# Patient Record
Sex: MALE | Race: WHITE | Employment: OTHER | URBAN - METROPOLITAN AREA
[De-identification: names, ages, dates, MRNs, and addresses within clinical notes are randomized per-mention and may not be internally consistent; named-entity substitution may affect disease eponyms.]

---

## 2017-09-26 RX ORDER — ASPIRIN 81 MG/1
81 TABLET ORAL DAILY
COMMUNITY
End: 2022-05-17

## 2017-09-26 RX ORDER — GLUCOSAM/CHONDRO/HERB 149/HYAL 750-100 MG
1 TABLET ORAL DAILY
COMMUNITY

## 2017-09-26 RX ORDER — LISINOPRIL 20 MG/1
20 TABLET ORAL DAILY
COMMUNITY
End: 2019-12-18

## 2017-09-26 RX ORDER — BISMUTH SUBSALICYLATE 262 MG
1 TABLET,CHEWABLE ORAL DAILY
COMMUNITY

## 2017-09-26 RX ORDER — PRAVASTATIN SODIUM 20 MG/1
20 TABLET ORAL
COMMUNITY

## 2017-09-26 RX ORDER — ACETAMINOPHEN 500 MG
1000 TABLET ORAL
COMMUNITY

## 2017-09-26 RX ORDER — METFORMIN HYDROCHLORIDE 1000 MG/1
1000 TABLET ORAL 2 TIMES DAILY
COMMUNITY

## 2017-09-26 RX ORDER — AMLODIPINE BESYLATE 5 MG/1
5 TABLET ORAL DAILY
COMMUNITY

## 2017-09-26 RX ORDER — LOSARTAN POTASSIUM 100 MG/1
100 TABLET ORAL DAILY
COMMUNITY

## 2017-09-27 ENCOUNTER — ANESTHESIA (OUTPATIENT)
Dept: ENDOSCOPY | Age: 77
End: 2017-09-27
Payer: MEDICARE

## 2017-09-27 ENCOUNTER — ANESTHESIA EVENT (OUTPATIENT)
Dept: ENDOSCOPY | Age: 77
End: 2017-09-27
Payer: MEDICARE

## 2017-09-27 ENCOUNTER — HOSPITAL ENCOUNTER (OUTPATIENT)
Age: 77
Setting detail: OUTPATIENT SURGERY
Discharge: HOME | End: 2017-09-27
Attending: INTERNAL MEDICINE | Admitting: INTERNAL MEDICINE
Payer: MEDICARE

## 2017-09-27 VITALS
HEART RATE: 58 BPM | DIASTOLIC BLOOD PRESSURE: 62 MMHG | WEIGHT: 251.5 LBS | HEIGHT: 68 IN | RESPIRATION RATE: 12 BRPM | BODY MASS INDEX: 38.12 KG/M2 | OXYGEN SATURATION: 100 % | SYSTOLIC BLOOD PRESSURE: 121 MMHG | TEMPERATURE: 97.5 F

## 2017-09-27 PROCEDURE — 74011250636 HC RX REV CODE- 250/636: Performed by: INTERNAL MEDICINE

## 2017-09-27 PROCEDURE — 77030010936 HC CLP LIG BSC -C: Performed by: INTERNAL MEDICINE

## 2017-09-27 PROCEDURE — 77030013992 HC SNR POLYP ENDOSC BSC -B: Performed by: INTERNAL MEDICINE

## 2017-09-27 PROCEDURE — 74011000250 HC RX REV CODE- 250

## 2017-09-27 PROCEDURE — 88305 TISSUE EXAM BY PATHOLOGIST: CPT | Performed by: INTERNAL MEDICINE

## 2017-09-27 PROCEDURE — 76040000007: Performed by: INTERNAL MEDICINE

## 2017-09-27 PROCEDURE — 77030003657 HC NDL SCLER BSC -B: Performed by: INTERNAL MEDICINE

## 2017-09-27 PROCEDURE — 74011250636 HC RX REV CODE- 250/636

## 2017-09-27 PROCEDURE — 76060000032 HC ANESTHESIA 0.5 TO 1 HR: Performed by: INTERNAL MEDICINE

## 2017-09-27 PROCEDURE — 74011250637 HC RX REV CODE- 250/637: Performed by: INTERNAL MEDICINE

## 2017-09-27 RX ORDER — MIDAZOLAM HYDROCHLORIDE 1 MG/ML
.25-5 INJECTION, SOLUTION INTRAMUSCULAR; INTRAVENOUS
Status: DISCONTINUED | OUTPATIENT
Start: 2017-09-27 | End: 2017-09-27 | Stop reason: HOSPADM

## 2017-09-27 RX ORDER — EPINEPHRINE 0.1 MG/ML
1 INJECTION INTRACARDIAC; INTRAVENOUS
Status: DISCONTINUED | OUTPATIENT
Start: 2017-09-27 | End: 2017-09-27 | Stop reason: HOSPADM

## 2017-09-27 RX ORDER — SODIUM CHLORIDE 0.9 % (FLUSH) 0.9 %
5-10 SYRINGE (ML) INJECTION AS NEEDED
Status: DISCONTINUED | OUTPATIENT
Start: 2017-09-27 | End: 2017-09-27 | Stop reason: HOSPADM

## 2017-09-27 RX ORDER — NALOXONE HYDROCHLORIDE 0.4 MG/ML
0.4 INJECTION, SOLUTION INTRAMUSCULAR; INTRAVENOUS; SUBCUTANEOUS
Status: DISCONTINUED | OUTPATIENT
Start: 2017-09-27 | End: 2017-09-27 | Stop reason: HOSPADM

## 2017-09-27 RX ORDER — ATROPINE SULFATE 0.1 MG/ML
0.5 INJECTION INTRAVENOUS
Status: DISCONTINUED | OUTPATIENT
Start: 2017-09-27 | End: 2017-09-27 | Stop reason: HOSPADM

## 2017-09-27 RX ORDER — SODIUM CHLORIDE 9 MG/ML
75 INJECTION, SOLUTION INTRAVENOUS CONTINUOUS
Status: DISCONTINUED | OUTPATIENT
Start: 2017-09-27 | End: 2017-09-27 | Stop reason: HOSPADM

## 2017-09-27 RX ORDER — SODIUM CHLORIDE 0.9 % (FLUSH) 0.9 %
5-10 SYRINGE (ML) INJECTION EVERY 8 HOURS
Status: DISCONTINUED | OUTPATIENT
Start: 2017-09-27 | End: 2017-09-27 | Stop reason: HOSPADM

## 2017-09-27 RX ORDER — LIDOCAINE HYDROCHLORIDE 20 MG/ML
INJECTION, SOLUTION EPIDURAL; INFILTRATION; INTRACAUDAL; PERINEURAL AS NEEDED
Status: DISCONTINUED | OUTPATIENT
Start: 2017-09-27 | End: 2017-09-27 | Stop reason: HOSPADM

## 2017-09-27 RX ORDER — FENTANYL CITRATE 50 UG/ML
50 INJECTION, SOLUTION INTRAMUSCULAR; INTRAVENOUS
Status: DISCONTINUED | OUTPATIENT
Start: 2017-09-27 | End: 2017-09-27 | Stop reason: HOSPADM

## 2017-09-27 RX ORDER — PROPOFOL 10 MG/ML
INJECTION, EMULSION INTRAVENOUS AS NEEDED
Status: DISCONTINUED | OUTPATIENT
Start: 2017-09-27 | End: 2017-09-27 | Stop reason: HOSPADM

## 2017-09-27 RX ORDER — SODIUM CHLORIDE 9 MG/ML
INJECTION, SOLUTION INTRAVENOUS
Status: DISCONTINUED | OUTPATIENT
Start: 2017-09-27 | End: 2017-09-27 | Stop reason: HOSPADM

## 2017-09-27 RX ORDER — FLUMAZENIL 0.1 MG/ML
0.2 INJECTION INTRAVENOUS
Status: DISCONTINUED | OUTPATIENT
Start: 2017-09-27 | End: 2017-09-27 | Stop reason: HOSPADM

## 2017-09-27 RX ORDER — DEXTROMETHORPHAN/PSEUDOEPHED 2.5-7.5/.8
1.2 DROPS ORAL
Status: DISCONTINUED | OUTPATIENT
Start: 2017-09-27 | End: 2017-09-27 | Stop reason: HOSPADM

## 2017-09-27 RX ADMIN — PROPOFOL 30 MG: 10 INJECTION, EMULSION INTRAVENOUS at 11:09

## 2017-09-27 RX ADMIN — SODIUM CHLORIDE 75 ML/HR: 900 INJECTION, SOLUTION INTRAVENOUS at 10:43

## 2017-09-27 RX ADMIN — SODIUM CHLORIDE: 9 INJECTION, SOLUTION INTRAVENOUS at 10:47

## 2017-09-27 RX ADMIN — SIMETHICONE 80 MG: 20 SUSPENSION/ DROPS ORAL at 10:59

## 2017-09-27 RX ADMIN — PROPOFOL 20 MG: 10 INJECTION, EMULSION INTRAVENOUS at 11:02

## 2017-09-27 RX ADMIN — LIDOCAINE HYDROCHLORIDE 60 MG: 20 INJECTION, SOLUTION EPIDURAL; INFILTRATION; INTRACAUDAL; PERINEURAL at 10:54

## 2017-09-27 RX ADMIN — PROPOFOL 100 MG: 10 INJECTION, EMULSION INTRAVENOUS at 10:54

## 2017-09-27 RX ADMIN — PROPOFOL 20 MG: 10 INJECTION, EMULSION INTRAVENOUS at 11:12

## 2017-09-27 RX ADMIN — PROPOFOL 20 MG: 10 INJECTION, EMULSION INTRAVENOUS at 10:59

## 2017-09-27 RX ADMIN — PROPOFOL 20 MG: 10 INJECTION, EMULSION INTRAVENOUS at 11:06

## 2017-09-27 RX ADMIN — PROPOFOL 40 MG: 10 INJECTION, EMULSION INTRAVENOUS at 10:57

## 2017-09-27 NOTE — DISCHARGE INSTRUCTIONS
[place] Office: [phone]    Anh Morales  [id number]  1940    EGD/COLONOSCOPY DISCHARGE INSTRUCTIONS  Discomfort:  Sore throat- throat lozenges or warm salt water gargle  redness at IV site- apply warm compress to area; if redness or soreness persist- contact your physician  Gaseous discomfort- walking, belching will help relieve any discomfort  You may not operate a vehicle for 12 hours  You may not engage in an occupation involving machinery or appliances for rest of today. You may not drink alcoholic beverages for at least 12 hours  Avoid making any critical decisions for at least 24 hour  DIET  You may resume your regular diet - however -  remember your colon is empty and a heavy meal will produce gas. Avoid these foods:  fried / greasy foods, excessive carbonated drinks or too much caffeine  MEDICATIONS   Regarding Aspirin or Nonsteroidal medications specifically, please see below. ACTIVITY  You may resume your normal daily activities. Spend the remainder of the day resting -  avoid any strenuous activity. CALL M.D. ANY SIGN OF   Increasing pain, nausea, vomiting  Abdominal distension (swelling)  New increased bleeding (oral or rectal)  Fever (chills)  Pain in chest area  Bloody discharge from nose or mouth  Shortness of breath    You may not take any Advil, Aspirin, Ibuprofen, Motrin, Aleve, or Goodys for 7 days, ONLY  Tylenol as needed for pain. Follow-up Instructions:   Call  Jerry Jaime MD for any questions or concerns  Results of procedure / biopsy in 7 days   Telephone # [phone]      Follow-up Information     None

## 2017-09-27 NOTE — ROUTINE PROCESS
Isis Ivonne  1940  [id number]    Situation:  Verbal report received from: Viet Mcdonough RN  Procedure: Procedure(s):  COLONOSCOPY  INJECTION  RESOLUTION CLIP x2  ENDOSCOPIC POLYPECTOMY    Background:    Preoperative diagnosis: PERSONAL HISTORY COLON POLYPS  Postoperative diagnosis: polyps, hemorrhoids, diverticulosis    :  Dr. Alanna Garcia  Assistant(s): Endoscopy Technician-1: Benito Decker  Endoscopy Technician-2: Balbir Diggs  Endoscopy RN-1: Ana Cristina Lorenz RN    Specimens:   ID Type Source Tests Collected by Time Destination   1 : cecum polyp Preservative Cecum  Leon Pimentel MD 9/27/2017 1122 Pathology   2 : ascending colon polyp Preservative Colon, Ascending  Leon Pimentel MD 9/27/2017 1123 Pathology   3 : transverse colon polyps Preservative Colon, Transverse  Leon Pimentel MD 9/27/2017 1123 Pathology   4 : descending colon polyp Preservative Colon, Descending  Leon Pimentel MD 9/27/2017 1124 Pathology     H. Pylori  no    Assessment:  Intra-procedure medications       Anesthesia gave intra-procedure sedation and medications, see anesthesia flow sheet     Intravenous fluids: NS@ KVO     Vital signs stable     Abdominal assessment: round and soft     Recommendation:  Discharge patient per MD order  Family or Friend   Permission to share finding with family or friend

## 2017-09-27 NOTE — ANESTHESIA PREPROCEDURE EVALUATION
Anesthetic History   No history of anesthetic complications            Review of Systems / Medical History  Patient summary reviewed, nursing notes reviewed and pertinent labs reviewed    Pulmonary  Within defined limits                 Neuro/Psych   Within defined limits           Cardiovascular  Within defined limits  Hypertension              Exercise tolerance: >4 METS     GI/Hepatic/Renal  Within defined limits              Endo/Other  Within defined limits  Diabetes    Arthritis     Other Findings   Comments: Hx Prostate Ca           Physical Exam    Airway  Mallampati: II  TM Distance: 4 - 6 cm  Neck ROM: normal range of motion   Mouth opening: Normal     Cardiovascular  Regular rate and rhythm,  S1 and S2 normal,  no murmur, click, rub, or gallop             Dental    Dentition: Full upper dentures and Full lower dentures     Pulmonary  Breath sounds clear to auscultation               Abdominal  GI exam deferred       Other Findings            Anesthetic Plan    ASA: 2  Anesthesia type: general and total IV anesthesia          Induction: Intravenous  Anesthetic plan and risks discussed with: Patient

## 2017-09-27 NOTE — PERIOP NOTES
Anesthesia reports 250mg Propofol, 60mg Lidocaine and 400mL NS given during procedure. Received report from anesthesia staff on vital signs and status of patient.

## 2017-09-27 NOTE — IP AVS SNAPSHOT
[address] [facility] 
[phone] Patient: Manan Collier MRN: [id number] [facility]:0/7/6073 You are allergic to the following No active allergies Recent Documentation Height Weight BMI Smoking Status 1.727 m 114.1 kg 38.24 kg/m2 Former Smoker Emergency Contacts Name Discharge Info Relation Home Work Mobile Janiya Castillo CAREGIVER [3] Spouse [3] [phone] About your hospitalization You were admitted on:  September 27, 2017 You last received care in the:  [facility] ENDOSCOPY You were discharged on:  September 27, 2017 Unit phone number:  [phone] Why you were hospitalized Your primary diagnosis was:  Not on File Providers Seen During Your Hospitalizations Provider Role Specialty Primary office phone Jannet Sanchez MD Attending Provider Gastroenterology [phone] Your Primary Care Physician (PCP) Primary Care Physician Office Phone Office Fax Rena Belcher [phone] Follow-up Information None Current Discharge Medication List  
  
CONTINUE these medications which have NOT CHANGED Dose & Instructions Dispensing Information Comments Morning Noon Evening Bedtime  
 amLODIPine 5 mg tablet Commonly known as:  Serge Emerald Your last dose was: Your next dose is:    
   
   
 Dose:  5 mg Take 5 mg by mouth daily. Refills:  0  
     
   
   
   
  
 aspirin delayed-release 81 mg tablet Your last dose was: Your next dose is:    
   
   
 Dose:  81 mg Take 81 mg by mouth daily. Refills:  0  
     
   
   
   
  
 FISH OIL 1,000 mg (120 mg-180 mg) Cap Generic drug:  Omega-3-DHA-EPA-Fish Oil Your last dose was: Your next dose is:    
   
   
 Dose:  1 Tab Take 1 Tab by mouth daily. Refills:  0 lisinopril 20 mg tablet Commonly known as:  Domitilaquentin Camacho Your last dose was: Your next dose is:    
   
   
 Dose:  20 mg Take 20 mg by mouth daily. Refills:  0  
     
   
   
   
  
 losartan 100 mg tablet Commonly known as:  COZAAR Your last dose was: Your next dose is:    
   
   
 Dose:  100 mg Take 100 mg by mouth daily. Refills:  0  
     
   
   
   
  
 metFORMIN 1,000 mg tablet Commonly known as:  GLUCOPHAGE Your last dose was: Your next dose is:    
   
   
 Dose:  1000 mg Take 1,000 mg by mouth two (2) times a day. Refills:  0  
     
   
   
   
  
 multivitamin tablet Commonly known as:  ONE A DAY Your last dose was: Your next dose is:    
   
   
 Dose:  1 Tab Take 1 Tab by mouth daily. Refills:  0  
     
   
   
   
  
 pravastatin 20 mg tablet Commonly known as:  PRAVACHOL Your last dose was: Your next dose is:    
   
   
 Dose:  20 mg Take 20 mg by mouth nightly. Refills:  0  
     
   
   
   
  
 TYLENOL EXTRA STRENGTH 500 mg tablet Generic drug:  acetaminophen Your last dose was: Your next dose is:    
   
   
 Dose:  1000 mg Take 1,000 mg by mouth every six (6) hours as needed for Pain. Refills:  0 Discharge Instructions [place] Office: [phone] Angelina Zaidi [phone] 
1940 EGD/COLONOSCOPY DISCHARGE INSTRUCTIONS Discomfort: 
Sore throat- throat lozenges or warm salt water gargle 
redness at IV site- apply warm compress to area; if redness or soreness persist- contact your physician Gaseous discomfort- walking, belching will help relieve any discomfort You may not operate a vehicle for 12 hours You may not engage in an occupation involving machinery or appliances for rest of today. You may not drink alcoholic beverages for at least 12 hours Avoid making any critical decisions for at least 24 hour DIET 
You may resume your regular diet  however -  remember your colon is empty and a heavy meal will produce gas. Avoid these foods:  fried / greasy foods, excessive carbonated drinks or too much caffeine MEDICATIONS Regarding Aspirin or Nonsteroidal medications specifically, please see below. ACTIVITY You may resume your normal daily activities. Spend the remainder of the day resting -  avoid any strenuous activity. CALL M.D. ANY SIGN OF Increasing pain, nausea, vomiting Abdominal distension (swelling) New increased bleeding (oral or rectal) Fever (chills) Pain in chest area Bloody discharge from nose or mouth Shortness of breath You may not take any Advil, Aspirin, Ibuprofen, Motrin, Aleve, or Goodys for 7 days, ONLY  Tylenol as needed for pain. Follow-up Instructions: 
 Call  Jerry Lujan MD for any questions or concerns Results of procedure / biopsy in 7 days Telephone # [phone] Follow-up Information None Discharge Orders None Introducing [facility]! Ama Kirk introduces [employer]seen patient portal. Now you can access parts of your medical record, email your doctor's office, and request medication refills online. 1. In your internet browser, go to [email or web address] 2. Click on the First Time User? Click Here link in the Sign In box. You will see the New Member Sign Up page. 3. Enter your Shopseen Access Code exactly as it appears below. You will not need to use this code after youve completed the sign-up process. If you do not sign up before the expiration date, you must request a new code. · [employer]seen Access Code: [email or web address] Expires: 12/26/2017  9:13 AM 
 
4. Enter the last four digits of your Social Security Number (xxxx) and Date of Birth (mm/dd/yyyy) as indicated and click Submit. You will be taken to the next sign-up page. 5. Create a Shopseen ID.  This will be your Shopseen login ID and cannot be changed, so think of one that is secure and easy to remember. 6. Create a Virtual Command password. You can change your password at any time. 7. Enter your Password Reset Question and Answer. This can be used at a later time if you forget your password. 8. Enter your e-mail address. You will receive e-mail notification when new information is available in [address] 9. Click Sign Up. You can now view and download portions of your medical record. 10. Click the Download Summary menu link to download a portable copy of your medical information. If you have questions, please visit the Frequently Asked Questions section of the [employer] Command website. Remember, Virtual Command is NOT to be used for urgent needs. For medical emergencies, dial 911. Now available from your iPhone and Android! General Information Please provide this summary of care documentation to your next provider. Patient Signature:  ____________________________________________________________ Date:  ____________________________________________________________  
  
Gildardo Benito Provider Signature:  ____________________________________________________________ Date:  ____________________________________________________________

## 2017-09-27 NOTE — H&P
Pre-endoscopy H and P    The patient was seen and examined in the room/pre-op holding area. The airway was assessed and documented. The problem list, past medical history, and medications were reviewed. There is no problem list on file for this patient. Social History     Social History    Marital status:      Spouse name: N/A    Number of children: N/A    Years of education: N/A     Occupational History    Not on file. Social History Main Topics    Smoking status: Former Smoker     Packs/day: 2.00     Years: 15.00     Quit date: 9/26/1965    Smokeless tobacco: Never Used    Alcohol use No    Drug use: No    Sexual activity: Not on file     Other Topics Concern    Not on file     Social History Narrative    No narrative on file     Past Medical History:   Diagnosis Date    Arthritis     Cancer ([facility] [address]     prostate cancer    Diabetes ([facility] [address] [facility]     Hypertension     Ill-defined condition     elevated cholesterol         Prior to Admission Medications   Prescriptions Last Dose Informant Patient Reported? Taking? Omega-3-DHA-EPA-Fish Oil (FISH OIL) 1,000 mg (120 mg-180 mg) cap 9/26/2017 at Unknown time  Yes Yes   Sig: Take 1 Tab by mouth daily. acetaminophen (TYLENOL EXTRA STRENGTH) 500 mg tablet 9/25/2017 at Unknown time  Yes Yes   Sig: Take 1,000 mg by mouth every six (6) hours as needed for Pain. amLODIPine (NORVASC) 5 mg tablet Not Taking at Unknown time  Yes No   Sig: Take 5 mg by mouth daily. aspirin delayed-release 81 mg tablet 9/20/2017 at Unknown time  Yes Yes   Sig: Take 81 mg by mouth daily. lisinopril (PRINIVIL, ZESTRIL) 20 mg tablet Not Taking at Unknown time  Yes No   Sig: Take 20 mg by mouth daily. losartan (COZAAR) 100 mg tablet 9/26/2017 at Unknown time  Yes Yes   Sig: Take 100 mg by mouth daily. metFORMIN (GLUCOPHAGE) 1,000 mg tablet 9/26/2017 at Unknown time  Yes Yes   Sig: Take 1,000 mg by mouth two (2) times a day.    multivitamin (ONE A DAY) tablet 9/26/2017 at Unknown time  Yes Yes   Sig: Take 1 Tab by mouth daily. pravastatin (PRAVACHOL) 20 mg tablet 9/26/2017 at Unknown time  Yes Yes   Sig: Take 20 mg by mouth nightly. Facility-Administered Medications: None           The review of systems is:  Negative  for shortness of breath or chest pain      The heart, lungs, and mental status were satisfactory for the administration of deep sedation and for the procedure. I discussed with the patient the objectives, risks, consequences and alternatives to the procedure.       eTrry Barrientos MD  9/27/2017  10:51 AM

## 2017-09-27 NOTE — PROCEDURES
Colonoscopy Procedure Note    Isis Coronado  1940  [id number]        Pre-operative Diagnosis: PERSONAL HISTORY COLON POLYPS    Post-operative Diagnosis: colon poylps, diverticulosis,internal hemorrhoids      : Jerry Garcia MD    Referring Provider: Radha Roldan MD    Sedation:  MAC anesthesia Propofol        Procedure Details:    After detailed informed consent was obtained with all risks and benefits of procedure explained and preoperative exam completed, the patient was taken to the endoscopy suite and placed in the left lateral decubitus position. Upon sequential sedation as per above, a digital rectal exam was performed  And was normal.  The Olympus videocolonoscope  was inserted in the rectum and carefully advanced to the cecum, which was identified by the ileocecal valve and appendiceal orifice. The quality of preparation was fair. The colonoscope was slowly withdrawn with careful evaluation between folds. Retroflexion in the rectum was performed. Findings:   · A 1.5 cm sessile cecal polyp was seen sitting in a recesss behind the ICV. It was lifted with 6 cc of SM normal saline with an injector needle and then snared off. 2 clips(360 Resolution) were applied for approximation and decreasing bleed/perofration risk. · A 8 mm ascending colon sessile polyp was snared off cold. · 2 sessile polyps removed with transverse colon. · A 8 mm sigmoid colon polyp was snared off cold. · Moderate sigmoid diverticulosis. · Medium sized internal hemorrhoids noted. · Prep is fair. Therapies:  5 complete polypectomy were performed using cold specimen retrieved and the polyps were  retrieved. 6 cc normal saline SM for lift, 2  Resolution clips. Specimen: Specimens were collected as described above and sent to pathology. Complications: None were encountered during the procedure. EBL:  None. Recommendations:     -Await pathology.  -High fiber diet.   -Repeat colonoscopy in 2 years (prep, polyps,past history)   Naturally, for new bleeding, unexplained weight loss,change in bowel habits and anemia, an earlier colonoscopy should be considered. Jerry Figueroa MD  9/27/2017  11:23 AM

## 2017-09-27 NOTE — ANESTHESIA POSTPROCEDURE EVALUATION
Post-Anesthesia Evaluation and Assessment    Patient: Maria M Zavala MRN: [id number]  SSN: [id number]    YOB: 1940  Age: 65 y.o. Sex: male       Cardiovascular Function/Vital Signs  Visit Vitals    /54    Pulse 64    Temp 36.4 °C (97.5 °F)    Resp 21    Ht 5' 8\" (1.727 m)    Wt 114.1 kg (251 lb 8 oz)    SpO2 96%    BMI 38.24 kg/m2       Patient is status post general, total IV anesthesia anesthesia for Procedure(s):  COLONOSCOPY  INJECTION  RESOLUTION CLIP x2  ENDOSCOPIC POLYPECTOMY. Nausea/Vomiting: None    Postoperative hydration reviewed and adequate. Pain:  Pain Scale 1: Numeric (0 - 10) (09/27/17 1140)  Pain Intensity 1: 2 (abdomen) (09/27/17 1140)   Managed    Neurological Status: At baseline    Mental Status and Level of Consciousness: Arousable    Pulmonary Status:   O2 Device: Room air (09/27/17 1140)   Adequate oxygenation and airway patent    Complications related to anesthesia: None    Post-anesthesia assessment completed.  No concerns    Signed By: Orlene Boxer, MD     September 27, 2017

## 2019-12-18 NOTE — PERIOP NOTES
[facility]  Ambulatory Surgery Unit  Pre-operative Instructions for Endo Procedures    Procedure Date  12/30            Tentative Arrival Time 0615      1. On the day of your procedure, please report to the Ambulatory Surgery Unit Registration Desk and sign in at your designated time. The Ambulatory Surgery Unit is located in [facility] on the [facility] side of the \[facility] [place] [facility]\"" across from the [facility] building. Please have all of your health insurance cards and a photo ID. 2. You must have someone with you to drive you home, as you should not drive a car for 24 hours following anesthesia. Please make arrangements for a responsible adult friend or family member to stay with you for at least the first 24 hours after your procedure. 3. Do not have anything to eat or drink (including water, gum, mints, coffee, juice) after 11:59 PM 12/29. This may not apply to medications prescribed by your physician. (Please note below the special instructions with medications to take the morning of your procedure.)    4. If applicable, follow the clear liquid diet and bowel prep instructions provided by your physician's office. If you do not have this information, or have any questions, please contact your physician's office. 5. We recommend you do not drink any alcoholic beverages for 24 hours before and after your procedure. 6. Contact your surgeons office for instructions on the following medications: non-steroidal anti-inflammatory drugs (i.e. Advil, Aleve), vitamins, and supplements. (Some surgeons will want you to stop these medications prior to surgery and others may allow you to take them)   **If you are currently taking Plavix, Coumadin, Aspirin and/or other blood-thinning agents, contact your surgeon for instructions. ** Your surgeon will partner with the physician prescribing these medications to determine if it is safe to stop or if you need to continue taking.  Please do not stop taking these medications without instructions from your surgeon. 7. In an effort to help prevent surgical site infection, we ask that you shower with an anti-bacterial soap (i.e. Dial or Safeguard) on the morning of your procedure. Do not apply any lotions, powders, or deodorants after showering. 8. Wear comfortable clothes. Wear glasses instead of contacts. Do not bring any jewelry or money (other than copays or fees as instructed). Do not wear make-up, particularly mascara, the morning of your procedure. Wear your hair loose or down, no ponytails, buns, candis pins or clips. All body piercings must be removed. 9. You should understand that if you do not follow these instructions your procedure may be cancelled. If your physical condition changes (i.e. fever, cold or flu) please contact your surgeon as soon as possible. 10. It is important that you be on time. If a situation occurs where you may be late, or if you have any questions or problems, please call [phone]. 11. Your procedure time may be subject to change. You will receive a phone call the day prior to confirm your arrival time. Special Instructions: Take all medications and inhalers, as prescribed, on the morning of surgery with a sip of water EXCEPT: Diabetic meds       Insulin Dependent Diabetic patients: Take your diabetic medications as prescribed the day before surgery. Hold all diabetic medications the day of surgery. If you are scheduled to arrive for surgery after 8:00 AM, and your AM blood sugar is >200, please call Ambulatory Surgery. I understand a pre-operative phone call will be made to verify my procedure time. In the event that I am not available, I give permission for a message to be left on my answering service and/or with another person?       yes         ___________________      ___________________      ___________________  (Signature of Patient)          (Witness)                   (Date and Time)

## 2019-12-27 ENCOUNTER — ANESTHESIA EVENT (OUTPATIENT)
Dept: SURGERY | Age: 79
End: 2019-12-27
Payer: MEDICARE

## 2019-12-30 ENCOUNTER — ANESTHESIA (OUTPATIENT)
Dept: SURGERY | Age: 79
End: 2019-12-30
Payer: MEDICARE

## 2019-12-30 ENCOUNTER — HOSPITAL ENCOUNTER (OUTPATIENT)
Age: 79
Setting detail: OUTPATIENT SURGERY
Discharge: HOME | End: 2019-12-30
Attending: INTERNAL MEDICINE | Admitting: INTERNAL MEDICINE
Payer: MEDICARE

## 2019-12-30 VITALS
SYSTOLIC BLOOD PRESSURE: 131 MMHG | HEIGHT: 69 IN | BODY MASS INDEX: 37.03 KG/M2 | DIASTOLIC BLOOD PRESSURE: 72 MMHG | WEIGHT: 250 LBS | HEART RATE: 60 BPM | OXYGEN SATURATION: 97 % | TEMPERATURE: 97.8 F | RESPIRATION RATE: 16 BRPM

## 2019-12-30 LAB
GLUCOSE BLD STRIP.AUTO-MCNC: 149 MG/DL (ref 65–100)
GLUCOSE BLD STRIP.AUTO-MCNC: 157 MG/DL (ref 65–100)
SERVICE CMNT-IMP: ABNORMAL
SERVICE CMNT-IMP: ABNORMAL

## 2019-12-30 PROCEDURE — 77030021352 HC CBL LD SYS DISP COVD -B: Performed by: INTERNAL MEDICINE

## 2019-12-30 PROCEDURE — 76060000073 HC AMB SURG ANES FIRST 0.5 HR: Performed by: INTERNAL MEDICINE

## 2019-12-30 PROCEDURE — 76210000040 HC AMBSU PH I REC FIRST 0.5 HR: Performed by: INTERNAL MEDICINE

## 2019-12-30 PROCEDURE — 77030013992 HC SNR POLYP ENDOSC BSC -B: Performed by: INTERNAL MEDICINE

## 2019-12-30 PROCEDURE — 76210000046 HC AMBSU PH II REC FIRST 0.5 HR: Performed by: INTERNAL MEDICINE

## 2019-12-30 PROCEDURE — 76030000002 HC AMB SURG OR TIME FIRST 0.: Performed by: INTERNAL MEDICINE

## 2019-12-30 PROCEDURE — 88305 TISSUE EXAM BY PATHOLOGIST: CPT

## 2019-12-30 PROCEDURE — 82962 GLUCOSE BLOOD TEST: CPT

## 2019-12-30 PROCEDURE — 77030020255 HC SOL INJ LR 1000ML BG: Performed by: INTERNAL MEDICINE

## 2019-12-30 PROCEDURE — 74011250636 HC RX REV CODE- 250/636: Performed by: NURSE ANESTHETIST, CERTIFIED REGISTERED

## 2019-12-30 PROCEDURE — 74011250636 HC RX REV CODE- 250/636: Performed by: INTERNAL MEDICINE

## 2019-12-30 RX ORDER — MIDAZOLAM HYDROCHLORIDE 1 MG/ML
.25-5 INJECTION, SOLUTION INTRAMUSCULAR; INTRAVENOUS
Status: DISCONTINUED | OUTPATIENT
Start: 2019-12-30 | End: 2019-12-30 | Stop reason: HOSPADM

## 2019-12-30 RX ORDER — SODIUM CHLORIDE 0.9 % (FLUSH) 0.9 %
5-40 SYRINGE (ML) INJECTION AS NEEDED
Status: DISCONTINUED | OUTPATIENT
Start: 2019-12-30 | End: 2019-12-30 | Stop reason: HOSPADM

## 2019-12-30 RX ORDER — NALOXONE HYDROCHLORIDE 0.4 MG/ML
0.4 INJECTION, SOLUTION INTRAMUSCULAR; INTRAVENOUS; SUBCUTANEOUS
Status: DISCONTINUED | OUTPATIENT
Start: 2019-12-30 | End: 2019-12-30 | Stop reason: SDUPTHER

## 2019-12-30 RX ORDER — PROPOFOL 10 MG/ML
INJECTION, EMULSION INTRAVENOUS AS NEEDED
Status: DISCONTINUED | OUTPATIENT
Start: 2019-12-30 | End: 2019-12-30 | Stop reason: HOSPADM

## 2019-12-30 RX ORDER — NALOXONE HYDROCHLORIDE 0.4 MG/ML
0.4 INJECTION, SOLUTION INTRAMUSCULAR; INTRAVENOUS; SUBCUTANEOUS
Status: DISCONTINUED | OUTPATIENT
Start: 2019-12-30 | End: 2019-12-30 | Stop reason: HOSPADM

## 2019-12-30 RX ORDER — SODIUM CHLORIDE, SODIUM LACTATE, POTASSIUM CHLORIDE, CALCIUM CHLORIDE 600; 310; 30; 20 MG/100ML; MG/100ML; MG/100ML; MG/100ML
25 INJECTION, SOLUTION INTRAVENOUS CONTINUOUS
Status: DISCONTINUED | OUTPATIENT
Start: 2019-12-30 | End: 2019-12-30 | Stop reason: HOSPADM

## 2019-12-30 RX ORDER — FLUMAZENIL 0.1 MG/ML
0.2 INJECTION INTRAVENOUS
Status: DISCONTINUED | OUTPATIENT
Start: 2019-12-30 | End: 2019-12-30 | Stop reason: HOSPADM

## 2019-12-30 RX ORDER — SODIUM CHLORIDE 9 MG/ML
75 INJECTION, SOLUTION INTRAVENOUS CONTINUOUS
Status: DISCONTINUED | OUTPATIENT
Start: 2019-12-30 | End: 2019-12-30 | Stop reason: HOSPADM

## 2019-12-30 RX ORDER — SODIUM CHLORIDE 0.9 % (FLUSH) 0.9 %
5-40 SYRINGE (ML) INJECTION EVERY 8 HOURS
Status: DISCONTINUED | OUTPATIENT
Start: 2019-12-30 | End: 2019-12-30 | Stop reason: HOSPADM

## 2019-12-30 RX ORDER — EPINEPHRINE 0.1 MG/ML
1 INJECTION INTRACARDIAC; INTRAVENOUS
Status: DISCONTINUED | OUTPATIENT
Start: 2019-12-30 | End: 2019-12-30 | Stop reason: HOSPADM

## 2019-12-30 RX ORDER — DEXTROMETHORPHAN/PSEUDOEPHED 2.5-7.5/.8
1.2 DROPS ORAL
Status: DISCONTINUED | OUTPATIENT
Start: 2019-12-30 | End: 2019-12-30 | Stop reason: HOSPADM

## 2019-12-30 RX ORDER — FENTANYL CITRATE 50 UG/ML
50 INJECTION, SOLUTION INTRAMUSCULAR; INTRAVENOUS
Status: DISCONTINUED | OUTPATIENT
Start: 2019-12-30 | End: 2019-12-30 | Stop reason: HOSPADM

## 2019-12-30 RX ORDER — ATROPINE SULFATE 0.1 MG/ML
0.5 INJECTION INTRAVENOUS
Status: DISCONTINUED | OUTPATIENT
Start: 2019-12-30 | End: 2019-12-30 | Stop reason: HOSPADM

## 2019-12-30 RX ADMIN — SODIUM CHLORIDE, SODIUM LACTATE, POTASSIUM CHLORIDE, AND CALCIUM CHLORIDE 25 ML/HR: 600; 310; 30; 20 INJECTION, SOLUTION INTRAVENOUS at 06:51

## 2019-12-30 RX ADMIN — PROPOFOL 50 MG: 10 INJECTION, EMULSION INTRAVENOUS at 07:51

## 2019-12-30 RX ADMIN — PROPOFOL 50 MG: 10 INJECTION, EMULSION INTRAVENOUS at 07:42

## 2019-12-30 RX ADMIN — PROPOFOL 50 MG: 10 INJECTION, EMULSION INTRAVENOUS at 07:41

## 2019-12-30 RX ADMIN — PROPOFOL 50 MG: 10 INJECTION, EMULSION INTRAVENOUS at 07:46

## 2019-12-30 NOTE — ANESTHESIA POSTPROCEDURE EVALUATION
Procedure(s):  COLONOSCOPY WITH POLYPECTOMY.     general, total IV anesthesia    Anesthesia Post Evaluation      Multimodal analgesia: multimodal analgesia not used between 6 hours prior to anesthesia start to PACU discharge  Patient location during evaluation: PACU  Patient participation: complete - patient participated  Level of consciousness: awake and alert  Pain score: 0  Airway patency: patent  Anesthetic complications: no  Cardiovascular status: acceptable  Respiratory status: acceptable  Hydration status: acceptable  Post anesthesia nausea and vomiting:  none      Vitals Value Taken Time   /62 12/30/2019  8:05 AM   Temp 36.6 °C (97.8 °F) 12/30/2019  7:56 AM   Pulse 63 12/30/2019  8:05 AM   Resp 15 12/30/2019  8:05 AM   SpO2 96 % 12/30/2019  8:05 AM

## 2019-12-30 NOTE — PROCEDURES
Colonoscopy Procedure Note    Esperanza Casper  1940  [id number]    Indications:  Please see below. Pre-operative Diagnosis: PERSONAL HISTORY COLON POLYPS    Post-operative Diagnosis: DIVERTICULOSIS, HEMORRHOIDS, POLYPS      : Jerry Esquivel MD    Referring Provider: Trip Almanzar MD    Sedation:  MAC anesthesia Propofol        Procedure Details:    After detailed informed consent was obtained with all risks and benefits of procedure explained and preoperative exam completed, the patient was taken to the endoscopy suite and placed in the left lateral decubitus position. Upon sequential sedation as per above, a digital rectal exam was performed  And was normal.  The Olympus videocolonoscope  was inserted in the rectum and carefully advanced to the cecum, which was identified by the ileocecal valve. The quality of preparation was inadequate. The colonoscope was slowly withdrawn with careful evaluation between folds. Retroflexion in the rectum was performed. Findings:   · The colon is not well prepped with vegetable material and liquid stool throughout which clogs the scope repeatedly. · 2 small polyps seen in transverse colon. Both removed with a cold snare. 1 polyp was lost in a stool puddle. · Moderate left sided diverticulosis. · Small internal hemorrhoids          Therapies:  2 complete polypectomies were performed using cold snare  and 1  polyp was retrieved. The other one was lost in a stool puddle    Specimen:  Specimens were collected as described above and sent to pathology. Complications: None were encountered during the procedure. EBL:  None. Recommendations:     -Await pathology. -Repeat colonoscopy in 6 months to 1 year  With a gallon [id number]/enema prep. Naturally, for new bleeding, unexplained weight loss,change in bowel habits and anemia, an earlier colonoscopy should be considered. Jerry Esquivel MD  12/30/2019  7:58 AM

## 2019-12-30 NOTE — DISCHARGE INSTRUCTIONS
[place] Office: [phone]    Javier Wilhelm  [id number]  1940    EGD/COLONOSCOPY DISCHARGE INSTRUCTIONS  Discomfort:  Sore throat- throat lozenges or warm salt water gargle  redness at IV site- apply warm compress to area; if redness or soreness persist- contact your physician  Gaseous discomfort- walking, belching will help relieve any discomfort  You may not operate a vehicle for 24 hours  You may not engage in an occupation involving machinery or appliances for rest of today. You may not drink alcoholic beverages for at least 24 hours  Avoid making any critical decisions for at least 24 hour  DIET  You may resume your regular diet - however -  remember your colon is empty and a heavy meal will produce gas. Avoid these foods:  fried / greasy foods, excessive carbonated drinks or too much caffeine  MEDICATIONS   Regarding Aspirin or Nonsteroidal medications specifically, please see below. ACTIVITY  You may resume your normal daily activities. Spend the remainder of the day resting -  avoid any strenuous activity. CALL M.D. ANY SIGN OF   Increasing pain, nausea, vomiting  Abdominal distension (swelling)  New increased bleeding (oral or rectal)  Fever (chills)  Pain in chest area  Bloody discharge from nose or mouth  Shortness of breath    You may not take any Advil, Aspirin, Ibuprofen, Motrin, Aleve, or Goodys for 5 days, ONLY  Tylenol as needed for pain. Follow-up Instructions:   Call  Jerry Green MD for any questions or concerns  Results of procedure / biopsy in 7 days   Telephone # [phone]  Start Miralax 17 gm ( take 2 times a day) and Metamucil (1 tbsp daily with water) these meds are over the counter    Repeat test in the next 6 months, office will get back in touch( will need gallon prep with enema)    Follow-up Information    None                DO NOT TAKE SLEEPING MEDICATIONS OR ANTIANXIETY MEDICATIONS WHILE TAKING NARCOTIC PAIN MEDICATIONS,  ESPECIALLY THE NIGHT OF ANESTHESIA. CPAP PATIENTS BE SURE TO WEAR MACHINE WHENEVER NAPPING OR SLEEPING. DISCHARGE SUMMARY from Nurse    The following personal items collected during your admission are returned to you:   Dental Appliance: Dental Appliances: Lowers, Uppers  Vision: Visual Aid: Glasses  Hearing Aid:    Jewelry:    Clothing:    Other Valuables:    Valuables sent to safe:        PATIENT INSTRUCTIONS:    Anesthesia Discharge Instructions for Procedural Area requiring Sedation (MAC Anesthesia, Cath Lab, Endo and Radiology): You have been given medications during your procedure that may affect your memory and mental judgement for the next 24 hours. During this time frame for your safety, please follow the instructions listed below :    Have a responsible adult to drive you home and be with you for at least 12 hours.  Rest today and resume normal activities tomorrow.  Start with a soft bland diet and advance as tolerated to your recommended diet.  Do not drive any motor vehicle or operate mechanical or electrical equipment prior to [employer].  Avoid making critical decisions or signing legal documents prior to 6am tomorrow.  Do not drink alcohol prior to 6am tomorrow.  If you have sleep apnea and you plan to go home and take a nap, please use your CPAP machine not only at bedtime, but also while napping for 24 hours.  If you notice any redness or swelling on parts of your body where IV medications were given, place a warm wet washcloth over the area for 20 minutes at a time until the redness or swelling goes away. If you still have redness or swelling after 2-3 days, please call us. · You will receive a Post Operative Call from one of the Recovery Room Nurses on the day after your surgery to check on you. It is very important for us to know how you are recovering after your surgery.  If you have an issue or need to speak with someone, please call your surgeon, do not wait for the post operative call.    · You may receive an e-mail or letter in the mail from [facility] regarding your experience with us in the Ambulatory Surgery Unit. Your feedback is valuable to us and we appreciate your participation in the survey. · If the above instructions are not adequate, please contact Jobe Kussmaul, BSN, RN Perianesthesia Manager at [phone]. If you are having problems after your surgery, call the physician at his office number. · We wish you a speedy recovery ? What to do at Home:      *  Please give a list of your current medications to your Primary Care Provider. *  Please update this list whenever your medications are discontinued, doses are      changed, or new medications (including over-the-counter products) are added. *  Please carry medication information at all times in case of emergency situations. If you have not received your influenza and/or pneumococcal vaccine, please follow up with your primary care physician. The discharge information has been reviewed with the patient and spouse. The patient and spouse verbalized understanding.

## 2019-12-30 NOTE — H&P
Pre-endoscopy H and P    The patient was seen and examined in the room/pre-op holding area. The airway was assessed and documented. The problem list, past medical history, and medications were reviewed. There is no problem list on file for this patient.     Social History     Socioeconomic History    Marital status:      Spouse name: Not on file    Number of children: Not on file    Years of education: Not on file    Highest education level: Not on file   Occupational History    Not on file   Social Needs    Financial resource strain: Not on file    Food insecurity:     Worry: Not on file     Inability: Not on file    Transportation needs:     Medical: Not on file     Non-medical: Not on file   Tobacco Use    Smoking status: Former Smoker     Packs/day: 2.00     Years: 15.00     Pack years: 30.00     Last attempt to quit: 1965     Years since quittin.2    Smokeless tobacco: Never Used   Substance and Sexual Activity    Alcohol use: No    Drug use: No    Sexual activity: Not on file   Lifestyle    Physical activity:     Days per week: Not on file     Minutes per session: Not on file    Stress: Not on file   Relationships    Social connections:     Talks on phone: Not on file     Gets together: Not on file     Attends Quaker service: Not on file     Active member of club or organization: Not on file     Attends meetings of clubs or organizations: Not on file     Relationship status: Not on file    Intimate partner violence:     Fear of current or ex partner: Not on file     Emotionally abused: Not on file     Physically abused: Not on file     Forced sexual activity: Not on file   Other Topics Concern    Not on file   Social History Narrative    Not on file     Past Medical History:   Diagnosis Date    Arthritis     At risk for sleep apnea 2019    Stop Bang 5     Cancer [facility])     prostate cancer    Diabetes ([facility] [address]     Elevated cholesterol     Hypertension Prior to Admission Medications   Prescriptions Last Dose Informant Patient Reported? Taking? Omega-3-DHA-EPA-Fish Oil (FISH OIL) 1,000 mg (120 mg-180 mg) cap 12/29/2019 at Unknown time  Yes Yes   Sig: Take 1 Tab by mouth daily. acetaminophen (TYLENOL EXTRA STRENGTH) 500 mg tablet 12/29/2019 at Unknown time  Yes Yes   Sig: Take 1,000 mg by mouth every six (6) hours as needed for Pain. amLODIPine (NORVASC) 5 mg tablet 12/30/2019 at Unknown time  Yes Yes   Sig: Take 5 mg by mouth daily. aspirin delayed-release 81 mg tablet 12/29/2019 at Unknown time  Yes Yes   Sig: Take 81 mg by mouth daily. losartan (COZAAR) 100 mg tablet 12/30/2019 at Unknown time  Yes Yes   Sig: Take 100 mg by mouth daily. metFORMIN (GLUCOPHAGE) 1,000 mg tablet 12/29/2019 at Unknown time  Yes Yes   Sig: Take 1,000 mg by mouth two (2) times a day. multivitamin (ONE A DAY) tablet 12/29/2019 at Unknown time  Yes Yes   Sig: Take 1 Tab by mouth daily. pravastatin (PRAVACHOL) 20 mg tablet 12/29/2019 at Unknown time  Yes Yes   Sig: Take 20 mg by mouth nightly. Facility-Administered Medications: None           The review of systems is:  Negative  for shortness of breath or chest pain      The heart, lungs, and mental status were satisfactory for the administration of deep sedation and for the procedure. I discussed with the patient the objectives, risks, consequences and alternatives to the procedure.       Rosalinda Doyle MD  12/30/2019  7:36 AM

## 2019-12-30 NOTE — ANESTHESIA PREPROCEDURE EVALUATION
Anesthetic History   No history of anesthetic complications            Review of Systems / Medical History  Patient summary reviewed, nursing notes reviewed and pertinent labs reviewed    Pulmonary  Within defined limits                 Neuro/Psych   Within defined limits           Cardiovascular  Within defined limits  Hypertension: well controlled              Exercise tolerance: >4 METS     GI/Hepatic/Renal  Within defined limits              Endo/Other  Within defined limits  Diabetes: type 2    Arthritis     Other Findings   Comments: Hx Prostate Ca         Physical Exam    Airway  Mallampati: II  TM Distance: 4 - 6 cm  Neck ROM: normal range of motion   Mouth opening: Normal     Cardiovascular    Rhythm: regular  Rate: normal         Dental    Dentition: Full upper dentures and Full lower dentures     Pulmonary  Breath sounds clear to auscultation               Abdominal  GI exam deferred       Other Findings            Anesthetic Plan    ASA: 2  Anesthesia type: general and total IV anesthesia          Induction: Intravenous  Anesthetic plan and risks discussed with: Patient      preop glucose 157

## 2019-12-30 NOTE — PERIOP NOTES
Papo Radha  1940  [id number]    Situation:  Verbal report given from: [address] [place] [address]  Procedure: Procedure(s):  COLONOSCOPY WITH POLYPECTOMY    Background:    Preoperative diagnosis: PERSONAL HISTORY COLON POLYPS    Postoperative diagnosis: DIVERTICULOSIS, HEMORRHOIDS, POLYPS    :  Dr. Jesus Sandhu    Assistant(s): Circ-1: Marcello Finnegan RN  Circ-2: Vasile Montague RN  Scrub Tech-1: Renetta Kirby     Specimens:   ID Type Source Tests Collected by Time Destination   1 : TRANSVERSE COLON POLYP Preservative Colon, Transverse  Tommy Solitario MD 12/30/2019 6405 Pathology       Assessment:  Intra-procedure medications         Anesthesia gave intra-procedure sedation and medications, see anesthesia flow sheet     Intravenous fluids: LR@ KVO     Vital signs stable       Recommendation:    Permission to share finding with wife Anna Garrido

## 2020-01-02 NOTE — PERIOP NOTES
[facility]  Ambulatory Surgery Unit  Pre-operative Instructions    Surgery/Procedure Date  Wednesday, January 8, 2020            Tentative Arrival Time TBD      1. On the day of your surgery/procedure, please report to the Ambulatory Surgery Unit Registration Desk and sign in at your designated time. The Ambulatory Surgery Unit is located in [facility] on the [facility] side of the [facility] [place] across from the [address] [place] [address]. Please have all of your health insurance cards and a photo ID. 2. You must have someone with you to drive you home, as you should not drive a car for 24 hours following anesthesia. Please make arrangements for a responsible adult friend or family member to stay with you for at least the first 24 hours after your surgery. 3. Do not have anything to eat or drink (including water, gum, mints, coffee, juice) after 11:59 PM, Tuesday. This may not apply to medications prescribed by your physician. (Please note below the special instructions with medications to take the morning of surgery, if applicable.)    4. We recommend you do not drink any alcoholic beverages for 24 hours before and after your surgery. 5. Contact your surgeons office for instructions on the following medications: non-steroidal anti-inflammatory drugs (i.e. Advil, Aleve), vitamins, and supplements. (Some surgeons will want you to stop these medications prior to surgery and others may allow you to take them)   **If you are currently taking Plavix, Coumadin, Aspirin and/or other blood-thinning agents, contact your surgeon for instructions. ** Your surgeon will partner with the physician prescribing these medications to determine if it is safe to stop or if you need to continue taking. Please do not stop taking these medications without instructions from your surgeon.     6. In an effort to help prevent surgical site infection, we ask that you shower with an anti-bacterial soap (i.e. Dial/Safeguard, or the soap provided to you at your preadmission testing appointment) for 3 days prior to and on the morning of surgery, using a fresh towel after each shower. (Please begin this process with fresh bed linens.) Do not apply any lotions, powders, or deodorants after the shower on the day of your procedure. If applicable, please do not shave the operative site for 48 hours prior to surgery. 7. Wear comfortable clothes. Wear glasses instead of contacts. Do not bring any jewelry or money (other than copays or fees as instructed). Do not wear make-up, particularly mascara, the morning of your surgery. Do not wear nail polish, particularly if you are having foot /hand surgery. Wear your hair loose or down, no ponytails, buns, candis pins or clips. All body piercings must be removed. 8. You should understand that if you do not follow these instructions your surgery may be cancelled. If your physical condition changes (i.e. fever, cold or flu) please contact your surgeon as soon as possible. 9. It is important that you be on time. If a situation occurs where you may be late, or if you have any questions or problems, please call [phone].    10. Your surgery time may be subject to change. You will receive a phone call the day prior to surgery to confirm your arrival time. 11. Pediatric patients: please bring a change of clothes, diapers, bottle/sippy cup, pacifier, etc.      Special Instructions: Take all medications and inhalers, as prescribed, on the morning of surgery with a sip of water EXCEPT: no diabetic medications day of surgery. Insulin Dependent Diabetic patients: Take your diabetic medications as prescribed the day before surgery. Hold all diabetic medications the day of surgery. If you are scheduled to arrive for surgery after 8:00 AM, and your AM blood sugar is >200, please call Ambulatory Surgery.       I understand a pre-operative phone call will be made to verify my surgery time. In the event that I am not available, I give permission for a message to be left on my answering service and/or with another person?       yes    Preop instructions reviewed  Pt verbalized understanding.      ___________________      ___________________      ________________  (Signature of Patient)          (Witness)                   (Date and Time)

## 2020-01-03 ENCOUNTER — APPOINTMENT (OUTPATIENT)
Dept: ULTRASOUND IMAGING | Age: 80
End: 2020-01-03
Attending: EMERGENCY MEDICINE
Payer: MEDICARE

## 2020-01-03 ENCOUNTER — HOSPITAL ENCOUNTER (EMERGENCY)
Age: 80
Discharge: HOME | End: 2020-01-03
Attending: EMERGENCY MEDICINE
Payer: MEDICARE

## 2020-01-03 ENCOUNTER — APPOINTMENT (OUTPATIENT)
Dept: GENERAL RADIOLOGY | Age: 80
End: 2020-01-03
Attending: EMERGENCY MEDICINE
Payer: MEDICARE

## 2020-01-03 VITALS
TEMPERATURE: 98 F | HEART RATE: 71 BPM | HEIGHT: 68 IN | RESPIRATION RATE: 20 BRPM | OXYGEN SATURATION: 96 % | SYSTOLIC BLOOD PRESSURE: 127 MMHG | WEIGHT: 259.04 LBS | BODY MASS INDEX: 39.26 KG/M2 | DIASTOLIC BLOOD PRESSURE: 85 MMHG

## 2020-01-03 DIAGNOSIS — R10.13 ABDOMINAL PAIN, EPIGASTRIC: ICD-10-CM

## 2020-01-03 DIAGNOSIS — K85.00 IDIOPATHIC ACUTE PANCREATITIS WITHOUT INFECTION OR NECROSIS: Primary | ICD-10-CM

## 2020-01-03 LAB
ALBUMIN SERPL-MCNC: 3.5 G/DL (ref 3.5–5)
ALBUMIN/GLOB SERPL: 0.9 {RATIO} (ref 1.1–2.2)
ALP SERPL-CCNC: 70 U/L (ref 45–117)
ALT SERPL-CCNC: 32 U/L (ref 12–78)
ANION GAP SERPL CALC-SCNC: 5 MMOL/L (ref 5–15)
AST SERPL-CCNC: 27 U/L (ref 15–37)
ATRIAL RATE: 90 BPM
BASOPHILS # BLD: 0 K/UL (ref 0–0.1)
BASOPHILS NFR BLD: 0 % (ref 0–1)
BILIRUB SERPL-MCNC: 1.1 MG/DL (ref 0.2–1)
BUN SERPL-MCNC: 14 MG/DL (ref 6–20)
BUN/CREAT SERPL: 13 (ref 12–20)
CALCIUM SERPL-MCNC: 9 MG/DL (ref 8.5–10.1)
CALCULATED P AXIS, ECG09: 65 DEGREES
CALCULATED R AXIS, ECG10: -3 DEGREES
CALCULATED T AXIS, ECG11: -10 DEGREES
CHLORIDE SERPL-SCNC: 102 MMOL/L (ref 97–108)
CO2 SERPL-SCNC: 31 MMOL/L (ref 21–32)
CREAT SERPL-MCNC: 1.06 MG/DL (ref 0.7–1.3)
DIAGNOSIS, 93000: NORMAL
DIFFERENTIAL METHOD BLD: NORMAL
EOSINOPHIL # BLD: 0.1 K/UL (ref 0–0.4)
EOSINOPHIL NFR BLD: 2 % (ref 0–7)
ERYTHROCYTE [DISTWIDTH] IN BLOOD BY AUTOMATED COUNT: 12.9 % (ref 11.5–14.5)
GLOBULIN SER CALC-MCNC: 3.7 G/DL (ref 2–4)
GLUCOSE SERPL-MCNC: 260 MG/DL (ref 65–100)
HCT VFR BLD AUTO: 41.5 % (ref 36.6–50.3)
HGB BLD-MCNC: 13.8 G/DL (ref 12.1–17)
IMM GRANULOCYTES # BLD AUTO: 0 K/UL (ref 0–0.04)
IMM GRANULOCYTES NFR BLD AUTO: 0 % (ref 0–0.5)
LIPASE SERPL-CCNC: 1146 U/L (ref 73–393)
LYMPHOCYTES # BLD: 1.3 K/UL (ref 0.8–3.5)
LYMPHOCYTES NFR BLD: 22 % (ref 12–49)
MCH RBC QN AUTO: 29.9 PG (ref 26–34)
MCHC RBC AUTO-ENTMCNC: 33.3 G/DL (ref 30–36.5)
MCV RBC AUTO: 89.8 FL (ref 80–99)
MONOCYTES # BLD: 0.7 K/UL (ref 0–1)
MONOCYTES NFR BLD: 12 % (ref 5–13)
NEUTS SEG # BLD: 4 K/UL (ref 1.8–8)
NEUTS SEG NFR BLD: 64 % (ref 32–75)
NRBC # BLD: 0 K/UL (ref 0–0.01)
NRBC BLD-RTO: 0 PER 100 WBC
P-R INTERVAL, ECG05: 178 MS
PLATELET # BLD AUTO: 192 K/UL (ref 150–400)
PMV BLD AUTO: 10 FL (ref 8.9–12.9)
POTASSIUM SERPL-SCNC: 4 MMOL/L (ref 3.5–5.1)
PROT SERPL-MCNC: 7.2 G/DL (ref 6.4–8.2)
Q-T INTERVAL, ECG07: 366 MS
QRS DURATION, ECG06: 116 MS
QTC CALCULATION (BEZET), ECG08: 447 MS
RBC # BLD AUTO: 4.62 M/UL (ref 4.1–5.7)
SODIUM SERPL-SCNC: 138 MMOL/L (ref 136–145)
TROPONIN I SERPL-MCNC: <0.05 NG/ML
VENTRICULAR RATE, ECG03: 90 BPM
WBC # BLD AUTO: 6.2 K/UL (ref 4.1–11.1)

## 2020-01-03 PROCEDURE — 93005 ELECTROCARDIOGRAM TRACING: CPT

## 2020-01-03 PROCEDURE — 85025 COMPLETE CBC W/AUTO DIFF WBC: CPT

## 2020-01-03 PROCEDURE — 84484 ASSAY OF TROPONIN QUANT: CPT

## 2020-01-03 PROCEDURE — 99284 EMERGENCY DEPT VISIT MOD MDM: CPT

## 2020-01-03 PROCEDURE — 36415 COLL VENOUS BLD VENIPUNCTURE: CPT

## 2020-01-03 PROCEDURE — 71045 X-RAY EXAM CHEST 1 VIEW: CPT

## 2020-01-03 PROCEDURE — 76705 ECHO EXAM OF ABDOMEN: CPT

## 2020-01-03 PROCEDURE — 83690 ASSAY OF LIPASE: CPT

## 2020-01-03 PROCEDURE — 80053 COMPREHEN METABOLIC PANEL: CPT

## 2020-01-03 NOTE — ED NOTES
Assumed care. Patient denies complaints at this time but reported that yesterday he was having upper abdominal pain and chest pain and wanted to get checked out. Alert and oriented. NAD. Cardiac, bp, and pulse ox monitoring in place. Call bell in reach. Will continue to monitor. 1100 Xray at bedside for portable cxr.    1313 Discharge instructions provided to patient. Verbalized understanding. Alert and oriented. IV lock removed. Monitoring discontinued. Patient ambulated with steady gait out of ED.

## 2020-01-07 ENCOUNTER — ANESTHESIA EVENT (OUTPATIENT)
Dept: SURGERY | Age: 80
End: 2020-01-07
Payer: MEDICARE

## 2020-01-07 PROBLEM — H25.812 COMBINED FORMS OF AGE-RELATED CATARACT OF LEFT EYE: Status: ACTIVE | Noted: 2020-01-07

## 2020-01-08 ENCOUNTER — ANESTHESIA (OUTPATIENT)
Dept: SURGERY | Age: 80
End: 2020-01-08
Payer: MEDICARE

## 2020-01-08 ENCOUNTER — HOSPITAL ENCOUNTER (OUTPATIENT)
Age: 80
Setting detail: OUTPATIENT SURGERY
Discharge: HOME | End: 2020-01-08
Attending: OPHTHALMOLOGY | Admitting: OPHTHALMOLOGY
Payer: MEDICARE

## 2020-01-08 VITALS
HEART RATE: 63 BPM | RESPIRATION RATE: 12 BRPM | SYSTOLIC BLOOD PRESSURE: 136 MMHG | DIASTOLIC BLOOD PRESSURE: 65 MMHG | WEIGHT: 253 LBS | OXYGEN SATURATION: 96 % | BODY MASS INDEX: 37.47 KG/M2 | TEMPERATURE: 98.2 F | HEIGHT: 69 IN

## 2020-01-08 LAB
GLUCOSE BLD STRIP.AUTO-MCNC: 160 MG/DL (ref 65–100)
GLUCOSE BLD STRIP.AUTO-MCNC: 192 MG/DL (ref 65–100)
SERVICE CMNT-IMP: ABNORMAL
SERVICE CMNT-IMP: ABNORMAL

## 2020-01-08 PROCEDURE — 74011000250 HC RX REV CODE- 250: Performed by: OPHTHALMOLOGY

## 2020-01-08 PROCEDURE — 82962 GLUCOSE BLOOD TEST: CPT

## 2020-01-08 PROCEDURE — 77030003029 HC SUT VCRL J&J -B: Performed by: OPHTHALMOLOGY

## 2020-01-08 PROCEDURE — 74011250636 HC RX REV CODE- 250/636: Performed by: NURSE ANESTHETIST, CERTIFIED REGISTERED

## 2020-01-08 PROCEDURE — 77030018850 HC STOCK ANTIEMB THG COVD -A: Performed by: OPHTHALMOLOGY

## 2020-01-08 PROCEDURE — 77030021352 HC CBL LD SYS DISP COVD -B: Performed by: OPHTHALMOLOGY

## 2020-01-08 PROCEDURE — 74011000250 HC RX REV CODE- 250

## 2020-01-08 PROCEDURE — V2632 POST CHMBR INTRAOCULAR LENS: HCPCS | Performed by: OPHTHALMOLOGY

## 2020-01-08 PROCEDURE — 74011250636 HC RX REV CODE- 250/636: Performed by: OPHTHALMOLOGY

## 2020-01-08 PROCEDURE — 76030000002 HC AMB SURG OR TIME FIRST 0.: Performed by: OPHTHALMOLOGY

## 2020-01-08 PROCEDURE — 76060000073 HC AMB SURG ANES FIRST 0.5 HR: Performed by: OPHTHALMOLOGY

## 2020-01-08 PROCEDURE — 76210000046 HC AMBSU PH II REC FIRST 0.5 HR: Performed by: OPHTHALMOLOGY

## 2020-01-08 PROCEDURE — 74011250637 HC RX REV CODE- 250/637: Performed by: OPHTHALMOLOGY

## 2020-01-08 PROCEDURE — 77030018846 HC SOL IRR STRL H20 ICUM -A: Performed by: OPHTHALMOLOGY

## 2020-01-08 DEVICE — LENS IOL POST 1-PC 6X13 22.0 -- ACRYSOF: Type: IMPLANTABLE DEVICE | Site: EYE | Status: FUNCTIONAL

## 2020-01-08 RX ORDER — SODIUM CHLORIDE 0.9 % (FLUSH) 0.9 %
5-40 SYRINGE (ML) INJECTION AS NEEDED
Status: DISCONTINUED | OUTPATIENT
Start: 2020-01-08 | End: 2020-01-08 | Stop reason: HOSPADM

## 2020-01-08 RX ORDER — POVIDONE-IODINE 10 %
SOLUTION, NON-ORAL TOPICAL
Status: COMPLETED | OUTPATIENT
Start: 2020-01-08 | End: 2020-01-08

## 2020-01-08 RX ORDER — SODIUM CHLORIDE 9 MG/ML
25 INJECTION, SOLUTION INTRAVENOUS CONTINUOUS
Status: DISCONTINUED | OUTPATIENT
Start: 2020-01-08 | End: 2020-01-08 | Stop reason: HOSPADM

## 2020-01-08 RX ORDER — GENTAMICIN SULFATE 0.3 %
0.5 OINTMENT (GRAM) OPHTHALMIC (EYE) 3 TIMES DAILY
Status: DISCONTINUED | OUTPATIENT
Start: 2020-01-08 | End: 2020-01-08 | Stop reason: HOSPADM

## 2020-01-08 RX ORDER — ONDANSETRON 2 MG/ML
4 INJECTION INTRAMUSCULAR; INTRAVENOUS AS NEEDED
Status: DISCONTINUED | OUTPATIENT
Start: 2020-01-08 | End: 2020-01-08 | Stop reason: HOSPADM

## 2020-01-08 RX ORDER — KETOROLAC TROMETHAMINE 5 MG/ML
1 SOLUTION OPHTHALMIC 4 TIMES DAILY
Status: COMPLETED | OUTPATIENT
Start: 2020-01-08 | End: 2020-01-08

## 2020-01-08 RX ORDER — SODIUM CHLORIDE 0.9 % (FLUSH) 0.9 %
5-40 SYRINGE (ML) INJECTION EVERY 8 HOURS
Status: DISCONTINUED | OUTPATIENT
Start: 2020-01-08 | End: 2020-01-08 | Stop reason: HOSPADM

## 2020-01-08 RX ORDER — SODIUM CHLORIDE, SODIUM LACTATE, POTASSIUM CHLORIDE, CALCIUM CHLORIDE 600; 310; 30; 20 MG/100ML; MG/100ML; MG/100ML; MG/100ML
25 INJECTION, SOLUTION INTRAVENOUS CONTINUOUS
Status: DISCONTINUED | OUTPATIENT
Start: 2020-01-08 | End: 2020-01-08 | Stop reason: HOSPADM

## 2020-01-08 RX ORDER — TETRACAINE HYDROCHLORIDE 5 MG/ML
1 SOLUTION OPHTHALMIC
Status: DISCONTINUED | OUTPATIENT
Start: 2020-01-08 | End: 2020-01-08 | Stop reason: HOSPADM

## 2020-01-08 RX ORDER — LIDOCAINE HYDROCHLORIDE 20 MG/ML
INJECTION, SOLUTION INFILTRATION; PERINEURAL
Status: DISCONTINUED
Start: 2020-01-08 | End: 2020-01-08 | Stop reason: HOSPADM

## 2020-01-08 RX ORDER — ERYTHROMYCIN 5 MG/G
OINTMENT OPHTHALMIC ONCE
Status: DISCONTINUED | OUTPATIENT
Start: 2020-01-08 | End: 2020-01-08 | Stop reason: HOSPADM

## 2020-01-08 RX ORDER — TROPICAMIDE 10 MG/ML
SOLUTION/ DROPS OPHTHALMIC
Status: COMPLETED
Start: 2020-01-08 | End: 2020-01-08

## 2020-01-08 RX ORDER — MOXIFLOXACIN 5 MG/ML
1 SOLUTION/ DROPS OPHTHALMIC 3 TIMES DAILY
COMMUNITY
End: 2021-11-16

## 2020-01-08 RX ORDER — MIDAZOLAM HYDROCHLORIDE 1 MG/ML
INJECTION, SOLUTION INTRAMUSCULAR; INTRAVENOUS AS NEEDED
Status: DISCONTINUED | OUTPATIENT
Start: 2020-01-08 | End: 2020-01-08 | Stop reason: HOSPADM

## 2020-01-08 RX ORDER — DIPHENHYDRAMINE HYDROCHLORIDE 50 MG/ML
12.5 INJECTION, SOLUTION INTRAMUSCULAR; INTRAVENOUS AS NEEDED
Status: DISCONTINUED | OUTPATIENT
Start: 2020-01-08 | End: 2020-01-08 | Stop reason: HOSPADM

## 2020-01-08 RX ORDER — LIDOCAINE HYDROCHLORIDE 40 MG/ML
3 INJECTION, SOLUTION RETROBULBAR; TOPICAL ONCE
Status: COMPLETED | OUTPATIENT
Start: 2020-01-08 | End: 2020-01-08

## 2020-01-08 RX ORDER — FENTANYL CITRATE 50 UG/ML
25 INJECTION, SOLUTION INTRAMUSCULAR; INTRAVENOUS
Status: DISCONTINUED | OUTPATIENT
Start: 2020-01-08 | End: 2020-01-08 | Stop reason: HOSPADM

## 2020-01-08 RX ORDER — PREDNISOLONE ACETATE 10 MG/ML
1 SUSPENSION/ DROPS OPHTHALMIC 2 TIMES DAILY
Status: DISCONTINUED | OUTPATIENT
Start: 2020-01-08 | End: 2020-01-08 | Stop reason: HOSPADM

## 2020-01-08 RX ORDER — LIDOCAINE HYDROCHLORIDE 10 MG/ML
0.1 INJECTION, SOLUTION EPIDURAL; INFILTRATION; INTRACAUDAL; PERINEURAL AS NEEDED
Status: DISCONTINUED | OUTPATIENT
Start: 2020-01-08 | End: 2020-01-08 | Stop reason: HOSPADM

## 2020-01-08 RX ORDER — TROPICAMIDE 10 MG/ML
1 SOLUTION/ DROPS OPHTHALMIC ONCE
Status: COMPLETED | OUTPATIENT
Start: 2020-01-08 | End: 2020-01-08

## 2020-01-08 RX ORDER — PROPARACAINE HYDROCHLORIDE 5 MG/ML
1 SOLUTION/ DROPS OPHTHALMIC ONCE
Status: COMPLETED | OUTPATIENT
Start: 2020-01-08 | End: 2020-01-08

## 2020-01-08 RX ADMIN — Medication 3 AMPULE: at 06:45

## 2020-01-08 RX ADMIN — MIDAZOLAM HYDROCHLORIDE 0.5 MG: 1 INJECTION, SOLUTION INTRAMUSCULAR; INTRAVENOUS at 07:50

## 2020-01-08 RX ADMIN — SODIUM CHLORIDE 25 ML/HR: 900 INJECTION, SOLUTION INTRAVENOUS at 06:34

## 2020-01-08 RX ADMIN — MIDAZOLAM HYDROCHLORIDE 0.5 MG: 1 INJECTION, SOLUTION INTRAMUSCULAR; INTRAVENOUS at 07:42

## 2020-01-08 RX ADMIN — MIDAZOLAM HYDROCHLORIDE 0.5 MG: 1 INJECTION, SOLUTION INTRAMUSCULAR; INTRAVENOUS at 07:49

## 2020-01-08 RX ADMIN — KETOROLAC TROMETHAMINE 1 DROP: 5 SOLUTION OPHTHALMIC at 06:45

## 2020-01-08 RX ADMIN — TROPICAMIDE 1 DROP: 10 SOLUTION/ DROPS OPHTHALMIC at 06:45

## 2020-01-08 RX ADMIN — PROPARACAINE HYDROCHLORIDE 1 DROP: 5 SOLUTION/ DROPS OPHTHALMIC at 06:45

## 2020-01-08 NOTE — OP NOTES
Name: Kristal Vail MASC-4        updated 3/1/2013  Description:  Bridgette Andrew with intraocular lens implant    PREOPERATIVE DIAGNOSIS: Visually impairing combined cataract, Left eye. POSTOPERATIVE DIAGNOSIS: Visually impairing combined   cataract,Left eye. OPERATION: Procedure(s):  LEFT EYE FIRST REFRACTIVE CATARCT REMOVAL WITH LENS IMPLANTATION    ANESTHESIA: Topical with intravenous sedation    TYPE OF LENS IMPLANT USED:   Implant Name Type Inv. Item Serial No.  Lot No. LRB No. Used Action   IOL ASPHERIC 6.0 JACINTA +22.0 - [id number]  IOL ASPHERIC 6.0 JACINTA +22.0 [id number] [employer] NA Left 1 Implanted       PHACO TIME:   37 seconds at an average power of 12.6%. Estimated blood loss: None    Complications:None    Specimen removed: None    DESCRIPTION OF PROCEDURE:  The patient was brought to the holding area where an IV was begun at a  keep open rate. The patient was connected to cardiovascular monitoring. The patient received 1 drop of mydriacyl 1% and proparacaine 0.5%. The patient was then brought back to the operating suite and cardiovascular monitoring was reestablished. The patient was  prepped and draped in the usual manner for ocular surgery. The patient received 1 drop of Proparacaine followed by 2 drops of 5% Betadine solution in the inferior conjunctival cul-de-sac The operating microscope was brought into position and 4% Xylocaine drops were instilled onto the eye. The lid speculum was set into position. A paracentesis was created and Sugarcane solution was instilled into the anterior chamber for adequate anesthesia and pupillary dilation. Viscoelastic was instilled into the anterior chamber. The 2.4 mm keratome was then utilized to create a clear corneal incision, and a circular capsulorrhexis was performed. BSS was utilized to perform careful hydrodissection of the lens. Viscoelastic was then instilled into the anterior chamber to protect the corneal endothelium. Phacoemulsification was then carried out. Any remaining cortical material was removed in irrigation/aspiration mode. Viscoelastic was then instilled into the capsular bag. The lens implant was inspected for any defects. After finding none, the lens was placed in its injector and inserted into the capsular bag. The lens implant was centered on the patient's visual/astigmatic axis. The viscoelastic was then removed from the eye. Miochol was instilled posterior to the iris plane to facilitate pupillary miosis. The wound was checked for any leaks, after finding none, Iopidine and Pred Forte drops were instilled into the inferior cul-de-sac, and erythromycin ointment was placed over the cornea. A semi-pressure dressing and shield were then placed for the patient. The patient tolerated the procedure very well, and was brought to the recovery room in an alert and stable and satisfactory postoperative condition. Instructions were given to the patient and their family for their immediate postoperative care.   [address] [place] [address],   1/8/2020

## 2020-01-08 NOTE — PERIOP NOTES
Terence Yan  1940  [id number]    Situation:  Verbal report given from:  Beatriz MCQUEEN and Chilango MCDONALD  Procedure: Procedure(s):  LEFT EYE FIRST REFRACTIVE CATARCT REMOVAL WITH LENS IMPLANTATION    Background:    Preoperative diagnosis: Combined forms of age-related cataract of left eye [H25.812]    Postoperative diagnosis: Combined forms of age-related cataract of left eye [H25.812]    :  Dr. Jonnie Paul    Assistant(s): Circ-1: Sylvester Winkler RN  Scrub Tech-1: Edin Donovan    Specimens: * No specimens in log *    Assessment:  Intra-procedure medications         Anesthesia gave intra-procedure sedation and medications, see anesthesia flow sheet     Intravenous fluids: NS @ KVO     Vital signs stable       Recommendation:    Permission to share finding with wife : yes

## 2020-01-08 NOTE — ANESTHESIA POSTPROCEDURE EVALUATION
Procedure(s):  LEFT EYE FIRST REFRACTIVE CATARCT REMOVAL WITH LENS IMPLANTATION. MAC    Anesthesia Post Evaluation      Multimodal analgesia: multimodal analgesia not used between 6 hours prior to anesthesia start to PACU discharge  Patient location during evaluation: PACU  Patient participation: complete - patient participated  Level of consciousness: awake and alert  Pain score: 0  Airway patency: patent  Anesthetic complications: no  Cardiovascular status: acceptable  Respiratory status: acceptable  Hydration status: acceptable  Post anesthesia nausea and vomiting:  none      No vitals data found for the desired time range.

## 2020-01-08 NOTE — PERIOP NOTES
Received to pACU awake/alert. BS [id number]  0825 Discharged to home via/wc,accompanied to car per RN. Skin warm and dry, awake and alert. Respirations even, unlabored. Pt and family members questions and concerns addressed prior to discharge. All belongings with pt.

## 2020-01-08 NOTE — DISCHARGE INSTRUCTIONS
Josh oCllado D.O., P.CLila  [facility] 183.  [phone]    Post-Operative Instructions for Cataract Surgery     Remove your eye shield and bandage at 12 noon the same day as surgery and start your eye drops. THROW AWAY THE GAUZE UNDER THE SHIELD.  Place the blue eye shield back on for one week while asleep, even if napping in the recliner. Use the tape included in your bag.  DO NOT RUB YOUR EYE EVER! DO NOT WIPE YOUR EYE! ONLY WIPE ON YOUR CHEEK!                DO NOT WEAR EYE MAKEUP FOR 1 WEEK!  You can shower starting tomorrow.  You may resume your previous diet.  If you use glaucoma drops in the operative eye, continue them as directed.  Common symptoms after surgery include a scratchy feeling, slight headache, red eye, and/or blurred vision. You may use Advil or Tylenol for any discomfort.  Avoid strenuous activities and driving until you see Dr. Karon Starkey tomorrow. Please use care when walking, your depth perception may be altered.  Bring your bag with your drops to your follow up appointment. Below are Instructions On How To Use Your Eye Drops. ON THE DAY OF SURGERY:     Use all three eye drops at 12 noon, 4pm, and 8pm.  Wait 10 minutes between drops. THE DAY AFTER SURGERY:     You will use the drops 4 times a day at 8am, 12 noon, 4pm, and 8pm.   Use the drops every day until bottles are empty. Vigamox (tan top) Put 1 drop in at 8am, 12 noon, 4pm, and 8pm.    Pred Acetate (pink top) Put 1 drop in at 8:10am, 12:10pm, 4:10pm, and 8:10pm. Shake before using. Ketorolac/Diclofenac Put 1 drop in at 8:20am, 12:20pm, 4:20pm, 8:20pm.    CONTINUE DROPS UNTIL ALL BOTTLES ARE EMPTY! Follow-up appointment tomorrow at Dr. Vale Dumont office:_______291 am____________    Please call the office at [phone] if you experience severe pain or nausea.      The following personal items collected during your admission are returned to you:   Dental Appliance: Dental Appliances: Lowers, Uppers, With patient  Vision: Visual Aid: Glasses, With patient  Hearing Aid: @FLOW  DISCHARGE SUMMARY from Nurse  (1341:LAST)@  Jewelry: Jewelry: Ring, With patient  Clothing: Clothing: With patient, Shirt, Undergarments, Footwear, Pants, Socks, Jacket/Coat, At bedside  Other Valuables: Other Valuables: Eyeglasses(given to wife)  Valuables sent to safe:        PATIENT INSTRUCTIONS:    After General Anesthesia or Intravenous Sedation, for 24 hours or while taking prescription Narcotics:        Someone should be with you for the next 24 hours. For your own safety, a responsible adult must drive you home. · Limit your activities  · Recommended activity: Rest today, up with assistance today. Do not climb stairs or shower unattended for the next 24 hours. · Please start with a soft bland diet and advance as tolerated (no nausea) to regular diet. · If you have a sore throat you should try the following: fluids, warm salt water gargles, or throat lozenges. If it does not improve after several days please follow up with your primary physician. · Do not drive and operate hazardous machinery  · Do not make important personal or business decisions  · Do  not drink alcoholic beverages  · If you have not urinated within 8 hours after discharge, please contact your surgeon on call. Report the following to your surgeon:  · Excessive pain, swelling, redness or odor of or around the surgical area  · Temperature over 100.5  · Any nausea or vomiting. · You will receive a Post Operative Call from one of the Recovery Room Nurses on the day after your surgery to check on you. It is very important for us to know how you are recovering after your surgery. If you have an issue or need to speak with someone, please call your surgeon, do not wait for the post operative call.     · You may receive an e-mail or letter in the mail from [employer] regarding your experience with us in the Ambulatory Surgery Unit. Your feedback is valuable to us and we appreciate your participation in the survey. · If the above instructions are not adequate or you are having problems after your surgery, call the physician at their office number. · We wish you a speedy recovery ? What to do at Home:      *  Please give a list of your current medications to your Primary Care Provider. *  Please update this list whenever your medications are discontinued, doses are      changed, or new medications (including over-the-counter products) are added. *  Please carry medication information at all times in case of emergency situations. If you have not received your influenza and/or pneumococcal vaccine, please follow up with your primary care physician. The discharge information has been reviewed with the patient and family. The patient and family verbalized understanding. TO PREVENT AN INFECTION      1. [address] YOUR HANDS     To prevent infection, good handwashing is the most important thing you or your caregiver can do.  Wash your hands with soap and water or use the hand  we gave you before you touch any wounds. 2.  USE CLEAN SHEETS     Use freshly cleaned sheets on your bed after surgery.  To keep the surgery site clean, do not allow pets to sleep with you while your wound is still healing. 3. STOP SMOKING    Stop smoking, or at least cut back on smoking     Smoking slows your healing. 4.  CONTROL YOUR BLOOD SUGAR     High blood sugars slow wound healing.  If you are diabetic, control your blood sugar levels before and after your surgery.

## 2020-01-08 NOTE — ANESTHESIA PREPROCEDURE EVALUATION
Anesthetic History   No history of anesthetic complications            Review of Systems / Medical History  Patient summary reviewed, nursing notes reviewed and pertinent labs reviewed    Pulmonary  Within defined limits                 Neuro/Psych   Within defined limits           Cardiovascular  Within defined limits  Hypertension: well controlled              Exercise tolerance: >4 METS     GI/Hepatic/Renal  Within defined limits              Endo/Other  Within defined limits  Diabetes: type 2    Arthritis     Other Findings   Comments: Hx Prostate Ca    Recent ED (01/2020) visit for epigastric pain/ pancreatitis           Physical Exam    Airway  Mallampati: II  TM Distance: 4 - 6 cm  Neck ROM: normal range of motion   Mouth opening: Normal     Cardiovascular    Rhythm: regular  Rate: normal         Dental    Dentition: Full upper dentures and Full lower dentures     Pulmonary  Breath sounds clear to auscultation               Abdominal  GI exam deferred       Other Findings            Anesthetic Plan    ASA: 2  Anesthesia type: MAC          Induction: Intravenous  Anesthetic plan and risks discussed with: Patient      preop glucose 192

## 2020-01-09 PROBLEM — H25.812 COMBINED FORMS OF AGE-RELATED CATARACT OF LEFT EYE: Status: RESOLVED | Noted: 2020-01-07 | Resolved: 2020-01-09

## 2020-01-09 PROBLEM — H25.811 COMBINED FORMS OF AGE-RELATED CATARACT OF RIGHT EYE: Status: ACTIVE | Noted: 2020-01-09

## 2020-01-13 NOTE — PERIOP NOTES
[facility]  Ambulatory Surgery Unit  Pre-operative Instructions    Surgery/Procedure Date  1/15            Tentative Arrival Time TBD      1. On the day of your surgery/procedure, please report to the Ambulatory Surgery Unit Registration Desk and sign in at your designated time. The Ambulatory Surgery Unit is located in [facility] on the [facility] side of the [facility] across from the [address] [place] [address]. Please have all of your health insurance cards and a photo ID. 2. You must have someone with you to drive you home, as you should not drive a car for 24 hours following anesthesia. Please make arrangements for a responsible adult friend or family member to stay with you for at least the first 24 hours after your surgery. 3. Do not have anything to eat or drink (including water, gum, mints, coffee, juice) after 11:59 PM  1/14. This may not apply to medications prescribed by your physician. (Please note below the special instructions with medications to take the morning of surgery, if applicable.)    4. We recommend you do not drink any alcoholic beverages for 24 hours before and after your surgery. 5. Contact your surgeons office for instructions on the following medications: non-steroidal anti-inflammatory drugs (i.e. Advil, Aleve), vitamins, and supplements. (Some surgeons will want you to stop these medications prior to surgery and others may allow you to take them)   **If you are currently taking Plavix, Coumadin, Aspirin and/or other blood-thinning agents, contact your surgeon for instructions. ** Your surgeon will partner with the physician prescribing these medications to determine if it is safe to stop or if you need to continue taking. Please do not stop taking these medications without instructions from your surgeon.     6. In an effort to help prevent surgical site infection, we ask that you shower with an anti-bacterial soap (i.e. Dial/Safeguard, or the soap provided to you at your preadmission testing appointment) on the morning of surgery, using a fresh towel after each shower. (Please begin this process with fresh bed linens.) Do not apply any lotions, powders, or deodorants after the shower on the day of your procedure. If applicable, please do not shave the operative site for 48 hours prior to surgery. 7. Wear comfortable clothes. Wear glasses instead of contacts. Do not bring any jewelry or money (other than copays or fees as instructed). Do not wear make-up, particularly mascara, the morning of your surgery. Do not wear nail polish, particularly if you are having foot /hand surgery. Wear your hair loose or down, no ponytails, buns, candis pins or clips. All body piercings must be removed. 8. You should understand that if you do not follow these instructions your surgery may be cancelled. If your physical condition changes (i.e. fever, cold or flu) please contact your surgeon as soon as possible. 9. It is important that you be on time. If a situation occurs where you may be late, or if you have any questions or problems, please call [phone].    10. Your surgery time may be subject to change. You will receive a phone call the day prior to surgery to confirm your arrival time. Special Instructions: Take all medications and inhalers, as prescribed, on the morning of surgery with a sip of water EXCEPT: diabetic medications      Insulin Dependent Diabetic patients: Take your diabetic medications as prescribed the day before surgery. Hold all diabetic medications the day of surgery. If you are scheduled to arrive for surgery after 8:00 AM, and your AM blood sugar is >200, please call Ambulatory Surgery. I understand a pre-operative phone call will be made to verify my surgery time. In the event that I am not available, I give permission for a message to be left on my answering service and/or with another person?       yes    Reviewed by phone with pt, verbalized understanding     ___________________      ___________________      ________________  (Signature of Patient)          (Witness)                   (Date and Time)

## 2020-01-14 ENCOUNTER — ANESTHESIA EVENT (OUTPATIENT)
Dept: SURGERY | Age: 80
End: 2020-01-14
Payer: MEDICARE

## 2020-01-15 ENCOUNTER — ANESTHESIA (OUTPATIENT)
Dept: SURGERY | Age: 80
End: 2020-01-15
Payer: MEDICARE

## 2020-01-15 ENCOUNTER — HOSPITAL ENCOUNTER (OUTPATIENT)
Age: 80
Setting detail: OUTPATIENT SURGERY
Discharge: HOME | End: 2020-01-15
Attending: OPHTHALMOLOGY | Admitting: OPHTHALMOLOGY
Payer: MEDICARE

## 2020-01-15 VITALS
DIASTOLIC BLOOD PRESSURE: 70 MMHG | HEART RATE: 57 BPM | HEIGHT: 68 IN | BODY MASS INDEX: 38.4 KG/M2 | SYSTOLIC BLOOD PRESSURE: 125 MMHG | WEIGHT: 253.38 LBS | OXYGEN SATURATION: 97 % | TEMPERATURE: 98 F | RESPIRATION RATE: 12 BRPM

## 2020-01-15 LAB
GLUCOSE BLD STRIP.AUTO-MCNC: 162 MG/DL (ref 65–100)
GLUCOSE BLD STRIP.AUTO-MCNC: 164 MG/DL (ref 65–100)
SERVICE CMNT-IMP: ABNORMAL
SERVICE CMNT-IMP: ABNORMAL

## 2020-01-15 PROCEDURE — 74011000250 HC RX REV CODE- 250

## 2020-01-15 PROCEDURE — 74011250636 HC RX REV CODE- 250/636: Performed by: NURSE ANESTHETIST, CERTIFIED REGISTERED

## 2020-01-15 PROCEDURE — 77030021352 HC CBL LD SYS DISP COVD -B: Performed by: OPHTHALMOLOGY

## 2020-01-15 PROCEDURE — 74011000250 HC RX REV CODE- 250: Performed by: OPHTHALMOLOGY

## 2020-01-15 PROCEDURE — 76030000002 HC AMB SURG OR TIME FIRST 0.: Performed by: OPHTHALMOLOGY

## 2020-01-15 PROCEDURE — 77030018846 HC SOL IRR STRL H20 ICUM -A: Performed by: OPHTHALMOLOGY

## 2020-01-15 PROCEDURE — 76060000073 HC AMB SURG ANES FIRST 0.5 HR: Performed by: OPHTHALMOLOGY

## 2020-01-15 PROCEDURE — 74011250636 HC RX REV CODE- 250/636: Performed by: OPHTHALMOLOGY

## 2020-01-15 PROCEDURE — 76210000046 HC AMBSU PH II REC FIRST 0.5 HR: Performed by: OPHTHALMOLOGY

## 2020-01-15 PROCEDURE — 82962 GLUCOSE BLOOD TEST: CPT

## 2020-01-15 PROCEDURE — V2632 POST CHMBR INTRAOCULAR LENS: HCPCS | Performed by: OPHTHALMOLOGY

## 2020-01-15 PROCEDURE — 74011250637 HC RX REV CODE- 250/637: Performed by: OPHTHALMOLOGY

## 2020-01-15 DEVICE — LENS IOL POST 1-PC 6X13 21.5 -- ACRYSOF: Type: IMPLANTABLE DEVICE | Site: EYE | Status: FUNCTIONAL

## 2020-01-15 RX ORDER — MIDAZOLAM HYDROCHLORIDE 1 MG/ML
0.5 INJECTION, SOLUTION INTRAMUSCULAR; INTRAVENOUS
Status: DISCONTINUED | OUTPATIENT
Start: 2020-01-15 | End: 2020-01-15 | Stop reason: HOSPADM

## 2020-01-15 RX ORDER — LIDOCAINE HYDROCHLORIDE 10 MG/ML
0.1 INJECTION, SOLUTION EPIDURAL; INFILTRATION; INTRACAUDAL; PERINEURAL AS NEEDED
Status: DISCONTINUED | OUTPATIENT
Start: 2020-01-15 | End: 2020-01-15 | Stop reason: HOSPADM

## 2020-01-15 RX ORDER — SODIUM CHLORIDE 9 MG/ML
25 INJECTION, SOLUTION INTRAVENOUS CONTINUOUS
Status: DISCONTINUED | OUTPATIENT
Start: 2020-01-15 | End: 2020-01-15 | Stop reason: HOSPADM

## 2020-01-15 RX ORDER — MIDAZOLAM HYDROCHLORIDE 1 MG/ML
1 INJECTION, SOLUTION INTRAMUSCULAR; INTRAVENOUS AS NEEDED
Status: DISCONTINUED | OUTPATIENT
Start: 2020-01-15 | End: 2020-01-15 | Stop reason: HOSPADM

## 2020-01-15 RX ORDER — DIPHENHYDRAMINE HYDROCHLORIDE 50 MG/ML
12.5 INJECTION, SOLUTION INTRAMUSCULAR; INTRAVENOUS AS NEEDED
Status: DISCONTINUED | OUTPATIENT
Start: 2020-01-15 | End: 2020-01-15 | Stop reason: HOSPADM

## 2020-01-15 RX ORDER — TROPICAMIDE 10 MG/ML
SOLUTION/ DROPS OPHTHALMIC
Status: COMPLETED
Start: 2020-01-15 | End: 2020-01-15

## 2020-01-15 RX ORDER — LIDOCAINE HYDROCHLORIDE 40 MG/ML
3 INJECTION, SOLUTION RETROBULBAR; TOPICAL ONCE
Status: COMPLETED | OUTPATIENT
Start: 2020-01-15 | End: 2020-01-15

## 2020-01-15 RX ORDER — TETRACAINE HYDROCHLORIDE 5 MG/ML
1 SOLUTION OPHTHALMIC
Status: ACTIVE | OUTPATIENT
Start: 2020-01-15 | End: 2020-01-15

## 2020-01-15 RX ORDER — HYDROMORPHONE HYDROCHLORIDE 1 MG/ML
0.5 INJECTION, SOLUTION INTRAMUSCULAR; INTRAVENOUS; SUBCUTANEOUS
Status: DISCONTINUED | OUTPATIENT
Start: 2020-01-15 | End: 2020-01-15 | Stop reason: HOSPADM

## 2020-01-15 RX ORDER — GENTAMICIN SULFATE 0.3 %
OINTMENT (GRAM) OPHTHALMIC (EYE) AS NEEDED
Status: DISCONTINUED | OUTPATIENT
Start: 2020-01-15 | End: 2020-01-15 | Stop reason: HOSPADM

## 2020-01-15 RX ORDER — MIDAZOLAM HYDROCHLORIDE 1 MG/ML
INJECTION, SOLUTION INTRAMUSCULAR; INTRAVENOUS AS NEEDED
Status: DISCONTINUED | OUTPATIENT
Start: 2020-01-15 | End: 2020-01-15 | Stop reason: HOSPADM

## 2020-01-15 RX ORDER — HYDROCODONE BITARTRATE AND ACETAMINOPHEN 5; 325 MG/1; MG/1
1 TABLET ORAL AS NEEDED
Status: DISCONTINUED | OUTPATIENT
Start: 2020-01-15 | End: 2020-01-15 | Stop reason: HOSPADM

## 2020-01-15 RX ORDER — PREDNISOLONE ACETATE 10 MG/ML
1 SUSPENSION/ DROPS OPHTHALMIC 2 TIMES DAILY
Status: DISCONTINUED | OUTPATIENT
Start: 2020-01-15 | End: 2020-01-15 | Stop reason: HOSPADM

## 2020-01-15 RX ORDER — KETOROLAC TROMETHAMINE 5 MG/ML
1 SOLUTION OPHTHALMIC ONCE
Status: COMPLETED | OUTPATIENT
Start: 2020-01-15 | End: 2020-01-15

## 2020-01-15 RX ORDER — PROPARACAINE HYDROCHLORIDE 5 MG/ML
1 SOLUTION/ DROPS OPHTHALMIC ONCE
Status: COMPLETED | OUTPATIENT
Start: 2020-01-15 | End: 2020-01-15

## 2020-01-15 RX ORDER — ERYTHROMYCIN 5 MG/G
OINTMENT OPHTHALMIC ONCE
Status: DISCONTINUED | OUTPATIENT
Start: 2020-01-15 | End: 2020-01-15 | Stop reason: HOSPADM

## 2020-01-15 RX ORDER — FENTANYL CITRATE 50 UG/ML
50 INJECTION, SOLUTION INTRAMUSCULAR; INTRAVENOUS AS NEEDED
Status: DISCONTINUED | OUTPATIENT
Start: 2020-01-15 | End: 2020-01-15 | Stop reason: HOSPADM

## 2020-01-15 RX ORDER — FENTANYL CITRATE 50 UG/ML
25 INJECTION, SOLUTION INTRAMUSCULAR; INTRAVENOUS
Status: DISCONTINUED | OUTPATIENT
Start: 2020-01-15 | End: 2020-01-15 | Stop reason: HOSPADM

## 2020-01-15 RX ORDER — TETRACAINE HYDROCHLORIDE 5 MG/ML
SOLUTION OPHTHALMIC AS NEEDED
Status: DISCONTINUED | OUTPATIENT
Start: 2020-01-15 | End: 2020-01-15 | Stop reason: HOSPADM

## 2020-01-15 RX ORDER — ONDANSETRON 2 MG/ML
4 INJECTION INTRAMUSCULAR; INTRAVENOUS AS NEEDED
Status: DISCONTINUED | OUTPATIENT
Start: 2020-01-15 | End: 2020-01-15 | Stop reason: HOSPADM

## 2020-01-15 RX ADMIN — MIDAZOLAM HYDROCHLORIDE 1 MG: 1 INJECTION, SOLUTION INTRAMUSCULAR; INTRAVENOUS at 08:17

## 2020-01-15 RX ADMIN — TROPICAMIDE 1 DROP: 10 SOLUTION/ DROPS OPHTHALMIC at 07:23

## 2020-01-15 RX ADMIN — PROPARACAINE HYDROCHLORIDE 1 DROP: 5 SOLUTION/ DROPS OPHTHALMIC at 07:23

## 2020-01-15 RX ADMIN — SODIUM CHLORIDE 25 ML/HR: 900 INJECTION, SOLUTION INTRAVENOUS at 07:21

## 2020-01-15 RX ADMIN — MIDAZOLAM HYDROCHLORIDE 0.5 MG: 1 INJECTION, SOLUTION INTRAMUSCULAR; INTRAVENOUS at 08:02

## 2020-01-15 RX ADMIN — Medication 3 AMPULE: at 07:22

## 2020-01-15 RX ADMIN — KETOROLAC TROMETHAMINE 1 DROP: 5 SOLUTION OPHTHALMIC at 07:25

## 2020-01-15 RX ADMIN — MIDAZOLAM HYDROCHLORIDE 0.5 MG: 1 INJECTION, SOLUTION INTRAMUSCULAR; INTRAVENOUS at 08:06

## 2020-01-15 NOTE — PERIOP NOTES
Amy Mcknightt  1940  [id number]    Situation:  Verbal report given from: Carin Garcia CRNA RN  Procedure: Procedure(s):  RIGHT EYE SECOND REFRACTIVE CATARCT REMOVAL WITH LENS IMPLANTATION    Background:    Preoperative diagnosis: Combined forms of age-related cataract of right eye [H25.811]    Postoperative diagnosis: Combined forms of age-related cataract of right eye [H25.811]    :  Dr. Sophia Posada    Assistant(s): Circ-1: Sandra Soto RN  Scrub Tech-1: Maris Callahan    Specimens: * No specimens in log *    Assessment:  Intra-procedure medications         Anesthesia gave intra-procedure sedation and medications, see anesthesia flow sheet     Intravenous fluids: LR@ KVO     Vital signs stable       Recommendation:    Permission to share finding with Vaishali : yes

## 2020-01-15 NOTE — PERIOP NOTES
Permission received to review discharge instructions and discuss private health information with, wife Cameron Barragan. Patient states that wife, Cameron Barragan will be with her for at least 24 hours following today's procedure. Pt refused blanket.

## 2020-01-15 NOTE — OP NOTES
Name: Ana Jordan MASC-4        updated 3/1/2013  Description:  Uriel Leach with intraocular lens implant    PREOPERATIVE DIAGNOSIS: Visually impairing combined cataract, Right eye. POSTOPERATIVE DIAGNOSIS: Visually impairing combined   cataract,Right eye. OPERATION: Procedure(s):  RIGHT EYE SECOND REFRACTIVE CATARCT REMOVAL WITH LENS IMPLANTATION    ANESTHESIA: Topical with intravenous sedation    TYPE OF LENS IMPLANT USED:   Implant Name Type Inv. Item Serial No.  Lot No. LRB No. Used Action   IOL ASPHERIC 6.0 JACINTA +21.5 - [id number]  IOL ASPHERIC 6.0 JACINTA +21.5 [id number] [employer] Right 1 Implanted       PHACO TIME:   50 seconds at an average power of 18.9%. Estimated blood loss: None    Complications:None    Specimen removed: None    DESCRIPTION OF PROCEDURE:  The patient was brought to the holding area where an IV was begun at a  keep open rate. The patient was connected to cardiovascular monitoring. The patient received 1 drop of mydriacyl 1% and proparacaine 0.5%. The patient was then brought back to the operating suite and cardiovascular monitoring was reestablished. The patient was  prepped and draped in the usual manner for ocular surgery. The patient received 1 drop of Proparacaine followed by 2 drops of 5% Betadine solution in the inferior conjunctival cul-de-sac The operating microscope was brought into position and 4% Xylocaine drops were instilled onto the eye. The lid speculum was set into position. A paracentesis was created and Sugarcane solution was instilled into the anterior chamber for adequate anesthesia and pupillary dilation. Viscoelastic was instilled into the anterior chamber. The 2.4 mm keratome was then utilized to create a clear corneal incision, and a circular capsulorrhexis was performed. BSS was utilized to perform careful hydrodissection of the lens.  Viscoelastic was then instilled into the anterior chamber to protect the corneal endothelium. Phacoemulsification was then carried out. Any remaining cortical material was removed in irrigation/aspiration mode. Viscoelastic was then instilled into the capsular bag. The lens implant was inspected for any defects. After finding none, the lens was placed in its injector and inserted into the capsular bag. The lens implant was centered on the patient's visual/astigmatic axis. The viscoelastic was then removed from the eye. Miochol was instilled posterior to the iris plane to facilitate pupillary miosis. The wound was checked for any leaks, after finding none, Iopidine and Pred Forte drops were instilled into the inferior cul-de-sac, and erythromycin ointment was placed over the cornea. A semi-pressure dressing and shield were then placed for the patient. The patient tolerated the procedure very well, and was brought to the recovery room in an alert and stable and satisfactory postoperative condition. Instructions were given to the patient and their family for their immediate postoperative care.   [address] [place] [address],   1/15/2020

## 2020-01-15 NOTE — DISCHARGE INSTRUCTIONS
Wandalee Boxer, D.O., P.CLila  [facility] 183.  [phone]    Post-Operative Instructions for Cataract Surgery     Remove your eye shield and bandage at 12 noon the same day as surgery and start your eye drops. THROW AWAY THE GAUZE UNDER THE SHIELD.  Place the blue eye shield back on for one week while asleep, even if napping in the recliner. Use the tape included in your bag.  DO NOT RUB YOUR EYE EVER! DO NOT WIPE YOUR EYE! ONLY WIPE ON YOUR CHEEK!                DO NOT WEAR EYE MAKEUP FOR 1 WEEK!  You can shower starting tomorrow.  You may resume your previous diet.  If you use glaucoma drops in the operative eye, continue them as directed.  Common symptoms after surgery include a scratchy feeling, slight headache, red eye, and/or blurred vision. You may use Advil or Tylenol for any discomfort.  Avoid strenuous activities and driving until you see Dr. Conley Sandhoff tomorrow. Please use care when walking, your depth perception may be altered.  Bring your bag with your drops to your follow up appointment. Below are Instructions On How To Use Your Eye Drops. ON THE DAY OF SURGERY:     Use all three eye drops at 12 noon, 4pm, and 8pm.  Wait 10 minutes between drops. THE DAY AFTER SURGERY:     You will use the drops 4 times a day at 8am, 12 noon, 4pm, and 8pm.   Use the drops every day until bottles are empty. Vigamox (tan top) Put 1 drop in at 8am, 12 noon, 4pm, and 8pm.    Pred Acetate (pink top) Put 1 drop in at 8:10am, 12:10pm, 4:10pm, and 8:10pm. Shake before using. Ketorolac/Diclofenac Put 1 drop in at 8:20am, 12:20pm, 4:20pm, 8:20pm.    CONTINUE DROPS UNTIL ALL BOTTLES ARE EMPTY! Follow-up appointment tomorrow at Dr. Gabriele Guzman office:________8:30am____________    Please call the office at [phone] if you experience severe pain or nausea.      The following personal items collected during your admission are returned to you: Dental Appliance: Dental Appliances: Lowers, Uppers  Vision: Visual Aid: None  Hearing Aid: @FLOW  DISCHARGE SUMMARY from Nurse  (1341:LAST)@  Jewelry: Jewelry: Ring(with patient - wedding band)  Clothing: Clothing: Other (comment)(street clothes with jacket)  Other Valuables: Other Valuables: Joni Kinney, Cell Phone(wallet and cell phone given to wife)  Valuables sent to safe:        PATIENT INSTRUCTIONS:    After General Anesthesia or Intravenous Sedation, for 24 hours or while taking prescription Narcotics:        Someone should be with you for the next 24 hours. For your own safety, a responsible adult must drive you home. · Limit your activities  · Recommended activity: Rest today, up with assistance today. Do not climb stairs or shower unattended for the next 24 hours. · Please start with a soft bland diet and advance as tolerated (no nausea) to regular diet. · If you have a sore throat you should try the following: fluids, warm salt water gargles, or throat lozenges. If it does not improve after several days please follow up with your primary physician. · Do not drive and operate hazardous machinery  · Do not make important personal or business decisions  · Do  not drink alcoholic beverages  · If you have not urinated within 8 hours after discharge, please contact your surgeon on call. Report the following to your surgeon:  · Excessive pain, swelling, redness or odor of or around the surgical area  · Temperature over 100.5  · Any nausea or vomiting. · You will receive a Post Operative Call from one of the Recovery Room Nurses on the day after your surgery to check on you. It is very important for us to know how you are recovering after your surgery. If you have an issue or need to speak with someone, please call your surgeon, do not wait for the post operative call.     · You may receive an e-mail or letter in the mail from [employer] regarding your experience with us in the Ambulatory Surgery Unit. Your feedback is valuable to us and we appreciate your participation in the survey. · If the above instructions are not adequate or you are having problems after your surgery, call the physician at their office number. · We wish you a speedy recovery ? What to do at Home:      *  Please give a list of your current medications to your Primary Care Provider. *  Please update this list whenever your medications are discontinued, doses are      changed, or new medications (including over-the-counter products) are added. *  Please carry medication information at all times in case of emergency situations. If you have not received your influenza and/or pneumococcal vaccine, please follow up with your primary care physician. The discharge information has been reviewed with the patient and family. The patient and family verbalized understanding. TO PREVENT AN INFECTION      1. [address] YOUR HANDS     To prevent infection, good handwashing is the most important thing you or your caregiver can do.  Wash your hands with soap and water or use the hand  we gave you before you touch any wounds. 2.  USE CLEAN SHEETS     Use freshly cleaned sheets on your bed after surgery.  To keep the surgery site clean, do not allow pets to sleep with you while your wound is still healing. 3. STOP SMOKING    Stop smoking, or at least cut back on smoking     Smoking slows your healing. 4.  CONTROL YOUR BLOOD SUGAR     High blood sugars slow wound healing.  If you are diabetic, control your blood sugar levels before and after your surgery.

## 2020-12-11 ENCOUNTER — TRANSCRIBE ORDER (OUTPATIENT)
Dept: SCHEDULING | Age: 80
End: 2020-12-11

## 2020-12-11 DIAGNOSIS — R06.00 DYSPNEA: Primary | ICD-10-CM

## 2020-12-17 ENCOUNTER — HOSPITAL ENCOUNTER (OUTPATIENT)
Dept: NON INVASIVE DIAGNOSTICS | Age: 80
Discharge: HOME | End: 2020-12-17
Attending: FAMILY MEDICINE
Payer: MEDICARE

## 2020-12-17 VITALS
HEIGHT: 68 IN | WEIGHT: 253.31 LBS | BODY MASS INDEX: 38.39 KG/M2 | SYSTOLIC BLOOD PRESSURE: 125 MMHG | DIASTOLIC BLOOD PRESSURE: 70 MMHG

## 2020-12-17 DIAGNOSIS — R06.00 DYSPNEA: ICD-10-CM

## 2020-12-17 LAB
ECHO AO ROOT DIAM: 2.25 CM
ECHO AR MAX VEL PISA: 123.81 CM/S
ECHO AV AREA PEAK VELOCITY: 2.02 CM2
ECHO AV AREA/BSA PEAK VELOCITY: 0.9 CM2/M2
ECHO AV PEAK GRADIENT: 13.92 MMHG
ECHO AV PEAK VELOCITY: 186.52 CM/S
ECHO AV REGURGITANT PHT: 1270.13 MS
ECHO LA MAJOR AXIS: 2.34 CM
ECHO LA MINOR AXIS: 1.04 CM
ECHO LV E' LATERAL VELOCITY: 9.55 CM/S
ECHO LV E' SEPTAL VELOCITY: 7.49 CM/S
ECHO LV INTERNAL DIMENSION DIASTOLIC: 4.33 CM (ref 4.2–5.9)
ECHO LV INTERNAL DIMENSION SYSTOLIC: 4.49 CM
ECHO LV IVSD: 1.27 CM (ref 0.6–1)
ECHO LV MASS 2D: 182.2 G (ref 88–224)
ECHO LV MASS INDEX 2D: 80.6 G/M2 (ref 49–115)
ECHO LV POSTERIOR WALL DIASTOLIC: 1.09 CM (ref 0.6–1)
ECHO LVOT DIAM: 2.28 CM
ECHO LVOT PEAK GRADIENT: 3.4 MMHG
ECHO LVOT PEAK VELOCITY: 92.18 CM/S
ECHO MV A VELOCITY: 70.98 CM/S
ECHO MV E DECELERATION TIME (DT): 224.17 MS
ECHO MV E VELOCITY: 79.13 CM/S
ECHO MV E/A RATIO: 1.11
ECHO MV E/E' LATERAL: 8.29
ECHO MV E/E' RATIO (AVERAGED): 9.43
ECHO MV E/E' SEPTAL: 10.56
ECHO PV MAX VELOCITY: 100.35 CM/S
ECHO PV PEAK INSTANTANEOUS GRADIENT SYSTOLIC: 4.03 MMHG
ECHO TV REGURGITANT MAX VELOCITY: 181.36 CM/S
ECHO TV REGURGITANT MAX VELOCITY: 95.73 CM/S
ECHO TV REGURGITANT PEAK GRADIENT: 13.27 MMHG

## 2020-12-17 PROCEDURE — 93306 TTE W/DOPPLER COMPLETE: CPT

## 2021-05-17 NOTE — PERIOP NOTES
Pt discharged via wheelchair, accompanied by RN. Pt discharged awake and alert, respirations equal and unlabored, skin warm, dry, and intact. Pt and family members' questions and concerns addressed prior to discharge. Complex Repair And Tissue Cultured Epidermal Autograft Text: The defect edges were debeveled with a #15 scalpel blade.  The primary defect was closed partially with a complex linear closure.  Given the location of the defect, shape of the defect and the proximity to free margins an tissue cultured epidermal autograft was deemed most appropriate to repair the remaining defect.  The graft was trimmed to fit the size of the remaining defect.  The graft was then placed in the primary defect, oriented appropriately, and sutured into place.

## 2021-10-09 ENCOUNTER — HOSPITAL ENCOUNTER (INPATIENT)
Age: 81
LOS: 7 days | Discharge: HOME | DRG: 330 | End: 2021-10-16
Attending: EMERGENCY MEDICINE | Admitting: HOSPITALIST
Payer: MEDICARE

## 2021-10-09 ENCOUNTER — APPOINTMENT (OUTPATIENT)
Dept: CT IMAGING | Age: 81
DRG: 330 | End: 2021-10-09
Attending: EMERGENCY MEDICINE
Payer: MEDICARE

## 2021-10-09 DIAGNOSIS — K63.89 COLONIC MASS: ICD-10-CM

## 2021-10-09 DIAGNOSIS — K62.5 RECTAL BLEEDING: Primary | ICD-10-CM

## 2021-10-09 DIAGNOSIS — I48.92 ATRIAL FLUTTER, UNSPECIFIED TYPE (HCC): ICD-10-CM

## 2021-10-09 DIAGNOSIS — S22.080A COMPRESSION FRACTURE OF T12 VERTEBRA, INITIAL ENCOUNTER (HCC): ICD-10-CM

## 2021-10-09 LAB
ALBUMIN SERPL-MCNC: 3.4 G/DL (ref 3.5–5)
ALBUMIN/GLOB SERPL: 0.8 {RATIO} (ref 1.1–2.2)
ALP SERPL-CCNC: 95 U/L (ref 45–117)
ALT SERPL-CCNC: 18 U/L (ref 12–78)
ANION GAP SERPL CALC-SCNC: 4 MMOL/L (ref 5–15)
APTT PPP: 25.8 SEC (ref 22.1–31)
AST SERPL-CCNC: 15 U/L (ref 15–37)
BASOPHILS # BLD: 0 K/UL (ref 0–0.1)
BASOPHILS NFR BLD: 0 % (ref 0–1)
BILIRUB SERPL-MCNC: 0.8 MG/DL (ref 0.2–1)
BUN SERPL-MCNC: 16 MG/DL (ref 6–20)
BUN/CREAT SERPL: 14 (ref 12–20)
CALCIUM SERPL-MCNC: 9.2 MG/DL (ref 8.5–10.1)
CHLORIDE SERPL-SCNC: 104 MMOL/L (ref 97–108)
CO2 SERPL-SCNC: 29 MMOL/L (ref 21–32)
COVID-19 RAPID TEST, COVR: NOT DETECTED
CREAT SERPL-MCNC: 1.12 MG/DL (ref 0.7–1.3)
DIFFERENTIAL METHOD BLD: NORMAL
EOSINOPHIL # BLD: 0.1 K/UL (ref 0–0.4)
EOSINOPHIL NFR BLD: 2 % (ref 0–7)
ERYTHROCYTE [DISTWIDTH] IN BLOOD BY AUTOMATED COUNT: 13.2 % (ref 11.5–14.5)
GLOBULIN SER CALC-MCNC: 4.3 G/DL (ref 2–4)
GLUCOSE BLD STRIP.AUTO-MCNC: 171 MG/DL (ref 65–117)
GLUCOSE BLD STRIP.AUTO-MCNC: 181 MG/DL (ref 65–117)
GLUCOSE SERPL-MCNC: 211 MG/DL (ref 65–100)
HCT VFR BLD AUTO: 40.7 % (ref 36.6–50.3)
HEMOCCULT STL QL: NEGATIVE
HGB BLD-MCNC: 13.4 G/DL (ref 12.1–17)
IMM GRANULOCYTES # BLD AUTO: 0 K/UL (ref 0–0.04)
IMM GRANULOCYTES NFR BLD AUTO: 0 % (ref 0–0.5)
INR PPP: 1 (ref 0.9–1.1)
LIPASE SERPL-CCNC: 123 U/L (ref 73–393)
LYMPHOCYTES # BLD: 1.9 K/UL (ref 0.8–3.5)
LYMPHOCYTES NFR BLD: 28 % (ref 12–49)
MAGNESIUM SERPL-MCNC: 2.2 MG/DL (ref 1.6–2.4)
MCH RBC QN AUTO: 29.6 PG (ref 26–34)
MCHC RBC AUTO-ENTMCNC: 32.9 G/DL (ref 30–36.5)
MCV RBC AUTO: 89.8 FL (ref 80–99)
MONOCYTES # BLD: 0.6 K/UL (ref 0–1)
MONOCYTES NFR BLD: 8 % (ref 5–13)
NEUTS SEG # BLD: 4.3 K/UL (ref 1.8–8)
NEUTS SEG NFR BLD: 62 % (ref 32–75)
NRBC # BLD: 0 K/UL (ref 0–0.01)
NRBC BLD-RTO: 0 PER 100 WBC
PLATELET # BLD AUTO: 280 K/UL (ref 150–400)
PMV BLD AUTO: 9.8 FL (ref 8.9–12.9)
POTASSIUM SERPL-SCNC: 3.7 MMOL/L (ref 3.5–5.1)
PROT SERPL-MCNC: 7.7 G/DL (ref 6.4–8.2)
PROTHROMBIN TIME: 10.9 SEC (ref 9–11.1)
RBC # BLD AUTO: 4.53 M/UL (ref 4.1–5.7)
SERVICE CMNT-IMP: ABNORMAL
SERVICE CMNT-IMP: ABNORMAL
SODIUM SERPL-SCNC: 137 MMOL/L (ref 136–145)
SOURCE, COVRS: NORMAL
THERAPEUTIC RANGE,PTTT: NORMAL SECS (ref 58–77)
WBC # BLD AUTO: 6.9 K/UL (ref 4.1–11.1)

## 2021-10-09 PROCEDURE — 99222 1ST HOSP IP/OBS MODERATE 55: CPT | Performed by: INTERNAL MEDICINE

## 2021-10-09 PROCEDURE — 83690 ASSAY OF LIPASE: CPT

## 2021-10-09 PROCEDURE — 82962 GLUCOSE BLOOD TEST: CPT

## 2021-10-09 PROCEDURE — 82272 OCCULT BLD FECES 1-3 TESTS: CPT

## 2021-10-09 PROCEDURE — 99285 EMERGENCY DEPT VISIT HI MDM: CPT

## 2021-10-09 PROCEDURE — 87635 SARS-COV-2 COVID-19 AMP PRB: CPT

## 2021-10-09 PROCEDURE — 74011250637 HC RX REV CODE- 250/637: Performed by: INTERNAL MEDICINE

## 2021-10-09 PROCEDURE — 74178 CT ABD&PLV WO CNTR FLWD CNTR: CPT

## 2021-10-09 PROCEDURE — 80053 COMPREHEN METABOLIC PANEL: CPT

## 2021-10-09 PROCEDURE — 85025 COMPLETE CBC W/AUTO DIFF WBC: CPT

## 2021-10-09 PROCEDURE — 93005 ELECTROCARDIOGRAM TRACING: CPT

## 2021-10-09 PROCEDURE — 94760 N-INVAS EAR/PLS OXIMETRY 1: CPT

## 2021-10-09 PROCEDURE — 83735 ASSAY OF MAGNESIUM: CPT

## 2021-10-09 PROCEDURE — 36415 COLL VENOUS BLD VENIPUNCTURE: CPT

## 2021-10-09 PROCEDURE — 74011636637 HC RX REV CODE- 636/637: Performed by: INTERNAL MEDICINE

## 2021-10-09 PROCEDURE — 74011000636 HC RX REV CODE- 636: Performed by: EMERGENCY MEDICINE

## 2021-10-09 PROCEDURE — 65660000000 HC RM CCU STEPDOWN

## 2021-10-09 PROCEDURE — 85610 PROTHROMBIN TIME: CPT

## 2021-10-09 PROCEDURE — 74011250636 HC RX REV CODE- 250/636: Performed by: INTERNAL MEDICINE

## 2021-10-09 PROCEDURE — 86901 BLOOD TYPING SEROLOGIC RH(D): CPT

## 2021-10-09 PROCEDURE — 85730 THROMBOPLASTIN TIME PARTIAL: CPT

## 2021-10-09 RX ORDER — ONDANSETRON 2 MG/ML
4 INJECTION INTRAMUSCULAR; INTRAVENOUS
Status: DISCONTINUED | OUTPATIENT
Start: 2021-10-09 | End: 2021-10-16 | Stop reason: HOSPADM

## 2021-10-09 RX ORDER — ACETAMINOPHEN 325 MG/1
650 TABLET ORAL
Status: DISCONTINUED | OUTPATIENT
Start: 2021-10-09 | End: 2021-10-16 | Stop reason: HOSPADM

## 2021-10-09 RX ORDER — SODIUM CHLORIDE 9 MG/ML
50 INJECTION, SOLUTION INTRAVENOUS CONTINUOUS
Status: DISCONTINUED | OUTPATIENT
Start: 2021-10-09 | End: 2021-10-13 | Stop reason: SDUPTHER

## 2021-10-09 RX ORDER — LABETALOL HYDROCHLORIDE 5 MG/ML
20 INJECTION, SOLUTION INTRAVENOUS
Status: DISCONTINUED | OUTPATIENT
Start: 2021-10-09 | End: 2021-10-16 | Stop reason: HOSPADM

## 2021-10-09 RX ORDER — BISACODYL 5 MG
5 TABLET, DELAYED RELEASE (ENTERIC COATED) ORAL DAILY PRN
Status: DISCONTINUED | OUTPATIENT
Start: 2021-10-09 | End: 2021-10-16 | Stop reason: HOSPADM

## 2021-10-09 RX ORDER — DEXTROSE 50 % IN WATER (D50W) INTRAVENOUS SYRINGE
25-50 AS NEEDED
Status: DISCONTINUED | OUTPATIENT
Start: 2021-10-09 | End: 2021-10-16 | Stop reason: HOSPADM

## 2021-10-09 RX ORDER — ACETAMINOPHEN 325 MG/1
650 TABLET ORAL
Status: DISCONTINUED | OUTPATIENT
Start: 2021-10-09 | End: 2021-10-09 | Stop reason: SDUPTHER

## 2021-10-09 RX ORDER — METOPROLOL TARTRATE 25 MG/1
12.5 TABLET, FILM COATED ORAL EVERY 12 HOURS
Status: DISCONTINUED | OUTPATIENT
Start: 2021-10-09 | End: 2021-10-16 | Stop reason: HOSPADM

## 2021-10-09 RX ORDER — MAGNESIUM CITRATE
296 SOLUTION, ORAL ORAL
Status: COMPLETED | OUTPATIENT
Start: 2021-10-10 | End: 2021-10-10

## 2021-10-09 RX ORDER — PROMETHAZINE HYDROCHLORIDE 25 MG/1
12.5 TABLET ORAL
Status: DISCONTINUED | OUTPATIENT
Start: 2021-10-09 | End: 2021-10-16 | Stop reason: HOSPADM

## 2021-10-09 RX ORDER — SODIUM CHLORIDE 0.9 % (FLUSH) 0.9 %
5-40 SYRINGE (ML) INJECTION AS NEEDED
Status: DISCONTINUED | OUTPATIENT
Start: 2021-10-09 | End: 2021-10-16 | Stop reason: HOSPADM

## 2021-10-09 RX ORDER — MAGNESIUM SULFATE 100 %
4 CRYSTALS MISCELLANEOUS AS NEEDED
Status: DISCONTINUED | OUTPATIENT
Start: 2021-10-09 | End: 2021-10-16 | Stop reason: HOSPADM

## 2021-10-09 RX ORDER — ACETAMINOPHEN 650 MG/1
650 SUPPOSITORY RECTAL
Status: DISCONTINUED | OUTPATIENT
Start: 2021-10-09 | End: 2021-10-16 | Stop reason: HOSPADM

## 2021-10-09 RX ORDER — PRAVASTATIN SODIUM 10 MG/1
20 TABLET ORAL
Status: DISCONTINUED | OUTPATIENT
Start: 2021-10-09 | End: 2021-10-16 | Stop reason: HOSPADM

## 2021-10-09 RX ORDER — INSULIN LISPRO 100 [IU]/ML
INJECTION, SOLUTION INTRAVENOUS; SUBCUTANEOUS
Status: DISCONTINUED | OUTPATIENT
Start: 2021-10-09 | End: 2021-10-16 | Stop reason: HOSPADM

## 2021-10-09 RX ORDER — POLYETHYLENE GLYCOL 3350 17 G/17G
119 POWDER, FOR SOLUTION ORAL EVERY 4 HOURS
Status: DISCONTINUED | OUTPATIENT
Start: 2021-10-09 | End: 2021-10-10 | Stop reason: SDUPTHER

## 2021-10-09 RX ORDER — POLYETHYLENE GLYCOL 3350 17 G/17G
17 POWDER, FOR SOLUTION ORAL DAILY
Status: DISCONTINUED | OUTPATIENT
Start: 2021-10-10 | End: 2021-10-16 | Stop reason: HOSPADM

## 2021-10-09 RX ORDER — SODIUM CHLORIDE 0.9 % (FLUSH) 0.9 %
5-40 SYRINGE (ML) INJECTION EVERY 8 HOURS
Status: DISCONTINUED | OUTPATIENT
Start: 2021-10-09 | End: 2021-10-16 | Stop reason: HOSPADM

## 2021-10-09 RX ORDER — BISACODYL 5 MG
10 TABLET, DELAYED RELEASE (ENTERIC COATED) ORAL EVERY 12 HOURS
Status: COMPLETED | OUTPATIENT
Start: 2021-10-09 | End: 2021-10-10

## 2021-10-09 RX ORDER — AMLODIPINE BESYLATE 5 MG/1
5 TABLET ORAL DAILY
Status: DISCONTINUED | OUTPATIENT
Start: 2021-10-10 | End: 2021-10-16 | Stop reason: HOSPADM

## 2021-10-09 RX ADMIN — IOPAMIDOL 100 ML: 755 INJECTION, SOLUTION INTRAVENOUS at 12:04

## 2021-10-09 RX ADMIN — Medication 10 ML: at 22:00

## 2021-10-09 RX ADMIN — POLYETHYLENE GLYCOL 3350 119 G: 17 POWDER, FOR SOLUTION ORAL at 21:02

## 2021-10-09 RX ADMIN — METOPROLOL TARTRATE 12.5 MG: 25 TABLET, FILM COATED ORAL at 21:02

## 2021-10-09 RX ADMIN — INSULIN LISPRO 2 UNITS: 100 INJECTION, SOLUTION INTRAVENOUS; SUBCUTANEOUS at 17:41

## 2021-10-09 RX ADMIN — SODIUM CHLORIDE 50 ML/HR: 9 INJECTION, SOLUTION INTRAVENOUS at 17:19

## 2021-10-09 RX ADMIN — Medication 10 ML: at 17:19

## 2021-10-09 RX ADMIN — BISACODYL 10 MG: 5 TABLET, COATED ORAL at 19:00

## 2021-10-09 NOTE — Clinical Note
Status[de-identified] INPATIENT [101]   Type of Bed: Telemetry [19]   Cardiac Monitoring Required?: Yes   Inpatient Hospitalization Certified Necessary for the Following Reasons: 3. Patient receiving treatment that can only be provided in an inpatient setting (further clarification in H&P documentation)   Admitting Diagnosis: Rectal bleed [0043964]   Admitting Diagnosis: Colonic mass [1157496]   Admitting Diagnosis: [facility] [427.32. ICD-9-CM]   Admitting Physician: Hieu Portillo [[id number]]   Attending Physician: Hieu Portillo [[id number]]   Estimated Length of Stay: 3-4 Midnights   Discharge Plan[de-identified] [facility]

## 2021-10-09 NOTE — ED NOTES
Assumed care of pt from triage, pt placed on the monitor x 3. Pt reporting intermittent abdominal discomfort, melena and watery diarrhea x 2 weeks, pt spoke w/ GI who referred him to ED for CT abdomen. Pt notes that he fell about 1 month ago onto his back on bricks outside. Pt notes residual back and neck discomfort and that \"he hasn't been right since\" and he thinks he \"busted something up on the inside. \" Pt describing incontinence of stool since the fall. Pt was evaluated by PCP after fall and was told he needed a colonoscopy at some point after he got a CT - was referred by GI for scan. Pt denies any dizziness, SOB, CP or numbness/tinglinging feet. Additionally, pt notes feeling more weak than normal after his fall. Pt is wheezing upon ausculation and notes he feels SOB/tires out w/ exertion. Pt BLE edema worsening over the past few weeks     1230 Pt ambulated to bedside commode, verbal order received for hemocult    1315 Per MD pt, ok to drink     1530 Patient is being transferred to Room # [id number] [phone] [id number]. Report given to CHARISSE Luther on Esteban Friar for routine progression of care. Report consisted of the following information SBAR, Kardex, ED Summary, MAR and Recent Results. Patient transferred to receiving unit by: transport (RN or tech name). Outstanding consults needed: not at this time    Next labs due: none ordered     The following personal items will be sent with the patient during transfer to the floor:     All valuables:    Cardiac monitoring ordered: Yes    The following CURRENT information was reported to the receiving RN:    Code status: Full Code at time of transfer    Last set of vital signs:  Vital Signs  Level of Consciousness: Alert (0) (10/09/21 0952)  Temp: 98.4 °F (36.9 °C) (10/09/21 0952)  Temp Source: Oral (10/09/21 0952)  Pulse (Heart Rate): 77 (10/09/21 1445)  Cardiac Rhythm: Atrial Flutter (10/09/21 1321)  Resp Rate: 19 (10/09/21 1445)  BP: 118/74 (10/09/21 1445)  MAP (Monitor): 89 (10/09/21 1445)  MAP (Calculated): 89 (10/09/21 1445)  BP 1 Location: Left upper arm (10/09/21 0952)  BP 1 Method: Automatic (10/09/21 0952)  BP Patient Position: At rest (10/09/21 0952)  MEWS Score: 3 (10/09/21 0952)         Oxygen Therapy  O2 Sat (%): 94 % (10/09/21 1445)  Pulse via Oximetry: 78 beats per minute (10/09/21 1445)  O2 Device: None (Room air) (10/09/21 1031)      Last pain assessment:  Pain 1  Pain Scale 1: Numeric (0 - 10)      Wounds: No     Urinary catheter: voiding  Is there a craft order: No     LDAs:       Peripheral IV 10/09/21 Left Antecubital (Active)         Opportunity for questions and clarification was provided.     Miri Woodruff, RN     [id number] Cardiology at bedside

## 2021-10-09 NOTE — CONSULTS
GI Consultation Note Lorri Cesar for Kitajyotsna Campbell)    NAME: Brittany Irizarry : 1940 MRN: [id number]   ATTG: Dr. Gerhardt Bookbinder PCP: Rodney Valverde MD  Date/Time:  10/9/2021 5:50 PM  Subjective:   REASON FOR CONSULT:        Raven Camacho is a 79 y.o. male who I was asked to see for abnormal CT scan showing a sigmoid mass and rectal bleeding. He takes ASA but no blood thinners. He's had abd discomfort for a few weeks, some watery diarrhea too. Normally he has constipation issues he says. He's also in afib/flutter, which is new. PMH is notable for arthritis, prostate CA, DM, HTN. Past Medical History:   Diagnosis Date    Arthritis     At risk for sleep apnea 2019    [facility] [place] [facility]     Cancer [facility])     prostate cancer    Diabetes ([facility] [address]     Elevated cholesterol     Hypertension       Past Surgical History:   Procedure Laterality Date    COLONOSCOPY N/A 2017    COLONOSCOPY performed by Deepali Machado MD at [facility]\"" ENDOSCOPY    COLONOSCOPY N/A 2019    COLONOSCOPY WITH POLYPECTOMY performed by Monroe Govea MD at \[facility]\"" AMBULATORY OR    HX CATARACT REMOVAL Left 2020    HX HERNIA REPAIR      HX HIP REPLACEMENT Right     HX KNEE REPLACEMENT Right     HX PROSTATECTOMY       Social History     Tobacco Use    Smoking status: Former Smoker     Packs/day: 2.00     Years: 15.00     Pack years: 30.00     Quit date: 1965     Years since quittin.0    Smokeless tobacco: Never Used   Substance Use Topics    Alcohol use: No      Family History   Problem Relation Age of Onset    Cancer Mother         ovarian    Stroke Father       No Known Allergies   Home Medications:  Prior to Admission Medications   Prescriptions Last Dose Informant Patient Reported? Taking? Omega-3-DHA-EPA-Fish Oil (FISH OIL) 1,000 mg (120 mg-180 mg) cap   Yes No   Sig: Take 1 Tab by mouth daily.    acetaminophen (TYLENOL EXTRA STRENGTH) 500 mg tablet   Yes No   Sig: Take 1,000 mg by mouth every six (6) hours as needed for Pain.   amLODIPine (NORVASC) 5 mg tablet   Yes No   Sig: Take 5 mg by mouth daily. aspirin delayed-release 81 mg tablet   Yes No   Sig: Take 81 mg by mouth daily. losartan (COZAAR) 100 mg tablet   Yes No   Sig: Take 100 mg by mouth daily. metFORMIN (GLUCOPHAGE) 1,000 mg tablet   Yes No   Sig: Take 1,000 mg by mouth two (2) times a day. moxifloxacin (VIGAMOX) 0.5 % ophthalmic solution   Yes No   Sig: Administer 1 Drop to left eye three (3) times daily. multivitamin (ONE A DAY) tablet   Yes No   Sig: Take 1 Tab by mouth daily. pravastatin (PRAVACHOL) 20 mg tablet   Yes No   Sig: Take 20 mg by mouth nightly.       Facility-Administered Medications: None     Hospital medications:  Current Facility-Administered Medications   Medication Dose Route Frequency    sodium chloride (NS) flush 5-40 mL  5-40 mL IntraVENous Q8H    sodium chloride (NS) flush 5-40 mL  5-40 mL IntraVENous PRN    acetaminophen (TYLENOL) tablet 650 mg  650 mg Oral Q6H PRN    Or    acetaminophen (TYLENOL) suppository 650 mg  650 mg Rectal Q6H PRN    [START ON 10/10/2021] polyethylene glycol (MIRALAX) packet 17 g  17 g Oral DAILY    bisacodyL (DULCOLAX) tablet 5 mg  5 mg Oral DAILY PRN    promethazine (PHENERGAN) tablet 12.5 mg  12.5 mg Oral Q6H PRN    Or    ondansetron (ZOFRAN) injection 4 mg  4 mg IntraVENous Q6H PRN    0.9% sodium chloride infusion  50 mL/hr IntraVENous CONTINUOUS    glucose chewable tablet 16 g  4 Tablet Oral PRN    dextrose (D50W) injection syrg 12.5-25 g  25-50 mL IntraVENous PRN    glucagon (GLUCAGEN) injection 1 mg  1 mg IntraMUSCular PRN    insulin lispro (HUMALOG) injection   SubCUTAneous AC&HS    metoprolol tartrate (LOPRESSOR) tablet 12.5 mg  12.5 mg Oral Q12H    bisacodyL (DULCOLAX) tablet 10 mg  10 mg Oral Q12H    [START ON 10/10/2021] magnesium citrate solution 296 mL  296 mL Oral NOW    polyethylene glycol (MIRALAX) packet 119 g  119 g Oral Q4H     REVIEW OF SYSTEMS:     []     Unable to obtain ROS due to  []    mental status change  []    sedated   []    intubated   [x]    Total of 11 systems reviewed as follows:  Const:  negative fever, negative chills, negative weight loss  Eyes:   negative diplopia or visual changes, negative eye pain  ENT:   negative coryza, negative sore throat  Resp:   negative cough, hemoptysis, dyspnea  Cards:  negative for chest pain, palpitations, lower extremity edema  :  negative for frequency, dysuria and hematuria  Skin:   negative for rash and pruritus  Heme:  negative for easy bruising and gum/nose bleeding  MS:  negative for myalgias, arthralgias, back pain and muscle weakness  Neurolo:  negative for headaches, dizziness, vertigo, memory problems   Psych:  negative for feelings of anxiety, depression     Pertinent Positives include :    Objective:   VITALS:    Visit Vitals  BP (!) 144/66   Pulse 84   Temp 97.9 °F (36.6 °C)   Resp 16   Ht 5' 8\" (1.727 m)   Wt 119.2 kg (262 lb 12.6 oz)   SpO2 96%   BMI 39.96 kg/m²     Temp (24hrs), Av.2 °F (36.8 °C), Min:97.9 °F (36.6 °C), Max:98.4 °F (36.9 °C)    PHYSICAL EXAM:   General:    Alert, cooperative, no distress, appears stated age. Head:   Normocephalic, without obvious abnormality, atraumatic. Eyes:   Conjunctivae clear, anicteric sclerae. Pupils are equal  Nose:  Nares normal. No drainage or sinus tenderness. Throat:    Lips, mucosa, and tongue normal.  No Thrush  Neck:  Supple, symmetrical,  no adenopathy, thyroid: non tender  Back:    Symmetric,  No CVA tenderness. Lungs:   CTA bilaterally. No wheezing/rhonchi/rales. Chest wall:  No tenderness or deformity. No Accessory muscle use. Heart:   S1S2, no murmur, rub or gallop. Abdomen:   Soft, non-tender. Not distended. Bowel sounds normal. No masses  Extremities: Atraumatic, No cyanosis. No edema. No clubbing  Skin:     Texture, turgor normal. No rashes/lesions/jaundice  Lymph: Cervical, supraclavicular normal.  Psych:  Good insight. Not depressed.   Not anxious or agitated. Neurologic: EOMs intact. No facial asymmetry. No aphasia or slurred speech. Normal strength, A/O X 3. LAB DATA REVIEWED:    Recent Results (from the past 48 hour(s))   CBC WITH AUTOMATED DIFF    Collection Time: 10/09/21 10:10 AM   Result Value Ref Range    WBC 6.9 4.1 - 11.1 K/uL    RBC 4.53 4.10 - 5.70 M/uL    HGB 13.4 12.1 - 17.0 g/dL    HCT 40.7 36.6 - 50.3 %    MCV 89.8 80.0 - 99.0 FL    MCH 29.6 26.0 - 34.0 PG    MCHC 32.9 30.0 - 36.5 g/dL    RDW 13.2 11.5 - 14.5 %    PLATELET 123 750 - 178 K/uL    MPV 9.8 8.9 - 12.9 FL    NRBC 0.0 0  WBC    ABSOLUTE NRBC 0.00 0.00 - 0.01 K/uL    NEUTROPHILS 62 32 - 75 %    LYMPHOCYTES 28 12 - 49 %    MONOCYTES 8 5 - 13 %    EOSINOPHILS 2 0 - 7 %    BASOPHILS 0 0 - 1 %    IMMATURE GRANULOCYTES 0 0.0 - 0.5 %    ABS. NEUTROPHILS 4.3 1.8 - 8.0 K/UL    ABS. LYMPHOCYTES 1.9 0.8 - 3.5 K/UL    ABS. MONOCYTES 0.6 0.0 - 1.0 K/UL    ABS. EOSINOPHILS 0.1 0.0 - 0.4 K/UL    ABS. BASOPHILS 0.0 0.0 - 0.1 K/UL    ABS. IMM. GRANS. 0.0 0.00 - 0.04 K/UL    DF AUTOMATED     METABOLIC PANEL, COMPREHENSIVE    Collection Time: 10/09/21 10:10 AM   Result Value Ref Range    Sodium 137 136 - 145 mmol/L    Potassium 3.7 3.5 - 5.1 mmol/L    Chloride 104 97 - 108 mmol/L    CO2 29 21 - 32 mmol/L    Anion gap 4 (L) 5 - 15 mmol/L    Glucose 211 (H) 65 - 100 mg/dL    BUN 16 6 - 20 MG/DL    Creatinine 1.12 0.70 - 1.30 MG/DL    BUN/Creatinine ratio 14 12 - 20      GFR est AA >60 >60 ml/min/1.73m2    GFR est non-AA >60 >60 ml/min/1.73m2    Calcium 9.2 8.5 - 10.1 MG/DL    Bilirubin, total 0.8 0.2 - 1.0 MG/DL    ALT (SGPT) 18 12 - 78 U/L    AST (SGOT) 15 15 - 37 U/L    Alk.  phosphatase 95 45 - 117 U/L    Protein, total 7.7 6.4 - 8.2 g/dL    Albumin 3.4 (L) 3.5 - 5.0 g/dL    Globulin 4.3 (H) 2.0 - 4.0 g/dL    A-G Ratio 0.8 (L) 1.1 - 2.2     TYPE & SCREEN    Collection Time: 10/09/21 10:10 AM   Result Value Ref Range    Crossmatch Expiration 10/12/2021,2359     ABO/Rh(D) Mitchael Asa POSITIVE Antibody screen NEG    LIPASE    Collection Time: 10/09/21 10:10 AM   Result Value Ref Range    Lipase 123 73 - 393 U/L   PROTHROMBIN TIME + INR    Collection Time: 10/09/21 10:21 AM   Result Value Ref Range    INR 1.0 0.9 - 1.1      Prothrombin time 10.9 9.0 - 11.1 sec   PTT    Collection Time: 10/09/21 10:21 AM   Result Value Ref Range    aPTT 25.8 22.1 - 31.0 sec    aPTT, therapeutic range     58.0 - 77.0 SECS   MAGNESIUM    Collection Time: 10/09/21 10:45 AM   Result Value Ref Range    Magnesium 2.2 1.6 - 2.4 mg/dL   OCCULT BLOOD, STOOL    Collection Time: 10/09/21  1:36 PM   Result Value Ref Range    Occult blood, stool Negative NEG     EKG, 12 LEAD, INITIAL    Collection Time: 10/09/21  1:43 PM   Result Value Ref Range    Ventricular Rate 74 BPM    Atrial Rate 288 BPM    QRS Duration 76 ms    Q-T Interval 376 ms    QTC Calculation (Bezet) 417 ms    Calculated P Axis 87 degrees    Calculated R Axis -14 degrees    Calculated T Axis 4 degrees    Diagnosis       Atrial flutter with variable AV block  When compared with ECG of 03-JAN-2020 09:54,  Atrial flutter has replaced Sinus rhythm  Incomplete right bundle branch block is no longer present     GLUCOSE, POC    Collection Time: 10/09/21  4:50 PM   Result Value Ref Range    Glucose (POC) 171 (H) 65 - 117 mg/dL    Performed by Kaycee Cordial (PCT)      IMAGING RESULTS:  CT A/P:  \"IMPRESSION     1. No evidence of active GI bleeding. 2. Eccentric masslike wall thickening in the mid sigmoid colon measuring 5.5 x  2.7 x 4.1 cm, highly suspicious for malignancy. Recommend colonoscopy. 3. Acute to subacute minimally displaced fracture of the anterior superior T12  vertebral body. Additional age-indeterminate minimally displaced fracture of the  T11 spinous process. 4. Cholelithiasis. 5. 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 Multiple nonobstructing stones in the right  kidney. \"    Assessment/Plan: Active Problems:    Atrial flutter, paroxysmal (HCC) (10/9/2021)      Rectal bleed (10/9/2021)      Colonic mass (10/9/2021)    78 yo M with rectal bleeding, recent change in bowel habits, mass in sigmoid colon on CT.  Also, new-onset afib/flutter   ___________________________________________________  RECOMMENDATIONS:      -- bowel preparation tomorrow  -- colonoscopy Monday with Dr. Clive Hernandez if okay from cardiology perspective  -- serial H/H    Discussed Code Status:    [x]    Full Code      []    DNR    ___________________________________________________  Care Plan discussed with:    [x]    Patient   []    Family   []    Nursing   [x]    Attending  Total Time :  55  minutes   ___________________________________________________  GI: Delmon Romberg, MD

## 2021-10-09 NOTE — H&P
Hospitalist Admission Note    NAME:  Sharyn Cummins   :   1940   MRN:   [id number]     Date of admit: 10/9/2021    PCP: Minh Kang MD    Assessment/Plan:       Acute lower GI bleed POA  ? Sigmoid colon mass POA  Presents with recent rectal bleeding x 3 weeks, no weight loss  Last colonoscopy  with Dr Prisca Burton   Not well prepped   2 small polyps seen in transverse colon. Both removed with a cold snare. 1 polyp was lost in a stool puddle. Moderate left sided diverticulosis. Small internal hemorrhoids  HgB 13.4  No current anticoagulant use  Hold aspirin  CTA abdomen pelvis in ED   No evidence of active GI bleeding. Eccentric masslike wall thickening in the mid sigmoid colon measuring 5.5 x 2.7 x 4.1 cm    highly suspicious for malignancy   Acute to subacute minimally displaced fracture of the anterior superior T12 vertebral body   Cholelithiasis. 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 Multiple nonobstructing stones in the right kidney.    Admit to telemetry  Maintain IV access  IV fluids  Regular diet, start clear liquid diet in AM for colonoscopy monday  Serial hemoglobins  Type and screen  Transfuse to keep hemoglobin greater than 7  PO PPI  Consult gastroenterology, Dr Marj Ferreira contacted by ED covering for Dr Bryanna Lazo onset atrial flutter POA  Asymptomatic, found on admit EKG  Currently rate controlled  Check echocardiogram and TSH  Seen by Dr. Wen Garcia in the emergency room  Monitor on telemetry  Low-dose beta-blocker    Right severe hydronephrosis, likely chronic POA  Right nephrolithiasis POA  Creatinine 1.12, creatinine clearance 63.8  Chronic urinary complaints per the patient  We will ask urology to see, suspect nothing emergent to do at this point    Acute to subacute fracture of the anterior superior T12 vertebral body POA  Noted on CT abdomen done today  Not a lot of back pain  PRN pain meds    Diabetes mellitus type 2 POA  Home regimen: metformin  Diabetic diet  Hold metformin, add lantus as needed  Sliding scale insulin  POC  Check HgBa1C    Essential HTN POA  Hyperlipidemia POA  Hold  ASA with bleeding  Continue statin  Continue home BP regimen  Norvasc, denied taking Cozaar  And Norvasc, add low-dose metoprolol as above  PRN labetalol     History of prostate cancer s/p prostatectomy 25 years ago    Obesity POA Body mass index is 39.96 kg/m². Given the patient's current clinical presentation, I have a high level of concern for decompensation if discharged from the emergency department. My assessment of this patient's clinical condition and my plan of care is as noted above. DVT prophylaxis with SCDs     Code status: full code  NOK: wife    History     CHIEF COMPLAINT: \"I been having blood in my stool for the past 3 weeks\"    HISTORY OF PRESENT ILLNESS:    79-year-old male    Known history of diabetes, hypertension, prostate cancer status post prostatectomy    History of colonic polyps, status post last colonoscopy in 2019 with Dr. Virginia Cates with 3 weeks of stool with blood  Often loose to watery, he described the stools as appearing \"rosalie\"  Seeing blood in the stool almost daily  Never enough to fill the bowl  No melena  Positive abdominal pain in the lower quadrants, moderate, brought on by eating   \"Gas-like pain\"  No weight loss  No chest pain, shortness of breath, fevers chills  Chronic urinary symptoms including dysuria  Because of the persistent blood in the stool, reached out to his PCP  PCP recommended he contact Dr. Gregg Shen, was referred to the emergency room    Emergency room  Hemodynamically stable  Non bloody bowel movement in ED per patient report, stool was heme-negative  Hemoglobin 13.4  Platelets 479,788, PT/INR 1.0  CT scan abdomen pelvis   No evidence of active GI bleeding.     Eccentric masslike wall thickening in the mid sigmoid colon measuring 5.5 x 2.7 x 4.1 cm    highly suspicious for malignancy   Acute to subacute minimally displaced fracture of the anterior superior T12 vertebral body   Cholelithiasis. 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 Multiple nonobstructing stones in the right kidney. EKG noted with atrial flutter, rate controlled   Patient denies a prior history of this  Seen by Dr. Coy Jackson from GI, planning for colonoscopy Monday  Seen by Dr. Sayda Castanon, low-dose beta-blocker started  We will called to admit the patient      Past Medical History:   Diagnosis Date    Arthritis     At risk for sleep apnea 2019    Stop [address]     Cancer [facility])     prostate cancer    Diabetes [address] [address]     Elevated cholesterol     Hypertension         Past Surgical History:   Procedure Laterality Date    COLONOSCOPY N/A 2017    COL[facility] performed [facility]idd [facility] at [facility]OSCOPY    COLONOSCOPY N/A 2019    COLONOSCOPY WITH POLYPECTOMY performed by Jonathan Walter MD at [facility] AMBULATORY OR    HX CATARACT REMOVAL Left 2020    HX HERNIA REPAIR      HX HIP REPLACEMENT Right     HX KNEE REPLACEMENT Right     HX PROSTATECTOMY         Social History     Tobacco Use    Smoking status: Former Smoker     Packs/day: 2.00     Years: 15.00     Pack years: 30.00     Quit date: 1965     Years since quittin.0    Smokeless tobacco: Never Used   Substance Use Topics    Alcohol use: No        Family History   Problem Relation Age of Onset    Cancer Mother         ovarian    Stroke Father         No Known Allergies     Prior to Admission medications    Medication Sig Start Date End Date Taking? Authorizing Provider   moxifloxacin (VIGAMOX) 0.5 % ophthalmic solution Administer 1 Drop to left eye three (3) times daily. Provider, Historical   pravastatin (PRAVACHOL) 20 mg tablet Take 20 mg by mouth nightly.     Provider, Historical   amLODIPine (NORVASC) 5 mg tablet Take 5 mg by mouth daily. Provider, Historical   losartan (COZAAR) 100 mg tablet Take 100 mg by mouth daily. Provider, Historical   multivitamin (ONE A DAY) tablet Take 1 Tab by mouth daily. Provider, Historical   acetaminophen (TYLENOL EXTRA STRENGTH) 500 mg tablet Take 1,000 mg by mouth every six (6) hours as needed for Pain. Provider, Historical   metFORMIN (GLUCOPHAGE) 1,000 mg tablet Take 1,000 mg by mouth two (2) times a day. Provider, Historical   aspirin delayed-release 81 mg tablet Take 81 mg by mouth daily. Provider, Historical   Omega-3-DHA-EPA-Fish Oil (FISH OIL) 1,000 mg (120 mg-180 mg) cap Take 1 Tab by mouth daily.     Provider, Historical       Review of symptoms:     POSITIVE= Bold  Negative = not bold  General:  fever, chills, sweats  Eyes:    blurred vision, eye pain, double vision  ENT:    Coryza, sore throat, trouble swallowing  Respiratory:   cough, sputum, SOB  Cardiology:   chest pain, orthopnea, PND, edema  Gastrointestinal:  abdominal pain , N/V, diarrhea, constipation, melena or BRBPR  Genitourinary:  Urgency, dysuria, hematuria  Muskuloskeletal :  Joint redness, swelling or acute joint pain, myalgias  Hematology:  easy bruising, nose or gum bleeding  Dermatological: rash, ulceration  Endocrine:   Polyuria or polydipsia, heat or hold intolerance  Neurological:  Headache, focal motor or sensory changes     Speech difficulties, memory loss  Psychological: depression, agitation      Objective:   VITALS:    Patient Vitals for the past 24 hrs:   Temp Pulse Resp BP SpO2   10/09/21 1515  86 23 110/78 93 %   10/09/21 1445  77 19 118/74 94 %   10/09/21 1315  88 14 (!) 113/58 94 %   10/09/21 1145  87 17 123/72 95 %   10/09/21 1045  91 16 125/76 94 %   10/09/21 0955    (!) 154/74    10/09/21 0952 98.4 °F (36.9 °C) 100 18 (!) 228/161 98 %     Temp (24hrs), Av.4 °F (36.9 °C), Min:98.4 °F (36.9 °C), Max:98.4 °F (36.9 °C)      O2 Device: None (Room air)    Wt Readings from Last 12 Encounters:   10/09/21 119.2 kg (262 lb 12.6 oz)   12/17/20 114.9 kg (253 lb 4.9 oz)   01/15/20 114.9 kg (253 lb 6 oz)   01/08/20 114.8 kg (253 lb)   01/03/20 117.5 kg (259 lb 0.7 oz)   12/30/19 113.4 kg (250 lb)   09/27/17 114.1 kg (251 lb 8 oz)         PHYSICAL EXAM:     I had a face to face encounter and independently examined this patient on 10/09/21  as outlined below:    General:    Alert, cooperative in no distress     HEENT: Normocephalic, atraumatic    PERRL, Sclera no icterus    Nasal mucosa without masses or discharge  Hearing intact to voice    Oropharynx without erythema or exudate  No thrush  Pink MM  Neck:  No meningismus, trachea midline, no carotid bruits     Thyroid not enlarged, no nodules or tenderness  Lungs:   Clear to auscultation bilaterally. No wheezing or rales    No accessory muscle use or retractions. Heart:   Regular rate and rhythm,  no murmur or gallop. No LE edema  Abdomen:   Soft, non-tender. Not distended. Bowel sounds normal.     No masses, No Hepatosplenomegaly, No Rebound or guarding  Lymph nodes: No cervical or inguinal JNENYFER  Musculoskeletal:  No Joint swelling, erythema, warmth.  No Cyanosis or clubbing  Skin:      No rashes    Not Jaundiced   No nodules or thickening  Neurologic: Alert and oriented X 3, follows commands     Cranial nerves 2 to 12 intact    Symmetric motor strength bilaterally       LAB DATA REVIEWED:    Recent Results (from the past 12 hour(s))   CBC WITH AUTOMATED DIFF    Collection Time: 10/09/21 10:10 AM   Result Value Ref Range    WBC 6.9 4.1 - 11.1 K/uL    RBC 4.53 4.10 - 5.70 M/uL    HGB 13.4 12.1 - 17.0 g/dL    HCT 40.7 36.6 - 50.3 %    MCV 89.8 80.0 - 99.0 FL    MCH 29.6 26.0 - 34.0 PG    MCHC 32.9 30.0 - 36.5 g/dL    RDW 13.2 11.5 - 14.5 %    PLATELET 803 807 - 525 K/uL    MPV 9.8 8.9 - 12.9 FL    NRBC 0.0 0  WBC    ABSOLUTE NRBC 0.00 0.00 - 0.01 K/uL    NEUTROPHILS 62 32 - 75 %    LYMPHOCYTES 28 12 - 49 %    MONOCYTES 8 5 - 13 % EOSINOPHILS 2 0 - 7 %    BASOPHILS 0 0 - 1 %    IMMATURE GRANULOCYTES 0 0.0 - 0.5 %    ABS. NEUTROPHILS 4.3 1.8 - 8.0 K/UL    ABS. LYMPHOCYTES 1.9 0.8 - 3.5 K/UL    ABS. MONOCYTES 0.6 0.0 - 1.0 K/UL    ABS. EOSINOPHILS 0.1 0.0 - 0.4 K/UL    ABS. BASOPHILS 0.0 0.0 - 0.1 K/UL    ABS. IMM. GRANS. 0.0 0.00 - 0.04 K/UL    DF AUTOMATED     METABOLIC PANEL, COMPREHENSIVE    Collection Time: 10/09/21 10:10 AM   Result Value Ref Range    Sodium 137 136 - 145 mmol/L    Potassium 3.7 3.5 - 5.1 mmol/L    Chloride 104 97 - 108 mmol/L    CO2 29 21 - 32 mmol/L    Anion gap 4 (L) 5 - 15 mmol/L    Glucose 211 (H) 65 - 100 mg/dL    BUN 16 6 - 20 MG/DL    Creatinine 1.12 0.70 - 1.30 MG/DL    BUN/Creatinine ratio 14 12 - 20      GFR est AA >60 >60 ml/min/1.73m2    GFR est non-AA >60 >60 ml/min/1.73m2    Calcium 9.2 8.5 - 10.1 MG/DL    Bilirubin, total 0.8 0.2 - 1.0 MG/DL    ALT (SGPT) 18 12 - 78 U/L    AST (SGOT) 15 15 - 37 U/L    Alk.  phosphatase 95 45 - 117 U/L    Protein, total 7.7 6.4 - 8.2 g/dL    Albumin 3.4 (L) 3.5 - 5.0 g/dL    Globulin 4.3 (H) 2.0 - 4.0 g/dL    A-G Ratio 0.8 (L) 1.1 - 2.2     TYPE & SCREEN    Collection Time: 10/09/21 10:10 AM   Result Value Ref Range    Crossmatch Expiration 10/12/2021,2359     ABO/Rh(D) Alena Cogan POSITIVE     Antibody screen NEG    LIPASE    Collection Time: 10/09/21 10:10 AM   Result Value Ref Range    Lipase 123 73 - 393 U/L   PROTHROMBIN TIME + INR    Collection Time: 10/09/21 10:21 AM   Result Value Ref Range    INR 1.0 0.9 - 1.1      Prothrombin time 10.9 9.0 - 11.1 sec   PTT    Collection Time: 10/09/21 10:21 AM   Result Value Ref Range    aPTT 25.8 22.1 - 31.0 sec    aPTT, therapeutic range     58.0 - 77.0 SECS   MAGNESIUM    Collection Time: 10/09/21 10:45 AM   Result Value Ref Range    Magnesium 2.2 1.6 - 2.4 mg/dL   OCCULT BLOOD, STOOL    Collection Time: 10/09/21  1:36 PM   Result Value Ref Range    Occult blood, stool Negative NEG     EKG, 12 LEAD, INITIAL    Collection Time: 10/09/21 1:43 PM   Result Value Ref Range    Ventricular Rate 74 BPM    Atrial Rate 288 BPM    QRS Duration 76 ms    Q-T Interval 376 ms    QTC Calculation (Bezet) 417 ms    Calculated P Axis 87 degrees    Calculated R Axis -14 degrees    Calculated T Axis 4 degrees    Diagnosis       Atrial flutter with variable AV block  When compared with ECG of 03-JAN-2020 09:54,  Atrial flutter has replaced Sinus rhythm  Incomplete right bundle branch block is no longer present         EKG as read by me shows atrial flutter with variable block rate 74      CT Results  (Last 48 hours)               10/09/21 1204  CT ABD PELV W WO CONT Final result    Impression:      1. No evidence of active GI bleeding. 2. Eccentric masslike wall thickening in the mid sigmoid colon measuring 5.5 x   2.7 x 4.1 cm, highly suspicious for malignancy. Recommend colonoscopy. 3. Acute to subacute minimally displaced fracture of the anterior superior T12   vertebral body. Additional age-indeterminate minimally displaced fracture of the   T11 spinous process. 4. Cholelithiasis. 5. 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 Multiple nonobstructing stones in the right   kidney. Narrative:  EXAM:  CT ABD PELV W WO CONT       INDICATION: GI bleed        COMPARISON: Right upper quadrant ultrasound 1/3/2020. CONTRAST:  100 mL of Isovue-370. TECHNIQUE:    Prior to and following the uneventful intravenous administration of contrast,   thin axial images were obtained through the abdomen and pelvis. Coronal and   sagittal reconstructions were generated. Oral contrast was not administered. CT   dose reduction was achieved through use of a standardized protocol tailored for   this examination and automatic exposure control for dose modulation. FINDINGS:    Lower Thorax:   Lung Bases: Clear. Heart: The heart is normal in size. No pericardial effusion.  Calcifications of the descending thoracic aorta. Abdomen/Pelvis:   Liver:  No focal liver lesions. Biliary system: Gallbladder contains multiple stones. No gallbladder wall   thickening or surrounding fat stranding. No intrahepatic or extrahepatic biliary   ductal dilatation. Spleen: Normal.       Pancreas: Normal.       Kidneys/Ureters/Bladder: There is severe right hydronephrosis to the level of   the UPJ, with no right hydroureter. There multiple nonobstructing stones within   the right kidney, with a staghorn type calculus in the lower pole of the right   kidney measuring at least 17 mm in length. There is a simple cyst in the   interpolar region of the right kidney measuring 2.1 cm. The left kidney is   unremarkable. The bladder is normal.       Adrenals: Normal.       Stomach/bowel: There is eccentric masslike wall thickening in the mid sigmoid   colon measuring 5.5 x 2.7 x 4.1 cm (series 305 image 129 and series 607 image   65), which lies along the antimesenteric wall of the sigmoid colon. This   thickening is nodular with associated luminal narrowing. There is no evidence of   active GI bleeding. There is a large amount of stool noted throughout the colon,   including a rectal stool ball. There is diverticulosis of the descending and   sigmoid colon. No free intraperitoneal air noted. Normal appendix. Reproductive Organs: Prostate is surgically absent. Vasculature: Normal caliber arteries. Severe calcific atherosclerosis of the   infrarenal abdominal aorta and iliac vasculature. Portal vein, SMV, and splenic   vein are patent. Nodes: No pathologically enlarged lymph nodes. Fluid: No free fluid. Bones/Soft Tissue: Acute to subacute minimally displaced fracture of the   anterior superior T12 vertebral body. Additional age-indeterminate minimally   displaced fracture of the T11 spinous process. Status post right total hip   arthroplasty.  Multilevel degenerative disc disease throughout the thoracolumbar   spine. Flowing ventral osteophytes in the minute lower thoracic spine,   consistent with DISH. CT ABD PELV W WO CONT    Result Date: 10/9/2021  1. No evidence of active GI bleeding. 2. Eccentric masslike wall thickening in the mid sigmoid colon measuring 5.5 x 2.7 x 4.1 cm, highly suspicious for malignancy. Recommend colonoscopy. 3. Acute to subacute minimally displaced fracture of the anterior superior T12 vertebral body. Additional age-indeterminate minimally displaced fracture of the T11 spinous process. 4. Cholelithiasis. 5. 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 Multiple nonobstructing stones in the right kidney. I saw the patient personally, took a history and did a complete physical exam at the bedside. I performed complex decision making in coming up with a diagnostic and treatment plan for the patient. I reviewed the patient's past medical records, current laboratory and radiology results, and actual Xray films/EKG. I have also discussed this case with the involved ED physician.     Care Plan discussed with:    Patient, Family, ED Doc    Risk of deterioration:  High    Total Time Coordinating Admission:  65   minutes    Total Critical Care Time:         Gino Baeza MD

## 2021-10-09 NOTE — CONSULTS
Cardiology Consult Note       Date of  Admission: 10/9/2021  9:57 AM     Admission type:Emergency     Subjective:     Asked by Dr. Reed/hospitalist to see. Mr. Conner Dockery is admitted with rectal bleed  for several days. Hemoglobin stable. CT abdomen showed rectal mass/thickening suggestive of malignancy. Noted to be in atrial flutter with controlled rate. Patient denies any previous cardiac history. No chest pain, SOB, palpitations. Echo 12/20 showed normal EF. Does not smoke. He is obese. Patient Active Problem List    Diagnosis Date Noted    Atrial flutter, paroxysmal ([address] 10/09/2021    Combined forms of age-related cataract of right eye 01/09/2020      Orin Kilgore MD  Past Medical History:   Diagnosis Date    Arthritis     At risk for sleep apnea 12/18/2019    Lily Melara 5     [facility])     prostate cancer    Diabetes ([facility] [address]     Elevated cholesterol     Hypertension       Past Surgical History:   Procedure Laterality Date    COLONOSCOPY N/A 9/27/2017    COLON[facility] Katina Tyler MD [facility] ENDOSCOPY    COLONOSCOPY N/A 12/30/2019    COLONOSCOPY WITH POLYPECTOMY performed by David Victoria MD at [facility] AMBULATORY OR    HX CATARACT REMOVAL Left 01/08/2020    HX HERNIA REPAIR      HX HIP REPLACEMENT Right     HX KNEE REPLACEMENT Right     HX PROSTATECTOMY       No Known Allergies   Family History   Problem Relation Age of Onset    Cancer Mother         ovarian    Stroke Father       No current facility-administered medications for this encounter. Current Outpatient Medications   Medication Sig    moxifloxacin (VIGAMOX) 0.5 % ophthalmic solution Administer 1 Drop to left eye three (3) times daily.  pravastatin (PRAVACHOL) 20 mg tablet Take 20 mg by mouth nightly.  amLODIPine (NORVASC) 5 mg tablet Take 5 mg by mouth daily.  losartan (COZAAR) 100 mg tablet Take 100 mg by mouth daily.  multivitamin (ONE A DAY) tablet Take 1 Tab by mouth daily.     acetaminophen (TYLENOL EXTRA STRENGTH) 500 mg tablet Take 1,000 mg by mouth every six (6) hours as needed for Pain.  metFORMIN (GLUCOPHAGE) 1,000 mg tablet Take 1,000 mg by mouth two (2) times a day.  aspirin delayed-release 81 mg tablet Take 81 mg by mouth daily.  Omega-3-DHA-EPA-Fish Oil (FISH OIL) 1,000 mg (120 mg-180 mg) cap Take 1 Tab by mouth daily. Review of Symptoms:  Review of Systems   Constitutional: Negative. Negative for chills and fever. HENT: Negative. Negative for hearing loss. Respiratory: Negative. Negative for cough, hemoptysis and shortness of breath. Cardiovascular: Negative. Negative for chest pain, palpitations, orthopnea, claudication, leg swelling and PND. Gastrointestinal: Positive for blood in stool. Negative for nausea and vomiting. Musculoskeletal: Negative for myalgias. Skin: Negative. Negative for rash. Neurological: Negative. Negative for dizziness, loss of consciousness and headaches. Physical Exam    Physical Exam  Vitals and nursing note reviewed. Constitutional:       Appearance: He is obese. Cardiovascular:      Rate and Rhythm: Normal rate. Rhythm irregular. Heart sounds: Normal heart sounds. Pulmonary:      Breath sounds: Normal breath sounds. Musculoskeletal:      Right lower leg: No edema. Left lower leg: No edema. Neurological:      Mental Status: He is alert and oriented to person, place, and time.    Psychiatric:         Mood and Affect: Mood normal.          Cardiographics    Telemetry: aflutter  ECG: atrial flutter, rate controlled  Echocardiogram: Not done    Labs:   Recent Results (from the past 24 hour(s))   CBC WITH AUTOMATED DIFF    Collection Time: 10/09/21 10:10 AM   Result Value Ref Range    WBC 6.9 4.1 - 11.1 K/uL    RBC 4.53 4.10 - 5.70 M/uL    HGB 13.4 12.1 - 17.0 g/dL    HCT 40.7 36.6 - 50.3 %    MCV 89.8 80.0 - 99.0 FL    MCH 29.6 26.0 - 34.0 PG    MCHC 32.9 30.0 - 36.5 g/dL    RDW 13.2 11.5 - 14.5 %    PLATELET 260 576 - 475 K/uL    MPV 9.8 8.9 - 12.9 FL    NRBC 0.0 0  WBC    ABSOLUTE NRBC 0.00 0.00 - 0.01 K/uL    NEUTROPHILS 62 32 - 75 %    LYMPHOCYTES 28 12 - 49 %    MONOCYTES 8 5 - 13 %    EOSINOPHILS 2 0 - 7 %    BASOPHILS 0 0 - 1 %    IMMATURE GRANULOCYTES 0 0.0 - 0.5 %    ABS. NEUTROPHILS 4.3 1.8 - 8.0 K/UL    ABS. LYMPHOCYTES 1.9 0.8 - 3.5 K/UL    ABS. MONOCYTES 0.6 0.0 - 1.0 K/UL    ABS. EOSINOPHILS 0.1 0.0 - 0.4 K/UL    ABS. BASOPHILS 0.0 0.0 - 0.1 K/UL    ABS. IMM. GRANS. 0.0 0.00 - 0.04 K/UL    DF AUTOMATED     METABOLIC PANEL, COMPREHENSIVE    Collection Time: 10/09/21 10:10 AM   Result Value Ref Range    Sodium 137 136 - 145 mmol/L    Potassium 3.7 3.5 - 5.1 mmol/L    Chloride 104 97 - 108 mmol/L    CO2 29 21 - 32 mmol/L    Anion gap 4 (L) 5 - 15 mmol/L    Glucose 211 (H) 65 - 100 mg/dL    BUN 16 6 - 20 MG/DL    Creatinine 1.12 0.70 - 1.30 MG/DL    BUN/Creatinine ratio 14 12 - 20      GFR est AA >60 >60 ml/min/1.73m2    GFR est non-AA >60 >60 ml/min/1.73m2    Calcium 9.2 8.5 - 10.1 MG/DL    Bilirubin, total 0.8 0.2 - 1.0 MG/DL    ALT (SGPT) 18 12 - 78 U/L    AST (SGOT) 15 15 - 37 U/L    Alk.  phosphatase 95 45 - 117 U/L    Protein, total 7.7 6.4 - 8.2 g/dL    Albumin 3.4 (L) 3.5 - 5.0 g/dL    Globulin 4.3 (H) 2.0 - 4.0 g/dL    A-G Ratio 0.8 (L) 1.1 - 2.2     TYPE & SCREEN    Collection Time: 10/09/21 10:10 AM   Result Value Ref Range    Crossmatch Expiration 10/12/2021,2359     ABO/Rh(D) [address] POSITIVE     Antibody screen NEG    LIPASE    Collection Time: 10/09/21 10:10 AM   Result Value Ref Range    Lipase 123 73 - 393 U/L   PROTHROMBIN TIME + INR    Collection Time: 10/09/21 10:21 AM   Result Value Ref Range    INR 1.0 0.9 - 1.1      Prothrombin time 10.9 9.0 - 11.1 sec   PTT    Collection Time: 10/09/21 10:21 AM   Result Value Ref Range    aPTT 25.8 22.1 - 31.0 sec    aPTT, therapeutic range     58.0 - 77.0 SECS   OCCULT BLOOD, STOOL    Collection Time: 10/09/21  1:36 PM Result Value Ref Range    Occult blood, stool Negative NEG          Assessment:     Assessment:         Hospital Problems  Date Reviewed: 1/9/2020        Codes Class Noted POA    [facility] ICD-10-CM: P88.35  ICD-9-CM: 427.32  10/9/2021 Yes               Plan:     Atrial  Flutter, paroxysmal- rate controlled. Unknown duration. He is asymptomatic. Will add low dose betablocker. Not a candidate for anti coagulation due to rectal bleed. Check echo. Proceed with GI w/u as needed. Will follow thank you for the consult.     Danielito Archer MD

## 2021-10-09 NOTE — ED PROVIDER NOTES
EMERGENCY DEPARTMENT HISTORY AND PHYSICAL EXAM      Date: 10/9/2021  Patient Name: Alejandro Patel    History of Presenting Illness     Chief Complaint   Patient presents with    Melena     Pt arrives ambulatory to triage with CC of rectal bleeding, bright red blood x 3 weeks that has steadily increased. Spoke to GI specialist yesterday who directed him to ED for imaging. Patient denies dizziness. History Provided By: Patient    HPI: Alejandro Patel, 79 y.o. male presents to the ED with cc of blood in stool. Pt states he has of GI bleeding issues in the past. Last colonoscopy was in 2019. He states for the past week he has been passing blood in his stool. He states today that he had 4 or 5 BM's prior to coming to the ED with blood in the stool he has had some lower abdominal discomfort mild in severity. He has been having constipation for quite some time with sometimes only 2BM;s per week. Over the past several weeks this has worsened and has only been going a small amount when he does have a bowel movement. In addition he states he had fallen a week ago and landed on bricks ans has been dealing with back pain. He states that pain has been moderate in severity but is not currently having any pain. Since the fall there has been no incontinence of bowel or bladder. There has been no loss of sensation or strength in the legs. He denies any fever or chills. He denies any CP or SOA. He denies any nausea or vomiting. There has been no melena. He denies any prior cardiac history. There are no other complaints, changes, or physical findings at this time. PCP: Yas Plasencia MD    No current facility-administered medications on file prior to encounter. Current Outpatient Medications on File Prior to Encounter   Medication Sig Dispense Refill    moxifloxacin (VIGAMOX) 0.5 % ophthalmic solution Administer 1 Drop to left eye three (3) times daily.       pravastatin (PRAVACHOL) 20 mg tablet Take 20 mg by mouth nightly.  amLODIPine (NORVASC) 5 mg tablet Take 5 mg by mouth daily.  losartan (COZAAR) 100 mg tablet Take 100 mg by mouth daily.  multivitamin (ONE A DAY) tablet Take 1 Tab by mouth daily.  acetaminophen (TYLENOL EXTRA STRENGTH) 500 mg tablet Take 1,000 mg by mouth every six (6) hours as needed for Pain.  metFORMIN (GLUCOPHAGE) 1,000 mg tablet Take 1,000 mg by mouth two (2) times a day.  aspirin delayed-release 81 mg tablet Take 81 mg by mouth daily.  Omega-3-DHA-EPA-Fish Oil (FISH OIL) 1,000 mg (120 mg-180 mg) cap Take 1 Tab by mouth daily. Past History     Past Medical History:  Past Medical History:   Diagnosis Date    Arthritis     At risk for sleep apnea 2019    [address]     Cancer [facility])     prostate cancer    Diabetes ([facility] [address]     Elevated cholesterol     Hypertension        Past Surgical History:  Past Surgical History:   Procedure Laterality Date    COLONOSCOPY N/A 2017    COLONOSCOPY performed by Benton Esquivel [facility] at [facility] ENDOSCOPY    COLONOSCOPY N/A 2019    COLONOSCOPY WITH POLYPECTOMY performed by Rachel Figueredo MD at [facility] AMBULATORY OR    HX CATARACT REMOVAL Left 2020    HX HERNIA REPAIR      HX HIP REPLACEMENT Right     HX KNEE REPLACEMENT Right     HX PROSTATECTOMY         Family History:  Family History   Problem Relation Age of Onset    Cancer Mother         ovarian    Stroke Father        Social History:  Social History     Tobacco Use    Smoking status: Former Smoker     Packs/day: 2.00     Years: 15.00     Pack years: 30.00     Quit date: 1965     Years since quittin.0    Smokeless tobacco: Never Used   Substance Use Topics    Alcohol use: No    Drug use: No       Allergies:  No Known Allergies      Review of Systems   Review of Systems   Constitutional: Negative. Negative for appetite change, chills, fatigue and fever. HENT: Negative.   Negative for congestion, rhinorrhea, sinus pressure and sore throat. Eyes: Negative. Respiratory: Negative. Negative for cough, choking, chest tightness, shortness of breath and wheezing. Cardiovascular: Negative. Negative for chest pain, palpitations and leg swelling. Gastrointestinal: Positive for blood in stool and constipation. Negative for abdominal pain, diarrhea, nausea and vomiting. Endocrine: Negative. Genitourinary: Negative. Negative for difficulty urinating, dysuria, flank pain and urgency. Musculoskeletal: Positive for back pain. Skin: Negative. Neurological: Negative. Negative for dizziness, speech difficulty, weakness, light-headedness, numbness and headaches. Psychiatric/Behavioral: Negative. All other systems reviewed and are negative. Physical Exam   Physical Exam  Vitals and nursing note reviewed. Constitutional:       General: He is not in acute distress. Appearance: He is well-developed. He is not diaphoretic. Comments: Morbidly obese     HENT:      Head: Normocephalic and atraumatic. Mouth/Throat:      Mouth: Mucous membranes are moist.      Pharynx: No oropharyngeal exudate. Eyes:      Extraocular Movements: Extraocular movements intact. Conjunctiva/sclera: Conjunctivae normal.      Pupils: Pupils are equal, round, and reactive to light. Neck:      Vascular: No JVD. Trachea: No tracheal deviation. Cardiovascular:      Rate and Rhythm: Normal rate and regular rhythm. Heart sounds: Normal heart sounds. No murmur heard. Pulmonary:      Effort: Pulmonary effort is normal. No respiratory distress. Breath sounds: Normal breath sounds. No stridor. No wheezing or rales. Abdominal:      General: There is no distension. Palpations: Abdomen is soft. Tenderness: There is abdominal tenderness (Mild LLQ). There is no guarding or rebound. Musculoskeletal:         General: No tenderness. Normal range of motion. Cervical back: Normal range of motion and neck supple. Skin:     General: Skin is warm and dry. Capillary Refill: Capillary refill takes less than 2 seconds. Neurological:      Mental Status: He is alert and oriented to person, place, and time. Cranial Nerves: No cranial nerve deficit. Comments: No gross motor or sensory deficits    Psychiatric:         Mood and Affect: Mood normal.         Behavior: Behavior normal.         Diagnostic Study Results     Labs -     Recent Results (from the past 12 hour(s))   CBC WITH AUTOMATED DIFF    Collection Time: 10/09/21 10:10 AM   Result Value Ref Range    WBC 6.9 4.1 - 11.1 K/uL    RBC 4.53 4.10 - 5.70 M/uL    HGB 13.4 12.1 - 17.0 g/dL    HCT 40.7 36.6 - 50.3 %    MCV 89.8 80.0 - 99.0 FL    MCH 29.6 26.0 - 34.0 PG    MCHC 32.9 30.0 - 36.5 g/dL    RDW 13.2 11.5 - 14.5 %    PLATELET 544 263 - 525 K/uL    MPV 9.8 8.9 - 12.9 FL    NRBC 0.0 0  WBC    ABSOLUTE NRBC 0.00 0.00 - 0.01 K/uL    NEUTROPHILS 62 32 - 75 %    LYMPHOCYTES 28 12 - 49 %    MONOCYTES 8 5 - 13 %    EOSINOPHILS 2 0 - 7 %    BASOPHILS 0 0 - 1 %    IMMATURE GRANULOCYTES 0 0.0 - 0.5 %    ABS. NEUTROPHILS 4.3 1.8 - 8.0 K/UL    ABS. LYMPHOCYTES 1.9 0.8 - 3.5 K/UL    ABS. MONOCYTES 0.6 0.0 - 1.0 K/UL    ABS. EOSINOPHILS 0.1 0.0 - 0.4 K/UL    ABS. BASOPHILS 0.0 0.0 - 0.1 K/UL    ABS. IMM. GRANS. 0.0 0.00 - 0.04 K/UL    DF AUTOMATED     METABOLIC PANEL, COMPREHENSIVE    Collection Time: 10/09/21 10:10 AM   Result Value Ref Range    Sodium 137 136 - 145 mmol/L    Potassium 3.7 3.5 - 5.1 mmol/L    Chloride 104 97 - 108 mmol/L    CO2 29 21 - 32 mmol/L    Anion gap 4 (L) 5 - 15 mmol/L    Glucose 211 (H) 65 - 100 mg/dL    BUN 16 6 - 20 MG/DL    Creatinine 1.12 0.70 - 1.30 MG/DL    BUN/Creatinine ratio 14 12 - 20      GFR est AA >60 >60 ml/min/1.73m2    GFR est non-AA >60 >60 ml/min/1.73m2    Calcium 9.2 8.5 - 10.1 MG/DL    Bilirubin, total 0.8 0.2 - 1.0 MG/DL    ALT (SGPT) 18 12 - 78 U/L    AST (SGOT) 15 15 - 37 U/L    Alk.  phosphatase 95 45 - 117 U/L Protein, total 7.7 6.4 - 8.2 g/dL    Albumin 3.4 (L) 3.5 - 5.0 g/dL    Globulin 4.3 (H) 2.0 - 4.0 g/dL    A-G Ratio 0.8 (L) 1.1 - 2.2     TYPE & SCREEN    Collection Time: 10/09/21 10:10 AM   Result Value Ref Range    Crossmatch Expiration 10/12/2021,2359     ABO/Rh(D) Juancarlos Mayorga POSITIVE     Antibody screen NEG    LIPASE    Collection Time: 10/09/21 10:10 AM   Result Value Ref Range    Lipase 123 73 - 393 U/L   PROTHROMBIN TIME + INR    Collection Time: 10/09/21 10:21 AM   Result Value Ref Range    INR 1.0 0.9 - 1.1      Prothrombin time 10.9 9.0 - 11.1 sec   PTT    Collection Time: 10/09/21 10:21 AM   Result Value Ref Range    aPTT 25.8 22.1 - 31.0 sec    aPTT, therapeutic range     58.0 - 77.0 SECS   OCCULT BLOOD, STOOL    Collection Time: 10/09/21  1:36 PM   Result Value Ref Range    Occult blood, stool Negative NEG         Radiologic Studies -   CT ABD PELV W WO CONT   Final Result      1. No evidence of active GI bleeding. 2. Eccentric masslike wall thickening in the mid sigmoid colon measuring 5.5 x   2.7 x 4.1 cm, highly suspicious for malignancy. Recommend colonoscopy. 3. Acute to subacute minimally displaced fracture of the anterior superior T12   vertebral body. Additional age-indeterminate minimally displaced fracture of the   T11 spinous process. 4. Cholelithiasis. 5. 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 Multiple nonobstructing stones in the right   kidney. CT Results  (Last 48 hours)               10/09/21 1204  CT ABD PELV W WO CONT Final result    Impression:      1. No evidence of active GI bleeding. 2. Eccentric masslike wall thickening in the mid sigmoid colon measuring 5.5 x   2.7 x 4.1 cm, highly suspicious for malignancy. Recommend colonoscopy. 3. Acute to subacute minimally displaced fracture of the anterior superior T12   vertebral body.  Additional age-indeterminate minimally displaced fracture of the   T11 spinous process. 4. Cholelithiasis. 5. 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 Multiple nonobstructing stones in the right   kidney. Narrative:  EXAM:  CT ABD PELV W WO CONT       INDICATION: GI bleed        COMPARISON: Right upper quadrant ultrasound 1/3/2020. CONTRAST:  100 mL of Isovue-370. TECHNIQUE:    Prior to and following the uneventful intravenous administration of contrast,   thin axial images were obtained through the abdomen and pelvis. Coronal and   sagittal reconstructions were generated. Oral contrast was not administered. CT   dose reduction was achieved through use of a standardized protocol tailored for   this examination and automatic exposure control for dose modulation. FINDINGS:    Lower Thorax:   Lung Bases: Clear. Heart: The heart is normal in size. No pericardial effusion. Calcifications of   the descending thoracic aorta. Abdomen/Pelvis:   Liver:  No focal liver lesions. Biliary system: Gallbladder contains multiple stones. No gallbladder wall   thickening or surrounding fat stranding. No intrahepatic or extrahepatic biliary   ductal dilatation. Spleen: Normal.       Pancreas: Normal.       Kidneys/Ureters/Bladder: There is severe right hydronephrosis to the level of   the UPJ, with no right hydroureter. There multiple nonobstructing stones within   the right kidney, with a staghorn type calculus in the lower pole of the right   kidney measuring at least 17 mm in length. There is a simple cyst in the   interpolar region of the right kidney measuring 2.1 cm. The left kidney is   unremarkable. The bladder is normal.       Adrenals: Normal.       Stomach/bowel: There is eccentric masslike wall thickening in the mid sigmoid   colon measuring 5.5 x 2.7 x 4.1 cm (series 305 image 129 and series 607 image   65), which lies along the antimesenteric wall of the sigmoid colon. This   thickening is nodular with associated luminal narrowing. There is no evidence of   active GI bleeding. There is a large amount of stool noted throughout the colon,   including a rectal stool ball. There is diverticulosis of the descending and   sigmoid colon. No free intraperitoneal air noted. Normal appendix. Reproductive Organs: Prostate is surgically absent. Vasculature: Normal caliber arteries. Severe calcific atherosclerosis of the   infrarenal abdominal aorta and iliac vasculature. Portal vein, SMV, and splenic   vein are patent. Nodes: No pathologically enlarged lymph nodes. Fluid: No free fluid. Bones/Soft Tissue: Acute to subacute minimally displaced fracture of the   anterior superior T12 vertebral body. Additional age-indeterminate minimally   displaced fracture of the T11 spinous process. Status post right total hip   arthroplasty. Multilevel degenerative disc disease throughout the thoracolumbar   spine. Flowing ventral osteophytes in the minute lower thoracic spine,   consistent with DISH. CXR Results  (Last 48 hours)    None          Medical Decision Making   I am the first provider for this patient. I reviewed the vital signs, available nursing notes, past medical history, past surgical history, family history and social history. Vital Signs-Reviewed the patient's vital signs. Patient Vitals for the past 12 hrs:   Temp Pulse Resp BP SpO2   10/09/21 1315  88 14 (!) 113/58 94 %   10/09/21 1145  87 17 123/72 95 %   10/09/21 1045  91 16 125/76 94 %   10/09/21 0955    (!) 154/74    10/09/21 0952 98.4 °F (36.9 °C) 100 18 (!) 228/161 98 %       EKG interpretation: (Preliminary)  Atrial flutter, variable block, rate 74, normal axis    Records Reviewed: Nursing Notes, Old Medical Records, Previous Radiology Studies and Previous Laboratory Studies, Colonoscopy in 2017 and 2019.  Polyps with both removed no malignancy    Provider Notes (Medical Decision Making):   DDx- Colitis, diverticulitis, hemorrhoids, constipation, acute blood loss anemia, Back strain    ED Course:   Initial assessment performed. The patients presenting problems have been discussed, and they are in agreement with the care plan formulated and outlined with them. I have encouraged them to ask questions as they arise throughout their visit. Hgb wnl. CT shows constipation in addition to what appears to be colonic mass. Pt has been having ongoing constipation issues for several months Discussed results with pt and his wife. This could be very large polyp given hx also concern for malignancy. While pt was in ED, he was placed on the heart monitor. Appeared to be Atrial flutter. EKG obtained which does confirm Aflutter with variable conduction but rate controlled. Pt with no prior hx and denies any hx of palpitations, CP or SOA. Following CT pt had a very large BM, no blood seen, occult stool card sent. Given colonic mass, new onset Aflutter, acute T12 compression fx, feel best interest of pt to admit will discuss with GI. Consult:  Case discussed with Dr. Heather Charles. Agrees with admx, colonoscopy prep tomorrow with plan for colonoscopy on Monday. Consult:  Case discussed with Dr. Mary Ellen Delgado. He will see pt in consult     Consult:  Case discussed with Dr. Carlos Velarde, IM. Pt will be evaluated and admitted. Consult:     Disposition:  Admit       Diagnosis     Clinical Impression:   1. Rectal bleeding    2. Colonic mass    3. Atrial flutter, unspecified type ([address]    4. Compression fracture of T12 vertebra, initial encounter [facility])        Attestations:    Yamini Kennedy, DO    Please note that this dictation was completed with [employer], the [employer] voice recognition software. Quite often unanticipated grammatical, syntax, homophones, and other interpretive errors are inadvertently transcribed by the computer software. Please disregard these errors.   Please excuse any errors that have escaped final proofreading. Thank you.

## 2021-10-10 LAB
ALBUMIN SERPL-MCNC: 3.1 G/DL (ref 3.5–5)
ALBUMIN/GLOB SERPL: 0.7 {RATIO} (ref 1.1–2.2)
ALP SERPL-CCNC: 91 U/L (ref 45–117)
ALT SERPL-CCNC: 18 U/L (ref 12–78)
ANION GAP SERPL CALC-SCNC: 5 MMOL/L (ref 5–15)
AST SERPL-CCNC: 16 U/L (ref 15–37)
ATRIAL RATE: 288 BPM
BASOPHILS # BLD: 0 K/UL (ref 0–0.1)
BASOPHILS NFR BLD: 0 % (ref 0–1)
BILIRUB SERPL-MCNC: 0.7 MG/DL (ref 0.2–1)
BUN SERPL-MCNC: 17 MG/DL (ref 6–20)
BUN/CREAT SERPL: 18 (ref 12–20)
CALCIUM SERPL-MCNC: 8.8 MG/DL (ref 8.5–10.1)
CALCULATED P AXIS, ECG09: 87 DEGREES
CALCULATED R AXIS, ECG10: -14 DEGREES
CALCULATED T AXIS, ECG11: 4 DEGREES
CHLORIDE SERPL-SCNC: 106 MMOL/L (ref 97–108)
CO2 SERPL-SCNC: 26 MMOL/L (ref 21–32)
CREAT SERPL-MCNC: 0.93 MG/DL (ref 0.7–1.3)
DIAGNOSIS, 93000: NORMAL
DIFFERENTIAL METHOD BLD: NORMAL
EOSINOPHIL # BLD: 0.1 K/UL (ref 0–0.4)
EOSINOPHIL NFR BLD: 3 % (ref 0–7)
ERYTHROCYTE [DISTWIDTH] IN BLOOD BY AUTOMATED COUNT: 13.4 % (ref 11.5–14.5)
EST. AVERAGE GLUCOSE BLD GHB EST-MCNC: 148 MG/DL
GLOBULIN SER CALC-MCNC: 4.2 G/DL (ref 2–4)
GLUCOSE BLD STRIP.AUTO-MCNC: 109 MG/DL (ref 65–117)
GLUCOSE BLD STRIP.AUTO-MCNC: 140 MG/DL (ref 65–117)
GLUCOSE BLD STRIP.AUTO-MCNC: 158 MG/DL (ref 65–117)
GLUCOSE BLD STRIP.AUTO-MCNC: 92 MG/DL (ref 65–117)
GLUCOSE SERPL-MCNC: 126 MG/DL (ref 65–100)
HBA1C MFR BLD: 6.8 % (ref 4–5.6)
HCT VFR BLD AUTO: 38 % (ref 36.6–50.3)
HGB BLD-MCNC: 13.1 G/DL (ref 12.1–17)
IMM GRANULOCYTES # BLD AUTO: 0 K/UL (ref 0–0.04)
IMM GRANULOCYTES NFR BLD AUTO: 0 % (ref 0–0.5)
LYMPHOCYTES # BLD: 1.4 K/UL (ref 0.8–3.5)
LYMPHOCYTES NFR BLD: 24 % (ref 12–49)
MCH RBC QN AUTO: 30.3 PG (ref 26–34)
MCHC RBC AUTO-ENTMCNC: 34.5 G/DL (ref 30–36.5)
MCV RBC AUTO: 87.8 FL (ref 80–99)
MONOCYTES # BLD: 0.7 K/UL (ref 0–1)
MONOCYTES NFR BLD: 12 % (ref 5–13)
NEUTS SEG # BLD: 3.5 K/UL (ref 1.8–8)
NEUTS SEG NFR BLD: 61 % (ref 32–75)
NRBC # BLD: 0 K/UL (ref 0–0.01)
NRBC BLD-RTO: 0 PER 100 WBC
PLATELET # BLD AUTO: 242 K/UL (ref 150–400)
PMV BLD AUTO: 9.7 FL (ref 8.9–12.9)
POTASSIUM SERPL-SCNC: 3.7 MMOL/L (ref 3.5–5.1)
PROT SERPL-MCNC: 7.3 G/DL (ref 6.4–8.2)
Q-T INTERVAL, ECG07: 376 MS
QRS DURATION, ECG06: 76 MS
QTC CALCULATION (BEZET), ECG08: 417 MS
RBC # BLD AUTO: 4.33 M/UL (ref 4.1–5.7)
SERVICE CMNT-IMP: ABNORMAL
SERVICE CMNT-IMP: ABNORMAL
SERVICE CMNT-IMP: NORMAL
SERVICE CMNT-IMP: NORMAL
SODIUM SERPL-SCNC: 137 MMOL/L (ref 136–145)
TSH SERPL DL<=0.05 MIU/L-ACNC: 2.53 UIU/ML (ref 0.36–3.74)
VENTRICULAR RATE, ECG03: 74 BPM
WBC # BLD AUTO: 5.7 K/UL (ref 4.1–11.1)

## 2021-10-10 PROCEDURE — 82962 GLUCOSE BLOOD TEST: CPT

## 2021-10-10 PROCEDURE — 74011250637 HC RX REV CODE- 250/637: Performed by: INTERNAL MEDICINE

## 2021-10-10 PROCEDURE — 36415 COLL VENOUS BLD VENIPUNCTURE: CPT

## 2021-10-10 PROCEDURE — 97116 GAIT TRAINING THERAPY: CPT

## 2021-10-10 PROCEDURE — 99232 SBSQ HOSP IP/OBS MODERATE 35: CPT | Performed by: INTERNAL MEDICINE

## 2021-10-10 PROCEDURE — 97161 PT EVAL LOW COMPLEX 20 MIN: CPT

## 2021-10-10 PROCEDURE — 80053 COMPREHEN METABOLIC PANEL: CPT

## 2021-10-10 PROCEDURE — 74011636637 HC RX REV CODE- 636/637: Performed by: INTERNAL MEDICINE

## 2021-10-10 PROCEDURE — 65660000000 HC RM CCU STEPDOWN

## 2021-10-10 PROCEDURE — 94760 N-INVAS EAR/PLS OXIMETRY 1: CPT

## 2021-10-10 PROCEDURE — 83036 HEMOGLOBIN GLYCOSYLATED A1C: CPT

## 2021-10-10 PROCEDURE — 84443 ASSAY THYROID STIM HORMONE: CPT

## 2021-10-10 PROCEDURE — 85025 COMPLETE CBC W/AUTO DIFF WBC: CPT

## 2021-10-10 RX ORDER — POLYETHYLENE GLYCOL 3350 17 G/17G
119 POWDER, FOR SOLUTION ORAL EVERY 4 HOURS
Status: COMPLETED | OUTPATIENT
Start: 2021-10-10 | End: 2021-10-10

## 2021-10-10 RX ADMIN — POLYETHYLENE GLYCOL 3350 17 G: 17 POWDER, FOR SOLUTION ORAL at 09:48

## 2021-10-10 RX ADMIN — PRAVASTATIN SODIUM 20 MG: 10 TABLET ORAL at 00:39

## 2021-10-10 RX ADMIN — PRAVASTATIN SODIUM 20 MG: 10 TABLET ORAL at 21:23

## 2021-10-10 RX ADMIN — Medication 10 ML: at 21:28

## 2021-10-10 RX ADMIN — METOPROLOL TARTRATE 12.5 MG: 25 TABLET, FILM COATED ORAL at 21:24

## 2021-10-10 RX ADMIN — POLYETHYLENE GLYCOL 3350 119 G: 17 POWDER, FOR SOLUTION ORAL at 00:39

## 2021-10-10 RX ADMIN — INSULIN LISPRO 2 UNITS: 100 INJECTION, SOLUTION INTRAVENOUS; SUBCUTANEOUS at 09:48

## 2021-10-10 RX ADMIN — Medication 10 ML: at 09:49

## 2021-10-10 RX ADMIN — BISACODYL 10 MG: 5 TABLET, COATED ORAL at 21:23

## 2021-10-10 RX ADMIN — BISACODYL 10 MG: 5 TABLET, COATED ORAL at 09:47

## 2021-10-10 RX ADMIN — INSULIN LISPRO 2 UNITS: 100 INJECTION, SOLUTION INTRAVENOUS; SUBCUTANEOUS at 13:03

## 2021-10-10 RX ADMIN — MAGNESIUM CITRATE 296 ML: 1.75 LIQUID ORAL at 09:48

## 2021-10-10 RX ADMIN — AMLODIPINE BESYLATE 5 MG: 5 TABLET ORAL at 09:47

## 2021-10-10 RX ADMIN — METOPROLOL TARTRATE 12.5 MG: 25 TABLET, FILM COATED ORAL at 09:47

## 2021-10-10 RX ADMIN — Medication 10 ML: at 13:32

## 2021-10-10 NOTE — PROGRESS NOTES
Hospitalist Progress Note    NAME: Anita Oswald   :  1940   MRN:  [id number]       Assessment / Plan:  Acute lower GI bleed POA  ? Sigmoid colon mass POA  Presents with recent rectal bleeding x 3 weeks, no weight loss  GI consultation  N.p.o. post midnight  Plan for colonoscopy tomorrow  Monitor hemoglobin       New onset atrial flutter POA  Asymptomatic, found on admit EKG  Currently rate controlled  Monitor on telemetry  Low-dose beta-blocker  Not candidate for anticoagulation    Right severe hydronephrosis, likely chronic POA  Right nephrolithiasis POA  Creatinine 1.12, creatinine clearance 63.8  Chronic urinary complaints per the patient   urology follow-up as outpatient     Acute to subacute fracture of the anterior superior T12 vertebral body POA  Noted on CT abdomen done today  Not a lot of back pain  PRN pain meds     Diabetes mellitus type 2 POA  Home regimen: metformin  Diabetic diet  Hold metformin, add lantus as needed  Sliding scale insulin  POC  Check HgBa1C     Essential HTN POA  Hyperlipidemia POA  Hold  ASA with bleeding  Continue statin  Continue home BP regimen  Norvasc, denied taking Cozaar  And Norvasc, add low-dose metoprolol as above  PRN labetalol          30.0 - 39.9 Obese / Body mass index is 39.96 kg/m². Estimated discharge date:   Barriers:    Code status: Full  Prophylaxis: SCD's  Recommended Disposition: Home w/Family     Subjective:     Chief Complaint / Reason for Physician Visit  \"\". Discussed with RN events overnight.   Feels better denies any active bleeding, plan for colonoscopy tomorrow    Review of Systems:  Symptom Y/N Comments  Symptom Y/N Comments   Fever/Chills n   Chest Pain n    Poor Appetite    Edema     Cough    Abdominal Pain     Sputum    Joint Pain     SOB/ROJO n   Pruritis/Rash     Nausea/vomit    Tolerating PT/OT     Diarrhea    Tolerating Diet y    Constipation n   Other       Could NOT obtain due to:      Objective:     VITALS:   Last 24hrs VS reviewed since prior progress note. Most recent are:  Patient Vitals for the past 24 hrs:   Temp Pulse Resp BP SpO2   10/10/21 1204 98 °F (36.7 °C) 71 16 126/74 98 %   10/10/21 0805 98.3 °F (36.8 °C) 74 18 (!) 146/76 97 %   10/10/21 0800  73      10/10/21 0345 97.9 °F (36.6 °C) 73 19 136/76 94 %   10/09/21 2335 98.2 °F (36.8 °C) 69 18 132/64 97 %   10/09/21 2102  76  (!) 143/73    10/09/21 1945 98 °F (36.7 °C) 74 18 (!) 143/73 98 %   10/09/21 1712 97.9 °F (36.6 °C) 84 16 (!) 144/66 96 %   10/09/21 1618 98.4 °F (36.9 °C) 87 13 139/72 92 %   10/09/21 1515  86 23 110/78 93 %   10/09/21 1445  77 19 118/74 94 %   10/09/21 1315  88 14 (!) 113/58 94 %     No intake or output data in the 24 hours ending 10/10/21 1245     I had a face to face encounter and independently examined this patient on 10/10/2021, as outlined below:  PHYSICAL EXAM:  General: WD, WN. Alert, cooperative, no acute distress    EENT:  EOMI. Anicteric sclerae. MMM  Resp:  CTA bilaterally, no wheezing or rales. No accessory muscle use  CV:  Regular  rhythm,  No edema  GI:  Soft, Non distended, Non tender. +Bowel sounds  Neurologic:  Alert and oriented X 3, normal speech,   Psych:   Good insight. Not anxious nor agitated  Skin:  No rashes. No jaundice    Reviewed most current lab test results and cultures  YES  Reviewed most current radiology test results   YES  Review and summation of old records today    NO  Reviewed patient's current orders and MAR    YES  PMH/SH reviewed - no change compared to H&P  ________________________________________________________________________  Care Plan discussed with:    Comments   Patient y    Family      RN y    Care Manager     Consultant                        Multidiciplinary team rounds were held today with , nursing, pharmacist and clinical coordinator. Patient's plan of care was discussed; medications were reviewed and discharge planning was addressed. ________________________________________________________________________  Total NON critical care TIME: 35   Minutes    Total CRITICAL CARE TIME Spent:   Minutes non procedure based      Comments   >50% of visit spent in counseling and coordination of care y    ________________________________________________________________________  Jayden Nash MD     Procedures: see electronic medical records for all procedures/Xrays and details which were not copied into this note but were reviewed prior to creation of Plan. LABS:  I reviewed today's most current labs and imaging studies. Pertinent labs include:  Recent Labs     10/10/21  0249 10/09/21  1010   WBC 5.7 6.9   HGB 13.1 13.4   HCT 38.0 40.7    280     Recent Labs     10/10/21  0249 10/09/21  1045 10/09/21  1021 10/09/21  1010     --   --  137   K 3.7  --   --  3.7     --   --  104   CO2 26  --   --  29   *  --   --  211*   BUN 17  --   --  16   CREA 0.93  --   --  1.12   CA 8.8  --   --  9.2   MG  --  2.2  --   --    ALB 3.1*  --   --  3.4*   TBILI 0.7  --   --  0.8   ALT 18  --   --  18   INR  --   --  1.0  --        Signed:  Jayden Nash MD

## 2021-10-10 NOTE — PROGRESS NOTES
End of Shift Note    Bedside shift change report given to Kisha Dawson RN (oncoming nurse) by Jamil Garcia RN (offgoing nurse). Report included the following information SBAR and MAR    Shift worked:  DAYS   Shift summary and any significant changes:     COLONOSCOPY MONDAY       Concerns for physician to address:  NONE   Zone phone for oncoming shift:   [phone]     Patient Information  Romell Cabot  79 y.o.  10/9/2021  9:57 AM by Clifford Nails MD. Romell Cabot was admitted from Home    Problem List  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 01/09/2020     Past Medical History:   Diagnosis Date    Arthritis     At risk for sleep apnea 12/18/2019    Stop [address]     Cancer [facility])     prostate cancer    Diabetes ([facility] [address]     Elevated cholesterol     Hypertension        Core Measures:  CVA: No No  CHF:No No  PNA:No No    Activity:  Activity Level: Up ad gissel  Number times ambulated in hallways past shift: 0  Number of times OOB to chair past shift: 5    Cardiac:   Cardiac Monitoring: Yes      Cardiac Rhythm: SV Tachy    Access:   Current line(s): PIV     Genitourinary:   Urinary status: voiding    Respiratory:   O2 Device: None (Room air)  Chronic home O2 use?: N/A  Incentive spirometer at bedside: N/A       GI:  Last Bowel Movement Date: 10/10/21  Current diet:  ADULT DIET Clear Liquid  DIET NPO  Passing flatus: YES  Tolerating current diet: YES       Pain Management:   Patient states pain is manageable on current regimen: N/A    Skin:  Arvin Score: 23  Interventions: turn team and increase time out of bed    Patient Safety:  Fall Score:  Total Score: 2  Interventions: bed/chair alarm and stay with me (per policy)       DVT prophylaxis:  DVT prophylaxis Med- No  DVT prophylaxis SCD or NEVILLE- No     Active Consults:  IP CONSULT TO UROLOGY    Length of Stay:  Expected LOS: - - -  Actual LOS: 1  Discharge Plan: No HOME?       Venkat Renee RN

## 2021-10-10 NOTE — PROGRESS NOTES
PHYSICAL THERAPY EVALUATION/DISCHARGE  Patient: Chavez Landry (34 y.o. male)  Date: 10/10/2021  Primary Diagnosis: Rectal bleed [K62.5]  Colonic mass [K63.89]  Atrial flutter (HCC) [I48.92]  Procedure(s) (LRB):  COLONOSCOPY (N/A)     Precautions:          ASSESSMENT  Based on the objective data described below, the patient presents with good strength, intact balance, and overall baseline independent functional mobility. Gait steady and stable overall as pt independently ambulated 100ft. No LOB/balance deficits noted. Pt denied c/o back pain with activity (T12 compression fx), stating pain has resolved over previous 1-2 days. Pt functioning at his baseline independent level and has no further skilled therapy needs. Functional Outcome Measure: The patient scored 75/100 on the Barthel Index outcome measure which is indicative of mild impairment in ADLs and functional mobility. Other factors to consider for discharge: none     Further skilled acute physical therapy is not indicated at this time. PLAN :  Recommendation for discharge: (in order for the patient to meet his/her long term goals)  No skilled physical therapy/ follow up rehabilitation needs identified at this time. This discharge recommendation:  Has been made in collaboration with the attending provider and/or case management    IF patient discharges home will need the following DME: none       SUBJECTIVE:   Patient stated I fell off a ladder trying to wash my wife's car. I guess that's what car washes are for.     OBJECTIVE DATA SUMMARY:   HISTORY:    Past Medical History:   Diagnosis Date    Arthritis     At risk for sleep apnea 12/18/2019    Stop [address]     Cancer [facility])     prostate cancer    Diabetes ([facility] [address]     Elevated cholesterol     Hypertension      Past Surgical History:   Procedure Laterality Date    COLONOSCOPY N/A 9/27/2017    COLONOSCOPY performed by Adryan Wells MD at [facility] ENDOSCOPY    COLONOSCOPY N/A 12/30/2019 COLONOSCOPY WITH POLYPECTOMY performed by Pat Lawler MD at [facility] AMBULATORY OR    HX CATARACT REMOVAL Left 2020    HX HERNIA REPAIR      HX HIP REPLACEMENT Right     HX KNEE REPLACEMENT Right     HX PROSTATECTOMY         Prior level of function: independent w/ ambulation and ADLs. Still driving. Lives with wife. Reports history of 1 fall which occurred off ladder as he was washing his wife's car. Personal factors and/or comorbidities impacting plan of care:     Home Situation  Home Environment: Private residence  # Steps to Enter: 2  Rails to Enter: Yes  Hand Rails : Right  One/Two Story Residence: One story  Living Alone: No  Support Systems: Spouse/Significant Other  Patient Expects to be Discharged to[de-identified] House  Current DME Used/Available at Home: Cane, straight    EXAMINATION/PRESENTATION/DECISION MAKING:   Critical Behavior:  Neurologic State: Alert  Orientation Level: Oriented X4  Cognition: Appropriate decision making, Appropriate for age attention/concentration, Appropriate safety awareness     Hearing:     Skin:  intact  Edema: none noted   Range Of Motion:  AROM: Within functional limits                       Strength:    Strength:  Within functional limits                    Tone & Sensation:   Tone: Normal                              Coordination:  Coordination: Within functional limits  Vision:      Functional Mobility:  Bed Mobility:  Rolling: Other (comment) (seated in recliner chair upon arrival )           Transfers:  Sit to Stand: Modified independent  Stand to Sit: Modified independent                       Balance:   Sitting: Intact  Standing: Intact  Ambulation/Gait Training:  Distance (ft): 100 Feet (ft)  Assistive Device: Gait belt  Ambulation - Level of Assistance: Independent        Gait Abnormalities: Decreased step clearance        Base of Support: Widened                           Functional Measure:  Barthel Index:    Bathin  Bladder: 10  Bowels: 10  Grooming: 5  Dressing: 10  Feeding: 10  Mobility: 0  Stairs: 0  Toilet Use: 10  Transfer (Bed to Chair and Back): 15  Total: 75/100       The Barthel ADL Index: Guidelines  1. The index should be used as a record of what a patient does, not as a record of what a patient could do. 2. The main aim is to establish degree of independence from any help, physical or verbal, however minor and for whatever reason. 3. The need for supervision renders the patient not independent. 4. A patient's performance should be established using the best available evidence. Asking the patient, friends/relatives and nurses are the usual sources, but direct observation and common sense are also important. However direct testing is not needed. 5. Usually the patient's performance over the preceding 24-48 hours is important, but occasionally longer periods will be relevant. 6. Middle categories imply that the patient supplies over 50 per cent of the effort. 7. Use of aids to be independent is allowed. Jonah Goodman., Barthel, D.W. (9713). Functional evaluation: the Barthel Index. [address] [facility] (14)2. Ana Maria Selby hillary LENNIE Munguia, Kym Irby., [facility], [address] [place] [address] (1999). Measuring the change indisability after inpatient rehabilitation; comparison of the responsiveness of the Barthel Index and Functional Kleberg Measure. Journal of Neurology, Neurosurgery, and Psychiatry, 66(4), 931-129. Pa Marshall, N.J.A, LICO Faye, & Evelyne Barajas, M.A. (2004.) Assessment of post-stroke quality of life in cost-effectiveness studies: The usefulness of the Barthel Index and the EuroQoL-5D.  Quality of Life Research, 15, 237-33          Physical Therapy Evaluation Charge Determination   History Examination Presentation Decision-Making   MEDIUM  Complexity : 1-2 comorbidities / personal factors will impact the outcome/ POC  MEDIUM Complexity : 3 Standardized tests and measures addressing body structure, function, activity limitation and / or participation in recreation  MEDIUM Complexity : Evolving with changing characteristics  MEDIUM Complexity : FOTO score of 26-74      Based on the above components, the patient evaluation is determined to be of the following complexity level: MEDIUM    Pain Rating:  Denied c/o pain    Activity Tolerance:   Good      After treatment patient left in no apparent distress:   Sitting in chair, Call bell within reach and Caregiver / family present    COMMUNICATION/EDUCATION:   The patients plan of care was discussed with: Registered nurse. Fall prevention education was provided and the patient/caregiver indicated understanding., Patient/family have participated as able in goal setting and plan of care. and Patient/family agree to work toward stated goals and plan of care.     Thank you for this referral.  Jaspal Bess, PT, DPT   Time Calculation: 16 mins

## 2021-10-10 NOTE — PROGRESS NOTES
GI Progress Note ([place] for [facility])  NAME:Ken Alcaraz :1940 FQV:[id number]   ATTG: Dr. Kajal Giordano  PCP: Barbette Bence, MD  Date/Time:  10/10/2021 8:24 AM     Primary GI: Dr. Mendez Ledesma    Reason for following: sigmoid mass    Assessment:   · Rectal bleeding  · Sigmoid mass  · Change in bowel habits, suddenly has looser stool, when normally constipated  · Afib/flutter, new-onset, cardiology following  · Prostate CA, DM, HTN     Plan:   · CLD  · Bowel prep today  · NPO p MN   · Colonoscopy tomorrow     Subjective:   Hgb stable, 13 again today. In the bathroom this morning. No complaints    Review of Systems:  Symptom Y/N Comments  Symptom Y/N Comments   Fever/Chills n   Chest Pain n    Cough n   Headaches n    Sputum n   Joint Pain n    SOB/ROJO n   Pruritis/Rash n    Tolerating Diet y CLD  Other       Could NOT obtain due to:      Objective:   VITALS:   Last 24hrs VS reviewed since prior progress note. Most recent are:  Visit Vitals  /76   Pulse 73   Temp 97.9 °F (36.6 °C)   Resp 19   Ht 5' 8\" (1.727 m)   Wt 119.2 kg (262 lb 12.6 oz)   SpO2 94%   BMI 39.96 kg/m²     No intake or output data in the 24 hours ending 10/10/21 0824  PHYSICAL EXAM:  General: WD, WN. Alert, cooperative, no acute distress    HEENT: NC, Atraumatic. Anicteric sclerae. Lungs:  CTA Bilaterally. No Wheezing/Rhonchi/Rales. Heart:  S1S2  Abdomen: Soft, Non distended, Non tender.  +Bowel sounds, no HSM  Neurologic:  Alert and oriented X 3. No acute neurological distress   Psych:   Good insight. Not anxious nor agitated.     Lab and Radiology Data Reviewed: (see below)    Medications Reviewed: (see below)  PMH/SH reviewed - no change compared to H&P  ________________________________________________________________________  Total time spent with patient: 20 minutes ________________________________________________________________________  Care Plan discussed with:  Patient x   Family     RN               Consultant:       Monica Baca MD Procedures: see electronic medical records for all procedures/Xrays and details which were not copied into this note but were reviewed prior to creation of Plan. LABS:  Recent Labs     10/10/21  0249 10/09/21  1010   WBC 5.7 6.9   HGB 13.1 13.4   HCT 38.0 40.7    280     Recent Labs     10/10/21  0249 10/09/21  1045 10/09/21  1010     --  137   K 3.7  --  3.7     --  104   CO2 26  --  29   BUN 17  --  16   CREA 0.93  --  1.12   *  --  211*   CA 8.8  --  9.2   MG  --  2.2  --      Recent Labs     10/10/21  0249 10/09/21  1010   AP 91 95   TP 7.3 7.7   ALB 3.1* 3.4*   GLOB 4.2* 4.3*   LPSE  --  123     Recent Labs     10/09/21  1021   INR 1.0   PTP 10.9   APTT 25.8      No results for input(s): FE, TIBC, PSAT, FERR in the last 72 hours. No results found for: FOL, RBCF  No results for input(s): PH, PCO2, PO2 in the last 72 hours. No results for input(s): CPK, CKMB in the last 72 hours.     No lab exists for component: TROPONINI  No results found for: COLOR, APPRN, SPGRU, REFSG, TARI, PROTU, GLUCU, KETU, BILU, UROU, KARL, LEUKU, GLUKE, EPSU, BACTU, WBCU, RBCU, CASTS, UCRY    MEDICATIONS:  Current Facility-Administered Medications   Medication Dose Route Frequency    sodium chloride (NS) flush 5-40 mL  5-40 mL IntraVENous Q8H    sodium chloride (NS) flush 5-40 mL  5-40 mL IntraVENous PRN    acetaminophen (TYLENOL) tablet 650 mg  650 mg Oral Q6H PRN    Or    acetaminophen (TYLENOL) suppository 650 mg  650 mg Rectal Q6H PRN    polyethylene glycol (MIRALAX) packet 17 g  17 g Oral DAILY    bisacodyL (DULCOLAX) tablet 5 mg  5 mg Oral DAILY PRN    promethazine (PHENERGAN) tablet 12.5 mg  12.5 mg Oral Q6H PRN    Or    ondansetron (ZOFRAN) injection 4 mg  4 mg IntraVENous Q6H PRN    0.9% sodium chloride infusion  50 mL/hr IntraVENous CONTINUOUS    glucose chewable tablet 16 g  4 Tablet Oral PRN    dextrose (D50W) injection syrg 12.5-25 g  25-50 mL IntraVENous PRN    glucagon (GLUCAGEN) injection 1 mg  1 mg IntraMUSCular PRN    insulin lispro (HUMALOG) injection   SubCUTAneous AC&HS    metoprolol tartrate (LOPRESSOR) tablet 12.5 mg  12.5 mg Oral Q12H    bisacodyL (DULCOLAX) tablet 10 mg  10 mg Oral Q12H    magnesium citrate solution 296 mL  296 mL Oral NOW    amLODIPine (NORVASC) tablet 5 mg  5 mg Oral DAILY    pravastatin (PRAVACHOL) tablet 20 mg  20 mg Oral QHS    labetaloL (NORMODYNE;TRANDATE) injection 20 mg  20 mg IntraVENous Q3H PRN

## 2021-10-10 NOTE — PROGRESS NOTES
Cardiology Progress Note      10/10/2021 11:50 AM    Admit Date: 10/9/2021    Admit Diagnosis: Rectal bleed [K62.5]; Colonic mass [K63.89]; Atrial flutter ([address] [I48.92]      Subjective:     Kristi Rodrigez denies any chest pain, SOB.      Visit Vitals  /76   Pulse 73   Temp 97.9 °F (36.6 °C)   Resp 19   Ht 5' 8\" (1.727 m)   Wt 262 lb 12.6 oz (119.2 kg)   SpO2 94%   BMI 39.96 kg/m²       Current Facility-Administered Medications   Medication Dose Route Frequency    sodium chloride (NS) flush 5-40 mL  5-40 mL IntraVENous Q8H    sodium chloride (NS) flush 5-40 mL  5-40 mL IntraVENous PRN    acetaminophen (TYLENOL) tablet 650 mg  650 mg Oral Q6H PRN    Or    acetaminophen (TYLENOL) suppository 650 mg  650 mg Rectal Q6H PRN    polyethylene glycol (MIRALAX) packet 17 g  17 g Oral DAILY    bisacodyL (DULCOLAX) tablet 5 mg  5 mg Oral DAILY PRN    promethazine (PHENERGAN) tablet 12.5 mg  12.5 mg Oral Q6H PRN    Or    ondansetron (ZOFRAN) injection 4 mg  4 mg IntraVENous Q6H PRN    0.9% sodium chloride infusion  50 mL/hr IntraVENous CONTINUOUS    glucose chewable tablet 16 g  4 Tablet Oral PRN    dextrose (D50W) injection syrg 12.5-25 g  25-50 mL IntraVENous PRN    glucagon (GLUCAGEN) injection 1 mg  1 mg IntraMUSCular PRN    insulin lispro (HUMALOG) injection   SubCUTAneous AC&HS    metoprolol tartrate (LOPRESSOR) tablet 12.5 mg  12.5 mg Oral Q12H    bisacodyL (DULCOLAX) tablet 10 mg  10 mg Oral Q12H    amLODIPine (NORVASC) tablet 5 mg  5 mg Oral DAILY    pravastatin (PRAVACHOL) tablet 20 mg  20 mg Oral QHS    labetaloL (NORMODYNE;TRANDATE) injection 20 mg  20 mg IntraVENous Q3H PRN         Objective:      Physical Exam:  Visit Vitals  /76 (BP 1 Location: Left upper arm, BP Patient Position: Sitting)   Pulse 72   Temp 98.4 °F (36.9 °C)   Resp 18   Ht 5' 8\" (1.727 m)   Wt 262 lb 12.6 oz (119.2 kg)   SpO2 97%   BMI 39.96 kg/m²     General Appearance:  Well developed, well nourished,alert and oriented x 3, and individual in no acute distress. Ears/Nose/Mouth/Throat:   Hearing grossly normal.         Neck: Supple. Chest:   Lungs clear to auscultation bilaterally. Cardiovascular:  Regular rate and rhythm, S1, S2 normal, no murmur. Abdomen:   Soft, non-tender, bowel sounds are active. Extremities: No edema bilaterally. Skin: Warm and dry.                Data Review:   Labs:    Recent Results (from the past 24 hour(s))   OCCULT BLOOD, STOOL    Collection Time: 10/09/21  1:36 PM   Result Value Ref Range    Occult blood, stool Negative NEG     EKG, 12 LEAD, INITIAL    Collection Time: 10/09/21  1:43 PM   Result Value Ref Range    Ventricular Rate 74 BPM    Atrial Rate 288 BPM    QRS Duration 76 ms    Q-T Interval 376 ms    QTC Calculation (Bezet) 417 ms    Calculated P Axis 87 degrees    Calculated R Axis -14 degrees    Calculated T Axis 4 degrees    Diagnosis       Atrial flutter with variable AV block  When compared with ECG of 03-JAN-2020 09:54,  Atrial flutter has replaced Sinus rhythm  Incomplete right bundle branch block is no longer present     GLUCOSE, POC    Collection Time: 10/09/21  4:50 PM   Result Value Ref Range    Glucose (POC) 171 (H) 65 - 117 mg/dL    Performed by Vasyl Coreas (PCT)    COVID-19 RAPID TEST    Collection Time: 10/09/21  6:57 PM   Result Value Ref Range    Specimen source Nasopharyngeal      COVID-19 rapid test Not detected NOTD     GLUCOSE, POC    Collection Time: 10/09/21  8:56 PM   Result Value Ref Range    Glucose (POC) 181 (H) 65 - 117 mg/dL    Performed by Orlando Foy (ANTHONY RN)    METABOLIC PANEL, COMPREHENSIVE    Collection Time: 10/10/21  2:49 AM   Result Value Ref Range    Sodium 137 136 - 145 mmol/L    Potassium 3.7 3.5 - 5.1 mmol/L    Chloride 106 97 - 108 mmol/L    CO2 26 21 - 32 mmol/L    Anion gap 5 5 - 15 mmol/L    Glucose 126 (H) 65 - 100 mg/dL    BUN 17 6 - 20 MG/DL    Creatinine 0.93 0.70 - 1.30 MG/DL    BUN/Creatinine ratio 18 12 - 20      GFR est AA >60 >60 ml/min/1.73m2    GFR est non-AA >60 >60 ml/min/1.73m2    Calcium 8.8 8.5 - 10.1 MG/DL    Bilirubin, total 0.7 0.2 - 1.0 MG/DL    ALT (SGPT) 18 12 - 78 U/L    AST (SGOT) 16 15 - 37 U/L    Alk. phosphatase 91 45 - 117 U/L    Protein, total 7.3 6.4 - 8.2 g/dL    Albumin 3.1 (L) 3.5 - 5.0 g/dL    Globulin 4.2 (H) 2.0 - 4.0 g/dL    A-G Ratio 0.7 (L) 1.1 - 2.2     CBC WITH AUTOMATED DIFF    Collection Time: 10/10/21  2:49 AM   Result Value Ref Range    WBC 5.7 4.1 - 11.1 K/uL    RBC 4.33 4.10 - 5.70 M/uL    HGB 13.1 12.1 - 17.0 g/dL    HCT 38.0 36.6 - 50.3 %    MCV 87.8 80.0 - 99.0 FL    MCH 30.3 26.0 - 34.0 PG    MCHC 34.5 30.0 - 36.5 g/dL    RDW 13.4 11.5 - 14.5 %    PLATELET 201 465 - 720 K/uL    MPV 9.7 8.9 - 12.9 FL    NRBC 0.0 0  WBC    ABSOLUTE NRBC 0.00 0.00 - 0.01 K/uL    NEUTROPHILS 61 32 - 75 %    LYMPHOCYTES 24 12 - 49 %    MONOCYTES 12 5 - 13 %    EOSINOPHILS 3 0 - 7 %    BASOPHILS 0 0 - 1 %    IMMATURE GRANULOCYTES 0 0.0 - 0.5 %    ABS. NEUTROPHILS 3.5 1.8 - 8.0 K/UL    ABS. LYMPHOCYTES 1.4 0.8 - 3.5 K/UL    ABS. MONOCYTES 0.7 0.0 - 1.0 K/UL    ABS. EOSINOPHILS 0.1 0.0 - 0.4 K/UL    ABS. BASOPHILS 0.0 0.0 - 0.1 K/UL    ABS. IMM.  GRANS. 0.0 0.00 - 0.04 K/UL    DF AUTOMATED     TSH 3RD GENERATION    Collection Time: 10/10/21  2:49 AM   Result Value Ref Range    TSH 2.53 0.36 - 3.74 uIU/mL   GLUCOSE, POC    Collection Time: 10/10/21  7:47 AM   Result Value Ref Range    Glucose (POC) 140 (H) 65 - 117 mg/dL    Performed by Sydnee Taylor (PCT)    GLUCOSE, POC    Collection Time: 10/10/21 11:36 AM   Result Value Ref Range    Glucose (POC) 158 (H) 65 - 117 mg/dL    Performed by Sydnee Taylor (PCT)        Telemetry: atrial flutter-      Assessment:     Hospital Problems  Date Reviewed: 1/9/2020        Codes Class Noted POA    Atrial flutter, paroxysmal ([facility] ICD-10-CM: X81.23  ICD-9-CM: 427.32  10/9/2021 Yes        Rectal bleed ICD-10-CM: K62.5  ICD-9-CM: 569.3  10/9/2021 Unknown        Colonic mass ICD-10-CM: M76.90  ICD-9-CM: 569.89  10/9/2021 Unknown              Plan:     Atrial flutter- rate controlled. No anticoagulation. Consider watchman down the road. Will await GI work up.     Zo Cat MD

## 2021-10-11 ENCOUNTER — ANESTHESIA EVENT (OUTPATIENT)
Dept: ENDOSCOPY | Age: 81
DRG: 330 | End: 2021-10-11
Payer: MEDICARE

## 2021-10-11 ENCOUNTER — APPOINTMENT (OUTPATIENT)
Dept: NON INVASIVE DIAGNOSTICS | Age: 81
DRG: 330 | End: 2021-10-11
Attending: INTERNAL MEDICINE
Payer: MEDICARE

## 2021-10-11 ENCOUNTER — ANESTHESIA (OUTPATIENT)
Dept: ENDOSCOPY | Age: 81
DRG: 330 | End: 2021-10-11
Payer: MEDICARE

## 2021-10-11 ENCOUNTER — APPOINTMENT (OUTPATIENT)
Dept: CT IMAGING | Age: 81
DRG: 330 | End: 2021-10-11
Attending: STUDENT IN AN ORGANIZED HEALTH CARE EDUCATION/TRAINING PROGRAM
Payer: MEDICARE

## 2021-10-11 LAB
ECHO AO ROOT DIAM: 3.72 CM
ECHO AV AREA PEAK VELOCITY: 1.76 CM2
ECHO AV AREA VTI: 1.96 CM2
ECHO AV AREA/BSA PEAK VELOCITY: 0.8 CM2/M2
ECHO AV AREA/BSA VTI: 0.9 CM2/M2
ECHO AV MEAN GRADIENT: 8.69 MMHG
ECHO AV PEAK GRADIENT: 16.39 MMHG
ECHO AV PEAK VELOCITY: 202.27 CM/S
ECHO AV VTI: 43.08 CM
ECHO IVC PROX: 1.81 CM
ECHO LA MAJOR AXIS: 3.22 CM
ECHO LA MINOR AXIS: 1.41 CM
ECHO LV E' LATERAL VELOCITY: 8.77 CM/S
ECHO LV E' SEPTAL VELOCITY: 7.81 CM/S
ECHO LV INTERNAL DIMENSION DIASTOLIC: 4.87 CM (ref 4.2–5.9)
ECHO LV INTERNAL DIMENSION SYSTOLIC: 3.44 CM
ECHO LV IVSD: 0.93 CM (ref 0.6–1)
ECHO LV MASS 2D: 187.4 G (ref 88–224)
ECHO LV MASS INDEX 2D: 81.9 G/M2 (ref 49–115)
ECHO LV POSTERIOR WALL DIASTOLIC: 1.18 CM (ref 0.6–1)
ECHO LVOT DIAM: 2.28 CM
ECHO LVOT PEAK GRADIENT: 3.04 MMHG
ECHO LVOT PEAK VELOCITY: 87.16 CM/S
ECHO LVOT SV: 84.6 ML
ECHO LVOT VTI: 20.74 CM
ECHO MV A VELOCITY: 31.94 CM/S
ECHO MV E DECELERATION TIME (DT): 187.12 MS
ECHO MV E VELOCITY: 84.46 CM/S
ECHO MV E/A RATIO: 2.64
ECHO MV E/E' LATERAL: 9.63
ECHO MV E/E' RATIO (AVERAGED): 10.22
ECHO MV E/E' SEPTAL: 10.81
ECHO PV MAX VELOCITY: 86.84 CM/S
ECHO PV PEAK INSTANTANEOUS GRADIENT SYSTOLIC: 3.02 MMHG
ECHO TV REGURGITANT MAX VELOCITY: 176.31 CM/S
ECHO TV REGURGITANT MAX VELOCITY: 84.96 CM/S
ECHO TV REGURGITANT PEAK GRADIENT: 14.33 MMHG
GLUCOSE BLD STRIP.AUTO-MCNC: 102 MG/DL (ref 65–117)
GLUCOSE BLD STRIP.AUTO-MCNC: 110 MG/DL (ref 65–117)
GLUCOSE BLD STRIP.AUTO-MCNC: 119 MG/DL (ref 65–117)
GLUCOSE BLD STRIP.AUTO-MCNC: 250 MG/DL (ref 65–117)
LVOT MG: 1.7 MMHG
SERVICE CMNT-IMP: ABNORMAL
SERVICE CMNT-IMP: ABNORMAL
SERVICE CMNT-IMP: NORMAL
SERVICE CMNT-IMP: NORMAL

## 2021-10-11 PROCEDURE — 71260 CT THORAX DX C+: CPT

## 2021-10-11 PROCEDURE — 93306 TTE W/DOPPLER COMPLETE: CPT

## 2021-10-11 PROCEDURE — 77030038665 HC SOL ELEVIEW INJ AGNT ARPH -B: Performed by: INTERNAL MEDICINE

## 2021-10-11 PROCEDURE — 74011250637 HC RX REV CODE- 250/637: Performed by: INTERNAL MEDICINE

## 2021-10-11 PROCEDURE — 88305 TISSUE EXAM BY PATHOLOGIST: CPT

## 2021-10-11 PROCEDURE — 74011250636 HC RX REV CODE- 250/636: Performed by: ANESTHESIOLOGY

## 2021-10-11 PROCEDURE — 77030003657 HC NDL SCLER BSC -B: Performed by: INTERNAL MEDICINE

## 2021-10-11 PROCEDURE — 77030021593 HC FCPS BIOP ENDOSC BSC -A: Performed by: INTERNAL MEDICINE

## 2021-10-11 PROCEDURE — 74011636637 HC RX REV CODE- 636/637: Performed by: INTERNAL MEDICINE

## 2021-10-11 PROCEDURE — 76040000019: Performed by: INTERNAL MEDICINE

## 2021-10-11 PROCEDURE — 82962 GLUCOSE BLOOD TEST: CPT

## 2021-10-11 PROCEDURE — 2709999900 HC NON-CHARGEABLE SUPPLY: Performed by: INTERNAL MEDICINE

## 2021-10-11 PROCEDURE — 65660000000 HC RM CCU STEPDOWN

## 2021-10-11 PROCEDURE — 74011250636 HC RX REV CODE- 250/636: Performed by: INTERNAL MEDICINE

## 2021-10-11 PROCEDURE — 76060000031 HC ANESTHESIA FIRST 0.5 HR: Performed by: INTERNAL MEDICINE

## 2021-10-11 PROCEDURE — 74011000636 HC RX REV CODE- 636: Performed by: INTERNAL MEDICINE

## 2021-10-11 PROCEDURE — 94760 N-INVAS EAR/PLS OXIMETRY 1: CPT

## 2021-10-11 PROCEDURE — 74011000250 HC RX REV CODE- 250: Performed by: ANESTHESIOLOGY

## 2021-10-11 PROCEDURE — 0DBN8ZX EXCISION OF SIGMOID COLON, VIA NATURAL OR ARTIFICIAL OPENING ENDOSCOPIC, DIAGNOSTIC: ICD-10-PCS | Performed by: INTERNAL MEDICINE

## 2021-10-11 PROCEDURE — 93306 TTE W/DOPPLER COMPLETE: CPT | Performed by: INTERNAL MEDICINE

## 2021-10-11 PROCEDURE — 99232 SBSQ HOSP IP/OBS MODERATE 35: CPT | Performed by: INTERNAL MEDICINE

## 2021-10-11 PROCEDURE — 88360 TUMOR IMMUNOHISTOCHEM/MANUAL: CPT

## 2021-10-11 RX ORDER — PROPOFOL 10 MG/ML
INJECTION, EMULSION INTRAVENOUS AS NEEDED
Status: DISCONTINUED | OUTPATIENT
Start: 2021-10-11 | End: 2021-10-11 | Stop reason: HOSPADM

## 2021-10-11 RX ORDER — SODIUM CHLORIDE 9 MG/ML
50 INJECTION, SOLUTION INTRAVENOUS CONTINUOUS
Status: DISCONTINUED | OUTPATIENT
Start: 2021-10-11 | End: 2021-10-14

## 2021-10-11 RX ORDER — LIDOCAINE HYDROCHLORIDE 20 MG/ML
INJECTION, SOLUTION EPIDURAL; INFILTRATION; INTRACAUDAL; PERINEURAL AS NEEDED
Status: DISCONTINUED | OUTPATIENT
Start: 2021-10-11 | End: 2021-10-11 | Stop reason: HOSPADM

## 2021-10-11 RX ADMIN — PROPOFOL 120 MG: 10 INJECTION, EMULSION INTRAVENOUS at 14:30

## 2021-10-11 RX ADMIN — SODIUM CHLORIDE 50 ML/HR: 900 INJECTION, SOLUTION INTRAVENOUS at 13:00

## 2021-10-11 RX ADMIN — SODIUM CHLORIDE 50 ML/HR: 9 INJECTION, SOLUTION INTRAVENOUS at 11:06

## 2021-10-11 RX ADMIN — INSULIN LISPRO 3 UNITS: 100 INJECTION, SOLUTION INTRAVENOUS; SUBCUTANEOUS at 23:31

## 2021-10-11 RX ADMIN — Medication 10 ML: at 21:10

## 2021-10-11 RX ADMIN — METOPROLOL TARTRATE 12.5 MG: 25 TABLET, FILM COATED ORAL at 20:44

## 2021-10-11 RX ADMIN — IOPAMIDOL 100 ML: 755 INJECTION, SOLUTION INTRAVENOUS at 17:58

## 2021-10-11 RX ADMIN — PRAVASTATIN SODIUM 20 MG: 10 TABLET ORAL at 21:08

## 2021-10-11 RX ADMIN — AMLODIPINE BESYLATE 5 MG: 5 TABLET ORAL at 11:05

## 2021-10-11 RX ADMIN — METOPROLOL TARTRATE 12.5 MG: 25 TABLET, FILM COATED ORAL at 11:05

## 2021-10-11 RX ADMIN — LIDOCAINE HYDROCHLORIDE 40 MG: 20 INJECTION, SOLUTION EPIDURAL; INFILTRATION; INTRACAUDAL; PERINEURAL at 14:18

## 2021-10-11 NOTE — PROGRESS NOTES
Endoscope was pre-cleaned at the bedside immediately following procedure by Emma Juarez. For medications administered by anesthesia, see anesthesia chart.

## 2021-10-11 NOTE — PROGRESS NOTES
End of Shift Note    Bedside shift change report given to Suyapa Alvarado RN   (oncoming nurse) by Eder Watters RN (offgoing nurse). Report included the following information SBAR, Kardex and Recent Results    Shift worked: 7a-7p   Shift summary and any significant changes:    Patient had colonoscopy, Echo, and CT of chest today       Concerns for physician to address: None   Zone phone for oncoming shift:       Patient Information  Esteban Dacosta  79 y.o.  10/9/2021  9:57 AM by Octavio Locke MD. Esteban Dacosta was admitted from Home    Problem List  Patient Active Problem List    Diagnosis Date Noted    Atrial flutter, paroxysmal ([facility] [address] 10/09/2021    Rectal bleed 10/09/2021    Colonic mass 10/09/2021    Combined forms of age-related cataract of right eye 01/09/2020     Past Medical History:   Diagnosis Date    Arthritis     At risk for sleep apnea 12/18/2019    Stop [place]      Cancer [facility])     prostate cancer    Diabetes ([facility] [address]     Elevated cholesterol     Hypertension        Core Measures:  CVA: No No  CHF:No No  PNA:No No    Activity:  Activity Level: Up with Assistance  Number times ambulated in hallways past shift: 2  Number of times OOB to chair past shift: 2    Cardiac:   Cardiac Monitoring: Yes      Cardiac Rhythm: Atrial Flutter (irregualr)    Access:   Current line(s): PIV   Central Line? No  PICC LINE? No     Genitourinary:   Urinary status: voiding  Urinary Catheter? No     Respiratory:   O2 Device: None (Room air)  Chronic home O2 use?: NO  Incentive spirometer at bedside: NO       GI:  Last Bowel Movement Date: 10/11/21  Current diet:  ADULT DIET Full Liquid  Passing flatus: YES  Tolerating current diet: YES       Pain Management:   Patient states pain is manageable on current regimen: YES    Skin:  Arvin Score: 23  Interventions: increase time out of bed and nutritional support     Patient Safety:  Fall Score:  Total Score: 2  Interventions: assistive device (walker, cane, etc), gripper socks, pt to call before getting OOB and stay with me (per policy)  High Fall Risk: Yes  @Rollbelt  @dexterity to release roll belt  Yes/No ( must document dexterity  here by stating Yes or No here, otherwise this is a restraint and must follow restraint documentation policy.)    DVT prophylaxis:  DVT prophylaxis Med-No  DVT prophylaxis SCD or NEVILLE- Yes     Wounds: (If Applicable)  Wounds- No  Location     Active Consults:  IP CONSULT TO UROLOGY  IP CONSULT TO HEMATOLOGY  IP CONSULT TO COLORECTAL SURGERY    Length of Stay:  Expected LOS: 3d 14h  Actual LOS: 2  Discharge Plan: possibly 10/13      Juanita Sidhu RN

## 2021-10-11 NOTE — CONSULTS
Hematology Oncology Consultation      Reason for consult: colon mass    Admitting Diagnosis: Rectal bleed [K62.5]  Colonic mass [K63.89]  Atrial flutter ([address] [I48.92]    Discussion: Roseline Forrester is a  79y.o. year old seen in consultation at the request of Dr. Aysha Swift for colon mass. He has a h/o aflutter, rectal bleed, DM2, HTN, HLD, colon mass, former smoker quit 1965, prostate cancer s/p prostatectomy 25 years ago, obesity. Admitted on 10/9/21 for acute lower GI bleed x 3 weeks. His last colonoscopy prior to admission was in 2019 with Dr. Aysha Swift, not a good prep. CTA/P was done and showed eccentric masslike wall thickening in the mid-sigmoid colon 5.5 x 2.7 x 4.1cm, highly suspicious for malignancy. He went for colonoscopy today 10/11/21 which demonstrated a fungating, large, malignant appearing mass in the distal sigmoid colon, 20cm from anal verge, covering 50% of the circumference. Multiple biopsies were taken. He states he has intermittent lower abdominal pain and fatigue. He is able to do light chores around the house but noticed easy fatigability lately. He has a normal appetite. No weight loss. He has been constipated his whole life but for a few weeks he had diarrhea. Then he had BRBPR and presented here. Wife at bedside to help provide history. Imaging:  CT A/P: masslike thickening in sigmoid colon 5.5cm in size.      Labs:    Recent Results (from the past 24 hour(s))   GLUCOSE, POC    Collection Time: 10/10/21  4:15 PM   Result Value Ref Range    Glucose (POC) 92 65 - 117 mg/dL    Performed by Olu Beal (PCT)    GLUCOSE, POC    Collection Time: 10/10/21  9:21 PM   Result Value Ref Range    Glucose (POC) 109 65 - 117 mg/dL    Performed by Desirae Flowers (ANTHONY RN)    GLUCOSE, POC    Collection Time: 10/11/21  8:03 AM   Result Value Ref Range    Glucose (POC) 119 (H) 65 - 117 mg/dL    Performed by Jessee SIMPSON    GLUCOSE, POC    Collection Time: 10/11/21 12:07 PM   Result Value Ref Range    Glucose (POC) 102 65 - 117 mg/dL    Performed by Dona Bucio PCT    ECHO ADULT COMPLETE    Collection Time: 10/11/21  2:11 PM   Result Value Ref Range    IVSd 0.93 0.60 - 1.00 cm    LVIDd 4.87 4.20 - 5.90 cm    LVIDs 3.44 cm    LVOT d 2.28 cm    LVPWd 1.18 (A) 0.60 - 1.00 cm    LVOT Peak Gradient 3.04 mmHg    Left Ventricular Outflow Tract Mean Gradient 1.70 mmHg    LVOT SV 84.6 mL    LVOT Peak Velocity 87.16 cm/s    LVOT VTI 20.74 cm    Left Atrium Major Axis 3.22 cm    Aortic Valve Area by Continuity of Peak Velocity 1.76 cm2    Aortic Valve Area by Continuity of VTI 1.96 cm2    AoV PG 16.39 mmHg    Aortic Valve Systolic Mean Gradient 5.73 mmHg    Aortic Valve Systolic Peak Velocity 205.74 cm/s    AoV VTI 43.08 cm    MV A Lalito 31.94 cm/s    Mitral Valve E Wave Deceleration Time 187.12 ms    MV E Lalito 84.46 cm/s    E/E' ratio (averaged) 10.22     E/E' lateral 9.63     E/E' septal 10.81     LV E' Lateral Velocity 8.77 cm/s    LV E' Septal Velocity 7.81 cm/s    TR Max Velocity 84.96 cm/s    Pulmonic Valve Systolic Peak Instantaneous Gradient 3.02 mmHg    Pulmonic Valve Max Velocity 86.84 cm/s    Triscuspid Valve Regurgitation Peak Gradient 14.33 mmHg    TR Max Velocity 176.31 cm/s    Ao Root D 3.72 cm    IVC proximal 1.81 cm    MV E/A 2.64     LV Mass .4 88.0 - 224.0 g    LV Mass AL Index 81.9 49.0 - 115.0 g/m2    Left Atrium Minor Axis 1.41 cm    PARAMJIT/BSA Pk Lalito 0.8 cm2/m2    PARAMJIT/BSA VTI 0.9 cm2/m2       History:  Past Medical History:   Diagnosis Date    Arthritis     At risk for sleep apnea 12/18/2019    Stop Bang 5     Cancer [facility])     prostate cancer    Diabetes ([facility] [address]     Elevated cholesterol     Hypertension       Past Surgical History:   Procedure Laterality Date    COLONOSCOPY N/A 9/27/2017    COLON[facility]COPY performed by [facility] at [facility] ENDOSCOPY    COLONOSCOPY N/A 12/30/2019    COLONOSCOPY WITH POLYPECTOMY performed [facility] at [facility]    COLONOSCOPY N/A 10/11/2021    COLONOSCOPY [facility]COPY    HX CATARACT REMOVAL Left 2020    HX HERNIA REPAIR      HX HIP REPLACEMENT Right     HX KNEE REPLACEMENT Right     HX PROSTATECTOMY        Prior to Admission medications    Medication Sig Start Date End Date Taking? Authorizing Provider   moxifloxacin (VIGAMOX) 0.5 % ophthalmic solution Administer 1 Drop to left eye three (3) times daily. Provider, Historical   pravastatin (PRAVACHOL) 20 mg tablet Take 20 mg by mouth nightly. Provider, Historical   amLODIPine (NORVASC) 5 mg tablet Take 5 mg by mouth daily. Provider, Historical   losartan (COZAAR) 100 mg tablet Take 100 mg by mouth daily. Provider, Historical   multivitamin (ONE A DAY) tablet Take 1 Tab by mouth daily. Provider, Historical   acetaminophen (TYLENOL EXTRA STRENGTH) 500 mg tablet Take 1,000 mg by mouth every six (6) hours as needed for Pain. Provider, Historical   metFORMIN (GLUCOPHAGE) 1,000 mg tablet Take 1,000 mg by mouth two (2) times a day. Provider, Historical   aspirin delayed-release 81 mg tablet Take 81 mg by mouth daily. Provider, Historical   Omega-3-DHA-EPA-Fish Oil (FISH OIL) 1,000 mg (120 mg-180 mg) cap Take 1 Tab by mouth daily.     Provider, Historical     No Known Allergies   Social History     Tobacco Use    Smoking status: Former Smoker     Packs/day: 2.00     Years: 15.00     Pack years: 30.00     Quit date: 1965     Years since quittin.0    Smokeless tobacco: Never Used   Substance Use Topics    Alcohol use: No      Family History   Problem Relation Age of Onset    Cancer Mother         ovarian    Stroke Father         Current Medications:  Current Facility-Administered Medications   Medication Dose Route Frequency    0.9% sodium chloride infusion  50 mL/hr IntraVENous CONTINUOUS    sodium chloride (NS) flush 5-40 mL  5-40 mL IntraVENous Q8H    sodium chloride (NS) flush 5-40 mL  5-40 mL IntraVENous PRN    acetaminophen (TYLENOL) tablet 650 mg  650 mg Oral Q6H PRN    Or    acetaminophen (TYLENOL) suppository 650 mg  650 mg Rectal Q6H PRN    polyethylene glycol (MIRALAX) packet 17 g  17 g Oral DAILY    bisacodyL (DULCOLAX) tablet 5 mg  5 mg Oral DAILY PRN    promethazine (PHENERGAN) tablet 12.5 mg  12.5 mg Oral Q6H PRN    Or    ondansetron (ZOFRAN) injection 4 mg  4 mg IntraVENous Q6H PRN    0.9% sodium chloride infusion  50 mL/hr IntraVENous CONTINUOUS    glucose chewable tablet 16 g  4 Tablet Oral PRN    dextrose (D50W) injection syrg 12.5-25 g  25-50 mL IntraVENous PRN    glucagon (GLUCAGEN) injection 1 mg  1 mg IntraMUSCular PRN    insulin lispro (HUMALOG) injection   SubCUTAneous AC&HS    metoprolol tartrate (LOPRESSOR) tablet 12.5 mg  12.5 mg Oral Q12H    amLODIPine (NORVASC) tablet 5 mg  5 mg Oral DAILY    pravastatin (PRAVACHOL) tablet 20 mg  20 mg Oral QHS    labetaloL (NORMODYNE;TRANDATE) injection 20 mg  20 mg IntraVENous Q3H PRN         Review of Systems:  10 point review of systems otherwise negative except as per HPI    Patient Vitals for the past 12 hrs:   Temp Pulse Resp BP SpO2   10/11/21 1457  62 21 135/69 96 %   10/11/21 1454  64 15 (!) 141/61 97 %   10/11/21 1452  61 21 (!) 137/53 97 %   10/11/21 1448  (!) 59 16 (!) 163/67 98 %   10/11/21 1445 97.9 °F (36.6 °C) 61 14 112/64 97 %   10/11/21 1437  68 17 (!) 104/41 97 %   10/11/21 1433  67 16 (!) 106/44 98 %   10/11/21 1252 98.1 °F (36.7 °C) 60 19 (!) 175/84 98 %   10/11/21 1113    (!) 144/81    10/11/21 0810 98 °F (36.7 °C) 66 18 (!) 144/81 97 %   10/11/21 0403 97.3 °F (36.3 °C) 60 20 (!) 169/82 97 %       Physical Exam:  Constitutional Alert, cooperative, oriented. Mood and affect appropriate. Appears close to chronological age. Well nourished. Well developed. Head Normocephalic   Eyes Conjunctivae and sclerae are clear and without icterus. ENMT No oral exudates, ulcers, masses, thrush or mucositis. Neck Supple without masses or thyromegaly. Hematologic/Lymphatic No petechiae or purpura. No tender or palpable lymph nodes in the cervical, supraclavicular, axillary or inguinal area. Respiratory Lungs are clear to auscultation without rhonchi or wheezing. Cardiovascular Regular rate and rhythm of heart without murmurs, gallops or rubs. Chest / Line Site Chest is symmetric with no chest wall deformities. Abdomen Non-tender, non-distended, no masses, ascites or hepatosplenomegaly. No guarding or rebound tenderness. Musculoskeletal No tenderness or swelling, normal range of motion without obvious weakness. Extremities No visible deformities, no cyanosis, clubbing or edema. Skin No rashes. Neurologic No sensory or motor deficits noted. Psychiatric Alert. Coherent speech. Verbalizes understanding of our discussions today. Impression:   80 yo M with h/o aflutter, rectal bleed, DM2, HTN, HLD, colon mass, former smoker quit 1965, prostate cancer s/p prostatectomy 25 years ago, obesity. I was consulted due to a colon mass. Colon mass, clf colon cancer  Mass biopsied, f/u pathology  Agree with surgery consult. Path is pending. He is not obstructed. Will need CT Chest for final staging. I will order this. CEA    Fatigue  Likely related to cancer. Hb is stable. Will order iron studies in case he has some iron deficiency relate to his bleeding mas. Thank you for allowing me to participate in the care of this patient. Please do not hesitate to contact me if questions or concerns arise.       Kriss Jeronimo MD  [facility] office  [facility], [address]  Phone [phone]  Fax [phone]

## 2021-10-11 NOTE — H&P
Pre-endoscopy H and P    The patient was seen and examined in the room/pre-op holding area. The airway was assessed and documented. The problem list, past medical history, and medications were reviewed. Patient Active Problem List   Diagnosis Code    Combined forms of age-related cataract of right eye H25.811    Atrial flutter, paroxysmal (HCC) I48.92    Rectal bleed K62.5    Colonic mass K63.89     Social History     Socioeconomic History    Marital status:      Spouse name: Not on file    Number of children: Not on file    Years of education: Not on file    Highest education level: Not on file   Occupational History    Not on file   Tobacco Use    Smoking status: Former Smoker     Packs/day: 2.00     Years: 15.00     Pack years: 30.00     Quit date: 1965     Years since quittin.0    Smokeless tobacco: Never Used   Substance and Sexual Activity    Alcohol use: No    Drug use: No    Sexual activity: Not on file   Other Topics Concern    Not on file   Social History Narrative    Not on file     Social Determinants of Health     Financial Resource Strain:     Difficulty of Paying Living Expenses:    Food Insecurity:     Worried About Running Out of Food in the Last Year:     [address] in the Last Year:    Transportation Needs:     Lack of Transportation (Medical):      Lack of Transportation (Non-Medical):    Physical Activity:     Days of Exercise per Week:     Minutes of Exercise per Session:    Stress:     Feeling of Stress :    Social Connections:     Frequency of Communication with Friends and Family:     Frequency of Social Gatherings with Friends and Family:     Attends [employer] Services:     Active Member of Clubs or Organizations:     Attends Club or Organization Meetings:     Marital Status:    Intimate Partner Violence:     Fear of Current or Ex-Partner:     Emotionally Abused:     Physically Abused:     Sexually Abused:      Past Medical History:   Diagnosis Date    Arthritis     At risk for sleep apnea 12/18/2019    Stop Bang 5     Cancer [facility])     prostate cancer    Diabetes ([address]     Elevated cholesterol     Hypertension          Prior to Admission Medications   Prescriptions Last Dose Informant Patient Reported? Taking? Omega-3-DHA-EPA-Fish Oil (FISH OIL) 1,000 mg (120 mg-180 mg) cap   Yes No   Sig: Take 1 Tab by mouth daily. acetaminophen (TYLENOL EXTRA STRENGTH) 500 mg tablet   Yes No   Sig: Take 1,000 mg by mouth every six (6) hours as needed for Pain. amLODIPine (NORVASC) 5 mg tablet   Yes No   Sig: Take 5 mg by mouth daily. aspirin delayed-release 81 mg tablet   Yes No   Sig: Take 81 mg by mouth daily. losartan (COZAAR) 100 mg tablet   Yes No   Sig: Take 100 mg by mouth daily. metFORMIN (GLUCOPHAGE) 1,000 mg tablet   Yes No   Sig: Take 1,000 mg by mouth two (2) times a day. moxifloxacin (VIGAMOX) 0.5 % ophthalmic solution   Yes No   Sig: Administer 1 Drop to left eye three (3) times daily. multivitamin (ONE A DAY) tablet   Yes No   Sig: Take 1 Tab by mouth daily. pravastatin (PRAVACHOL) 20 mg tablet   Yes No   Sig: Take 20 mg by mouth nightly. Facility-Administered Medications: None           The review of systems is:  Negative  for shortness of breath or chest pain      The heart, lungs, and mental status were satisfactory for the administration of deep sedation and for the procedure. I discussed with the patient the objectives, risks, consequences and alternatives to the procedure.       Renu Barroso MD  10/11/2021  2:13 PM

## 2021-10-11 NOTE — ANESTHESIA PREPROCEDURE EVALUATION
Anesthetic History   No history of anesthetic complications            Review of Systems / Medical History  Patient summary reviewed, nursing notes reviewed and pertinent labs reviewed    Pulmonary          Smoker (30 pk yrs)      Comments: Former Smoker - 30 pack years   Neuro/Psych   Within defined limits           Cardiovascular    Hypertension: well controlled        Dysrhythmias : atrial flutter  Hyperlipidemia    Exercise tolerance: <4 METS  Comments: EKG 10/10/21: Atrial flutter with variable AV block    GI/Hepatic/Renal               Comments: Rectal bleed  Colonic mass   Endo/Other    Diabetes: type 2    Morbid obesity, arthritis and cancer     Other Findings   Comments: Hx Prostate Ca  DJD           Physical Exam    Airway  Mallampati: II  TM Distance: 4 - 6 cm  Neck ROM: normal range of motion   Mouth opening: Normal     Cardiovascular  Regular rate and rhythm,  S1 and S2 normal,  no murmur, click, rub, or gallop             Dental    Dentition: Full upper dentures and Full lower dentures     Pulmonary  Breath sounds clear to auscultation               Abdominal  GI exam deferred       Other Findings            Anesthetic Plan    ASA: 3  Anesthesia type: total IV anesthesia          Induction: Intravenous  Anesthetic plan and risks discussed with: Patient

## 2021-10-11 NOTE — PROGRESS NOTES
Spiritual Care Partner Volunteer visited patient at [facility] in [facility] ENDOSCOPY on 10/11/2021     Documented by:  Amy Alvarado M.Div,., [place] [address] Provider   Paging Service [phone])

## 2021-10-11 NOTE — PROCEDURES
Colonoscopy Procedure Note    Kristi Rodrigez  1940  [id number]    Indications:  Please see below. Pre-operative Diagnosis: Abnormal CT scan, sigmoid colon [R93.3]    Post-operative Diagnosis: mass in distal sigmoid colon,diverticulosis,internal hemorrhoids,poor prep      : Jerry Hernandez MD    Referring Provider: Julia Ramos MD    Sedation:  MAC anesthesia Propofol        Procedure Details:    After detailed informed consent was obtained with all risks and benefits of procedure explained and preoperative exam completed, the patient was taken to the endoscopy suite and placed in the left lateral decubitus position. Upon sequential sedation as per above, a digital rectal exam was performed  and was normal.  The Olympus videocolonoscope  was inserted in the rectum and carefully advanced to the cecum, which was identified by the ileocecal valve. The quality of preparation was inadequate. The colonoscope was slowly withdrawn with careful evaluation between folds. Retroflexion in the rectum was performed. Findings:   · The Olympus video high-definition colonoscope was advanced to the cecum, identified by the ICV with ease. cecum had stool in it. · Colon is again not well prepped with very poor mucosal views. · A fungating, large, malignant appearing mass is noted in the distal sigmoid colon, starting at 20 cm from the anal verge. It covers 50+ % of the circumference. I took multiple biopsies of the mass and tattooed the mucosa proximal  and distal to this, for marking. · Dense sigmoid diverticulosis is noted. · Distal rectal old blood noted. · Medium internal hemorrhoids      Therapies:  biopsy of colon  mass  injection of 7 cc of [place] Ink    Specimen/s:  Specimens were collected as described above and sent to pathology. Complications: None were encountered during the procedure. EBL:  None.     Recommendations:     -Await pathology.  -Consult Colorectal surgery and oncology. Jerry Gonzalez MD  10/11/2021  2:30 PM

## 2021-10-11 NOTE — ANESTHESIA POSTPROCEDURE EVALUATION
Procedure(s):  COLONOSCOPY  COLON BIOPSY  INJECTION. total IV anesthesia    Anesthesia Post Evaluation        Patient location during evaluation: PACU  Note status: Adequate. Level of consciousness: responsive to verbal stimuli and sleepy but conscious  Pain management: satisfactory to patient  Airway patency: patent  Anesthetic complications: no  Cardiovascular status: acceptable  Respiratory status: acceptable  Hydration status: acceptable  Comments: +Post-Anesthesia Evaluation and Assessment    Patient: Tracie Jamison MRN: [id number]  SSN: [id number]   YOB: 1940  Age: 79 y.o. Sex: male      Cardiovascular Function/Vital Signs    BP (!) 106/44   Pulse 67   Temp 36.7 °C (98.1 °F)   Resp 16   Ht 5' 8\" (1.727 m)   Wt 118.8 kg (262 lb)   SpO2 98%   BMI 39.84 kg/m²     Patient is status post Procedure(s):  COLONOSCOPY  COLON BIOPSY  INJECTION. Nausea/Vomiting: Controlled. Postoperative hydration reviewed and adequate. Pain:  Pain Scale 1: Numeric (0 - 10) (10/11/21 1252)  Pain Intensity 1: 0 (10/11/21 1252)   Managed. Neurological Status: At baseline. Mental Status and Level of Consciousness: Arousable. Pulmonary Status:   O2 Device: CO2 nasal cannula (10/11/21 1433)   Adequate oxygenation and airway patent. Complications related to anesthesia: None    Post-anesthesia assessment completed. No concerns.     Signed By: Magdalene Gilbert MD    10/11/2021  Post anesthesia nausea and vomiting:  controlled      INITIAL Post-op Vital signs:   Vitals Value Taken Time   /44 10/11/21 1433   Temp     Pulse 67 10/11/21 1433   Resp 16 10/11/21 1433   SpO2 98 % 10/11/21 1433

## 2021-10-11 NOTE — PERIOP NOTES
Post op instructions given to patient verbally. Opportunity for questions were given. Patient verbalizes understanding.

## 2021-10-11 NOTE — PROGRESS NOTES
TRANSFER - IN REPORT:    Verbal report received from [facility] Book  being received from 3115(unit) for ordered procedure      Report consisted of patients Situation, Background, Assessment and   Recommendations(SBAR). Information from the following report(s) SBAR was reviewed with the receiving nurse. Opportunity for questions and clarification was provided.

## 2021-10-11 NOTE — PROGRESS NOTES
TRANSFER - OUT REPORT:    Verbal report given to CHARISSE England on Emelia Mayorga  being transferred to Neuro(unit) for routine progression of care       Report consisted of patients Situation, Background, Assessment and   Recommendations(SBAR). Information from the following report(s) SBAR was reviewed with the receiving nurse. Lines:   Peripheral IV 10/09/21 Left Antecubital (Active)   Site Assessment Clean, dry, & intact 10/11/21 0810   Phlebitis Assessment 0 10/11/21 0810   Infiltration Assessment 0 10/11/21 0810   Dressing Status Clean, dry, & intact 10/11/21 0810   Dressing Type Transparent 10/11/21 0810   Hub Color/Line Status Blue;Capped 10/11/21 0810   Alcohol Cap Used Yes 10/11/21 0810        Opportunity for questions and clarification was provided.

## 2021-10-11 NOTE — PROGRESS NOTES
[address]  [phone]      Cardiology Progress Note      10/11/2021 3:02 PM    Admit Date: 10/9/2021    Admit Diagnosis:   Rectal bleed [K62.5]  Colonic mass [K63.89]  Atrial flutter ([address] [I48.92]    Subjective:     Anita Oswald is s/p biopsy of colonic mass. Per patient surgery is planned.     Remains in aflutter with rate controlled 50-70bpm    Visit Vitals  /69   Pulse 62   Temp 97.9 °F (36.6 °C)   Resp 21   Ht 5' 8\" (1.727 m)   Wt 262 lb (118.8 kg)   SpO2 96%   BMI 39.84 kg/m²       Current Facility-Administered Medications   Medication Dose Route Frequency    0.9% sodium chloride infusion  50 mL/hr IntraVENous CONTINUOUS    sodium chloride (NS) flush 5-40 mL  5-40 mL IntraVENous Q8H    sodium chloride (NS) flush 5-40 mL  5-40 mL IntraVENous PRN    acetaminophen (TYLENOL) tablet 650 mg  650 mg Oral Q6H PRN    Or    acetaminophen (TYLENOL) suppository 650 mg  650 mg Rectal Q6H PRN    polyethylene glycol (MIRALAX) packet 17 g  17 g Oral DAILY    bisacodyL (DULCOLAX) tablet 5 mg  5 mg Oral DAILY PRN    promethazine (PHENERGAN) tablet 12.5 mg  12.5 mg Oral Q6H PRN    Or    ondansetron (ZOFRAN) injection 4 mg  4 mg IntraVENous Q6H PRN    0.9% sodium chloride infusion  50 mL/hr IntraVENous CONTINUOUS    glucose chewable tablet 16 g  4 Tablet Oral PRN    dextrose (D50W) injection syrg 12.5-25 g  25-50 mL IntraVENous PRN    glucagon (GLUCAGEN) injection 1 mg  1 mg IntraMUSCular PRN    insulin lispro (HUMALOG) injection   SubCUTAneous AC&HS    metoprolol tartrate (LOPRESSOR) tablet 12.5 mg  12.5 mg Oral Q12H    amLODIPine (NORVASC) tablet 5 mg  5 mg Oral DAILY    pravastatin (PRAVACHOL) tablet 20 mg  20 mg Oral QHS    labetaloL (NORMODYNE;TRANDATE) injection 20 mg  20 mg IntraVENous Q3H PRN       Objective:      Physical Exam:  General Appearance:  elderly obese  male in no acute distress  Chest:   Clear  Cardiovascular:  herminio, irr n murmur. Abdomen:   Soft, non-tender, bowel sounds are active. Extremities: +1 ledy LE edema  Skin:  Warm and dry. Data Review:   Recent Labs     10/10/21  0249 10/09/21  1010   WBC 5.7 6.9   HGB 13.1 13.4   HCT 38.0 40.7    280     Recent Labs     10/10/21  0249 10/09/21  1045 10/09/21  1021 10/09/21  1010     --   --  137   K 3.7  --   --  3.7     --   --  104   CO2 26  --   --  29   *  --   --  211*   BUN 17  --   --  16   CREA 0.93  --   --  1.12   CA 8.8  --   --  9.2   MG  --  2.2  --   --    ALB 3.1*  --   --  3.4*   TBILI 0.7  --   --  0.8   ALT 18  --   --  18   INR  --   --  1.0  --        No results for input(s): TROIQ, CPK, CKMB in the last 72 hours. Intake/Output Summary (Last 24 hours) at 10/11/2021 1502  Last data filed at 10/11/2021 1455  Gross per 24 hour   Intake 400 ml   Output    Net 400 ml        Telemetry: aflutter at 50-70bp,    Echo:  · quality for this study was technically difficult. · LV: Estimated LVEF is 50 - 55%. Normal cavity size and wall thickness. · AV: Aortic valve leaflet calcification present. Assessment:     Active Problems:    Atrial flutter, paroxysmal (HCC) (10/9/2021)      Rectal bleed (10/9/2021)      Colonic mass (10/9/2021)        Plan:     Atrial flutter with controlled rate:  Continue on low dose BB  EF 50-55%. New onset? Patient states he has been told for years that his HR was irregular  CHADS2 vasc score: 3. Recommend long term DOAC, but will not start at this time due to pending surgery. Start DOAC as soon as possible post surgery. Possible aflutter ablation in future. Consider watchman down the road.       Estrella Campbell [address] Cardiology             Patient seen, examined by me personally. Plan discussed as detailed. Agree with note as outlined by  NP with modifications as noted. My independent physical exam reveals : Physical Exam  Vitals and nursing note reviewed.    Constitutional: Appearance: He is obese. Cardiovascular:      Rate and Rhythm: Normal rate. Rhythm irregular. Heart sounds: Normal heart sounds. Pulmonary:      Breath sounds: Normal breath sounds. Musculoskeletal:      Right lower leg: No edema. Left lower leg: No edema. Neurological:      Mental Status: He is oriented to person, place, and time. Psychiatric:         Mood and Affect: Mood normal.          No additional findings noted. Agree with plan as outlined above with modifications as noted. Rate controlled flutter. ? Duration. Okay to proceed with GI evaluation.     Zo Cat MD

## 2021-10-11 NOTE — PROGRESS NOTES
Hospitalist Progress Note    NAME: Lincoln Ku   :  1940   MRN:  [id number]       Assessment / Plan:  Acute lower GI bleed POA  ? Sigmoid colon mass POA  Presents with recent rectal bleeding x 3 weeks, no weight loss  GI consultation  N.p.o. post midnight  Plan for colonoscopy today   Monitor hemoglobin        New onset atrial flutter POA  Asymptomatic, found on admit EKG  Currently rate controlled  Monitor on telemetry  Low-dose beta-blocker  Not candidate for anticoagulation     Right severe hydronephrosis, likely chronic POA  Right nephrolithiasis POA  Creatinine 1.12, creatinine clearance 63.8  Chronic urinary complaints per the patient   urology follow-up as outpatient     Acute to subacute fracture of the anterior superior T12 vertebral body POA  Noted on CT abdomen done today  Not a lot of back pain  PRN pain meds     Diabetes mellitus type 2 POA  Home regimen: metformin  Diabetic diet  Hold metformin, add lantus as needed  Sliding scale insulin  POC  Check HgBa1C     Essential HTN POA  Hyperlipidemia POA  Hold  ASA with bleeding  Continue statin  Continue home BP regimen  Norvasc, denied taking Cozaar  And Norvasc, add low-dose metoprolol as above  PRN labetalol            30.0 - 39.9 Obese / Body mass index is 39.96 kg/m².     Estimated discharge date:   Barriers:     Code status: Full  Prophylaxis: SCD's  Recommended Disposition: Home w/Family           Subjective:     Chief Complaint / Reason for Physician Visit    Discussed with RN events overnight. Plan for colonoscopy today     Review of Systems:  Symptom Y/N Comments  Symptom Y/N Comments   Fever/Chills n   Chest Pain n    Poor Appetite    Edema     Cough    Abdominal Pain     Sputum    Joint Pain     SOB/ROJO n   Pruritis/Rash     Nausea/vomit    Tolerating PT/OT     Diarrhea    Tolerating Diet     Constipation n   Other       Could NOT obtain due to:      Objective:     VITALS:   Last 24hrs VS reviewed since prior progress note. Most recent are:  Patient Vitals for the past 24 hrs:   Temp Pulse Resp BP SpO2   10/11/21 1457  62 21 135/69 96 %   10/11/21 1454  64 15 (!) 141/61 97 %   10/11/21 1452  61 21 (!) 137/53 97 %   10/11/21 1448  (!) 59 16 (!) 163/67 98 %   10/11/21 1445 97.9 °F (36.6 °C) 61 14 112/64 97 %   10/11/21 1437  68 17 (!) 104/41 97 %   10/11/21 1433  67 16 (!) 106/44 98 %   10/11/21 1252 98.1 °F (36.7 °C) 60 19 (!) 175/84 98 %   10/11/21 1113    (!) 144/81    10/11/21 0810 98 °F (36.7 °C) 66 18 (!) 144/81 97 %   10/11/21 0403 97.3 °F (36.3 °C) 60 20 (!) 169/82 97 %   10/10/21 2037 97.4 °F (36.3 °C) 73 16 139/83 98 %       Intake/Output Summary (Last 24 hours) at 10/11/2021 1848  Last data filed at 10/11/2021 1455  Gross per 24 hour   Intake 400 ml   Output    Net 400 ml        I had a face to face encounter and independently examined this patient on 10/11/2021, as outlined below:  PHYSICAL EXAM:  General: WD, WN. Alert, cooperative, no acute distress    EENT:  EOMI. Anicteric sclerae. MMM  Resp:  CTA bilaterally, no wheezing or rales. No accessory muscle use  CV:  Regular  rhythm,  No edema  GI:  Soft, Non distended, Non tender. +Bowel sounds  Neurologic:  Alert and oriented X 3, normal speech,   Psych:   Good insight. Not anxious nor agitated  Skin:  No rashes. No jaundice    Reviewed most current lab test results and cultures  YES  Reviewed most current radiology test results   YES  Review and summation of old records today    NO  Reviewed patient's current orders and MAR    YES  PMH/SH reviewed - no change compared to H&P  ________________________________________________________________________  Care Plan discussed with:    Comments   Patient y    Family      RN y    Care Manager     Consultant                        Multidiciplinary team rounds were held today with , nursing, pharmacist and clinical coordinator.   Patient's plan of care was discussed; medications were reviewed and discharge planning was addressed. ________________________________________________________________________  Total NON critical care TIME:  35  Minutes    Total CRITICAL CARE TIME Spent:   Minutes non procedure based      Comments   >50% of visit spent in counseling and coordination of care     ________________________________________________________________________  Sherry Grewal MD     Procedures: see electronic medical records for all procedures/Xrays and details which were not copied into this note but were reviewed prior to creation of Plan. LABS:  I reviewed today's most current labs and imaging studies. Pertinent labs include:  Recent Labs     10/10/21  0249 10/09/21  1010   WBC 5.7 6.9   HGB 13.1 13.4   HCT 38.0 40.7    280     Recent Labs     10/10/21  0249 10/09/21  1045 10/09/21  1021 10/09/21  1010     --   --  137   K 3.7  --   --  3.7     --   --  104   CO2 26  --   --  29   *  --   --  211*   BUN 17  --   --  16   CREA 0.93  --   --  1.12   CA 8.8  --   --  9.2   MG  --  2.2  --   --    ALB 3.1*  --   --  3.4*   TBILI 0.7  --   --  0.8   ALT 18  --   --  18   INR  --   --  1.0  --        Signed:  Sherry Grewal MD

## 2021-10-11 NOTE — ROUTINE PROCESS
Army Padgett  1940  [id number]    Situation:  Verbal report received from: Mark Sousa RN  Procedure: Procedure(s):  COLONOSCOPY  COLON BIOPSY  INJECTION    Background:    Preoperative diagnosis: Abnormal CT scan, sigmoid colon [R93.3]  Postoperative diagnosis: Colon Mass, internal hemorrhoids, diverticulosis, poor prep    :  Dr. Alf Taylor  Assistant(s): Endoscopy Technician-1: Esipnoza Polk  Endoscopy RN-1: Alessandro Manuel    Specimens:   ID Type Source Tests Collected by Time Destination   1 : Recto-Sigmoid Colon Mass BX Preservative   David Victoria MD 10/11/2021 1432 Pathology     H. Pylori  no    Assessment:  Intra-procedure medications     Anesthesia gave intra-procedure sedation and medications, see anesthesia flow sheet yes    Intravenous fluids: NS@ KVO     Vital signs stable   yes    Abdominal assessment: round and soft   yes    Recommendation:    Return to floor yes, inpatient room 200  Family or Mahnaz Mckeonch, wife  Permission to share finding with family or friend yes

## 2021-10-11 NOTE — PROGRESS NOTES
Problem: Falls - Risk of  Goal: *Absence of Falls  Description: Document Christian Linda Fall Risk and appropriate interventions in the flowsheet. 10/11/2021 1219 by Liang Stout RN  Outcome: Progressing Towards Goal  Note: Fall Risk Interventions:            Medication Interventions: Assess postural VS orthostatic hypotension, Bed/chair exit alarm, Patient to call before getting OOB, Teach patient to arise slowly         History of Falls Interventions: Bed/chair exit alarm, Investigate reason for fall      10/11/2021 1218 by Liang Stout RN  Outcome: Progressing Towards Goal  Note: Fall Risk Interventions:            Medication Interventions: Assess postural VS orthostatic hypotension, Bed/chair exit alarm, Patient to call before getting OOB, Teach patient to arise slowly         History of Falls Interventions: Bed/chair exit alarm, Investigate reason for fall         Problem: Diabetes Self-Management  Goal: *Disease process and treatment process  Description: Define diabetes and identify own type of diabetes; list 3 options for treating diabetes. 10/11/2021 1219 by Liang Stout RN  Outcome: Progressing Towards Goal  10/11/2021 1218 by Liang Stout RN  Outcome: Progressing Towards Goal  Goal: *Incorporating nutritional management into lifestyle  Description: Describe effect of type, amount and timing of food on blood glucose; list 3 methods for planning meals. Outcome: Progressing Towards Goal  Goal: *Developing strategies to promote health/change behavior  Description: Define the ABC's of diabetes; identify appropriate screenings, schedule and personal plan for screenings. Outcome: Progressing Towards Goal  Goal: *Using medications safely  Description: State effect of diabetes medications on diabetes; name diabetes medication taking, action and side effects.   Outcome: Progressing Towards Goal

## 2021-10-12 ENCOUNTER — APPOINTMENT (OUTPATIENT)
Dept: GENERAL RADIOLOGY | Age: 81
DRG: 330 | End: 2021-10-12
Attending: SURGERY
Payer: MEDICARE

## 2021-10-12 ENCOUNTER — ANESTHESIA (OUTPATIENT)
Dept: SURGERY | Age: 81
DRG: 330 | End: 2021-10-12
Payer: MEDICARE

## 2021-10-12 ENCOUNTER — ANESTHESIA EVENT (OUTPATIENT)
Dept: SURGERY | Age: 81
DRG: 330 | End: 2021-10-12
Payer: MEDICARE

## 2021-10-12 LAB
ABO + RH BLD: NORMAL
ABO + RH BLD: NORMAL
ANION GAP SERPL CALC-SCNC: 1 MMOL/L (ref 5–15)
APPEARANCE UR: CLEAR
BACTERIA URNS QL MICRO: ABNORMAL /HPF
BASOPHILS # BLD: 0 K/UL (ref 0–0.1)
BASOPHILS NFR BLD: 0 % (ref 0–1)
BILIRUB UR QL: NEGATIVE
BLOOD GROUP ANTIBODIES SERPL: NORMAL
BLOOD GROUP ANTIBODIES SERPL: NORMAL
BUN SERPL-MCNC: 12 MG/DL (ref 6–20)
BUN/CREAT SERPL: 14 (ref 12–20)
CALCIUM SERPL-MCNC: 8.6 MG/DL (ref 8.5–10.1)
CEA SERPL-MCNC: 3.1 NG/ML
CHLORIDE SERPL-SCNC: 108 MMOL/L (ref 97–108)
CO2 SERPL-SCNC: 28 MMOL/L (ref 21–32)
COLOR UR: ABNORMAL
CREAT SERPL-MCNC: 0.88 MG/DL (ref 0.7–1.3)
DIFFERENTIAL METHOD BLD: NORMAL
EOSINOPHIL # BLD: 0.2 K/UL (ref 0–0.4)
EOSINOPHIL NFR BLD: 3 % (ref 0–7)
EPITH CASTS URNS QL MICRO: ABNORMAL /LPF
ERYTHROCYTE [DISTWIDTH] IN BLOOD BY AUTOMATED COUNT: 13.1 % (ref 11.5–14.5)
FERRITIN SERPL-MCNC: 163 NG/ML (ref 26–388)
GLUCOSE BLD STRIP.AUTO-MCNC: 101 MG/DL (ref 65–117)
GLUCOSE BLD STRIP.AUTO-MCNC: 107 MG/DL (ref 65–117)
GLUCOSE BLD STRIP.AUTO-MCNC: 193 MG/DL (ref 65–117)
GLUCOSE BLD STRIP.AUTO-MCNC: 218 MG/DL (ref 65–117)
GLUCOSE SERPL-MCNC: 133 MG/DL (ref 65–100)
GLUCOSE UR STRIP.AUTO-MCNC: NEGATIVE MG/DL
HCT VFR BLD AUTO: 38.6 % (ref 36.6–50.3)
HGB BLD-MCNC: 12.7 G/DL (ref 12.1–17)
HGB UR QL STRIP: ABNORMAL
IMM GRANULOCYTES # BLD AUTO: 0 K/UL (ref 0–0.04)
IMM GRANULOCYTES NFR BLD AUTO: 0 % (ref 0–0.5)
IRON SATN MFR SERPL: 23 % (ref 20–50)
IRON SERPL-MCNC: 49 UG/DL (ref 35–150)
KETONES UR QL STRIP.AUTO: NEGATIVE MG/DL
LEUKOCYTE ESTERASE UR QL STRIP.AUTO: ABNORMAL
LYMPHOCYTES # BLD: 1.4 K/UL (ref 0.8–3.5)
LYMPHOCYTES NFR BLD: 25 % (ref 12–49)
MCH RBC QN AUTO: 29.5 PG (ref 26–34)
MCHC RBC AUTO-ENTMCNC: 32.9 G/DL (ref 30–36.5)
MCV RBC AUTO: 89.6 FL (ref 80–99)
MONOCYTES # BLD: 0.5 K/UL (ref 0–1)
MONOCYTES NFR BLD: 10 % (ref 5–13)
NEUTS SEG # BLD: 3.5 K/UL (ref 1.8–8)
NEUTS SEG NFR BLD: 62 % (ref 32–75)
NITRITE UR QL STRIP.AUTO: NEGATIVE
NRBC # BLD: 0 K/UL (ref 0–0.01)
NRBC BLD-RTO: 0 PER 100 WBC
PH UR STRIP: 6.5 [PH] (ref 5–8)
PLATELET # BLD AUTO: 276 K/UL (ref 150–400)
PMV BLD AUTO: 9.6 FL (ref 8.9–12.9)
POTASSIUM SERPL-SCNC: 3.4 MMOL/L (ref 3.5–5.1)
PROT UR STRIP-MCNC: NEGATIVE MG/DL
RBC # BLD AUTO: 4.31 M/UL (ref 4.1–5.7)
RBC #/AREA URNS HPF: ABNORMAL /HPF (ref 0–5)
SERVICE CMNT-IMP: ABNORMAL
SERVICE CMNT-IMP: ABNORMAL
SERVICE CMNT-IMP: NORMAL
SERVICE CMNT-IMP: NORMAL
SODIUM SERPL-SCNC: 137 MMOL/L (ref 136–145)
SP GR UR REFRACTOMETRY: 1.01 (ref 1–1.03)
SPECIMEN EXP DATE BLD: NORMAL
SPECIMEN EXP DATE BLD: NORMAL
TIBC SERPL-MCNC: 216 UG/DL (ref 250–450)
UA: UC IF INDICATED,UAUC: ABNORMAL
UROBILINOGEN UR QL STRIP.AUTO: 1 EU/DL (ref 0.2–1)
WBC # BLD AUTO: 5.6 K/UL (ref 4.1–11.1)
WBC URNS QL MICRO: ABNORMAL /HPF (ref 0–4)

## 2021-10-12 PROCEDURE — 77030009527 HC GEL PRT SYS AMR -E: Performed by: SURGERY

## 2021-10-12 PROCEDURE — P9045 ALBUMIN (HUMAN), 5%, 250 ML: HCPCS | Performed by: REGISTERED NURSE

## 2021-10-12 PROCEDURE — 77030010285 HC STPLR INT PRSTRNG COVD -B: Performed by: SURGERY

## 2021-10-12 PROCEDURE — 85025 COMPLETE CBC W/AUTO DIFF WBC: CPT

## 2021-10-12 PROCEDURE — 74011000250 HC RX REV CODE- 250: Performed by: REGISTERED NURSE

## 2021-10-12 PROCEDURE — 74011000254 HC RX REV CODE- 254: Performed by: REGISTERED NURSE

## 2021-10-12 PROCEDURE — 88309 TISSUE EXAM BY PATHOLOGIST: CPT

## 2021-10-12 PROCEDURE — 65660000000 HC RM CCU STEPDOWN

## 2021-10-12 PROCEDURE — 87086 URINE CULTURE/COLONY COUNT: CPT

## 2021-10-12 PROCEDURE — 77030002986 HC SUT PROL J&J -A: Performed by: SURGERY

## 2021-10-12 PROCEDURE — 94760 N-INVAS EAR/PLS OXIMETRY 1: CPT

## 2021-10-12 PROCEDURE — 77030008756 HC TU IRR SUC STRY -B: Performed by: SURGERY

## 2021-10-12 PROCEDURE — 77030031139 HC SUT VCRL2 J&J -A: Performed by: SURGERY

## 2021-10-12 PROCEDURE — 77030008771 HC TU NG SALEM SUMP -A: Performed by: ANESTHESIOLOGY

## 2021-10-12 PROCEDURE — 74011000250 HC RX REV CODE- 250: Performed by: SURGERY

## 2021-10-12 PROCEDURE — 71045 X-RAY EXAM CHEST 1 VIEW: CPT

## 2021-10-12 PROCEDURE — 82378 CARCINOEMBRYONIC ANTIGEN: CPT

## 2021-10-12 PROCEDURE — 2709999900 HC NON-CHARGEABLE SUPPLY: Performed by: SURGERY

## 2021-10-12 PROCEDURE — 77030025171 HC FCPS ENDOSC DISP 2 J&J -B: Performed by: SURGERY

## 2021-10-12 PROCEDURE — 77030002966 HC SUT PDS J&J -A: Performed by: SURGERY

## 2021-10-12 PROCEDURE — 77030029357 HC DEV CLSR FAC SYS EFX TELE -C: Performed by: SURGERY

## 2021-10-12 PROCEDURE — 36415 COLL VENOUS BLD VENIPUNCTURE: CPT

## 2021-10-12 PROCEDURE — 88305 TISSUE EXAM BY PATHOLOGIST: CPT

## 2021-10-12 PROCEDURE — 77030008606 HC TRCR ENDOSC KII AMR -B: Performed by: SURGERY

## 2021-10-12 PROCEDURE — 77030035277 HC OBTRTR BLDELSS DISP INTU -B: Performed by: SURGERY

## 2021-10-12 PROCEDURE — 77030040259 HC STPLR DEV SUREFORM DVNCI INTU -F: Performed by: SURGERY

## 2021-10-12 PROCEDURE — 77030035489 HC REDUCR CANN ENDOWR INTU -C: Performed by: SURGERY

## 2021-10-12 PROCEDURE — 77030013079 HC BLNKT BAIR HGGR 3M -A: Performed by: ANESTHESIOLOGY

## 2021-10-12 PROCEDURE — 77030026438 HC STYL ET INTUB CARD -A: Performed by: ANESTHESIOLOGY

## 2021-10-12 PROCEDURE — 02HV33Z INSERTION OF INFUSION DEVICE INTO SUPERIOR VENA CAVA, PERCUTANEOUS APPROACH: ICD-10-PCS | Performed by: ANESTHESIOLOGY

## 2021-10-12 PROCEDURE — 76010000878 HC OR TIME 3 TO 3.5HR INTENSV - TIER 2: Performed by: SURGERY

## 2021-10-12 PROCEDURE — 77030019908 HC STETH ESOPH SIMS -A: Performed by: ANESTHESIOLOGY

## 2021-10-12 PROCEDURE — 74011250636 HC RX REV CODE- 250/636: Performed by: SURGERY

## 2021-10-12 PROCEDURE — 80048 BASIC METABOLIC PNL TOTAL CA: CPT

## 2021-10-12 PROCEDURE — 77030020061 HC IV BLD WRMR ADMIN SET 3M -B: Performed by: ANESTHESIOLOGY

## 2021-10-12 PROCEDURE — 82962 GLUCOSE BLOOD TEST: CPT

## 2021-10-12 PROCEDURE — 77030025303 HC STPLR ENDOSC J&J -G: Performed by: SURGERY

## 2021-10-12 PROCEDURE — 74011250636 HC RX REV CODE- 250/636: Performed by: INTERNAL MEDICINE

## 2021-10-12 PROCEDURE — 87077 CULTURE AEROBIC IDENTIFY: CPT

## 2021-10-12 PROCEDURE — 74011250637 HC RX REV CODE- 250/637: Performed by: INTERNAL MEDICINE

## 2021-10-12 PROCEDURE — 99231 SBSQ HOSP IP/OBS SF/LOW 25: CPT | Performed by: INTERNAL MEDICINE

## 2021-10-12 PROCEDURE — 77030008684 HC TU ET CUF COVD -B: Performed by: ANESTHESIOLOGY

## 2021-10-12 PROCEDURE — 81001 URINALYSIS AUTO W/SCOPE: CPT

## 2021-10-12 PROCEDURE — 77030035029 HC NDL INSUF VERES DISP COVD -B: Performed by: SURGERY

## 2021-10-12 PROCEDURE — 86901 BLOOD TYPING SEROLOGIC RH(D): CPT

## 2021-10-12 PROCEDURE — 76060000037 HC ANESTHESIA 3 TO 3.5 HR: Performed by: SURGERY

## 2021-10-12 PROCEDURE — 77030018673: Performed by: SURGERY

## 2021-10-12 PROCEDURE — 51798 US URINE CAPACITY MEASURE: CPT

## 2021-10-12 PROCEDURE — 76210000006 HC OR PH I REC 0.5 TO 1 HR: Performed by: SURGERY

## 2021-10-12 PROCEDURE — 77030005513 HC CATH URETH FOL11 MDII -B: Performed by: SURGERY

## 2021-10-12 PROCEDURE — 77030016151 HC PROTCTR LNS DFOG COVD -B: Performed by: SURGERY

## 2021-10-12 PROCEDURE — 77030020703 HC SEAL CANN DISP INTU -B: Performed by: SURGERY

## 2021-10-12 PROCEDURE — 77030035279 HC SEAL VSL ENDOWR XI INTU -I2: Performed by: SURGERY

## 2021-10-12 PROCEDURE — 77030035278 HC STPLR SEAL ENDOWR INTU -B: Performed by: SURGERY

## 2021-10-12 PROCEDURE — 82728 ASSAY OF FERRITIN: CPT

## 2021-10-12 PROCEDURE — 87186 SC STD MICRODIL/AGAR DIL: CPT

## 2021-10-12 PROCEDURE — 77030034628 HC LIGASURE LAP SEAL DIV MD COVD -F: Performed by: SURGERY

## 2021-10-12 PROCEDURE — 74011250636 HC RX REV CODE- 250/636: Performed by: REGISTERED NURSE

## 2021-10-12 PROCEDURE — 77030002933 HC SUT MCRYL J&J -A: Performed by: SURGERY

## 2021-10-12 PROCEDURE — 74011250636 HC RX REV CODE- 250/636: Performed by: ANESTHESIOLOGY

## 2021-10-12 PROCEDURE — 74011636637 HC RX REV CODE- 636/637: Performed by: INTERNAL MEDICINE

## 2021-10-12 PROCEDURE — 83540 ASSAY OF IRON: CPT

## 2021-10-12 RX ORDER — SODIUM CHLORIDE, SODIUM LACTATE, POTASSIUM CHLORIDE, CALCIUM CHLORIDE 600; 310; 30; 20 MG/100ML; MG/100ML; MG/100ML; MG/100ML
25 INJECTION, SOLUTION INTRAVENOUS CONTINUOUS
Status: DISCONTINUED | OUTPATIENT
Start: 2021-10-12 | End: 2021-10-13 | Stop reason: HOSPADM

## 2021-10-12 RX ORDER — ALBUMIN HUMAN 50 G/1000ML
SOLUTION INTRAVENOUS AS NEEDED
Status: DISCONTINUED | OUTPATIENT
Start: 2021-10-12 | End: 2021-10-12 | Stop reason: HOSPADM

## 2021-10-12 RX ORDER — ONDANSETRON 2 MG/ML
INJECTION INTRAMUSCULAR; INTRAVENOUS AS NEEDED
Status: DISCONTINUED | OUTPATIENT
Start: 2021-10-12 | End: 2021-10-12 | Stop reason: HOSPADM

## 2021-10-12 RX ORDER — SUCCINYLCHOLINE CHLORIDE 20 MG/ML
INJECTION INTRAMUSCULAR; INTRAVENOUS AS NEEDED
Status: DISCONTINUED | OUTPATIENT
Start: 2021-10-12 | End: 2021-10-12 | Stop reason: HOSPADM

## 2021-10-12 RX ORDER — DIPHENHYDRAMINE HYDROCHLORIDE 50 MG/ML
12.5 INJECTION, SOLUTION INTRAMUSCULAR; INTRAVENOUS AS NEEDED
Status: ACTIVE | OUTPATIENT
Start: 2021-10-12 | End: 2021-10-12

## 2021-10-12 RX ORDER — SODIUM CHLORIDE 0.9 % (FLUSH) 0.9 %
5-40 SYRINGE (ML) INJECTION EVERY 8 HOURS
Status: DISCONTINUED | OUTPATIENT
Start: 2021-10-12 | End: 2021-10-12 | Stop reason: HOSPADM

## 2021-10-12 RX ORDER — BUPIVACAINE HYDROCHLORIDE AND EPINEPHRINE 2.5; 5 MG/ML; UG/ML
INJECTION, SOLUTION EPIDURAL; INFILTRATION; INTRACAUDAL; PERINEURAL AS NEEDED
Status: DISCONTINUED | OUTPATIENT
Start: 2021-10-12 | End: 2021-10-12 | Stop reason: HOSPADM

## 2021-10-12 RX ORDER — OXYCODONE HYDROCHLORIDE 5 MG/1
5 TABLET ORAL
Status: DISCONTINUED | OUTPATIENT
Start: 2021-10-12 | End: 2021-10-16 | Stop reason: HOSPADM

## 2021-10-12 RX ORDER — CEFOXITIN 2 G/1
INJECTION, POWDER, FOR SOLUTION INTRAVENOUS AS NEEDED
Status: DISCONTINUED | OUTPATIENT
Start: 2021-10-12 | End: 2021-10-12 | Stop reason: HOSPADM

## 2021-10-12 RX ORDER — KETAMINE HYDROCHLORIDE 10 MG/ML
INJECTION, SOLUTION INTRAMUSCULAR; INTRAVENOUS AS NEEDED
Status: DISCONTINUED | OUTPATIENT
Start: 2021-10-12 | End: 2021-10-12 | Stop reason: HOSPADM

## 2021-10-12 RX ORDER — HYDROMORPHONE HYDROCHLORIDE 2 MG/ML
INJECTION, SOLUTION INTRAMUSCULAR; INTRAVENOUS; SUBCUTANEOUS AS NEEDED
Status: DISCONTINUED | OUTPATIENT
Start: 2021-10-12 | End: 2021-10-12 | Stop reason: HOSPADM

## 2021-10-12 RX ORDER — SODIUM CHLORIDE 0.9 % (FLUSH) 0.9 %
5-40 SYRINGE (ML) INJECTION AS NEEDED
Status: DISCONTINUED | OUTPATIENT
Start: 2021-10-12 | End: 2021-10-13 | Stop reason: HOSPADM

## 2021-10-12 RX ORDER — HYDROMORPHONE HYDROCHLORIDE 1 MG/ML
0.2 INJECTION, SOLUTION INTRAMUSCULAR; INTRAVENOUS; SUBCUTANEOUS
Status: DISCONTINUED | OUTPATIENT
Start: 2021-10-12 | End: 2021-10-13 | Stop reason: HOSPADM

## 2021-10-12 RX ORDER — FENTANYL CITRATE 50 UG/ML
INJECTION, SOLUTION INTRAMUSCULAR; INTRAVENOUS AS NEEDED
Status: DISCONTINUED | OUTPATIENT
Start: 2021-10-12 | End: 2021-10-12 | Stop reason: HOSPADM

## 2021-10-12 RX ORDER — PROPOFOL 10 MG/ML
INJECTION, EMULSION INTRAVENOUS AS NEEDED
Status: DISCONTINUED | OUTPATIENT
Start: 2021-10-12 | End: 2021-10-12 | Stop reason: HOSPADM

## 2021-10-12 RX ORDER — OXYCODONE HYDROCHLORIDE 5 MG/1
10 TABLET ORAL
Status: DISCONTINUED | OUTPATIENT
Start: 2021-10-12 | End: 2021-10-16 | Stop reason: HOSPADM

## 2021-10-12 RX ORDER — ROCURONIUM BROMIDE 10 MG/ML
INJECTION, SOLUTION INTRAVENOUS AS NEEDED
Status: DISCONTINUED | OUTPATIENT
Start: 2021-10-12 | End: 2021-10-12 | Stop reason: HOSPADM

## 2021-10-12 RX ORDER — LIDOCAINE HYDROCHLORIDE 10 MG/ML
0.1 INJECTION, SOLUTION EPIDURAL; INFILTRATION; INTRACAUDAL; PERINEURAL AS NEEDED
Status: DISCONTINUED | OUTPATIENT
Start: 2021-10-12 | End: 2021-10-12 | Stop reason: HOSPADM

## 2021-10-12 RX ORDER — LIDOCAINE HYDROCHLORIDE 20 MG/ML
INJECTION, SOLUTION EPIDURAL; INFILTRATION; INTRACAUDAL; PERINEURAL AS NEEDED
Status: DISCONTINUED | OUTPATIENT
Start: 2021-10-12 | End: 2021-10-12 | Stop reason: HOSPADM

## 2021-10-12 RX ORDER — FENTANYL CITRATE 50 UG/ML
25 INJECTION, SOLUTION INTRAMUSCULAR; INTRAVENOUS
Status: DISCONTINUED | OUTPATIENT
Start: 2021-10-12 | End: 2021-10-13 | Stop reason: HOSPADM

## 2021-10-12 RX ORDER — SODIUM CHLORIDE 0.9 % (FLUSH) 0.9 %
5-40 SYRINGE (ML) INJECTION EVERY 8 HOURS
Status: DISCONTINUED | OUTPATIENT
Start: 2021-10-12 | End: 2021-10-13 | Stop reason: HOSPADM

## 2021-10-12 RX ORDER — SODIUM CHLORIDE 0.9 % (FLUSH) 0.9 %
5-40 SYRINGE (ML) INJECTION AS NEEDED
Status: DISCONTINUED | OUTPATIENT
Start: 2021-10-12 | End: 2021-10-12 | Stop reason: HOSPADM

## 2021-10-12 RX ORDER — HYDROMORPHONE HYDROCHLORIDE 1 MG/ML
0.5 INJECTION, SOLUTION INTRAMUSCULAR; INTRAVENOUS; SUBCUTANEOUS
Status: DISCONTINUED | OUTPATIENT
Start: 2021-10-12 | End: 2021-10-16 | Stop reason: HOSPADM

## 2021-10-12 RX ORDER — DEXAMETHASONE SODIUM PHOSPHATE 4 MG/ML
INJECTION, SOLUTION INTRA-ARTICULAR; INTRALESIONAL; INTRAMUSCULAR; INTRAVENOUS; SOFT TISSUE AS NEEDED
Status: DISCONTINUED | OUTPATIENT
Start: 2021-10-12 | End: 2021-10-12 | Stop reason: HOSPADM

## 2021-10-12 RX ORDER — SODIUM CHLORIDE, SODIUM LACTATE, POTASSIUM CHLORIDE, CALCIUM CHLORIDE 600; 310; 30; 20 MG/100ML; MG/100ML; MG/100ML; MG/100ML
INJECTION, SOLUTION INTRAVENOUS
Status: DISCONTINUED | OUTPATIENT
Start: 2021-10-12 | End: 2021-10-12 | Stop reason: HOSPADM

## 2021-10-12 RX ORDER — INDOCYANINE GREEN AND WATER 25 MG
KIT INJECTION AS NEEDED
Status: DISCONTINUED | OUTPATIENT
Start: 2021-10-12 | End: 2021-10-12 | Stop reason: HOSPADM

## 2021-10-12 RX ADMIN — SODIUM CHLORIDE, POTASSIUM CHLORIDE, SODIUM LACTATE AND CALCIUM CHLORIDE: 600; 310; 30; 20 INJECTION, SOLUTION INTRAVENOUS at 20:51

## 2021-10-12 RX ADMIN — ROCURONIUM BROMIDE 10 MG: 10 INJECTION INTRAVENOUS at 21:01

## 2021-10-12 RX ADMIN — SODIUM CHLORIDE, POTASSIUM CHLORIDE, SODIUM LACTATE AND CALCIUM CHLORIDE: 600; 310; 30; 20 INJECTION, SOLUTION INTRAVENOUS at 19:51

## 2021-10-12 RX ADMIN — SODIUM CHLORIDE 50 MCG/MIN: 900 INJECTION, SOLUTION INTRAVENOUS at 20:44

## 2021-10-12 RX ADMIN — ROCURONIUM BROMIDE 30 MG: 10 INJECTION INTRAVENOUS at 20:24

## 2021-10-12 RX ADMIN — SODIUM CHLORIDE 50 ML/HR: 900 INJECTION, SOLUTION INTRAVENOUS at 23:23

## 2021-10-12 RX ADMIN — INDOCYANINE GREEN 7.5 MG: KIT INTRAVENOUS at 21:58

## 2021-10-12 RX ADMIN — Medication 10 ML: at 05:34

## 2021-10-12 RX ADMIN — CEFOXITIN 2 G: 2 INJECTION, POWDER, FOR SOLUTION INTRAVENOUS at 19:51

## 2021-10-12 RX ADMIN — HYDROMORPHONE HYDROCHLORIDE 0.2 MG: 2 INJECTION, SOLUTION INTRAMUSCULAR; INTRAVENOUS; SUBCUTANEOUS at 22:18

## 2021-10-12 RX ADMIN — DEXAMETHASONE SODIUM PHOSPHATE 8 MG: 4 INJECTION, SOLUTION INTRAMUSCULAR; INTRAVENOUS at 20:35

## 2021-10-12 RX ADMIN — SUGAMMADEX 200 MG: 100 INJECTION, SOLUTION INTRAVENOUS at 22:43

## 2021-10-12 RX ADMIN — PROPOFOL 130 MG: 10 INJECTION, EMULSION INTRAVENOUS at 20:02

## 2021-10-12 RX ADMIN — FENTANYL CITRATE 50 MCG: 50 INJECTION, SOLUTION INTRAMUSCULAR; INTRAVENOUS at 21:01

## 2021-10-12 RX ADMIN — ROCURONIUM BROMIDE 10 MG: 10 INJECTION INTRAVENOUS at 20:02

## 2021-10-12 RX ADMIN — METOPROLOL TARTRATE 12.5 MG: 25 TABLET, FILM COATED ORAL at 08:51

## 2021-10-12 RX ADMIN — SODIUM CHLORIDE 80 MCG: 900 INJECTION, SOLUTION INTRAVENOUS at 20:43

## 2021-10-12 RX ADMIN — FENTANYL CITRATE 25 MCG: 50 INJECTION INTRAMUSCULAR; INTRAVENOUS at 23:28

## 2021-10-12 RX ADMIN — ALBUMIN (HUMAN) 250 ML: 2.5 SOLUTION INTRAVENOUS at 20:50

## 2021-10-12 RX ADMIN — AMLODIPINE BESYLATE 5 MG: 5 TABLET ORAL at 08:51

## 2021-10-12 RX ADMIN — KETAMINE HYDROCHLORIDE 30 MG: 10 INJECTION, SOLUTION INTRAMUSCULAR; INTRAVENOUS at 20:26

## 2021-10-12 RX ADMIN — CEFOXITIN 2 G: 2 INJECTION, POWDER, FOR SOLUTION INTRAVENOUS at 21:51

## 2021-10-12 RX ADMIN — INSULIN LISPRO 3 UNITS: 100 INJECTION, SOLUTION INTRAVENOUS; SUBCUTANEOUS at 11:51

## 2021-10-12 RX ADMIN — PROPOFOL 50 MG: 10 INJECTION, EMULSION INTRAVENOUS at 22:48

## 2021-10-12 RX ADMIN — SODIUM CHLORIDE, POTASSIUM CHLORIDE, SODIUM LACTATE AND CALCIUM CHLORIDE: 600; 310; 30; 20 INJECTION, SOLUTION INTRAVENOUS at 20:30

## 2021-10-12 RX ADMIN — SUCCINYLCHOLINE CHLORIDE 140 MG: 20 INJECTION, SOLUTION INTRAMUSCULAR; INTRAVENOUS at 20:02

## 2021-10-12 RX ADMIN — HYDROMORPHONE HYDROCHLORIDE 0.4 MG: 2 INJECTION, SOLUTION INTRAMUSCULAR; INTRAVENOUS; SUBCUTANEOUS at 21:47

## 2021-10-12 RX ADMIN — FENTANYL CITRATE 50 MCG: 50 INJECTION, SOLUTION INTRAMUSCULAR; INTRAVENOUS at 20:02

## 2021-10-12 RX ADMIN — ROCURONIUM BROMIDE 10 MG: 10 INJECTION INTRAVENOUS at 21:34

## 2021-10-12 RX ADMIN — ONDANSETRON HYDROCHLORIDE 4 MG: 2 INJECTION, SOLUTION INTRAMUSCULAR; INTRAVENOUS at 21:09

## 2021-10-12 RX ADMIN — ROCURONIUM BROMIDE 10 MG: 10 INJECTION INTRAVENOUS at 22:18

## 2021-10-12 RX ADMIN — PROPOFOL 70 MG: 10 INJECTION, EMULSION INTRAVENOUS at 20:52

## 2021-10-12 RX ADMIN — HYDROMORPHONE HYDROCHLORIDE 0.6 MG: 2 INJECTION, SOLUTION INTRAMUSCULAR; INTRAVENOUS; SUBCUTANEOUS at 23:08

## 2021-10-12 RX ADMIN — LIDOCAINE HYDROCHLORIDE 100 MG: 20 INJECTION, SOLUTION INTRAVENOUS at 20:02

## 2021-10-12 NOTE — PROGRESS NOTES
Awaiting path which is still pending.     Lab Results   Component Value Date/Time    CEA 3.1 10/12/2021 01:03 AM     ANN MARIE Streeter  10/12/21  1:01 PM

## 2021-10-12 NOTE — PROGRESS NOTES
Patient voided and I performed bladder scan which showed no residual. Pt states he feels he emptied his bladder better than he has in some time.

## 2021-10-12 NOTE — PROGRESS NOTES
[address] [place] [address], [address] [place] [address] Ne, [address] [facility]  [phone]      Cardiology Progress Note      10/12/2021 3:02 PM    Admit Date: 10/9/2021    Admit Diagnosis:   Rectal bleed [K62.5]  Colonic mass [K63.89]  Atrial flutter ([address] [I48.92]    Subjective:     Theron Badillo has no c/o CP, SOB. Remains in rate controlled aflutter. Visit Vitals  BP (!) 148/76   Pulse 68   Temp 97.8 °F (36.6 °C)   Resp 16   Ht 5' 8\" (1.727 m)   Wt 262 lb (118.8 kg)   SpO2 96%   BMI 39.84 kg/m²       Current Facility-Administered Medications   Medication Dose Route Frequency    0.9% sodium chloride infusion  50 mL/hr IntraVENous CONTINUOUS    sodium chloride (NS) flush 5-40 mL  5-40 mL IntraVENous Q8H    sodium chloride (NS) flush 5-40 mL  5-40 mL IntraVENous PRN    acetaminophen (TYLENOL) tablet 650 mg  650 mg Oral Q6H PRN    Or    acetaminophen (TYLENOL) suppository 650 mg  650 mg Rectal Q6H PRN    polyethylene glycol (MIRALAX) packet 17 g  17 g Oral DAILY    bisacodyL (DULCOLAX) tablet 5 mg  5 mg Oral DAILY PRN    promethazine (PHENERGAN) tablet 12.5 mg  12.5 mg Oral Q6H PRN    Or    ondansetron (ZOFRAN) injection 4 mg  4 mg IntraVENous Q6H PRN    0.9% sodium chloride infusion  50 mL/hr IntraVENous CONTINUOUS    glucose chewable tablet 16 g  4 Tablet Oral PRN    dextrose (D50W) injection syrg 12.5-25 g  25-50 mL IntraVENous PRN    glucagon (GLUCAGEN) injection 1 mg  1 mg IntraMUSCular PRN    insulin lispro (HUMALOG) injection   SubCUTAneous AC&HS    metoprolol tartrate (LOPRESSOR) tablet 12.5 mg  12.5 mg Oral Q12H    amLODIPine (NORVASC) tablet 5 mg  5 mg Oral DAILY    pravastatin (PRAVACHOL) tablet 20 mg  20 mg Oral QHS    labetaloL (NORMODYNE;TRANDATE) injection 20 mg  20 mg IntraVENous Q3H PRN       Objective:      Physical Exam:  General Appearance:  elderly obese  male in no acute distress  Chest:   Clear  Cardiovascular:  herminio, irr no murmur.    Abdomen:   Soft, non-tender, bowel sounds are active. Extremities: +1 ledy LE edema  Skin:  Warm and dry. Data Review:   Recent Labs     10/12/21  0103 10/10/21  0249 10/09/21  1010   WBC 5.6 5.7 6.9   HGB 12.7 13.1 13.4   HCT 38.6 38.0 40.7    242 280     Recent Labs     10/12/21  0103 10/10/21  0249 10/09/21  1045 10/09/21  1021 10/09/21  1010    137  --   --  137   K 3.4* 3.7  --   --  3.7    106  --   --  104   CO2 28 26  --   --  29   * 126*  --   --  211*   BUN 12 17  --   --  16   CREA 0.88 0.93  --   --  1.12   CA 8.6 8.8  --   --  9.2   MG  --   --  2.2  --   --    ALB  --  3.1*  --   --  3.4*   TBILI  --  0.7  --   --  0.8   ALT  --  18  --   --  18   INR  --   --   --  1.0  --        No results for input(s): TROIQ, CPK, CKMB in the last 72 hours. Intake/Output Summary (Last 24 hours) at 10/12/2021 0938  Last data filed at 10/11/2021 2152  Gross per 24 hour   Intake 1400 ml   Output    Net 1400 ml        Telemetry: aflutter at 50-70bp,    Echo:  · quality for this study was technically difficult. · LV: Estimated LVEF is 50 - 55%. Normal cavity size and wall thickness. · AV: Aortic valve leaflet calcification present. Assessment:     Active Problems:    Atrial flutter, paroxysmal (HCC) (10/9/2021)      Rectal bleed (10/9/2021)      Colonic mass (10/9/2021)        Plan:     Atrial flutter with controlled rate:  Continue on low dose BB  EF 50-55%. New onset? Patient states he has been told for years that his HR was irregular  CHADS2 vasc score: 3. Recommend long term DOAC, but will not start at this time due to pending surgery. Start DOAC as soon as possible post surgery. Possible aflutter ablation in future. Consider watchman down the road.     Colonic mass:  Surgery required, workup in process      Pre-op CV risk assessment:  · Revised cardiac risk index has no risk factors, predicting a 0.4% risk of cardiac death, nonfatal myocardial infarction, and nonfatal cardiac arrest , and a 0.5% risk of myocardial infarction, pulmonary edema, ventricular fibrillation, primary cardiac arrest, and complete heart block. RCA signing off. Please call if RCA can be of further assistance. Estrella Garciareynawalter Campbell 134 Cardiology             Patient seen, examined by me personally. Plan discussed as detailed. Agree with note as outlined by  NP with modifications as noted. My independent physical exam reveals : Physical Exam  Vitals and nursing note reviewed. Constitutional:       Appearance: He is obese. Cardiovascular:      Rate and Rhythm: Normal rate. Rhythm irregular. Heart sounds: Normal heart sounds. Pulmonary:      Breath sounds: Normal breath sounds. Musculoskeletal:      Right lower leg: No edema. Left lower leg: No edema. Neurological:      Mental Status: He is alert and oriented to person, place, and time. Psychiatric:         Mood and Affect: Mood normal.          No additional findings noted. Agree with plan as outlined above with modifications as noted. Will be available as needed.     Beba Leyva MD

## 2021-10-12 NOTE — CONSULTS
Consult    Patient: Caren Rivas MRN: [id number]  SSN: [id number]    YOB: 1940  Age: 79 y.o. Sex: male      Subjective:      Caren Rivas is a 79 y.o. male who is being seen for newly diagnosed mass in the distal sigmoid colon about 20cm from the anal verge.   This was tattooed proximal and distal.  It appeared malignant and covered about 50% of the circumference    Past Medical History:   Diagnosis Date    Arthritis     At risk for sleep apnea 2019    [address]     Cancer [facility])     prostate cancer    Diabetes ([facility]     Elevated cholesterol     Hypertension      Past Surgical History:   Procedure Laterality Date    COLONOSCOPY N/A 2017    COLONOSCOPY performed by Trudy Castro MD at [facility] ENDOSCOPY    COLONOSCOPY N/A 2019    COLONOSCOPY WITH POLYPECTOMY performed by Corinne Man MD at [facility] AMBULATORY OR    COLONOSCOPY N/A 10/11/2021    COLONOSCOPY performed by Corinne Man MD at [facility] ENDOSCOPY    HX CATARACT REMOVAL Left 2020    HX HERNIA REPAIR      HX HIP REPLACEMENT Right     HX KNEE REPLACEMENT Right     HX PROSTATECTOMY        Family History   Problem Relation Age of Onset    Cancer Mother         ovarian    Stroke Father      Social History     Tobacco Use    Smoking status: Former Smoker     Packs/day: 2.00     Years: 15.00     Pack years: 30.00     Quit date: 1965     Years since quittin.0    Smokeless tobacco: Never Used   Substance Use Topics    Alcohol use: No      Current Facility-Administered Medications   Medication Dose Route Frequency Provider Last Rate Last Admin    0.9% sodium chloride infusion  50 mL/hr IntraVENous CONTINUOUS Corinne Man MD   IV Completed at 10/11/21 1456    sodium chloride (NS) flush 5-40 mL  5-40 mL IntraVENous Q8H Aneesh Bailey MD   10 mL at 10/12/21 0534    sodium chloride (NS) flush 5-40 mL  5-40 mL IntraVENous PRN Aneesh Bailey MD        acetaminophen (TYLENOL) tablet 650 mg  650 mg Oral Q6H PRN Daryl Booker MD        Or    acetaminophen (TYLENOL) suppository 650 mg  650 mg Rectal Q6H PRN Daryl Booker MD        polyethylene glycol (MIRALAX) packet 17 g  17 g Oral DAILY Daryl Booker MD   17 g at 10/10/21 6218    bisacodyL (DULCOLAX) tablet 5 mg  5 mg Oral DAILY PRN Daryl Booker MD        promethazine (PHENERGAN) tablet 12.5 mg  12.5 mg Oral Q6H PRN Daryl Booker MD        Or    ondansetron [facility]) injection 4 mg  4 mg IntraVENous Q6H PRN Daryl Booker MD        0.9% sodium chloride infusion  50 mL/hr IntraVENous CONTINUOUS Daryl Booker MD 50 mL/hr at 10/11/21 1106 50 mL/hr at 10/11/21 1106    glucose chewable tablet 16 g  4 Tablet Oral PRN Daryl Booker MD        dextrose (D50W) injection syrg 12.5-25 g  25-50 mL IntraVENous PRN Daryl Booker MD        glucagon (GLUCAGEN) injection 1 mg  1 mg IntraMUSCular PRN Daryl Booker MD        insulin lispro (HUMALOG) injection   SubCUTAneous AC&HS Daryl Booker MD   3 Units at 10/12/21 1151    metoprolol tartrate (LOPRESSOR) tablet 12.5 mg  12.5 mg Oral Q12H Edson Lin MD   12.5 mg at 10/12/21 0851    amLODIPine (NORVASC) tablet 5 mg  5 mg Oral DAILY Daryl Booker MD   5 mg at 10/12/21 8945    pravastatin (PRAVACHOL) tablet 20 mg  20 mg Oral QHS Daryl Booker MD   20 mg at 10/11/21 2108    labetaloL (NORMODYNE;TRANDATE) injection 20 mg  20 mg IntraVENous Q3H PRN Daryl Booker MD            No Known Allergies    Review of Systems:  A comprehensive review of systems was negative except for that written in the History of Present Illness.     Objective:     Vitals:    10/11/21 1940 10/12/21 0000 10/12/21 0332 10/12/21 0747   BP: (!) 152/83 (!) 123/55 132/61 (!) 148/76   Pulse: 71 (!) 51 64 68   Resp: 19 18 18 16   Temp: 98.6 °F (37 °C) 97.7 °F (36.5 °C) 97.5 °F (36.4 °C) 97.8 °F (36.6 °C) SpO2: 98% 98% 96%    Weight:       Height:            Physical Exam:  General:  Alert, cooperative, no distress, appears stated age. Eyes:  Conjunctivae/corneas clear. PERRL, EOMs intact. Fundi benign   Ears:  Normal TMs and external ear canals both ears. Nose: Nares normal. Septum midline. Mucosa normal. No drainage or sinus tenderness. Mouth/Throat: Lips, mucosa, and tongue normal. Teeth and gums normal.   Neck: Supple, symmetrical, trachea midline, no adenopathy, thyroid: no enlargment/tenderness/nodules, no carotid bruit and no JVD. Back:   Symmetric, no curvature. ROM normal. No CVA tenderness. Lungs:   Clear to auscultation bilaterally. Heart:  Regular rate and rhythm, S1, S2 normal, no murmur, click, rub or gallop. Abdomen:   Soft, non-tender. Bowel sounds normal. No masses,  No organomegaly. Extremities: Extremities normal, atraumatic, no cyanosis or edema. Pulses: 2+ and symmetric all extremities. Skin: Skin color, texture, turgor normal. No rashes or lesions   Lymph nodes: Cervical, supraclavicular, and axillary nodes normal.   Neurologic: CNII-XII intact. Normal strength, sensation and reflexes throughout. Assessment:     Hospital Problems  Date Reviewed: 10/11/2021        Codes Class Noted POA    [facility] ICD-10-CM: Y19.15  ICD-9-CM: 427.32  10/9/2021 Yes        Rectal bleed ICD-10-CM: K62.5  ICD-9-CM: 569.3  10/9/2021 Unknown        Colonic mass ICD-10-CM: K33.41  ICD-9-CM: 569.89  10/9/2021 Unknown              Plan:     Robotic assisted sigmoid colectomy. I explained the risks and benefits including but not limited to bleeding, infection, anastomotic leak, need for an ostomy, and damage to surrounding structures.     Signed By: Armandina Lennox, MD     October 12, 2021

## 2021-10-12 NOTE — PROGRESS NOTES
Transition of Care Plan:    RUR: 9  Disposition: Home   Follow up appointments: PCP   DME needed: NA- Independent   Transportation at [address] will transport   [address] or means to access home:   Yes- spouse has it      IM Medicare Letter: will provide upon d/c   Is patient a BCPI-A Bundle:  No         If yes, was Bundle Letter given?:     Caregiver Contact: Rut Ball [phone] Spouse   Discharge Caregiver contacted prior to discharge? No    Reason for Admission:  Acute lower GI bleed                      RUR Score:     9                Plan for utilizing home health:     No     PCP: First and Last name:  Imani Salas MD     Name of Practice:    Are you a current patient: Yes/No: Yes   Approximate date of last visit: 2 weeks ago   Can you participate in a virtual visit with your PCP:                     Current Advanced Directive/Advance Care Plan: Full Code      Healthcare Decision Maker:   Click here to complete [address] including selection of the Healthcare Decision Maker Relationship (ie \"Primary\")                             Transition of Care Plan:               Home    Care Management Interventions  PCP Verified by CM: Yes  Mode of Transport at Discharge: Self (Spouse will transport )  Transition of Care Consult (CM Consult): Discharge Planning (Pt had hh services with At home care [facility])  Discharge Durable Medical Equipment:  (Pt is independent )  Support Systems: Spouse/Significant Other  Confirm Follow Up Transport: Self  Discharge Location  Discharge Placement: [phone] (ACP) Conversation      Date of Conversation: 10/9/2021  Conducted with: Patient with Decision Making Capacity    Healthcare Decision Maker:   No healthcare decision makers have been documented.    Click here to complete [address] including selection of the Healthcare Decision Maker Relationship (ie \"Primary\")        Content/Action Overview:   DECLINED ACP conversation - will revisit periodically   Reviewed DNR/DNI and patient elects Full Code (Attempt Resuscitation)      Length of Voluntary ACP Conversation in minutes:  12 minutes    Agustina RUSS  ED Case Manager   Ext [phone]

## 2021-10-12 NOTE — PROGRESS NOTES
End of Shift Note    Bedside shift change report given to  Zeus Garcia RN by Vivienne Maxwell RN (offgoing nurse). Report included the following information SBAR, Kardex and Recent Results    Shift worked: 7a-7p   Shift summary and any significant changes:     Pt to have surgery this evening. Concerns for physician to address: None   Zone phone for oncoming shift:  [phone]     Patient Information  Erika Parkinson  79 y.o.  10/9/2021  9:57 AM by Letha Sal MD. Erika Parkinson was admitted from Home    Problem List  Patient Active Problem List    Diagnosis Date Noted    Atrial flutter, paroxysmal ([address] 10/09/2021    Rectal bleed 10/09/2021    Colonic mass 10/09/2021    Combined forms of age-related cataract of right eye 01/09/2020     Past Medical History:   Diagnosis Date    Arthritis     At risk for sleep apnea 12/18/2019    Stop Nomi Gobble 5     Cancer [facility])     prostate cancer    Diabetes [address] [address]     Elevated cholesterol     Hypertension        Core Measures:  CVA: Yes Yes  CHF:No No  PNA:No No    Activity:  Activity Level: Up with Assistance  Number times ambulated in hallways past shift: 0  Number of times OOB to chair past shift: 1    Cardiac:   Cardiac Monitoring: Yes      Cardiac Rhythm: Atrial Flutter    Access:   Current line(s): PIV   Central Line? No   PICC LINE? No     Genitourinary:   Urinary status: voiding  Urinary Catheter? No    Respiratory:   O2 Device: None (Room air)  Chronic home O2 use?: NO  Incentive spirometer at bedside: NO       GI:  Last Bowel Movement Date: 10/11/21  Current diet:  DIET NPO  Passing flatus: NO  Tolerating current diet: YES       Pain Management:   Patient states pain is manageable on current regimen: YES    Skin:  Arvin Score: 23  Interventions: increase time out of bed and nutritional support     Patient Safety:  Fall Score:  Total Score: 2  Interventions: bed/chair alarm, assistive device (walker, cane, etc), gripper socks, pt to call before getting OOB and stay with me (per policy)  High Fall Risk: Yes  @Rollbelt  @dexterity to release roll belt  Yes/No ( must document dexterity  here by stating Yes or No here, otherwise this is a restraint and must follow restraint documentation policy.)    DVT prophylaxis:  DVT prophylaxis Med- Yes  DVT prophylaxis SCD or NEVILLE- No     Wounds: (If Applicable)  Wounds- No  Location     Active Consults:  IP CONSULT TO UROLOGY  IP CONSULT TO HEMATOLOGY  IP CONSULT TO COLORECTAL SURGERY    Length of Stay:  Expected LOS: 3d 14h  Actual LOS: 3  Discharge Plan: TBD-pt to have surgery this evening      Na Sung, RN

## 2021-10-12 NOTE — CONSULTS
Hematology Oncology Follow Up Note      Reason for consult: colon mass    Admitting Diagnosis: Rectal bleed [K62.5]  Colonic mass [K63.89]  Atrial flutter ([address] [I48.92]    History: Bandar Bustamante is a  79y.o. year old seen in consultation at the request of Dr. Krystyna Barrera for colon mass. He has a h/o aflutter, rectal bleed, DM2, HTN, HLD, colon mass, former smoker quit 1965, prostate cancer s/p prostatectomy 25 years ago, obesity. Admitted on 10/9/21 for acute lower GI bleed x 3 weeks. His last colonoscopy prior to admission was in 2019 with Dr. Krystyna Barrera, not a good prep. CTA/P was done and showed eccentric masslike wall thickening in the mid-sigmoid colon 5.5 x 2.7 x 4.1cm, highly suspicious for malignancy. He went for colonoscopy 10/11/21 which demonstrated a fungating, large, malignant appearing mass in the distal sigmoid colon, 20cm from anal verge, covering 50% of the circumference. Multiple biopsies were taken. Path is pending. He states he has intermittent lower abdominal pain and fatigue. He is able to do light chores around the house but noticed easy fatigability lately. He has a normal appetite. No weight loss. He has been constipated his whole life but for a few weeks he had diarrhea. Then he had BRBPR and presented here. Wife at bedside to help provide history. Follow up:   Path still pending. He is doing well. Anxiously awaiting the surgeon's recommendations. Wife at bedside helping provide history. Imaging:  10//9/21 CT A/P: masslike thickening in sigmoid colon 5.5cm in size. 10/11/21 CT Chest: Multiple bilateral pulmonary nodules measuring 4 mm or less in size. Recommend follow-up CT in 6 months or per treatment protocol.     Labs:    Recent Results (from the past 24 hour(s))   GLUCOSE, POC    Collection Time: 10/11/21 12:07 PM   Result Value Ref Range    Glucose (POC) 102 65 - 117 mg/dL    Performed by Ashwini Ledbetter PCT    ECHO ADULT COMPLETE    Collection Time: 10/11/21  2:11 PM   Result Value Ref Range    IVSd 0.93 0.60 - 1.00 cm    LVIDd 4.87 4.20 - 5.90 cm    LVIDs 3.44 cm    LVOT d 2.28 cm    LVPWd 1.18 (A) 0.60 - 1.00 cm    LVOT Peak Gradient 3.04 mmHg    Left Ventricular Outflow Tract Mean Gradient 1.70 mmHg    LVOT SV 84.6 mL    LVOT Peak Velocity 87.16 cm/s    LVOT VTI 20.74 cm    Left Atrium Major Axis 3.22 cm    Aortic Valve Area by Continuity of Peak Velocity 1.76 cm2    Aortic Valve Area by Continuity of VTI 1.96 cm2    AoV PG 16.39 mmHg    Aortic Valve Systolic Mean Gradient 0.46 mmHg    Aortic Valve Systolic Peak Velocity 572.43 cm/s    AoV VTI 43.08 cm    MV A Lalito 31.94 cm/s    Mitral Valve E Wave Deceleration Time 187.12 ms    MV E Lalito 84.46 cm/s    E/E' ratio (averaged) 10.22     E/E' lateral 9.63     E/E' septal 10.81     LV E' Lateral Velocity 8.77 cm/s    LV E' Septal Velocity 7.81 cm/s    TR Max Velocity 84.96 cm/s    Pulmonic Valve Systolic Peak Instantaneous Gradient 3.02 mmHg    Pulmonic Valve Max Velocity 86.84 cm/s    Triscuspid Valve Regurgitation Peak Gradient 14.33 mmHg    TR Max Velocity 176.31 cm/s    Ao Root D 3.72 cm    IVC proximal 1.81 cm    MV E/A 2.64     LV Mass .4 88.0 - 224.0 g    LV Mass AL Index 81.9 49.0 - 115.0 g/m2    Left Atrium Minor Axis 1.41 cm    PARAMJIT/BSA Pk Lalito 0.8 cm2/m2    PARAMJIT/BSA VTI 0.9 cm2/m2   GLUCOSE, POC    Collection Time: 10/11/21  6:47 PM   Result Value Ref Range    Glucose (POC) 110 65 - 117 mg/dL    Performed by Kory Aguirre PCT    GLUCOSE, POC    Collection Time: 10/11/21  8:55 PM   Result Value Ref Range    Glucose (POC) 250 (H) 65 - 117 mg/dL    Performed by Chanelle Montero*    CEA    Collection Time: 10/12/21  1:03 AM   Result Value Ref Range    CEA 3.1 ng/mL   IRON PROFILE    Collection Time: 10/12/21  1:03 AM   Result Value Ref Range    Iron 49 35 - 150 ug/dL    TIBC 216 (L) 250 - 450 ug/dL    Iron % saturation 23 20 - 50 %   FERRITIN    Collection Time: 10/12/21  1:03 AM   Result Value Ref Range    Ferritin 163 26 - 388 NG/ML   CBC WITH AUTOMATED DIFF    Collection Time: 10/12/21  1:03 AM   Result Value Ref Range    WBC 5.6 4.1 - 11.1 K/uL    RBC 4.31 4.10 - 5.70 M/uL    HGB 12.7 12.1 - 17.0 g/dL    HCT 38.6 36.6 - 50.3 %    MCV 89.6 80.0 - 99.0 FL    MCH 29.5 26.0 - 34.0 PG    MCHC 32.9 30.0 - 36.5 g/dL    RDW 13.1 11.5 - 14.5 %    PLATELET 191 460 - 301 K/uL    MPV 9.6 8.9 - 12.9 FL    NRBC 0.0 0  WBC    ABSOLUTE NRBC 0.00 0.00 - 0.01 K/uL    NEUTROPHILS 62 32 - 75 %    LYMPHOCYTES 25 12 - 49 %    MONOCYTES 10 5 - 13 %    EOSINOPHILS 3 0 - 7 %    BASOPHILS 0 0 - 1 %    IMMATURE GRANULOCYTES 0 0.0 - 0.5 %    ABS. NEUTROPHILS 3.5 1.8 - 8.0 K/UL    ABS. LYMPHOCYTES 1.4 0.8 - 3.5 K/UL    ABS. MONOCYTES 0.5 0.0 - 1.0 K/UL    ABS. EOSINOPHILS 0.2 0.0 - 0.4 K/UL    ABS. BASOPHILS 0.0 0.0 - 0.1 K/UL    ABS. IMM.  GRANS. 0.0 0.00 - 0.04 K/UL    DF AUTOMATED     METABOLIC PANEL, BASIC    Collection Time: 10/12/21  1:03 AM   Result Value Ref Range    Sodium 137 136 - 145 mmol/L    Potassium 3.4 (L) 3.5 - 5.1 mmol/L    Chloride 108 97 - 108 mmol/L    CO2 28 21 - 32 mmol/L    Anion gap 1 (L) 5 - 15 mmol/L    Glucose 133 (H) 65 - 100 mg/dL    BUN 12 6 - 20 MG/DL    Creatinine 0.88 0.70 - 1.30 MG/DL    BUN/Creatinine ratio 14 12 - 20      GFR est AA >60 >60 ml/min/1.73m2    GFR est non-AA >60 >60 ml/min/1.73m2    Calcium 8.6 8.5 - 10.1 MG/DL   GLUCOSE, POC    Collection Time: 10/12/21  6:38 AM   Result Value Ref Range    Glucose (POC) 107 65 - 117 mg/dL    Performed by Donna Human*        History:  Past Medical History:   Diagnosis Date    Arthritis     At risk for sleep apnea 12/18/2019    Stop Bang 5     Cancer [facility])     prostate cancer    Diabetes ([facility] [address]     Elevated cholesterol     Hypertension       Past Surgical History:   Procedure Laterality Date    COLONOSCOPY N/A 9/27/2017    COLONOSCOPY [facility]uffin [facility] at [facility] ENDOSCOPY    COLONOSCOPY N/A 12/30/2019    COLONOSCOPY WITH POLYPECTOMY performed by Mendez Ledesma, Jacqueline Arreola MD at [facility] AMBULATORY OR    COLONOSCOPY N/A 10/11/2021    COLONOSCOPY performed by Cristhian Meeks MD at [facility] ENDOSCOPY    HX CATARACT REMOVAL Left 2020    HX HERNIA REPAIR      HX HIP REPLACEMENT Right     HX KNEE REPLACEMENT Right     HX PROSTATECTOMY        Prior to Admission medications    Medication Sig Start Date End Date Taking? Authorizing Provider   moxifloxacin (VIGAMOX) 0.5 % ophthalmic solution Administer 1 Drop to left eye three (3) times daily. Provider, Historical   pravastatin (PRAVACHOL) 20 mg tablet Take 20 mg by mouth nightly. Provider, Historical   amLODIPine (NORVASC) 5 mg tablet Take 5 mg by mouth daily. Provider, Historical   losartan (COZAAR) 100 mg tablet Take 100 mg by mouth daily. Provider, Historical   multivitamin (ONE A DAY) tablet Take 1 Tab by mouth daily. Provider, Historical   acetaminophen (TYLENOL EXTRA STRENGTH) 500 mg tablet Take 1,000 mg by mouth every six (6) hours as needed for Pain. Provider, Historical   metFORMIN (GLUCOPHAGE) 1,000 mg tablet Take 1,000 mg by mouth two (2) times a day. Provider, Historical   aspirin delayed-release 81 mg tablet Take 81 mg by mouth daily. Provider, Historical   Omega-3-DHA-EPA-Fish Oil (FISH OIL) 1,000 mg (120 mg-180 mg) cap Take 1 Tab by mouth daily.     Provider, Historical     No Known Allergies   Social History     Tobacco Use    Smoking status: Former Smoker     Packs/day: 2.00     Years: 15.00     Pack years: 30.00     Quit date: 1965     Years since quittin.0    Smokeless tobacco: Never Used   Substance Use Topics    Alcohol use: No      Family History   Problem Relation Age of Onset    Cancer Mother         ovarian    Stroke Father         Current Medications:  Current Facility-Administered Medications   Medication Dose Route Frequency    0.9% sodium chloride infusion  50 mL/hr IntraVENous CONTINUOUS    sodium chloride (NS) flush 5-40 mL  5-40 mL IntraVENous Q8H    sodium chloride (NS) flush 5-40 mL  5-40 mL IntraVENous PRN    acetaminophen (TYLENOL) tablet 650 mg  650 mg Oral Q6H PRN    Or    acetaminophen (TYLENOL) suppository 650 mg  650 mg Rectal Q6H PRN    polyethylene glycol (MIRALAX) packet 17 g  17 g Oral DAILY    bisacodyL (DULCOLAX) tablet 5 mg  5 mg Oral DAILY PRN    promethazine (PHENERGAN) tablet 12.5 mg  12.5 mg Oral Q6H PRN    Or    ondansetron (ZOFRAN) injection 4 mg  4 mg IntraVENous Q6H PRN    0.9% sodium chloride infusion  50 mL/hr IntraVENous CONTINUOUS    glucose chewable tablet 16 g  4 Tablet Oral PRN    dextrose (D50W) injection syrg 12.5-25 g  25-50 mL IntraVENous PRN    glucagon (GLUCAGEN) injection 1 mg  1 mg IntraMUSCular PRN    insulin lispro (HUMALOG) injection   SubCUTAneous AC&HS    metoprolol tartrate (LOPRESSOR) tablet 12.5 mg  12.5 mg Oral Q12H    amLODIPine (NORVASC) tablet 5 mg  5 mg Oral DAILY    pravastatin (PRAVACHOL) tablet 20 mg  20 mg Oral QHS    labetaloL (NORMODYNE;TRANDATE) injection 20 mg  20 mg IntraVENous Q3H PRN         Review of Systems:  10 point review of systems otherwise negative except as per HPI    Patient Vitals for the past 12 hrs:   Temp Pulse Resp BP SpO2   10/12/21 0747 97.8 °F (36.6 °C) 68 16 (!) 148/76    10/12/21 0332 97.5 °F (36.4 °C) 64 18 132/61 96 %   10/12/21 0000 97.7 °F (36.5 °C) (!) 51 18 (!) 123/55 98 %       Physical Exam:  Constitutional Alert, cooperative, oriented. Mood and affect appropriate. Appears close to chronological age. Well nourished. Well developed. Head Normocephalic   Eyes Conjunctivae and sclerae are clear and without icterus. ENMT No oral exudates, ulcers, masses, thrush or mucositis. Neck Supple without masses or thyromegaly. Hematologic/Lymphatic No petechiae or purpura. No tender or palpable lymph nodes in the cervical, supraclavicular, axillary or inguinal area. Respiratory Lungs are clear to auscultation without rhonchi or wheezing.    Cardiovascular Regular rate and rhythm of heart without murmurs, gallops or rubs. Chest / Line Site Chest is symmetric with no chest wall deformities. Abdomen Non-tender, non-distended, no masses, ascites or hepatosplenomegaly. No guarding or rebound tenderness. Musculoskeletal No tenderness or swelling, normal range of motion without obvious weakness. Extremities No visible deformities, no cyanosis, clubbing or edema. Skin No rashes. Neurologic No sensory or motor deficits noted. Psychiatric Alert. Coherent speech. Verbalizes understanding of our discussions today. Impression:   80 yo M with h/o aflutter, rectal bleed, DM2, HTN, HLD, colon mass, former smoker quit 1965, prostate cancer s/p prostatectomy 25 years ago, obesity. I was consulted due to a colon mass. Colon mass, clf colon cancer  Mass biopsied, pathology pending   Agree with surgery consult. He is not obstructed. CT Chest showed multiple indeterminate pulmonary nodules, 4mm or smaller in size. CEA 3.1    He can f/u with me in the office after surgery. Please let me know when he is discharged so I can set up follow up for him. Pulmonary nodules, indeterminate   CT showed Multiple bilateral pulmonary nodules measuring 4 mm or less in size. Recommend follow-up CT in 6 months or per treatment protocol. Fatigue  Likely related to cancer. Hb is stable. Iron studies normal.     Thank you for allowing me to participate in the care of this patient. Please do not hesitate to contact me if questions or concerns arise.       Trixie Reid MD  [facility] INPATIENT REHABILITATION office  [address] [place] [address], [address]  Phone [phone]  Fax [phone]

## 2021-10-12 NOTE — PROGRESS NOTES
End of Shift Note    Bedside shift change report given to Laverne Walter RN   (oncoming nurse) by Joseluis Quinones RN (offgoing nurse). Report included the following information SBAR, Kardex and Recent Results    Shift worked: 7p-7a   Shift summary and any significant changes:    Patient had very low heart rate overnight. Concerns for physician to address: bradycardia   Zone phone for oncoming shift:       Patient Information  Alejandro Patel  79 y.o.  10/9/2021  9:57 AM by Jenn Mccallum MD. Alejandro Patel was admitted from Home    Problem List  Patient Active Problem List    Diagnosis Date Noted    Atrial flutter, paroxysmal ([address] 10/09/2021    Rectal bleed 10/09/2021    Colonic mass 10/09/2021    Combined forms of age-related cataract of right eye 01/09/2020     Past Medical History:   Diagnosis Date    Arthritis     At risk for sleep apnea 12/18/2019    Stop Ladi Risejanine      Cancer [facility])     prostate cancer    Diabetes ([facility] [address]     Elevated cholesterol     Hypertension        Core Measures:  CVA: No No  CHF:No No  PNA:No No    Activity:  Activity Level: Up with Assistance  Number times ambulated in hallways past shift: 2  Number of times OOB to chair past shift: 2    Cardiac:   Cardiac Monitoring: Yes      Cardiac Rhythm: Atrial Flutter    Access:   Current line(s): PIV   Central Line? No  PICC LINE? No     Genitourinary:   Urinary status: voiding  Urinary Catheter? No     Respiratory:   O2 Device: None (Room air)  Chronic home O2 use?: NO  Incentive spirometer at bedside: NO       GI:  Last Bowel Movement Date: 10/11/21  Current diet:  ADULT DIET Full Liquid  Passing flatus: YES  Tolerating current diet: YES       Pain Management:   Patient states pain is manageable on current regimen: YES    Skin:  Arvin Score: 23  Interventions: increase time out of bed and nutritional support     Patient Safety:  Fall Score:  Total Score: 2  Interventions: assistive device (walker, cane, etc), gripper socks, pt to call before getting OOB and stay with me (per policy)  High Fall Risk: Yes  @Rollbelt  @dexterity to release roll belt  Yes/No ( must document dexterity  here by stating Yes or No here, otherwise this is a restraint and must follow restraint documentation policy.)    DVT prophylaxis:  DVT prophylaxis Med-No  DVT prophylaxis SCD or NEVILLE- Yes     Wounds: (If Applicable)  Wounds- No  Location     Active Consults:  IP CONSULT TO UROLOGY  IP CONSULT TO HEMATOLOGY  IP CONSULT TO COLORECTAL SURGERY    Length of Stay:  Expected LOS: 3d 14h  Actual LOS: 3  Discharge Plan: possibly 10/13      Giuliano Corbin RN

## 2021-10-12 NOTE — PROGRESS NOTES
Problem: Falls - Risk of  Goal: *Absence of Falls  Description: Document Pratik Cease Fall Risk and appropriate interventions in the flowsheet. Outcome: Progressing Towards Goal  Note: Fall Risk Interventions:            Medication Interventions: Bed/chair exit alarm, Patient to call before getting OOB, Teach patient to arise slowly         History of Falls Interventions: Bed/chair exit alarm, Door open when patient unattended, Room close to nurse's station         Problem: Diabetes Self-Management  Goal: *Disease process and treatment process  Description: Define diabetes and identify own type of diabetes; list 3 options for treating diabetes. Outcome: Progressing Towards Goal  Goal: *Incorporating nutritional management into lifestyle  Description: Describe effect of type, amount and timing of food on blood glucose; list 3 methods for planning meals. Outcome: Progressing Towards Goal  Goal: *Developing strategies to promote health/change behavior  Description: Define the ABC's of diabetes; identify appropriate screenings, schedule and personal plan for screenings. Outcome: Progressing Towards Goal  Goal: *Using medications safely  Description: State effect of diabetes medications on diabetes; name diabetes medication taking, action and side effects. Outcome: Progressing Towards Goal  Goal: *Monitoring blood glucose, interpreting and using results  Description: Identify recommended blood glucose targets  and personal targets. Outcome: Progressing Towards Goal  Goal: *Prevention, detection, treatment of acute complications  Description: List symptoms of hyper- and hypoglycemia; describe how to treat low blood sugar and actions for lowering  high blood glucose level.   Outcome: Progressing Towards Goal  Goal: *Prevention, detection and treatment of chronic complications  Description: Define the natural course of diabetes and describe the relationship of blood glucose levels to long term complications of diabetes.   Outcome: Progressing Towards Goal

## 2021-10-12 NOTE — ANESTHESIA PREPROCEDURE EVALUATION
Relevant Problems   CARDIOVASCULAR   (+) Atrial flutter, paroxysmal (HCC)       Anesthetic History   No history of anesthetic complications            Review of Systems / Medical History  Patient summary reviewed, nursing notes reviewed and pertinent labs reviewed    Pulmonary                Comments: Former [address] - 30 pack years    Stop Bang Score = 5    Neuro/Psych   Within defined limits           Cardiovascular    Hypertension        Dysrhythmias : atrial flutter  Hyperlipidemia      Comments: ECG (10/9/21): Atrial flutter with variable AV block   When compared with ECG of 03-JAN-2020 09:54,   Atrial flutter has replaced Sinus rhythm   Incomplete right bundle branch block is no longer present    TTE (12/17/20):  ·RV: Not well visualized. ·LV: Estimated LVEF is 55 - 60%. Normal cavity size, wall thickness and systolic function (ejection fraction normal). Inconclusive left ventricular diastolic function. ·Echo study was technically difficulty.      GI/Hepatic/Renal         Renal disease: stones      Comments: Sigmoid colon mass  Acute lower GI bleed     Right severe hydronephrosis, likely chronic   Right nephrolithiasis  Endo/Other    Diabetes: type 2    Morbid obesity and arthritis    Comments: H/O Prostate Cancer s/p Prostatectomy Other Findings   Comments: Acute to subacute fracture of the anterior superior T12 vertebral body          Physical Exam    Airway  Mallampati: I  TM Distance: > 6 cm  Neck ROM: normal range of motion   Mouth opening: Normal     Cardiovascular  Regular rate and rhythm,  S1 and S2 normal,  no murmur, click, rub, or gallop             Dental    Dentition: Edentulous     Pulmonary  Breath sounds clear to auscultation               Abdominal  GI exam deferred       Other Findings            Anesthetic Plan    ASA: 3  Anesthesia type: general    Monitoring Plan: BIS      Induction: Intravenous  Anesthetic plan and risks discussed with: Patient

## 2021-10-12 NOTE — CONSULTS
Urology Consult    Patient: Esteban Dacosta MRN: [id number]  SSN: [id number]    YOB: 1940  Age: 79 y.o. Sex: male          Date of Consultation:  October 12, 2021  Requesting Physician: Nicole Ivory MD  Reason for Consultation: UPJ obstruction            Assessment/Plan:  Chronic UPJ obstruction. No right hydroureter   Hx frequent UTIs complicated by uncontrolled DM and incontinence  Remote hx prostatectomy    Mult non obstructing right kidney stones with possible staghorn stone in right lower pole     -no surgical urologic intervention at this time, obstruction appears chronic and has no pain, renal failure or infection at this time.   -check UA and follow cultures  -nursing to check pvr for retention. Place craft for retention >350ml   -will arrange for outpatient follow up for definitive stone treatment     Supervising MD, Dr. Jasmin Landa       History of Present Illness:  Patient is a 79 y.o. male admitted 10/9/2021 to the hospital for Rectal bleed [K62.5]  Colonic mass [K63.89]  Atrial flutter ([address] [I48.92]. Urology was consulted for hydronephrosis. He is a known patient of [facility]. Last office visit copied below. He denies cva tenderness or flank pain. Denies hematuria, nausea, fevers or chills. Patient does admit to chronic feeling of incomplete emptying. He was unaware he had kidney stones. He did have a recent negative cystoscopy last year with Dr. Marcus Yepez. Chart reviewed:  Af, vss  No recent UOP recorded  Wbc wnl, hgb stable  Creat wnl   No recent UA    CT abd images personally reviewed along with Dr. Sven Torres     1. No evidence of active GI bleeding. 2. Eccentric masslike wall thickening in the mid sigmoid colon measuring 5.5 x  2.7 x 4.1 cm, highly suspicious for malignancy. Recommend colonoscopy. 3. Acute to subacute minimally displaced fracture of the anterior superior T12  vertebral body.  Additional age-indeterminate minimally displaced fracture of the  T11 spinous process. 4. Cholelithiasis. 5. 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 Multiple nonobstructing stones in the right  kidney.        ===last ov visit===  2020 jun-concerns UTI. A1c 7.2. May culture Proteus with some resistance. Remote history of prostatectomy with incontinence 2 pads per day. Absent prostate on exam.  PCR Enterococcus faecalis. Augmentin prescription. UPDATEpatient back in for discussion. Sep 2020 Proteus mirabilis noted. More recent available notes are reviewed including current medication list.  Persisting elevated A1c. Correlation with infections were outlined and addressed. He is working on weight with BMI 39. No fevers, chills, nausea, or vomiting. No visible hematuria. His biggest complaint is some persisting dysuria when he is off antibiotics. I reviewed his prior records and infection history. We talked some of this could be colonization rather than acute infection. Differential diagnosis could include stone, tumor, stricture, bladder neck contracture. .  .  _________  PLAN    Z71.89 - Other Specified Counseling (Prevent Exposure & Assessing Risk) -details discussed with patient regarding day-to-day measures to prevent transmission of COVID virus including handwashing, wearing facemask, social distancing. Recurrent UTI concernsgrowing a few different bacteria. This could be related to some extent to his incontinence and use of pads. May represent colonization. Contribution of his uncontrolled diabetes is also noted to patient. Thankfully cystoscopy today shows no anatomic abnormality. There are no stones, bladder neck contracture, masses. We will send a cytology. Trimethoprim suppressive daily dosing x6 months with repeat office visit at that time. Post prostatectomy incontinencechronic. Does not wish further intervention.     Personal remote history prostate cancerprior normal JANE with absent prostate    Morbiditiescontribution of diabetes and obesity to current clinical situation noted. PAST MEDICAL HISTORY:    Allergies: No known allergies. DENIES: Latex, Shellfish, X-Ray Dye, Iodine. Medications: TRIMETHOPRIM 100 MG ORAL TABLET (TRIMETHOPRIM) 1 tab p.o.; Route: ORAL  GLIMEPIRIDE 1 MG ORAL TABLET (GLIMEPIRIDE) TAKE 1 TABLET EVERY MORNING WITH BREAKFAST; Route: ORAL  ONE DAILY MENS 50+ MULTIVIT ORAL TABLET (MULTIPLE VITAMINS-MINERALS) daily; Route: ORAL  FISH OIL CAPSULE (OMEGA-3 FATTY ACIDS CAPS) daily; Route: ORAL  PRAVASTATIN SODIUM 20 MG ORAL TABLET (PRAVASTATIN SODIUM) daily; Route: ORAL  AMLODIPINE BESYLATE 5 MG ORAL TABLET (AMLODIPINE BESYLATE) daily; Route: ORAL  LOSARTAN POTASSIUM 100 MG ORAL TABLET (LOSARTAN POTASSIUM) daily; Route: ORAL  METFORMIN HCL 1000 MG ORAL TABLET (METFORMIN HCL) twice a day; Route: ORAL  ASPIRIN 81 TABLET DELAYED RELEASE (ASPIRIN TBEC) daily    Problems: Personal history of malignant neoplasm of prostate (ICD-V10.46) (PMS02-D69.59)  Male stress incontinence (XFC68-U01.3)  UTI (ICD-599.0) (ZDM12-O64.0)    Illnesses: Diabetes and Cancer. DENIES: Heart Disease, Pacemaker/Defibrillator, Lung Disease, High Blood Pressure, Bowel Problems, Stroke/Seizure, Kidney Problems, Bleeding Problems, HIV, or Hepatitis. Surgeries: Prostate Surgery, Joint Replacement Surgery, and Hernia Repair. Family History: DENIES: Prostate cancer, Kidney cancer, Kidney disease, Kidney stones. Social History: Retired. Single. Smoking status: former smoker. Does not drink alcohol. System Review: Admits to: Involuntary Urine Loss. DENIES: Unexplained Weight Loss, Dry Eyes, Dry Mouth, Leg Swelling, Shortness of Breath, Constipation, Lower Extremity Weakness, Dry Skin, Difficulty Walking, Psychiatric Problems, Impaired Sex Drive, Easy Bleeding, Rash.          URINALYSIS from Voided specimen  Urine Dip: pH: 6.5, Bld: Neg, LE: ++, Nit: Neg, Prot: Neg, Ket: Neg, Gluc: Neg  Urine Micro: WBC: 6-10, RBC: 0, Bacteria: 0    POST-VOID RESIDUAL  US PVR (12/04/2020):  0 ccs     CYSTOSCOPY: Prepped and draped. Flexible cystoscopy. Urethra: WNL  Prostate: Absent  Bladder: WNL  Retroflex: WNL  UOs in normal position. Postcysto abx given. Prescription(s) Today: TRIMETHOPRIM 100 MG ORAL TABLET (TRIMETHOPRIM) 1 tab p.o. #90[Tablet] x 1,  12/04/2020, Deborah Lara MD        cc: Stef Velasquez MD  Transcribed by Speech to Text Technology  [employer]  Bladder Scan, Cysto, E&M Service, Urinalysis Microscopic      ]        Electronically signed by Deborah Lara MD on 12/04/2020 at 11:02 AM    ________________________________________________________________________            Past Medical History:  No Known Allergies   Prior to Admission medications    Medication Sig Start Date End Date Taking? Authorizing Provider   moxifloxacin (VIGAMOX) 0.5 % ophthalmic solution Administer 1 Drop to left eye three (3) times daily. Provider, Historical   pravastatin (PRAVACHOL) 20 mg tablet Take 20 mg by mouth nightly. Provider, Historical   amLODIPine (NORVASC) 5 mg tablet Take 5 mg by mouth daily. Provider, Historical   losartan (COZAAR) 100 mg tablet Take 100 mg by mouth daily. Provider, Historical   multivitamin (ONE A DAY) tablet Take 1 Tab by mouth daily. Provider, Historical   acetaminophen (TYLENOL EXTRA STRENGTH) 500 mg tablet Take 1,000 mg by mouth every six (6) hours as needed for Pain. Provider, Historical   metFORMIN (GLUCOPHAGE) 1,000 mg tablet Take 1,000 mg by mouth two (2) times a day. Provider, Historical   aspirin delayed-release 81 mg tablet Take 81 mg by mouth daily. Provider, Historical   Omega-3-DHA-EPA-Fish Oil (FISH OIL) 1,000 mg (120 mg-180 mg) cap Take 1 Tab by mouth daily.     Provider, Historical      PMHx:  has a past medical history of Arthritis, At risk for sleep apnea (12/18/2019), Cancer [facility]), Diabetes ([address] Elevated cholesterol, and Hypertension. He also has no past medical history of Difficult intubation, Malignant hyperthermia due to anesthesia, Nausea & vomiting, or Pseudocholinesterase deficiency. PSurgHx:  has a past surgical history that includes hx prostatectomy; colonoscopy (N/A, 9/27/2017); hx hernia repair; colonoscopy (N/A, 12/30/2019); hx hip replacement (Right); hx knee replacement (Right); hx cataract removal (Left, 01/08/2020); and colonoscopy (N/A, 10/11/2021). PSocHx:  reports that he quit smoking about 56 years ago. He has a 30.00 pack-year smoking history. He has never used smokeless tobacco. He reports that he does not drink alcohol and does not use drugs. ROS:  Admission ROS by Megan Grewal MD from 10/9/2021 were reviewed with the patient and changes (other than per HPI) include: none.     Physical Exam  General Appearance: NAD, awake  HENT: atraumatic, normal ears  Cardiovascular: not tachycardic, no LE edema  Respiratory: no distress, room air  Abdomen: soft, no suprapubic fullness or tenderness  : no CVA tenderness  Extremities: moves all  Musculoskeletal: normal alignment of neck and head  Neuro: Appropriate, no focal neurological deficits  Mood/Affect: appropriate, A&O x 3      Lab Results   Component Value Date/Time    WBC 5.6 10/12/2021 01:03 AM    HCT 38.6 10/12/2021 01:03 AM    PLATELET 537 97/39/9892 01:03 AM    Sodium 137 10/12/2021 01:03 AM    Potassium 3.4 (L) 10/12/2021 01:03 AM    Chloride 108 10/12/2021 01:03 AM    CO2 28 10/12/2021 01:03 AM    BUN 12 10/12/2021 01:03 AM    Creatinine 0.88 10/12/2021 01:03 AM    Glucose 133 (H) 10/12/2021 01:03 AM    Calcium 8.6 10/12/2021 01:03 AM    Magnesium 2.2 10/09/2021 10:45 AM    INR 1.0 10/09/2021 10:21 AM       UA: No results found for: COLOR, APPRN, SPGRU, REFSG, TARI, PROTU, GLUCU, KETU, BILU, UROU, KARL, LEUKU, GLUKE, EPSU, BACTU, WBCU, RBCU, CASTS, UCRY        Signed By: Ermalinda Severin, RICKY  - October 12, 2021

## 2021-10-12 NOTE — PROGRESS NOTES
Hospitalist Progress Note    NAME: Mauricio [place]   :  1940   MRN:  [id number]       Assessment / Plan:    Sigmoid colon mass POA  Presents with recent rectal bleeding x 3 weeks, no weight loss  GI consultation  N.p.o. post midnight  S/p  Colonoscopy yesterday show fungating, large mass malignant appearing  Surgery was consulted plan for possible sigmoid colectomy           New onset atrial flutter POA  Asymptomatic, found on admit EKG  Currently rate controlled  Monitor on telemetry  Low-dose beta-blocker  Not candidate for anticoagulation     Right severe hydronephrosis, likely chronic POA  Right nephrolithiasis POA  Creatinine 1.12, creatinine clearance 63.8  Chronic urinary complaints per the patient   urology follow-up as outpatient     Acute to subacute fracture of the anterior superior T12 vertebral body POA  Noted on CT abdomen done today  Not a lot of back pain  PRN pain meds     Diabetes mellitus type 2 POA  Home regimen: metformin  Diabetic diet  Hold metformin, add lantus as needed  Sliding scale insulin  POC  Check HgBa1C     Essential HTN POA  Hyperlipidemia POA  Hold  ASA with bleeding  Continue statin  Continue home BP regimen  Norvasc, denied taking Cozaar  And Norvasc, add low-dose metoprolol as above  PRN labetalol            30.0 - 39.9 Obese / Body mass index is 39.96 kg/m².     Estimated discharge date: October 15  Barriers:     Code status: Full  Prophylaxis: SCD's  Recommended Disposition: Home w/Family         Subjective:     Chief Complaint / Reason for Physician Visit  . Discussed with RN events overnight.   Plan for surgery later on today or tomorrow    Review of Systems:  Symptom Y/N Comments  Symptom Y/N Comments   Fever/Chills n   Chest Pain n    Poor Appetite    Edema     Cough    Abdominal Pain     Sputum    Joint Pain     SOB/ROJO n   Pruritis/Rash     Nausea/vomit    Tolerating PT/OT     Diarrhea    Tolerating Diet     Constipation n   Other       Could NOT obtain due to: Objective:     VITALS:   Last 24hrs VS reviewed since prior progress note. Most recent are:  Patient Vitals for the past 24 hrs:   Temp Pulse Resp BP SpO2   10/12/21 1100 96.8 °F (36 °C) 72 18 (!) 140/81 95 %   10/12/21 0747 97.8 °F (36.6 °C) 68 16 (!) 148/76    10/12/21 0332 97.5 °F (36.4 °C) 64 18 132/61 96 %   10/12/21 0000 97.7 °F (36.5 °C) (!) 51 18 (!) 123/55 98 %   10/11/21 1940 98.6 °F (37 °C) 71 19 (!) 152/83 98 %   10/11/21 1852 98 °F (36.7 °C) 68 20 139/72 96 %       Intake/Output Summary (Last 24 hours) at 10/12/2021 1507  Last data filed at 10/11/2021 2152  Gross per 24 hour   Intake 1000 ml   Output    Net 1000 ml        I had a face to face encounter and independently examined this patient on 10/12/2021, as outlined below:  PHYSICAL EXAM:  General: WD, WN. Alert, cooperative, no acute distress    EENT:  EOMI. Anicteric sclerae. MMM  Resp:  CTA bilaterally, no wheezing or rales. No accessory muscle use  CV:  Regular  rhythm,  No edema  GI:  Soft, Non distended, Non tender. +Bowel sounds  Neurologic:  Alert and oriented X 3, normal speech,   Psych:   Good insight. Not anxious nor agitated  Skin:  No rashes. No jaundice    Reviewed most current lab test results and cultures  YES  Reviewed most current radiology test results   YES  Review and summation of old records today    NO  Reviewed patient's current orders and MAR    YES  PMH/SH reviewed - no change compared to H&P  ________________________________________________________________________  Care Plan discussed with:    Comments   Patient y    Family      RN y    Care Manager     Consultant                        Multidiciplinary team rounds were held today with , nursing, pharmacist and clinical coordinator. Patient's plan of care was discussed; medications were reviewed and discharge planning was addressed.      ________________________________________________________________________  Total NON critical care TIME:  35 Minutes    Total CRITICAL CARE TIME Spent:   Minutes non procedure based      Comments   >50% of visit spent in counseling and coordination of care y    ________________________________________________________________________  Allyson Avila MD     Procedures: see electronic medical records for all procedures/Xrays and details which were not copied into this note but were reviewed prior to creation of Plan. LABS:  I reviewed today's most current labs and imaging studies. Pertinent labs include:  Recent Labs     10/12/21  0103 10/10/21  0249   WBC 5.6 5.7   HGB 12.7 13.1   HCT 38.6 38.0    242     Recent Labs     10/12/21  0103 10/10/21  0249    137   K 3.4* 3.7    106   CO2 28 26   * 126*   BUN 12 17   CREA 0.88 0.93   CA 8.6 8.8   ALB  --  3.1*   TBILI  --  0.7   ALT  --  18       Signed:  Allyson Avila MD

## 2021-10-13 LAB
ANION GAP SERPL CALC-SCNC: 5 MMOL/L (ref 5–15)
BASOPHILS # BLD: 0 K/UL (ref 0–0.1)
BASOPHILS NFR BLD: 0 % (ref 0–1)
BUN SERPL-MCNC: 11 MG/DL (ref 6–20)
BUN/CREAT SERPL: 10 (ref 12–20)
CALCIUM SERPL-MCNC: 8.5 MG/DL (ref 8.5–10.1)
CHLORIDE SERPL-SCNC: 104 MMOL/L (ref 97–108)
CO2 SERPL-SCNC: 27 MMOL/L (ref 21–32)
CREAT SERPL-MCNC: 1.1 MG/DL (ref 0.7–1.3)
DIFFERENTIAL METHOD BLD: ABNORMAL
EOSINOPHIL # BLD: 0 K/UL (ref 0–0.4)
EOSINOPHIL NFR BLD: 0 % (ref 0–7)
ERYTHROCYTE [DISTWIDTH] IN BLOOD BY AUTOMATED COUNT: 13.3 % (ref 11.5–14.5)
GLUCOSE BLD STRIP.AUTO-MCNC: 207 MG/DL (ref 65–117)
GLUCOSE BLD STRIP.AUTO-MCNC: 230 MG/DL (ref 65–117)
GLUCOSE BLD STRIP.AUTO-MCNC: 272 MG/DL (ref 65–117)
GLUCOSE BLD STRIP.AUTO-MCNC: 294 MG/DL (ref 65–117)
GLUCOSE SERPL-MCNC: 204 MG/DL (ref 65–100)
HCT VFR BLD AUTO: 36.2 % (ref 36.6–50.3)
HGB BLD-MCNC: 12.2 G/DL (ref 12.1–17)
IMM GRANULOCYTES # BLD AUTO: 0 K/UL (ref 0–0.04)
IMM GRANULOCYTES NFR BLD AUTO: 0 % (ref 0–0.5)
LYMPHOCYTES # BLD: 0.6 K/UL (ref 0.8–3.5)
LYMPHOCYTES NFR BLD: 7 % (ref 12–49)
MCH RBC QN AUTO: 29.5 PG (ref 26–34)
MCHC RBC AUTO-ENTMCNC: 33.7 G/DL (ref 30–36.5)
MCV RBC AUTO: 87.7 FL (ref 80–99)
MONOCYTES # BLD: 0.3 K/UL (ref 0–1)
MONOCYTES NFR BLD: 3 % (ref 5–13)
NEUTS SEG # BLD: 7.7 K/UL (ref 1.8–8)
NEUTS SEG NFR BLD: 90 % (ref 32–75)
NRBC # BLD: 0 K/UL (ref 0–0.01)
NRBC BLD-RTO: 0 PER 100 WBC
PLATELET # BLD AUTO: 226 K/UL (ref 150–400)
PMV BLD AUTO: 9.4 FL (ref 8.9–12.9)
POTASSIUM SERPL-SCNC: 4 MMOL/L (ref 3.5–5.1)
RBC # BLD AUTO: 4.13 M/UL (ref 4.1–5.7)
RBC MORPH BLD: ABNORMAL
SERVICE CMNT-IMP: ABNORMAL
SODIUM SERPL-SCNC: 136 MMOL/L (ref 136–145)
WBC # BLD AUTO: 8.6 K/UL (ref 4.1–11.1)

## 2021-10-13 PROCEDURE — 85025 COMPLETE CBC W/AUTO DIFF WBC: CPT

## 2021-10-13 PROCEDURE — 77030041070 HC DRSG ALG MDII -A

## 2021-10-13 PROCEDURE — 74011250637 HC RX REV CODE- 250/637: Performed by: INTERNAL MEDICINE

## 2021-10-13 PROCEDURE — 94760 N-INVAS EAR/PLS OXIMETRY 1: CPT

## 2021-10-13 PROCEDURE — 77010033678 HC OXYGEN DAILY

## 2021-10-13 PROCEDURE — 0DTN0ZZ RESECTION OF SIGMOID COLON, OPEN APPROACH: ICD-10-PCS | Performed by: SURGERY

## 2021-10-13 PROCEDURE — 74011250636 HC RX REV CODE- 250/636: Performed by: NURSE PRACTITIONER

## 2021-10-13 PROCEDURE — 74011250637 HC RX REV CODE- 250/637: Performed by: SURGERY

## 2021-10-13 PROCEDURE — 80048 BASIC METABOLIC PNL TOTAL CA: CPT

## 2021-10-13 PROCEDURE — 74011250636 HC RX REV CODE- 250/636: Performed by: INTERNAL MEDICINE

## 2021-10-13 PROCEDURE — 36415 COLL VENOUS BLD VENIPUNCTURE: CPT

## 2021-10-13 PROCEDURE — 2709999900 HC NON-CHARGEABLE SUPPLY

## 2021-10-13 PROCEDURE — 82962 GLUCOSE BLOOD TEST: CPT

## 2021-10-13 PROCEDURE — 74011636637 HC RX REV CODE- 636/637: Performed by: INTERNAL MEDICINE

## 2021-10-13 PROCEDURE — 8E0W4CZ ROBOTIC ASSISTED PROCEDURE OF TRUNK REGION, PERCUTANEOUS ENDOSCOPIC APPROACH: ICD-10-PCS | Performed by: SURGERY

## 2021-10-13 PROCEDURE — 77030038554 HC DRSG WND THERAHONEY MDII -Z

## 2021-10-13 PROCEDURE — 65270000029 HC RM PRIVATE

## 2021-10-13 RX ORDER — ENOXAPARIN SODIUM 100 MG/ML
40 INJECTION SUBCUTANEOUS EVERY 24 HOURS
Status: DISCONTINUED | OUTPATIENT
Start: 2021-10-13 | End: 2021-10-16 | Stop reason: HOSPADM

## 2021-10-13 RX ADMIN — INSULIN LISPRO 5 UNITS: 100 INJECTION, SOLUTION INTRAVENOUS; SUBCUTANEOUS at 12:02

## 2021-10-13 RX ADMIN — ENOXAPARIN SODIUM 40 MG: 40 INJECTION SUBCUTANEOUS at 16:58

## 2021-10-13 RX ADMIN — INSULIN LISPRO 5 UNITS: 100 INJECTION, SOLUTION INTRAVENOUS; SUBCUTANEOUS at 16:57

## 2021-10-13 RX ADMIN — INSULIN LISPRO 2 UNITS: 100 INJECTION, SOLUTION INTRAVENOUS; SUBCUTANEOUS at 22:23

## 2021-10-13 RX ADMIN — METOPROLOL TARTRATE 12.5 MG: 25 TABLET, FILM COATED ORAL at 08:58

## 2021-10-13 RX ADMIN — Medication 1 AMPULE: at 12:03

## 2021-10-13 RX ADMIN — Medication 10 ML: at 06:00

## 2021-10-13 RX ADMIN — SODIUM CHLORIDE 50 ML/HR: 900 INJECTION, SOLUTION INTRAVENOUS at 12:07

## 2021-10-13 RX ADMIN — Medication 1 AMPULE: at 22:23

## 2021-10-13 RX ADMIN — AMLODIPINE BESYLATE 5 MG: 5 TABLET ORAL at 08:59

## 2021-10-13 RX ADMIN — INSULIN LISPRO 3 UNITS: 100 INJECTION, SOLUTION INTRAVENOUS; SUBCUTANEOUS at 09:00

## 2021-10-13 RX ADMIN — PRAVASTATIN SODIUM 20 MG: 10 TABLET ORAL at 22:23

## 2021-10-13 RX ADMIN — Medication 10 ML: at 17:02

## 2021-10-13 RX ADMIN — Medication 10 ML: at 22:23

## 2021-10-13 RX ADMIN — METOPROLOL TARTRATE 12.5 MG: 25 TABLET, FILM COATED ORAL at 22:23

## 2021-10-13 NOTE — PROGRESS NOTES
End of Shift Note    Bedside shift change report given to [facility] RN (oncoming nurse) by Delphine Paris RN (offgoing nurse). Report included the following information SBAR, Kardex, Intake/Output, MAR and Recent Results    Shift worked:  7p-7a     Shift summary and any significant changes:     Pt arrived to unit from PACU at 0000. Pt A&O x4. Pt has a craft, draining well. Craft care completed. Small open wound noted on L butttocks, wound care consulted. Pt weaned off oxygen, tolerating well, has few episodes of sating to low 90s but picks back up. Concerns for physician to address:  none     Zone phone for oncoming shift:          Activity:  Activity Level: Up with Assistance  Number times ambulated in hallways past shift: 0  Number of times OOB to chair past shift: 0    Cardiac:   Cardiac Monitoring: No      Cardiac Rhythm: Atrial Flutter    Access:   Current line(s): PICC     Genitourinary:   Urinary status: craft    Respiratory:   O2 Device: Nasal cannula  Chronic home O2 use?: NO  Incentive spirometer at bedside: YES  Actual Volume (ml): 2500 ml  GI:  Last Bowel Movement Date: 10/11/21  Current diet:  ADULT DIET Regular; Low Fiber  Passing flatus: YES  Tolerating current diet: YES       Pain Management:   Patient states pain is manageable on current regimen: YES    Skin:  Arvin Score: 20  Interventions: float heels and increase time out of bed    Patient Safety:  Fall Score:  Total Score: 2  Interventions: bed/chair alarm, gripper socks, pt to call before getting OOB and stay with me (per policy)  High Fall Risk: Yes    Length of Stay:  Expected LOS: 3d 14h  Actual LOS: 4      Delphine Paris RN

## 2021-10-13 NOTE — WOUND CARE
Wound care Nurse consult: consult placed by staff nurse for \" small open wound on L buttocks\". Patient is an 80 y/o CM admitted for acute lower GI bleed. Past Medical History:   Diagnosis Date    Arthritis     At risk for sleep apnea 12/18/2019    Stop Bang 5     Cancer [facility])     prostate cancer    Diabetes ([address]     Elevated cholesterol     Hypertension      Patient and wife in room. Reported to [facility] nurse that he has had this open area for months and had been treating it with abx ointment at home. Patient has a small partial thickness wound to soft tissue ridge of left buttock. Blanchable base, painful and pink. Tita-wound skin intact. Most likely caused by small pimple/abscess and has been constantly irritated. WOUND POA CONDITIONS        Wound Buttocks Left small open wound; blanchable 10/13/21 (Active)   Wound Etiology Other (Comment) 10/13/21 1330   Dressing Status New dressing applied 10/13/21 1330   Cleansed Cleansed with saline 10/13/21 1330   Dressing/Treatment Hydrating gel;Alginate; Foam 10/13/21 1330   Wound Length (cm) 0.5 cm 10/13/21 1330   Wound Width (cm) 0.5 cm 10/13/21 1330   Wound Depth (cm) 0.1 cm 10/13/21 1330   Wound Surface Area (cm^2) 0.25 cm^2 10/13/21 1330   Wound Volume (cm^3) 0.025 cm^3 10/13/21 1330   Wound Assessment Pink/red 10/13/21 1330   Drainage Amount None 10/13/21 1330   Wound Odor None 10/13/21 1330   Tita-Wound/Incision Assessment Intact 10/13/21 1330   Edges Flat/open edges 10/13/21 1330   Wound Thickness Description Partial thickness 10/13/21 1330   Number of days: 0           Recommend and done:    Left buttock wound: daily, cleanse with NS, wipe clean. Apply a small amount of Therahoney to a piece of Alginate (Maxorb), cover wound. Secure with a small foam dressing.     Marlyn Chau RN, [facility]

## 2021-10-13 NOTE — OP NOTES
[address]  OPERATIVE REPORT    Name:  Latoya Becerra  MR#:  [id number]  :  1940  ACCOUNT #:  [de-identified]  DATE OF SERVICE:  10/12/2021    PREOPERATIVE DIAGNOSIS:  Colon mass. POSTOPERATIVE DIAGNOSIS:  Colon mass. PROCEDURE PERFORMED:  Robotic-assisted converted to laparoscopic hand-assisted sigmoid colectomy. SURGEON:  Guicho Mata MD    ASSISTANT:  Mono Otoole. ANESTHESIA:  General.    COMPLICATIONS:  None. SPECIMENS REMOVED:  Sigmoid colon with proximal and distal donuts. IMPLANTS:  None. ESTIMATED BLOOD LOSS:  Less than 100 mL. DRAINS:  None. FINDINGS:  Mass in sigmoid colon, no evidence of metastatic disease in the liver. INDICATIONS:  This is an 48-year-old male, who presents with a bleeding mass. Colonoscopy revealed a malignant appearing lesion in the sigmoid colon. Tattoo was placed proximal and distal to this lesion. I explained the risks and benefits of the surgery including, but not limited to bleeding, infection, anastomotic leak, damage to surrounding structures, and need for colostomy. He understood the risks and has elected to proceed. DESCRIPTION OF PROCEDURE:  The patient was brought to the operating suite, placed in the supine position. General endotracheal anesthesia was induced. Venodyne stockings were placed as well as a Del Rosario catheter. His abdomen was prepped and draped in the usual sterile fashion and a time-out was performed indicating the correct patient and procedure to be performed as per hospital protocol. An 8-mm incision was made in the left mid abdomen. A Veress needle was inserted in the abdomen and the abdomen was insufflated. We then placed an 8-mm robotic trocar using a 5-mm 0-degree scope under direct visualization. A 12-mm robotic trocar was placed in the right lower quadrant and a 5-mm assistant port was placed in the right upper quadrant.   The patient was placed in steep Trendelenburg position and the robot was docked. I began by elevating the sigmoid colon and identifying a large mass in the sigmoid colon. This was quite heavy and there was a significant amount of pericolic fat draped over the colon making it very difficult to identify the medial aspect of the mesentery. I began the dissection along the medial aspect of the mesentery scoring along the medial edge overlying the sacral promontory gaining access into the avascular plane between the left colon mesentery and retroperitoneum. I continued in this avascular plane; however, it became exceedingly difficult to do so due to the nature of his pericolic fat and BMI. I was able to free up the lateral attachments along the descending colon all the way up to the splenic flexure taking down the white line of Toldt and mobilizing the descending colon in its entirety by creating a mesenteric window at the rectosigmoid junction several centimeters below the distal tattoo rossy. The colon was divided using a green load SureForm robotic stapler. The mesorectum was taken down with vessel sealer. Unfortunately during the process, I have taken down the inferior mesenteric artery. It became somewhat difficult to control the bleeding due to the patient's obesity. I was having trouble actually gaining control of the vasculature. After several attempts, I was finally able to use the vessel sealer to stop the bleeding along the inferior mesenteric artery. In light of difficult nature from his obesity, I decided to convert to a hand assist to facilitate in the ease of dissection. The robot was undocked and a lower midline incision was made for the hand port. I completed the dissection along the mesentery posteriorly freeing up the posterior attachments and identifying the proximal resection margin several centimeters upstream from the proximal tattoo rossy. The mesentery was divided at this level.   I then had Anesthesia inject indocyanine green dye to confirm excellent blood supply to proximal and distal margins. Once that was confirmed, I exteriorized the bowel and placed automatic pursestring at the proximal resection margin. The bowel was divided and sent to Pathology for further examination. A 29 EEA anvil was placed was placed in the colotomy. Pursestring suture was cinched down. The fat was cleared off of the anvil and the second pursestring suture was placed using 2-0 Prolene. The anvil was then dropped back in the abdominal cavity. I placed sequential dilators transanally up to the transverse staple line followed by a 29 EEA stapler. The spike was deployed. The spike and the anvil were mated. The stapler was closed and fired and three complete donuts were identified. These were both sent to Pathology for further examination. I then performed a flexible sigmoidoscopy using adult video colonoscope. This was inserted transanally up to the anastomosis. The anastomosis was gently insufflated while simultaneously irrigating the pelvis. No leaks were noted. No bubbles were seen. There was pink healthy mucosa on both sides. At this time, I was ready for closure. The right lower quadrant incision was closed with 0 Vicryl suture using a Weck device. The midline incision was closed with a 0 looped PDS suture. All the skin incisions were closed with 4-0 Monocryl followed by Dermabond. 30 mL of 0.25% Marcaine with epinephrine was injected subcutaneously. The patient was awakened and taken to the recovery room in stable condition.       Mary Og MD      [id number]  D:  10/12/2021 22:52  T:  10/13/2021 4:26  JOB #:  [id number]

## 2021-10-13 NOTE — BRIEF OP NOTE
Brief Postoperative Note    Patient: Timmy Castellon  YOB: 1940  MRN: [id number]    Date of Procedure: 10/12/2021     Pre-Op Diagnosis: Colon mass    Post-Op Diagnosis: Same as preoperative diagnosis.       Procedure(s):  COLON RESECTION SIGMOID LEFT ROBOTIC ASSISTED    Surgeon(s):  Jenna Baltazar MD    Surgical Assistant: Surg Asst-1: Lazarus Riis    Anesthesia: General     Estimated Blood Loss (mL): less than 373     Complications: None    Specimens:   ID Type Source Tests Collected by Time Destination   1 : Proximal Post donut Preservative Colon  Jenna Baltazar MD 10/12/2021 2220 Pathology   2 : Distal donut Preservative Colon  Jenna Baltazar MD 10/12/2021 2222 Pathology   3 : Sigmoid Colon Preservative Sigmoid  Jenna Baltazar MD 10/12/2021 2224 Pathology        Implants: * No implants in log *    Drains: * No LDAs found *    Findings: Mass in the sigmoid colon    Electronically Signed by Dayana Gallegos MD on 10/12/2021 at 10:43 PM

## 2021-10-13 NOTE — PERIOP NOTES
Handoff Report from Operating Room to PACU    Report received from [address] [place] [address] and Jacob RN regarding Andrea Zuniga. Surgeon(s):  Tg Pearson MD  And Procedure(s) (LRB):  COLON RESECTION SIGMOID LEFT ROBOTIC ASSISTED, (N/A)  confirmed   with allergies and dressings discussed. Anesthesia type, drugs, patient history, complications, estimated blood loss, vital signs, intake and output, and last pain medication, lines, reversal medications and temperature were reviewed. 2343- chest xray complete    0000- Patient's wife made aware of new room assignment. Spoke with RN from Patient's Previous room. RN to bring Patient belongings including cell phone glasses and dentures to new room assignment 3740.    2350- TRANSFER - OUT REPORT:    Verbal report given to [facility] RN(name) on Andrea Zuniga  being transferred to 3214(unit) for routine progression of care       Report consisted of patients Situation, Background, Assessment and   Recommendations(SBAR). Information from the following report(s) OR Summary, Procedure Summary, Intake/Output and MAR was reviewed with the receiving nurse. Lines:   Triple Lumen 10/12/21 Right (Active)   Central Line Being Utilized Yes 10/12/21 2345   Criteria for Appropriate Use Limited/no vessel suitable for conventional peripheral access 10/12/21 2345   Site Assessment Clean, dry, & intact 10/12/21 2310   Infiltration Assessment 0 10/12/21 2310   Dressing Status Clean, dry, & intact 10/12/21 2310   Proximal Hub Color/Line Status White;Capped;Flushed 10/12/21 2310   Positive Blood Return (Medial Site) Yes 10/12/21 2310   Medial Hub Color/Line Status Blue;Capped;Flushed 10/12/21 2310   Positive Blood Return (Lateral Site) Yes 10/12/21 2310   Distal Hub Color/Line Status Brown; Infusing 10/12/21 2345   Positive Blood Return (Site #3) Yes 10/12/21 2345   Alcohol Cap Used Yes 10/12/21 2310        Opportunity for questions and clarification was provided.       Patient transported with:   O2 @ 3l liters  Registered Nurse   Chart

## 2021-10-13 NOTE — ANESTHESIA POSTPROCEDURE EVALUATION
Procedure(s):  COLON RESECTION SIGMOID LEFT ROBOTIC ASSISTED,.    general    Anesthesia Post Evaluation        Patient location during evaluation: PACU  Note status: Adequate. Level of consciousness: responsive to verbal stimuli and sleepy but conscious  Pain management: satisfactory to patient  Airway patency: patent  Anesthetic complications: no  Cardiovascular status: acceptable  Respiratory status: acceptable  Hydration status: acceptable  Comments: +Post-Anesthesia Evaluation and Assessment    Patient: Brittany Irizarry MRN: [id number]  SSN: [id number]   YOB: 1940  Age: 79 y.o. Sex: male      Cardiovascular Function/Vital Signs    /70   Pulse 89   Temp 37 °C (98.6 °F)   Resp 8   Ht 5' 8\" (1.727 m)   Wt 118.8 kg (262 lb)   SpO2 93%   BMI 39.84 kg/m²     Patient is status post Procedure(s):  COLON RESECTION SIGMOID LEFT ROBOTIC ASSISTED,. Nausea/Vomiting: Controlled. Postoperative hydration reviewed and adequate. Pain:  Pain Scale 1: (P) Numeric (0 - 10) (10/12/21 2328)  Pain Intensity 1: (P) 6 (10/12/21 2328)   Managed. Neurological Status:   Neuro (WDL): Exceptions to WDL (10/12/21 2310)   At baseline. Mental Status and Level of Consciousness: Arousable. Pulmonary Status:   O2 Device: (P) Nasal cannula (10/12/21 2325)   Adequate oxygenation and airway patent. Complications related to anesthesia: None    Post-anesthesia assessment completed. No concerns. Signed By: Honey Oneill MD    10/12/2021  Post anesthesia nausea and vomiting:  controlled      INITIAL Post-op Vital signs:   Vitals Value Taken Time   /70 10/12/21 2330   Temp 37 °C (98.6 °F) 10/12/21 2315   Pulse 85 10/12/21 2338   Resp 8 10/12/21 2338   SpO2 92 % 10/12/21 2338   Vitals shown include unvalidated device data.

## 2021-10-13 NOTE — ANESTHESIA PROCEDURE NOTES
Central Line Placement    Start time: 10/12/2021 8:10 PM  End time: 10/12/2021 8:25 PM  Performed by: Ruth Ann Dumont MD  Authorized by: Ruth Ann Dumont MD     Indications: central pressure monitoring and vascular access  Preanesthetic Checklist: patient identified, risks and benefits discussed, anesthesia consent, site marked, patient being monitored and timeout performed    Timeout Time: 20:10 EDT       Pre-procedure: All elements of maximal sterile barrier technique followed? Yes    2% Chlorhexidine for cutaneous antisepsis, Hand hygiene performed prior to catheter insertion and Ultrasound guidance    Sterile Ultrasound Technique followed?: Yes            Procedure:   Prep:  ChloraPrep    Orientation:  Right  Patient position:  Trendelenburg  Catheter type:  Triple lumen  Catheter size:  7 Fr  Catheter length:  16 cm  Number of attempts:  1  Successful placement: Yes      Assessment:   Post-procedure:  Catheter secured and sterile dressing with CHG applied  Assessment:  Blood return through all ports, free fluid flow and guidewire removal verified  Insertion:  Uncomplicated  Patient tolerance:  Patient tolerated the procedure well with no immediate complications  Central Line Procedure Note    Indication: Inadequate venous access    Risks, benefits, alternatives explained and patient agrees to proceed. Patient positioned in Trendelenburg. 7-Step Sterility Protocol followed. (cap, mask sterile gown, sterile gloves, large sterile sheet, hand hygiene, 2% chlorhexidine for cutaneous antisepsis)  5 mL 1% Lidocaine placed at insertion site. Right internal jugular cannulated x 1 attempt(s) utilizing the Seldinger technique. Catheter secured & Biopatch applied. Sterile Tegaderm placed.

## 2021-10-13 NOTE — PROGRESS NOTES
End of Shift Note    Bedside shift change report given to Jessa (oncoming nurse) by Mitesh Jaime RN (offgoing nurse). Report included the following information SBAR, Kardex, Intake/Output, MAR and Recent Results    Shift worked:  7a-7p     Shift summary and any significant changes:     wound care orders placed for L buttock, craft removed, pt has voided twice since removal     Concerns for physician to address:  none     Zone phone for oncoming shift:   [id number]       Activity:  Activity Level: Up ad gissel  Number times ambulated in hallways past shift: 2  Number of times OOB to chair past shift: 3    Cardiac:   Cardiac Monitoring: No      Cardiac Rhythm: Atrial Flutter    Access:   Current line(s): PICC     Genitourinary:   Urinary status: voiding    Respiratory:   O2 Device: None (Room air)  Chronic home O2 use?: NO  Incentive spirometer at bedside: YES  Actual Volume (ml): 2500 ml  GI:  Last Bowel Movement Date: 10/11/21  Current diet:  ADULT DIET Regular; Low Fiber  Passing flatus: YES  Tolerating current diet: YES       Pain Management:   Patient states pain is manageable on current regimen: YES    Skin:  Arvin Score: 20  Interventions: float heels and increase time out of bed    Patient Safety:  Fall Score:  Total Score: 2  Interventions: gripper socks  High Fall Risk: Yes    Length of Stay:  Expected LOS: 5d 16h  Actual LOS: 4      Mitesh Jaime RN

## 2021-10-13 NOTE — PROGRESS NOTES
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 Lay Parish. We will see again on request.  Please call if questions.       ANN MARIE Vital  10/13/21  4:37 PM

## 2021-10-13 NOTE — PROGRESS NOTES
Hospitalist Progress Note    NAME: Nano Hartmann   :  1940   MRN:  [id number]       Assessment / Plan:  Sigmoid colon mass POA  Presents with recent rectal bleeding x 3 weeks, no weight loss  S/p sigmoid colectomy POD 1  S/p  Colonoscopyshowing fungating, large mass with malignant appearance  Pain well controlled  Oncology consulted with plan for OP follow up pending path from surgery  Advance diet as per GI and CRS     New onset atrial flutter POA  Asymptomatic, found on admit EKG  Currently rate controlled  Monitor on telemetry  Low-dose beta-blocker  Not candidate for anticoagulation     Right severe hydronephrosis, likely chronic POA  Right nephrolithiasis POA  Creatinine 1.12, creatinine clearance 63.8  Chronic urinary complaints per the patient   urology follow-up as outpatient     Acute to subacute fracture of the anterior superior T12 vertebral body POA  Noted on CT abdomen done today  Not a lot of back pain  PRN pain meds     Diabetes mellitus type 2 POA  Home regimen: metformin  Change diet to diabetic diet, BS in 200s  Hold metformin, add lantus as needed  Sliding scale insulin  HbA1C 6.8     Essential HTN POA  Hyperlipidemia POA  Hold  ASA with bleeding  Continue statin  Continue home BP regimen  Norvasc, denied taking Cozaar  And Norvasc, add low-dose metoprolol as above  PRN labetalol      Subjective:     Chief Complaint / Reason for Physician Visit  POD 1 sigmoid colectomy. Patient sitting in bedside chair, no acute complaints. Denies pain/n/v    Discussed with RN events overnight.      Review of Systems:  Symptom Y/N Comments  Symptom Y/N Comments   Fever/Chills n   Chest Pain n    Poor Appetite n   Edema n    Cough n   Abdominal Pain n    Sputum n   Joint Pain     SOB/ROJO n   Pruritis/Rash     Nausea/vomit n   Tolerating PT/OT     Diarrhea n   Tolerating Diet y    Constipation n   Other       Could NOT obtain due to:      Objective:     VITALS:   Last 24hrs VS reviewed since prior progress note. Most recent are:  Patient Vitals for the past 24 hrs:   Temp Pulse Resp BP SpO2   10/13/21 0800 97.4 °F (36.3 °C) 97 16 (!) 156/86 94 %   10/13/21 0444 98 °F (36.7 °C) 91 16 (!) 144/76 92 %   10/13/21 0200 98 °F (36.7 °C) 93 16 (!) 154/87 93 %   10/13/21 0100 98 °F (36.7 °C) 92 16 134/74 98 %   10/13/21 0045 97.8 °F (36.6 °C) 91 18 (!) 142/82    10/13/21 0031 97.8 °F (36.6 °C) 91 18 (!) 155/86    10/13/21 0020 97.8 °F (36.6 °C) 92 18 (!) 151/81 95 %   10/13/21 0017 98.1 °F (36.7 °C) 91 15 (!) 151/81 95 %   10/12/21 2345 98.6 °F (37 °C) 90 14 139/66 95 %   10/12/21 2330  89 14 137/70 93 %   10/12/21 2328  89 11  96 %   10/12/21 2325  89 15 136/67 93 %   10/12/21 2320  89 14 (!) 140/69 92 %   10/12/21 2315 98.6 °F (37 °C) 89 14 (!) 140/67 94 %   10/12/21 2310  87 13 (!) 140/67 93 %   10/12/21 1514 98.2 °F (36.8 °C) (!) 50 16 117/74 97 %       Intake/Output Summary (Last 24 hours) at 10/13/2021 1131  Last data filed at 10/13/2021 0744  Gross per 24 hour   Intake 3301.67 ml   Output 1850 ml   Net 1451.67 ml        I had a face to face encounter and independently examined this patient on 10/13/2021, as outlined below:  PHYSICAL EXAM:  General: WD, WN. Alert, cooperative, no acute distress    EENT:  EOMI. Anicteric sclerae. MMM  Resp:  CTA bilaterally, no wheezing or rales. No accessory muscle use  CV:  Regular  rhythm,  No edema  GI:  Soft, Non distended, Non tender. +Bowel sounds  Neurologic:  Alert and oriented X 3, normal speech,   Psych:   Good insight. Not anxious nor agitated  Skin:  No rashes.   No jaundice    Reviewed most current lab test results and cultures  YES  Reviewed most current radiology test results   YES  Review and summation of old records today    NO  Reviewed patient's current orders and MAR    YES  PMH/SH reviewed - no change compared to H&P  ________________________________________________________________________  Care Plan discussed with:    Comments   Patient x    Family  x    RN x    Care Manager     Consultant                       x Multidiciplinary team rounds were held today with , nursing, pharmacist and clinical coordinator. Patient's plan of care was discussed; medications were reviewed and discharge planning was addressed. ________________________________________________________________________  Total NON critical care TIME:  30   Minutes    Total CRITICAL CARE TIME Spent:   Minutes non procedure based      Comments   >50% of visit spent in counseling and coordination of care x    ________________________________________________________________________  Vandana Medrano DO     Procedures: see electronic medical records for all procedures/Xrays and details which were not copied into this note but were reviewed prior to creation of Plan. LABS:  I reviewed today's most current labs and imaging studies.   Pertinent labs include:  Recent Labs     10/13/21  0327 10/12/21  0103   WBC 8.6 5.6   HGB 12.2 12.7   HCT 36.2* 38.6    276     Recent Labs     10/13/21  0327 10/12/21  0103    137   K 4.0 3.4*    108   CO2 27 28   * 133*   BUN 11 12   CREA 1.10 0.88   CA 8.5 8.6       Signed: Vandana Medrano DO

## 2021-10-13 NOTE — PROGRESS NOTES
Hematology Oncology Follow Up Note      Reason for consult: colon mass    Admitting Diagnosis: Rectal bleed [K62.5]  Colonic mass [K63.89]  Atrial flutter ([address] [I48.92]    History: Andrea Zuniga is a  79y.o. year old seen in consultation at the request of Dr. Rober Parikh for colon mass. He has a h/o aflutter, rectal bleed, DM2, HTN, HLD, colon mass, former smoker quit 1965, prostate cancer s/p prostatectomy 25 years ago, obesity. Admitted on 10/9/21 for acute lower GI bleed x 3 weeks. His last colonoscopy prior to admission was in 2019 with Dr. Rober Parikh, not a good prep. CTA/P was done and showed eccentric masslike wall thickening in the mid-sigmoid colon 5.5 x 2.7 x 4.1cm, highly suspicious for malignancy. He went for colonoscopy 10/11/21 which demonstrated a fungating, large, malignant appearing mass in the distal sigmoid colon, 20cm from anal verge, covering 50% of the circumference. Multiple biopsies were taken. Path is pending. He states he has intermittent lower abdominal pain and fatigue. He is able to do light chores around the house but noticed easy fatigability lately. He has a normal appetite. No weight loss. He has been constipated his whole life but for a few weeks he had diarrhea. Then he had BRBPR and presented here. Wife at bedside to help provide history. Follow up:   Path still pending. He went for sigmoid colectomy 10/12. Per the op note during the procedure the TOMMY was ligated and bleeding occurred. He is doing well postoperatively. No complaints. Ate breakfast this morning. Imaging:  10//9/21 CT A/P: masslike thickening in sigmoid colon 5.5cm in size. 10/11/21 CT Chest: Multiple bilateral pulmonary nodules measuring 4 mm or less in size. Recommend follow-up CT in 6 months or per treatment protocol.     Labs:    Recent Results (from the past 24 hour(s))   GLUCOSE, POC    Collection Time: 10/12/21 11:40 AM   Result Value Ref Range    Glucose (POC) 218 (H) 65 - 117 mg/dL    Performed by Gaurav Coello RN    TYPE & SCREEN    Collection Time: 10/12/21 12:56 PM   Result Value Ref Range    Crossmatch Expiration 10/15/2021,2359     ABO/Rh(D) O POSITIVE     Antibody screen NEG    URINALYSIS W/ REFLEX CULTURE    Collection Time: 10/12/21  2:39 PM    Specimen: Urine   Result Value Ref Range    Color YELLOW/STRAW      Appearance CLEAR CLEAR      Specific gravity 1.007 1.003 - 1.030      pH (UA) 6.5 5.0 - 8.0      Protein Negative NEG mg/dL    Glucose Negative NEG mg/dL    Ketone Negative NEG mg/dL    Bilirubin Negative NEG      Blood TRACE (A) NEG      Urobilinogen 1.0 0.2 - 1.0 EU/dL    Nitrites Negative NEG      Leukocyte Esterase LARGE (A) NEG      WBC  0 - 4 /hpf    RBC 0-5 0 - 5 /hpf    Epithelial cells FEW FEW /lpf    Bacteria 1+ (A) NEG /hpf    UA:UC IF INDICATED URINE CULTURE ORDERED (A) CNI     GLUCOSE, POC    Collection Time: 10/12/21  4:26 PM   Result Value Ref Range    Glucose (POC) 101 65 - 117 mg/dL    Performed by Gaurav Coello RN    GLUCOSE, POC    Collection Time: 10/12/21 11:11 PM   Result Value Ref Range    Glucose (POC) 193 (H) 65 - 117 mg/dL    Performed by Kenya Bolaños    CBC WITH AUTOMATED DIFF    Collection Time: 10/13/21  3:27 AM   Result Value Ref Range    WBC 8.6 4.1 - 11.1 K/uL    RBC 4.13 4.10 - 5.70 M/uL    HGB 12.2 12.1 - 17.0 g/dL    HCT 36.2 (L) 36.6 - 50.3 %    MCV 87.7 80.0 - 99.0 FL    MCH 29.5 26.0 - 34.0 PG    MCHC 33.7 30.0 - 36.5 g/dL    RDW 13.3 11.5 - 14.5 %    PLATELET 522 340 - 250 K/uL    MPV 9.4 8.9 - 12.9 FL    NRBC 0.0 0  WBC    ABSOLUTE NRBC 0.00 0.00 - 0.01 K/uL    NEUTROPHILS 90 (H) 32 - 75 %    LYMPHOCYTES 7 (L) 12 - 49 %    MONOCYTES 3 (L) 5 - 13 %    EOSINOPHILS 0 0 - 7 %    BASOPHILS 0 0 - 1 %    IMMATURE GRANULOCYTES 0 0.0 - 0.5 %    ABS. NEUTROPHILS 7.7 1.8 - 8.0 K/UL    ABS. LYMPHOCYTES 0.6 (L) 0.8 - 3.5 K/UL    ABS. MONOCYTES 0.3 0.0 - 1.0 K/UL    ABS. EOSINOPHILS 0.0 0.0 - 0.4 K/UL    ABS. BASOPHILS 0.0 0.0 - 0.1 K/UL    ABS. IMM.  GRANS. 0.0 0.00 - 0.04 K/UL    DF SMEAR SCANNED      RBC COMMENTS NORMOCYTIC, NORMOCHROMIC     METABOLIC PANEL, BASIC    Collection Time: 10/13/21  3:27 AM   Result Value Ref Range    Sodium 136 136 - 145 mmol/L    Potassium 4.0 3.5 - 5.1 mmol/L    Chloride 104 97 - 108 mmol/L    CO2 27 21 - 32 mmol/L    Anion gap 5 5 - 15 mmol/L    Glucose 204 (H) 65 - 100 mg/dL    BUN 11 6 - 20 MG/DL    Creatinine 1.10 0.70 - 1.30 MG/DL    BUN/Creatinine ratio 10 (L) 12 - 20      GFR est AA >60 >60 ml/min/1.73m2    GFR est non-AA >60 >60 ml/min/1.73m2    Calcium 8.5 8.5 - 10.1 MG/DL   GLUCOSE, POC    Collection Time: 10/13/21  8:25 AM   Result Value Ref Range    Glucose (POC) 230 (H) 65 - 117 mg/dL    Performed by Magalie Bustamante RN        History:  Past Medical History:   Diagnosis Date    Arthritis     At risk for sleep apnea 12/18/2019    Stop Bang 5     Cancer [facility])     prostate cancer    Diabetes ([facility] [address]     Elevated cholesterol     Hypertension       Past Surgical History:   Procedure Laterality Date    COLONOSCOPY N/A 9/27/2017    COLONOSCOPY performed by Divina Stacy MD at [facility] ENDOSCOPY    COLONOSCOPY N/A 12/30/2019    COLONOSCOPY WITH POLYPECTOMY performed by Carla Cueto MD at [facility] AMBULATORY OR    COLONOSCOPY N/A 10/11/2021    COLONOSCOPY performed by Carla Cueto MD at [facility] ENDOSCOPY    HX CATARACT REMOVAL Left 01/08/2020    HX HERNIA REPAIR      HX HIP REPLACEMENT Right     HX KNEE REPLACEMENT Right     HX PROSTATECTOMY        Prior to Admission medications    Medication Sig Start Date End Date Taking? Authorizing Provider   pravastatin (PRAVACHOL) 20 mg tablet Take 20 mg by mouth nightly. Yes Provider, Historical   multivitamin (ONE A DAY) tablet Take 1 Tab by mouth daily. Yes Provider, Historical   metFORMIN (GLUCOPHAGE) 1,000 mg tablet Take 1,000 mg by mouth two (2) times a day. Yes Provider, Historical   aspirin delayed-release 81 mg tablet Take 81 mg by mouth daily.    Yes Provider, Historical Omega-3-DHA-EPA-Fish Oil (FISH OIL) 1,000 mg (120 mg-180 mg) cap Take 1 Tab by mouth daily. Yes Provider, Historical   moxifloxacin (VIGAMOX) 0.5 % ophthalmic solution Administer 1 Drop to left eye three (3) times daily. Patient not taking: Reported on 10/13/2021    Provider, Historical   amLODIPine (NORVASC) 5 mg tablet Take 5 mg by mouth daily. Provider, Historical   losartan (COZAAR) 100 mg tablet Take 100 mg by mouth daily. Provider, Historical   acetaminophen (TYLENOL EXTRA STRENGTH) 500 mg tablet Take 1,000 mg by mouth every six (6) hours as needed for Pain.   Patient not taking: Reported on 10/13/2021    Provider, Historical     No Known Allergies   Social History     Tobacco Use    Smoking status: Former Smoker     Packs/day: 2.00     Years: 15.00     Pack years: 30.00     Quit date: 1965     Years since quittin.0    Smokeless tobacco: Never Used   Substance Use Topics    Alcohol use: No      Family History   Problem Relation Age of Onset    Cancer Mother         ovarian    Stroke Father         Current Medications:  Current Facility-Administered Medications   Medication Dose Route Frequency    oxyCODONE IR (ROXICODONE) tablet 5 mg  5 mg Oral Q4H PRN    oxyCODONE IR (ROXICODONE) tablet 10 mg  10 mg Oral Q4H PRN    HYDROmorphone (DILAUDID) injection 0.5 mg  0.5 mg IntraVENous Q3H PRN    alcohol 62% (NOZIN) nasal  1 Ampule  1 Ampule Topical Q12H    0.9% sodium chloride infusion  50 mL/hr IntraVENous CONTINUOUS    sodium chloride (NS) flush 5-40 mL  5-40 mL IntraVENous Q8H    sodium chloride (NS) flush 5-40 mL  5-40 mL IntraVENous PRN    acetaminophen (TYLENOL) tablet 650 mg  650 mg Oral Q6H PRN    Or    acetaminophen (TYLENOL) suppository 650 mg  650 mg Rectal Q6H PRN    polyethylene glycol (MIRALAX) packet 17 g  17 g Oral DAILY    bisacodyL (DULCOLAX) tablet 5 mg  5 mg Oral DAILY PRN    promethazine (PHENERGAN) tablet 12.5 mg  12.5 mg Oral Q6H PRN    Or    ondansetron (ZOFRAN) injection 4 mg  4 mg IntraVENous Q6H PRN    glucose chewable tablet 16 g  4 Tablet Oral PRN    dextrose (D50W) injection syrg 12.5-25 g  25-50 mL IntraVENous PRN    glucagon (GLUCAGEN) injection 1 mg  1 mg IntraMUSCular PRN    insulin lispro (HUMALOG) injection   SubCUTAneous AC&HS    metoprolol tartrate (LOPRESSOR) tablet 12.5 mg  12.5 mg Oral Q12H    amLODIPine (NORVASC) tablet 5 mg  5 mg Oral DAILY    pravastatin (PRAVACHOL) tablet 20 mg  20 mg Oral QHS    labetaloL (NORMODYNE;TRANDATE) injection 20 mg  20 mg IntraVENous Q3H PRN         Review of Systems:  10 point review of systems otherwise negative except as per HPI    Patient Vitals for the past 12 hrs:   Temp Pulse Resp BP SpO2   10/13/21 0800 97.4 °F (36.3 °C) 97 16 (!) 156/86 94 %   10/13/21 0444 98 °F (36.7 °C) 91 16 (!) 144/76 92 %   10/13/21 0200 98 °F (36.7 °C) 93 16 (!) 154/87 93 %   10/13/21 0100 98 °F (36.7 °C) 92 16 134/74 98 %   10/13/21 0045 97.8 °F (36.6 °C) 91 18 (!) 142/82    10/13/21 0031 97.8 °F (36.6 °C) 91 18 (!) 155/86    10/13/21 0020 97.8 °F (36.6 °C) 92 18 (!) 151/81 95 %   10/13/21 0017 98.1 °F (36.7 °C) 91 15 (!) 151/81 95 %   10/12/21 2345 98.6 °F (37 °C) 90 14 139/66 95 %   10/12/21 2330  89 14 137/70 93 %   10/12/21 2328  89 11  96 %   10/12/21 2325  89 15 136/67 93 %   10/12/21 2320  89 14 (!) 140/69 92 %   10/12/21 2315 98.6 °F (37 °C) 89 14 (!) 140/67 94 %   10/12/21 2310  87 13 (!) 140/67 93 %       Physical Exam:  Constitutional Alert, cooperative, oriented. Mood and affect appropriate. Appears close to chronological age. Well nourished. Well developed. Head Normocephalic   Eyes Conjunctivae and sclerae are clear and without icterus. ENMT No oral exudates, ulcers, masses, thrush or mucositis. Neck Supple without masses or thyromegaly. Hematologic/Lymphatic No petechiae or purpura. No tender or palpable lymph nodes in the cervical, supraclavicular, axillary or inguinal area. Respiratory Lungs are clear to auscultation without rhonchi or wheezing. Cardiovascular Regular rate and rhythm of heart without murmurs, gallops or rubs. Chest / Line Site Chest is symmetric with no chest wall deformities. Abdomen Non-tender, non-distended, no masses, ascites or hepatosplenomegaly. No guarding or rebound tenderness. Fresh abdominal incisions are clean without surrounding drainage or erythema    Musculoskeletal No tenderness or swelling, normal range of motion without obvious weakness. Extremities No visible deformities, no cyanosis, clubbing or edema. Skin No rashes. Neurologic No sensory or motor deficits noted. Psychiatric Alert. Coherent speech. Verbalizes understanding of our discussions today. Impression:   80 yo M with h/o aflutter, rectal bleed, DM2, HTN, HLD, colon mass, former smoker quit 1965, prostate cancer s/p prostatectomy 25 years ago, obesity. I was consulted due to a colon mass. Colon mass, clf colon cancer  Mass biopsied, pathology pending   Pt went for surgery- sigmoid hemicolectomy on 10/13/21. CT Chest showed multiple indeterminate pulmonary nodules, 4mm or smaller in size. CEA 3.1    He can f/u with me in the office after surgery. Please let me know when he is discharged so I can set up follow up for him. Pulmonary nodules, indeterminate   CT showed Multiple bilateral pulmonary nodules measuring 4 mm or less in size. Recommend follow-up CT in 6 months or per treatment protocol. Fatigue  Likely related to cancer. Hb is stable. Iron studies normal.     Thank you for allowing me to participate in the care of this patient. Please do not hesitate to contact me if questions or concerns arise.       Juanita Robins MD  [facility] INPATIENT REHABILITATION office  [address] [facility], [facility] [address]  Phone [phone]  Fax [phone]

## 2021-10-13 NOTE — PROGRESS NOTES
Surgery NP Progress Note    Donovan Santizo  [id number]  male  79 y.o.  1940    s/p COLON RESECTION SIGMOID LEFT ROBOTIC ASSISTED, on 10/12/2021   Pt seen with Dr. Renetta Arana:   Active Problems:    Atrial flutter, paroxysmal ([address] (10/9/2021)      Rectal bleed (10/9/2021)      Colonic mass (10/9/2021)        Expected post-op progress. Plan/Recommendations/Medical Decision Making:     - Mobilize with nursing and OOB to chair for meals  - Continue diet  - Pain management- Continue current pain control methods.   - VTE Prophylaxis:   - Discharge planning- home tomorrow or Friday, depending on tolerance of diet and return of bowel function.   - Oncology following and will see patient after d/c. Subjective:     Patient  With some abdominal pain but not requiring pain med. Had some cramping after breakfast this AM. Since resolved. No N/V. No bowel function yet. Objective:     Blood pressure 132/73, pulse 68, temperature 98.1 °F (36.7 °C), resp. rate 16, height 5' 8\" (1.727 m), weight 118.8 kg (262 lb), SpO2 95 %. Temp (24hrs), Av °F (36.7 °C), Min:97.4 °F (36.3 °C), Max:98.6 °F (37 °C)      Pt resting in chair. NAD   Incisions CDI. Abd soft and appropriately tender. SCDs for mechanical DVT proph while in bed     Body mass index is 39.84 kg/m². Reference: BMI greater than 30 is classified as obesity and greater than 40 is classified as morbid obesity.      Last 3 Recorded Weights in this Encounter    10/09/21 0952 10/11/21 1113   Weight: 119.2 kg (262 lb 12.6 oz) 118.8 kg (262 lb)         Marj Holloway, RICKY   MSN, APRN, FNP-C, CWOCN-AP    10/13/21

## 2021-10-13 NOTE — PROGRESS NOTES
Report called to [facility] CTR room . Pt belongings dentures uppers and lowers, glasses, cell phone and  brought to pt's new room.

## 2021-10-14 LAB
BACTERIA SPEC CULT: ABNORMAL
CC UR VC: ABNORMAL
GLUCOSE BLD STRIP.AUTO-MCNC: 174 MG/DL (ref 65–117)
GLUCOSE BLD STRIP.AUTO-MCNC: 194 MG/DL (ref 65–117)
GLUCOSE BLD STRIP.AUTO-MCNC: 195 MG/DL (ref 65–117)
GLUCOSE BLD STRIP.AUTO-MCNC: 227 MG/DL (ref 65–117)
SERVICE CMNT-IMP: ABNORMAL

## 2021-10-14 PROCEDURE — 65270000029 HC RM PRIVATE

## 2021-10-14 PROCEDURE — 74011250637 HC RX REV CODE- 250/637: Performed by: INTERNAL MEDICINE

## 2021-10-14 PROCEDURE — 82962 GLUCOSE BLOOD TEST: CPT

## 2021-10-14 PROCEDURE — 74011636637 HC RX REV CODE- 636/637: Performed by: INTERNAL MEDICINE

## 2021-10-14 PROCEDURE — 74011250636 HC RX REV CODE- 250/636: Performed by: NURSE PRACTITIONER

## 2021-10-14 PROCEDURE — 74011250637 HC RX REV CODE- 250/637: Performed by: SURGERY

## 2021-10-14 PROCEDURE — 94760 N-INVAS EAR/PLS OXIMETRY 1: CPT

## 2021-10-14 RX ORDER — CEPHALEXIN 250 MG/1
500 CAPSULE ORAL EVERY 6 HOURS
Status: DISCONTINUED | OUTPATIENT
Start: 2021-10-14 | End: 2021-10-16 | Stop reason: HOSPADM

## 2021-10-14 RX ADMIN — INSULIN LISPRO 2 UNITS: 100 INJECTION, SOLUTION INTRAVENOUS; SUBCUTANEOUS at 08:20

## 2021-10-14 RX ADMIN — PRAVASTATIN SODIUM 20 MG: 10 TABLET ORAL at 21:41

## 2021-10-14 RX ADMIN — AMLODIPINE BESYLATE 5 MG: 5 TABLET ORAL at 08:17

## 2021-10-14 RX ADMIN — METOPROLOL TARTRATE 12.5 MG: 25 TABLET, FILM COATED ORAL at 08:17

## 2021-10-14 RX ADMIN — POLYETHYLENE GLYCOL 3350 17 G: 17 POWDER, FOR SOLUTION ORAL at 08:19

## 2021-10-14 RX ADMIN — METOPROLOL TARTRATE 12.5 MG: 25 TABLET, FILM COATED ORAL at 21:41

## 2021-10-14 RX ADMIN — Medication 10 ML: at 06:34

## 2021-10-14 RX ADMIN — CEPHALEXIN 500 MG: 250 CAPSULE ORAL at 18:24

## 2021-10-14 RX ADMIN — ACETAMINOPHEN 650 MG: 325 TABLET ORAL at 11:58

## 2021-10-14 RX ADMIN — ENOXAPARIN SODIUM 40 MG: 40 INJECTION SUBCUTANEOUS at 14:52

## 2021-10-14 RX ADMIN — INSULIN LISPRO 3 UNITS: 100 INJECTION, SOLUTION INTRAVENOUS; SUBCUTANEOUS at 11:59

## 2021-10-14 RX ADMIN — Medication 1 AMPULE: at 21:41

## 2021-10-14 RX ADMIN — Medication 10 ML: at 14:50

## 2021-10-14 RX ADMIN — INSULIN LISPRO 2 UNITS: 100 INJECTION, SOLUTION INTRAVENOUS; SUBCUTANEOUS at 18:24

## 2021-10-14 RX ADMIN — Medication 1 AMPULE: at 08:18

## 2021-10-14 RX ADMIN — CEPHALEXIN 500 MG: 250 CAPSULE ORAL at 23:07

## 2021-10-14 RX ADMIN — Medication 10 ML: at 21:43

## 2021-10-14 NOTE — PROGRESS NOTES
Transition of Care Plan:     RUR: 10% \"low risk\"  Disposition: Home w/ follow-up appts  Follow up appointments: PCP, Surgery  DME needed: None  Transportation at Discharge: Wife  [address] or means to access home:  Yes  IM Medicare Letter: Needed at d/c  Is patient a BCPI-A Bundle:  No                    If yes, was Bundle Letter given?:     Caregiver Contact: Wife- Yadira Pratt, [phone]   Discharge Caregiver contacted prior to discharge? CM reviewed chart. Discharge plan discussed during IDR. Pt is not medically stable for d/c at this time; needs return of bowel function. Anticipate pt to d/c home 10/15 w/ his family. Will continue to follow.     Brayan Verdin, MSW  Care Management

## 2021-10-14 NOTE — PROGRESS NOTES
End of Shift Note    Bedside shift change report given to  (oncoming nurse) by Ernestine Baird RN (offgoing nurse). Report included the following information SBAR, Kardex, Intake/Output, MAR and Recent Results    Shift worked:  7A-3P     Shift summary and any significant changes:    Patient walked in the hallway x3. Patient sat up in the chair most of the day. Changed drsg on sacrum. Wife at bedside. Administered miralax, patient passing gas, no BM today     Concerns for physician to address: None      Zone phone for oncoming shift:  [phone]       Activity:  Activity Level: Up ad gissel  Number times ambulated in hallways past shift: 3  Number of times OOB to chair past shift: 3    Cardiac:   Cardiac Monitoring: No      Cardiac Rhythm: Atrial Flutter    Access:   Current line(s): PIV     Genitourinary:   Urinary status: voiding    Respiratory:   O2 Device: None (Room air)  Chronic home O2 use?: NO  Incentive spirometer at bedside: YES  Actual Volume (ml): 1000 ml  GI:  Last Bowel Movement Date: 10/11/21  Current diet:  ADULT DIET Regular; 4 carb choices (60 gm/meal); Low Fiber  Passing flatus: YES  Tolerating current diet: YES       Pain Management:   Patient states pain is manageable on current regimen: YES    Skin:  Arvin Score: 20  Interventions: increase time out of bed    Patient Safety:  Fall Score:  Total Score: 2  Interventions: pt to call before getting OOB  High Fall Risk: Yes    Length of Stay:  Expected LOS: 5d 16h  Actual LOS: Tasha William RN

## 2021-10-14 NOTE — PROGRESS NOTES
End of Shift Note    Bedside shift change report given to CHARISSE Chandler (oncoming nurse) by Irina Claudio RN (offgoing nurse). Report included the following information SBAR, Kardex, Intake/Output, MAR and Recent Results    Shift worked:  1448-3525     Shift summary and any significant changes:    Pt rested well throughout the night. Ice pack to abdomen. Pt up ad gissel in room. Pt is passing gas, still no BM. Concerns for physician to address:       Zone phone for oncoming shift:   [phone]       Activity:  Activity Level: Up ad gissel  Number times ambulated in hallways past shift: 0  Number of times OOB to chair past shift: 1    Cardiac:   Cardiac Monitoring: No      Cardiac Rhythm: Atrial Flutter    Access:   Current line(s): central line     Genitourinary:   Urinary status: voiding    Respiratory:   O2 Device: None (Room air)  Chronic home O2 use?: NO  Incentive spirometer at bedside: YES  Actual Volume (ml): 2500 ml  GI:  Last Bowel Movement Date: 10/11/21  Current diet:  ADULT DIET Regular; 4 carb choices (60 gm/meal); Low Fiber  Passing flatus: YES  Tolerating current diet: YES       Pain Management:   Patient states pain is manageable on current regimen: YES    Skin:  Arvin Score: 20  Interventions: float heels and increase time out of bed    Patient Safety:  Fall Score:  Total Score: 2  Interventions: gripper socks  High Fall Risk: Yes    Length of Stay:  Expected LOS: 5d 16h  Actual LOS: [address] RN

## 2021-10-14 NOTE — PROGRESS NOTES
Surgery NP Progress Note    Andrea Zuniga  [id number]  male  79 y.o.  1940    s/p COLON RESECTION SIGMOID LEFT ROBOTIC ASSISTED, on 10/12/2021   Pt seen with Dr. Caraballo Oz:   Active Problems:    Atrial flutter, paroxysmal ([address] (10/9/2021)      Rectal bleed (10/9/2021)      Colonic mass (10/9/2021)      Expected post-op progress. Plan/Recommendations/Medical Decision Making:     - Mobilize with nursing and OOB to chair for meals  - Continue diet  - Pain management- Continue current pain control methods.   - VTE Prophylaxis: Lovenox  - Discharge planning- home tomorrow, depending on tolerance of diet and return of bowel function.   - Oncology following and will see patient after d/c. Subjective:     Patient reports pain in the abdomen. He is avoiding pain medications. He has been walking and the pain is tolerable with this. He is passing gas but has not had a bowel movement. Tolerating diet without nausea/vomiting. Objective:     Blood pressure (!) 141/71, pulse 65, temperature 98.4 °F (36.9 °C), resp. rate 18, height 5' 8\" (1.727 m), weight 118.8 kg (262 lb), SpO2 98 %. Temp (24hrs), Av.3 °F (36.8 °C), Min:98.1 °F (36.7 °C), Max:98.4 °F (36.9 °C)      Pt resting in chair. NAD   Incisions CDI. Expected bruising. Abd round, soft and appropriately tender. SCDs for mechanical DVT proph while in bed     Body mass index is 39.84 kg/m². Reference: BMI greater than 30 is classified as obesity and greater than 40 is classified as morbid obesity.      Last 3 Recorded Weights in this Encounter    10/09/21 0952 10/11/21 1113   Weight: 119.2 kg (262 lb 12.6 oz) 118.8 kg (262 lb)         Sonja Smith NP   MSN, APRN, FNP-C, CWOCN-AP    10/14/21

## 2021-10-14 NOTE — PROGRESS NOTES
Hematology Oncology Follow Up Note      Reason for consult: colon mass    Admitting Diagnosis: Rectal bleed [K62.5]  Colonic mass [K63.89]  Atrial flutter ([address] [I48.92]    History: Sarah Thorne is a  79y.o. year old seen in consultation at the request of Dr. Burgess for colon mass. He has a h/o aflutter, rectal bleed, DM2, HTN, HLD, colon mass, former smoker quit 1965, prostate cancer s/p prostatectomy 25 years ago, obesity. Admitted on 10/9/21 for acute lower GI bleed x 3 weeks. His last colonoscopy prior to admission was in 2019 with Dr. Burgess, not a good prep. CTA/P was done and showed eccentric masslike wall thickening in the mid-sigmoid colon 5.5 x 2.7 x 4.1cm, highly suspicious for malignancy. He went for colonoscopy 10/11/21 which demonstrated a fungating, large, malignant appearing mass in the distal sigmoid colon, 20cm from anal verge, covering 50% of the circumference. Multiple biopsies were taken. Path was invasive adenocarcinoma. He states he has intermittent lower abdominal pain and fatigue. He is able to do light chores around the house but noticed easy fatigability lately. He has a normal appetite. No weight loss. He has been constipated his whole life but for a few weeks he had diarrhea. Then he had BRBPR and presented here. Wife at bedside to help provide history. Interval history[de-identified]   Path now back, invasive adenocarcinoma. Doing well. No complaints. Has been walking 20 min at a time. Wife at bedside. Hospital course:   -10/11/21: Colonoscopy found a mass in sigmoid colon. Dr. Elizabeth Galindo. Colon is again not well prepped with very poor mucosal views. A fungating, large, malignant appearing mass is noted in the distal sigmoid colon, starting at 20 cm from the anal verge. It covers 50+ % of the circumference  -10/12/21: Sigmoid colectomy with Dr. El Austin    Imaging:  10//9/21 CT A/P: masslike thickening in sigmoid colon 5.5cm in size.    10/11/21 CT Chest: Multiple bilateral pulmonary nodules measuring 4 mm or less in size. Recommend follow-up CT in 6 months or per treatment protocol. Path: from biopsy: Rectosigmoid colon, mass; biopsy:        Invasive adenocarcinoma, well-to moderately differentiated.                  Labs:    Recent Results (from the past 24 hour(s))   GLUCOSE, POC    Collection Time: 10/13/21 11:48 AM   Result Value Ref Range    Glucose (POC) 294 (H) 65 - 117 mg/dL    Performed by Pat Silva PCT    GLUCOSE, POC    Collection Time: 10/13/21  3:55 PM   Result Value Ref Range    Glucose (POC) 272 (H) 65 - 117 mg/dL    Performed by Pat Silva PCT    GLUCOSE, POC    Collection Time: 10/13/21  9:36 PM   Result Value Ref Range    Glucose (POC) 207 (H) 65 - 117 mg/dL    Performed by Macarena Rodriguez    GLUCOSE, POC    Collection Time: 10/14/21  7:11 AM   Result Value Ref Range    Glucose (POC) 195 (H) 65 - 117 mg/dL    Performed by Annie Wilkes (PCT)        History:  Past Medical History:   Diagnosis Date    Arthritis     At risk for sleep apnea 12/18/2019    Stop Bang 5     Cancer [facility])     prostate cancer    Diabetes ([facility]     Elevated cholesterol     Hypertension       Past Surgical History:   Procedure Laterality Date    COLONOSCOPY N/A 9/27/2017    COLONOSCOPY performed by Divina Stacy MD at \[facility]"" ENDOSCOPY    COLONOSCOPY N/A 12/30/2019    COLONOSCOPY WITH POLYPECTOMY performed by Carla Cueto MD at [address] COLONOSCOPY N/A 10/11/2021    COLONOSCOPY performed by Carla Cueto MD at \[facility]\"" ENDOSCOPY    HX CATARACT REMOVAL Left 01/08/2020    HX HERNIA REPAIR      HX HIP REPLACEMENT Right     HX KNEE REPLACEMENT Right     HX PROSTATECTOMY        Prior to Admission medications    Medication Sig Start Date End Date Taking? Authorizing Provider   pravastatin (PRAVACHOL) 20 mg tablet Take 20 mg by mouth nightly. Yes Provider, Historical   multivitamin (ONE A DAY) tablet Take 1 Tab by mouth daily.    Yes Provider, Historical   metFORMIN (GLUCOPHAGE) 1,000 mg tablet Take 1,000 mg by mouth two (2) times a day. Yes Provider, Historical   aspirin delayed-release 81 mg tablet Take 81 mg by mouth daily. Yes Provider, Historical   Omega-3-DHA-EPA-Fish Oil (FISH OIL) 1,000 mg (120 mg-180 mg) cap Take 1 Tab by mouth daily. Yes Provider, Historical   moxifloxacin (VIGAMOX) 0.5 % ophthalmic solution Administer 1 Drop to left eye three (3) times daily. Patient not taking: Reported on 10/13/2021    Provider, Historical   amLODIPine (NORVASC) 5 mg tablet Take 5 mg by mouth daily. Provider, Historical   losartan (COZAAR) 100 mg tablet Take 100 mg by mouth daily. Provider, Historical   acetaminophen (TYLENOL EXTRA STRENGTH) 500 mg tablet Take 1,000 mg by mouth every six (6) hours as needed for Pain.   Patient not taking: Reported on 10/13/2021    Provider, Historical     No Known Allergies   Social History     Tobacco Use    Smoking status: Former Smoker     Packs/day: 2.00     Years: 15.00     Pack years: 30.00     Quit date: 1965     Years since quittin.0    Smokeless tobacco: Never Used   Substance Use Topics    Alcohol use: No      Family History   Problem Relation Age of Onset    Cancer Mother         ovarian    Stroke Father         Current Medications:  Current Facility-Administered Medications   Medication Dose Route Frequency    enoxaparin (LOVENOX) injection 40 mg  40 mg SubCUTAneous Q24H    oxyCODONE IR (ROXICODONE) tablet 5 mg  5 mg Oral Q4H PRN    oxyCODONE IR (ROXICODONE) tablet 10 mg  10 mg Oral Q4H PRN    HYDROmorphone (DILAUDID) injection 0.5 mg  0.5 mg IntraVENous Q3H PRN    alcohol 62% (NOZIN) nasal  1 Ampule  1 Ampule Topical Q12H    0.9% sodium chloride infusion  50 mL/hr IntraVENous CONTINUOUS    sodium chloride (NS) flush 5-40 mL  5-40 mL IntraVENous Q8H    sodium chloride (NS) flush 5-40 mL  5-40 mL IntraVENous PRN    acetaminophen (TYLENOL) tablet 650 mg  650 mg Oral Q6H PRN    Or    acetaminophen (TYLENOL) suppository 650 mg  650 mg Rectal Q6H PRN    polyethylene glycol (MIRALAX) packet 17 g  17 g Oral DAILY    bisacodyL (DULCOLAX) tablet 5 mg  5 mg Oral DAILY PRN    promethazine (PHENERGAN) tablet 12.5 mg  12.5 mg Oral Q6H PRN    Or    ondansetron (ZOFRAN) injection 4 mg  4 mg IntraVENous Q6H PRN    glucose chewable tablet 16 g  4 Tablet Oral PRN    dextrose (D50W) injection syrg 12.5-25 g  25-50 mL IntraVENous PRN    glucagon (GLUCAGEN) injection 1 mg  1 mg IntraMUSCular PRN    insulin lispro (HUMALOG) injection   SubCUTAneous AC&HS    metoprolol tartrate (LOPRESSOR) tablet 12.5 mg  12.5 mg Oral Q12H    amLODIPine (NORVASC) tablet 5 mg  5 mg Oral DAILY    pravastatin (PRAVACHOL) tablet 20 mg  20 mg Oral QHS    labetaloL (NORMODYNE;TRANDATE) injection 20 mg  20 mg IntraVENous Q3H PRN         Review of Systems:  10 point review of systems otherwise negative except as per HPI    Patient Vitals for the past 12 hrs:   Temp Pulse Resp BP SpO2   10/14/21 0803 98.4 °F (36.9 °C) 65 18 (!) 141/71 98 %   10/14/21 0412 98.2 °F (36.8 °C) 70 16 136/83 95 %   10/14/21 0033 98.4 °F (36.9 °C) 66 16 (!) 147/80 97 %       Physical Exam:  Constitutional Alert, cooperative, oriented. Mood and affect appropriate. Appears close to chronological age. Well nourished. Well developed. Head Normocephalic   Eyes Conjunctivae and sclerae are clear and without icterus. ENMT No oral exudates, ulcers, masses, thrush or mucositis. Neck Supple without masses or thyromegaly. Hematologic/Lymphatic No petechiae or purpura. No tender or palpable lymph nodes in the cervical, supraclavicular, axillary or inguinal area. Respiratory Lungs are clear to auscultation without rhonchi or wheezing. Cardiovascular Regular rate and rhythm of heart without murmurs, gallops or rubs. Chest / Line Site Chest is symmetric with no chest wall deformities.    Abdomen Non-tender, non-distended, no masses, ascites or hepatosplenomegaly. No guarding or rebound tenderness. abdominal incisions are clean without surrounding drainage or erythema    Musculoskeletal No tenderness or swelling, normal range of motion without obvious weakness. Extremities No visible deformities, no cyanosis, clubbing or edema. Skin No rashes. Neurologic No sensory or motor deficits noted. Psychiatric Alert. Coherent speech. Verbalizes understanding of our discussions today. Impression:   80 yo M with h/o aflutter, rectal bleed, DM2, HTN, HLD, colon mass, former smoker quit 1965, prostate cancer s/p prostatectomy 25 years ago, obesity. I was consulted due to a colon mass. Colon cancer  Invasive adenocarcinoma. S/p sigmoid hemicolectomy on 10/12/21. CT Chest showed multiple indeterminate pulmonary nodules, 4mm or smaller in size. CEA 3.1    He can f/u with me in the office after surgery. Will review the path at that time. I have him set up in my clinic in mid November. Pulmonary nodules, indeterminate   CT showed Multiple bilateral pulmonary nodules measuring 4 mm or less in size. Recommend follow-up CT in 6 months or per treatment protocol. Thank you for allowing me to participate in the care of this patient. I will sign off. Please don't hesitate to contact me if questions arise. I will ask our office to get him set up to see me in clinic.      Jim Cohen MD  [facility] INPATIENT REHABILITATION office  [address] [facility], [facility] [address]  Phone [phone]  Fax [phone]

## 2021-10-14 NOTE — PROGRESS NOTES
Problem: Falls - Risk of  Goal: *Absence of Falls  Description: Document Christian Linda Fall Risk and appropriate interventions in the flowsheet. Outcome: Progressing Towards Goal  Note: Fall Risk Interventions:            Medication Interventions: Teach patient to arise slowly         History of Falls Interventions: Door open when patient unattended         Problem: Patient Education: Go to Patient Education Activity  Goal: Patient/Family Education  Outcome: Progressing Towards Goal     Problem: Diabetes Self-Management  Goal: *Disease process and treatment process  Description: Define diabetes and identify own type of diabetes; list 3 options for treating diabetes. Outcome: Progressing Towards Goal  Goal: *Incorporating nutritional management into lifestyle  Description: Describe effect of type, amount and timing of food on blood glucose; list 3 methods for planning meals. Outcome: Progressing Towards Goal  Goal: *Incorporating physical activity into lifestyle  Description: State effect of exercise on blood glucose levels. Outcome: Progressing Towards Goal  Goal: *Developing strategies to promote health/change behavior  Description: Define the ABC's of diabetes; identify appropriate screenings, schedule and personal plan for screenings. Outcome: Progressing Towards Goal  Goal: *Using medications safely  Description: State effect of diabetes medications on diabetes; name diabetes medication taking, action and side effects. Outcome: Progressing Towards Goal  Goal: *Monitoring blood glucose, interpreting and using results  Description: Identify recommended blood glucose targets  and personal targets. Outcome: Progressing Towards Goal  Goal: *Prevention, detection, treatment of acute complications  Description: List symptoms of hyper- and hypoglycemia; describe how to treat low blood sugar and actions for lowering  high blood glucose level.   Outcome: Progressing Towards Goal  Goal: *Prevention, detection and treatment of chronic complications  Description: Define the natural course of diabetes and describe the relationship of blood glucose levels to long term complications of diabetes.   Outcome: Progressing Towards Goal  Goal: *Developing strategies to address psychosocial issues  Description: Describe feelings about living with diabetes; identify support needed and support network  Outcome: Progressing Towards Goal  Goal: *Insulin pump training  Outcome: Progressing Towards Goal  Goal: *Sick day guidelines  Outcome: Progressing Towards Goal  Goal: *Patient Specific Goal (EDIT GOAL, INSERT TEXT)  Outcome: Progressing Towards Goal     Problem: Patient Education: Go to Patient Education Activity  Goal: Patient/Family Education  Outcome: Progressing Towards Goal

## 2021-10-15 LAB
GLUCOSE BLD STRIP.AUTO-MCNC: 182 MG/DL (ref 65–117)
GLUCOSE BLD STRIP.AUTO-MCNC: 194 MG/DL (ref 65–117)
GLUCOSE BLD STRIP.AUTO-MCNC: 207 MG/DL (ref 65–117)
GLUCOSE BLD STRIP.AUTO-MCNC: 251 MG/DL (ref 65–117)
SERVICE CMNT-IMP: ABNORMAL

## 2021-10-15 PROCEDURE — 74011250637 HC RX REV CODE- 250/637: Performed by: SURGERY

## 2021-10-15 PROCEDURE — 74011250636 HC RX REV CODE- 250/636: Performed by: NURSE PRACTITIONER

## 2021-10-15 PROCEDURE — 82962 GLUCOSE BLOOD TEST: CPT

## 2021-10-15 PROCEDURE — 94760 N-INVAS EAR/PLS OXIMETRY 1: CPT

## 2021-10-15 PROCEDURE — 65270000029 HC RM PRIVATE

## 2021-10-15 PROCEDURE — 74011250637 HC RX REV CODE- 250/637: Performed by: INTERNAL MEDICINE

## 2021-10-15 PROCEDURE — 74011636637 HC RX REV CODE- 636/637: Performed by: INTERNAL MEDICINE

## 2021-10-15 RX ORDER — CEPHALEXIN 500 MG/1
500 CAPSULE ORAL EVERY 6 HOURS
Qty: 20 CAPSULE | Refills: 0 | Status: SHIPPED | OUTPATIENT
Start: 2021-10-15 | End: 2021-10-20

## 2021-10-15 RX ORDER — METOPROLOL TARTRATE 25 MG/1
12.5 TABLET, FILM COATED ORAL EVERY 12 HOURS
Qty: 30 TABLET | Refills: 0 | Status: SHIPPED | OUTPATIENT
Start: 2021-10-15 | End: 2021-11-14

## 2021-10-15 RX ORDER — POLYETHYLENE GLYCOL 3350 17 G/17G
17 POWDER, FOR SOLUTION ORAL DAILY
Qty: 15 PACKET | Refills: 0 | Status: SHIPPED | OUTPATIENT
Start: 2021-10-16 | End: 2021-10-31

## 2021-10-15 RX ADMIN — Medication 10 ML: at 14:00

## 2021-10-15 RX ADMIN — Medication 10 ML: at 21:47

## 2021-10-15 RX ADMIN — PRAVASTATIN SODIUM 20 MG: 10 TABLET ORAL at 21:31

## 2021-10-15 RX ADMIN — CEPHALEXIN 500 MG: 250 CAPSULE ORAL at 05:31

## 2021-10-15 RX ADMIN — AMLODIPINE BESYLATE 5 MG: 5 TABLET ORAL at 08:39

## 2021-10-15 RX ADMIN — INSULIN LISPRO 3 UNITS: 100 INJECTION, SOLUTION INTRAVENOUS; SUBCUTANEOUS at 21:46

## 2021-10-15 RX ADMIN — METOPROLOL TARTRATE 12.5 MG: 25 TABLET, FILM COATED ORAL at 08:39

## 2021-10-15 RX ADMIN — CEPHALEXIN 500 MG: 250 CAPSULE ORAL at 18:40

## 2021-10-15 RX ADMIN — METOPROLOL TARTRATE 12.5 MG: 25 TABLET, FILM COATED ORAL at 21:31

## 2021-10-15 RX ADMIN — INSULIN LISPRO 3 UNITS: 100 INJECTION, SOLUTION INTRAVENOUS; SUBCUTANEOUS at 07:30

## 2021-10-15 RX ADMIN — Medication 1 AMPULE: at 21:00

## 2021-10-15 RX ADMIN — INSULIN LISPRO 2 UNITS: 100 INJECTION, SOLUTION INTRAVENOUS; SUBCUTANEOUS at 18:40

## 2021-10-15 RX ADMIN — Medication 1 AMPULE: at 08:41

## 2021-10-15 RX ADMIN — POLYETHYLENE GLYCOL 3350 17 G: 17 POWDER, FOR SOLUTION ORAL at 08:39

## 2021-10-15 RX ADMIN — CEPHALEXIN 500 MG: 250 CAPSULE ORAL at 12:16

## 2021-10-15 RX ADMIN — Medication 10 ML: at 05:31

## 2021-10-15 RX ADMIN — INSULIN LISPRO 2 UNITS: 100 INJECTION, SOLUTION INTRAVENOUS; SUBCUTANEOUS at 12:16

## 2021-10-15 RX ADMIN — ENOXAPARIN SODIUM 40 MG: 40 INJECTION SUBCUTANEOUS at 16:27

## 2021-10-15 RX ADMIN — ACETAMINOPHEN 650 MG: 325 TABLET ORAL at 18:45

## 2021-10-15 NOTE — PROGRESS NOTES
Hospitalist Progress Note    NAME: Romell Cabot   :  1940   MRN:  [id number]       Assessment / Plan:  Sigmoid colon mass POA  Presents with recent rectal bleeding x 3 weeks, no weight loss  S/p sigmoid colectomy POD 1  S/p  Colonoscopyshowing fungating, large mass with malignant appearance  Pain well controlled  Oncology consulted with plan for OP follow up pending path from surgery  Advance diet as per GI and CRS     New onset atrial flutter POA  Asymptomatic, found on admit EKG  Currently rate controlled  Monitor on telemetry  Low-dose beta-blocker  Not candidate for anticoagulation    UTI  -UA 50-100wbcs, large LE  -admits urinary frequency and dysuria  -may be contributing to hyperglycemia  -cultures +proteus mirabilis. Only 75k CFU but given symps will treat  -start keflex  -needs definitive treatment for urinary stones, which is planned upon OP follow-up with urology  -will be ok for DC on oral abx     Right severe hydronephrosis, likely chronic POA  Right nephrolithiasis POA  Creatinine 1.12, creatinine clearance 63.8  Chronic urinary complaints per the patient   urology follow-up as outpatient     Acute to subacute fracture of the anterior superior T12 vertebral body POA  Noted on CT abdomen done today  Not a lot of back pain  PRN pain meds     Diabetes mellitus type 2 POA  Home regimen: metformin  Change diet to diabetic diet, BS in 200s  Hold metformin, add lantus as needed  Sliding scale insulin  HbA1C 6.8     Essential HTN POA  Hyperlipidemia POA  Hold  ASA with bleeding  Continue statin  Continue home BP regimen  Norvasc, denied taking Cozaar  And Norvasc, add low-dose metoprolol as above  PRN labetalol      Subjective:     Chief Complaint / Reason for Physician Visit  POD 2 sigmoid colectomy. Passing gas, no bm yet. Denies abd pain, n/v/, or f/c    Discussed with RN events overnight.      Review of Systems:  Symptom Y/N Comments  Symptom Y/N Comments   Fever/Chills n   Chest Pain n    Poor Appetite n   Edema n    Cough n   Abdominal Pain n    Sputum n   Joint Pain     SOB/ROJO n   Pruritis/Rash     Nausea/vomit n   Tolerating PT/OT     Diarrhea n   Tolerating Diet y    Constipation n   Other       Could NOT obtain due to:      Objective:     VITALS:   Last 24hrs VS reviewed since prior progress note. Most recent are:  Patient Vitals for the past 24 hrs:   Temp Pulse Resp BP SpO2   10/14/21 2024 98.1 °F (36.7 °C) 73 17 (!) 147/72 98 %   10/14/21 1520 97.9 °F (36.6 °C) 74 18 (!) 144/69 100 %   10/14/21 1106 98.4 °F (36.9 °C) 71 18 (!) 142/72 97 %   10/14/21 0803 98.4 °F (36.9 °C) 65 18 (!) 141/71 98 %   10/14/21 0412 98.2 °F (36.8 °C) 70 16 136/83 95 %   10/14/21 0033 98.4 °F (36.9 °C) 66 16 (!) 147/80 97 %       Intake/Output Summary (Last 24 hours) at 10/14/2021 2308  Last data filed at 10/14/2021 1455  Gross per 24 hour   Intake 480 ml   Output 600 ml   Net -120 ml        I had a face to face encounter and independently examined this patient on 10/14/2021, as outlined below:  PHYSICAL EXAM:  General: WD, WN. Alert, cooperative, no acute distress    EENT:  EOMI. Anicteric sclerae. MMM  Resp:  CTA bilaterally, no wheezing or rales. No accessory muscle use  CV:  Regular  rhythm,  No edema  GI:  Soft, Non distended, Non tender. +Bowel sounds  Neurologic:  Alert and oriented X 3, normal speech,   Psych:   Good insight. Not anxious nor agitated  Skin:  No rashes.   No jaundice    Reviewed most current lab test results and cultures  YES  Reviewed most current radiology test results   YES  Review and summation of old records today    NO  Reviewed patient's current orders and MAR    YES  PMH/SH reviewed - no change compared to H&P  ________________________________________________________________________  Care Plan discussed with:    Comments   Patient x    Family  x    RN x    Care Manager     Consultant                       x Multidiciplinary team rounds were held today with , nursing, pharmacist and clinical coordinator. Patient's plan of care was discussed; medications were reviewed and discharge planning was addressed. ________________________________________________________________________  Total NON critical care TIME:  30   Minutes    Total CRITICAL CARE TIME Spent:   Minutes non procedure based      Comments   >50% of visit spent in counseling and coordination of care x    ________________________________________________________________________  Adriana Kumar DO     Procedures: see electronic medical records for all procedures/Xrays and details which were not copied into this note but were reviewed prior to creation of Plan. LABS:  I reviewed today's most current labs and imaging studies.   Pertinent labs include:  Recent Labs     10/13/21  0327 10/12/21  0103   WBC 8.6 5.6   HGB 12.2 12.7   HCT 36.2* 38.6    276     Recent Labs     10/13/21  0327 10/12/21  0103    137   K 4.0 3.4*    108   CO2 27 28   * 133*   BUN 11 12   CREA 1.10 0.88   CA 8.5 8.6       Signed: Adriana Kumar DO

## 2021-10-15 NOTE — DISCHARGE INSTRUCTIONS
Georgina Doll MD, FACS  Dante Rubalcava. MD Italia Wong MD Aniceto Chad, MD Aris Leatherwood, MD    [facility] Discharge Instructions for Colon Surgery Patients    1. Diagnosis: bowel resection  2. Low fiber diet. 3. Do not drive while taking narcotic pain medications. 4. Leave surgical glue on incision. It may fall off on it's own. 5. May take a shower. 6. No lifting any objects weighing more than 15 pounds. Do not do any housework, such as vacuuming, scrubbing, etc for at least a month. 7. When you get tired during the day, take naps, as you need your rest.  8. Multiple bowel movements are normal each day for a while. 9. May walk as desired. May go up and down stairs. 10. Take pain medication as prescribed: (NO DRIVING WHILE ON PAIN MEDICATIONS). 11.  See me in the office in 10-14 days. Call as soon as discharged for an appointment 21 [phone]. IF SURGERY INVOLVED AN OSTOMY BAG, PLEASE BRING YOUR SUPPLIES TO YOUR 1ST VISIT! 12.  Call the Exchange [phone], if you have any questions or problems after office hours.             Bl[address] S[address], [address]  [facility], [address]  [phone] · Fax [phone] [address] [facility]  [phone] · Fax [phone], [address]  [place], [address] [place] [address]  [phone] · Fax [phone]

## 2021-10-15 NOTE — PROGRESS NOTES
Spiritual Care Assessment/Progress Note  [facility]      NAME: Tian Mclain      MRN: [id number]  AGE: 79 y.o. SEX: male  Bahai Affiliation: Hoahaoism   Language: English     10/15/2021     Total Time (in minutes): 19     Spiritual Assessment begun in [facility] 3 SURG TELE through conversation with:         [x]Patient        [x] Family    [] Friend(s)        Reason for Consult: Initial/Spiritual assessment, patient floor     Spiritual beliefs: (Please include comment if needed)     [x] Identifies with a michael tradition: Quaker     [x] Supported by a michael community: Johnathan Aragon 50              [] Claims no spiritual orientation:           [] Seeking spiritual identity:                [] Adheres to an individual form of spirituality:           [] Not able to assess:                           Identified resources for coping:      [x] Prayer                               [] Music                  [] Guided Imagery     [x] Family/friends                 [] Pet visits     [] Devotional reading                         [] Unknown     [] Other:                                               Interventions offered during this visit: (See comments for more details)    Patient Interventions: Affirmation of emotions/emotional suffering, Affirmation of michael, Initial visit, Initial/Spiritual assessment, patient floor, Coping skills reviewed/reinforced, Catharsis/review of pertinent events in supportive environment, Life review/legacy, Prayer (assurance of)     Family/Friend(s):  Affirmation of emotions/emotional suffering, Affirmation of michael, Initial Assessment, Catharsis/review of pertinent events in supportive environment, Coping skills reviewed/reinforced, Prayer (assurance of)     Plan of Care:     [x] Support spiritual and/or cultural needs    [] Support AMD and/or advance care planning process      [] Support grieving process   [] Coordinate Rites and/or Rituals    [] Coordination with community clergy   [] No spiritual needs identified at this time   [] Detailed Plan of Care below (See Comments)  [] Make referral to Music Therapy  [] Make referral to Pet Therapy     [] Make referral to Addiction services  [] Make referral to [facility]  [] Make referral to Spiritual Care Partner  [] No future visits requested        [x] Follow up upon further referrals     Comments:     Initial Spiritual Care Assessment visit for Mr. Anup Oliva in 3214. Patient was alert, his wife Mrs. Anup Oliva was at bedside supportively. Patient shared his current medical status and medical journey. They were happy by hearing his colon cancer is stage one and early detected, had good news from the doctor, they are expecting going home by tomorrow. They have been attending [place] [address] for 37 years, have strong spiritual support from the Islam, and rest of their family. They believe God's control and sovereignty firmly, confessed they are in God's hand.  affirmed their sincere michael as a coping resource, encouraged them to stay in their michael. They were thankful for 's visit,  advised of ongoing care availability.        [id number] [facility] Fe Dugan,Th.M, [address] Provider   Paging Service [phone])

## 2021-10-15 NOTE — PROGRESS NOTES
Surgery NP Progress Note    Emma Notice  [id number]  male  79 y.o.  1940    s/p COLON RESECTION SIGMOID LEFT ROBOTIC ASSISTED, on 10/12/2021   Pt seen with Dr. Viki Jiang:   Active Problems:    Atrial flutter, paroxysmal ([address] (10/9/2021)      Rectal bleed (10/9/2021)      Colonic mass (10/9/2021)      Expected post-op progress. Plan/Recommendations/Medical Decision Making:     - Mobilize with nursing and OOB to chair for meals  - Continue diet  - Pain management- Continue current pain control methods.   - VTE Prophylaxis: Lovenox  - Discharge planning- home when having return of bowel function  - Oncology will see patient after d/c. Subjective:     Patient continues to improve. Tolerating diet. Passing flatus, no BM yet. Objective:     Blood pressure 135/75, pulse 72, temperature 98.4 °F (36.9 °C), resp. rate 16, height 5' 8\" (1.727 m), weight 118.8 kg (262 lb), SpO2 95 %. Temp (24hrs), Av.4 °F (36.9 °C), Min:97.9 °F (36.6 °C), Max:98.9 °F (37.2 °C)      Pt resting in chair. NAD   Incisions CDI. Expected bruising. Abd round, soft and appropriately tender. SCDs for mechanical DVT proph while in bed     Body mass index is 39.84 kg/m². Reference: BMI greater than 30 is classified as obesity and greater than 40 is classified as morbid obesity.      Last 3 Recorded Weights in this Encounter    10/09/21 0952 10/11/21 1113   Weight: 119.2 kg (262 lb 12.6 oz) 118.8 kg (262 lb)         Law Masters, NP   MSN, APRN, FNP-C, CWOCN-AP    10/15/21

## 2021-10-15 NOTE — PROGRESS NOTES
Comprehensive Nutrition Assessment    Type and Reason for Visit: Initial, RD nutrition re-screen/LOS    Nutrition Recommendations/Plan:   · Continue CCD/Low Fiber diet. · Please document % meals and supplements consumed in flowsheet I/O's under intake. Nutrition Assessment:     10/15: Chart reviewed; med noted for rectal bleed on admission with colon mass, s/p COLON RESECTION SIGMOID LEFT ROBOTIC ASSISTED. RD visited pt per LOS policy. Wife reports appetite is good and pt is tolerating >75% of meals. Pt and wife were about to walk the hallways. Awaiting return of bowel function prior to discharge. No acute nutrition needs identified at this time. Patient Vitals for the past 168 hrs:   % Diet Eaten   10/15/21 0804 76 - 100%     Last Weight Metric  Weight Loss Metrics 10/11/2021 12/17/2020 1/15/2020 1/8/2020 1/3/2020 12/30/2019 9/27/2017   Today's Wt 262 lb 253 lb 4.9 oz 253 lb 6 oz 253 lb 259 lb 0.7 oz 250 lb 251 lb 8 oz   BMI 39.84 kg/m2 38.52 kg/m2 38.53 kg/m2 37.36 kg/m2 39.39 kg/m2 36.92 kg/m2 38.24 kg/m2     Estimated Daily Nutrient Needs:  Energy (kcal): 2400 (BMR 1862 x 1.3AF); Weight Used for Energy Requirements: Current  Protein (g): 118 (1.0 g/kg bw); Weight Used for Protein Requirements: Current  Fluid (ml/day): 2400 ml/day; Method Used for Fluid Requirements: 1 ml/kcal    Nutrition Related Findings:  BM: 10/11; Labs: ; Meds: reviewed      Wounds:    Surgical incision       Current Nutrition Therapies:  ADULT DIET Regular; 4 carb choices (60 gm/meal); Low Fiber    Anthropometric Measures:  · Height:  5' 8\" (172.7 cm)  · Current Body Wt:  118.8 kg (261 lb 14.5 oz)     · Ideal Body Wt:  154 lbs:  170.1 %     · BMI Category:  Obese class 2 (BMI 35.0-39. 9)       Nutrition Diagnosis:   No nutrition diagnosis at this time     Nutrition Interventions:   Food and/or Nutrient Delivery: Continue current diet  Nutrition Education and Counseling: No recommendations at this time  Coordination of Nutrition Care: Continue to monitor while inpatient    Goals:  Maintain PO intake at least 75% of meals next 5-7 days       Nutrition Monitoring and Evaluation:   Behavioral-Environmental Outcomes: None identified  Food/Nutrient Intake Outcomes: Food and nutrient intake  Physical Signs/Symptoms Outcomes: Biochemical data, Weight, GI status, Skin    Discharge Planning:    Continue current diet     Electronically signed by Dulce Chun RD on 10/15/2021 at 2:53 PM

## 2021-10-15 NOTE — PROGRESS NOTES
End of Shift Note    Bedside shift change report given to [address], RN (oncoming nurse) by Kyleigh Goins RN (offgoing nurse). Report included the following information SBAR, Kardex, Intake/Output, MAR and Recent Results    Shift worked:  [id number]     Shift summary and any significant changes:    No significant changes during shift. Passing gas however no BM     Concerns for physician to address: None   Zone phone for oncoming shift:  [phone]     Activity:  Activity Level: Up ad gissel  Number times ambulated in hallways past shift: 1  Number of times OOB to chair past shift: 0    Cardiac:   Cardiac Monitoring: No      Cardiac Rhythm: Atrial Flutter    Access:   Current line(s): PIV     Genitourinary:   Urinary status: voiding    Respiratory:   O2 Device: None (Room air)  Chronic home O2 use?: NO  Incentive spirometer at bedside: YES  Actual Volume (ml): 1000 ml  GI:  Last Bowel Movement Date: 10/11/21  Current diet:  ADULT DIET Regular; 4 carb choices (60 gm/meal); Low Fiber  Passing flatus: YES  Tolerating current diet: YES       Pain Management:   Patient states pain is manageable on current regimen: YES    Skin:  Arvin Score: 20  Interventions: float heels and increase time out of bed    Patient Safety:  Fall Score:  Total Score: 2  Interventions: gripper socks and pt to call before getting OOB  High Fall Risk: Yes    Length of Stay:  Expected LOS: 5d 16h  Actual LOS: 6      Kyleigh Goins RN

## 2021-10-16 VITALS
TEMPERATURE: 98.3 F | HEART RATE: 91 BPM | DIASTOLIC BLOOD PRESSURE: 79 MMHG | HEIGHT: 68 IN | RESPIRATION RATE: 18 BRPM | SYSTOLIC BLOOD PRESSURE: 122 MMHG | OXYGEN SATURATION: 97 % | BODY MASS INDEX: 39.71 KG/M2 | WEIGHT: 262 LBS

## 2021-10-16 LAB
GLUCOSE BLD STRIP.AUTO-MCNC: 199 MG/DL (ref 65–117)
GLUCOSE BLD STRIP.AUTO-MCNC: 224 MG/DL (ref 65–117)
SERVICE CMNT-IMP: ABNORMAL
SERVICE CMNT-IMP: ABNORMAL

## 2021-10-16 PROCEDURE — 74011250637 HC RX REV CODE- 250/637: Performed by: SURGERY

## 2021-10-16 PROCEDURE — 74011250637 HC RX REV CODE- 250/637: Performed by: INTERNAL MEDICINE

## 2021-10-16 PROCEDURE — 82962 GLUCOSE BLOOD TEST: CPT

## 2021-10-16 PROCEDURE — 94760 N-INVAS EAR/PLS OXIMETRY 1: CPT

## 2021-10-16 PROCEDURE — 74011636637 HC RX REV CODE- 636/637: Performed by: INTERNAL MEDICINE

## 2021-10-16 RX ADMIN — INSULIN LISPRO 2 UNITS: 100 INJECTION, SOLUTION INTRAVENOUS; SUBCUTANEOUS at 09:03

## 2021-10-16 RX ADMIN — INSULIN LISPRO 3 UNITS: 100 INJECTION, SOLUTION INTRAVENOUS; SUBCUTANEOUS at 12:11

## 2021-10-16 RX ADMIN — AMLODIPINE BESYLATE 5 MG: 5 TABLET ORAL at 09:03

## 2021-10-16 RX ADMIN — Medication 1 AMPULE: at 09:03

## 2021-10-16 RX ADMIN — Medication 10 ML: at 06:12

## 2021-10-16 RX ADMIN — CEPHALEXIN 500 MG: 250 CAPSULE ORAL at 06:12

## 2021-10-16 RX ADMIN — CEPHALEXIN 500 MG: 250 CAPSULE ORAL at 00:46

## 2021-10-16 RX ADMIN — CEPHALEXIN 500 MG: 250 CAPSULE ORAL at 12:11

## 2021-10-16 RX ADMIN — POLYETHYLENE GLYCOL 3350 17 G: 17 POWDER, FOR SOLUTION ORAL at 09:03

## 2021-10-16 RX ADMIN — METOPROLOL TARTRATE 12.5 MG: 25 TABLET, FILM COATED ORAL at 09:03

## 2021-10-16 NOTE — PROGRESS NOTES
Transition of Care Plan:     RUR: 11% \"low risk\"  Disposition: Home w/ follow-up appts  Follow up appointments: PCP, Surgery, Cardio, Urology- details in AVS  DME needed: None  Transportation at Discharge: Wife; present at bedside  [place] or means to access home:  Yes  IM Medicare Letter: Signed & reviewed. .  Is patient a BCPI-A Bundle:  No                    If yes, was Bundle Letter given?:     Caregiver Contact: Wife- Radha Curtis, [phone]   Discharge Caregiver contacted prior to discharge? CM acknowledged discharge. CM reviewed pt's chart. CM met w/ pt & his wife at bedside to review [facility] plan for d/c re: home w/ follow-up appts. Pt & wife verbalized understanding & are agreeable to d/c. Wife present at bedside to transport home. Medicare pt has received, reviewed, and signed 2nd  letter informing them of their right to appeal the discharge. Signed copy has been placed on pt bedside chart. Care Management Interventions  PCP Verified by CM:  Yes  Mode of Transport at Discharge: Self (Spouse will transport )  Transition of Care Consult (CM Consult): Discharge Planning (Pt had hh services with At home care [facility])  Discharge Durable Medical Equipment:  (Pt is independent )  Support Systems: Spouse/Significant Other  Confirm Follow Up Transport: Self  Discharge Location  Discharge Placement: Nestor Vargas, MSW  Care Management

## 2021-10-16 NOTE — PROGRESS NOTES
DISCHARGE NOTE FROM [facility] NURSE    Patient determined to be stable for discharge by attending provider. I have reviewed the discharge instructions and follow-up appointments with the patient and spouse. They verbalized understanding and all questions were answered to their satisfaction. No complaints or further questions were expressed. Medications sent to pharmacy. Appropriate educational materials and medication side effect teaching were provided. PIV were removed prior to discharge. Patient did not discharge with any line, craft, or drain. All personal items collected during admission were returned to the patient prior to discharge. Post-op patient: Yes- Patient given post-op discharge kit and instructed on use.        Brendon Bass RN

## 2021-11-03 ENCOUNTER — TRANSCRIBE ORDER (OUTPATIENT)
Dept: SCHEDULING | Age: 81
End: 2021-11-03

## 2021-11-03 DIAGNOSIS — N20.0 KIDNEY STONES: Primary | ICD-10-CM

## 2021-11-10 ENCOUNTER — HOSPITAL ENCOUNTER (OUTPATIENT)
Dept: CT IMAGING | Age: 81
Discharge: HOME | End: 2021-11-10
Attending: STUDENT IN AN ORGANIZED HEALTH CARE EDUCATION/TRAINING PROGRAM
Payer: MEDICARE

## 2021-11-10 ENCOUNTER — HOSPITAL ENCOUNTER (OUTPATIENT)
Dept: NUCLEAR MEDICINE | Age: 81
Discharge: HOME | End: 2021-11-10
Attending: STUDENT IN AN ORGANIZED HEALTH CARE EDUCATION/TRAINING PROGRAM
Payer: MEDICARE

## 2021-11-10 DIAGNOSIS — N20.0 KIDNEY STONES: ICD-10-CM

## 2021-11-10 PROCEDURE — 78708 K FLOW/FUNCT IMAGE W/DRUG: CPT

## 2021-11-10 PROCEDURE — 74178 CT ABD&PLV WO CNTR FLWD CNTR: CPT

## 2021-11-10 PROCEDURE — 74011250636 HC RX REV CODE- 250/636: Performed by: STUDENT IN AN ORGANIZED HEALTH CARE EDUCATION/TRAINING PROGRAM

## 2021-11-10 PROCEDURE — 74011000636 HC RX REV CODE- 636: Performed by: STUDENT IN AN ORGANIZED HEALTH CARE EDUCATION/TRAINING PROGRAM

## 2021-11-10 RX ORDER — FUROSEMIDE 10 MG/ML
20 INJECTION INTRAMUSCULAR; INTRAVENOUS ONCE
Status: COMPLETED | OUTPATIENT
Start: 2021-11-10 | End: 2021-11-10

## 2021-11-10 RX ORDER — BETIATIDE 1 MG/1
10 INJECTION, POWDER, LYOPHILIZED, FOR SOLUTION INTRAVENOUS
Status: COMPLETED | OUTPATIENT
Start: 2021-11-10 | End: 2021-11-10

## 2021-11-10 RX ADMIN — FUROSEMIDE 20 MG: 10 INJECTION, SOLUTION INTRAMUSCULAR; INTRAVENOUS at 13:12

## 2021-11-10 RX ADMIN — BETIATIDE 10 MILLICURIE: 1 INJECTION, POWDER, LYOPHILIZED, FOR SOLUTION INTRAVENOUS at 14:00

## 2021-11-10 RX ADMIN — IOPAMIDOL 100 ML: 755 INJECTION, SOLUTION INTRAVENOUS at 14:15

## 2021-11-16 ENCOUNTER — OFFICE VISIT (OUTPATIENT)
Dept: CARDIOLOGY CLINIC | Age: 81
End: 2021-11-16
Payer: MEDICARE

## 2021-11-16 VITALS
DIASTOLIC BLOOD PRESSURE: 66 MMHG | WEIGHT: 258.9 LBS | HEART RATE: 60 BPM | RESPIRATION RATE: 18 BRPM | HEIGHT: 68 IN | BODY MASS INDEX: 39.24 KG/M2 | OXYGEN SATURATION: 97 % | SYSTOLIC BLOOD PRESSURE: 132 MMHG

## 2021-11-16 DIAGNOSIS — E78.2 MIXED HYPERLIPIDEMIA: ICD-10-CM

## 2021-11-16 DIAGNOSIS — I48.92 ATRIAL FLUTTER, PAROXYSMAL (HCC): Primary | ICD-10-CM

## 2021-11-16 DIAGNOSIS — I10 PRIMARY HYPERTENSION: ICD-10-CM

## 2021-11-16 PROCEDURE — G8427 DOCREV CUR MEDS BY ELIG CLIN: HCPCS | Performed by: INTERNAL MEDICINE

## 2021-11-16 PROCEDURE — G8417 CALC BMI ABV UP PARAM F/U: HCPCS | Performed by: INTERNAL MEDICINE

## 2021-11-16 PROCEDURE — 99213 OFFICE O/P EST LOW 20 MIN: CPT | Performed by: INTERNAL MEDICINE

## 2021-11-16 PROCEDURE — 1101F PT FALLS ASSESS-DOCD LE1/YR: CPT | Performed by: INTERNAL MEDICINE

## 2021-11-16 PROCEDURE — 93000 ELECTROCARDIOGRAM COMPLETE: CPT | Performed by: INTERNAL MEDICINE

## 2021-11-16 PROCEDURE — G8432 DEP SCR NOT DOC, RNG: HCPCS | Performed by: INTERNAL MEDICINE

## 2021-11-16 PROCEDURE — G8536 NO DOC ELDER MAL SCRN: HCPCS | Performed by: INTERNAL MEDICINE

## 2021-11-16 RX ORDER — CLOTRIMAZOLE AND BETAMETHASONE DIPROPIONATE 10; .64 MG/G; MG/G
CREAM TOPICAL
COMMUNITY
Start: 2021-09-30 | End: 2021-11-16

## 2021-11-16 RX ORDER — GLIMEPIRIDE 1 MG/1
TABLET ORAL
COMMUNITY
Start: 2021-08-23

## 2021-11-16 RX ORDER — METOPROLOL TARTRATE 25 MG/1
12.5 TABLET, FILM COATED ORAL 2 TIMES DAILY
COMMUNITY
End: 2022-05-17

## 2021-11-16 NOTE — PROGRESS NOTES
Mica Martinez, [facility]    Subjective/HPI:     Chavez Landry is a 79 y.o. male is here for routine f/u. He has a PMHx of DM2, HTN, HLD. Admitted to [facility] from 10/9 to 10/15 for acute lower GI bleed due to sigmoid colon mass. Noted to have new onset Atrial flutter. Echo with preserved LVEF. [address] held due to recent GI bleed with colon mass. 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 He saw surgeon outpatient and was told \"he got it all\". Does not desire to follow up with oncology now. Has fu with GI upcoming to discuss repeat colonoscopy. Currently denies complaints of chest pains, dizziness, orthopnea, PND or edema. Denies shortness of breath or palpitation symptoms. Current Outpatient Medications on File Prior to Visit   Medication Sig Dispense Refill    glimepiride (AMARYL) 1 mg tablet TAKE 1 TABLET EVERY MORNING WITH BREAKFAST      metoprolol tartrate (LOPRESSOR) 25 mg tablet Take 12.5 mg by mouth two (2) times a day.  pravastatin (PRAVACHOL) 20 mg tablet Take 20 mg by mouth nightly.  amLODIPine (NORVASC) 5 mg tablet Take 5 mg by mouth daily.  losartan (COZAAR) 100 mg tablet Take 100 mg by mouth daily.  multivitamin (ONE A DAY) tablet Take 1 Tab by mouth daily.  acetaminophen (TYLENOL EXTRA STRENGTH) 500 mg tablet Take 1,000 mg by mouth every six (6) hours as needed for Pain.  metFORMIN (GLUCOPHAGE) 1,000 mg tablet Take 1,000 mg by mouth two (2) times a day.  aspirin delayed-release 81 mg tablet Take 81 mg by mouth daily.  Omega-3-DHA-EPA-Fish Oil (FISH OIL) 1,000 mg (120 mg-180 mg) cap Take 1 Tab by mouth daily.  clotrimazole-betamethasone (LOTRISONE) topical cream APPLY TO AFFECTED AREAS TWICE DAILY (Patient not taking: Reported on 11/16/2021)      moxifloxacin (VIGAMOX) 0.5 % ophthalmic solution Administer 1 Drop to left eye three (3) times daily.  (Patient not taking: Reported on 11/16/2021)       No current facility-administered medications on file prior to visit. Review of Symptoms:    Review of Systems   Constitutional: Negative for chills, fever and weight loss. HENT: Negative for nosebleeds. Eyes: Negative for blurred vision and double vision. Respiratory: Negative for cough, shortness of breath and wheezing. Cardiovascular: Negative for chest pain, palpitations, orthopnea, leg swelling and PND. Skin: Negative for rash. Neurological: Negative for dizziness and loss of consciousness. Physical Exam:      General: Well developed, in no acute distress, cooperative and alert  Heart:  irreg irreg; normal S1/S2; no murmurs, no gallops or rubs. Respiratory: Clear bilaterally x 4, no wheezing or rales  Extremities:  Normal cap refill, no cyanosis, atraumatic. No edema. Vascular: 2+ pulses symmetric in all extremities    Vitals:    11/16/21 1032   BP: 132/66   BP 1 Location: Left arm   BP Patient Position: Sitting   BP Cuff Size: Large adult   Resp: 18   Height: 5' 8\" (1.727 m)   Weight: 258 lb 14.4 oz (117.4 kg)   SpO2: 97%       ECG done today shows atrial fibrillation, controlled rates     Assessment:       ICD-10-CM ICD-9-CM    1. Atrial flutter, paroxysmal (HCC)  I48.92 427.32 AMB POC EKG ROUTINE W/ 12 LEADS, INTER & REP   2. Primary hypertension  I10 401.9    3. Mixed hyperlipidemia  E78.2 272.2         Plan:     1. Atrial flutter, paroxysmal (HCC)  In the setting of acute rectal bleeding  Currently in rate controlled A fib  Echo done 10/2021 with preserved LVEF 55-60%, mild AS  Start Eliquis 5 mg BID    2. Primary hypertension  BP controlled. Continue anti-hypertensive therapy and low sodium diet    3. Mixed hyperlipidemia  Continue statin therapy and low fat, low cholesterol diet  Lipids managed by PCP    F/u with Dr. Tamiko Sal in 1 month    Deandra Snell NP       [facility]             Patient seen, examined by me personally.  Plan discussed as detailed. Agree with note as outlined by  NP with modifications as noted. My independent physical exam reveals : Physical Exam  Vitals and nursing note reviewed. Cardiovascular:      Rate and Rhythm: Normal rate and regular rhythm. Heart sounds: Normal heart sounds. Pulmonary:      Breath sounds: Normal breath sounds. Musculoskeletal:      Right lower leg: No edema. Left lower leg: No edema. Skin:     General: Skin is warm. Neurological:      Mental Status: He is alert and oriented to person, place, and time. Psychiatric:         Mood and Affect: Mood normal.          No additional findings noted. Agree with plan as outlined above with modifications as noted. Will start eliquis.      Allen Mckeon MD

## 2021-11-16 NOTE — LETTER
11/16/2021    Patient: Vineet Schmidt   YOB: 1940   Date of Visit: 11/16/2021     James Niño MD  [facility] Medical [place] [address].  Via In Basket    Dear James Niño MD,      Thank you for referring Mr. Vineet Schmidt to [address] [place] [address] for evaluation. My notes for this consultation are attached. If you have questions, please do not hesitate to call me. I look forward to following your patient along with you.       Sincerely,    Cody Mei MD

## 2021-11-16 NOTE — PROGRESS NOTES
Chief Complaint   Patient presents with   [facility] Follow Up     10/9/2021 - 10/16/2021 [facility]    Irregular Heart Beat     Atrial Flutter     Visit Vitals  /66 (BP 1 Location: Left arm, BP Patient Position: Sitting, BP Cuff Size: Large adult)   Pulse 60   Resp 18   Ht 5' 8\" (1.727 m)   Wt 258 lb 14.4 oz (117.4 kg)   SpO2 97%   BMI 39.37 kg/m²       1. Have you been to the ER, urgent care clinic since your last visit? Hospitalized since your last visit? Yes When: 10/9/2021 - 10/16/2021  Where: [facility] Reason for visit: Atrial Flutter    2. Have you seen or consulted any other health care providers outside of the [address] [place] [address] since your last visit? Include any pap smears or colon screening.  Nephrology and [facility]

## 2021-11-17 NOTE — DISCHARGE SUMMARY
Discharge Summary     Patient: Roseline Forrester MRN: [id number]  SSN: [id number]    YOB: 1940  Age: 79 y.o. Sex: male       Admit Date: 10/9/2021    Discharge Date: 10/16/21      Admission Diagnoses: Rectal bleed [K62.5]  Colonic mass [K63.89]  Atrial flutter ([address] [I48.92]    Discharge Diagnoses:   Problem List as of 10/16/2021 Date Reviewed: 10/12/2021          Codes Class Noted - Resolved    Atrial flutter, paroxysmal ([address] ICD-10-CM: I48.92  ICD-9-CM: 427.32  10/9/2021 - Present        Rectal bleed ICD-10-CM: K62.5  ICD-9-CM: 569.3  10/9/2021 - Present        Colonic mass ICD-10-CM: K63.89  ICD-9-CM: 569.89  10/9/2021 - Present        Combined forms of age-related cataract of right eye ICD-10-CM: H25.811  ICD-9-CM: 366.19  1/9/2020 - Present    Overview Signed 1/9/2020 12:11 PM by Jeanine Young DO     KPE/IOL OD             RESOLVED: Atrial flutter ([address] ICD-10-CM: F84.20  ICD-9-CM: 427.32  10/9/2021 - 10/9/2021        RESOLVED: Combined forms of age-related cataract of left eye ICD-10-CM: H25.812  ICD-9-CM: 366.19  1/7/2020 - 1/9/2020    Overview Signed 1/7/2020  5:18 PM by Jeanine Young DO     [address] OS                    Discharge Condition: Good    Hospital Course: Normal postoperative course after sigmoid colectomy    Disposition: home    Discharge Medications:   Cannot display discharge medications since this patient is not currently admitted. Activity: Activity as tolerated  Diet: Regular Diet  Wound Care: Keep wound clean and dry    Follow-up Appointments   Procedures    FOLLOW UP VISIT Appointment in: Two Weeks     Standing Status:   Standing     Number of Occurrences:   1     Order Specific Question:   Appointment in     Answer:    Two Weeks       Signed By: Archana Man MD     November 17, 2021

## 2021-11-24 ENCOUNTER — TELEPHONE (OUTPATIENT)
Dept: CARDIOLOGY CLINIC | Age: 81
End: 2021-11-24

## 2021-11-24 NOTE — TELEPHONE ENCOUNTER
Pre-Procedure Cardiac Clearance Request:    Facility: [facility]    Procedure Date:12/15/2021    Procedure:Cystoscopy w/right ureteroscopy Laser Lithortripsy, RT Stent Placement, Possible Ureteral Biopsy. Surgeon: Tino Gaspar MD.    Anesthesia :general    Request for Interruption of Anticoagulants: Eliquis 5 mg and Aspirin 81 mg. May stop anticoagulant how many days prior and when to resume    Is patient cleared from a cardiac standpoint to proceed with upcoming procedure. Please advise.

## 2021-11-26 NOTE — TELEPHONE ENCOUNTER
Surgical clearance, last office visit progress notes and last EKG faxed to [facility] to Dr. Karlos Chun.

## 2021-11-26 NOTE — TELEPHONE ENCOUNTER
Per Shawn Torres. Moderate risk to interrupt anti-coagulation given ongoing A flutter, but may be considered acceptable risk for surgery from CV standpoint. May hold Eliquis 2 days prior and ASA 5 days prior to procedure.  Should resume both 1-day post op.

## 2021-12-09 ENCOUNTER — TELEPHONE (OUTPATIENT)
Dept: CARDIOLOGY CLINIC | Age: 81
End: 2021-12-09

## 2021-12-09 NOTE — TELEPHONE ENCOUNTER
Completed patients assistance documents for Eliquis faxed to [facility] [employer] to [phone].

## 2022-03-04 ENCOUNTER — TELEPHONE (OUTPATIENT)
Dept: CARDIOLOGY CLINIC | Age: 82
End: 2022-03-04

## 2022-03-04 NOTE — TELEPHONE ENCOUNTER
Fax received from [facility] with the denial for assistance. Patient is not eligible since documentation of 3% out of pocket prescription expenses, based on household adjusted gross income, not met.

## 2022-03-14 ENCOUNTER — TELEPHONE (OUTPATIENT)
Dept: CARDIOLOGY CLINIC | Age: 82
End: 2022-03-14

## 2022-03-14 NOTE — TELEPHONE ENCOUNTER
New fax received from [facility] with the approval for Assistance for Eliquis. Patient is now Eligible to receive Eliquis free of charge from 03- through 12-. Patient was notified.

## 2022-03-19 PROBLEM — K63.89 COLONIC MASS: Status: ACTIVE | Noted: 2021-10-09

## 2022-03-19 PROBLEM — K62.5 RECTAL BLEED: Status: ACTIVE | Noted: 2021-10-09

## 2022-03-19 PROBLEM — I48.92 ATRIAL FLUTTER, PAROXYSMAL (HCC): Status: ACTIVE | Noted: 2021-10-09

## 2022-03-19 PROBLEM — H25.811 COMBINED FORMS OF AGE-RELATED CATARACT OF RIGHT EYE: Status: ACTIVE | Noted: 2020-01-09

## 2022-03-21 ENCOUNTER — TELEPHONE (OUTPATIENT)
Dept: CARDIOLOGY CLINIC | Age: 82
End: 2022-03-21

## 2022-03-30 ENCOUNTER — TELEPHONE (OUTPATIENT)
Dept: CARDIOLOGY CLINIC | Age: 82
End: 2022-03-30

## 2022-03-30 ENCOUNTER — OFFICE VISIT (OUTPATIENT)
Dept: CARDIOLOGY CLINIC | Age: 82
End: 2022-03-30
Payer: MEDICARE

## 2022-03-30 VITALS
SYSTOLIC BLOOD PRESSURE: 140 MMHG | WEIGHT: 258.3 LBS | DIASTOLIC BLOOD PRESSURE: 78 MMHG | OXYGEN SATURATION: 98 % | HEART RATE: 74 BPM | BODY MASS INDEX: 40.54 KG/M2 | RESPIRATION RATE: 18 BRPM | HEIGHT: 67 IN

## 2022-03-30 DIAGNOSIS — I48.92 ATRIAL FLUTTER, PAROXYSMAL (HCC): Primary | ICD-10-CM

## 2022-03-30 DIAGNOSIS — I10 PRIMARY HYPERTENSION: ICD-10-CM

## 2022-03-30 DIAGNOSIS — E78.2 MIXED HYPERLIPIDEMIA: ICD-10-CM

## 2022-03-30 PROCEDURE — G8417 CALC BMI ABV UP PARAM F/U: HCPCS | Performed by: INTERNAL MEDICINE

## 2022-03-30 PROCEDURE — 1101F PT FALLS ASSESS-DOCD LE1/YR: CPT | Performed by: INTERNAL MEDICINE

## 2022-03-30 PROCEDURE — G8754 DIAS BP LESS 90: HCPCS | Performed by: INTERNAL MEDICINE

## 2022-03-30 PROCEDURE — 99213 OFFICE O/P EST LOW 20 MIN: CPT | Performed by: INTERNAL MEDICINE

## 2022-03-30 PROCEDURE — G8753 SYS BP > OR = 140: HCPCS | Performed by: INTERNAL MEDICINE

## 2022-03-30 PROCEDURE — G8510 SCR DEP NEG, NO PLAN REQD: HCPCS | Performed by: INTERNAL MEDICINE

## 2022-03-30 PROCEDURE — G8427 DOCREV CUR MEDS BY ELIG CLIN: HCPCS | Performed by: INTERNAL MEDICINE

## 2022-03-30 PROCEDURE — G8536 NO DOC ELDER MAL SCRN: HCPCS | Performed by: INTERNAL MEDICINE

## 2022-03-30 PROCEDURE — 93000 ELECTROCARDIOGRAM COMPLETE: CPT | Performed by: INTERNAL MEDICINE

## 2022-03-30 NOTE — TELEPHONE ENCOUNTER
Cardiac Clearance, last EKG and progress notes faxed to [facility] at [phone].

## 2022-03-30 NOTE — TELEPHONE ENCOUNTER
Fax received from [facility] requesting cardiac clearance for  Upcoming Colonoscopy. Procedure Date: June 24, 2022    Procedure:Colonoscopy    Surgeon: Dr. Lilliana Jimenez    Are you treating the patient for any comorbidities that would prevent them from being sedated by Propofol in a free standing endoscopy center? Request for Interruption of Anticoagulants:   Eliquis 5 mg  Aspirin 81 mg  Is patient cleared from a cardiac standpoint to proceed with upcoming procedure. Please advise.

## 2022-03-30 NOTE — LETTER
3/30/2022    Patient: Gabo Villalta   YOB: 1940   Date of Visit: 3/30/2022     Leyla Leonard MD  [facility] Medical [facility]  V[address] [facility] [address]    Dear Leyla Leonard MD,      Thank you for referring Mr. Gabo Villalta to [address] [place] [address] for evaluation. My notes for this consultation are attached. If you have questions, please do not hesitate to call me. I look forward to following your patient along with you.       Sincerely,    Ni Ba MD

## 2022-03-30 NOTE — PROGRESS NOTES
Chief Complaint   Patient presents with    Follow-up     Medication changes Eliquis-Denies cardiac symptoms    Cholesterol Problem    Hypertension     1. Have you been to the ER, urgent care clinic since your last visit? Hospitalized since your last visit? No    2. Have you seen or consulted any other health care providers outside of the [address] [place] [address] since your last visit? Include any pap smears or colon screening.  No

## 2022-03-30 NOTE — PROGRESS NOTES
Kathy Phan, [facility]    Subjective/HPI:     Lisa Che is a 79 y.o. male is here for routine f/u. He has a PMHx of PAF, DM2, HTN, HLD. Feeling well today. Has not been taking Eliquis BID because he did not have enough and had been waiting to hear back from [employer] regarding Patient Assistance. Got a shipment last week, so started taking it BID for the past week. Denies complaints of palpitations, shortness of breath. Denies chest pain symptoms. Scheduled for colonoscopy in 6/2022. Had sigmoid colon resection with 31 lymph nodes removed for invasive adenocarcinoma. Pathology shows no metastatic carcinoma in the lymph nodes. Current Outpatient Medications on File Prior to Visit   Medication Sig Dispense Refill    glimepiride (AMARYL) 1 mg tablet TAKE 1 TABLET EVERY MORNING WITH BREAKFAST      metoprolol tartrate (LOPRESSOR) 25 mg tablet Take 12.5 mg by mouth two (2) times a day.  apixaban (ELIQUIS) 5 mg tablet Take 1 Tablet by mouth two (2) times a day. 180 Tablet 3    pravastatin (PRAVACHOL) 20 mg tablet Take 20 mg by mouth nightly.  amLODIPine (NORVASC) 5 mg tablet Take 5 mg by mouth daily.  losartan (COZAAR) 100 mg tablet Take 100 mg by mouth daily.  multivitamin (ONE A DAY) tablet Take 1 Tab by mouth daily.  acetaminophen (TYLENOL EXTRA STRENGTH) 500 mg tablet Take 1,000 mg by mouth every six (6) hours as needed for Pain.  metFORMIN (GLUCOPHAGE) 1,000 mg tablet Take 1,000 mg by mouth two (2) times a day.  aspirin delayed-release 81 mg tablet Take 81 mg by mouth daily.  Omega-3-DHA-EPA-Fish Oil (FISH OIL) 1,000 mg (120 mg-180 mg) cap Take 1 Tab by mouth daily.  apixaban (ELIQUIS) 5 mg tablet Take 1 Tablet by mouth two (2) times a day. (Patient not taking: Reported on 3/30/2022) 28 Tablet 0     No current facility-administered medications on file prior to visit.        Review of Symptoms:    Review of Systems   Constitutional: Negative for chills, fever and weight loss. HENT: Negative for nosebleeds. Eyes: Negative for blurred vision and double vision. Respiratory: Negative for cough, shortness of breath and wheezing. Cardiovascular: Negative for chest pain, palpitations, orthopnea, leg swelling and PND. Skin: Negative for rash. Neurological: Negative for dizziness and loss of consciousness. Physical Exam:      General: Well developed, in no acute distress, cooperative and alert  Heart: irreg irreg; normal S1/S2; no murmurs, no gallops or rubs. Respiratory: Clear bilaterally x 4, no wheezing or rales  Extremities:  Normal cap refill, no cyanosis, atraumatic. No edema. Vascular: 2+ pulses symmetric in all extremities    Vitals:    03/30/22 1022 03/30/22 1030   BP: (!) 142/86 (!) 140/78   BP 1 Location: Left upper arm Left upper arm   BP Patient Position: Sitting Sitting   BP Cuff Size: Large adult Large adult   Pulse: 74    Resp: 18    Height: 5' 7\" (1.702 m)    Weight: 258 lb 4.8 oz (117.2 kg)    SpO2: 98%        ECG done today shows atrial fibrillation     Assessment:       ICD-10-CM ICD-9-CM    1. Atrial flutter, paroxysmal (HCC)  I48.92 427.32    2. Primary hypertension  I10 401.9 AMB POC EKG ROUTINE W/ 12 LEADS, INTER & REP   3. Mixed hyperlipidemia  E78.2 272.2         Plan:     1. Atrial flutter, paroxysmal (HCC)  In the setting of acute rectal bleeding  Remains in rate controlled atrial fibrillation  Echo done 10/2021 with preserved LVEF 55-60%, mild AS  Continue BB Eliquis for stroke prevention  Plan for REN/DCCV after 4 weeks of being on Eliquis  CPT 17398, 72011     2. Primary hypertension  BP controlled. Continue anti-hypertensive therapy and low sodium diet     3.   Mixed hyperlipidemia  Continue statin therapy and low fat, low cholesterol diet  Lipids managed by PCP    F/u with Dr. Jeniffer Man after cardioversion    Benson Diaz NP       [facility]             Patient seen, examined by me personally. Plan discussed as detailed. Agree with note as outlined by  NP with modifications as noted. My independent physical exam reveals : Physical Exam  Vitals reviewed. Cardiovascular:      Rate and Rhythm: Normal rate. Rhythm irregular. Heart sounds: Normal heart sounds. Musculoskeletal:      Right lower leg: No edema. Left lower leg: No edema. Neurological:      Mental Status: He is alert and oriented to person, place, and time. Psychiatric:         Mood and Affect: Mood normal.          No additional findings noted. Agree with plan as outlined above with modifications as noted. Remains in afib, rate controlled. Discussed REN/DCC.     Cookie Section, MD

## 2022-03-30 NOTE — TELEPHONE ENCOUNTER
Demond Walton NP  standpoint for upcoming procedure.  May hold Eliquis 3 days prior to procedure, resume afterwards.

## 2022-05-17 ENCOUNTER — HOSPITAL ENCOUNTER (OUTPATIENT)
Dept: NON INVASIVE DIAGNOSTICS | Age: 82
Discharge: HOME | End: 2022-05-17
Payer: MEDICARE

## 2022-05-17 VITALS
OXYGEN SATURATION: 95 % | DIASTOLIC BLOOD PRESSURE: 76 MMHG | SYSTOLIC BLOOD PRESSURE: 154 MMHG | WEIGHT: 257 LBS | BODY MASS INDEX: 40.34 KG/M2 | HEIGHT: 67 IN | HEART RATE: 67 BPM | RESPIRATION RATE: 15 BRPM

## 2022-05-17 DIAGNOSIS — I48.91 ATRIAL FIBRILLATION, UNSPECIFIED TYPE (HCC): ICD-10-CM

## 2022-05-17 LAB
ATRIAL RATE: 62 BPM
CALCULATED P AXIS, ECG09: 77 DEGREES
CALCULATED R AXIS, ECG10: 7 DEGREES
CALCULATED T AXIS, ECG11: 60 DEGREES
DIAGNOSIS, 93000: NORMAL
ECHO TV REGURGITANT MAX VELOCITY: 2.79 M/S
ECHO TV REGURGITANT PEAK GRADIENT: 31 MMHG
P-R INTERVAL, ECG05: 202 MS
Q-T INTERVAL, ECG07: 448 MS
QRS DURATION, ECG06: 88 MS
QTC CALCULATION (BEZET), ECG08: 454 MS
VENTRICULAR RATE, ECG03: 62 BPM

## 2022-05-17 PROCEDURE — 93312 ECHO TRANSESOPHAGEAL: CPT

## 2022-05-17 PROCEDURE — 93005 ELECTROCARDIOGRAM TRACING: CPT

## 2022-05-17 PROCEDURE — 93325 DOPPLER ECHO COLOR FLOW MAPG: CPT | Performed by: INTERNAL MEDICINE

## 2022-05-17 PROCEDURE — 93312 ECHO TRANSESOPHAGEAL: CPT | Performed by: INTERNAL MEDICINE

## 2022-05-17 PROCEDURE — 99153 MOD SED SAME PHYS/QHP EA: CPT

## 2022-05-17 PROCEDURE — 92960 CARDIOVERSION ELECTRIC EXT: CPT | Performed by: INTERNAL MEDICINE

## 2022-05-17 PROCEDURE — 92960 CARDIOVERSION ELECTRIC EXT: CPT

## 2022-05-17 PROCEDURE — 74011000250 HC RX REV CODE- 250: Performed by: INTERNAL MEDICINE

## 2022-05-17 PROCEDURE — 99152 MOD SED SAME PHYS/QHP 5/>YRS: CPT

## 2022-05-17 PROCEDURE — 99152 MOD SED SAME PHYS/QHP 5/>YRS: CPT | Performed by: INTERNAL MEDICINE

## 2022-05-17 PROCEDURE — 93320 DOPPLER ECHO COMPLETE: CPT | Performed by: INTERNAL MEDICINE

## 2022-05-17 PROCEDURE — 74011250636 HC RX REV CODE- 250/636: Performed by: INTERNAL MEDICINE

## 2022-05-17 RX ORDER — LIDOCAINE HYDROCHLORIDE 20 MG/ML
15 SOLUTION OROPHARYNGEAL AS NEEDED
Status: DISCONTINUED | OUTPATIENT
Start: 2022-05-17 | End: 2022-05-17

## 2022-05-17 RX ORDER — FENTANYL CITRATE 50 UG/ML
12.5-5 INJECTION, SOLUTION INTRAMUSCULAR; INTRAVENOUS
Status: DISCONTINUED | OUTPATIENT
Start: 2022-05-17 | End: 2022-05-17

## 2022-05-17 RX ORDER — AMIODARONE HYDROCHLORIDE 200 MG/1
200 TABLET ORAL DAILY
Qty: 30 TABLET | Refills: 3 | Status: SHIPPED | OUTPATIENT
Start: 2022-05-17

## 2022-05-17 RX ORDER — MIDAZOLAM HYDROCHLORIDE 1 MG/ML
.5-2 INJECTION, SOLUTION INTRAMUSCULAR; INTRAVENOUS
Status: DISCONTINUED | OUTPATIENT
Start: 2022-05-17 | End: 2022-05-17

## 2022-05-17 RX ADMIN — FENTANYL CITRATE 25 MCG: 50 INJECTION, SOLUTION INTRAMUSCULAR; INTRAVENOUS at 13:39

## 2022-05-17 RX ADMIN — BENZOCAINE, BUTAMBEN, AND TETRACAINE HYDROCHLORIDE 1 SPRAY: .028; .004; .004 AEROSOL, SPRAY TOPICAL at 13:19

## 2022-05-17 RX ADMIN — FENTANYL CITRATE 25 MCG: 50 INJECTION, SOLUTION INTRAMUSCULAR; INTRAVENOUS at 13:25

## 2022-05-17 RX ADMIN — MIDAZOLAM HYDROCHLORIDE 1 MG: 1 INJECTION, SOLUTION INTRAMUSCULAR; INTRAVENOUS at 13:39

## 2022-05-17 RX ADMIN — LIDOCAINE HYDROCHLORIDE 15 ML: 20 SOLUTION ORAL; TOPICAL at 13:19

## 2022-05-17 RX ADMIN — MIDAZOLAM HYDROCHLORIDE 1 MG: 1 INJECTION, SOLUTION INTRAMUSCULAR; INTRAVENOUS at 13:25

## 2022-05-17 NOTE — PROGRESS NOTES
Patient arrived to Non-Invasive Cardiology Lab for Out Patient REN/DCC Procedure. Staff introduced to patient. Patient identifiers verified with Name and Date of Birth. Procedure verified with patient. Consent forms reviewed and signed by patient or authorized representative and verified. Allergies verified. Patient informed of procedure and plan of care. Questions answered with review. Patient on cardiac monitor, non-invasive blood pressure, SPO2 monitor. On 2LNC O2. Patient is A&Ox3. Patient reports no complaints. Patient on stretcher, in low position, with side rails up. Patient instructed to call for assistance as needed. Family in waiting room.

## 2022-05-17 NOTE — DISCHARGE INSTRUCTIONS
DISCHARGE SUMMARY     The following personal items collected during your admission are returned to you:   Dental Appliance:    Vision:    Hearing Aid:    Jewelry:    Clothing:        PATIENT INSTRUCTIONS: Continue taking all the same medications EXCEPT STOP Metoprolol and Aspirin and START Amiodarone. You had a transesophageal echocardiogram, your throat was numbed for the procedure, so start with sips of water and advance diet as swallowing returns to normal. Avoid any scalding hot beverages for the next 2 hours. The cardioversion procedure can cause redness to the skin where the patches were placed. You may treat this with Aloe or Cortisone cream over the counter lotion. If the skin appears very reddened or blistered contact your physician    Call to make an appointment with Dr. Tracie Webb in 2 week(s). What to do at Home:  Recommended activity: No driving today    The discharge information has been reviewed with the PATIENT . The PATIENT  verbalized understanding.

## 2022-05-17 NOTE — H&P
[facility]  [address] [facility] [address] [place] [address]  [phone]  WWW. [employer]          CARDIOLOGY HISTORY AND PHYSICAL    Date of  Admission: 2022 11:39 AM     Admission type:Elective     Subjective:      Mendel Expose is a 79 y.o. male admitted for Atrial fibrillation, unspecified type ([address] [I48.91]. Mr. Shane Caceres is here for REN/cardioversion for his atrial fibrillation. 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 2020      Luis F Cha MD  Past Medical History:   Diagnosis Date    Arthritis     At risk for sleep apnea 2019    Stop Bang 5     Atrial fibrillation (HCC)     Atrial Flutter    Cancer ([address]     prostate cancer    Cataract     Diabetes ([facility]     Elevated cholesterol     Hypertension     PUD (peptic ulcer disease)       Social History     Socioeconomic History    Marital status:    Tobacco Use    Smoking status: Former Smoker     Packs/day: 2.00     Years: 15.00     Pack years: 30.00     Quit date: 1965     Years since quittin.7    Smokeless tobacco: Never Used   Vaping Use    Vaping Use: Never used   Substance and Sexual Activity    Alcohol use: No    Drug use: No     No Known Allergies   Family History   Problem Relation Age of Onset    Cancer Mother         ovarian    Stroke Father       Current Outpatient Medications   Medication Sig    glimepiride (AMARYL) 1 mg tablet TAKE 1 TABLET EVERY MORNING WITH BREAKFAST    metoprolol tartrate (LOPRESSOR) 25 mg tablet Take 12.5 mg by mouth two (2) times a day.  apixaban (ELIQUIS) 5 mg tablet Take 1 Tablet by mouth two (2) times a day.  pravastatin (PRAVACHOL) 20 mg tablet Take 20 mg by mouth nightly.  amLODIPine (NORVASC) 5 mg tablet Take 5 mg by mouth daily.     losartan (COZAAR) 100 mg tablet Take 100 mg by mouth daily.  multivitamin (ONE A DAY) tablet Take 1 Tab by mouth daily.  acetaminophen (TYLENOL EXTRA STRENGTH) 500 mg tablet Take 1,000 mg by mouth every six (6) hours as needed for Pain.  metFORMIN (GLUCOPHAGE) 1,000 mg tablet Take 1,000 mg by mouth two (2) times a day.  Omega-3-DHA-EPA-Fish Oil (FISH OIL) 1,000 mg (120 mg-180 mg) cap Take 1 Tab by mouth daily. Current Facility-Administered Medications   Medication Dose Route Frequency    midazolam (VERSED) injection 0.5-2 mg  0.5-2 mg IntraVENous Multiple    lidocaine (XYLOCAINE) 2 % viscous solution 15 mL  15 mL Mouth/Throat PRN    fentaNYL citrate (PF) injection 12.5-50 mcg  12.5-50 mcg IntraVENous Multiple         Review of Symptoms:  Constitutional: negative  Eyes: negative  Ears, nose, mouth, throat, and face: negative  Respiratory: negative  Cardiovascular: negative  Gastrointestinal: negative  Genitourinary:negative  Musculoskeletal:negative  Neurological: negative  Behvioral/Psych: negative  Endocrine: negative     Objective:   Physical Exam:  General Appearance:    Chest:   Clear  Cardiovascular:  Regular rate and rhythm, no murmur. Abdomen:   Soft, non-tender, bowel sounds are active. Extremities:   Skin:  Warm and dry. Visit Vitals  BP (!) 203/95   Pulse 68   Resp 13   Ht 5' 7\" (1.702 m)   Wt 257 lb (116.6 kg)   SpO2 97%   BMI 40.25 kg/m²       Cardiographics    Telemetry:   ECG:   Echocardiogram:     Labs:  No results for input(s): WBC, HGB, HCT, PLT, HGBEXT, HCTEXT, PLTEXT, HGBEXT, HCTEXT, PLTEXT in the last 72 hours. No results for input(s): NA, K, CL, CO2, GLU, BUN, CREA, CA, MG, PHOS, ALB, TBIL, TBILI, ALT, INR, INREXT, INREXT in the last 72 hours. No lab exists for component: SGOT    No results for input(s): TROIQ, CPK, CKMB in the last 72 hours.        Assessment:     Assessment:       Active Problems:    Atrial flutter, paroxysmal (HCC) (10/9/2021)         Plan:     REN/DCC today.

## 2022-05-17 NOTE — PROGRESS NOTES
Pt sedated with 1mg Versed and 25mcg Fentanyl for REN (monitored sedation from 1325 to 1338)    Pt sedated with an additional 1mg Versed and 25mcg Fentanyl, given 1 synchronized shock(s) at 360 Joules, AFib converted to NSR.(monitored sedation from 1338 to 1349)

## 2022-05-29 NOTE — ED PROVIDER NOTES
EMERGENCY DEPARTMENT HISTORY AND PHYSICAL EXAM      Date: 1/3/2020  Patient Name: Delbert Duron    History of Presenting Illness     Chief Complaint   Patient presents with    Abdominal Pain     Ambulatory into the ED with c/o abdominal pain x several days and CP x last night.  Chest Pain       History Provided By: Patient    HPI: Delbert Duron, [de-identified] y.o. male presents to the ED with history of burning chest pain that woke him up at 2:30 AM this morning. Patient complains of some mild epigastric pain, had no shortness of breath after he took some antacid medication the pain resolved after approximately 1 hour. He currently has no pain but rated his pain a 6 out of 10 and burning in quality, nonradiating from the epigastric area and lower chest area. He has not previously had these symptoms previously. He says he has no known heart disease, has not had a fever or cough, has no known history of heartburn and denies eating anything spicy or overly rich yesterday before bedtime. He has no known history of gallstones, hepatitis or pancreatitis. He does not drink alcohol. There are no other complaints, changes, or physical findings at this time. PCP: Betzaida Alvarez MD    No current facility-administered medications on file prior to encounter. Current Outpatient Medications on File Prior to Encounter   Medication Sig Dispense Refill    pravastatin (PRAVACHOL) 20 mg tablet Take 20 mg by mouth nightly.  amLODIPine (NORVASC) 5 mg tablet Take 5 mg by mouth daily.  losartan (COZAAR) 100 mg tablet Take 100 mg by mouth daily.  multivitamin (ONE A DAY) tablet Take 1 Tab by mouth daily.  acetaminophen (TYLENOL EXTRA STRENGTH) 500 mg tablet Take 1,000 mg by mouth every six (6) hours as needed for Pain.  metFORMIN (GLUCOPHAGE) 1,000 mg tablet Take 1,000 mg by mouth two (2) times a day.  aspirin delayed-release 81 mg tablet Take 81 mg by mouth daily.       Omega-3-DHA-EPA-Fish Oil (FISH OIL) 1,000 mg (120 mg-180 mg) cap Take 1 Tab by mouth daily. Past History     Past Medical History:  Past Medical History:   Diagnosis Date    Arthritis     At risk for sleep apnea 2019    Stop [employer] Stakes 5     Cancer [facility])     prostate cancer    Diabetes ([address]     Elevated cholesterol     Hypertension        Past Surgical History:  Past Surgical History:   Procedure Laterality Date    COLONOSCOPY N/A 2017    COLONOSCOPY performed by Rebecca Del Rio MD at [facility] ENDOSCOPY    COLONOSCOPY N/A 2019    COLONOSCOPY WITH POLYPECTOMY performed by Milly Reynolds MD at [facility] AMBULATORY OR    HX HERNIA REPAIR      HX HIP REPLACEMENT Right     HX KNEE REPLACEMENT Right     HX PROSTATECTOMY         Family History:  Family History   Problem Relation Age of Onset    Cancer Mother         ovarian    Stroke Father        Social History:  Social History     Tobacco Use    Smoking status: Former Smoker     Packs/day: 2.00     Years: 15.00     Pack years: 30.00     Last attempt to quit: 1965     Years since quittin.3    Smokeless tobacco: Never Used   Substance Use Topics    Alcohol use: No    Drug use: No       Allergies:  No Known Allergies      Review of Systems   Review of Systems   Constitutional: Negative for appetite change, fever and unexpected weight change. HENT: Negative for sore throat. Respiratory: Negative for chest tightness, shortness of breath and wheezing. Cardiovascular: Positive for chest pain. Negative for palpitations and leg swelling. Gastrointestinal: Positive for abdominal pain and diarrhea. Negative for anal bleeding, blood in stool, constipation, nausea, rectal pain and vomiting. Genitourinary: Negative for difficulty urinating and hematuria. Musculoskeletal: Negative for back pain and neck pain. Neurological: Negative for dizziness, syncope and light-headedness. All other systems reviewed and are negative.       Physical Exam   Physical Exam   Vital signs and nursing notes reviewed    CONSTITUTIONAL: Alert, in mild distress; well-developed; well-nourished. Pleasant disposition. HEAD:  Normocephalic, atraumatic  EYES: PERRL; EOM's intact. Nonicteric sclera. ENTM: Nose: no rhinorrhea; Throat: no erythema or exudate, mucous membranes moist  Neck:  Supple. trachea is midline. Carotid bruits bilaterally. RESP: Chest clear, equal breath sounds. - W/R/R  CV: S1 and S2 WNL; No murmurs, gallops or rubs. 2+ radial and DP pulses bilaterally. GI: non-distended, normal bowel sounds, abdomen soft and non-tender except for mild tenderness in the epigastric area. . No masses or organomegaly. No midline pulsatile mass on direct palpation. : No costo-vertebral angle tenderness. BACK:  Non-tender, normal appearance  UPPER EXT:  Normal inspection. no joint or soft tissue swelling  LOWER EXT: No edema, no calf tenderness. NEURO: Alert and oriented x3, 5/5 strength and light touch sensation intact in bilateral upper and lower extremities. SKIN: No rashes; Warm and dry, nonjaundiced skin.   PSYCH: Normal mood, normal affect    Diagnostic Study Results     Labs -     Recent Results (from the past 12 hour(s))   EKG, 12 LEAD, INITIAL    Collection Time: 01/03/20  9:54 AM   Result Value Ref Range    Ventricular Rate 90 BPM    Atrial Rate 90 BPM    P-R Interval 178 ms    QRS Duration 116 ms    Q-T Interval 366 ms    QTC Calculation (Bezet) 447 ms    Calculated P Axis 65 degrees    Calculated R Axis -3 degrees    Calculated T Axis -10 degrees    Diagnosis       Normal sinus rhythm  Incomplete right bundle branch block  No previous ECGs available  Confirmed by Jolanta Mcdaniels MD, Ciera Castillo ([id number]) on 1/3/2020 11:08:02 AM     CBC WITH AUTOMATED DIFF    Collection Time: 01/03/20 10:10 AM   Result Value Ref Range    WBC 6.2 4.1 - 11.1 K/uL    RBC 4.62 4.10 - 5.70 M/uL    HGB 13.8 12.1 - 17.0 g/dL    HCT 41.5 36.6 - 50.3 %    MCV 89.8 80.0 - 99.0 FL    MCH 29.9 26.0 - 34.0 PG Left main stem  tissue/yes MCHC 33.3 30.0 - 36.5 g/dL    RDW 12.9 11.5 - 14.5 %    PLATELET 814 455 - 619 K/uL    MPV 10.0 8.9 - 12.9 FL    NRBC 0.0 0  WBC    ABSOLUTE NRBC 0.00 0.00 - 0.01 K/uL    NEUTROPHILS 64 32 - 75 %    LYMPHOCYTES 22 12 - 49 %    MONOCYTES 12 5 - 13 %    EOSINOPHILS 2 0 - 7 %    BASOPHILS 0 0 - 1 %    IMMATURE GRANULOCYTES 0 0.0 - 0.5 %    ABS. NEUTROPHILS 4.0 1.8 - 8.0 K/UL    ABS. LYMPHOCYTES 1.3 0.8 - 3.5 K/UL    ABS. MONOCYTES 0.7 0.0 - 1.0 K/UL    ABS. EOSINOPHILS 0.1 0.0 - 0.4 K/UL    ABS. BASOPHILS 0.0 0.0 - 0.1 K/UL    ABS. IMM. GRANS. 0.0 0.00 - 0.04 K/UL    DF AUTOMATED     METABOLIC PANEL, COMPREHENSIVE    Collection Time: 01/03/20 10:10 AM   Result Value Ref Range    Sodium 138 136 - 145 mmol/L    Potassium 4.0 3.5 - 5.1 mmol/L    Chloride 102 97 - 108 mmol/L    CO2 31 21 - 32 mmol/L    Anion gap 5 5 - 15 mmol/L    Glucose 260 (H) 65 - 100 mg/dL    BUN 14 6 - 20 MG/DL    Creatinine 1.06 0.70 - 1.30 MG/DL    BUN/Creatinine ratio 13 12 - 20      GFR est AA >60 >60 ml/min/1.73m2    GFR est non-AA >60 >60 ml/min/1.73m2    Calcium 9.0 8.5 - 10.1 MG/DL    Bilirubin, total 1.1 (H) 0.2 - 1.0 MG/DL    ALT (SGPT) 32 12 - 78 U/L    AST (SGOT) 27 15 - 37 U/L    Alk. phosphatase 70 45 - 117 U/L    Protein, total 7.2 6.4 - 8.2 g/dL    Albumin 3.5 3.5 - 5.0 g/dL    Globulin 3.7 2.0 - 4.0 g/dL    A-G Ratio 0.9 (L) 1.1 - 2.2     LIPASE    Collection Time: 01/03/20 10:10 AM   Result Value Ref Range    Lipase 1,146 (H) 73 - 393 U/L   TROPONIN I    Collection Time: 01/03/20 10:10 AM   Result Value Ref Range    Troponin-I, Qt. <0.05 <0.05 ng/mL       Radiologic Studies -   XR CHEST PORT   Final Result   IMPRESSION: No acute abnormality is identified. US ABD LTD   Final Result   IMPRESSION:       1. Gallbladder sludge. No biliary ductal dilatation. 2. Mild to moderate right hydronephrosis, with cortical and renal sinus cysts.         CT Results  (Last 48 hours)    None        CXR Results  (Last 48 hours) 01/03/20 1116  XR CHEST PORT Final result    Impression:  IMPRESSION: No acute abnormality is identified. Narrative:  EXAM:  XR CHEST PORT       INDICATION:  Chest Pain       COMPARISON:  None       FINDINGS: A portable AP radiograph of the chest was obtained at 1044 hours. The   patient is on a cardiac monitor. The lungs are clear. Heart size is borderline   enlarged. Bony structures appear intact. Medical Decision Making   I am the first provider for this patient. I reviewed the vital signs, available nursing notes, past medical history, past surgical history, family history and social history. Vital Signs-Reviewed the patient's vital signs. Patient Vitals for the past 12 hrs:   Temp Pulse Resp BP SpO2   01/03/20 1200  71 13 (!) 135/95 96 %   01/03/20 1100  75 17 120/61 97 %   01/03/20 1011 98.2 °F (36.8 °C) 78 20 133/66 95 %   01/03/20 0947 98.1 °F (36.7 °C) 84 17 155/73 97 %       EKG interpretation: (Preliminary)  EKG performed at 9:54 AM, as interpreted by me shows normal sinus rhythm at a rate of 90, normal axis, normal intervals save for incomplete right bundle branch block, no visible acute ischemic changes. Records Reviewed: Nursing Notes and Old Medical Records    Provider Notes (Medical Decision Making):   21-year-old male with burning chest discomfort arising from the epigastric area now completely resolved here with evidence of pancreatitis by lab work. T bili slightly elevated patient has no right upper quadrant pain and only minimal sludge in the gallbladder, do not suspect obstructive process leading to pancreatitis. Patient is completely pain-free now,  hemodynamically stable, will follow up with referring provider and discharge patient home. Today's evaluation is not consistent with cardiac concerns. His evaluation is not consistent with aortic dissection.   Limited visualization of the pancreas appears normal.    ED Course:   Initial yes assessment performed. The patients presenting problems have been discussed, and they are in agreement with the care plan formulated and outlined with them. I have encouraged them to ask questions as they arise throughout their visit.       1:01 PM  Reviewed findings with the patient. He currently has no pain. Mild epigastric pain on palpation only. Discussed pancreatitis and patient is not a regular alcohol user, sepsis cholesterol is under control and is no history of gallstones. Minimal sludge today, likely gallbladder not a culprit of today's pancreatitis. Discussed return precautions the patient. Have placed call to primary care doctor for follow-up given her referral today. Discussed return precautions and follow-up with her with his primary care doctor as well as dietary changes at home in the media period. Disposition:  Discharge    Discharge Note:  1:01 PM  The pt is ready for discharge. The pt's signs, symptoms, diagnosis, and discharge instructions have been discussed and pt has conveyed their understanding. The pt is to follow up as recommended or return to ER should their symptoms worsen. Plan has been discussed and pt is in agreement. PLAN:  1. Current Discharge Medication List        2. Follow-up Information     Follow up With Specialties Details Why Contact Info    Sofi Carrasco, [address] [facility]  [phone]      [facility] EMERGENCY DEPT Emergency Medicine  If symptoms worsen including new vomiting, worsening abdominal pain, fever, blood in your stool or other new concerning symptoms. [address] [place] [address]  [phone]        Return to ED if worse     Diagnosis     Clinical Impression:   1. Idiopathic acute pancreatitis without infection or necrosis    2.  Abdominal pain, epigastric        Attestations:    Ziyad Guthrie MD    Please note that this dictation was completed with Recommend, the Octamer voice recognition software. Quite often unanticipated grammatical, syntax, homophones, and other interpretive errors are inadvertently transcribed by the computer software. Please disregard these errors. Please excuse any errors that have escaped final proofreading. Thank you.

## 2022-06-07 ENCOUNTER — OFFICE VISIT (OUTPATIENT)
Dept: CARDIOLOGY CLINIC | Age: 82
End: 2022-06-07
Payer: MEDICARE

## 2022-06-07 VITALS
WEIGHT: 256.6 LBS | OXYGEN SATURATION: 98 % | BODY MASS INDEX: 40.27 KG/M2 | HEART RATE: 70 BPM | DIASTOLIC BLOOD PRESSURE: 60 MMHG | SYSTOLIC BLOOD PRESSURE: 120 MMHG | RESPIRATION RATE: 14 BRPM | HEIGHT: 67 IN

## 2022-06-07 DIAGNOSIS — I10 BENIGN ESSENTIAL HTN: ICD-10-CM

## 2022-06-07 DIAGNOSIS — E11.8 CONTROLLED DIABETES MELLITUS TYPE 2 WITH COMPLICATIONS, UNSPECIFIED WHETHER LONG TERM INSULIN USE (HCC): ICD-10-CM

## 2022-06-07 DIAGNOSIS — E78.2 MIXED HYPERLIPIDEMIA: ICD-10-CM

## 2022-06-07 DIAGNOSIS — I48.92 ATRIAL FLUTTER, PAROXYSMAL (HCC): ICD-10-CM

## 2022-06-07 DIAGNOSIS — I48.0 PAF (PAROXYSMAL ATRIAL FIBRILLATION) (HCC): Primary | ICD-10-CM

## 2022-06-07 PROCEDURE — G8427 DOCREV CUR MEDS BY ELIG CLIN: HCPCS | Performed by: INTERNAL MEDICINE

## 2022-06-07 PROCEDURE — G8754 DIAS BP LESS 90: HCPCS | Performed by: INTERNAL MEDICINE

## 2022-06-07 PROCEDURE — 99214 OFFICE O/P EST MOD 30 MIN: CPT | Performed by: INTERNAL MEDICINE

## 2022-06-07 PROCEDURE — G8417 CALC BMI ABV UP PARAM F/U: HCPCS | Performed by: INTERNAL MEDICINE

## 2022-06-07 PROCEDURE — G8752 SYS BP LESS 140: HCPCS | Performed by: INTERNAL MEDICINE

## 2022-06-07 PROCEDURE — 1101F PT FALLS ASSESS-DOCD LE1/YR: CPT | Performed by: INTERNAL MEDICINE

## 2022-06-07 PROCEDURE — 1123F ACP DISCUSS/DSCN MKR DOCD: CPT | Performed by: INTERNAL MEDICINE

## 2022-06-07 PROCEDURE — 93000 ELECTROCARDIOGRAM COMPLETE: CPT | Performed by: INTERNAL MEDICINE

## 2022-06-07 PROCEDURE — G8536 NO DOC ELDER MAL SCRN: HCPCS | Performed by: INTERNAL MEDICINE

## 2022-06-07 PROCEDURE — G8510 SCR DEP NEG, NO PLAN REQD: HCPCS | Performed by: INTERNAL MEDICINE

## 2022-06-07 RX ORDER — FUROSEMIDE 20 MG/1
20 TABLET ORAL AS NEEDED
COMMUNITY
Start: 2022-05-05

## 2022-06-07 RX ORDER — METOPROLOL TARTRATE 25 MG/1
12.5 TABLET, FILM COATED ORAL 2 TIMES DAILY
Qty: 180 TABLET | Refills: 3 | Status: SHIPPED | OUTPATIENT
Start: 2022-06-07

## 2022-06-07 RX ORDER — METOPROLOL TARTRATE 25 MG/1
12.5 TABLET, FILM COATED ORAL 2 TIMES DAILY
Qty: 180 TABLET | Refills: 3 | Status: SHIPPED | OUTPATIENT
Start: 2022-06-07 | End: 2022-06-07

## 2022-06-07 NOTE — PROGRESS NOTES
[address], [place], [address] [place] [address]  [phone]     Subjective:      Shivam Webster is a 79 y.o. male is here for  New patient consultation. He has a PMHx of PAF, DM2, HTN, HLD. Prev followed by Dr Angie Webster, last seen in 3/2022. He is s/p REN/CV in 5/17/2022    Today,   He is on [address] [place] [address] Road, reports no melena, hematuria, or obvious signs of bleeding. No falls. Feeling well      the patient denies chest pain/ shortness of breath, orthopnea, PND, LE edema, palpitations, syncope, or presyncope.        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 01/09/2020      Joel Tovar MD  Past Medical History:   Diagnosis Date    Arthritis     At risk for sleep apnea 12/18/2019    Stop Bang 5     Atrial fibrillation (HCC)     Atrial Flutter    Cancer ([address]     prostate cancer    Cataract     Diabetes [facility]     Elevated cholesterol     Hypertension     PUD (peptic ulcer disease)       Past Surgical History:   Procedure Laterality Date    COLONOSCOPY N/A 9/27/2017    COLONOSCOPY performed by Sha Owen MD at [facility] ENDOSCOPY    COLONOSCOPY N/A 12/30/2019    COLONOSCOPY WITH POLYPECTOMY performed by Toyin Torrez MD at [facility] AMBULATORY OR    COLONOSCOPY N/A 10/11/2021    COLONOSCOPY performed by Toyin Torrez MD at [facility] ENDOSCOPY    HX CATARACT REMOVAL Left 01/08/2020    HX HERNIA REPAIR      HX HIP REPLACEMENT Right     HX KNEE REPLACEMENT Right     HX ORTHOPAEDIC Right     total r hip replacement 2021    HX PROSTATECTOMY       No Known Allergies   Family History   Problem Relation Age of Onset    Cancer Mother         ovarian    Stroke Father       Social History     Socioeconomic History    Marital status:      Spouse name: Not on file    Number of children: Not on file    Years of education: Not on file   Zeeshan Bess Highest education level: Not on file   Occupational History    Not on file   Tobacco Use    Smoking status: Former Smoker     Packs/day: 2.00     Years: 15.00     Pack years: 30.00     Quit date: 1965     Years since quittin.5    Smokeless tobacco: Never Used   Vaping Use    Vaping Use: Never used   Substance and Sexual Activity    Alcohol use: No    Drug use: No    Sexual activity: Not on file   Other Topics Concern    Not on file   Social History Narrative    Not on file     Social Determinants of Health     Financial Resource Strain:     Difficulty of Paying Living Expenses: Not on file   Food Insecurity:     Worried About Running Out of Food in the Last Year: Not on file    Janis of Food in the Last Year: Not on file   Transportation Needs:     Lack of Transportation (Medical): Not on file    Lack of Transportation (Non-Medical): Not on file   Physical Activity:     Days of Exercise per Week: Not on file    Minutes of Exercise per Session: Not on file   Stress:     Feeling of Stress : Not on file   Social Connections:     Frequency of Communication with Friends and Family: Not on file    Frequency of Social Gatherings with Friends and Family: Not on file    Attends Gnosticist Services: Not on file    Active Member of [address] [place] [address] [employer] or Organizations: Not on file    Attends Club or Organization Meetings: Not on file    Marital Status: Not on file   Intimate Partner Violence:     Fear of Current or Ex-Partner: Not on file    Emotionally Abused: Not on file    Physically Abused: Not on file    Sexually Abused: Not on file   Housing Stability:     Unable to Pay for Housing in the Last Year: Not on file    Number of [place] in the Last Year: Not on file    Unstable Housing in the Last Year: Not on file      Current Outpatient Medications   Medication Sig    furosemide (LASIX) 20 mg tablet Take 20 mg by mouth as needed.     amiodarone (CORDARONE) 200 mg tablet Take 1 Tablet by mouth daily.  glimepiride (AMARYL) 1 mg tablet TAKE 1 TABLET EVERY MORNING WITH BREAKFAST    apixaban (ELIQUIS) 5 mg tablet Take 1 Tablet by mouth two (2) times a day.  pravastatin (PRAVACHOL) 20 mg tablet Take 20 mg by mouth nightly.  amLODIPine (NORVASC) 5 mg tablet Take 5 mg by mouth daily.  losartan (COZAAR) 100 mg tablet Take 100 mg by mouth daily.  multivitamin (ONE A DAY) tablet Take 1 Tab by mouth daily.  acetaminophen (TYLENOL EXTRA STRENGTH) 500 mg tablet Take 1,000 mg by mouth every six (6) hours as needed for Pain.  metFORMIN (GLUCOPHAGE) 1,000 mg tablet Take 1,000 mg by mouth two (2) times a day.  Omega-3-DHA-EPA-Fish Oil (FISH OIL) 1,000 mg (120 mg-180 mg) cap Take 1 Tab by mouth daily. No current facility-administered medications for this visit. Review of Symptoms:  11 systems reviewed, negative other than as stated in the HPI    Physical ExamPhysical Exam:    Vitals:    06/07/22 1055   BP: 120/60   Pulse: 70   Resp: 14   SpO2: 98%   Weight: 256 lb 9.6 oz (116.4 kg)   Height: 5' 7\" (1.702 m)     Body mass index is 40.19 kg/m². General PE  Gen:  NAD  Mental Status - Alert. General Appearance - Not in acute distress. HEENT:  PERRL, no carotid bruits or JVD  Chest and Lung Exam   Inspection: Accessory muscles - No use of accessory muscles in breathing. Auscultation:   Breath sounds: - Normal.   Cardiovascular   Inspection: Jugular vein - Bilateral - Inspection Normal.   Palpation/Percussion:   Apical Impulse: - Normal.   Auscultation: Rhythm - Regular. Heart Sounds - S1 WNL and S2 WNL. No S3 or S4. Murmurs & Other Heart Sounds: Auscultation of the heart reveals - No Murmurs. Peripheral Vascular   Upper Extremity: Inspection - Bilateral - No Cyanotic nailbeds or Digital clubbing. Lower Extremity:   Palpation: Edema - Bilateral - No edema. Abdomen:   Soft, non-tender, bowel sounds are active.   Neuro: A&O times 3, CN and motor grossly WNL    Labs:   No results found for: CHOL, CHOLX, CHLST, CHOLV, 222673, HDL, HDLP, LDL, LDLC, DLDLP, TGLX, TRIGL, TRIGP, CHHD, CHHDX  No results found for: CPK, CPKX, CPX  Lab Results   Component Value Date/Time    Sodium 136 10/13/2021 03:27 AM    Potassium 4.0 10/13/2021 03:27 AM    Chloride 104 10/13/2021 03:27 AM    CO2 27 10/13/2021 03:27 AM    Anion gap 5 10/13/2021 03:27 AM    Glucose 204 (H) 10/13/2021 03:27 AM    BUN 11 10/13/2021 03:27 AM    Creatinine 1.10 10/13/2021 03:27 AM    BUN/Creatinine ratio 10 (L) 10/13/2021 03:27 AM    GFR est AA >60 10/13/2021 03:27 AM    GFR est non-AA >60 10/13/2021 03:27 AM    Calcium 8.5 10/13/2021 03:27 AM    Bilirubin, total 0.7 10/10/2021 02:49 AM    Alk. phosphatase 91 10/10/2021 02:49 AM    Protein, total 7.3 10/10/2021 02:49 AM    Albumin 3.1 (L) 10/10/2021 02:49 AM    Globulin 4.2 (H) 10/10/2021 02:49 AM    A-G Ratio 0.7 (L) 10/10/2021 02:49 AM    ALT (SGPT) 18 10/10/2021 02:49 AM       EKG:  Atrial fibrillation       Assessment:          ICD-10-CM ICD-9-CM    1. PAF (paroxysmal atrial fibrillation) ([facility])  I48.0 427.31    2. Atrial flutter, paroxysmal ([facility])  I48.92 427.32    3. Benign essential HTN  I10 401.1    4. Mixed hyperlipidemia  E78.2 272.2    5. Controlled diabetes mellitus type 2 with complications, unspecified whether long term insulin use ([facility])  E11.8 250.90        Orders Placed This Encounter    furosemide (LASIX) 20 mg tablet     Sig: Take 20 mg by mouth as needed. 05/17/22    ECHO REN W POSSIBLE CARDIOVERSION 05/17/2022 5/17/2022    Interpretation Summary    Left Ventricle: Normal left ventricular systolic function with a visually estimated EF of 55 - 60%. Left ventricle size is normal. Normal wall thickness. Normal wall motion. Normal diastolic function.   Aortic Valve: Moderate regurgitation.   Mitral Valve: Moderate regurgitation.   Left Atrium: Normal sized appendage. No left atrial appendage thrombus noted.   Aorta: Normal sized aortic root.  Mildly dilated ascending aorta. Signed by: Rina Wilhelm MD on 5/17/2022  6:10 PM         Plan:     Mr. Juan Lujan presents for new patient visit, to reestablish care to maintain cardiac care within the [facility] [employer] system. PAF/AFL  Sp REN/CV 5/17/2022, but now in rate controlled atrial fibrillation again  Felt better after cardioversion  REN 5/17/2022 posted above  Echo done 10/2021 with preserved LVEF 55-60%, mild AS  Continue Amiodarone 200 mg daily  (started post CV)  Continue Eliquis 5 mg BID  Previously on metoprolol 12.5 mg BID-we will restart metoprolol today, and now that he has loaded amiodarone for 3 weeks we can reattempt cardioversion. If unsuccessful, can refer to EP unless he chooses rate control and anticoagulation    Valvular d/o  Moderate AR/MR per REN 5/17/2022    HTN  BP controlled.  Continue anti-hypertensive therapy and low sodium diet     Mixed hyperlipidemia  Continue statin therapy and and low fat diet  Labs and lipids per PCP    DM  On oral agent    Continue current care and f/u in 6 months. Althea Alexis NP    Patient seen and examined by me with the above nurse practitioner. I personally performed all components of the history, physical, and medical decision making and agree with the assessment and plan with minor modifications as noted. Today the patient presents with recurrent atrial fibrillation after cardioversion in May 2022. Has now noted amiodarone for about 3 weeks. 100% compliant with Eliquis for months. General PE  Gen:  NAD  Mental Status - Alert. General Appearance - Not in acute distress. HEENT:  PERRL, no carotid bruits or JVD  Chest and Lung Exam   Inspection: Accessory muscles - No use of accessory muscles in breathing. Auscultation:   Breath sounds: - Normal.   Cardiovascular   Inspection: Jugular vein - Bilateral - Inspection Normal.   Palpation/Percussion:   Apical Impulse: - Normal.   Auscultation: Rhythm - Regular.  Heart Sounds - S1 WNL and S2 WNL. No S3 or S4. Murmurs & Other Heart Sounds: Auscultation of the heart reveals - No Murmurs. Peripheral Vascular   Upper Extremity: Inspection - Bilateral - No Cyanotic nailbeds or Digital clubbing. Lower Extremity:   Palpation: Edema - Bilateral - No edema. Abdomen:   Soft, non-tender, bowel sounds are active. Neuro: A&O times 3, CN and motor grossly WNL    We will set up for labs and repeat cardioversion. If that is unsuccessful, will discuss options of AF ablation versus rate control and anticoagulation. Follow up after cardioversion.

## 2022-06-07 NOTE — PATIENT INSTRUCTIONS
Your physician has ordered a Cardioversion in the near future. Please get your blood work done as soon as you can. Follow up with us after your cardioversion.

## 2022-06-07 NOTE — LETTER
6/7/2022    Patient: Nya Glasgow   YOB: 1940   Date of Visit: 6/7/2022     Katie Edmond, [address]  [facility]  [address] [facility] [address]    Dear Katie Edmond MD,      Thank you for referring Mr. Nya Glasgow to [address] [place] [address] for evaluation. My notes for this consultation are attached. If you have questions, please do not hesitate to call me. I look forward to following your patient along with you.       Sincerely,    Marisol Mccartney MD

## 2022-06-08 ENCOUNTER — TELEPHONE (OUTPATIENT)
Dept: CARDIOLOGY CLINIC | Age: 82
End: 2022-06-08

## 2022-06-08 NOTE — TELEPHONE ENCOUNTER
----- Message from Janneth Guardado NP sent at 6/8/2022 10:50 AM EDT -----  Pre cardioversion lab reviewed.   Just fyi no need to call

## 2022-06-16 ENCOUNTER — HOSPITAL ENCOUNTER (OUTPATIENT)
Dept: NON INVASIVE DIAGNOSTICS | Age: 82
Discharge: HOME | End: 2022-06-16
Attending: INTERNAL MEDICINE
Payer: MEDICARE

## 2022-06-16 VITALS
BODY MASS INDEX: 40.18 KG/M2 | SYSTOLIC BLOOD PRESSURE: 127 MMHG | DIASTOLIC BLOOD PRESSURE: 85 MMHG | RESPIRATION RATE: 15 BRPM | OXYGEN SATURATION: 95 % | HEART RATE: 70 BPM | WEIGHT: 256 LBS | HEIGHT: 67 IN | TEMPERATURE: 98.9 F

## 2022-06-16 DIAGNOSIS — I48.0 PAF (PAROXYSMAL ATRIAL FIBRILLATION) (HCC): ICD-10-CM

## 2022-06-16 DIAGNOSIS — E11.8 CONTROLLED DIABETES MELLITUS TYPE 2 WITH COMPLICATIONS, UNSPECIFIED WHETHER LONG TERM INSULIN USE (HCC): ICD-10-CM

## 2022-06-16 DIAGNOSIS — E78.2 MIXED HYPERLIPIDEMIA: ICD-10-CM

## 2022-06-16 DIAGNOSIS — I48.92 ATRIAL FIBRILLATION AND FLUTTER (HCC): ICD-10-CM

## 2022-06-16 DIAGNOSIS — I10 BENIGN ESSENTIAL HTN: ICD-10-CM

## 2022-06-16 DIAGNOSIS — R06.02 SOB (SHORTNESS OF BREATH): ICD-10-CM

## 2022-06-16 DIAGNOSIS — I48.92 ATRIAL FLUTTER, PAROXYSMAL (HCC): ICD-10-CM

## 2022-06-16 DIAGNOSIS — I48.91 ATRIAL FIBRILLATION AND FLUTTER (HCC): ICD-10-CM

## 2022-06-16 LAB
ATRIAL RATE: 258 BPM
ATRIAL RATE: 76 BPM
CALCULATED P AXIS, ECG09: 62 DEGREES
CALCULATED P AXIS, ECG09: 65 DEGREES
CALCULATED R AXIS, ECG10: -6 DEGREES
CALCULATED R AXIS, ECG10: -7 DEGREES
CALCULATED T AXIS, ECG11: 14 DEGREES
CALCULATED T AXIS, ECG11: 21 DEGREES
DIAGNOSIS, 93000: NORMAL
DIAGNOSIS, 93000: NORMAL
P-R INTERVAL, ECG05: 224 MS
Q-T INTERVAL, ECG07: 436 MS
Q-T INTERVAL, ECG07: 466 MS
QRS DURATION, ECG06: 100 MS
QRS DURATION, ECG06: 90 MS
QTC CALCULATION (BEZET), ECG08: 461 MS
QTC CALCULATION (BEZET), ECG08: 490 MS
VENTRICULAR RATE, ECG03: 59 BPM
VENTRICULAR RATE, ECG03: 76 BPM

## 2022-06-16 PROCEDURE — 92960 CARDIOVERSION ELECTRIC EXT: CPT | Performed by: INTERNAL MEDICINE

## 2022-06-16 PROCEDURE — 93312 ECHO TRANSESOPHAGEAL: CPT

## 2022-06-16 PROCEDURE — 93005 ELECTROCARDIOGRAM TRACING: CPT

## 2022-06-16 PROCEDURE — 74011250636 HC RX REV CODE- 250/636: Performed by: INTERNAL MEDICINE

## 2022-06-16 PROCEDURE — 92960 CARDIOVERSION ELECTRIC EXT: CPT

## 2022-06-16 RX ORDER — MIDAZOLAM HYDROCHLORIDE 5 MG/ML
15 INJECTION INTRAMUSCULAR; INTRAVENOUS
Status: DISCONTINUED | OUTPATIENT
Start: 2022-06-16 | End: 2022-06-16

## 2022-06-16 RX ORDER — FENTANYL CITRATE 50 UG/ML
300 INJECTION, SOLUTION INTRAMUSCULAR; INTRAVENOUS
Status: DISCONTINUED | OUTPATIENT
Start: 2022-06-16 | End: 2022-06-20 | Stop reason: HOSPADM

## 2022-06-16 RX ORDER — MIDAZOLAM HYDROCHLORIDE 1 MG/ML
8 INJECTION, SOLUTION INTRAMUSCULAR; INTRAVENOUS
Status: DISCONTINUED | OUTPATIENT
Start: 2022-06-16 | End: 2022-06-20 | Stop reason: HOSPADM

## 2022-06-16 RX ADMIN — FENTANYL CITRATE 25 MCG: 50 INJECTION, SOLUTION INTRAMUSCULAR; INTRAVENOUS at 09:34

## 2022-06-16 RX ADMIN — MIDAZOLAM 2 MG: 1 INJECTION INTRAMUSCULAR; INTRAVENOUS at 09:28

## 2022-06-16 RX ADMIN — FENTANYL CITRATE 25 MCG: 50 INJECTION, SOLUTION INTRAMUSCULAR; INTRAVENOUS at 09:29

## 2022-06-16 RX ADMIN — MIDAZOLAM 1 MG: 1 INJECTION INTRAMUSCULAR; INTRAVENOUS at 09:34

## 2022-06-16 NOTE — PROGRESS NOTES
Patient discharged to home and transported to d/c lot via w/c. Post cardioversion instructions reviewed with pt. Including f/u with Dr. Valerie Stephenson office in 2-4 weeks and confirm when or if to stop blood thinners prior to colonoscopy on 6/24/22. Both pt. And wife verbalized understanding of the instructions.

## 2022-06-16 NOTE — DISCHARGE INSTRUCTIONS
ED Provider Note    CHIEF COMPLAINT  Chief Complaint   Patient presents with   • Abdominal Pain     lower abdomen, onset last night       HPI  Chasity Carrion is a 29 y.o. female who presents with chief complaint of abdominal pain.  Patient reports abdominal pain began last night.  She reports the pain is constant but worsens and improves without any exacerbating or relieving factors.  Patient reports associated nausea and vomiting.  She has vomited 3 times since the onset of the pain.  Patient reports emesis is nonbloody nonbilious.  She denies any major changes in her bowel movements.  Patient denies any vaginal bleeding outside of her cycle or vaginal discharge.  Patient reports that she is currently not on her cycle.  Patient denies any associated fevers or chills.  There is no association of pain with food or eating.  Patient denies any dysuria urgency or frequency.  Patient reports she has never had sex.    REVIEW OF SYSTEMS  Review of Systems   Cardiovascular: Negative for chest pain.   Gastrointestinal: Positive for abdominal pain and nausea. Negative for melena and vomiting.       See HPI for further details. All other systems are negative.     PAST MEDICAL HISTORY       SOCIAL HISTORY  Social History     Tobacco Use   • Smoking status: Not on file   Substance and Sexual Activity   • Alcohol use: Not on file   • Drug use: Not on file   • Sexual activity: Not on file       SURGICAL HISTORY  patient denies any surgical history    CURRENT MEDICATIONS  Home Medications     Reviewed by Meka Martini R.N. (Registered Nurse) on 08/17/21 at 0739  Med List Status: Complete   Medication Last Dose Status        Patient Lopez Taking any Medications                       ALLERGIES  No Known Allergies    PHYSICAL EXAM  Vitals:    08/17/21 0731   BP: 132/89   Pulse: (!) 105   Resp: 18   Temp: 36.9 °C (98.5 °F)   SpO2: 96%       Physical Exam  Constitutional:       Appearance: She is well-developed.  You have received sedation medications today. YOU SHOULD NOT DRIVE FOR 24 HOURS, DO NOT OPERATE HEAVY MACHINERY, DO NOT MAKE ANY LEGAL DECISIONS OR SIGN LEGAL DOCUMENTS FOR 24 HOURS. DO NOT DRINK ALCOHOL, TAKE ANY MEDICATIONS UNLESS PRESCRIBED BY YOUR DOCTOR. IF YOU ARE A CAREGIVER, SOMEONE SHOULD TAKE THAT ROLE FOR 24 HOURS. Side effects of sedation medications and other medications used today have been reviewed  Those side effects can include but are not limited to: dizziness, drowsiness, poor balance, fatigue, sleepiness. Take precautions at home to prevent falls, such as assistance with walking or stairs if allowed and /or when needed or position changes. Allergic or adverse reactions could include nausea, itching, hives, dizziness, or anything else out of the ordinary.   HENT:      Head: Normocephalic and atraumatic.   Eyes:      Conjunctiva/sclera: Conjunctivae normal.   Cardiovascular:      Rate and Rhythm: Regular rhythm.      Comments: Mildly tachycardic  Pulmonary:      Effort: Pulmonary effort is normal.      Breath sounds: Normal breath sounds.   Abdominal:      General: Bowel sounds are normal. There is no distension.      Palpations: Abdomen is soft.      Tenderness: There is abdominal tenderness. There is no rebound.      Comments: Moderate tenderness periumbilically but more pronounced to the right of the umbilicus   Musculoskeletal:      Cervical back: Normal range of motion and neck supple.   Skin:     General: Skin is warm and dry.      Capillary Refill: Capillary refill takes less than 2 seconds.      Findings: No rash.   Neurological:      Mental Status: She is alert and oriented to person, place, and time.   Psychiatric:         Behavior: Behavior normal.           DIAGNOSTIC STUDIES / PROCEDURES      LABS  Results for orders placed or performed during the hospital encounter of 08/17/21   URINALYSIS,CULTURE IF INDICATED    Specimen: Urine   Result Value Ref Range    Color Yellow     Character Clear     Specific Gravity 1.022 <1.035    Ph >=9.0 (A) 5.0 - 8.0    Glucose Negative Negative mg/dL    Ketones Negative Negative mg/dL    Protein Negative Negative mg/dL    Bilirubin Negative Negative    Urobilinogen, Urine 1.0 Negative    Nitrite Negative Negative    Leukocyte Esterase Trace (A) Negative    Occult Blood Negative Negative    Micro Urine Req Microscopic    URINE MICROSCOPIC (W/UA)   Result Value Ref Range    WBC 2-5 /hpf    RBC 2-5 (A) /hpf    Bacteria Few (A) None /hpf    Epithelial Cells Moderate (A) /hpf    Hyaline Cast 0-2 /lpf   BETA-HCG QUALITATIVE URINE   Result Value Ref Range    Beta-Hcg Urine Negative Negative   CBC WITH DIFFERENTIAL   Result Value Ref Range    WBC 18.6 (H) 4.8 - 10.8 K/uL    RBC 5.44 (H) 4.20 - 5.40 M/uL    Hemoglobin 14.4 12.0 -  16.0 g/dL    Hematocrit 45.2 37.0 - 47.0 %    MCV 83.1 81.4 - 97.8 fL    MCH 26.5 (L) 27.0 - 33.0 pg    MCHC 31.9 (L) 33.6 - 35.0 g/dL    RDW 44.7 35.9 - 50.0 fL    Platelet Count 398 164 - 446 K/uL    MPV 9.3 9.0 - 12.9 fL    Neutrophils-Polys 81.80 (H) 44.00 - 72.00 %    Lymphocytes 9.00 (L) 22.00 - 41.00 %    Monocytes 8.30 0.00 - 13.40 %    Eosinophils 0.10 0.00 - 6.90 %    Basophils 0.20 0.00 - 1.80 %    Immature Granulocytes 0.60 0.00 - 0.90 %    Nucleated RBC 0.00 /100 WBC    Neutrophils (Absolute) 15.21 (H) 2.00 - 7.15 K/uL    Lymphs (Absolute) 1.68 1.00 - 4.80 K/uL    Monos (Absolute) 1.54 (H) 0.00 - 0.85 K/uL    Eos (Absolute) 0.01 0.00 - 0.51 K/uL    Baso (Absolute) 0.04 0.00 - 0.12 K/uL    Immature Granulocytes (abs) 0.11 0.00 - 0.11 K/uL    NRBC (Absolute) 0.00 K/uL   CMP   Result Value Ref Range    Sodium 137 135 - 145 mmol/L    Potassium 4.0 3.6 - 5.5 mmol/L    Chloride 102 96 - 112 mmol/L    Co2 22 20 - 33 mmol/L    Anion Gap 13.0 7.0 - 16.0    Glucose 102 (H) 65 - 99 mg/dL    Bun 8 8 - 22 mg/dL    Creatinine 0.50 0.50 - 1.40 mg/dL    Calcium 9.7 8.5 - 10.5 mg/dL    AST(SGOT) 21 12 - 45 U/L    ALT(SGPT) 26 2 - 50 U/L    Alkaline Phosphatase 108 (H) 30 - 99 U/L    Total Bilirubin 1.7 (H) 0.1 - 1.5 mg/dL    Albumin 4.4 3.2 - 4.9 g/dL    Total Protein 8.1 6.0 - 8.2 g/dL    Globulin 3.7 (H) 1.9 - 3.5 g/dL    A-G Ratio 1.2 g/dL   ESTIMATED GFR   Result Value Ref Range    GFR If African American >60 >60 mL/min/1.73 m 2    GFR If Non African American >60 >60 mL/min/1.73 m 2   LIPASE   Result Value Ref Range    Lipase 25 11 - 82 U/L   SARS-COV Antigen ANTONIO: Collect dry nasal swab   Result Value Ref Range    SARS-CoV-2 Source Nasal Swab     SARS-COV ANTIGEN ANTONIO NotDetected Not-Detected         RADIOLOGY  CT-ABDOMEN-PELVIS WITH   Final Result      1.  Findings compatible with appendicitis.   2.  Small amount of free fluid in the pelvis.   3.  Probable hepatic steatosis.              COURSE & MEDICAL DECISION  MAKING  Pertinent Labs & Imaging studies reviewed. (See chart for details)    Patient here with nausea vomiting and abdominal pain.  Patient symptoms are most consistent with possible gastroenteritis versus appendicitis versus cystitis versus functional bowel pain versus ovarian torsion.  Will check basic labs.  Patient symptoms would be atypical appendicitis given the symptoms are coming and going however she does have more pronounced pain on her right side.  Will check CT, if CT reveals a very large associated ovarian cyst will consider possible torsion as well.  Patient's urinalysis is inconsistent with infection.  Patient is refusing a pelvic exam.  Patient white count is 18, she has been given IV fluids, morphine and Zofran.  Awaiting CT.  CT reveals findings consistent with acute appendicitis, patient given ceftriaxone and Flagyl, case discussed with Dr. Hale general surgery who will take patient to the operating room.  Covid testing has been sent.  Patient be sent from the emergency department to operating room      FINAL IMPRESSION  1.  Acute appendicitis         Electronically signed by: Rufino Anderson M.D., 8/17/2021 9:42 AM

## 2022-06-17 ENCOUNTER — TELEPHONE (OUTPATIENT)
Dept: CARDIOLOGY CLINIC | Age: 82
End: 2022-06-17

## 2022-06-17 NOTE — TELEPHONE ENCOUNTER
Patient is scheduled to have a colonoscopy on 6/24, would have to stop his plavix 6/21. Wants to check and see after what had happened with his heart, if he should reschedule the colonoscopy or if he is okay to proceed with it.      Please advise [phone]

## 2022-06-17 NOTE — TELEPHONE ENCOUNTER
Pre-Procedure Cardiac Clearance Request:    Facility: [facility]  Procedure Date: June 24, 2022    Procedure: colonoscopy    Surgeon:Jamir    Request for Interruption of Anticoagulants:  Eliquis 5 mg Bid  Patient last cardioversion June 16, 2022. Asking if he can be  cleared from a cardiac standpoint to proceed with upcoming procedure or need to reschedule Colonoscopy. Please advise.

## 2022-06-20 ENCOUNTER — TELEPHONE (OUTPATIENT)
Dept: CARDIOLOGY CLINIC | Age: 82
End: 2022-06-20

## 2022-06-20 NOTE — TELEPHONE ENCOUNTER
Spoke with patients wife, Arnie Sabillon on [place]. Appointment given for 9 am on June 21, 2022. NP wants to see patient at 8:40 am, patient was informed to come early.

## 2022-06-20 NOTE — PROGRESS NOTES
[address], [place], [address] [place] [address]  [phone]     Subjective:      Mesha Han is a 79 y.o. male is here for  Follow up. He has a PMHx of PAF, DM2, HTN, HLD. Hx  REN/CV in 5/17/2022. Now S/p REN/ CV  On 6/16/2022    Today, back in afib, rate controlled. States compliance with medication regimen. He is on [address], reports no melena, hematuria, or obvious signs of bleeding. No falls. He is also seeking cardiac clearance to hold eliquis for colonoscopy 6/24/2022. the patient denies chest pain/ shortness of breath, orthopnea, PND, LE edema, palpitations, syncope, or presyncope.        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 01/09/2020      Manda Romero MD  Past Medical History:   Diagnosis Date    Arthritis     At risk for sleep apnea 12/18/2019    Stop Bang 5     Atrial fibrillation (HCC)     Atrial Flutter    Cancer ([address]     prostate cancer    Cataract     Diabetes ([address]     Elevated cholesterol     Hypertension     PUD (peptic ulcer disease)       Past Surgical History:   Procedure Laterality Date    COLONOSCOPY N/A 9/27/2017    COLONOSCOPY performed by Kaiden Segal MD at [facility] ENDOSCOPY    COLONOSCOPY N/A 12/30/2019    COLONOSCOPY WITH POLYPECTOMY performed by Gallo Schroeder MD at [address] COLONOSCOPY N/A 10/11/2021    COLONOSCOPY performed by Gallo Schroeder MD at [facility] ENDOSCOPY    HX CATARACT REMOVAL Left 01/08/2020    HX HERNIA REPAIR      HX HIP REPLACEMENT Right     HX KNEE REPLACEMENT Right     HX ORTHOPAEDIC Right     total r hip replacement 2021    HX PROSTATECTOMY       No Known Allergies   Family History   Problem Relation Age of Onset    Cancer Mother         ovarian    Stroke Father       Social History     Socioeconomic History    Marital status:  Spouse name: Not on file    Number of children: Not on file    Years of education: Not on file    Highest education level: Not on file   Occupational History    Not on file   Tobacco Use    Smoking status: Former Smoker     Packs/day: 2.00     Years: 15.00     Pack years: 30.00     Quit date: 1965     Years since quittin.7    Smokeless tobacco: Never Used   Vaping Use    Vaping Use: Never used   Substance and Sexual Activity    Alcohol use: No    Drug use: No    Sexual activity: Not on file   Other Topics Concern    Not on file   Social History Narrative    Not on file     Social Determinants of Health     Financial Resource Strain:     Difficulty of Paying Living Expenses: Not on file   Food Insecurity:     Worried About Running Out of Food in the Last Year: Not on file    Janis of Food in the Last Year: Not on file   Transportation Needs:     Lack of Transportation (Medical): Not on file    Lack of Transportation (Non-Medical):  Not on file   Physical Activity:     Days of Exercise per Week: Not on file    Minutes of Exercise per Session: Not on file   Stress:     Feeling of Stress : Not on file   Social Connections:     Frequency of Communication with Friends and Family: Not on file    Frequency of Social Gatherings with Friends and Family: Not on file    Attends [employer] Services: Not on file    Active Member of [address] [place] [address] [employer] or Organizations: Not on file    Attends Club or Organization Meetings: Not on file    Marital Status: Not on file   Intimate Partner Violence:     Fear of Current or Ex-Partner: Not on file    Emotionally Abused: Not on file    Physically Abused: Not on file    Sexually Abused: Not on file   Housing Stability:     Unable to Pay for Housing in the Last Year: Not on file    Number of [place] in the Last Year: Not on file    Unstable Housing in the Last Year: Not on file      Current Outpatient Medications   Medication Sig    furosemide (LASIX) 20 mg tablet Take 20 mg by mouth as needed.  metoprolol tartrate (LOPRESSOR) 25 mg tablet Take 0.5 Tablets by mouth two (2) times a day.  amiodarone (CORDARONE) 200 mg tablet Take 1 Tablet by mouth daily.  glimepiride (AMARYL) 1 mg tablet TAKE 1 TABLET EVERY MORNING WITH BREAKFAST    apixaban (ELIQUIS) 5 mg tablet Take 1 Tablet by mouth two (2) times a day.  pravastatin (PRAVACHOL) 20 mg tablet Take 20 mg by mouth nightly.  amLODIPine (NORVASC) 5 mg tablet Take 5 mg by mouth daily.  losartan (COZAAR) 100 mg tablet Take 100 mg by mouth daily.  multivitamin (ONE A DAY) tablet Take 1 Tab by mouth daily.  acetaminophen (TYLENOL EXTRA STRENGTH) 500 mg tablet Take 1,000 mg by mouth every six (6) hours as needed for Pain.  metFORMIN (GLUCOPHAGE) 1,000 mg tablet Take 1,000 mg by mouth two (2) times a day.  Omega-3-DHA-EPA-Fish Oil (FISH OIL) 1,000 mg (120 mg-180 mg) cap Take 1 Tab by mouth daily. No current facility-administered medications for this visit. Review of Symptoms:  11 systems reviewed, negative other than as stated in the HPI    Physical ExamPhysical Exam:    There were no vitals filed for this visit. There is no height or weight on file to calculate BMI. General PE  Gen:  NAD  Mental Status - Alert. General Appearance - Not in acute distress. HEENT:  PERRL, no carotid bruits or JVD  Chest and Lung Exam   Inspection: Accessory muscles - No use of accessory muscles in breathing. Auscultation:   Breath sounds: - Normal.   Cardiovascular   Inspection: Jugular vein - Bilateral - Inspection Normal.   Palpation/Percussion:   Apical Impulse: - Normal.   Auscultation: Rhythm - IRRegular. Heart Sounds - S1 WNL and S2 WNL. No S3 or S4. Murmurs & Other Heart Sounds: Auscultation of the heart reveals - No Murmurs. Peripheral Vascular   Upper Extremity: Inspection - Bilateral - No Cyanotic nailbeds or Digital clubbing.    Lower Extremity:   Palpation: Edema - Bilateral - No edema. Abdomen:   Soft, non-tender, bowel sounds are active. Neuro: A&O times 3, CN and motor grossly WNL    Labs:   No results found for: CHOL, CHOLX, CHLST, CHOLV, 432645, HDL, HDLP, LDL, LDLC, DLDLP, TGLX, TRIGL, TRIGP, CHHD, CHHDX  No results found for: CPK, CPKX, CPX  Lab Results   Component Value Date/Time    Sodium 138 06/07/2022 11:50 AM    Potassium 3.9 06/07/2022 11:50 AM    Chloride 103 06/07/2022 11:50 AM    CO2 30 06/07/2022 11:50 AM    Anion gap 5 06/07/2022 11:50 AM    Glucose 217 (H) 06/07/2022 11:50 AM    BUN 24 (H) 06/07/2022 11:50 AM    Creatinine 1.24 06/07/2022 11:50 AM    BUN/Creatinine ratio 19 06/07/2022 11:50 AM    GFR est AA >60 06/07/2022 11:50 AM    GFR est non-AA 56 (L) 06/07/2022 11:50 AM    Calcium 10.0 06/07/2022 11:50 AM    Bilirubin, total 0.7 10/10/2021 02:49 AM    Alk. phosphatase 91 10/10/2021 02:49 AM    Protein, total 7.3 10/10/2021 02:49 AM    Albumin 3.1 (L) 10/10/2021 02:49 AM    Globulin 4.2 (H) 10/10/2021 02:49 AM    A-G Ratio 0.7 (L) 10/10/2021 02:49 AM    ALT (SGPT) 18 10/10/2021 02:49 AM       EKG:  Atrial fibrillation       Assessment:          ICD-10-CM ICD-9-CM    1. PAF (paroxysmal atrial fibrillation) (HCC)  I48.0 427.31    2. Atrial flutter, paroxysmal (HCC)  I48.92 427.32    3. Benign essential HTN  I10 401.1    4. Mixed hyperlipidemia  E78.2 272.2    5. Controlled type 2 diabetes mellitus with complication, without long-term current use of insulin (HCC)  E11.8 250.90        No orders of the defined types were placed in this encounter. 05/17/22    ECHO REN W POSSIBLE CARDIOVERSION 05/17/2022 5/17/2022    Interpretation Summary    Left Ventricle: Normal left ventricular systolic function with a visually estimated EF of 55 - 60%. Left ventricle size is normal. Normal wall thickness. Normal wall motion. Normal diastolic function.   Aortic Valve: Moderate regurgitation.   Mitral Valve: Moderate regurgitation.   Left Atrium: Normal sized appendage.  No left atrial appendage thrombus noted.   Aorta: Normal sized aortic root. Mildly dilated ascending aorta. Signed by: Patricia Justin MD on 5/17/2022  6:10 PM       Plan:       PAF/AFL  S/p REN/ CV 6/16/2022  Sp REN/CV 5/17/2022  REN 5/17/2022 posted above  Echo done 10/2021 with preserved LVEF 55-60%, mild AS  Continue Amiodarone 200 mg daily  (started post CV)  Continue Eliquis 5 mg BID  Continue Metoprolol Tartrate 12.5 mg BID  Back in afib, discussed options of AF ablation versus rate control and anticoagulation. He prefers conservative mgmt at this time. On holding eliquis:   strongly recommend against interruption of Eliquis for colonoscopy at this point. There is a high risk of stroke in the first month or perhaps more after cardioversion if blood thinners are interrupted. He should delay colonoscopy unless there is major bleeding.   He will reschedule colonoscopy in 1-2 mos    Valvular d/o  Moderate AR/MR per REN 5/17/2022    HTN  BP controlled.  Continue anti-hypertensive therapy and low sodium diet  Serum Cr 1.24 in 6/2022     Mixed hyperlipidemia  Continue statin therapy and and low fat diet  Labs and lipids per PCP    DM  On oral agent    Continue current care and f/u in 3 mos    Brenda Quintana NP

## 2022-06-21 ENCOUNTER — OFFICE VISIT (OUTPATIENT)
Dept: CARDIOLOGY CLINIC | Age: 82
End: 2022-06-21
Payer: MEDICARE

## 2022-06-21 VITALS
SYSTOLIC BLOOD PRESSURE: 140 MMHG | HEART RATE: 88 BPM | OXYGEN SATURATION: 95 % | BODY MASS INDEX: 40.81 KG/M2 | DIASTOLIC BLOOD PRESSURE: 70 MMHG | HEIGHT: 67 IN | RESPIRATION RATE: 16 BRPM | WEIGHT: 260 LBS

## 2022-06-21 DIAGNOSIS — I10 BENIGN ESSENTIAL HTN: ICD-10-CM

## 2022-06-21 DIAGNOSIS — I48.92 ATRIAL FLUTTER, PAROXYSMAL (HCC): ICD-10-CM

## 2022-06-21 DIAGNOSIS — E11.8 CONTROLLED TYPE 2 DIABETES MELLITUS WITH COMPLICATION, WITHOUT LONG-TERM CURRENT USE OF INSULIN (HCC): ICD-10-CM

## 2022-06-21 DIAGNOSIS — E78.2 MIXED HYPERLIPIDEMIA: ICD-10-CM

## 2022-06-21 DIAGNOSIS — I48.0 PAF (PAROXYSMAL ATRIAL FIBRILLATION) (HCC): Primary | ICD-10-CM

## 2022-06-21 PROCEDURE — 93000 ELECTROCARDIOGRAM COMPLETE: CPT | Performed by: NURSE PRACTITIONER

## 2022-06-21 PROCEDURE — G8753 SYS BP > OR = 140: HCPCS | Performed by: NURSE PRACTITIONER

## 2022-06-21 PROCEDURE — G8432 DEP SCR NOT DOC, RNG: HCPCS | Performed by: NURSE PRACTITIONER

## 2022-06-21 PROCEDURE — 99214 OFFICE O/P EST MOD 30 MIN: CPT | Performed by: NURSE PRACTITIONER

## 2022-06-21 PROCEDURE — 1101F PT FALLS ASSESS-DOCD LE1/YR: CPT | Performed by: NURSE PRACTITIONER

## 2022-06-21 PROCEDURE — G8427 DOCREV CUR MEDS BY ELIG CLIN: HCPCS | Performed by: NURSE PRACTITIONER

## 2022-06-21 PROCEDURE — G8536 NO DOC ELDER MAL SCRN: HCPCS | Performed by: NURSE PRACTITIONER

## 2022-06-21 PROCEDURE — G8417 CALC BMI ABV UP PARAM F/U: HCPCS | Performed by: NURSE PRACTITIONER

## 2022-06-21 PROCEDURE — G8754 DIAS BP LESS 90: HCPCS | Performed by: NURSE PRACTITIONER

## 2022-06-21 PROCEDURE — 1123F ACP DISCUSS/DSCN MKR DOCD: CPT | Performed by: NURSE PRACTITIONER

## 2022-06-21 NOTE — TELEPHONE ENCOUNTER
Yrn Blunt and Avanell Cranker. We performed REN and cardioversion on this patient but for some reason his report never appeared in my reading work list on [address] [place] [address]. Can you please investigate and let me know when you think it is fixed by confidential text message?

## 2022-06-21 NOTE — TELEPHONE ENCOUNTER
I strongly recommend against interruption of Eliquis for colonoscopy at this point. There is a high risk of stroke in the first month or perhaps more after cardioversion if blood thinners are interrupted. He should delay colonoscopy unless there is major bleeding. Let me know if there needs to be further discussion.

## 2022-10-03 ENCOUNTER — OFFICE VISIT (OUTPATIENT)
Dept: CARDIOLOGY CLINIC | Age: 82
End: 2022-10-03
Payer: MEDICARE

## 2022-10-03 VITALS
WEIGHT: 257.6 LBS | RESPIRATION RATE: 18 BRPM | OXYGEN SATURATION: 96 % | BODY MASS INDEX: 40.43 KG/M2 | DIASTOLIC BLOOD PRESSURE: 64 MMHG | HEART RATE: 48 BPM | SYSTOLIC BLOOD PRESSURE: 140 MMHG | HEIGHT: 67 IN

## 2022-10-03 DIAGNOSIS — E78.2 MIXED HYPERLIPIDEMIA: ICD-10-CM

## 2022-10-03 DIAGNOSIS — I48.0 PAF (PAROXYSMAL ATRIAL FIBRILLATION) (HCC): Primary | ICD-10-CM

## 2022-10-03 DIAGNOSIS — I48.92 ATRIAL FIBRILLATION AND FLUTTER (HCC): ICD-10-CM

## 2022-10-03 DIAGNOSIS — I10 PRIMARY HYPERTENSION: ICD-10-CM

## 2022-10-03 DIAGNOSIS — I48.92 ATRIAL FLUTTER, PAROXYSMAL (HCC): ICD-10-CM

## 2022-10-03 DIAGNOSIS — E11.8 CONTROLLED TYPE 2 DIABETES MELLITUS WITH COMPLICATION, WITHOUT LONG-TERM CURRENT USE OF INSULIN (HCC): ICD-10-CM

## 2022-10-03 DIAGNOSIS — I48.91 ATRIAL FIBRILLATION AND FLUTTER (HCC): ICD-10-CM

## 2022-10-03 DIAGNOSIS — I10 BENIGN ESSENTIAL HTN: ICD-10-CM

## 2022-10-03 PROCEDURE — G8754 DIAS BP LESS 90: HCPCS | Performed by: INTERNAL MEDICINE

## 2022-10-03 PROCEDURE — G8536 NO DOC ELDER MAL SCRN: HCPCS | Performed by: INTERNAL MEDICINE

## 2022-10-03 PROCEDURE — 1123F ACP DISCUSS/DSCN MKR DOCD: CPT | Performed by: INTERNAL MEDICINE

## 2022-10-03 PROCEDURE — 1101F PT FALLS ASSESS-DOCD LE1/YR: CPT | Performed by: INTERNAL MEDICINE

## 2022-10-03 PROCEDURE — G8417 CALC BMI ABV UP PARAM F/U: HCPCS | Performed by: INTERNAL MEDICINE

## 2022-10-03 PROCEDURE — 93000 ELECTROCARDIOGRAM COMPLETE: CPT | Performed by: INTERNAL MEDICINE

## 2022-10-03 PROCEDURE — G8753 SYS BP > OR = 140: HCPCS | Performed by: INTERNAL MEDICINE

## 2022-10-03 PROCEDURE — 99214 OFFICE O/P EST MOD 30 MIN: CPT | Performed by: INTERNAL MEDICINE

## 2022-10-03 PROCEDURE — G8510 SCR DEP NEG, NO PLAN REQD: HCPCS | Performed by: INTERNAL MEDICINE

## 2022-10-03 PROCEDURE — G8427 DOCREV CUR MEDS BY ELIG CLIN: HCPCS | Performed by: INTERNAL MEDICINE

## 2022-10-03 RX ORDER — PREDNISONE 10 MG/1
TABLET ORAL
COMMUNITY
Start: 2022-09-20

## 2022-10-03 RX ORDER — AMOXICILLIN 875 MG/1
TABLET, FILM COATED ORAL
COMMUNITY
Start: 2022-09-20

## 2022-10-03 RX ORDER — BACLOFEN 10 MG/1
10 TABLET ORAL 2 TIMES DAILY
COMMUNITY
Start: 2022-09-16

## 2022-10-03 NOTE — PROGRESS NOTES
[place] , [place], [address] [place] [address]  [phone]     Subjective:      Manjula Watts is a 79 y.o. male is here for routine f/u. The patient was last seen by us 6/21/22 post CV. Today, the patient denies chest pain/ shortness of breath, orthopnea, PND, LE edema, palpitations, syncope, or presyncope.        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 01/09/2020      Kiera Montiel MD  Past Medical History:   Diagnosis Date    Arthritis     At risk for sleep apnea 12/18/2019    Stop Bang 5     Atrial fibrillation (HCC)     Atrial Flutter    Cancer ([facility]     prostate cancer    Cataract     Diabetes ([facility]     Elevated cholesterol     Hypertension     PUD (peptic ulcer disease)       Past Surgical History:   Procedure Laterality Date    COLONOSCOPY N/A 9/27/2017    COLONOSCOPY performed by Rashida Tidwell MD at [facility] ENDOSCOPY    COLONOSCOPY N/A 12/30/2019    COLONOSCOPY WITH POLYPECTOMY performed by Alyssa Powell MD at [facility] AMBULATORY OR    COLONOSCOPY N/A 10/11/2021    COLONOSCOPY performed by Alyssa Powell MD at [facility] ENDOSCOPY    HX CATARACT REMOVAL Left 01/08/2020    HX HERNIA REPAIR      HX HIP REPLACEMENT Right     HX KNEE REPLACEMENT Right     HX ORTHOPAEDIC Right     total r hip replacement 2021    HX PROSTATECTOMY       No Known Allergies   Family History   Problem Relation Age of Onset    Cancer Mother         ovarian    Stroke Father       Social History     Socioeconomic History    Marital status:      Spouse name: Not on file    Number of children: Not on file    Years of education: Not on file    Highest education level: Not on file   Occupational History    Not on file   Tobacco Use    Smoking status: Former     Packs/day: 2.00     Years: 15.00     Pack years: 30.00     Types: Cigarettes     Quit date: 9/26/1965 Years since quittin.0    Smokeless tobacco: Never   Vaping Use    Vaping Use: Never used   Substance and Sexual Activity    Alcohol use: No    Drug use: No    Sexual activity: Not on file   Other Topics Concern    Not on file   Social History Narrative    Not on file     Social Determinants of Health     Financial Resource Strain: Not on file   Food Insecurity: Not on file   Transportation Needs: Not on file   Physical Activity: Not on file   Stress: Not on file   Social Connections: Not on file   Intimate Partner Violence: Not on file   Housing Stability: Not on file      Current Outpatient Medications   Medication Sig    amoxicillin (AMOXIL) 875 mg tablet TAKE ONE TABLET TWICE DAILY    furosemide (LASIX) 20 mg tablet Take 20 mg by mouth as needed. metoprolol tartrate (LOPRESSOR) 25 mg tablet Take 0.5 Tablets by mouth two (2) times a day. amiodarone (CORDARONE) 200 mg tablet Take 1 Tablet by mouth daily. glimepiride (AMARYL) 1 mg tablet TAKE 1 TABLET EVERY MORNING WITH BREAKFAST    apixaban (ELIQUIS) 5 mg tablet Take 1 Tablet by mouth two (2) times a day. pravastatin (PRAVACHOL) 20 mg tablet Take 20 mg by mouth nightly. amLODIPine (NORVASC) 5 mg tablet Take 5 mg by mouth daily. losartan (COZAAR) 100 mg tablet Take 100 mg by mouth daily. multivitamin (ONE A DAY) tablet Take 1 Tab by mouth daily. metFORMIN (GLUCOPHAGE) 1,000 mg tablet Take 1,000 mg by mouth two (2) times a day. baclofen (LIORESAL) 10 mg tablet Take 10 mg by mouth two (2) times a day. (Patient not taking: Reported on 10/3/2022)    predniSONE (DELTASONE) 10 mg tablet TAKE ONE TABLET TWICE DAILY (Patient not taking: Reported on 10/3/2022)    acetaminophen (TYLENOL) 500 mg tablet Take 1,000 mg by mouth every six (6) hours as needed for Pain. (Patient not taking: Reported on 10/3/2022)    omega 3-DHA-EPA-fish oil 1,000 mg (120 mg-180 mg) capsule Take 1 Tab by mouth daily.  (Patient not taking: Reported on 10/3/2022) No current facility-administered medications for this visit. Review of Symptoms:  11 systems reviewed, negative other than as stated in the HPI    Physical ExamPhysical Exam:    Vitals:    10/03/22 1305 10/03/22 1316   BP: (!) 170/66 (!) 140/64   Pulse: (!) 48    Resp: 18    SpO2: 96%    Weight: 257 lb 9.6 oz (116.8 kg)    Height: 5' 7\" (1.702 m)      Body mass index is 40.35 kg/m². General PE  Gen:  NAD  Mental Status - Alert. General Appearance - Not in acute distress. HEENT:  PERRL, no carotid bruits or JVD  Chest and Lung Exam   Inspection: Accessory muscles - No use of accessory muscles in breathing. Auscultation:   Breath sounds: - Normal.   Cardiovascular   Inspection: Jugular vein - Bilateral - Inspection Normal.   Palpation/Percussion:   Apical Impulse: - Normal.   Auscultation: Rhythm - Regular. Heart Sounds - S1 WNL and S2 WNL. No S3 or S4. Murmurs & Other Heart Sounds: Auscultation of the heart reveals - No Murmurs. Peripheral Vascular   Upper Extremity: Inspection - Bilateral - No Cyanotic nailbeds or Digital clubbing. Lower Extremity:   Palpation: Edema - Bilateral - No edema. Abdomen:   Soft, non-tender, bowel sounds are active. Neuro: A&O times 3, CN and motor grossly WNL    Labs:   No results found for: CHOL, CHOLX, CHLST, CHOLV, 179306, HDL, HDLP, LDL, LDLC, DLDLP, TGLX, TRIGL, TRIGP, CHHD, CHHDX  No results found for: CPK, CPKX, CPX  Lab Results   Component Value Date/Time    Sodium 138 06/07/2022 11:50 AM    Potassium 3.9 06/07/2022 11:50 AM    Chloride 103 06/07/2022 11:50 AM    CO2 30 06/07/2022 11:50 AM    Anion gap 5 06/07/2022 11:50 AM    Glucose 217 (H) 06/07/2022 11:50 AM    BUN 24 (H) 06/07/2022 11:50 AM    Creatinine 1.24 06/07/2022 11:50 AM    BUN/Creatinine ratio 19 06/07/2022 11:50 AM    GFR est AA >60 06/07/2022 11:50 AM    GFR est non-AA 56 (L) 06/07/2022 11:50 AM    Calcium 10.0 06/07/2022 11:50 AM    Bilirubin, total 0.7 10/10/2021 02:49 AM    Alk. phosphatase 91 10/10/2021 02:49 AM    Protein, total 7.3 10/10/2021 02:49 AM    Albumin 3.1 (L) 10/10/2021 02:49 AM    Globulin 4.2 (H) 10/10/2021 02:49 AM    A-G Ratio 0.7 (L) 10/10/2021 02:49 AM    ALT (SGPT) 18 10/10/2021 02:49 AM       EKG:  NSR       Assessment:          ICD-10-CM ICD-9-CM    1. PAF (paroxysmal atrial fibrillation) (HCC)  I48.0 427.31       2. Atrial fibrillation and flutter (HCC)  I48.91 427.31 AMB POC EKG ROUTINE W/ 12 LEADS, INTER & REP    I48.92 427.32       3. Atrial flutter, paroxysmal (HCC)  I48.92 427.32       4. Benign essential HTN  I10 401.1       5. Controlled type 2 diabetes mellitus with complication, without long-term current use of insulin (HCC)  E11.8 250.90       6. Mixed hyperlipidemia  E78.2 272.2       7. Primary hypertension  I10 401.9           Orders Placed This Encounter    AMB POC EKG ROUTINE W/ 12 LEADS, INTER & REP     Order Specific Question:   Reason for Exam:     Answer:   routine    amoxicillin (AMOXIL) 875 mg tablet     Sig: TAKE ONE TABLET TWICE DAILY    baclofen (LIORESAL) 10 mg tablet     Sig: Take 10 mg by mouth two (2) times a day. predniSONE (DELTASONE) 10 mg tablet     Sig: TAKE ONE TABLET TWICE DAILY        Plan:     PAF/AFL  S/p REN/ CV 6/16/2022  Sp REN/CV 5/17/2022  REN 5/17/2022 posted above  Echo done 10/2021 with preserved LVEF 55-60%, mild AS  Continue Amiodarone 200 mg daily  (started post CV)  Continue Eliquis 5 mg BID  Continue Metoprolol Tartrate 12.5 mg BID  EKG in June was mistakenly read as atrial fibrillation, but it was actually normal sinus rhythm with PACs, and he remains in sinus rhythm today      On holding eliquis:  He is now okay to hold his Eliquis for 2 days prior to colonoscopy as his cardioversion was in June. Valvular d/o  Moderate AR/MR per REN 5/17/2022-reassess and consider repeat echo at follow-up in 6 months     HTN  BP controlled.   Continue anti-hypertensive therapy and low sodium diet  Serum Cr 1.24 in 6/2022  Lab Results   Component Value Date/Time    Creatinine 1.24 06/07/2022 11:50 AM        Mixed hyperlipidemia  Continue statin therapy and and low fat diet  Labs and lipids per PCP      DM  On oral agent     Continue current care and f/u in 6 months, sooner as needed      Garrison Man MD

## 2022-10-03 NOTE — LETTER
10/3/2022    Patient: Bala York   YOB: 1940   Date of Visit: 10/3/2022     Corey Ferrell, [address] [place] [address]  [place]    Dear Corey Ferrell MD,      Thank you for referring Mr. Bala York to [address]  for evaluation. My notes for this consultation are attached. If you have questions, please do not hesitate to call me. I look forward to following your patient along with you.       Sincerely,    Ruiz Hood MD

## 2022-10-31 ENCOUNTER — TELEPHONE (OUTPATIENT)
Dept: CARDIOLOGY CLINIC | Age: 82
End: 2022-10-31

## 2022-11-01 NOTE — TELEPHONE ENCOUNTER
Last office note with cardiac clearance and EKG faxed to [facility] at [phone]. Left secure message to patient.

## 2022-11-07 ENCOUNTER — TELEPHONE (OUTPATIENT)
Dept: CARDIOLOGY CLINIC | Age: 82
End: 2022-11-07

## 2022-11-07 NOTE — TELEPHONE ENCOUNTER
Patient said that doctor Avila Sebastian said he doesn't have Afib and he would like to know why is he taking Eliquis medication.     [phone]

## 2022-11-07 NOTE — TELEPHONE ENCOUNTER
This nurse attempted to contact patient. Left message to call CAV back. Patient has a Dx of Afib since 10/2021.

## 2022-11-07 NOTE — TELEPHONE ENCOUNTER
Verified Patient with two identifiers  This nurse remained patient that he has a diagnosis of Atrial Fibrillation since 2021, patient did not remember stated. Patient also requesting cardiac clearance for upcoming procedures and to hold Eliquis. Pre-Procedure Cardiac Clearance Request:    [facility]ility: [facility]    Procedure DCQV:03-    Procedure: Colonoscopy    Surgeon: Yenni Rivera    Request for Interruption of Anticoagulants:     Eliquis   May stop anticoagulant how many days prior and when to resume    Is patient cleared from a cardiac standpoint to proceed with upcoming procedure. Please advise.

## 2022-11-07 NOTE — TELEPHONE ENCOUNTER
Verified Patient with two identifiers  Spoke with patient regarding recommendations to hold eliquis 24-28 hours prior to Colonoscopy. Patient voiced understanding. Clearance note, recent progress note and EKG faxed to colon and rectal specialist at [phone].

## 2022-11-15 ENCOUNTER — TELEPHONE (OUTPATIENT)
Dept: CARDIOLOGY CLINIC | Age: 82
End: 2022-11-15

## 2022-11-15 NOTE — TELEPHONE ENCOUNTER
Page #4 of [facility] application signed and fax back to [address] [place] [address] at [phone]. Rest of application will be fax by patient.

## 2022-11-30 ENCOUNTER — ANESTHESIA (OUTPATIENT)
Dept: ENDOSCOPY | Age: 82
End: 2022-11-30
Payer: MEDICARE

## 2022-11-30 ENCOUNTER — HOSPITAL ENCOUNTER (OUTPATIENT)
Age: 82
Setting detail: OUTPATIENT SURGERY
Discharge: HOME | End: 2022-11-30
Attending: SURGERY | Admitting: SURGERY
Payer: MEDICARE

## 2022-11-30 ENCOUNTER — ANESTHESIA EVENT (OUTPATIENT)
Dept: ENDOSCOPY | Age: 82
End: 2022-11-30
Payer: MEDICARE

## 2022-11-30 VITALS
BODY MASS INDEX: 36.99 KG/M2 | DIASTOLIC BLOOD PRESSURE: 69 MMHG | SYSTOLIC BLOOD PRESSURE: 137 MMHG | RESPIRATION RATE: 19 BRPM | OXYGEN SATURATION: 95 % | TEMPERATURE: 98 F | HEIGHT: 67 IN | WEIGHT: 235.7 LBS | HEART RATE: 57 BPM

## 2022-11-30 LAB
GLUCOSE BLD STRIP.AUTO-MCNC: 112 MG/DL (ref 65–117)
SERVICE CMNT-IMP: NORMAL

## 2022-11-30 PROCEDURE — 74011250636 HC RX REV CODE- 250/636: Performed by: SURGERY

## 2022-11-30 PROCEDURE — 74011250636 HC RX REV CODE- 250/636: Performed by: NURSE ANESTHETIST, CERTIFIED REGISTERED

## 2022-11-30 PROCEDURE — 88342 IMHCHEM/IMCYTCHM 1ST ANTB: CPT

## 2022-11-30 PROCEDURE — 88364 INSITU HYBRIDIZATION (FISH): CPT

## 2022-11-30 PROCEDURE — 76040000007: Performed by: SURGERY

## 2022-11-30 PROCEDURE — 88305 TISSUE EXAM BY PATHOLOGIST: CPT

## 2022-11-30 PROCEDURE — 76060000032 HC ANESTHESIA 0.5 TO 1 HR: Performed by: SURGERY

## 2022-11-30 PROCEDURE — 2709999900 HC NON-CHARGEABLE SUPPLY: Performed by: SURGERY

## 2022-11-30 PROCEDURE — 77030013992 HC SNR POLYP ENDOSC BSC -B: Performed by: SURGERY

## 2022-11-30 PROCEDURE — 88365 INSITU HYBRIDIZATION (FISH): CPT

## 2022-11-30 PROCEDURE — 82962 GLUCOSE BLOOD TEST: CPT

## 2022-11-30 RX ORDER — FLUMAZENIL 0.1 MG/ML
0.2 INJECTION INTRAVENOUS
Status: DISCONTINUED | OUTPATIENT
Start: 2022-11-30 | End: 2022-11-30 | Stop reason: HOSPADM

## 2022-11-30 RX ORDER — ATROPINE SULFATE 0.1 MG/ML
0.5 INJECTION INTRAVENOUS
Status: DISCONTINUED | OUTPATIENT
Start: 2022-11-30 | End: 2022-11-30 | Stop reason: HOSPADM

## 2022-11-30 RX ORDER — SODIUM CHLORIDE 0.9 % (FLUSH) 0.9 %
5-40 SYRINGE (ML) INJECTION AS NEEDED
Status: DISCONTINUED | OUTPATIENT
Start: 2022-11-30 | End: 2022-11-30 | Stop reason: HOSPADM

## 2022-11-30 RX ORDER — EPINEPHRINE 0.1 MG/ML
1 INJECTION INTRACARDIAC; INTRAVENOUS
Status: DISCONTINUED | OUTPATIENT
Start: 2022-11-30 | End: 2022-11-30 | Stop reason: HOSPADM

## 2022-11-30 RX ORDER — DEXTROMETHORPHAN/PSEUDOEPHED 2.5-7.5/.8
1.2 DROPS ORAL
Status: DISCONTINUED | OUTPATIENT
Start: 2022-11-30 | End: 2022-11-30 | Stop reason: HOSPADM

## 2022-11-30 RX ORDER — FENTANYL CITRATE 50 UG/ML
INJECTION, SOLUTION INTRAMUSCULAR; INTRAVENOUS AS NEEDED
Status: DISCONTINUED | OUTPATIENT
Start: 2022-11-30 | End: 2022-11-30 | Stop reason: HOSPADM

## 2022-11-30 RX ORDER — SODIUM CHLORIDE 0.9 % (FLUSH) 0.9 %
5-40 SYRINGE (ML) INJECTION EVERY 8 HOURS
Status: DISCONTINUED | OUTPATIENT
Start: 2022-11-30 | End: 2022-11-30 | Stop reason: HOSPADM

## 2022-11-30 RX ORDER — FENTANYL CITRATE 50 UG/ML
INJECTION, SOLUTION INTRAMUSCULAR; INTRAVENOUS
Status: COMPLETED
Start: 2022-11-30 | End: 2022-11-30

## 2022-11-30 RX ORDER — SODIUM CHLORIDE 9 MG/ML
75 INJECTION, SOLUTION INTRAVENOUS CONTINUOUS
Status: DISCONTINUED | OUTPATIENT
Start: 2022-11-30 | End: 2022-11-30 | Stop reason: HOSPADM

## 2022-11-30 RX ORDER — SODIUM CHLORIDE 9 MG/ML
50 INJECTION, SOLUTION INTRAVENOUS CONTINUOUS
Status: DISCONTINUED | OUTPATIENT
Start: 2022-11-30 | End: 2022-11-30 | Stop reason: HOSPADM

## 2022-11-30 RX ORDER — NALOXONE HYDROCHLORIDE 0.4 MG/ML
0.4 INJECTION, SOLUTION INTRAMUSCULAR; INTRAVENOUS; SUBCUTANEOUS
Status: DISCONTINUED | OUTPATIENT
Start: 2022-11-30 | End: 2022-11-30 | Stop reason: HOSPADM

## 2022-11-30 RX ORDER — PROPOFOL 10 MG/ML
INJECTION, EMULSION INTRAVENOUS AS NEEDED
Status: DISCONTINUED | OUTPATIENT
Start: 2022-11-30 | End: 2022-11-30 | Stop reason: HOSPADM

## 2022-11-30 RX ADMIN — PROPOFOL 40 MG: 10 INJECTION, EMULSION INTRAVENOUS at 11:57

## 2022-11-30 RX ADMIN — PROPOFOL 30 MG: 10 INJECTION, EMULSION INTRAVENOUS at 11:52

## 2022-11-30 RX ADMIN — PROPOFOL 20 MG: 10 INJECTION, EMULSION INTRAVENOUS at 11:54

## 2022-11-30 RX ADMIN — PROPOFOL 30 MG: 10 INJECTION, EMULSION INTRAVENOUS at 12:07

## 2022-11-30 RX ADMIN — PROPOFOL 90 MG: 10 INJECTION, EMULSION INTRAVENOUS at 11:50

## 2022-11-30 RX ADMIN — PROPOFOL 10 MG: 10 INJECTION, EMULSION INTRAVENOUS at 12:00

## 2022-11-30 RX ADMIN — PROPOFOL 30 MG: 10 INJECTION, EMULSION INTRAVENOUS at 12:04

## 2022-11-30 RX ADMIN — FENTANYL CITRATE 50 MCG: 50 INJECTION, SOLUTION INTRAMUSCULAR; INTRAVENOUS at 12:20

## 2022-11-30 RX ADMIN — SODIUM CHLORIDE 75 ML/HR: 9 INJECTION, SOLUTION INTRAVENOUS at 10:49

## 2022-11-30 NOTE — PROGRESS NOTES
Patient states he has a tremor that started a few weeks ago that is intermittent. Patient states today is one of the days that the tremor is worse. Patient states he has not contacted his PCP about the tremor at this time. Patient does have a PMH of diabetes and he does not know if he can feel when his blood sugar is low, blood sugar checked at this time and it is 112. Anesthesia made aware of new onset tremor- okay to proceed.

## 2022-11-30 NOTE — ANESTHESIA POSTPROCEDURE EVALUATION
Procedure(s):  COLONOSCOPY  ENDOSCOPIC POLYPECTOMY. total IV anesthesia, general    Anesthesia Post Evaluation        Patient location during evaluation: PACU  Note status: Adequate. Level of consciousness: responsive to verbal stimuli and sleepy but conscious  Pain management: satisfactory to patient  Airway patency: patent  Anesthetic complications: no  Cardiovascular status: acceptable  Respiratory status: acceptable  Hydration status: acceptable  Comments: +Post-Anesthesia Evaluation and Assessment    Patient: Lakisha Anderson MRN: [id number]  SSN: [id number]   YOB: 1940  Age: 79 y.o. Sex: male      Cardiovascular Function/Vital Signs    /69   Pulse (!) 57   Temp 36.7 °C (98 °F)   Resp 19   Ht 5' 7\" (1.702 m)   Wt 106.9 kg (235 lb 11.2 oz)   SpO2 95%   BMI 36.92 kg/m²     Patient is status post Procedure(s):  COLONOSCOPY  ENDOSCOPIC POLYPECTOMY. Nausea/Vomiting: Controlled. Postoperative hydration reviewed and adequate. Pain:  Pain Scale 1: Numeric (0 - 10) (11/30/22 1251)  Pain Intensity 1: 0 (11/30/22 1251)   Managed. Neurological Status: At baseline. Mental Status and Level of Consciousness: Arousable. Pulmonary Status:   O2 Device: None (Room air) (11/30/22 1251)   Adequate oxygenation and airway patent. Complications related to anesthesia: None    Post-anesthesia assessment completed. No concerns. Signed By: Ramses Veliz MD    11/30/2022  Post anesthesia nausea and vomiting:  controlled      INITIAL Post-op Vital signs:   Vitals Value Taken Time   /69 11/30/22 1251   Temp 36.7 °C (98 °F) 11/30/22 1230   Pulse 57 11/30/22 1252   Resp 14 11/30/22 1252   SpO2 97 % 11/30/22 1252   Vitals shown include unvalidated device data.

## 2022-11-30 NOTE — H&P
History and Physical    Patient: Jeane Bermudez MRN: [id number]  SSN: [id number]    YOB: 1940  Age: 79 y.o. Sex: male      Subjective:      Jeane Bermudez is a 79 y.o. male who presents for colonoscopy. Past Medical History:   Diagnosis Date    Arthritis     At risk for sleep apnea 2019    Stop Bang 5     Atrial fibrillation (HCC)     Atrial Flutter    Cancer ([facility] [address]     prostate cancer    Cataract     Diabetes ([facility] [address]     Elevated cholesterol     Hypertension     PUD (peptic ulcer disease)      Past Surgical History:   Procedure Laterality Date    COLONOSCOPY N/A 2017    COLONOSCOPY performed by Mxaimiliano Manning MD at [facility] ENDOSCOPY    COLONOSCOPY N/A 2019    COLONOSCOPY WITH POLYPECTOMY performed by Lesley Hwang MD at [facility] AMBULATORY OR    COLONOSCOPY N/A 10/11/2021    COLONOSCOPY performed by Lesley Hwang MD at [facility] ENDOSCOPY    HX CATARACT REMOVAL Left 2020    HX HERNIA REPAIR      HX HIP REPLACEMENT Right     HX KNEE REPLACEMENT Right     HX ORTHOPAEDIC Right     total r hip replacement     HX PROSTATECTOMY        Family History   Problem Relation Age of Onset    Cancer Mother         ovarian    Stroke Father      Social History     Tobacco Use    Smoking status: Former     Packs/day: 2.00     Years: 15.00     Pack years: 30.00     Types: Cigarettes     Quit date: 1965     Years since quittin.2    Smokeless tobacco: Never   Substance Use Topics    Alcohol use: No      Prior to Admission medications    Medication Sig Start Date End Date Taking? Authorizing Provider   furosemide (LASIX) 20 mg tablet Take 20 mg by mouth as needed. 22  Yes Provider, Historical   metoprolol tartrate (LOPRESSOR) 25 mg tablet Take 0.5 Tablets by mouth two (2) times a day. 22  Yes Darreld Kocher, MD   amiodarone (CORDARONE) 200 mg tablet Take 1 Tablet by mouth daily.  22  Yes Ernestina Pizano MD   glimepiride (AMARYL) 1 mg tablet TAKE 1 TABLET EVERY MORNING WITH BREAKFAST 8/23/21  Yes Provider, Historical   pravastatin (PRAVACHOL) 20 mg tablet Take 20 mg by mouth nightly. Yes Provider, Historical   amLODIPine (NORVASC) 5 mg tablet Take 5 mg by mouth daily. Yes Provider, Historical   losartan (COZAAR) 100 mg tablet Take 100 mg by mouth daily. Yes Provider, Historical   multivitamin (ONE A DAY) tablet Take 1 Tab by mouth daily. Yes Provider, Historical   metFORMIN (GLUCOPHAGE) 1,000 mg tablet Take 1,000 mg by mouth two (2) times a day. Yes Provider, Historical   apixaban (ELIQUIS) 5 mg tablet Take 1 Tablet by mouth two (2) times a day. 11/15/22   Mari Hand MD   amoxicillin (AMOXIL) 875 mg tablet TAKE ONE TABLET TWICE DAILY 9/20/22   Provider, Historical   baclofen (LIORESAL) 10 mg tablet Take 10 mg by mouth two (2) times a day. Patient not taking: Reported on 10/3/2022 9/16/22   Provider, Historical   predniSONE (DELTASONE) 10 mg tablet TAKE ONE TABLET TWICE DAILY  Patient not taking: Reported on 10/3/2022 9/20/22   Provider, Historical   acetaminophen (TYLENOL) 500 mg tablet Take 1,000 mg by mouth every six (6) hours as needed for Pain. Patient not taking: Reported on 10/3/2022    Provider, Historical   omega 3-DHA-EPA-fish oil 1,000 mg (120 mg-180 mg) capsule Take 1 Tab by mouth daily. Patient not taking: Reported on 10/3/2022    Provider, Historical        No Known Allergies    Review of Systems:  A comprehensive review of systems was negative except for that written in the History of Present Illness. Objective:     Vitals:    11/30/22 1041   BP: (!) 191/85   Pulse: 67   Resp: 16   SpO2: 97%   Weight: 106.9 kg (235 lb 11.2 oz)   Height: 5' 7\" (1.702 m)        Physical Exam:  General:  Alert, cooperative, no distress, appears stated age. Eyes:  Conjunctivae/corneas clear. PERRL, EOMs intact. Fundi benign   Ears:  Normal TMs and external ear canals both ears. Nose: Nares normal. Septum midline.  Mucosa normal. No drainage or sinus tenderness. Mouth/Throat: Lips, mucosa, and tongue normal. Teeth and gums normal.   Neck: Supple, symmetrical, trachea midline, no adenopathy, thyroid: no enlargment/tenderness/nodules, no carotid bruit and no JVD. Back:   Symmetric, no curvature. ROM normal. No CVA tenderness. Lungs:   Clear to auscultation bilaterally. Heart:  Regular rate and rhythm, S1, S2 normal, no murmur, click, rub or gallop. Abdomen:   Soft, non-tender. Bowel sounds normal. No masses,  No organomegaly. Extremities: Extremities normal, atraumatic, no cyanosis or edema. Pulses: 2+ and symmetric all extremities. Skin: Skin color, texture, turgor normal. No rashes or lesions   Lymph nodes: Cervical, supraclavicular, and axillary nodes normal.   Neurologic: CNII-XII intact. Normal strength, sensation and reflexes throughout.        Assessment:     Hospital Problems  Date Reviewed: 10/3/2022   None      Plan:     Colonoscopy    Signed By: Alayna Chaudhry MD     November 30, 2022

## 2022-11-30 NOTE — PROCEDURES
Colonoscopy Procedure Note    Indications: Previous colon cancer    Anesthesia/Sedation: MAC anesthesia Propofol    Pre-Procedure Exam:  Airway: clear   Heart: normal S1and S2    Lungs: clear bilateral  Abdomen: soft, nontender, bowel sounds present and normal in all quadrants   Mental Status: awake, alert, and oriented to person, place, and time      Procedure in Detail:  Informed consent was obtained for the procedure, including sedation. Risks of perforation, hemorrhage, adverse drug reaction, and aspiration were discussed. The patient was placed in the left lateral decubitus position. Based on the pre-procedure assessment, including review of the patient's medical history, medications, allergies, and review of systems, he had been deemed to be an appropriate candidate for moderate sedation; he was therefore sedated with the medications listed above. The patient was monitored continuously with ECG tracing, pulse oximetry, blood pressure monitoring, and direct observations. A rectal examination was performed. The [id number] was inserted into the rectum and advanced under direct vision to the cecum, which was identified by the ileocecal valve and appendiceal orifice. The quality of the colonic preparation was good. A careful inspection was made as the colonoscope was withdrawn, including a retroflexed view of the rectum; findings and interventions are described below. Appropriate photodocumentation was obtained. Findings:   Rectum:     - Small internal hemorrhoids  - Sessile polyp in the distal rectum / anal canal.  Removed and retrieved with hot snare  Sigmoid:     - Surgically removed . Normal appearing anastomosis  Descending Colon:     - Diverticulosis  Transverse Colon:     - 2 small 4mm polyps in the mid transverse colon. Removed and retrieved with hot snare. - 1 small 5mm sessile polyp in the proximal transverse colon.  Removed and retrieved with hot snare  Ascending Colon:     - Sessile polyp just adjacent to the IC valve. Removed and retrieved with hot snare  Cecum:     - Large carpeting polyp under the IC valve. About 3cm in size and unable to remove due to location, size, and sessile nature. Recommend surgery      Specimens: Specimens were collected and sent to pathology. EBL: None    Complications: None; patient tolerated the procedure well. Attending Attestation: I performed the procedure.     Recommendations:    - Needs surgery for cecal polyp and next colonoscopy in 1 year

## 2022-11-30 NOTE — PROGRESS NOTES
Endoscope was pre-cleaned at the bedside immediately following procedure by JASMEET Pettit For medications administered by anesthesia, see anesthesia chart.

## 2022-11-30 NOTE — DISCHARGE INSTRUCTIONS
Eliezer Hamman  [id number]  1940    COLON DISCHARGE INSTRUCTIONS  Discomfort:  Redness at IV site- apply warm compress to area; if redness or soreness persist- contact your physician  There may be a slight amount of blood passed from the rectum  Gaseous discomfort- walking, belching will help relieve any discomfort  You may not operate a vehicle for 12 hours  You may not engage in an occupation involving machinery or appliances for rest of today  You may not drink alcoholic beverages for at least 12 hours  Avoid making any critical decisions for at least 24 hour  DIET:   Regular diet. - however -  remember your colon is empty and a heavy meal will produce gas. Avoid these foods:  vegetables, fried / greasy foods, carbonated drinks for today    ACTIVITY:  You may resume your normal daily activities it is recommended that you spend the remainder of the day resting -  avoid any strenuous activity. CALL M.D. ANY SIGN OF:   Increasing pain, nausea, vomiting  Abdominal distension (swelling)  New increased bleeding (oral or rectal)  Fever (chills)  Pain in chest area  Bloody discharge from nose or mouth  Shortness of breath     Follow-up Instructions:   Call Jenna Barnes MD if any questions or problems. Telephone # [phone]  Biopsy results will be available in  7 to10 days  Should have a repeat colonoscopy in 1 years. Please follow up with me in the office to discuss surgery for the polyp in the colon  May resume Eliquis in  5 days on Monday December 5th  COLONOSCOPY FINDINGS:  Your colonoscopy showed: 5 polyps removed. 1 polyp will require surgery to remove. Diverticulosis and small hemorrhoid.     Patient Education on Sedation / Analgesia Administered for Procedure      For 24 hours after general anesthesia or intravenous analgesia / sedation:  Have someone responsible help you with your care  Limit your activities  Do not drive and operate hazardous machinery  Do not make important personal, legal or business decisions  Do not drink alcoholic beverages  If you have not urinated within 8 hours after discharge, please contact your physician  Resume your medications unless otherwise instructed    For 24 hours after general anesthesia or intravenous analgesia / sedation  you may experience:  Drowsiness, dizziness, sleepiness, or confusion  Difficulty remembering or delayed reaction times  Difficulty with your balance, especially while walking, move slowly and carefully, do not make sudden position changes  Difficulty focusing or blurred vision    You may not be aware of slight changes in your behavior and/or your reaction time because of the medication used during and after your procedure. Report the following to your physician:  Excessive pain, swelling, redness or odor of or around the surgical area  Temperature over 100.5  Nausea and vomiting lasting longer than 4 hours or if unable to take medications  Any signs of decreased circulation or nerve impairment to extremity: change in color, persistent numbness, tingling, coldness or increase pain  Any questions or concerns    IF YOU REPORT TO AN EMERGENCY ROOM, DOCTOR'S OFFICE OR HOSPITAL WITHIN 24 HOURS AFTER YOUR PROCEDURE, BRING THIS SHEET AND YOUR AFTER VISIT SUMMARY WITH YOU AND GIVE IT TO THE PHYSICIAN OR NURSE ATTENDING YOU. Learning About Diverticulosis and Diverticulitis  What are diverticulosis and diverticulitis? In diverticulosis and diverticulitis, pouches called diverticula form in the wall of the large intestine, or colon. In diverticulosis, the pouches do not cause any pain or other symptoms. In diverticulitis, the pouches get inflamed or infected and cause symptoms. Doctors aren't sure what causes these pouches in the colon. But they think that a low-fiber diet may play a role. Without fiber to add bulk to the stool, the colon has to work harder than normal to push the stool forward.  The pressure from this may cause pouches to form in weak spots along the colon. Some people with diverticulosis get diverticulitis. But experts don't know why this happens. What are the symptoms? In diverticulosis, most people don't have symptoms. But pouches sometimes bleed. In diverticulitis, symptoms may last from a few hours to a week or more. They include:  Belly pain. This is usually in the lower left side. It is sometimes worse when you move. This is the most common symptom. Fever and chills. Bloating and gas. Diarrhea or constipation. Nausea and sometimes vomiting. Not feeling like eating. How can you prevent diverticulitis? You may be able to lower your chance of getting diverticulitis. You can do this by taking steps to prevent constipation. Eat fruits, vegetables, beans, and whole grains every day. These foods are high in fiber. Drink plenty of fluids. If you have kidney, heart, or liver disease and have to limit fluids, talk with your doctor before you increase the amount of fluids you drink. Get at least 30 minutes of exercise on most days of the week. Walking is a good choice. You also may want to do other activities, such as running, swimming, cycling, or playing tennis or team sports. Take a fiber supplement, such as Citrucel or Metamucil, every day if needed. Read and follow all instructions on the label. Schedule time each day for a bowel movement. Having a daily routine may help. Take your time and do not strain when having a bowel movement. Some people avoid nuts, seeds, berries, and popcorn. They believe that these foods might get trapped in the diverticula and cause pain. But there is no proof that these foods cause diverticulitis or make it worse. How are these problems treated? The best way to treat diverticulosis is to avoid constipation. Treatment for diverticulitis includes antibiotics. It often includes a change in your diet.  You may need only liquids at first. Your doctor may suggest pain medicines for pain or belly cramps. In some cases, surgery may be needed. Follow-up care is a key part of your treatment and safety. Be sure to make and go to all appointments, and call your doctor if you are having problems. It's also a good idea to know your test results and keep a list of the medicines you take. Where can you learn more? Go to http://www.gray.com/  Enter L953 in the search box to learn more about \"Learning About Diverticulosis and Diverticulitis. \"  Current as of: June 6, 2022               Content Version: 13.4  © 3468-0183 [employer]. Care instructions adapted under license by [employer] (which disclaims liability or warranty for this information). If you have questions about a medical condition or this instruction, always ask your healthcare professional. [address] any warranty or liability for your use of this information. Colon Polyps: Care Instructions  Your Care Instructions     Colon polyps are growths in the colon or the rectum. The cause of most colon polyps is not known, and most people who get them do not have any problems. But a certain kind can turn into cancer. For this reason, regular testing for colon polyps is important for people as they get older. It is also important for anyone who has an increased risk for colon cancer. Polyps are usually found through routine colon cancer screening tests. Although most colon polyps are not cancerous, they are usually removed and then tested for cancer. Screening for colon cancer saves lives because the cancer can usually be cured if it is caught early. If you have a polyp that is the type that can turn into cancer, you may need more tests to examine your entire colon. The doctor will remove any other polyps that are found, and you will be tested more often. Follow-up care is a key part of your treatment and safety.  Be sure to make and go to all appointments, and call your doctor if you are having problems. It's also a good idea to know your test results and keep a list of the medicines you take. How can you care for yourself at home? Regular exams to look for colon polyps are the best way to prevent polyps from turning into colon cancer. These can include stool tests, sigmoidoscopy, colonoscopy, and CT colonography. Talk with your doctor about a testing schedule that is right for you. To prevent polyps  There is no home treatment that can prevent colon polyps. But these steps may help lower your risk for cancer. Stay active. Being active can help you get to and stay at a healthy weight. Try to exercise on most days of the week. Walking is a good choice. Eat well. Choose a variety of vegetables, fruits, legumes (such as peas and beans), fish, poultry, and whole grains. Do not smoke. If you need help quitting, talk to your doctor about stop-smoking programs and medicines. These can increase your chances of quitting for good. If you drink alcohol, limit how much you drink. Limit alcohol to 2 drinks a day for men and 1 drink a day for women. When should you call for help? Call your doctor now or seek immediate medical care if:    You have severe belly pain. Your stools are maroon or very bloody. Watch closely for changes in your health, and be sure to contact your doctor if:    You have a fever. You have nausea or vomiting. You have a change in bowel habits (new constipation or diarrhea). Your symptoms get worse or are not improving as expected. Where can you learn more? Go to http://www.With[employer].com/  Enter C571 in the search box to learn more about \"Colon Polyps: Care Instructions. \"  Current as of: June 6, 2022               Content Version: 13.4  © 0168-9719 [employer]. Care instructions adapted under license by [employer] (which disclaims liability or warranty for this information).  If you have questions about a medical condition or this instruction, always ask your healthcare professional. [place] [address] any warranty or liability for your use of this information.

## 2022-11-30 NOTE — ANESTHESIA PREPROCEDURE EVALUATION
Anesthetic History   No history of anesthetic complications            Review of Systems / Medical History  Patient summary reviewed, nursing notes reviewed and pertinent labs reviewed    Pulmonary          Smoker (30 pk yrs)      Comments: Former Smoker - 30 pack years   Neuro/Psych   Within defined limits           Cardiovascular    Hypertension: well controlled        Dysrhythmias : atrial flutter  Hyperlipidemia    Exercise tolerance: <4 METS  Comments: EKG 10/10/21: Atrial flutter with variable AV block    GI/Hepatic/Renal               Comments: Rectal bleed  Colonic mass   Endo/Other    Diabetes: type 2    Morbid obesity, arthritis and cancer     Other Findings   Comments: Hx Prostate Ca  DJD  Slight tremor in hands.            Physical Exam    Airway  Mallampati: II  TM Distance: 4 - 6 cm  Neck ROM: normal range of motion   Mouth opening: Normal     Cardiovascular  Regular rate and rhythm,  S1 and S2 normal,  no murmur, click, rub, or gallop             Dental    Dentition: Full upper dentures and Full lower dentures     Pulmonary  Breath sounds clear to auscultation               Abdominal  GI exam deferred       Other Findings            Anesthetic Plan    ASA: 3  Anesthesia type: total IV anesthesia and general          Induction: Intravenous  Anesthetic plan and risks discussed with: Patient      Propofol MAC

## 2022-11-30 NOTE — ROUTINE PROCESS
Nubia Orn  1940  [id number]    Situation:  Verbal report received from: Ginger Stoddard  Procedure: Procedure(s):  COLONOSCOPY  ENDOSCOPIC POLYPECTOMY    Background:    Preoperative diagnosis: HISTORY OF COLON CANCER  Postoperative diagnosis: Polyps, diverticulosis,hemorrhoids    :  Dr. Uday Lora  Assistant(s): Endoscopy Technician-1: Cherry Vazquez  Endoscopy RN-1: Howard Garza RN    Specimens:   ID Type Source Tests Collected by Time Destination   1 : Cecum polyp Preservative Cecum  Liliam Go MD 11/30/2022 1155 Pathology   2 : Proximal transverse colon polyp Preservative Colon, Transverse  Liliam Go MD 11/30/2022 1202 Pathology   3 : Mid-transverse colon polyp Preservative Colon, Transverse  Liliam Go MD 11/30/2022 1203 Pathology   4 : Distal rectal polyp Preservative Rectum  Liliam Go MD 11/30/2022 1210 Pathology     H. Pylori  no    Assessment:  Intra-procedure medications     Anesthesia gave intra-procedure sedation and medications, see anesthesia flow sheet yes    Intravenous fluids: NS@ KVO     Vital signs stable     Abdominal assessment: round and soft     Recommendation:  Discharge patient per MD order.   Family   Permission to share finding with family or friend yes     Dr. Uday Lora said patient may resume Eliquis in 5 days

## 2022-12-19 ENCOUNTER — TELEPHONE (OUTPATIENT)
Dept: CARDIOLOGY CLINIC | Age: 82
End: 2022-12-19

## 2022-12-19 NOTE — TELEPHONE ENCOUNTER
Pre-Procedure Cardiac Clearance Request:    Facility[facility]    Procedure Date: January 20, 2022    Procedure: Ileocolectomy for a colon Polyp    Surgeon: Evelyn Souza    Request for Interruption of Eliquis. May stop anticoagulant how many days prior and when to resume    Is patient cleared from a cardiac standpoint to proceed with upcoming procedure. Please advise. Patient was cleared in 10-31 for a colonoscopy.

## 2022-12-19 NOTE — TELEPHONE ENCOUNTER
If the patient is not having any chest pain or shortness of breath, he is at low cardiac risk to proceed with this urgent procedure and may hold Eliquis for 3 days prior and the day of surgery, restart when felt to be safe from a surgical standpoint.

## 2022-12-19 NOTE — TELEPHONE ENCOUNTER
Left secure message for patient with cardiac clearance. Clearance note, recent progress note and EKG faxed to [phone] [facility].

## 2023-01-16 NOTE — PERIOP NOTES
Patient reports that he is having issues with his throat and saw an ENT specialist on 1/13/23. He reports that he needs to have an Endoscopy procedure first. He states he needs to cancel his surgery with Dr Angelito Merida. [facility] for Nolan Morrison to follow up with patient.

## 2023-02-15 ENCOUNTER — TELEPHONE (OUTPATIENT)
Dept: CARDIOLOGY CLINIC | Age: 83
End: 2023-02-15

## 2023-02-15 ENCOUNTER — HOSPITAL ENCOUNTER (OUTPATIENT)
Dept: PREADMISSION TESTING | Age: 83
Discharge: HOME | End: 2023-02-15
Attending: SURGERY
Payer: MEDICARE

## 2023-02-15 ENCOUNTER — HOSPITAL ENCOUNTER (OUTPATIENT)
Dept: GENERAL RADIOLOGY | Age: 83
Discharge: HOME | End: 2023-02-15
Attending: SURGERY
Payer: MEDICARE

## 2023-02-15 VITALS
OXYGEN SATURATION: 98 % | TEMPERATURE: 98.1 F | HEIGHT: 67 IN | WEIGHT: 230.38 LBS | HEART RATE: 58 BPM | DIASTOLIC BLOOD PRESSURE: 57 MMHG | RESPIRATION RATE: 18 BRPM | SYSTOLIC BLOOD PRESSURE: 140 MMHG | BODY MASS INDEX: 36.16 KG/M2

## 2023-02-15 LAB
ABO + RH BLD: NORMAL
ALBUMIN SERPL-MCNC: 3.3 G/DL (ref 3.5–5)
ALBUMIN/GLOB SERPL: 0.8 (ref 1.1–2.2)
ALP SERPL-CCNC: 78 U/L (ref 45–117)
ALT SERPL-CCNC: 17 U/L (ref 12–78)
ANION GAP SERPL CALC-SCNC: 8 MMOL/L (ref 5–15)
APPEARANCE UR: ABNORMAL
APTT PPP: 30.5 SEC (ref 22.1–31)
AST SERPL-CCNC: 12 U/L (ref 15–37)
BACTERIA URNS QL MICRO: ABNORMAL /HPF
BILIRUB SERPL-MCNC: 0.7 MG/DL (ref 0.2–1)
BILIRUB UR QL: NEGATIVE
BLOOD GROUP ANTIBODIES SERPL: NORMAL
BUN SERPL-MCNC: 19 MG/DL (ref 6–20)
BUN/CREAT SERPL: 16 (ref 12–20)
CALCIUM SERPL-MCNC: 9.1 MG/DL (ref 8.5–10.1)
CHLORIDE SERPL-SCNC: 101 MMOL/L (ref 97–108)
CO2 SERPL-SCNC: 27 MMOL/L (ref 21–32)
COLOR UR: ABNORMAL
CREAT SERPL-MCNC: 1.22 MG/DL (ref 0.7–1.3)
EPITH CASTS URNS QL MICRO: ABNORMAL /LPF
ERYTHROCYTE [DISTWIDTH] IN BLOOD BY AUTOMATED COUNT: 12.8 % (ref 11.5–14.5)
EST. AVERAGE GLUCOSE BLD GHB EST-MCNC: 105 MG/DL
GLOBULIN SER CALC-MCNC: 4.1 G/DL (ref 2–4)
GLUCOSE SERPL-MCNC: 192 MG/DL (ref 65–100)
GLUCOSE UR STRIP.AUTO-MCNC: 500 MG/DL
HBA1C MFR BLD: 5.3 % (ref 4–5.6)
HCT VFR BLD AUTO: 36.2 % (ref 36.6–50.3)
HGB BLD-MCNC: 12.2 G/DL (ref 12.1–17)
HGB UR QL STRIP: ABNORMAL
INR PPP: 1.1 (ref 0.9–1.1)
KETONES UR QL STRIP.AUTO: ABNORMAL MG/DL
LEUKOCYTE ESTERASE UR QL STRIP.AUTO: ABNORMAL
MAGNESIUM SERPL-MCNC: 2.2 MG/DL (ref 1.6–2.4)
MCH RBC QN AUTO: 31.8 PG (ref 26–34)
MCHC RBC AUTO-ENTMCNC: 33.7 G/DL (ref 30–36.5)
MCV RBC AUTO: 94.3 FL (ref 80–99)
NITRITE UR QL STRIP.AUTO: NEGATIVE
NRBC # BLD: 0 K/UL (ref 0–0.01)
NRBC BLD-RTO: 0 PER 100 WBC
OTHER,OTHU: ABNORMAL
PH UR STRIP: 7 (ref 5–8)
PHOSPHATE SERPL-MCNC: 2.8 MG/DL (ref 2.6–4.7)
PLATELET # BLD AUTO: 269 K/UL (ref 150–400)
PMV BLD AUTO: 9.8 FL (ref 8.9–12.9)
POTASSIUM SERPL-SCNC: 4.1 MMOL/L (ref 3.5–5.1)
PROT SERPL-MCNC: 7.4 G/DL (ref 6.4–8.2)
PROT UR STRIP-MCNC: 300 MG/DL
PROTHROMBIN TIME: 11.2 SEC (ref 9–11.1)
RBC # BLD AUTO: 3.84 M/UL (ref 4.1–5.7)
RBC #/AREA URNS HPF: ABNORMAL /HPF (ref 0–5)
SODIUM SERPL-SCNC: 136 MMOL/L (ref 136–145)
SP GR UR REFRACTOMETRY: 1.02
SPECIMEN EXP DATE BLD: NORMAL
THERAPEUTIC RANGE,PTTT: NORMAL SECS (ref 58–77)
UA: UC IF INDICATED,UAUC: ABNORMAL
UROBILINOGEN UR QL STRIP.AUTO: 1 EU/DL (ref 0.2–1)
WBC # BLD AUTO: 5.8 K/UL (ref 4.1–11.1)
WBC URNS QL MICRO: >100 /HPF (ref 0–4)

## 2023-02-15 PROCEDURE — 83036 HEMOGLOBIN GLYCOSYLATED A1C: CPT

## 2023-02-15 PROCEDURE — 87077 CULTURE AEROBIC IDENTIFY: CPT

## 2023-02-15 PROCEDURE — 80053 COMPREHEN METABOLIC PANEL: CPT

## 2023-02-15 PROCEDURE — 87086 URINE CULTURE/COLONY COUNT: CPT

## 2023-02-15 PROCEDURE — 85730 THROMBOPLASTIN TIME PARTIAL: CPT

## 2023-02-15 PROCEDURE — 86900 BLOOD TYPING SEROLOGIC ABO: CPT

## 2023-02-15 PROCEDURE — 87186 SC STD MICRODIL/AGAR DIL: CPT

## 2023-02-15 PROCEDURE — 36415 COLL VENOUS BLD VENIPUNCTURE: CPT

## 2023-02-15 PROCEDURE — 81001 URINALYSIS AUTO W/SCOPE: CPT

## 2023-02-15 PROCEDURE — 71046 X-RAY EXAM CHEST 2 VIEWS: CPT

## 2023-02-15 PROCEDURE — 85610 PROTHROMBIN TIME: CPT

## 2023-02-15 PROCEDURE — 83735 ASSAY OF MAGNESIUM: CPT

## 2023-02-15 PROCEDURE — 85027 COMPLETE CBC AUTOMATED: CPT

## 2023-02-15 PROCEDURE — 84100 ASSAY OF PHOSPHORUS: CPT

## 2023-02-15 RX ORDER — ASCORBIC ACID 500 MG
1000 TABLET ORAL DAILY
COMMUNITY

## 2023-02-15 NOTE — TELEPHONE ENCOUNTER
Letter received from [facility] with the denial for Eliquis PA application. Reason: Documentation of 3% out of pocket prescription expenses, based on household adjusted gross income, not met.      Secure message left to patient

## 2023-02-15 NOTE — PERIOP NOTES
Hibiclens/Chlorhexidine    Preventing Infections Before and After - Your Surgery    IMPORTANT INSTRUCTIONS    Please read and follow these instructions carefully. If you are unable to comply with the below instructions your procedure will be cancelled. Every Night for Three (3) nights before your surgery:  Shower with an antibacterial soap, such as Dial, or the soap provided at your preassessment appointment. A shower is better than a bath for cleaning your skin. If needed, ask someone to help you reach all areas of your body. Dont forget to clean your belly button with every shower. The night before your surgery: If you lose your Hibiclens/chlorhexidine please contact surgery center or you can purchase it at a local pharmacy  On the night before your surgery, shower with an antibacterial soap, such as Dial, or the soap provided at your preassessment appointment. With one packet of Hibiclens/Chlorhexidine in hand, turn water off. Apply Hibiclens antiseptic skin cleanser with a clean, freshly washed washcloth. Gently apply to your body from chin to toes (except the genital area) and especially the area(s) where your incision(s) will be. Leave Hibiclens/Chlorhexidine on your skin for at least 20 seconds. CAUTION: If needed, Hibiclens/chlorhexidine may be used to clean the folds of skin of the legs (such as in the area of the groin) and on your buttocks and hips. However, do not use Hibiclens/Chlorhexidine above the neck or in the genital area (your bottom) or put inside any area of your body. Turn the water back on and rinse. Dry gently with a clean, freshly washed towel. After your shower, do not use any powder, deodorant, perfumes or lotion. Use clean, freshly washed towels and washcloths every time you shower. Wear clean, freshly washed pajamas to bed the night before surgery. Sleep on clean, freshly washed sheets. Do not allow pets to sleep in your bed with you.         The Morning of your surgery:  Shower again thoroughly with an antibacterial soap, such as Dial or the soap provided at your preassessment appointment. If needed, ask someone for help to reach all areas of your body. Dont forget to clean your belly button! Rinse. Dry gently with a clean, freshly washed towel. After your shower, do not use any powder, deodorant, perfumes or lotion prior to surgery. Put on clean, freshly washed clothing. Tips to help prevent infections after your surgery:  Protect your surgical wound from germs:  Hand washing is the most important thing you and your caregivers can do to prevent infections. Keep your bandage clean and dry! Do not touch your surgical wound. Use clean, freshly washed towels and washcloths every time you shower; do not share bath linens with others. Until your surgical wound is healed, wear clothing and sleep on bed linens each day that are clean and freshly washed. Do not allow pets to sleep in your bed with you or touch your surgical wound. Do not smoke - smoking delays wound healing. This may be a good time to stop smoking. If you have diabetes, it is important for you to manage your blood sugar levels properly before your surgery as well as after your surgery. Poorly managed blood sugar levels slow down wound healing and prevent you from healing completely. If you lose your Hibiclens/chlorhexidine, please call the [facility], or it is available for purchase at your pharmacy.                ___________________      ___________________      2/15/2023 @ 1925  (Signature of Patient)          (Witness)                   (Date and Time)

## 2023-02-15 NOTE — PERIOP NOTES
The [facility] (ERAS) book was reviewed with the patient during the pre-admission testing (PAT) appointment. An opportunity for questions was provided, patient verbalized understanding. Colon Prep: Suprep has been called into pharmacy and ready to be picked up. Pre-op Rx: Reglan, Flagyl, Neomycin has not been picked up by patient, called to pharmacy Rx x3 ready for pickup by patient.

## 2023-02-15 NOTE — PERIOP NOTES

## 2023-02-15 NOTE — PERIOP NOTES
Orthopedic and Spine Patients: Instructions on When You Can   Eat or Drink Before Surgery      You have been provided a pre-surgery drink received at your pre-admission testing appointment. Night before surgery: You should drink 1/2 bottle of the  pre-surgery drink at bedtime. No food after midnight! Day of Surgery:  Complete 2nd half of the bottle of the pre-surgery drink  on way to hospital.    For questions call Pre-Admission Testing at [phone]. They are available from 8:00am-5:00pm, Monday through Friday.

## 2023-02-15 NOTE — PERIOP NOTES
[facility]  Preoperative Instructions    Surgery Date 2/21/2023          Time of Arrival to be called  Contact # [phone]    1. On the day of your surgery, please report to the Surgical Services Registration Desk and sign in at your designated time. The Surgery Center is located to the right of the Emergency Room. 2. You must have someone with you to drive you home. You should not drive a car for 24 hours following surgery. Please make arrangements for a friend or family member to stay with you for the first 24 hours after your surgery. 3. Refer to your handbook for instructions on drinking liquids prior to surgery. Pick-up your Bowel Prep-Suprep, Reglan, Flagyl, Neomycin ready to be picked up at your pharmacy. 4. We recommend you do not drink any alcoholic beverages for 24 hours before and after your surgery. 5. Contact your surgeons office for instructions on the following medications: non-steroidal anti-inflammatory drugs (i.e. Advil, Aleve), vitamins, and supplements. (Some surgeons will want you to stop these medications prior to surgery and others may allow you to take them)  **If you are currently taking Plavix, Coumadin, Aspirin and/or other blood-thinning agents, contact your surgeon for instructions. ** Your surgeon will partner with the physician prescribing these medications to determine if it is safe to stop or if you need to continue taking. Please do not stop taking these medications without instructions from your surgeon    6. Wear comfortable clothes. Wear glasses instead of contacts. Do not bring any money or jewelry. Please bring picture ID, insurance card, and any prearranged co-payment or hospital payment. Do not wear make-up, particularly mascara the morning of your surgery. Do not wear nail polish, particularly if you are having foot /hand surgery. Wear your hair loose or down, no ponytails, buns, candis pins or clips.   All body piercings must be removed. Please shower with antibacterial soap for three consecutive days before and on the morning of surgery, but do not apply any lotions, powders or deodorants after the shower on the day of surgery. Please use a fresh towels after each shower. Please sleep in clean clothes and change bed linens the night before surgery. Please do not shave for 48 hours prior to surgery. Shaving of the face is acceptable. 7. You should understand that if you do not follow these instructions your surgery may be cancelled. If your physical condition changes (I.e. fever, cold or flu) please contact your surgeon as soon as possible. 8. It is important that you be on time. If a situation occurs where you may be late, please call [phone] (OR Holding Area). 9. If you have any questions and or problems, please call [phone] (Pre-admission Testing). 10. Your surgery time may be subject to change. You will receive a phone call the evening prior with your arrival time. 11.  If having outpatient surgery, you must have someone to drive you here, stay with you during the duration of your stay, and to drive you home at time of discharge. Special Instructions:   TAKE ALL MEDICATIONS THE DAY OF SURGERY EXCEPT: Metformin, glimepiride, vitamins, supplements      I understand a pre-operative phone call will be made to verify my surgery time. In the event that I am not available, I give permission for a message to be left on my answering service and/or with another person?   yes         ___________________        __________   2/15/2023 @ 9163    (Signature of Patient)             (Witness)                (Date and Time)

## 2023-02-16 LAB
BACTERIA SPEC CULT: NORMAL
BACTERIA SPEC CULT: NORMAL
SERVICE CMNT-IMP: NORMAL

## 2023-02-18 LAB
BACTERIA SPEC CULT: ABNORMAL
CC UR VC: ABNORMAL
SERVICE CMNT-IMP: ABNORMAL

## 2023-02-20 ENCOUNTER — TELEPHONE (OUTPATIENT)
Dept: CARDIOLOGY CLINIC | Age: 83
End: 2023-02-20

## 2023-02-20 NOTE — TELEPHONE ENCOUNTER
Pre-Procedure Cardiac Clearance Request:    Facility: [facility]    Procedure Date:TBD    Procedure # 1: Colectomy  Procedure # 2 Endoscopy    Surgeon:Dr. Delicia Juarez      Request for Interruption of Anticoagulants:     Eliquis. May stop anticoagulant how many days prior and when to resume    Is patient cleared from a cardiac standpoint to proceed with upcoming procedure. Please advise.

## 2023-02-21 ENCOUNTER — ANESTHESIA (OUTPATIENT)
Dept: SURGERY | Age: 83
End: 2023-02-21
Payer: MEDICARE

## 2023-02-21 ENCOUNTER — ANESTHESIA EVENT (OUTPATIENT)
Dept: SURGERY | Age: 83
End: 2023-02-21
Payer: MEDICARE

## 2023-02-21 ENCOUNTER — HOSPITAL ENCOUNTER (INPATIENT)
Age: 83
LOS: 4 days | Discharge: HOME | End: 2023-02-25
Attending: SURGERY | Admitting: SURGERY
Payer: MEDICARE

## 2023-02-21 DIAGNOSIS — K63.5 POLYP OF COLON, UNSPECIFIED PART OF COLON, UNSPECIFIED TYPE: Primary | ICD-10-CM

## 2023-02-21 LAB
GLUCOSE BLD STRIP.AUTO-MCNC: 193 MG/DL (ref 65–117)
GLUCOSE BLD STRIP.AUTO-MCNC: 206 MG/DL (ref 65–117)
SERVICE CMNT-IMP: ABNORMAL
SERVICE CMNT-IMP: ABNORMAL

## 2023-02-21 PROCEDURE — 77030008771 HC TU NG SALEM SUMP -A: Performed by: STUDENT IN AN ORGANIZED HEALTH CARE EDUCATION/TRAINING PROGRAM

## 2023-02-21 PROCEDURE — 77030008606 HC TRCR ENDOSC KII AMR -B: Performed by: SURGERY

## 2023-02-21 PROCEDURE — 77030013079 HC BLNKT BAIR HGGR 3M -A: Performed by: STUDENT IN AN ORGANIZED HEALTH CARE EDUCATION/TRAINING PROGRAM

## 2023-02-21 PROCEDURE — 76210000016 HC OR PH I REC 1 TO 1.5 HR: Performed by: SURGERY

## 2023-02-21 PROCEDURE — 74011250636 HC RX REV CODE- 250/636: Performed by: ANESTHESIOLOGY

## 2023-02-21 PROCEDURE — 74011000250 HC RX REV CODE- 250: Performed by: SURGERY

## 2023-02-21 PROCEDURE — 77030040260 HC STPLR RELD SUREFORM DVNCI INTU -C: Performed by: SURGERY

## 2023-02-21 PROCEDURE — 77030013928 HC RETRCT WND ELXIS AMR -B: Performed by: SURGERY

## 2023-02-21 PROCEDURE — 76010000878 HC OR TIME 3 TO 3.5HR INTENSV - TIER 2: Performed by: SURGERY

## 2023-02-21 PROCEDURE — 77030035489 HC REDUCR CANN ENDOWR INTU -C: Performed by: SURGERY

## 2023-02-21 PROCEDURE — 74011000254 HC RX REV CODE- 254: Performed by: NURSE ANESTHETIST, CERTIFIED REGISTERED

## 2023-02-21 PROCEDURE — 77030026438 HC STYL ET INTUB CARD -A: Performed by: STUDENT IN AN ORGANIZED HEALTH CARE EDUCATION/TRAINING PROGRAM

## 2023-02-21 PROCEDURE — 77030035279 HC SEAL VSL ENDOWR XI INTU -I2: Performed by: SURGERY

## 2023-02-21 PROCEDURE — 74011250636 HC RX REV CODE- 250/636: Performed by: NURSE ANESTHETIST, CERTIFIED REGISTERED

## 2023-02-21 PROCEDURE — 77030010507 HC ADH SKN DERMBND J&J -B: Performed by: SURGERY

## 2023-02-21 PROCEDURE — 77030002933 HC SUT MCRYL J&J -A: Performed by: SURGERY

## 2023-02-21 PROCEDURE — 77030018673: Performed by: SURGERY

## 2023-02-21 PROCEDURE — 74011250637 HC RX REV CODE- 250/637: Performed by: SURGERY

## 2023-02-21 PROCEDURE — 77030008684 HC TU ET CUF COVD -B: Performed by: STUDENT IN AN ORGANIZED HEALTH CARE EDUCATION/TRAINING PROGRAM

## 2023-02-21 PROCEDURE — 77030016151 HC PROTCTR LNS DFOG COVD -B: Performed by: SURGERY

## 2023-02-21 PROCEDURE — 74011250636 HC RX REV CODE- 250/636: Performed by: SURGERY

## 2023-02-21 PROCEDURE — 77030035278 HC STPLR SEAL ENDOWR INTU -B: Performed by: SURGERY

## 2023-02-21 PROCEDURE — 77030022704 HC SUT VLOC COVD -B: Performed by: SURGERY

## 2023-02-21 PROCEDURE — 74011000250 HC RX REV CODE- 250: Performed by: NURSE ANESTHETIST, CERTIFIED REGISTERED

## 2023-02-21 PROCEDURE — 77030020703 HC SEAL CANN DISP INTU -B: Performed by: SURGERY

## 2023-02-21 PROCEDURE — 88309 TISSUE EXAM BY PATHOLOGIST: CPT

## 2023-02-21 PROCEDURE — 77030035277 HC OBTRTR BLDELSS DISP INTU -B: Performed by: SURGERY

## 2023-02-21 PROCEDURE — 8E0W4CZ ROBOTIC ASSISTED PROCEDURE OF TRUNK REGION, PERCUTANEOUS ENDOSCOPIC APPROACH: ICD-10-PCS | Performed by: SURGERY

## 2023-02-21 PROCEDURE — 0DTF4ZZ RESECTION OF RIGHT LARGE INTESTINE, PERCUTANEOUS ENDOSCOPIC APPROACH: ICD-10-PCS | Performed by: SURGERY

## 2023-02-21 PROCEDURE — 65270000029 HC RM PRIVATE

## 2023-02-21 PROCEDURE — 77030035029 HC NDL INSUF VERES DISP COVD -B: Performed by: SURGERY

## 2023-02-21 PROCEDURE — 77030002966 HC SUT PDS J&J -A: Performed by: SURGERY

## 2023-02-21 PROCEDURE — 2709999900 HC NON-CHARGEABLE SUPPLY

## 2023-02-21 PROCEDURE — 76060000037 HC ANESTHESIA 3 TO 3.5 HR: Performed by: SURGERY

## 2023-02-21 PROCEDURE — 74011250636 HC RX REV CODE- 250/636: Performed by: STUDENT IN AN ORGANIZED HEALTH CARE EDUCATION/TRAINING PROGRAM

## 2023-02-21 PROCEDURE — 77030019908 HC STETH ESOPH SIMS -A: Performed by: STUDENT IN AN ORGANIZED HEALTH CARE EDUCATION/TRAINING PROGRAM

## 2023-02-21 PROCEDURE — 77030031139 HC SUT VCRL2 J&J -A: Performed by: SURGERY

## 2023-02-21 PROCEDURE — 77030008756 HC TU IRR SUC STRY -B: Performed by: SURGERY

## 2023-02-21 PROCEDURE — 82962 GLUCOSE BLOOD TEST: CPT

## 2023-02-21 PROCEDURE — 2709999900 HC NON-CHARGEABLE SUPPLY: Performed by: SURGERY

## 2023-02-21 PROCEDURE — 77030005513 HC CATH URETH FOL11 MDII -B: Performed by: SURGERY

## 2023-02-21 RX ORDER — ONDANSETRON 2 MG/ML
INJECTION INTRAMUSCULAR; INTRAVENOUS AS NEEDED
Status: DISCONTINUED | OUTPATIENT
Start: 2023-02-21 | End: 2023-02-21 | Stop reason: HOSPADM

## 2023-02-21 RX ORDER — ENOXAPARIN SODIUM 100 MG/ML
40 INJECTION SUBCUTANEOUS DAILY
Status: DISCONTINUED | OUTPATIENT
Start: 2023-02-22 | End: 2023-02-25 | Stop reason: HOSPADM

## 2023-02-21 RX ORDER — CEFOXITIN 2 G/1
INJECTION, POWDER, FOR SOLUTION INTRAVENOUS AS NEEDED
Status: DISCONTINUED | OUTPATIENT
Start: 2023-02-21 | End: 2023-02-21 | Stop reason: HOSPADM

## 2023-02-21 RX ORDER — DEXAMETHASONE SODIUM PHOSPHATE 4 MG/ML
INJECTION, SOLUTION INTRA-ARTICULAR; INTRALESIONAL; INTRAMUSCULAR; INTRAVENOUS; SOFT TISSUE AS NEEDED
Status: DISCONTINUED | OUTPATIENT
Start: 2023-02-21 | End: 2023-02-21 | Stop reason: HOSPADM

## 2023-02-21 RX ORDER — SODIUM CHLORIDE 0.9 % (FLUSH) 0.9 %
5-40 SYRINGE (ML) INJECTION EVERY 8 HOURS
Status: DISCONTINUED | OUTPATIENT
Start: 2023-02-21 | End: 2023-02-25 | Stop reason: HOSPADM

## 2023-02-21 RX ORDER — GLYCOPYRROLATE 0.2 MG/ML
INJECTION INTRAMUSCULAR; INTRAVENOUS AS NEEDED
Status: DISCONTINUED | OUTPATIENT
Start: 2023-02-21 | End: 2023-02-21 | Stop reason: HOSPADM

## 2023-02-21 RX ORDER — PROPOFOL 10 MG/ML
INJECTION, EMULSION INTRAVENOUS AS NEEDED
Status: DISCONTINUED | OUTPATIENT
Start: 2023-02-21 | End: 2023-02-21 | Stop reason: HOSPADM

## 2023-02-21 RX ORDER — ONDANSETRON 2 MG/ML
4 INJECTION INTRAMUSCULAR; INTRAVENOUS AS NEEDED
Status: DISCONTINUED | OUTPATIENT
Start: 2023-02-21 | End: 2023-02-21 | Stop reason: HOSPADM

## 2023-02-21 RX ORDER — ROCURONIUM BROMIDE 10 MG/ML
INJECTION, SOLUTION INTRAVENOUS AS NEEDED
Status: DISCONTINUED | OUTPATIENT
Start: 2023-02-21 | End: 2023-02-21 | Stop reason: HOSPADM

## 2023-02-21 RX ORDER — MIDAZOLAM HYDROCHLORIDE 1 MG/ML
1 INJECTION, SOLUTION INTRAMUSCULAR; INTRAVENOUS AS NEEDED
Status: DISCONTINUED | OUTPATIENT
Start: 2023-02-21 | End: 2023-02-21 | Stop reason: HOSPADM

## 2023-02-21 RX ORDER — HYDROMORPHONE HYDROCHLORIDE 2 MG/ML
INJECTION, SOLUTION INTRAMUSCULAR; INTRAVENOUS; SUBCUTANEOUS AS NEEDED
Status: DISCONTINUED | OUTPATIENT
Start: 2023-02-21 | End: 2023-02-21 | Stop reason: HOSPADM

## 2023-02-21 RX ORDER — HYDROMORPHONE HYDROCHLORIDE 1 MG/ML
0.2 INJECTION, SOLUTION INTRAMUSCULAR; INTRAVENOUS; SUBCUTANEOUS
Status: DISCONTINUED | OUTPATIENT
Start: 2023-02-21 | End: 2023-02-21 | Stop reason: HOSPADM

## 2023-02-21 RX ORDER — SODIUM CHLORIDE, SODIUM LACTATE, POTASSIUM CHLORIDE, CALCIUM CHLORIDE 600; 310; 30; 20 MG/100ML; MG/100ML; MG/100ML; MG/100ML
25 INJECTION, SOLUTION INTRAVENOUS CONTINUOUS
Status: DISCONTINUED | OUTPATIENT
Start: 2023-02-21 | End: 2023-02-21 | Stop reason: HOSPADM

## 2023-02-21 RX ORDER — LIDOCAINE HYDROCHLORIDE 20 MG/ML
INJECTION, SOLUTION EPIDURAL; INFILTRATION; INTRACAUDAL; PERINEURAL AS NEEDED
Status: DISCONTINUED | OUTPATIENT
Start: 2023-02-21 | End: 2023-02-21 | Stop reason: HOSPADM

## 2023-02-21 RX ORDER — OXYCODONE HYDROCHLORIDE 5 MG/1
5 TABLET ORAL
Status: DISCONTINUED | OUTPATIENT
Start: 2023-02-21 | End: 2023-02-25 | Stop reason: HOSPADM

## 2023-02-21 RX ORDER — CELECOXIB 200 MG/1
200 CAPSULE ORAL ONCE
Status: COMPLETED | OUTPATIENT
Start: 2023-02-21 | End: 2023-02-21

## 2023-02-21 RX ORDER — INDOCYANINE GREEN AND WATER 25 MG
KIT INJECTION AS NEEDED
Status: DISCONTINUED | OUTPATIENT
Start: 2023-02-21 | End: 2023-02-21 | Stop reason: HOSPADM

## 2023-02-21 RX ORDER — ONDANSETRON 2 MG/ML
4 INJECTION INTRAMUSCULAR; INTRAVENOUS
Status: DISCONTINUED | OUTPATIENT
Start: 2023-02-21 | End: 2023-02-25 | Stop reason: HOSPADM

## 2023-02-21 RX ORDER — EPHEDRINE SULFATE/0.9% NACL/PF 50 MG/5 ML
SYRINGE (ML) INTRAVENOUS AS NEEDED
Status: DISCONTINUED | OUTPATIENT
Start: 2023-02-21 | End: 2023-02-21 | Stop reason: HOSPADM

## 2023-02-21 RX ORDER — FENTANYL CITRATE 50 UG/ML
25 INJECTION, SOLUTION INTRAMUSCULAR; INTRAVENOUS
Status: COMPLETED | OUTPATIENT
Start: 2023-02-21 | End: 2023-02-21

## 2023-02-21 RX ORDER — BUPIVACAINE HYDROCHLORIDE AND EPINEPHRINE 2.5; 5 MG/ML; UG/ML
INJECTION, SOLUTION EPIDURAL; INFILTRATION; INTRACAUDAL; PERINEURAL AS NEEDED
Status: DISCONTINUED | OUTPATIENT
Start: 2023-02-21 | End: 2023-02-21 | Stop reason: HOSPADM

## 2023-02-21 RX ORDER — OXYCODONE HYDROCHLORIDE 5 MG/1
5 TABLET ORAL AS NEEDED
Status: DISCONTINUED | OUTPATIENT
Start: 2023-02-21 | End: 2023-02-21 | Stop reason: HOSPADM

## 2023-02-21 RX ORDER — SODIUM CHLORIDE, SODIUM LACTATE, POTASSIUM CHLORIDE, CALCIUM CHLORIDE 600; 310; 30; 20 MG/100ML; MG/100ML; MG/100ML; MG/100ML
75 INJECTION, SOLUTION INTRAVENOUS CONTINUOUS
Status: DISCONTINUED | OUTPATIENT
Start: 2023-02-21 | End: 2023-02-25 | Stop reason: HOSPADM

## 2023-02-21 RX ORDER — MAGNESIUM SULFATE HEPTAHYDRATE 40 MG/ML
INJECTION, SOLUTION INTRAVENOUS AS NEEDED
Status: DISCONTINUED | OUTPATIENT
Start: 2023-02-21 | End: 2023-02-21 | Stop reason: HOSPADM

## 2023-02-21 RX ORDER — SUCCINYLCHOLINE CHLORIDE 20 MG/ML
INJECTION INTRAMUSCULAR; INTRAVENOUS AS NEEDED
Status: DISCONTINUED | OUTPATIENT
Start: 2023-02-21 | End: 2023-02-21 | Stop reason: HOSPADM

## 2023-02-21 RX ORDER — ACETAMINOPHEN 500 MG
1000 TABLET ORAL ONCE
Status: COMPLETED | OUTPATIENT
Start: 2023-02-21 | End: 2023-02-21

## 2023-02-21 RX ORDER — ACETAMINOPHEN 325 MG/1
650 TABLET ORAL EVERY 6 HOURS
Status: DISCONTINUED | OUTPATIENT
Start: 2023-02-21 | End: 2023-02-25 | Stop reason: HOSPADM

## 2023-02-21 RX ORDER — ONDANSETRON 4 MG/1
4 TABLET, ORALLY DISINTEGRATING ORAL
Status: DISCONTINUED | OUTPATIENT
Start: 2023-02-21 | End: 2023-02-25 | Stop reason: HOSPADM

## 2023-02-21 RX ORDER — LIDOCAINE HYDROCHLORIDE ANHYDROUS AND DEXTROSE MONOHYDRATE .8; 5 G/100ML; G/100ML
INJECTION, SOLUTION INTRAVENOUS
Status: DISCONTINUED | OUTPATIENT
Start: 2023-02-21 | End: 2023-02-21 | Stop reason: HOSPADM

## 2023-02-21 RX ORDER — KETOROLAC TROMETHAMINE 30 MG/ML
INJECTION, SOLUTION INTRAMUSCULAR; INTRAVENOUS AS NEEDED
Status: DISCONTINUED | OUTPATIENT
Start: 2023-02-21 | End: 2023-02-21 | Stop reason: HOSPADM

## 2023-02-21 RX ORDER — SODIUM CHLORIDE, SODIUM LACTATE, POTASSIUM CHLORIDE, CALCIUM CHLORIDE 600; 310; 30; 20 MG/100ML; MG/100ML; MG/100ML; MG/100ML
INJECTION, SOLUTION INTRAVENOUS
Status: DISCONTINUED | OUTPATIENT
Start: 2023-02-21 | End: 2023-02-21 | Stop reason: HOSPADM

## 2023-02-21 RX ORDER — OXYCODONE HYDROCHLORIDE 5 MG/1
10 TABLET ORAL
Status: DISCONTINUED | OUTPATIENT
Start: 2023-02-21 | End: 2023-02-25 | Stop reason: HOSPADM

## 2023-02-21 RX ORDER — FENTANYL CITRATE 50 UG/ML
INJECTION, SOLUTION INTRAMUSCULAR; INTRAVENOUS AS NEEDED
Status: DISCONTINUED | OUTPATIENT
Start: 2023-02-21 | End: 2023-02-21 | Stop reason: HOSPADM

## 2023-02-21 RX ORDER — SODIUM CHLORIDE 9 MG/ML
INJECTION, SOLUTION INTRAVENOUS
Status: DISCONTINUED | OUTPATIENT
Start: 2023-02-21 | End: 2023-02-21 | Stop reason: HOSPADM

## 2023-02-21 RX ORDER — HYDROMORPHONE HYDROCHLORIDE 1 MG/ML
0.25 INJECTION, SOLUTION INTRAMUSCULAR; INTRAVENOUS; SUBCUTANEOUS
Status: DISCONTINUED | OUTPATIENT
Start: 2023-02-21 | End: 2023-02-25 | Stop reason: HOSPADM

## 2023-02-21 RX ORDER — HYDROMORPHONE HYDROCHLORIDE 1 MG/ML
0.5 INJECTION, SOLUTION INTRAMUSCULAR; INTRAVENOUS; SUBCUTANEOUS
Status: DISCONTINUED | OUTPATIENT
Start: 2023-02-21 | End: 2023-02-25 | Stop reason: HOSPADM

## 2023-02-21 RX ORDER — SODIUM CHLORIDE 0.9 % (FLUSH) 0.9 %
5-40 SYRINGE (ML) INJECTION AS NEEDED
Status: DISCONTINUED | OUTPATIENT
Start: 2023-02-21 | End: 2023-02-25 | Stop reason: HOSPADM

## 2023-02-21 RX ORDER — MIDAZOLAM HYDROCHLORIDE 1 MG/ML
INJECTION, SOLUTION INTRAMUSCULAR; INTRAVENOUS AS NEEDED
Status: DISCONTINUED | OUTPATIENT
Start: 2023-02-21 | End: 2023-02-21 | Stop reason: HOSPADM

## 2023-02-21 RX ADMIN — ACETAMINOPHEN 1000 MG: 500 TABLET ORAL at 13:17

## 2023-02-21 RX ADMIN — FENTANYL CITRATE 25 MCG: 50 INJECTION, SOLUTION INTRAMUSCULAR; INTRAVENOUS at 21:53

## 2023-02-21 RX ADMIN — FENTANYL CITRATE 25 MCG: 50 INJECTION, SOLUTION INTRAMUSCULAR; INTRAVENOUS at 22:23

## 2023-02-21 RX ADMIN — ONDANSETRON HYDROCHLORIDE 4 MG: 2 INJECTION, SOLUTION INTRAMUSCULAR; INTRAVENOUS at 21:16

## 2023-02-21 RX ADMIN — FENTANYL CITRATE 25 MCG: 50 INJECTION, SOLUTION INTRAMUSCULAR; INTRAVENOUS at 22:11

## 2023-02-21 RX ADMIN — SODIUM CHLORIDE: 0.9 INJECTION, SOLUTION INTRAVENOUS at 18:28

## 2023-02-21 RX ADMIN — LIDOCAINE HYDROCHLORIDE 100 MG: 20 INJECTION, SOLUTION EPIDURAL; INFILTRATION; INTRACAUDAL; PERINEURAL at 18:25

## 2023-02-21 RX ADMIN — SUGAMMADEX 200 MG: 100 INJECTION, SOLUTION INTRAVENOUS at 21:26

## 2023-02-21 RX ADMIN — INDOCYANINE GREEN 7.5 MG: KIT INTRAVENOUS at 20:25

## 2023-02-21 RX ADMIN — Medication 3 AMPULE: at 13:18

## 2023-02-21 RX ADMIN — ACETAMINOPHEN 325MG 650 MG: 325 TABLET ORAL at 23:47

## 2023-02-21 RX ADMIN — CEFOXITIN 2 G: 2 INJECTION, POWDER, FOR SOLUTION INTRAVENOUS at 20:29

## 2023-02-21 RX ADMIN — ROCURONIUM BROMIDE 20 MG: 10 INJECTION INTRAVENOUS at 19:22

## 2023-02-21 RX ADMIN — FENTANYL CITRATE 25 MCG: 50 INJECTION, SOLUTION INTRAMUSCULAR; INTRAVENOUS at 22:05

## 2023-02-21 RX ADMIN — Medication 5 MG: at 21:10

## 2023-02-21 RX ADMIN — FENTANYL CITRATE 25 MCG: 50 INJECTION, SOLUTION INTRAMUSCULAR; INTRAVENOUS at 18:25

## 2023-02-21 RX ADMIN — ROCURONIUM BROMIDE 10 MG: 10 INJECTION INTRAVENOUS at 18:25

## 2023-02-21 RX ADMIN — KETOROLAC TROMETHAMINE 15 MG: 30 INJECTION, SOLUTION INTRAMUSCULAR; INTRAVENOUS at 21:26

## 2023-02-21 RX ADMIN — HYDROMORPHONE HYDROCHLORIDE 0.2 MG: 2 INJECTION, SOLUTION INTRAMUSCULAR; INTRAVENOUS; SUBCUTANEOUS at 18:21

## 2023-02-21 RX ADMIN — Medication 10 MG: at 20:25

## 2023-02-21 RX ADMIN — HYDROMORPHONE HYDROCHLORIDE 0.4 MG: 2 INJECTION, SOLUTION INTRAMUSCULAR; INTRAVENOUS; SUBCUTANEOUS at 18:25

## 2023-02-21 RX ADMIN — CEFOXITIN 2 G: 2 INJECTION, POWDER, FOR SOLUTION INTRAVENOUS at 18:31

## 2023-02-21 RX ADMIN — Medication 2 G: at 19:03

## 2023-02-21 RX ADMIN — Medication 10 MG: at 18:51

## 2023-02-21 RX ADMIN — SODIUM CHLORIDE, POTASSIUM CHLORIDE, SODIUM LACTATE AND CALCIUM CHLORIDE: 600; 310; 30; 20 INJECTION, SOLUTION INTRAVENOUS at 18:21

## 2023-02-21 RX ADMIN — OXYCODONE 5 MG: 5 TABLET ORAL at 23:47

## 2023-02-21 RX ADMIN — CELECOXIB 200 MG: 200 CAPSULE ORAL at 13:17

## 2023-02-21 RX ADMIN — PROPOFOL 150 MG: 10 INJECTION, EMULSION INTRAVENOUS at 18:25

## 2023-02-21 RX ADMIN — FENTANYL CITRATE 25 MCG: 50 INJECTION, SOLUTION INTRAMUSCULAR; INTRAVENOUS at 22:33

## 2023-02-21 RX ADMIN — LIDOCAINE HYDROCHLORIDE 1 MG/KG/HR: 8 INJECTION, SOLUTION INTRAVENOUS at 18:28

## 2023-02-21 RX ADMIN — ROCURONIUM BROMIDE 20 MG: 10 INJECTION INTRAVENOUS at 20:21

## 2023-02-21 RX ADMIN — ROCURONIUM BROMIDE 10 MG: 10 INJECTION INTRAVENOUS at 18:45

## 2023-02-21 RX ADMIN — SODIUM CHLORIDE, POTASSIUM CHLORIDE, SODIUM LACTATE AND CALCIUM CHLORIDE 75 ML/HR: 600; 310; 30; 20 INJECTION, SOLUTION INTRAVENOUS at 22:05

## 2023-02-21 RX ADMIN — SUCCINYLCHOLINE CHLORIDE 180 MG: 20 INJECTION, SOLUTION INTRAMUSCULAR; INTRAVENOUS at 18:27

## 2023-02-21 RX ADMIN — SODIUM CHLORIDE, PRESERVATIVE FREE 10 ML: 5 INJECTION INTRAVENOUS at 23:48

## 2023-02-21 RX ADMIN — Medication 10 MG: at 19:27

## 2023-02-21 RX ADMIN — DEXAMETHASONE SODIUM PHOSPHATE 4 MG: 4 INJECTION, SOLUTION INTRAMUSCULAR; INTRAVENOUS at 18:31

## 2023-02-21 RX ADMIN — MIDAZOLAM HYDROCHLORIDE 0.5 MG: 1 INJECTION, SOLUTION INTRAMUSCULAR; INTRAVENOUS at 18:21

## 2023-02-21 RX ADMIN — GLYCOPYRROLATE 0.2 MG: 0.2 INJECTION, SOLUTION INTRAMUSCULAR; INTRAVENOUS at 18:53

## 2023-02-21 RX ADMIN — INDOCYANINE GREEN 5 MG: KIT INTRAVENOUS at 20:32

## 2023-02-21 NOTE — PERIOP NOTES
12:30= brought to Pre Op via wheelchair to Pre Op; stood with assisted out of left side of wheelchair; armrest was moved up so that pt could turn and get out; assisted to scale without difficulty; when getting back in wheelchair, explained to pt to come into chair from the left side; as pt attempted to get into chair, he stepped into the front of the chair to get back in and lost balance; attempted to hold pt to assist back into chair, but unable to grasp; pt slid down to floor on right side, and did NOT hit head on floor; assisted x3 with getting pt back into wheelchair from right side of chair; brought into room, assisted with getting changed into gown, using CHG wipes; did not need to use restroom; mepilex dsg placed on sacrum; erythema noted on center gluteal folds; warming blanket applied but not turned on. Assessed skin; no bruising or pain noted anywhere after fall; pt states \"I'm okay. I've had worse falls than this. \"    15:25= Pam, OR Charge in to see pt; informed that Dr. Conor Rios is still in long surgery and will not be finished until at least 6:30PM; pt does not want to reschedule surgery, so he wants to proceed with surgery tonight. Called waiting room to inform wife; not in waiting room. 15:33= Dr. Dylan Rowan in to talk with pt.

## 2023-02-21 NOTE — PERIOP NOTES
08:56= christ Villareal RN, Dr. Karly Gomez stated his current case is taking longer than expected; requested that this pt arrive at 12PM for his surgery so that he would not have to wait for him in the waiting room.

## 2023-02-21 NOTE — PERIOP NOTES
1236 - PT DENIES FEVER COLD, COUGH SOB, N/V, DIARRHEA. .. PRE-OP TCHING DONE - PT VERBALIZES UNDERSTANDING. STRETCHER IN LOWEST POSITION, CB IN PLACE AND SR UP X2. PT AWARE SURGERY HAS BEEN DELAYED ABOUT 3-4 HOURS. PT WISHES TO WAIT SECONDARY TO BOWEL PREP.

## 2023-02-21 NOTE — ANESTHESIA PREPROCEDURE EVALUATION
Relevant Problems   CARDIOVASCULAR   (+) Atrial flutter, paroxysmal (HCC)       Anesthetic History   No history of anesthetic complications            Review of Systems / Medical History  Patient summary reviewed, nursing notes reviewed and pertinent labs reviewed    Pulmonary                Comments: Former [address] - 30 pack years    Stop Bang Score = 5    Neuro/Psych   Within defined limits           Cardiovascular    Hypertension        Dysrhythmias : atrial flutter  Hyperlipidemia    Exercise tolerance: >4 METS  Comments: Patient taking Eliquis (last taken 2/15/23)    ECG (10/3/22): Marked sinus  Bradycardia   Right bundle branch block. REN (5/17/22):   Left Ventricle: Normal left ventricular systolic function with a visually estimated EF of 55 - 60%. Left ventricle size is normal. Normal wall thickness. Normal wall motion. Normal diastolic function.   Aortic Valve: Moderate regurgitation.   Mitral Valve: Moderate regurgitation.   Left Atrium: Normal sized appendage. No left atrial appendage thrombus noted.   Aorta: Normal sized aortic root.  Mildly dilated ascending aorta.        GI/Hepatic/Renal         Renal disease (CRI, Stage III): stones and CRI  PUD    Comments: Colon polyp    Hx Sigmoid colon mass s/p robotic-assisted sigmoid colon resection (10/12/21) Endo/Other    Diabetes: type 2    Morbid obesity, arthritis and cancer (Hx Prostate cancer s/p prostatectomy )     Other Findings            Physical Exam    Airway  Mallampati: I  TM Distance: > 6 cm  Neck ROM: normal range of motion   Mouth opening: Normal     Cardiovascular  Regular rate and rhythm,  S1 and S2 normal,  no murmur, click, rub, or gallop             Dental    Dentition: Edentulous     Pulmonary  Breath sounds clear to auscultation               Abdominal  GI exam deferred       Other Findings            Anesthetic Plan    ASA: 3  Anesthesia type: general    Monitoring Plan: BIS      Induction: Intravenous  Anesthetic plan and risks discussed with: Patient

## 2023-02-21 NOTE — PROGRESS NOTES
Scheduled for robotic right colectomy with Dr. Naima King today for unresectable cecal polyp. Dr. Naima King delayed in earlier case. I discussed with Dr. Naima King and pt and his family me starting/assisting/performing the case in order to expedite his care. All parties agreeable. We will proceed with robotic right colectomy.     Mackenzie Jain MD   [facility]  [phone]

## 2023-02-22 LAB
ANION GAP SERPL CALC-SCNC: 10 MMOL/L (ref 5–15)
BASOPHILS # BLD: 0 K/UL (ref 0–0.1)
BASOPHILS NFR BLD: 0 % (ref 0–1)
BUN SERPL-MCNC: 15 MG/DL (ref 6–20)
BUN/CREAT SERPL: 9 (ref 12–20)
CALCIUM SERPL-MCNC: 8.8 MG/DL (ref 8.5–10.1)
CHLORIDE SERPL-SCNC: 104 MMOL/L (ref 97–108)
CO2 SERPL-SCNC: 24 MMOL/L (ref 21–32)
CREAT SERPL-MCNC: 1.59 MG/DL (ref 0.7–1.3)
DIFFERENTIAL METHOD BLD: ABNORMAL
EOSINOPHIL # BLD: 0 K/UL (ref 0–0.4)
EOSINOPHIL NFR BLD: 0 % (ref 0–7)
ERYTHROCYTE [DISTWIDTH] IN BLOOD BY AUTOMATED COUNT: 12.7 % (ref 11.5–14.5)
GLUCOSE BLD STRIP.AUTO-MCNC: 115 MG/DL (ref 65–117)
GLUCOSE BLD STRIP.AUTO-MCNC: 337 MG/DL (ref 65–117)
GLUCOSE BLD STRIP.AUTO-MCNC: 341 MG/DL (ref 65–117)
GLUCOSE BLD STRIP.AUTO-MCNC: 368 MG/DL (ref 65–117)
GLUCOSE BLD STRIP.AUTO-MCNC: 378 MG/DL (ref 65–117)
GLUCOSE SERPL-MCNC: 231 MG/DL (ref 65–100)
HCT VFR BLD AUTO: 33.2 % (ref 36.6–50.3)
HGB BLD-MCNC: 10.8 G/DL (ref 12.1–17)
IMM GRANULOCYTES # BLD AUTO: 0.1 K/UL (ref 0–0.04)
IMM GRANULOCYTES NFR BLD AUTO: 1 % (ref 0–0.5)
LYMPHOCYTES # BLD: 0.4 K/UL (ref 0.8–3.5)
LYMPHOCYTES NFR BLD: 5 % (ref 12–49)
MCH RBC QN AUTO: 31.1 PG (ref 26–34)
MCHC RBC AUTO-ENTMCNC: 32.5 G/DL (ref 30–36.5)
MCV RBC AUTO: 95.7 FL (ref 80–99)
MONOCYTES # BLD: 0.3 K/UL (ref 0–1)
MONOCYTES NFR BLD: 3 % (ref 5–13)
NEUTS SEG # BLD: 7.5 K/UL (ref 1.8–8)
NEUTS SEG NFR BLD: 91 % (ref 32–75)
NRBC # BLD: 0 K/UL (ref 0–0.01)
NRBC BLD-RTO: 0 PER 100 WBC
PLATELET # BLD AUTO: 249 K/UL (ref 150–400)
PMV BLD AUTO: 9.9 FL (ref 8.9–12.9)
POTASSIUM SERPL-SCNC: 3.8 MMOL/L (ref 3.5–5.1)
RBC # BLD AUTO: 3.47 M/UL (ref 4.1–5.7)
RBC MORPH BLD: ABNORMAL
SERVICE CMNT-IMP: ABNORMAL
SERVICE CMNT-IMP: NORMAL
SODIUM SERPL-SCNC: 138 MMOL/L (ref 136–145)
WBC # BLD AUTO: 8.3 K/UL (ref 4.1–11.1)

## 2023-02-22 PROCEDURE — 74011250637 HC RX REV CODE- 250/637: Performed by: SURGERY

## 2023-02-22 PROCEDURE — 94760 N-INVAS EAR/PLS OXIMETRY 1: CPT

## 2023-02-22 PROCEDURE — 85025 COMPLETE CBC W/AUTO DIFF WBC: CPT

## 2023-02-22 PROCEDURE — 74011250636 HC RX REV CODE- 250/636: Performed by: SURGERY

## 2023-02-22 PROCEDURE — 74011000250 HC RX REV CODE- 250: Performed by: SURGERY

## 2023-02-22 PROCEDURE — 65270000029 HC RM PRIVATE

## 2023-02-22 PROCEDURE — 36415 COLL VENOUS BLD VENIPUNCTURE: CPT

## 2023-02-22 PROCEDURE — 80048 BASIC METABOLIC PNL TOTAL CA: CPT

## 2023-02-22 PROCEDURE — 82962 GLUCOSE BLOOD TEST: CPT

## 2023-02-22 PROCEDURE — 74011636637 HC RX REV CODE- 636/637: Performed by: NURSE PRACTITIONER

## 2023-02-22 PROCEDURE — 77010033678 HC OXYGEN DAILY

## 2023-02-22 RX ORDER — INSULIN LISPRO 100 [IU]/ML
10 INJECTION, SOLUTION INTRAVENOUS; SUBCUTANEOUS ONCE
Status: COMPLETED | OUTPATIENT
Start: 2023-02-22 | End: 2023-02-22

## 2023-02-22 RX ORDER — IBUPROFEN 200 MG
4 TABLET ORAL AS NEEDED
Status: DISCONTINUED | OUTPATIENT
Start: 2023-02-22 | End: 2023-02-25 | Stop reason: HOSPADM

## 2023-02-22 RX ORDER — AMLODIPINE BESYLATE 5 MG/1
5 TABLET ORAL DAILY
Status: DISCONTINUED | OUTPATIENT
Start: 2023-02-22 | End: 2023-02-25 | Stop reason: HOSPADM

## 2023-02-22 RX ORDER — LOSARTAN POTASSIUM 100 MG/1
100 TABLET ORAL DAILY
Status: DISCONTINUED | OUTPATIENT
Start: 2023-02-22 | End: 2023-02-25 | Stop reason: HOSPADM

## 2023-02-22 RX ORDER — INSULIN LISPRO 100 [IU]/ML
INJECTION, SOLUTION INTRAVENOUS; SUBCUTANEOUS
Status: DISCONTINUED | OUTPATIENT
Start: 2023-02-22 | End: 2023-02-22

## 2023-02-22 RX ORDER — AMIODARONE HYDROCHLORIDE 200 MG/1
200 TABLET ORAL DAILY
Status: DISCONTINUED | OUTPATIENT
Start: 2023-02-22 | End: 2023-02-25 | Stop reason: HOSPADM

## 2023-02-22 RX ORDER — INSULIN LISPRO 100 [IU]/ML
INJECTION, SOLUTION INTRAVENOUS; SUBCUTANEOUS
Status: DISCONTINUED | OUTPATIENT
Start: 2023-02-22 | End: 2023-02-25 | Stop reason: HOSPADM

## 2023-02-22 RX ORDER — METFORMIN HYDROCHLORIDE 500 MG/1
1000 TABLET ORAL 2 TIMES DAILY
Status: DISCONTINUED | OUTPATIENT
Start: 2023-02-22 | End: 2023-02-23

## 2023-02-22 RX ORDER — GLIMEPIRIDE 4 MG/1
4 TABLET ORAL DAILY
Status: DISCONTINUED | OUTPATIENT
Start: 2023-02-22 | End: 2023-02-25 | Stop reason: HOSPADM

## 2023-02-22 RX ORDER — DEXTROSE MONOHYDRATE 100 MG/ML
0-250 INJECTION, SOLUTION INTRAVENOUS AS NEEDED
Status: DISCONTINUED | OUTPATIENT
Start: 2023-02-22 | End: 2023-02-25 | Stop reason: HOSPADM

## 2023-02-22 RX ORDER — METOPROLOL TARTRATE 25 MG/1
12.5 TABLET, FILM COATED ORAL 2 TIMES DAILY
Status: DISCONTINUED | OUTPATIENT
Start: 2023-02-22 | End: 2023-02-25 | Stop reason: HOSPADM

## 2023-02-22 RX ORDER — PRAVASTATIN SODIUM 10 MG/1
20 TABLET ORAL
Status: DISCONTINUED | OUTPATIENT
Start: 2023-02-22 | End: 2023-02-25 | Stop reason: HOSPADM

## 2023-02-22 RX ADMIN — HYDROMORPHONE HYDROCHLORIDE 0.5 MG: 1 INJECTION, SOLUTION INTRAMUSCULAR; INTRAVENOUS; SUBCUTANEOUS at 02:45

## 2023-02-22 RX ADMIN — Medication 1 AMPULE: at 22:13

## 2023-02-22 RX ADMIN — AMIODARONE HYDROCHLORIDE 200 MG: 200 TABLET ORAL at 09:00

## 2023-02-22 RX ADMIN — SODIUM CHLORIDE, PRESERVATIVE FREE 10 ML: 5 INJECTION INTRAVENOUS at 22:13

## 2023-02-22 RX ADMIN — METFORMIN HYDROCHLORIDE 1000 MG: 500 TABLET ORAL at 17:47

## 2023-02-22 RX ADMIN — SODIUM CHLORIDE, PRESERVATIVE FREE 10 ML: 5 INJECTION INTRAVENOUS at 02:47

## 2023-02-22 RX ADMIN — METOPROLOL TARTRATE 12.5 MG: 25 TABLET, FILM COATED ORAL at 10:13

## 2023-02-22 RX ADMIN — METFORMIN HYDROCHLORIDE 1000 MG: 500 TABLET ORAL at 10:12

## 2023-02-22 RX ADMIN — ACETAMINOPHEN 325MG 650 MG: 325 TABLET ORAL at 17:39

## 2023-02-22 RX ADMIN — ACETAMINOPHEN 325MG 650 MG: 325 TABLET ORAL at 06:13

## 2023-02-22 RX ADMIN — Medication 1 AMPULE: at 10:12

## 2023-02-22 RX ADMIN — LOSARTAN POTASSIUM 100 MG: 100 TABLET, FILM COATED ORAL at 10:12

## 2023-02-22 RX ADMIN — ENOXAPARIN SODIUM 40 MG: 100 INJECTION SUBCUTANEOUS at 10:15

## 2023-02-22 RX ADMIN — PRAVASTATIN SODIUM 20 MG: 10 TABLET ORAL at 02:46

## 2023-02-22 RX ADMIN — METOPROLOL TARTRATE 12.5 MG: 25 TABLET, FILM COATED ORAL at 17:48

## 2023-02-22 RX ADMIN — Medication 10 UNITS: at 12:00

## 2023-02-22 RX ADMIN — Medication 10 UNITS: at 16:30

## 2023-02-22 RX ADMIN — GLIMEPIRIDE 4 MG: 4 TABLET ORAL at 10:12

## 2023-02-22 RX ADMIN — ACETAMINOPHEN 325MG 650 MG: 325 TABLET ORAL at 10:20

## 2023-02-22 RX ADMIN — AMLODIPINE BESYLATE 5 MG: 5 TABLET ORAL at 10:13

## 2023-02-22 RX ADMIN — PRAVASTATIN SODIUM 20 MG: 10 TABLET ORAL at 22:12

## 2023-02-22 RX ADMIN — ACETAMINOPHEN 325MG 650 MG: 325 TABLET ORAL at 22:12

## 2023-02-22 RX ADMIN — OXYCODONE 10 MG: 5 TABLET ORAL at 10:13

## 2023-02-22 RX ADMIN — SODIUM CHLORIDE, PRESERVATIVE FREE 10 ML: 5 INJECTION INTRAVENOUS at 14:00

## 2023-02-22 NOTE — BRIEF OP NOTE
Brief Postoperative Note    Patient: Clayton Irizarry  YOB: 1940  MRN: [id number]    Date of Procedure: 2/21/2023     Pre-Op Diagnosis: COLON POLYP    Post-Op Diagnosis: Same as preoperative diagnosis.       Procedure(s):  ROBOTIC ASSISTED LAPAROSCOPIC ILEO COLECTOMY ( E R A S )    Surgeon(s):  MD Jesus Miguel MD    Surgical Assistant: Surg Asst-1: Karri Parrish    Anesthesia: General     Estimated Blood Loss (mL): less than 50     Complications: None    Specimens:   ID Type Source Tests Collected by Time Destination   1 : right colon Preservative Colon, Right  Rivka Grande MD 2/21/2023 2112 Pathology        Implants: * No implants in log *    Drains: * No LDAs found *      Electronically Signed by Magaly Albert MD on 2/21/2023 at 9:22 PM

## 2023-02-22 NOTE — PROGRESS NOTES
End of Shift Note    Bedside shift change report given to [address] (oncoming nurse) by Randa Sands (offgoing nurse). Report included the following information SBAR, Kardex, Intake/Output, and MAR    Shift worked:      Shift summary and any significant changes: Up with 1 assist in the hallway   Family present   Tolerated diet   Tolerated medication  Have to give extra coverage today due to elevated B.S.      Concerns for physician to address: None    Zone phone for oncoming shift:  None        Activity:     Number times ambulated in hallways past shift: 1  Number of times OOB to chair past shift: 3    Cardiac:   Cardiac Monitoring: No      Cardiac Rhythm: Sinus Cristian    Access:  Current line(s): PIV     Genitourinary:   Urinary status: voiding    Respiratory:   O2 Device: Nasal cannula  Chronic home O2 use?: NO  Incentive spirometer at bedside: YES       GI:  Last Bowel Movement Date: 02/21/23  Current diet:  ADULT DIET Regular; Low Fiber  Passing flatus: YES  Tolerating current diet: NO       Pain Management:   Patient states pain is manageable on current regimen: YES    Skin:  Arvin Score: 21  Interventions: turn team, increase time out of bed, and foam dressing    Patient Safety:  Fall Score:    Interventions: bed/chair alarm, assistive device (walker, cane, etc), gripper socks, pt to call before getting OOB, and stay with me (per policy)       Length of Stay:  Expected LOS: - - -  Actual LOS: [facility]

## 2023-02-22 NOTE — PERIOP NOTES
2140 Handoff Report from Operating Room to PACU    Report received from [address] [place] [address] and Salena darden RN regarding Bibiana Story. Surgeon(s):  Angelene Boehringer, MD Ernie Dancer, MD  And Procedure(s) (LRB):  ROBOTIC ASSISTED LAPAROSCOPIC ILEO COLECTOMY ( E R A S ) (N/A)  confirmed   with allergies and drains discussed. Anesthesia type, drugs, patient history, complications, estimated blood loss, vital signs, intake and output, and last pain medication, lines, reversal medications, and temperature were reviewed. Dr. Elida Rodriguez aware . No orders noted at time. 2255- TRANSFER - OUT REPORT:    Verbal report given to [facility] RN(name) on Bibiana Story  being transferred to [facility](unit) for routine post - op       Report consisted of patients Situation, Background, Assessment and   Recommendations(SBAR). Information from the following report(s) OR Summary, Procedure Summary, Intake/Output, and MAR was reviewed with the receiving nurse. Lines:   Peripheral IV 02/21/23 Anterior;Distal;Left Forearm (Active)   Site Assessment Clean, dry, & intact 02/21/23 2140   Phlebitis Assessment 0 02/21/23 2140   Infiltration Assessment 0 02/21/23 2140   Dressing Status Clean, dry, & intact 02/21/23 2140   Dressing Type Tape;Transparent 02/21/23 2140   Hub Color/Line Status Green; Infusing 02/21/23 2140       Peripheral IV 02/21/23 Posterior;Proximal;Right Forearm (Active)   Site Assessment Clean, dry, & intact 02/21/23 2140   Phlebitis Assessment 0 02/21/23 2140   Infiltration Assessment 0 02/21/23 2140   Dressing Status Clean, dry, & intact 02/21/23 2140   Dressing Type Tape;Transparent 02/21/23 2140   Hub Color/Line Status Pink;Capped;Flushed 02/21/23 2140        Opportunity for questions and clarification was provided. Patient transported with:   O2 @ 2 liters  Registered Nurse  Chart  Personal belongings  Patient wife updated on care and new room number.

## 2023-02-22 NOTE — ANESTHESIA POSTPROCEDURE EVALUATION
Procedure(s):  ROBOTIC ASSISTED LAPAROSCOPIC ILEO COLECTOMY ( E R A S ). general    Anesthesia Post Evaluation      Multimodal analgesia: multimodal analgesia used between 6 hours prior to anesthesia start to PACU discharge  Patient location during evaluation: PACU  Patient participation: complete - patient participated  Level of consciousness: sleepy but conscious  Pain management: adequate  Airway patency: patent  Anesthetic complications: no  Cardiovascular status: acceptable, blood pressure returned to baseline and hemodynamically stable  Respiratory status: acceptable and nasal cannula  Hydration status: acceptable  Post anesthesia nausea and vomiting:  none  Final Post Anesthesia Temperature Assessment:  Normothermia (36.0-37.5 degrees C)      INITIAL Post-op Vital signs:   Vitals Value Taken Time   /54 02/21/23 2230   Temp 37.1 °C (98.7 °F) 02/21/23 2141   Pulse 57 02/21/23 2237   Resp 13 02/21/23 2237   SpO2 95 % 02/21/23 2237   Vitals shown include unvalidated device data.

## 2023-02-22 NOTE — PROGRESS NOTES
Comprehensive Nutrition Assessment    Type and Reason for Visit: Initial, Positive nutrition screen, Consult    Nutrition Recommendations/Plan:   Continue low fiber diet. Provided guidance and handout for discharge. ERAS supplements provided. Malnutrition Assessment:  Malnutrition Status:  Severe malnutrition (02/22/23 1451)    Context:  Chronic illness     Findings of the 6 clinical characteristics of malnutrition:   Energy Intake:  75% or less est energy requirements for 1 month or longer  Weight Loss:  Greater than 10% over 6 months     Body Fat Loss:  No significant body fat loss,     Muscle Mass Loss:  No significant muscle mass loss,    Fluid Accumulation:  No significant fluid accumulation,     Strength:  Not performed     Nutrition Assessment:     Chart reviewed for MST and ERAS supplement consult. Pt is s/p ileo-colectomy yesterday. He has been eating well today and tolerating his meals so far without issues. Per EMR, he had lost ~35lb (13%) since October which is severe for the time frame. Pt reports feeling a lump in his throat around that time which kept him from eating much because it made swallowing difficult. Recently, that sensation has dissipated and he has been able to eat better. His wife had gotten him started on Ensures at home prior to surgery, and reports he has eaten better here post op that she has seen him eat in several months. BG has been >300mg/dL today, but his last A1c was 5.3 this month. Elevated related to surgical stress? Will continue monitoring. Daughter ordered dinner tonight <60g CHO. RD provided a 5-day supply of Ensure Surgery/Immunonutrition (10 bottles) to the bedside per ERAS protocol and discussed the importance of adequate nutrition/supplement compliance in effort to optimize wound healing and recovery from surgery. Pt agreeable and reports that he enjoyed the Ensure Surgery pre-op. RD anticipates good compliance.      Nutrition Related Findings:    Labs: BG [phone]. Meds: humalog, metformin, dilaudid, oxycodone. BM 2/21. Wound Type: Surgical incision (abdomen)    Current Nutrition Intake & Therapies:  Average Meal Intake: %  Average Supplement Intake: None ordered  ADULT DIET Regular; Low Fiber    Anthropometric Measures:  Height: 5' 7.25\" (170.8 cm)  Ideal Body Weight (IBW): 150 lbs (68 kg)     Current Body Wt:  100.9 kg (222 lb 7.1 oz), 148.3 % IBW. Standing scale  Current BMI (kg/m2): 34.6        Weight Adjustment: No adjustment                 BMI Category: Obese class 1 (BMI 30.0-34. 9)    Estimated Daily Nutrient Needs:  Energy Requirements Based On: Formula  Weight Used for Energy Requirements: Current  Energy (kcal/day): 2165 kcals (BMR x 1. 3AF)  Weight Used for Protein Requirements: Current  Protein (g/day): 100-120g (1.0-1.2g/kg)  Method Used for Fluid Requirements: 1 ml/kcal  Fluid (ml/day): 2200mL    Nutrition Diagnosis:   Increased nutrient needs related to increased demand for energy/nutrients (protein) as evidenced by  (post op wound healing)    Nutrition Interventions:   Food and/or Nutrient Delivery: Continue current diet, Start oral nutrition supplement  Nutrition Education/Counseling: No recommendations at this time  Coordination of Nutrition Care: Continue to monitor while inpatient       Goals:     Goals: PO intake 75% or greater, by next RD assessment       Nutrition Monitoring and Evaluation:   Behavioral-Environmental Outcomes: None identified  Food/Nutrient Intake Outcomes: Food and nutrient intake, Supplement intake  Physical Signs/Symptoms Outcomes: Biochemical data, GI status, Weight, Skin, Nutrition focused physical findings    Discharge Planning:    Continue current diet, Continue oral nutrition supplement    Nixon Roberts RD  Contact: [phone]

## 2023-02-22 NOTE — PROGRESS NOTES
Problem: Risk for Spread of Infection  Goal: Prevent transmission of infectious organism to others  Description: Prevent the transmission of infectious organisms to other patients, staff members, and visitors. Outcome: Progressing Towards Goal     Problem: Patient Education:  Go to Education Activity  Goal: Patient/Family Education  Outcome: Progressing Towards Goal     Problem: Falls - Risk of  Goal: *Absence of Falls  Description: Document Diana Linda Fall Risk and appropriate interventions in the flowsheet.   Outcome: Progressing Towards Goal  Note: Fall Risk Interventions:  Mobility Interventions: Bed/chair exit alarm                             Problem: Patient Education: Go to Patient Education Activity  Goal: Patient/Family Education  Outcome: Progressing Towards Goal

## 2023-02-22 NOTE — PROGRESS NOTES
End of Shift Note    Bedside shift change report given to CHASE Abreu (oncoming nurse) by Nanci Landry RN (offgoing nurse). Report included the following information SBAR, Kardex, MAR, and Recent Results    Shift worked:  3192-1966     Shift summary and any significant changes:     PPD 1. On 2L O2. Bp  trend down. MAP stable. IVF. Pain regimen adequate. Incisions intact. [place] removed @0600. Pending first void. IS encouraged. SCDs. Audra fluids.      Concerns for physician to address:      Zone phone for oncoming shift:                Length of Stay:  Expected LOS: - - -  Actual LOS: 1      Nanci Landry RN

## 2023-02-22 NOTE — PROGRESS NOTES
02/21/23 1925   Family Communication   Family Update Message Will update in 1-1/2 hours   Delivery Origin Nurse  Ligia Bowles RN)    Relationship to Patient Spouse    Phone Number Kristal Germain [phone]   Family/Significant Other Update Called;Updated

## 2023-02-23 LAB
ANION GAP SERPL CALC-SCNC: 4 MMOL/L (ref 5–15)
BASOPHILS # BLD: 0 K/UL (ref 0–0.1)
BASOPHILS NFR BLD: 0 % (ref 0–1)
BUN SERPL-MCNC: 27 MG/DL (ref 6–20)
BUN/CREAT SERPL: 19 (ref 12–20)
CALCIUM SERPL-MCNC: 8.3 MG/DL (ref 8.5–10.1)
CHLORIDE SERPL-SCNC: 100 MMOL/L (ref 97–108)
CO2 SERPL-SCNC: 26 MMOL/L (ref 21–32)
CREAT SERPL-MCNC: 1.43 MG/DL (ref 0.7–1.3)
DIFFERENTIAL METHOD BLD: ABNORMAL
EOSINOPHIL # BLD: 0 K/UL (ref 0–0.4)
EOSINOPHIL NFR BLD: 0 % (ref 0–7)
ERYTHROCYTE [DISTWIDTH] IN BLOOD BY AUTOMATED COUNT: 13.2 % (ref 11.5–14.5)
GLUCOSE BLD STRIP.AUTO-MCNC: 144 MG/DL (ref 65–117)
GLUCOSE BLD STRIP.AUTO-MCNC: 153 MG/DL (ref 65–117)
GLUCOSE BLD STRIP.AUTO-MCNC: 211 MG/DL (ref 65–117)
GLUCOSE BLD STRIP.AUTO-MCNC: 302 MG/DL (ref 65–117)
GLUCOSE SERPL-MCNC: 135 MG/DL (ref 65–100)
HCT VFR BLD AUTO: 22.8 % (ref 36.6–50.3)
HGB BLD-MCNC: 7.5 G/DL (ref 12.1–17)
IMM GRANULOCYTES # BLD AUTO: 0 K/UL (ref 0–0.04)
IMM GRANULOCYTES NFR BLD AUTO: 1 % (ref 0–0.5)
LYMPHOCYTES # BLD: 1.1 K/UL (ref 0.8–3.5)
LYMPHOCYTES NFR BLD: 13 % (ref 12–49)
MCH RBC QN AUTO: 31.1 PG (ref 26–34)
MCHC RBC AUTO-ENTMCNC: 32.9 G/DL (ref 30–36.5)
MCV RBC AUTO: 94.6 FL (ref 80–99)
MONOCYTES # BLD: 0.8 K/UL (ref 0–1)
MONOCYTES NFR BLD: 10 % (ref 5–13)
NEUTS SEG # BLD: 6.4 K/UL (ref 1.8–8)
NEUTS SEG NFR BLD: 77 % (ref 32–75)
NRBC # BLD: 0 K/UL (ref 0–0.01)
NRBC BLD-RTO: 0 PER 100 WBC
PLATELET # BLD AUTO: 204 K/UL (ref 150–400)
PMV BLD AUTO: 9.4 FL (ref 8.9–12.9)
POTASSIUM SERPL-SCNC: 3.7 MMOL/L (ref 3.5–5.1)
RBC # BLD AUTO: 2.41 M/UL (ref 4.1–5.7)
SERVICE CMNT-IMP: ABNORMAL
SODIUM SERPL-SCNC: 130 MMOL/L (ref 136–145)
WBC # BLD AUTO: 8.4 K/UL (ref 4.1–11.1)

## 2023-02-23 PROCEDURE — 74011250637 HC RX REV CODE- 250/637: Performed by: SURGERY

## 2023-02-23 PROCEDURE — 94760 N-INVAS EAR/PLS OXIMETRY 1: CPT

## 2023-02-23 PROCEDURE — 74011250636 HC RX REV CODE- 250/636: Performed by: SURGERY

## 2023-02-23 PROCEDURE — 85025 COMPLETE CBC W/AUTO DIFF WBC: CPT

## 2023-02-23 PROCEDURE — 77010033678 HC OXYGEN DAILY

## 2023-02-23 PROCEDURE — 80048 BASIC METABOLIC PNL TOTAL CA: CPT

## 2023-02-23 PROCEDURE — 82962 GLUCOSE BLOOD TEST: CPT

## 2023-02-23 PROCEDURE — 36415 COLL VENOUS BLD VENIPUNCTURE: CPT

## 2023-02-23 PROCEDURE — 74011000250 HC RX REV CODE- 250: Performed by: SURGERY

## 2023-02-23 PROCEDURE — 74011636637 HC RX REV CODE- 636/637: Performed by: NURSE PRACTITIONER

## 2023-02-23 PROCEDURE — 65270000029 HC RM PRIVATE

## 2023-02-23 RX ORDER — METFORMIN HYDROCHLORIDE 500 MG/1
1000 TABLET ORAL 2 TIMES DAILY WITH MEALS
Status: DISCONTINUED | OUTPATIENT
Start: 2023-02-23 | End: 2023-02-25 | Stop reason: HOSPADM

## 2023-02-23 RX ADMIN — AMLODIPINE BESYLATE 5 MG: 5 TABLET ORAL at 09:51

## 2023-02-23 RX ADMIN — SODIUM CHLORIDE, PRESERVATIVE FREE 10 ML: 5 INJECTION INTRAVENOUS at 05:17

## 2023-02-23 RX ADMIN — LOSARTAN POTASSIUM 100 MG: 100 TABLET, FILM COATED ORAL at 09:56

## 2023-02-23 RX ADMIN — ACETAMINOPHEN 325MG 650 MG: 325 TABLET ORAL at 22:09

## 2023-02-23 RX ADMIN — Medication 10 UNITS: at 13:30

## 2023-02-23 RX ADMIN — Medication 3 UNITS: at 09:57

## 2023-02-23 RX ADMIN — Medication 4 UNITS: at 16:30

## 2023-02-23 RX ADMIN — PRAVASTATIN SODIUM 20 MG: 10 TABLET ORAL at 22:09

## 2023-02-23 RX ADMIN — GLIMEPIRIDE 4 MG: 4 TABLET ORAL at 12:26

## 2023-02-23 RX ADMIN — METFORMIN HYDROCHLORIDE 1000 MG: 500 TABLET ORAL at 10:16

## 2023-02-23 RX ADMIN — METOPROLOL TARTRATE 12.5 MG: 25 TABLET, FILM COATED ORAL at 09:51

## 2023-02-23 RX ADMIN — ENOXAPARIN SODIUM 40 MG: 100 INJECTION SUBCUTANEOUS at 09:51

## 2023-02-23 RX ADMIN — Medication 1 AMPULE: at 10:17

## 2023-02-23 RX ADMIN — Medication 1 AMPULE: at 22:09

## 2023-02-23 RX ADMIN — ACETAMINOPHEN 325MG 650 MG: 325 TABLET ORAL at 10:16

## 2023-02-23 RX ADMIN — OXYCODONE 10 MG: 5 TABLET ORAL at 09:47

## 2023-02-23 RX ADMIN — AMIODARONE HYDROCHLORIDE 200 MG: 200 TABLET ORAL at 09:56

## 2023-02-23 RX ADMIN — ACETAMINOPHEN 325MG 650 MG: 325 TABLET ORAL at 05:16

## 2023-02-23 RX ADMIN — SODIUM CHLORIDE, PRESERVATIVE FREE 10 ML: 5 INJECTION INTRAVENOUS at 22:09

## 2023-02-23 RX ADMIN — SODIUM CHLORIDE, PRESERVATIVE FREE 10 ML: 5 INJECTION INTRAVENOUS at 14:00

## 2023-02-23 RX ADMIN — ACETAMINOPHEN 325MG 650 MG: 325 TABLET ORAL at 18:02

## 2023-02-23 RX ADMIN — METFORMIN HYDROCHLORIDE 1000 MG: 500 TABLET ORAL at 18:03

## 2023-02-23 RX ADMIN — METOPROLOL TARTRATE 12.5 MG: 25 TABLET, FILM COATED ORAL at 18:03

## 2023-02-23 NOTE — PROGRESS NOTES
Transition of Care Plan:    RUR:  10%   Disposition:  home with spouse  If SNF or IPR: Date FOC offered:  Date FOC received:  Date authorization started with reference number:  Date authorization received and expires:  Accepting facility:  Follow up appointments:  PCP, Specialists  DME needed: tbd  Transportation at Discharge: spouse, son or daughter  Doc F[place]ardo or means to access home:      yes  IM Medicare Letter: needed at d/c  Is patient a [place] and connected with the [place]? N/a             If yes, was [employer] transfer form completed and VA notified? Caregiver Reed An  [phone]  Discharge Caregiver contacted prior to discharge? CM will contact prior to d/c if pt desires. Care Conference needed?:          Reason for Admission:  Colon Polyp                     RUR Score:   10%                   Plan for utilizing home health:  tbd        PCP: First and Last name:  Antonio Dobson MD     Name of Practice:    Are you a current patient: Yes/No: yes   Approximate date of last visit: 3 months ago    Can you participate in a virtual visit with your PCP:                     Current Advanced Directive/Advance Care Plan: Full Code    [address] (ACP) Conversation      Date of Conversation: 2/21/23  Conducted with: Healthcare Decision Maker: Next of Kin by law (only applies in absence of a Healthcare Power of  or Legal Guardian)    Healthcare Decision Maker:   No healthcare decision makers have been documented. Click here to complete [address] including selection of the Healthcare Decision Maker Relationship (ie \"Primary\")      Content/Action Overview:    Has ACP document(s) NOT on file - requested patient to provide  Reviewed DNR/DNI and patient elects Full Code (Attempt Resuscitation)      Length of Voluntary ACP Conversation in minutes:  <16 minutes (Non-Billable)    Miguel Angel Mckinley                                        Transition of Care Plan:   home with family    CM spoke with pt's spouse via phone to introduce self/role, verify demographics, insurance and PCP. CM also discussed d/c plan. Pt is a 78 yo, ,  male who was admitted to [facility] on 2/21/23 with the above dx. Pt sees his PCP every 3 months. Pt uses the . Pt lives with his spouse in a one fl home with 0 NIRAJ. Pt's son lives next door. Pt also has a supportive daughter. Pt can complete his ADL care at baseline. Pt does drive. Pt does not have a hx of HH, SNF or IPR. Pt's spouse, daughter or son will transport when ready. CM will continue to assess for d/c needs. Care Management Interventions  Mode of Transport at Discharge:  Other (see comment) (spouse, son or dtr)  Transition of Care Consult (CM Consult): Discharge Planning  Discharge Durable Medical Equipment: No  Physical Therapy Consult: No  Occupational Therapy Consult: No  Speech Therapy Consult: No  Support Systems: Spouse/Significant Other, Child(nathaniel)  Confirm Follow Up Transport: Self  The Patient and/or Patient Representative was Provided with a Choice of Provider and Agrees with the Discharge Plan?: Yes  Freedom of Choice List was Provided with Basic Dialogue that Supports the Patient's Individualized Plan of Care/Goals, Treatment Preferences and Shares the Quality Data Associated with the Providers?: Yes  Discharge Location  Patient Expects to be Discharged to[de-identified] Home with family assistance    Rebecca Chaidez LMSW  Supervisee in [facility], [address] [place] [address]

## 2023-02-23 NOTE — TELEPHONE ENCOUNTER
The patient is at low cardiac risk to proceed with the procedure, and can interrupt blood thinners if necessary, for the following length of time individualized for each blood thinner, with the shorter and or longer end of the duration depending on surgical bleeding risk as per the proceduralist/surgeon:    Aspirin/Plavix/Effient: 5 to 7 days  Eliquis/Xarelto/Pradaxa/Savaysa: 2 to 3 days  Coumadin: 5 days    If the surgeon believes the procedure is low bleeding risk, recommend continuation of aspirin 81 mg if possible, as even cardiac surgery is performed on this dose of aspirin. Please advise that whenever a blood thinner is temporarily stopped for the treatment of atrial fibrillation, there is always a tiny (less than 1%) chance of a stroke, and even less for people that are not always in atrial fibrillation. Usually, the benefit of the interrupting blood thinner for the procedure exceeds the risk. Resume blood thinners as per the surgeon/proceduralist when felt to be safe from a surgical/procedural standpoint.

## 2023-02-23 NOTE — PROGRESS NOTES
Surgery NP Progress Note    Asa Harbour  [id number]  male  79 y.o.  1940    s/p ROBOTIC ASSISTED LAPAROSCOPIC ILEO COLECTOMY ( E R A S ) on 2023   Pt seen with Dr. Mariano Hoffman    Assessment:   Active Problems:    Colon polyp (2023)      Expected post-op progress. Plan/Recommendations/Medical Decision Making:     - Mobilize with nursing and OOB to chair for meals  - Reduce diet to FLD.   - Pain management- Continue current pain control methods.   - VTE Prophylaxis: Lovenox  - Watch for vomiting and place NGT if patient vomits. Subjective:     Patient with some nausea and belching. No vomiting. No bowel function yet. Expected post-op pain. Objective:     Blood pressure (!) 108/51, pulse (!) 56, temperature 97.3 °F (36.3 °C), resp. rate 16, height 5' 7.25\" (1.708 m), weight 100.9 kg (222 lb 7.1 oz), SpO2 95 %. Temp (24hrs), Av.8 °F (36.6 °C), Min:97.3 °F (36.3 °C), Max:98.1 °F (36.7 °C)      Pt resting in bed. NAD   Incisions CDI. Abd soft and appropriately tender. Some expected bruising. SCDs for mechanical DVT proph while in bed     Body mass index is 34.58 kg/m². Reference: BMI greater than 30 is classified as obesity and greater than 40 is classified as morbid obesity.      Last 3 Recorded Weights in this Encounter    23 1236   Weight: 100.9 kg (222 lb 7.1 oz)         Lo Shipley NP   MSN, APRN, FNP-C, CWOCN-AP, RNFA    23

## 2023-02-23 NOTE — PROGRESS NOTES
Problem: Risk for Spread of Infection  Goal: Prevent transmission of infectious organism to others  Description: Prevent the transmission of infectious organisms to other patients, staff members, and visitors. Outcome: Resolved/Not Met     Problem: Patient Education:  Go to Education Activity  Goal: Patient/Family Education  Outcome: Resolved/Not Met     Problem: Falls - Risk of  Goal: *Absence of Falls  Description: Document Lila Fall Risk and appropriate interventions in the flowsheet.   Outcome: Resolved/Not Met     Problem: Patient Education: Go to Patient Education Activity  Goal: Patient/Family Education  Outcome: Resolved/Not Met

## 2023-02-24 ENCOUNTER — TELEPHONE (OUTPATIENT)
Dept: CARDIOLOGY CLINIC | Age: 83
End: 2023-02-24

## 2023-02-24 LAB
GLUCOSE BLD STRIP.AUTO-MCNC: 114 MG/DL (ref 65–117)
GLUCOSE BLD STRIP.AUTO-MCNC: 123 MG/DL (ref 65–117)
GLUCOSE BLD STRIP.AUTO-MCNC: 127 MG/DL (ref 65–117)
GLUCOSE BLD STRIP.AUTO-MCNC: 215 MG/DL (ref 65–117)
SERVICE CMNT-IMP: ABNORMAL
SERVICE CMNT-IMP: NORMAL

## 2023-02-24 PROCEDURE — 94760 N-INVAS EAR/PLS OXIMETRY 1: CPT

## 2023-02-24 PROCEDURE — 65270000029 HC RM PRIVATE

## 2023-02-24 PROCEDURE — 74011250636 HC RX REV CODE- 250/636: Performed by: SURGERY

## 2023-02-24 PROCEDURE — 74011250637 HC RX REV CODE- 250/637: Performed by: SURGERY

## 2023-02-24 PROCEDURE — 74011636637 HC RX REV CODE- 636/637: Performed by: NURSE PRACTITIONER

## 2023-02-24 PROCEDURE — 74011000250 HC RX REV CODE- 250: Performed by: SURGERY

## 2023-02-24 PROCEDURE — 82962 GLUCOSE BLOOD TEST: CPT

## 2023-02-24 RX ORDER — OXYCODONE HYDROCHLORIDE 5 MG/1
5 TABLET ORAL
Qty: 10 TABLET | Refills: 0 | Status: SHIPPED | OUTPATIENT
Start: 2023-02-24 | End: 2023-02-27

## 2023-02-24 RX ORDER — POLYETHYLENE GLYCOL 3350 17 G/17G
17 POWDER, FOR SOLUTION ORAL ONCE
Status: COMPLETED | OUTPATIENT
Start: 2023-02-24 | End: 2023-02-24

## 2023-02-24 RX ADMIN — Medication 1 AMPULE: at 21:15

## 2023-02-24 RX ADMIN — METOPROLOL TARTRATE 12.5 MG: 25 TABLET, FILM COATED ORAL at 09:12

## 2023-02-24 RX ADMIN — ACETAMINOPHEN 325MG 650 MG: 325 TABLET ORAL at 12:38

## 2023-02-24 RX ADMIN — METFORMIN HYDROCHLORIDE 1000 MG: 500 TABLET ORAL at 19:05

## 2023-02-24 RX ADMIN — SODIUM CHLORIDE, PRESERVATIVE FREE 10 ML: 5 INJECTION INTRAVENOUS at 22:38

## 2023-02-24 RX ADMIN — METOPROLOL TARTRATE 12.5 MG: 25 TABLET, FILM COATED ORAL at 19:05

## 2023-02-24 RX ADMIN — METFORMIN HYDROCHLORIDE 1000 MG: 500 TABLET ORAL at 09:12

## 2023-02-24 RX ADMIN — AMIODARONE HYDROCHLORIDE 200 MG: 200 TABLET ORAL at 09:12

## 2023-02-24 RX ADMIN — ACETAMINOPHEN 325MG 650 MG: 325 TABLET ORAL at 06:28

## 2023-02-24 RX ADMIN — SODIUM CHLORIDE, PRESERVATIVE FREE 10 ML: 5 INJECTION INTRAVENOUS at 14:00

## 2023-02-24 RX ADMIN — GLIMEPIRIDE 4 MG: 4 TABLET ORAL at 19:06

## 2023-02-24 RX ADMIN — ENOXAPARIN SODIUM 40 MG: 100 INJECTION SUBCUTANEOUS at 09:12

## 2023-02-24 RX ADMIN — POLYETHYLENE GLYCOL 3350 17 G: 17 POWDER, FOR SOLUTION ORAL at 12:38

## 2023-02-24 RX ADMIN — LOSARTAN POTASSIUM 100 MG: 100 TABLET, FILM COATED ORAL at 09:12

## 2023-02-24 RX ADMIN — SODIUM CHLORIDE, PRESERVATIVE FREE 10 ML: 5 INJECTION INTRAVENOUS at 06:28

## 2023-02-24 RX ADMIN — PRAVASTATIN SODIUM 20 MG: 10 TABLET ORAL at 22:36

## 2023-02-24 RX ADMIN — AMLODIPINE BESYLATE 5 MG: 5 TABLET ORAL at 09:12

## 2023-02-24 RX ADMIN — Medication 4 UNITS: at 12:38

## 2023-02-24 NOTE — PROGRESS NOTES
End of Shift Note    Bedside shift change report given to [address] (oncoming nurse) by Laverne Christian (offgoing nurse). Report included the following information SBAR, Kardex, Intake/Output, and MAR    Shift worked:      Shift summary and any significant changes: Up with 1 assist in the hallway   Family present   Tolerated diet   Tolerated medication  Have to give extra coverage today due to elevated B.S. Concerns for physician to address: None    Zone phone for oncoming shift:  None        Activity:  Activity Level: Up with Assistance  Number times ambulated in hallways past shift: 1  Number of times OOB to chair past shift: 3    Cardiac:   Cardiac Monitoring: No      Cardiac Rhythm: Sinus Rhythm    Access:  Current line(s): PIV     Genitourinary:   Urinary status: voiding    Respiratory:   O2 Device: None (Room air)  Chronic home O2 use?: NO  Incentive spirometer at bedside: YES       GI:  Last Bowel Movement Date: 02/21/23  Current diet:  ADULT DIET Full Liquid  Passing flatus: YES  Tolerating current diet: NO       Pain Management:   Patient states pain is manageable on current regimen: YES    Skin:  Arvin Score: 21  Interventions: turn team, increase time out of bed, and foam dressing    Patient Safety:  Fall Score:  Total Score: 1  Interventions: bed/chair alarm, assistive device (walker, cane, etc), gripper socks, pt to call before getting OOB, and stay with me (per policy)       Length of Stay:  Expected LOS: 2d 2h  Actual LOS: [facility]

## 2023-02-24 NOTE — PROGRESS NOTES
Surgery NP Progress Note    Evelyn Neat  [id number]  male  79 y.o.  1940    s/p ROBOTIC ASSISTED LAPAROSCOPIC ILEO COLECTOMY ( E R A S ) on 2023   Pt seen with Dr. Dany Tobin    Assessment:   Active Problems:    Colon polyp (2023)      Expected post-op progress. Plan/Recommendations/Medical Decision Making:     - Mobilize with nursing and OOB to chair for meals  - Low fiber diet  - Pain management- Continue current pain control methods.   - VTE Prophylaxis: Lovenox  - Home when having bowel function, today vs tomorrow      Subjective:     Patient feeling better overall. Having flatus. No bowel movement yet. Objective:     Blood pressure (!) 137/56, pulse 63, temperature 97.7 °F (36.5 °C), resp. rate 17, height 5' 7.25\" (1.708 m), weight 100.9 kg (222 lb 7.1 oz), SpO2 98 %. Temp (24hrs), Av.9 °F (36.6 °C), Min:97.3 °F (36.3 °C), Max:98.5 °F (36.9 °C)      Pt resting in bed. NAD   Incisions CDI. Abd soft and appropriately tender. Some expected bruising. SCDs for mechanical DVT proph while in bed     Body mass index is 34.58 kg/m². Reference: BMI greater than 30 is classified as obesity and greater than 40 is classified as morbid obesity.      Last 3 Recorded Weights in this Encounter    23 1236   Weight: 100.9 kg (222 lb 7.1 oz)         Татьяна Marino, NP   MSN, APRN, FNP-C, CWOCN-AP, RNFA    23

## 2023-02-24 NOTE — DISCHARGE INSTRUCTIONS
Sumit Ley MD, [address] [facility] Jamila Jackson MD, [address] [facility] MD Sumeet, FACS  Elsa Nick. Luis A Rucker MD, MD Darius Restrepo MD Sherrod Horton, MD      Discharge Instructions for [facility] Colorectal Surgery Patients       Do not lift any objects weighing more than 10 pounds for 4 weeks. Do not do any housework including vacuuming, scrubbing or yardwork for 4 weeks. Do not drive for two weeks or while taking sedating medications. You may walk as desired and go up and down stairs as needed. You may shower. Do not take tub baths, swim or use hot tubs for 4 weeks. Leave steri-strips on incision. They will fall off on their own. You may have dermabond on your incisions which is surgical glue. This will dissolve over time. Do not scrub around incisions. Follow low-fiber diet for 2 weeks. (See handbook for additional details). Drink nutritional supplements 2 times per day until your diet and appetite are back to normal. Diabetic patients should drink one-half bottle 4 times daily. Multiple bowel movements are normal each day. Contact your surgeons office for any concerns. Take Acetaminophen 1000mg every 6 hours for 24 hours, then as needed for pain and Oxycodone (per prescription instructions)    Follow up with providers as scheduled. If your surgery involves an ostomy bag, please bring your supplies to the first office visit with your surgeon. If you experience fever (greater than 100.5), chills, vomiting or redness or drainage at surgical site, please contact your surgeons office. For questions regarding quality or quantity of ostomy output, refer to ERAS handbook or call surgeons office. Please see handbook for additional instructions. If you have further questions, please call your surgeons office.

## 2023-02-24 NOTE — TELEPHONE ENCOUNTER
Letter received from [facility] CATHERINE with the denial for PA for Eliquis. Reason : documentation of 3% out-of pocket prescription expenses, based on household adjusted gross income, not meet. This patient attempted to contact patient but, patient had a recent surgery and still in hospital.    Spoke with patients wife. Ms. Cline Mc informed. Will call [facility] SCREMELVIN and get everything in orders, stated.

## 2023-02-25 VITALS
SYSTOLIC BLOOD PRESSURE: 142 MMHG | HEIGHT: 67 IN | BODY MASS INDEX: 34.91 KG/M2 | TEMPERATURE: 97.9 F | RESPIRATION RATE: 16 BRPM | WEIGHT: 222.44 LBS | HEART RATE: 62 BPM | DIASTOLIC BLOOD PRESSURE: 60 MMHG | OXYGEN SATURATION: 96 %

## 2023-02-25 LAB
GLUCOSE BLD STRIP.AUTO-MCNC: 192 MG/DL (ref 65–117)
GLUCOSE BLD STRIP.AUTO-MCNC: 72 MG/DL (ref 65–117)
SERVICE CMNT-IMP: ABNORMAL
SERVICE CMNT-IMP: NORMAL

## 2023-02-25 PROCEDURE — 74011250637 HC RX REV CODE- 250/637: Performed by: STUDENT IN AN ORGANIZED HEALTH CARE EDUCATION/TRAINING PROGRAM

## 2023-02-25 PROCEDURE — 74011250637 HC RX REV CODE- 250/637: Performed by: SURGERY

## 2023-02-25 PROCEDURE — 94760 N-INVAS EAR/PLS OXIMETRY 1: CPT

## 2023-02-25 PROCEDURE — 82962 GLUCOSE BLOOD TEST: CPT

## 2023-02-25 PROCEDURE — 74011000250 HC RX REV CODE- 250: Performed by: SURGERY

## 2023-02-25 RX ORDER — FACIAL-BODY WIPES
10 EACH TOPICAL ONCE
Status: COMPLETED | OUTPATIENT
Start: 2023-02-25 | End: 2023-02-25

## 2023-02-25 RX ADMIN — LOSARTAN POTASSIUM 100 MG: 100 TABLET, FILM COATED ORAL at 10:08

## 2023-02-25 RX ADMIN — SODIUM CHLORIDE, PRESERVATIVE FREE 10 ML: 5 INJECTION INTRAVENOUS at 06:18

## 2023-02-25 RX ADMIN — BISACODYL 10 MG: 10 SUPPOSITORY RECTAL at 10:14

## 2023-02-25 RX ADMIN — AMIODARONE HYDROCHLORIDE 200 MG: 200 TABLET ORAL at 10:08

## 2023-02-25 RX ADMIN — AMLODIPINE BESYLATE 5 MG: 5 TABLET ORAL at 10:08

## 2023-02-25 RX ADMIN — METOPROLOL TARTRATE 12.5 MG: 25 TABLET, FILM COATED ORAL at 10:08

## 2023-02-25 NOTE — PROGRESS NOTES
Problem: Falls - Risk of  Goal: *Absence of Falls  Description: Document Pernell Oliveros Fall Risk and appropriate interventions in the flowsheet.   Outcome: Progressing Towards Goal  Note: Fall Risk Interventions:  Mobility Interventions: Communicate number of staff needed for ambulation/transfer                             Problem: Patient Education: Go to Patient Education Activity  Goal: Patient/Family Education  Outcome: Progressing Towards Goal

## 2023-02-25 NOTE — PROGRESS NOTES
End of Shift Note    Bedside shift change report given to CHARISSE Jarrell (oncoming nurse) by Home Hastings RN (offgoing nurse).   Report included the following information SBAR, Kardex, Procedure Summary, Intake/Output, and Recent Results    Length of Stay:  Expected LOS: 2d 2h  Actual LOS: [address] [facility], RN

## 2023-02-25 NOTE — PROGRESS NOTES
Transition of Care Plan:    RUR: 10%-\"Low Risk\"  Disposition: Home with follow-up appts  If SNF or IPR: Date FOC offered: N/A  Follow up appointments: PCP & Specialists as indicated   DME needed: N/A  Transportation at Discharge: The patient's daughter is at bedside and will be transporting him home   [place] or means to access home: Yes      IM Medicare Letter: Given and signed on 2/24/23  Is patient a [place] and connected with the LTAC, [facility] within [facility]? N/A              If yes, was [place] transfer form completed and VA notified? Caregiver Contact: Media Monday, Wife, Phone: [phone]  Discharge Caregiver contacted prior to discharge? CM spoke with the patient's wife, daughter, and granddaughter at [address] [place] [address] needed?: No    CM was alerted that the patient is being discharged today, 2/25/23. CM met with the patient, his wife, his daughter, and his granddaughter at bedside and they have no questions/concerns for d/c today. His daughter will be driving the patient home. 2nd IM letter was given & signed on 2/24/23. From CM perspective, the patient can be discharged.      Johnathan Linda 049, [address] [facility]

## 2023-02-25 NOTE — PROGRESS NOTES
End of Shift Note    Bedside shift change report given to  CHARISSE Ramsey (oncoming nurse) by Nathan Pena RN (offgoing nurse). Report included the following information SBAR, Kardex, Intake/Output, and MAR    Shift worked:  7P-7A     Shift summary and any significant changes:     Pt is alert and orientation. no any pain complained during my shift. No any Bowel movement during my shift. Concerns for physician to address:       Zone phone for oncoming shift:   [phone]       Activity:  Activity Level: Up ad gissel  Number times ambulated in hallways past shift: 0  Number of times OOB to chair past shift: 1    Cardiac:   Cardiac Monitoring: No      Cardiac Rhythm: Sinus Rhythm    Access:  Current line(s): PIV     Genitourinary:   Urinary status: voiding    Respiratory:   O2 Device: None (Room air)  Chronic home O2 use?: NO  Incentive spirometer at bedside: YES       GI:  Last Bowel Movement Date: 02/21/23  Current diet:  ADULT DIET Regular; Low Fiber  DIET ONE TIME MESSAGE  Passing flatus: YES  Tolerating current diet: YES       Pain Management:   Patient states pain is manageable on current regimen: YES    Skin:  Arvin Score: 21  Interventions: float heels and increase time out of bed    Patient Safety:  Fall Score:  Total Score: 1  Interventions: assistive device (walker, cane, etc), gripper socks, and pt to call before getting OOB       Length of Stay:  Expected LOS: 2d 2h  Actual LOS: 4      Wesly Toro RN

## 2023-02-25 NOTE — PROGRESS NOTES
CRS Note    Doing well. Feels good. Having a lot of flatus, tolerating diet without issue. Pain controlled. No BM yet.     Vitals reviewed    NAD   Abd soft, nontender, nondistended, incisions in tact, ecchymosis across lower abd    No new labs    A/P:  S/p robotic right colectomy for colon polyp, doing well, just awaiting BM prior to dc  - miraLAX and dulcolax suppos today  - alert me if BM today and will dc home  - otherwise continue current    Chip Moore MD   [facility]  [phone]

## 2023-02-28 NOTE — OP NOTES
Operative Report    Patient: Ann Moore MRN: [id number]  SSN: [id number]    YOB: 1940  Age: 79 y.o. Sex: male       Date of Surgery: 2/21/2023     Preoperative Diagnosis: COLON POLYP     Postoperative Diagnosis: COLON POLYP     Surgeon(s) and Role:     * Mireille Gómez MD - Primary     * Adrián Villa MD - Co-Surgeon    Anesthesia: General     Procedure: Procedure(s):  ROBOTIC ASSISTED LAPAROSCOPIC ILEO COLECTOMY ( E R A S )       Procedure in Detail:   The patient was taken to the operating suite and placed in the supine position. General endotracheal anesthesia was induced. A craft catheter an SCDs were placed. The abdomen was prepped and draped in the usual sterile fashion. A timeout was performed as per hospital protocol. A Verress needle was inserted into the abdominal cavity through an 8mm incision in the left upper quadrant and the abdomen was insufflated. An 8mm robotic trocar was inserted under direct visualization using a 5mm scope. Two more 8mm robotic trocars were placed in the periumbilical area and right lower quadrant. A 12mm trocar was inserted in the left upper quadrant and a 5mm assistant port was placed in the left mid abdomen. It was through these all of these sites that the entirety of the procedure was completed. The patient was placed in the slight Trendelenburg position with right side up and the robot was docked. The cecum was elevated identifying the pedicle for the ileocolic artery and vein. The mesentery was scored along the medial edge and access was gained into the avascular plane between the right colon mesentery and the retroperitoneum. The duodenum and right ureter were identified and injury was avoided during this part of the dissection. After skeletonizing the ileocolic pedicle, it was cauterized and divided by using the Vessel sealer. This was cauterized twice proximally, once distally, and it was divided in between.      The medial-to-lateral plane was developed to the right abdominal side wall as well as up to the hepatic flexure. The duodenum was swept free from the mesentery and brought posteriorly. At this point, attention was turned to the lateral dissection plane. The white line of Toldt was divided along the right lateral peritoneal reflection. This was continued up to the hepatic flexure. The hepatic flexure was taken down in its entirety dividing the hepatocolic and renocolic attachments. The lesser sac was accessed and the proximal transverse colon was mobilized away from the greater omentum. The distal dissection margin was identified at the mid-ascending colon. The mesentery was divided to this level. The proximal margin was identified along the terminal ileum adjacent to the antimesenteric fat pad. The mesentery was divided to this level. After complete mobilization of the ascending colon and terminal ileum, anesthesia injected ICG dye to confirm excellent blood supply to the proximal and distal margins. The proximal and distal margins were divided with a robotic 60mm blue load stapler and the specimen was placed over the liver for later extraction. A colotomy and enterotomy were made along the antimesenteric border of the afferent and efferent limbs. A 60mm blue load robotic stapler was used to create a side-to-side functional end-to end anastomosis. A 2-0 Vicryl suture was placed on the two ends of the common enterotomy to \"fishmouth\" the defect. The common enterotomy was closed in 2 layers using 3-0 V-lock suture in full thickness running fashion followed by seromuscular Lembert sutures in the opposite direction. The specimen was grasped with a locking grasper, and the robot was undocked. The left upper quadrant incision was extended to allow for extraction and a wound protector was placed. The specimen was extracted. The fascia was then closed with 0 looped PDS suture.   All the skin incisions were closed with 4-0 Monocryl. 30ml of 0.25% Marcaine with epinephrine was injected subcutaneously. The patient was awakened, extubated, and taken to the recovery room in stable condition. Estimated Blood Loss:  50ml           Specimens:   ID Type Source Tests Collected by Time Destination   1 : right colon Preservative Colon, Right  Junette Habermann, MD 2/21/2023 2112 Pathology           Drains: None                Complications: None    Counts: Sponge and needle counts were correct times two.     Signed By:  Zafar Mandel MD     February 28, 2023

## 2023-03-07 NOTE — PROGRESS NOTES
Physician Progress Note      Jerry Moy  CSN #:                  [id number]  :                       1940  ADMIT DATE:       2023 11:59 AM  [address] [place] DATE:        2023 3:55 PM  RESPONDING  PROVIDER #:        Robin Alvarez NP          QUERY TEXT:    TEODORO Olivo NP and/or Dr Parrish Sullivan:    Patient admitted with unresectable cecal polyp and underwent a robotic right colectomy. Noted to have severe malnutrition in RD consultant pn. Please document in progress notes and discharge summary if you are evaluating and /or treating any of the following: The medical record reflects the following:    Risk Factors: 79year old male with DM2 and with unresectable cecal polyp    Clinical Indicators:   PLACIDO Feliciano RD pn:  Malnutrition Assessment:  Malnutrition Status:  Severe malnutrition (23 1451)  Context:  Chronic illness  Findings of the 6 clinical characteristics of malnutrition:  Energy Intake:  75% or less est energy requirements for 1 month or longer  Weight Loss:  Greater than 10% over 6 months  Body Fat Loss:  No significant body fat loss,  Muscle Mass Loss:  No significant muscle mass loss,  Fluid Accumulation:  No significant fluid accumulation,    Anthropometric Measures:  Height: 5' 7.25\" (170.8 cm)  Ideal Body Weight (IBW): 150 lbs (68 kg)  Current Body Wt:  100.9 kg (222 lb 7.1 oz), 148.3 % IBW. Standing scale  Current BMI (kg/m2): 34.6  Weight Adjustment: No adjustment  BMI Category: Obese class 1 (BMI 30.0-34. 9)    Treatment: Registered dietician consult. RD recommendations: Continue low fiber diet. Provided guidance and handout for discharge and ERAS supplements provided    ASPEN Criteria:  https://aspenjournals. onlinelibrary. tracey. com/doi/full/10.1177/2070654105024408    Thank you,  Carlos Wallis RN, CDI  Options provided:  -- Protein calorie malnutrition severe  -- Other - I will add my own diagnosis  -- Disagree - Not applicable / Not valid  -- Disagree - Clinically unable to determine / Unknown  -- Refer to Clinical Documentation Reviewer    PROVIDER RESPONSE TEXT:    This patient has severe protein calorie malnutrition.     Query created by: Isabela Rucker on 3/6/2023 1:57 PM      Electronically signed by:  Chen Ruby NP 3/7/2023 7:08 AM

## 2023-03-15 ENCOUNTER — TELEPHONE (OUTPATIENT)
Dept: CARDIOLOGY CLINIC | Age: 83
End: 2023-03-15

## 2023-04-03 ENCOUNTER — OFFICE VISIT (OUTPATIENT)
Dept: CARDIOLOGY CLINIC | Age: 83
End: 2023-04-03
Payer: MEDICARE

## 2023-04-03 DIAGNOSIS — I34.0 MITRAL VALVE INSUFFICIENCY, UNSPECIFIED ETIOLOGY: ICD-10-CM

## 2023-04-03 DIAGNOSIS — I35.1 AORTIC VALVE INSUFFICIENCY, ETIOLOGY OF CARDIAC VALVE DISEASE UNSPECIFIED: ICD-10-CM

## 2023-04-03 DIAGNOSIS — I48.91 ATRIAL FIBRILLATION AND FLUTTER (HCC): ICD-10-CM

## 2023-04-03 DIAGNOSIS — I48.92 ATRIAL FIBRILLATION AND FLUTTER (HCC): ICD-10-CM

## 2023-04-03 DIAGNOSIS — E11.8 CONTROLLED TYPE 2 DIABETES MELLITUS WITH COMPLICATION, WITHOUT LONG-TERM CURRENT USE OF INSULIN (HCC): ICD-10-CM

## 2023-04-03 DIAGNOSIS — I48.92 ATRIAL FLUTTER, PAROXYSMAL (HCC): Primary | ICD-10-CM

## 2023-04-03 DIAGNOSIS — E78.2 MIXED HYPERLIPIDEMIA: ICD-10-CM

## 2023-04-03 DIAGNOSIS — I10 BENIGN ESSENTIAL HTN: ICD-10-CM

## 2023-04-03 DIAGNOSIS — I48.0 PAF (PAROXYSMAL ATRIAL FIBRILLATION) (HCC): ICD-10-CM

## 2023-04-03 PROCEDURE — G8536 NO DOC ELDER MAL SCRN: HCPCS | Performed by: INTERNAL MEDICINE

## 2023-04-03 PROCEDURE — 99214 OFFICE O/P EST MOD 30 MIN: CPT | Performed by: INTERNAL MEDICINE

## 2023-04-03 PROCEDURE — 93000 ELECTROCARDIOGRAM COMPLETE: CPT | Performed by: INTERNAL MEDICINE

## 2023-04-03 PROCEDURE — 1101F PT FALLS ASSESS-DOCD LE1/YR: CPT | Performed by: INTERNAL MEDICINE

## 2023-04-03 PROCEDURE — G8510 SCR DEP NEG, NO PLAN REQD: HCPCS | Performed by: INTERNAL MEDICINE

## 2023-04-03 PROCEDURE — 3078F DIAST BP <80 MM HG: CPT | Performed by: INTERNAL MEDICINE

## 2023-04-03 PROCEDURE — G8427 DOCREV CUR MEDS BY ELIG CLIN: HCPCS | Performed by: INTERNAL MEDICINE

## 2023-04-03 PROCEDURE — G8417 CALC BMI ABV UP PARAM F/U: HCPCS | Performed by: INTERNAL MEDICINE

## 2023-04-03 PROCEDURE — 1124F ACP DISCUSS-NO DSCNMKR DOCD: CPT | Performed by: INTERNAL MEDICINE

## 2023-04-03 PROCEDURE — 3044F HG A1C LEVEL LT 7.0%: CPT | Performed by: INTERNAL MEDICINE

## 2023-04-03 PROCEDURE — 3077F SYST BP >= 140 MM HG: CPT | Performed by: INTERNAL MEDICINE

## 2023-04-03 NOTE — LETTER
4/3/2023    Patient: Anisha Cui   YOB: 1940   Date of Visit: 4/3/2023     Lelan Dance, [address]  [place] [address].  Via [address] [facility] [address]    Dear Lelan Dance, MD,      Thank you for referring Mr. Shakir Aparicio to [address] [place] [address] for evaluation. My notes for this consultation are attached. If you have questions, please do not hesitate to call me. I look forward to following your patient along with you.       Sincerely,    Zaria Starr MD

## 2023-04-03 NOTE — PROGRESS NOTES
[address], [place], [address] [place] [address]  [phone]     Subjective:      Italia Ochoa is a 79 y.o. male is here for routine f/u. Last seen 10/2022    He is sp ROBOTIC ASSISTED LAPAROSCOPIC ILEO COLECTOMY ( E R A S )  on 2/21/2023 performed by Dr Yasmine Rios    Today,     Maintaining SR. He is on OAC,no falls. His Hgb was low at 7.5 in 2/23/2023 on discharge date,  states had recent labwork with PCP  States he still pass blood clots / blood in his urine, followed by Dr. Roque An follow up with him on April 14 2023    the patient denies chest pain/ shortness of breath, orthopnea, PND, LE edema, palpitations, syncope, or presyncope.        Patient Active Problem List    Diagnosis Date Noted    Colon polyp 02/21/2023    Atrial flutter, paroxysmal ([address] 10/09/2021    Rectal bleed 10/09/2021    Colonic mass 10/09/2021    Combined forms of age-related cataract of right eye 01/09/2020      Nola Santos MD  Past Medical History:   Diagnosis Date    Arthritis     At risk for sleep apnea 12/18/2019    Stop Bang 5     Atrial fibrillation (HCC)     Atrial Flutter-Wittkamp    Blood thinned due to long-term anticoagulant use     eliquis    Cancer ([address]     prostate cancer    Cataract     COVID-19 01/23/2023    Diabetes ([address]     DM2    Elevated cholesterol     [place] (hard of hearing)     bilateral hearing aids    Hypertension     PUD (peptic ulcer disease) 1980      Past Surgical History:   Procedure Laterality Date    COLONOSCOPY N/A 09/27/2017    COLONOSCOPY performed by Saloni Velarde MD at [facility] ENDOSCOPY    COLONOSCOPY N/A 12/30/2019    COLONOSCOPY WITH POLYPECTOMY performed by Juan Hogan MD at [facility] AMBULATORY OR    COLONOSCOPY N/A 10/11/2021    COLONOSCOPY performed by Juan Hogan MD at [facility] ENDOSCOPY    COLONOSCOPY N/A 11/30/2022    COLONOSCOPY performed by Gudelia Ramirez MD at [facility] ENDOSCOPY    HX CATARACT REMOVAL Left 01/08/2020    HX HERNIA REPAIR      umbilical    HX HIP REPLACEMENT Right     HX KNEE REPLACEMENT Right     HX PROSTATECTOMY       No Known Allergies   Family History   Problem Relation Age of Onset    Cancer Mother         ovarian    Stroke Father       Social History     Socioeconomic History    Marital status:      Spouse name: Not on file    Number of children: Not on file    Years of education: Not on file    Highest education level: Not on file   Occupational History    Not on file   Tobacco Use    Smoking status: Former     Packs/day: 2.00     Years: 15.00     Pack years: 30.00     Types: Cigarettes     Quit date: 1965     Years since quittin.5    Smokeless tobacco: Never   Vaping Use    Vaping Use: Never used   Substance and Sexual Activity    Alcohol use: No    Drug use: Never    Sexual activity: Not on file   Other Topics Concern    Not on file   Social History Narrative    Not on file     Social Determinants of Health     Financial Resource Strain: Not on file   Food Insecurity: Not on file   Transportation Needs: Not on file   Physical Activity: Not on file   Stress: Not on file   Social Connections: Not on file   Intimate Partner Violence: Not on file   Housing Stability: Not on file      Current Outpatient Medications   Medication Sig    ascorbic acid, vitamin C, (VITAMIN C) 500 mg tablet Take 1,000 mg by mouth daily. apixaban (ELIQUIS) 5 mg tablet Take 1 Tablet by mouth two (2) times a day. furosemide (LASIX) 20 mg tablet Take 20 mg by mouth as needed. metoprolol tartrate (LOPRESSOR) 25 mg tablet Take 0.5 Tablets by mouth two (2) times a day. amiodarone (CORDARONE) 200 mg tablet Take 1 Tablet by mouth daily. glimepiride (AMARYL) 1 mg tablet TAKE 1 TABLET EVERY MORNING WITH BREAKFAST    pravastatin (PRAVACHOL) 20 mg tablet Take 20 mg by mouth nightly. amLODIPine (NORVASC) 5 mg tablet Take 5 mg by mouth daily. losartan (COZAAR) 100 mg tablet Take 100 mg by mouth daily.     multivitamin (ONE A DAY) tablet Take 1 Tab by mouth daily. acetaminophen (TYLENOL) 500 mg tablet Take 1,000 mg by mouth every six (6) hours as needed for Pain.    metFORMIN (GLUCOPHAGE) 1,000 mg tablet Take 1,000 mg by mouth two (2) times a day. No current facility-administered medications for this visit. Review of Symptoms:  11 systems reviewed, negative other than as stated in the HPI    Physical ExamPhysical Exam:    Vitals:    04/03/23 1302 04/03/23 1312   BP: (!) 140/66    Pulse: (!) 49 (!) 50   Resp: 16    SpO2: 97%    Weight: 225 lb (102.1 kg)    Height: 5' 8\" (1.727 m)      Body mass index is 34.21 kg/m². General PE  Gen:  NAD  Mental Status - Alert. General Appearance - Not in acute distress. HEENT:  PERRL, no carotid bruits or JVD  Chest and Lung Exam   Inspection: Accessory muscles - No use of accessory muscles in breathing. Auscultation:   Breath sounds: - Normal.   Cardiovascular   Inspection: Jugular vein - Bilateral - Inspection Normal.   Palpation/Percussion:   Apical Impulse: - Normal.   Auscultation: Rhythm - Regular. Heart Sounds - S1 WNL and S2 WNL. No S3 or S4. Murmurs & Other Heart Sounds: Auscultation of the heart reveals - I/VI DOROTEO  Peripheral Vascular   Upper Extremity: Inspection - Bilateral - No Cyanotic nailbeds or Digital clubbing. Lower Extremity:   Palpation: Edema - Bilateral - No edema. Abdomen:   Soft, non-tender, bowel sounds are active.   Neuro: A&O times 3, CN and motor grossly WNL    Labs:   No results found for: CHOL, CHOLX, CHLST, CHOLV, 065609, HDL, HDLP, LDL, LDLC, DLDLP, TGLX, TRIGL, TRIGP, CHHD, CHHDX  No results found for: CPK, CPKX, CPX  Lab Results   Component Value Date/Time    Sodium 130 (L) 02/23/2023 05:12 AM    Potassium 3.7 02/23/2023 05:12 AM    Chloride 100 02/23/2023 05:12 AM    CO2 26 02/23/2023 05:12 AM    Anion gap 4 (L) 02/23/2023 05:12 AM    Glucose 135 (H) 02/23/2023 05:12 AM    BUN 27 (H) 02/23/2023 05:12 AM    Creatinine 1.43 (H) 02/23/2023 05:12 AM    BUN/Creatinine ratio 19 02/23/2023 05:12 AM    GFR est AA >60 06/07/2022 11:50 AM    GFR est non-AA 56 (L) 06/07/2022 11:50 AM    Calcium 8.3 (L) 02/23/2023 05:12 AM    Bilirubin, total 0.7 02/15/2023 01:33 PM    Alk. phosphatase 78 02/15/2023 01:33 PM    Protein, total 7.4 02/15/2023 01:33 PM    Albumin 3.3 (L) 02/15/2023 01:33 PM    Globulin 4.1 (H) 02/15/2023 01:33 PM    A-G Ratio 0.8 (L) 02/15/2023 01:33 PM    ALT (SGPT) 17 02/15/2023 01:33 PM       EKG:  NSR       Assessment:          ICD-10-CM ICD-9-CM    1. Atrial flutter, paroxysmal (HCC)  I48.92 427.32 AMB POC EKG ROUTINE W/ 12 LEADS, INTER & REP      2. Atrial fibrillation and flutter (HCC)  I48.91 427.31     I48.92 427.32       3. Benign essential HTN  I10 401.1       4. PAF (paroxysmal atrial fibrillation) (HCC)  I48.0 427.31       5. Controlled type 2 diabetes mellitus with complication, without long-term current use of insulin (HCC)  E11.8 250.90       6. Mixed hyperlipidemia  E78.2 272.2       7. Aortic valve insufficiency, etiology of cardiac valve disease unspecified  I35.1 424.1       8. Mitral valve insufficiency, unspecified etiology  I34.0 424.0           Orders Placed This Encounter    AMB POC EKG ROUTINE W/ 12 LEADS, INTER & REP     Order Specific Question:   Reason for Exam:     Answer:   routine     05/17/22    ECHO REN W POSSIBLE CARDIOVERSION 05/17/2022 5/17/2022    Interpretation Summary    Left Ventricle: Normal left ventricular systolic function with a visually estimated EF of 55 - 60%. Left ventricle size is normal. Normal wall thickness. Normal wall motion. Normal diastolic function. Aortic Valve: Moderate regurgitation. Mitral Valve: Moderate regurgitation. Left Atrium: Normal sized appendage. No left atrial appendage thrombus noted. Aorta: Normal sized aortic root. Mildly dilated ascending aorta.     Signed by: Yaniv Philip MD on 5/17/2022  6:10 PM       Plan:     PAF/AFL  S/p REN/ CV 6/16/2022  Sp REN/CV 5/17/2022  REN 5/17/2022 posted above  Echo done 10/2021 with preserved LVEF 55-60%, mild AS  Continue Amiodarone 200 mg daily  (started post CV); will request labwork from PCP,  check TSH if non ordered  Continue Eliquis 5 mg BID    States he still pass blood clots / blood in his urine, followed by Dr. Hortensia Velez follow up with him on April 14 2023  Serum Cr 1.43 in 2/2023; Hgb 7.5 in 2/2023---has been recently checked by PCP--will request records  Continue Metoprolol Tartrate 12.5 mg BID    Valvular d/o  Moderate AR/MR per REN 5/17/2022-will repeat echo in the near future     HTN  BP controlled. Continue anti-hypertensive therapy and low sodium diet    Mixed hyperlipidemia  Continue statin therapy and and low fat diet  Labs and lipids per PCP, will request    DM  On oral agent    sp ROBOTIC ASSISTED LAPAROSCOPIC ILEO COLECTOMY (E R A S)  on 2/21/2023 performed by Dr Antwan Esquivel current care and f/u in 6 months, sooner as needed    Real Foots, NP     Patient seen and examined by me with the above nurse practitioner. I personally performed all components of the history, physical, and medical decision making and agree with the assessment and plan with minor modifications as noted. Today the patient presents in normal sinus rhythm, with controlled blood pressure, and hyperlipidemia treated with statin. No signs of bleeding since last colon resection. General PE  Gen:  NAD  Mental Status - Alert. General Appearance - Not in acute distress. HEENT:  PERRL, no carotid bruits or JVD  Chest and Lung Exam   Inspection: Accessory muscles - No use of accessory muscles in breathing. Auscultation:   Breath sounds: - Normal.   Cardiovascular   Inspection: Jugular vein - Bilateral - Inspection Normal.   Palpation/Percussion:   Apical Impulse: - Normal.   Auscultation: Rhythm - Regular. Heart Sounds - S1 WNL and S2 WNL. No S3 or S4. Murmurs & Other Heart Sounds: Auscultation of the heart reveals - No Murmurs. Peripheral Vascular   Upper Extremity: Inspection - Bilateral - No Cyanotic nailbeds or Digital clubbing. Lower Extremity:   Palpation: Edema - Bilateral - No edema. Abdomen:   Soft, non-tender, bowel sounds are active. Neuro: A&O times 3, CN and motor grossly WNL    Today the patient presents in normal sinus rhythm, with controlled blood pressure, and hyperlipidemia treated with statin. No signs of bleeding since last colon resection. Continue with aggressive medical management. Encouraged healthy diet and exercise. Repeat echo in the near future for moderate AR and MR seen on REN in May 2022.

## 2023-04-04 ENCOUNTER — DOCUMENTATION ONLY (OUTPATIENT)
Dept: CARDIOLOGY CLINIC | Age: 83
End: 2023-04-04

## 2023-04-26 NOTE — TELEPHONE ENCOUNTER
Letter received from [facility]ATHERINE. PA for Eliquis has been approved. Approved through 12-. Pharmacy informed. Patient aware.

## 2023-05-24 ENCOUNTER — TELEPHONE (OUTPATIENT)
Age: 83
End: 2023-05-24

## 2023-06-01 ENCOUNTER — TELEPHONE (OUTPATIENT)
Age: 83
End: 2023-06-01

## 2023-06-01 DIAGNOSIS — I35.1 AORTIC VALVE INSUFFICIENCY, ETIOLOGY OF CARDIAC VALVE DISEASE UNSPECIFIED: Primary | ICD-10-CM

## 2023-06-01 DIAGNOSIS — I77.89 ENLARGED AORTA (HCC): ICD-10-CM

## 2023-06-01 NOTE — TELEPHONE ENCOUNTER
Spoke with patients wife. This nurse explained that needed to have a clarification from [employer] since a low d-dimer advise showed up. NP gave okay to proceed with test. Order placed.

## 2023-06-01 NOTE — TELEPHONE ENCOUNTER
Pt is calling because the [facility] was suppose to schedule him for a CT scan but they don't have an order for this. The hospital will accept a verbal order also for this procedure.     [phone]

## 2023-06-20 ENCOUNTER — TELEPHONE (OUTPATIENT)
Age: 83
End: 2023-06-20

## 2023-06-20 NOTE — TELEPHONE ENCOUNTER
----- Message from SHARON López NP sent at 6/16/2023  3:57 PM EDT -----  Aorta is stable---aneurysm is about 4.2 x 4.3 cm, smaller than what echocardiogram  Estimated it to be so good news  Lung nodules stable  Continue same meds for now  Will see him at follow up. Spoke with ms Toni Gibson, verified patient with 2 identifiers. Wife voiced understanding.

## 2023-06-28 ENCOUNTER — TRANSCRIBE ORDERS (OUTPATIENT)
Facility: HOSPITAL | Age: 83
End: 2023-06-28

## 2023-06-28 DIAGNOSIS — N13.9 ACUTE UNILATERAL OBSTRUCTIVE UROPATHY: Primary | ICD-10-CM

## 2023-06-30 ENCOUNTER — TELEPHONE (OUTPATIENT)
Age: 83
End: 2023-06-30

## 2023-07-05 NOTE — TELEPHONE ENCOUNTER
Clearance note, recent progress note and EKG faxed to [facility], dr. Garcia Flower office at  [phone].

## 2023-07-14 ENCOUNTER — HOSPITAL ENCOUNTER (OUTPATIENT)
Facility: HOSPITAL | Age: 83
End: 2023-07-14
Attending: STUDENT IN AN ORGANIZED HEALTH CARE EDUCATION/TRAINING PROGRAM
Payer: MEDICARE

## 2023-07-14 DIAGNOSIS — N13.9 ACUTE UNILATERAL OBSTRUCTIVE UROPATHY: ICD-10-CM

## 2023-07-14 PROCEDURE — A9562 TC99M MERTIATIDE: HCPCS | Performed by: STUDENT IN AN ORGANIZED HEALTH CARE EDUCATION/TRAINING PROGRAM

## 2023-07-14 PROCEDURE — 6360000002 HC RX W HCPCS: Performed by: STUDENT IN AN ORGANIZED HEALTH CARE EDUCATION/TRAINING PROGRAM

## 2023-07-14 PROCEDURE — 78708 K FLOW/FUNCT IMAGE W/DRUG: CPT

## 2023-07-14 PROCEDURE — 3430000000 HC RX DIAGNOSTIC RADIOPHARMACEUTICAL: Performed by: STUDENT IN AN ORGANIZED HEALTH CARE EDUCATION/TRAINING PROGRAM

## 2023-07-14 RX ORDER — FUROSEMIDE 10 MG/ML
20 INJECTION INTRAMUSCULAR; INTRAVENOUS ONCE
Status: COMPLETED | OUTPATIENT
Start: 2023-07-14 | End: 2023-07-14

## 2023-07-14 RX ADMIN — TECHNESCAN TC 99M MERTIATIDE 10.2 MILLICURIE: 1 INJECTION, POWDER, LYOPHILIZED, FOR SOLUTION INTRAVENOUS at 07:55

## 2023-07-14 RX ADMIN — FUROSEMIDE 20 MG: 10 INJECTION, SOLUTION INTRAMUSCULAR; INTRAVENOUS at 07:55

## 2023-07-31 ENCOUNTER — HOSPITAL ENCOUNTER (OUTPATIENT)
Facility: HOSPITAL | Age: 83
Discharge: HOME | End: 2023-08-03
Attending: INTERNAL MEDICINE
Payer: MEDICARE

## 2023-07-31 DIAGNOSIS — R63.4 LOSS OF WEIGHT: ICD-10-CM

## 2023-07-31 DIAGNOSIS — Z85.038 PERSONAL HISTORY OF COLON CANCER: ICD-10-CM

## 2023-07-31 DIAGNOSIS — R13.10 DYSPHAGIA, UNSPECIFIED TYPE: ICD-10-CM

## 2023-07-31 DIAGNOSIS — R93.89 ABNORMAL CT SCAN: ICD-10-CM

## 2023-07-31 PROCEDURE — A9579 GAD-BASE MR CONTRAST NOS,1ML: HCPCS | Performed by: INTERNAL MEDICINE

## 2023-07-31 PROCEDURE — 74220 X-RAY XM ESOPHAGUS 1CNTRST: CPT

## 2023-07-31 PROCEDURE — 6360000004 HC RX CONTRAST MEDICATION: Performed by: INTERNAL MEDICINE

## 2023-07-31 PROCEDURE — 74183 MRI ABD W/O CNTR FLWD CNTR: CPT

## 2023-07-31 RX ADMIN — GADOTERIDOL 20 ML: 279.3 INJECTION, SOLUTION INTRAVENOUS at 12:04

## 2023-08-04 ENCOUNTER — ANESTHESIA (OUTPATIENT)
Facility: HOSPITAL | Age: 83
End: 2023-08-04
Payer: MEDICARE

## 2023-08-04 ENCOUNTER — ANESTHESIA EVENT (OUTPATIENT)
Facility: HOSPITAL | Age: 83
End: 2023-08-04
Payer: MEDICARE

## 2023-08-04 ENCOUNTER — HOSPITAL ENCOUNTER (OUTPATIENT)
Facility: HOSPITAL | Age: 83
Setting detail: OUTPATIENT SURGERY
Discharge: HOME | End: 2023-08-04
Attending: INTERNAL MEDICINE | Admitting: INTERNAL MEDICINE
Payer: MEDICARE

## 2023-08-04 VITALS
HEART RATE: 41 BPM | BODY MASS INDEX: 34.21 KG/M2 | DIASTOLIC BLOOD PRESSURE: 52 MMHG | WEIGHT: 218 LBS | HEIGHT: 67 IN | RESPIRATION RATE: 11 BRPM | OXYGEN SATURATION: 98 % | TEMPERATURE: 97.8 F | SYSTOLIC BLOOD PRESSURE: 151 MMHG

## 2023-08-04 PROCEDURE — 3700000001 HC ADD 15 MINUTES (ANESTHESIA): Performed by: INTERNAL MEDICINE

## 2023-08-04 PROCEDURE — 2580000003 HC RX 258: Performed by: INTERNAL MEDICINE

## 2023-08-04 PROCEDURE — 2709999900 HC NON-CHARGEABLE SUPPLY: Performed by: INTERNAL MEDICINE

## 2023-08-04 PROCEDURE — 7100000010 HC PHASE II RECOVERY - FIRST 15 MIN: Performed by: INTERNAL MEDICINE

## 2023-08-04 PROCEDURE — C1726 CATH, BAL DIL, NON-VASCULAR: HCPCS | Performed by: INTERNAL MEDICINE

## 2023-08-04 PROCEDURE — 3600007512: Performed by: INTERNAL MEDICINE

## 2023-08-04 PROCEDURE — 2500000003 HC RX 250 WO HCPCS: Performed by: REGISTERED NURSE

## 2023-08-04 PROCEDURE — 3600007502: Performed by: INTERNAL MEDICINE

## 2023-08-04 PROCEDURE — 6360000002 HC RX W HCPCS: Performed by: REGISTERED NURSE

## 2023-08-04 PROCEDURE — 88342 IMHCHEM/IMCYTCHM 1ST ANTB: CPT

## 2023-08-04 PROCEDURE — 3700000000 HC ANESTHESIA ATTENDED CARE: Performed by: INTERNAL MEDICINE

## 2023-08-04 PROCEDURE — 88305 TISSUE EXAM BY PATHOLOGIST: CPT

## 2023-08-04 PROCEDURE — 7100000011 HC PHASE II RECOVERY - ADDTL 15 MIN: Performed by: INTERNAL MEDICINE

## 2023-08-04 RX ORDER — SODIUM CHLORIDE 9 MG/ML
25 INJECTION, SOLUTION INTRAVENOUS PRN
Status: DISCONTINUED | OUTPATIENT
Start: 2023-08-04 | End: 2023-08-04 | Stop reason: HOSPADM

## 2023-08-04 RX ORDER — SODIUM CHLORIDE 9 MG/ML
INJECTION, SOLUTION INTRAVENOUS CONTINUOUS PRN
Status: COMPLETED | OUTPATIENT
Start: 2023-08-04 | End: 2023-08-04

## 2023-08-04 RX ORDER — SODIUM CHLORIDE 0.9 % (FLUSH) 0.9 %
5-40 SYRINGE (ML) INJECTION EVERY 12 HOURS SCHEDULED
Status: DISCONTINUED | OUTPATIENT
Start: 2023-08-04 | End: 2023-08-04 | Stop reason: HOSPADM

## 2023-08-04 RX ORDER — LIDOCAINE HYDROCHLORIDE 20 MG/ML
INJECTION, SOLUTION EPIDURAL; INFILTRATION; INTRACAUDAL; PERINEURAL PRN
Status: DISCONTINUED | OUTPATIENT
Start: 2023-08-04 | End: 2023-08-04 | Stop reason: SDUPTHER

## 2023-08-04 RX ORDER — GLYCOPYRROLATE 0.2 MG/ML
INJECTION INTRAMUSCULAR; INTRAVENOUS PRN
Status: DISCONTINUED | OUTPATIENT
Start: 2023-08-04 | End: 2023-08-04 | Stop reason: SDUPTHER

## 2023-08-04 RX ORDER — SODIUM CHLORIDE 0.9 % (FLUSH) 0.9 %
5-40 SYRINGE (ML) INJECTION PRN
Status: DISCONTINUED | OUTPATIENT
Start: 2023-08-04 | End: 2023-08-04 | Stop reason: HOSPADM

## 2023-08-04 RX ADMIN — GLYCOPYRROLATE 0.2 MG: 0.2 INJECTION, SOLUTION INTRAMUSCULAR; INTRAVENOUS at 12:10

## 2023-08-04 RX ADMIN — LIDOCAINE HYDROCHLORIDE 100 MG: 20 INJECTION, SOLUTION EPIDURAL; INFILTRATION; INTRACAUDAL; PERINEURAL at 12:10

## 2023-08-04 RX ADMIN — PROPOFOL 30 MG: 10 INJECTION, EMULSION INTRAVENOUS at 12:18

## 2023-08-04 RX ADMIN — PROPOFOL 40 MG: 10 INJECTION, EMULSION INTRAVENOUS at 12:14

## 2023-08-04 RX ADMIN — PROPOFOL 80 MG: 10 INJECTION, EMULSION INTRAVENOUS at 12:10

## 2023-08-04 ASSESSMENT — PAIN - FUNCTIONAL ASSESSMENT: PAIN_FUNCTIONAL_ASSESSMENT: 0-10

## 2023-08-04 NOTE — OP NOTE
[place] Office: [phone]      Esophagogastroduodenoscopy Procedure Note      Peyman Sanabria  1940  [id number]    Indication:  dysphagia, weight loss      : Bernard San MD    Referring Provider:  Nikita Bran MD    Sedation:  MAC anesthesia Propofol    Procedure Details:  After detailed informed consent was obtained for the procedure, with all risks and benefits of procedure explained the patient was taken to the endoscopy suite and placed in the left lateral decubitus position. Following sequential administration of sedation as per above, the endoscope was inserted into the mouth and advanced under direct vision to second portion of the duodenum. A careful inspection was made as the gastroscope was withdrawn, including a retroflexed view of the proximal stomach; findings and interventions are described below. Findings:     Esophagus: The esophageal mucosa in the proximal, mid and distal esophagus is normal.   Biopsies were taken to r/o EoE. TTS CRE 18 mm balloon dilation performed x 1 minute w/ no complication/s. The squamo-columnar junction is at 45 cm where the Z-line was noted. There is a small 1 cm hiatal hernia. Mild GEJ erythema noted. Biopsies taken. Stomach: The gastric mucosa has erythema in the body and antrum. Biopsies taken   The fundus was found to be normal with no lesions noted on retroflexion. Endoscopic grading of gastroesophageal flap valve (GEFV)/Hill rdgrdrrdarddrderd:rd rd3rd The angularis is normal    Duodenum:   The bulb and post bulbar mucosa is normal in appearance. The duodenal folds are normal.     Therapies:  biopsy of esophagus and dilation of esophagus to 18 mm  biopsy of stomach body, antrum    Specimen:     Specimens were collected as described and send to the laboratory. Complications:   None were encountered during the procedure. EBL:  None. Recommendations:     -Acid suppression suggested. ,  -Await pathology. ,   -Follow symptoms. ,

## 2023-08-04 NOTE — PROGRESS NOTES
ARRIVAL INFORMATION:  Verified patient name and date of birth, scheduled procedure, and informed consent. : Easton Larios (son) contact [phone]  Physician and staff can share information with the . Belongings with patient include:  Clothing,Glasses Dentures (full upper and lower) both in green personals bag        GI FOCUSED ASSESSMENT:  Neuro: Awake, alert, oriented x4  Respiratory: even and unlabored   GI: soft and non-distended  EKG Rhythm: sinus bradycardia    Education:Reviewed general discharge instructions and  information. The risks and benefits of the bite block have been explained to patient. Patient verbalizes understanding.

## 2023-08-04 NOTE — H&P
Pre-endoscopy H and P    The patient was seen and examined in the room/pre-op holding area. The airway was assessed and documented. The problem list, past medical history, and medications were reviewed. Patient Active Problem List   Diagnosis    Combined forms of age-related cataract of right eye    Rectal bleed    Atrial flutter, paroxysmal (HCC)    Colonic mass    Colon polyp     Social History     Socioeconomic History    Marital status:      Spouse name: Not on file    Number of children: Not on file    Years of education: Not on file    Highest education level: Not on file   Occupational History    Not on file   Tobacco Use    Smoking status: Former     Packs/day: 2.00     Types: Cigarettes, Pipe, Cigars     Quit date: 1965     Years since quittin.8    Smokeless tobacco: Never   Vaping Use    Vaping Use: Never used   Substance and Sexual Activity    Alcohol use: No    Drug use: Never    Sexual activity: Not on file   Other Topics Concern    Not on file   Social History Narrative    Not on file     Social Determinants of Health     Financial Resource Strain: Not on file   Food Insecurity: Not on file   Transportation Needs: Not on file   Physical Activity: Not on file   Stress: Not on file   Social Connections: Not on file   Intimate Partner Violence: Not on file   Housing Stability: Not on file     Past Medical History:   Diagnosis Date    Arthritis     At risk for sleep apnea 2019    Stop Bang 5     Atrial fibrillation ([address] )     Atrial Flutter-Wittkamp    Cancer ([address] [facility])     prostate cancer    Colon cancer ([address] [facility])     Diabetes ([address] [facility])     DM2    Elevated cholesterol     History of blood transfusion     History of kidney stones     [place] (hard of hearing)     bilateral hearing aids    Hypertension     PUD (peptic ulcer disease) 1980    Skin cancer          Prior to Admission Medications   Prescriptions Last Dose Informant Patient Reported? Taking?    Multiple Vitamin (MULTIVITAMIN PO)

## 2023-08-04 NOTE — DISCHARGE INSTRUCTIONS
[place] Office: [phone]    Aneta Kiser  [id number]  1940    EGD/COLONOSCOPY DISCHARGE INSTRUCTIONS  Discomfort:  Sore throat- throat lozenges or warm salt water gargle  redness at IV site- apply warm compress to area; if redness or soreness persist- contact your physician  Gaseous discomfort- walking, belching will help relieve any discomfort  You may not operate a vehicle for 12 hours  You may not engage in an occupation involving machinery or appliances for rest of today. You may not drink alcoholic beverages for at least 12 hours  Avoid making any critical decisions for at least 24 hour  DIET  You may resume your regular diet - however -  remember your colon is empty and a heavy meal will produce gas. Avoid these foods:  fried / greasy foods, excessive carbonated drinks or too much caffeine  MEDICATIONS   Regarding Aspirin or Nonsteroidal medications specifically, please see below. ACTIVITY  You may resume your normal daily activities. Spend the remainder of the day resting -  avoid any strenuous activity. CALL M.D. ANY SIGN OF   Increasing pain, nausea, vomiting  Abdominal distension (swelling)  New increased bleeding (oral or rectal)  Fever (chills)  Pain in chest area  Bloody discharge from nose or mouth  Shortness of breath    You may not take any Advil, Aspirin, Ibuprofen, Motrin or Aleve(NSAIDs) for 7 days, ONLY  Tylenol as needed for pain. Findings:    Esophagus: The esophageal mucosa in the proximal, mid and distal esophagus is normal.   Biopsies were taken to r/o EoE. TTS CRE 18 mm balloon dilation performed x 1 minute w/ no complication/s. The squamo-columnar junction is at 45 cm where the Z-line was noted. There is a small 1 cm hiatal hernia. Mild GEJ erythema noted. Biopsies taken. Stomach: The gastric mucosa has erythema in the body and antrum. Biopsies taken   The fundus was found to be normal with no lesions noted on retroflexion.   Endoscopic grading of

## 2023-08-10 ENCOUNTER — TELEPHONE (OUTPATIENT)
Age: 83
End: 2023-08-10

## 2023-08-10 NOTE — TELEPHONE ENCOUNTER
Pt needs medication clearance for eliquis 5mg for a colonoscopy scheduled on 8/19.      Iris Ng # [phone]

## 2023-08-10 NOTE — TELEPHONE ENCOUNTER
Spoke with Shabana Valerio at  [facility]. Representative confirmed having clearance for upcoming procedure.

## 2023-08-14 ENCOUNTER — TELEPHONE (OUTPATIENT)
Age: 83
End: 2023-08-14

## 2023-08-14 NOTE — TELEPHONE ENCOUNTER
Pt wife  called and would like to speak to nurse concerning pt will be having an colonoscopy and need clearance for surgery on Friday for Eliquis    [phone] fax [facility]     Pt wife [phone]

## 2023-08-15 ENCOUNTER — TELEPHONE (OUTPATIENT)
Age: 83
End: 2023-08-15

## 2023-08-15 NOTE — TELEPHONE ENCOUNTER
Patient's wife called stating that patient is having a colonoscopy at [facility] and [facility]. Spoke with ebony Avila to use cardiac clearance for endoscopy and colonoscopy procedures. Clearance previously given by Isaias Saxena NP. Clearance fax to [facility]  at [phone]    July 5, 2023   Med[place] SHARON Cooper NP    Last echo 5/2023 with preserved LVEF 55-60 mild-mod valvular disease     The patient is at low cardiac risk to proceed with the procedure, and can interrupt blood thinners if necessary, for the following length of time individualized for each blood thinner, with the shorter and or longer end of the duration depending on surgical bleeding risk as per the proceduralist/surgeon:     Aspirin/Plavix/Effient: 5 to 7 days   Eliquis/Xarelto/Pradaxa/Savaysa: 2 to 3 days   Coumadin: 5 days     FOR SPINAL INJECTION/ PROCEDURES, HOLDING OF BLOOD THINNERS AS PER THE SOCIETY OF REGIONAL ANESTHESIA GUIDELINES MAY BE MORE AGGRESSIVE AND RECOMMEND FOLLOWING THESE GUIDELINES. If the surgeon believes the procedure is low bleeding risk, recommend continuation of aspirin 81 mg if possible, as even cardiac surgery is performed on this dose of aspirin. Please advise that whenever a blood thinner is temporarily stopped for the treatment of atrial fibrillation, there is always a tiny (less than 1%) chance of a stroke, and even less for people that are not always in atrial fibrillation. Usually, the benefit of the interacting blood thinner for the procedure exceeds the risk. Resume blood thinners as per the surgeon/proceduralist when felt to be safe from a surgical/procedural standpoint.

## 2023-08-15 NOTE — TELEPHONE ENCOUNTER
Pt's wife is calling to find out if our office has faxed over the clearance letter for the patient to hold his Eliquis to the [facility].    [phone] office  [phone] fax Att: Danielito Cornelius    [phone] patient

## 2023-08-18 ENCOUNTER — ANESTHESIA (OUTPATIENT)
Facility: HOSPITAL | Age: 83
End: 2023-08-18
Payer: MEDICARE

## 2023-08-18 ENCOUNTER — ANESTHESIA EVENT (OUTPATIENT)
Facility: HOSPITAL | Age: 83
End: 2023-08-18
Payer: MEDICARE

## 2023-08-18 ENCOUNTER — HOSPITAL ENCOUNTER (OUTPATIENT)
Facility: HOSPITAL | Age: 83
Setting detail: OUTPATIENT SURGERY
Discharge: HOME | End: 2023-08-18
Attending: SURGERY | Admitting: SURGERY
Payer: MEDICARE

## 2023-08-18 VITALS
OXYGEN SATURATION: 96 % | RESPIRATION RATE: 13 BRPM | HEIGHT: 67 IN | DIASTOLIC BLOOD PRESSURE: 45 MMHG | WEIGHT: 216 LBS | TEMPERATURE: 97 F | HEART RATE: 43 BPM | BODY MASS INDEX: 33.9 KG/M2 | SYSTOLIC BLOOD PRESSURE: 135 MMHG

## 2023-08-18 PROCEDURE — 3600007512: Performed by: SURGERY

## 2023-08-18 PROCEDURE — 6360000002 HC RX W HCPCS

## 2023-08-18 PROCEDURE — 6360000002 HC RX W HCPCS: Performed by: ANESTHESIOLOGY

## 2023-08-18 PROCEDURE — 2500000003 HC RX 250 WO HCPCS

## 2023-08-18 PROCEDURE — 2720000010 HC SURG SUPPLY STERILE: Performed by: SURGERY

## 2023-08-18 PROCEDURE — 2709999900 HC NON-CHARGEABLE SUPPLY: Performed by: SURGERY

## 2023-08-18 PROCEDURE — 3600007502: Performed by: SURGERY

## 2023-08-18 PROCEDURE — 3700000001 HC ADD 15 MINUTES (ANESTHESIA): Performed by: SURGERY

## 2023-08-18 PROCEDURE — 2580000003 HC RX 258: Performed by: SURGERY

## 2023-08-18 PROCEDURE — 88305 TISSUE EXAM BY PATHOLOGIST: CPT

## 2023-08-18 PROCEDURE — 7100000010 HC PHASE II RECOVERY - FIRST 15 MIN: Performed by: SURGERY

## 2023-08-18 PROCEDURE — 3700000000 HC ANESTHESIA ATTENDED CARE: Performed by: SURGERY

## 2023-08-18 RX ORDER — HYDRALAZINE HYDROCHLORIDE 20 MG/ML
10 INJECTION INTRAMUSCULAR; INTRAVENOUS ONCE
Status: COMPLETED | OUTPATIENT
Start: 2023-08-18 | End: 2023-08-18

## 2023-08-18 RX ORDER — SODIUM CHLORIDE 0.9 % (FLUSH) 0.9 %
5-40 SYRINGE (ML) INJECTION EVERY 12 HOURS SCHEDULED
Status: DISCONTINUED | OUTPATIENT
Start: 2023-08-18 | End: 2023-08-18 | Stop reason: HOSPADM

## 2023-08-18 RX ORDER — LIDOCAINE HYDROCHLORIDE 20 MG/ML
INJECTION, SOLUTION EPIDURAL; INFILTRATION; INTRACAUDAL; PERINEURAL PRN
Status: DISCONTINUED | OUTPATIENT
Start: 2023-08-18 | End: 2023-08-18 | Stop reason: SDUPTHER

## 2023-08-18 RX ORDER — SODIUM CHLORIDE 9 MG/ML
INJECTION, SOLUTION INTRAVENOUS CONTINUOUS
Status: DISCONTINUED | OUTPATIENT
Start: 2023-08-18 | End: 2023-08-18 | Stop reason: HOSPADM

## 2023-08-18 RX ORDER — GLYCOPYRROLATE 0.2 MG/ML
INJECTION INTRAMUSCULAR; INTRAVENOUS PRN
Status: DISCONTINUED | OUTPATIENT
Start: 2023-08-18 | End: 2023-08-18 | Stop reason: SDUPTHER

## 2023-08-18 RX ORDER — SODIUM CHLORIDE 9 MG/ML
25 INJECTION, SOLUTION INTRAVENOUS PRN
Status: DISCONTINUED | OUTPATIENT
Start: 2023-08-18 | End: 2023-08-18 | Stop reason: HOSPADM

## 2023-08-18 RX ORDER — SODIUM CHLORIDE 0.9 % (FLUSH) 0.9 %
5-40 SYRINGE (ML) INJECTION PRN
Status: DISCONTINUED | OUTPATIENT
Start: 2023-08-18 | End: 2023-08-18 | Stop reason: HOSPADM

## 2023-08-18 RX ADMIN — GLYCOPYRROLATE 0.2 MG: 0.2 INJECTION, SOLUTION INTRAMUSCULAR; INTRAVENOUS at 13:47

## 2023-08-18 RX ADMIN — SODIUM CHLORIDE 25 ML: 9 INJECTION, SOLUTION INTRAVENOUS at 13:00

## 2023-08-18 RX ADMIN — HYDRALAZINE HYDROCHLORIDE 10 MG: 20 INJECTION INTRAMUSCULAR; INTRAVENOUS at 13:08

## 2023-08-18 RX ADMIN — LIDOCAINE HYDROCHLORIDE 100 MG: 20 INJECTION, SOLUTION EPIDURAL; INFILTRATION; INTRACAUDAL; PERINEURAL at 13:47

## 2023-08-18 RX ADMIN — PROPOFOL 140 MG: 10 INJECTION, EMULSION INTRAVENOUS at 14:02

## 2023-08-18 ASSESSMENT — PAIN - FUNCTIONAL ASSESSMENT: PAIN_FUNCTIONAL_ASSESSMENT: 0-10

## 2023-08-18 NOTE — PROCEDURES
Colonoscopy Procedure Note    Hassel Lesches  1940  [id number]    Indications:    Personal history of colon cancer (screening only), weight loss      Pre-operative Diagnosis: Weight loss [R63.4]  Personal history of colon cancer [Z85.038]    Post-operative Diagnosis: * No post-op diagnosis entered *    : Jacqui Maya MD    Referring Provider: Chemo Montero MD    Sedation:  MAC anesthesia Propofol    Procedure Details:    After informed consent was obtained with all risks and benefits of procedure explained and preoperative exam completed, the patient was taken to the endoscopy suite and placed in the left lateral decubitus position. Upon sequential sedation as per above, a digital rectal exam was performed  And was normal.  The Olympus videocolonoscope  was inserted in the rectum and carefully advanced to the surgical anastomosis . The quality of preparation was good. The colonoscope was slowly withdrawn with careful evaluation between folds. Retroflexion in the rectum was performed and was normal..     Findings:   Rectum: Weak sphincter tone  Sigmoid: 1 polyp (removed)  Descending Colon: normal  Transverse Colon: normal  Ascending Colon: normal  Cecum: surgically absent  Terminal Ileum: normal    Therapies:  Polypectomy    Specimen:   Polyp    Complications: None. EBL:  None    Recommendations: -Repeat colonoscopy in 5 years.       Jacqui Maya MD  8/18/2023  2:09 PM

## 2023-08-18 NOTE — ANESTHESIA POSTPROCEDURE EVALUATION
Department of Anesthesiology  Postprocedure Note    Patient: Yaron Meza  MRN: [id number]  YOB: 1940  Date of evaluation: 8/18/2023      Procedure Summary     Date: 08/18/23 Room / Location: [facility] ENDO 03 / [facility] ENDOSCOPY    Anesthesia Start: 1342 Anesthesia Stop: 6575    Procedure: COLONOSCOPY (Lower GI Region) Diagnosis:       Weight loss      Personal history of colon cancer      (Weight loss [R63.4])      (Personal history of colon cancer [Z85.038])    Surgeons: Racquel Rueda MD Responsible Provider: Rose Erwin MD    Anesthesia Type: MAC ASA Status: 3          Anesthesia Type: MAC    Shemar Phase I: Shemar Score: 10    Shemar Phase II: Shemar Score: 10      Anesthesia Post Evaluation    Patient location during evaluation: bedside  Patient participation: complete - patient participated  Level of consciousness: awake and alert  Airway patency: patent  Nausea & Vomiting: no nausea and no vomiting  Complications: no  Cardiovascular status: hemodynamically stable  Respiratory status: acceptable  Hydration status: stable  Multimodal analgesia pain management approach  Pain management: adequate

## 2023-08-18 NOTE — PROGRESS NOTES
ARRIVAL INFORMATION:  Verified patient name and date of birth, scheduled procedure, and informed consent. : Wellington Esparza (daughter) contact number:  Physician and staff can share information with the . Belongings with patient include:  Clothing,Glasses, Dentures upper and lower        GI FOCUSED ASSESSMENT:  Neuro: Awake, alert, oriented x4  Respiratory: even and unlabored   GI: soft and non-distended  EKG Rhythm: normal sinus rhythm    Education:Reviewed general discharge instructions and  information.

## 2023-08-18 NOTE — DISCHARGE INSTRUCTIONS
Sandra Reddy  [id number]  1940    COLON DISCHARGE INSTRUCTIONS  Discomfort:  Redness at IV site- apply warm compress to area; if redness or soreness persist- contact your physician  There may be a slight amount of blood passed from the rectum  Gaseous discomfort- walking, belching will help relieve any discomfort  You may not operate a vehicle for 12 hours  You may not engage in an occupation involving machinery or appliances for rest of today  You may not drink alcoholic beverages for at least 12 hours  Avoid making any critical decisions for at least 24 hour  DIET:   Regular diet. - however -  remember your colon is empty and a heavy meal will produce gas. Avoid these foods:  vegetables, fried / greasy foods, carbonated drinks for today    MEDICATIONS:  [unfilled]     ACTIVITY:  You may resume your normal daily activities it is recommended that you spend the remainder of the day resting -  avoid any strenuous activity. CALL M.D. ANY SIGN OF:   Increasing pain, nausea, vomiting  Abdominal distension (swelling)  New increased bleeding (oral or rectal)  Fever (chills)  Pain in chest area  Bloody discharge from nose or mouth  Shortness of breath     Follow-up Instructions:   Call Emili Antonio MD if any questions or problems. Telephone # [phone]  Biopsy results will be available in  7 to10 days  Should have a repeat colonoscopy in 5 years.     COLONOSCOPY FINDINGS:    Patient Education on Sedation / Analgesia Administered for Procedure      For 24 hours after general anesthesia or intravenous analgesia / sedation:  Have someone responsible help you with your care  Limit your activities  Do not drive and operate hazardous machinery  Do not make important personal, legal or business decisions  Do not drink alcoholic beverages  If you have not urinated within 8 hours after discharge, please contact your physician  Resume your medications unless otherwise instructed    For 24 hours after general

## 2023-08-18 NOTE — H&P
History and Physical    Patient: Jah Mckeon MRN: [id number]  SSN: [id number]    YOB: 1940  Age: 79 y.o. Sex: male      Subjective:      Jah Mckeon is a 79 y.o. male who presents for colonoscopy.      Past Medical History:   Diagnosis Date    Arthritis     At risk for sleep apnea 2019    Stop Bang 5     Atrial fibrillation (HCC)     Atrial Flutter-Wittkamp    Cancer (HCC)     prostate cancer    Colon cancer ([facility])     Diabetes ([facility])     DM2    Elevated cholesterol     History of blood transfusion     History of kidney stones     [place] (hard of hearing)     bilateral hearing aids    Hypertension     PUD (peptic ulcer disease)     Skin cancer      Past Surgical History:   Procedure Laterality Date    APPENDECTOMY          CATARACT REMOVAL Bilateral     COLONOSCOPY N/A 2017    COLONOSCOPY performed by Greer Lefort, MD at [facility] ENDOSCOPY    COLONOSCOPY N/A 2019    COLONOSCOPY WITH POLYPECTOMY performed by Greer Lefort, MD at [facility] AMBULATORY OR    COLONOSCOPY N/A 2022    COLONOSCOPY performed by Katerina Singh MD at [facility] ENDOSCOPY    COLONOSCOPY N/A 10/11/2021    COLONOSCOPY performed by Greer Lefort, MD at [address] [place] [address]          LITHOTRIPSY      PROSTATECTOMY      TOTAL HIP ARTHROPLASTY Right     TOTAL KNEE ARTHROPLASTY Right     UPPER GASTROINTESTINAL ENDOSCOPY N/A 2023    EGD ESOPHAGOGASTRODUODENOSCOPY with BX performed by Chriss Jones MD at [facility] ENDOSCOPY      Family History   Problem Relation Age of Onset    Cancer Mother         ovarian    Stroke Father     No Known Problems Sister     Cancer Brother         lung, brain & throat     Social History     Tobacco Use    Smoking status: Former     Packs/day: 2.00     Types: Cigarettes, Pipe, Cigars     Quit date: 1965     Years since quittin.9    Smokeless tobacco: Never   Substance Use Topics    Alcohol use: No      Prior to Admission medications

## 2023-08-18 NOTE — PROCEDURES
Endoscopy Case End Note:    2383:  Procedure scope was pre-cleaned, per protocol, at bedside by Eileen Hassan RN.      6262:  Report received from anesthesia Merlin Harris CRNA. See anesthesia flowsheet for intra-procedure vital signs and events.

## 2023-08-18 NOTE — ANESTHESIA PRE PROCEDURE
(Current): There were no vitals filed for this visit. BP Readings from Last 3 Encounters:   08/18/23 (!) 209/59   08/04/23 (!) 151/52   05/16/23 (!) 140/74       NPO Status:                                                                                 BMI:   Wt Readings from Last 3 Encounters:   08/18/23 98 kg (216 lb)   08/04/23 98.9 kg (218 lb)   05/16/23 102.1 kg (225 lb)     There is no height or weight on file to calculate BMI.    CBC:   Lab Results   Component Value Date/Time    WBC 8.4 02/23/2023 05:12 AM    RBC 2.41 02/23/2023 05:12 AM    HGB 7.5 02/23/2023 05:12 AM    HCT 22.8 02/23/2023 05:12 AM    MCV 94.6 02/23/2023 05:12 AM    RDW 13.2 02/23/2023 05:12 AM     02/23/2023 05:12 AM       CMP:   Lab Results   Component Value Date/Time     02/23/2023 05:12 AM    K 3.7 02/23/2023 05:12 AM     02/23/2023 05:12 AM    CO2 26 02/23/2023 05:12 AM    BUN 27 02/23/2023 05:12 AM    CREATININE 1.20 06/14/2023 02:18 PM    CREATININE 1.43 02/23/2023 05:12 AM    GFRAA >60 06/07/2022 11:50 AM    AGRATIO 0.8 02/15/2023 01:33 PM    GLUCOSE 135 02/23/2023 05:12 AM    PROT 7.4 02/15/2023 01:33 PM    CALCIUM 8.3 02/23/2023 05:12 AM    BILITOT 0.7 02/15/2023 01:33 PM    ALKPHOS 78 02/15/2023 01:33 PM    AST 12 02/15/2023 01:33 PM    ALT 17 02/15/2023 01:33 PM       POC Tests: No results for input(s): POCGLU, POCNA, POCK, POCCL, POCBUN, POCHEMO, POCHCT in the last 72 hours.     Coags:   Lab Results   Component Value Date/Time    PROTIME 11.2 02/15/2023 01:33 PM    INR 1.1 02/15/2023 01:33 PM    APTT 30.5 02/15/2023 01:33 PM       HCG (If Applicable): No results found for: PREGTESTUR, PREGSERUM, HCG, HCGQUANT     ABGs: No results found for: PHART, PO2ART, TUF8JJD, BOF8XYO, BEART, I5NHUUXQ     Type & Screen (If Applicable):  No results found for: LABABO, LABRH    Drug/Infectious Status (If Applicable):  No results found for: HIV, HEPCAB    COVID-19 Screening (If

## 2023-10-17 ENCOUNTER — TELEPHONE (OUTPATIENT)
Age: 83
End: 2023-10-17

## 2023-10-17 RX ORDER — AMIODARONE HYDROCHLORIDE 200 MG/1
200 TABLET ORAL DAILY
Qty: 60 TABLET | Refills: 0 | Status: SHIPPED | OUTPATIENT
Start: 2023-10-17

## 2023-10-17 NOTE — TELEPHONE ENCOUNTER
Pts wife requested a refill on Amiodarone 200 mg. Completely ran out.       [address] Ph# [phone]

## 2023-10-17 NOTE — TELEPHONE ENCOUNTER
Spoke with ms. Bryn Tobin, explained that patient needed a FU appointment in October. Appointment made for November 14. Refill given until next appt with Dr. Slick Henson.

## 2023-11-14 ENCOUNTER — TELEPHONE (OUTPATIENT)
Age: 83
End: 2023-11-14

## 2023-11-14 ENCOUNTER — OFFICE VISIT (OUTPATIENT)
Age: 83
End: 2023-11-14
Payer: MEDICARE

## 2023-11-14 VITALS
HEART RATE: 43 BPM | SYSTOLIC BLOOD PRESSURE: 140 MMHG | BODY MASS INDEX: 33.59 KG/M2 | DIASTOLIC BLOOD PRESSURE: 62 MMHG | RESPIRATION RATE: 18 BRPM | OXYGEN SATURATION: 96 % | HEIGHT: 67 IN | WEIGHT: 214 LBS

## 2023-11-14 DIAGNOSIS — I10 ESSENTIAL (PRIMARY) HYPERTENSION: ICD-10-CM

## 2023-11-14 DIAGNOSIS — I48.91 ATRIAL FIBRILLATION AND FLUTTER (HCC): ICD-10-CM

## 2023-11-14 DIAGNOSIS — E78.2 MIXED HYPERLIPIDEMIA: ICD-10-CM

## 2023-11-14 DIAGNOSIS — E11.8 TYPE 2 DIABETES MELLITUS WITH UNSPECIFIED COMPLICATIONS (HCC): ICD-10-CM

## 2023-11-14 DIAGNOSIS — I48.92 ATRIAL FIBRILLATION AND FLUTTER (HCC): ICD-10-CM

## 2023-11-14 DIAGNOSIS — K62.5 RECTAL BLEED: ICD-10-CM

## 2023-11-14 DIAGNOSIS — I77.89 ENLARGED AORTA (HCC): ICD-10-CM

## 2023-11-14 DIAGNOSIS — I35.1 NONRHEUMATIC AORTIC (VALVE) INSUFFICIENCY: ICD-10-CM

## 2023-11-14 DIAGNOSIS — I35.1 AORTIC VALVE INSUFFICIENCY, ETIOLOGY OF CARDIAC VALVE DISEASE UNSPECIFIED: ICD-10-CM

## 2023-11-14 DIAGNOSIS — I35.1 AORTIC VALVE INSUFFICIENCY, ETIOLOGY OF CARDIAC VALVE DISEASE UNSPECIFIED: Primary | ICD-10-CM

## 2023-11-14 LAB
ALBUMIN SERPL-MCNC: 3.5 G/DL (ref 3.5–5)
ALBUMIN/GLOB SERPL: 0.9 (ref 1.1–2.2)
ALP SERPL-CCNC: 333 U/L (ref 45–117)
ALT SERPL-CCNC: 126 U/L (ref 12–78)
ANION GAP SERPL CALC-SCNC: 2 MMOL/L (ref 5–15)
AST SERPL-CCNC: 143 U/L (ref 15–37)
BILIRUB SERPL-MCNC: 2.2 MG/DL (ref 0.2–1)
BUN SERPL-MCNC: 21 MG/DL (ref 6–20)
BUN/CREAT SERPL: 16 (ref 12–20)
CALCIUM SERPL-MCNC: 9.2 MG/DL (ref 8.5–10.1)
CHLORIDE SERPL-SCNC: 106 MMOL/L (ref 97–108)
CHOLEST SERPL-MCNC: 114 MG/DL
CO2 SERPL-SCNC: 32 MMOL/L (ref 21–32)
CREAT SERPL-MCNC: 1.35 MG/DL (ref 0.7–1.3)
ERYTHROCYTE [DISTWIDTH] IN BLOOD BY AUTOMATED COUNT: 14.1 % (ref 11.5–14.5)
GLOBULIN SER CALC-MCNC: 3.8 G/DL (ref 2–4)
GLUCOSE SERPL-MCNC: 152 MG/DL (ref 65–100)
HCT VFR BLD AUTO: 39 % (ref 36.6–50.3)
HDLC SERPL-MCNC: 49 MG/DL
HDLC SERPL: 2.3 (ref 0–5)
HGB BLD-MCNC: 12.5 G/DL (ref 12.1–17)
LDLC SERPL CALC-MCNC: 55 MG/DL (ref 0–100)
LDLC SERPL DIRECT ASSAY-MCNC: 52 MG/DL (ref 0–100)
MCH RBC QN AUTO: 28.8 PG (ref 26–34)
MCHC RBC AUTO-ENTMCNC: 32.1 G/DL (ref 30–36.5)
MCV RBC AUTO: 89.9 FL (ref 80–99)
NRBC # BLD: 0 K/UL (ref 0–0.01)
NRBC BLD-RTO: 0 PER 100 WBC
PLATELET # BLD AUTO: 269 K/UL (ref 150–400)
PMV BLD AUTO: 10.4 FL (ref 8.9–12.9)
POTASSIUM SERPL-SCNC: 4 MMOL/L (ref 3.5–5.1)
PROT SERPL-MCNC: 7.3 G/DL (ref 6.4–8.2)
RBC # BLD AUTO: 4.34 M/UL (ref 4.1–5.7)
SODIUM SERPL-SCNC: 140 MMOL/L (ref 136–145)
T4 FREE SERPL-MCNC: 1.6 NG/DL (ref 0.8–1.5)
TRIGL SERPL-MCNC: 50 MG/DL
TSH SERPL DL<=0.05 MIU/L-ACNC: 2.23 UIU/ML (ref 0.36–3.74)
VLDLC SERPL CALC-MCNC: 10 MG/DL
WBC # BLD AUTO: 5.8 K/UL (ref 4.1–11.1)

## 2023-11-14 PROCEDURE — 93005 ELECTROCARDIOGRAM TRACING: CPT | Performed by: INTERNAL MEDICINE

## 2023-11-14 PROCEDURE — 1123F ACP DISCUSS/DSCN MKR DOCD: CPT | Performed by: INTERNAL MEDICINE

## 2023-11-14 PROCEDURE — 99214 OFFICE O/P EST MOD 30 MIN: CPT | Performed by: INTERNAL MEDICINE

## 2023-11-14 PROCEDURE — 3078F DIAST BP <80 MM HG: CPT | Performed by: INTERNAL MEDICINE

## 2023-11-14 PROCEDURE — 93010 ELECTROCARDIOGRAM REPORT: CPT | Performed by: INTERNAL MEDICINE

## 2023-11-14 PROCEDURE — G8417 CALC BMI ABV UP PARAM F/U: HCPCS | Performed by: INTERNAL MEDICINE

## 2023-11-14 PROCEDURE — 1036F TOBACCO NON-USER: CPT | Performed by: INTERNAL MEDICINE

## 2023-11-14 PROCEDURE — G8484 FLU IMMUNIZE NO ADMIN: HCPCS | Performed by: INTERNAL MEDICINE

## 2023-11-14 PROCEDURE — 3044F HG A1C LEVEL LT 7.0%: CPT | Performed by: INTERNAL MEDICINE

## 2023-11-14 PROCEDURE — 3077F SYST BP >= 140 MM HG: CPT | Performed by: INTERNAL MEDICINE

## 2023-11-14 PROCEDURE — G8427 DOCREV CUR MEDS BY ELIG CLIN: HCPCS | Performed by: INTERNAL MEDICINE

## 2023-11-14 RX ORDER — LISINOPRIL 20 MG/1
20 TABLET ORAL
COMMUNITY
End: 2023-11-16

## 2023-11-14 RX ORDER — OFLOXACIN 3 MG/ML
SOLUTION/ DROPS OPHTHALMIC
COMMUNITY
Start: 2023-10-23 | End: 2023-11-16

## 2023-11-14 RX ORDER — CHLORAL HYDRATE 500 MG
CAPSULE ORAL
COMMUNITY
End: 2023-11-16

## 2023-11-14 RX ORDER — CLOTRIMAZOLE AND BETAMETHASONE DIPROPIONATE 10; .64 MG/G; MG/G
CREAM TOPICAL
COMMUNITY
End: 2023-11-16

## 2023-11-14 RX ORDER — AMIODARONE HYDROCHLORIDE 100 MG/1
100 TABLET ORAL DAILY
Qty: 90 TABLET | Refills: 2 | Status: ON HOLD | OUTPATIENT
Start: 2023-11-14

## 2023-11-14 RX ORDER — METOPROLOL SUCCINATE 25 MG/1
12.5 TABLET, EXTENDED RELEASE ORAL DAILY
Qty: 450 TABLET | Refills: 3 | Status: ON HOLD | OUTPATIENT
Start: 2023-11-14

## 2023-11-14 RX ORDER — HYDROCODONE BITARTRATE AND ACETAMINOPHEN 5; 325 MG/1; MG/1
TABLET ORAL
COMMUNITY
Start: 2019-07-24 | End: 2023-11-16

## 2023-11-14 RX ORDER — SOD SULF/POT CHLORIDE/MAG SULF 1.479 G
TABLET ORAL
COMMUNITY
Start: 2023-08-10 | End: 2023-11-16

## 2023-11-14 RX ORDER — ONDANSETRON 4 MG/1
TABLET, FILM COATED ORAL
COMMUNITY
Start: 2023-08-10 | End: 2023-11-16

## 2023-11-14 ASSESSMENT — PATIENT HEALTH QUESTIONNAIRE - PHQ9
SUM OF ALL RESPONSES TO PHQ9 QUESTIONS 1 & 2: 0
SUM OF ALL RESPONSES TO PHQ QUESTIONS 1-9: 0
1. LITTLE INTEREST OR PLEASURE IN DOING THINGS: 0
2. FEELING DOWN, DEPRESSED OR HOPELESS: 0
SUM OF ALL RESPONSES TO PHQ QUESTIONS 1-9: 0

## 2023-11-14 NOTE — TELEPHONE ENCOUNTER
Sandra muniz/[facility] called ?  Patient medication for Metoprolol would like callback    [place]# [phone]

## 2023-11-14 NOTE — PROGRESS NOTES
[address] [place] [address], [facility] [address]  [facility] [address], [address] [place] [address]     Subjective:        Alena Rodriguez is a 79 y.o. male is here for routine f/u. Last seen by me in April 2023. The patient has some abdominal pain that is reminiscent of prior pancreatitis unrelated to alcohol, but denies chest pain/ shortness of breath, orthopnea, PND, LE edema, palpitations, syncope, presyncope or fatigue.     Patient Active Problem List    Diagnosis Date Noted    Colon polyp 02/21/2023    Rectal bleed 10/09/2021    Atrial flutter, paroxysmal ([address]) 10/09/2021    Colonic mass 10/09/2021    Combined forms of age-related cataract of right eye 01/09/2020      Manpreet Fan MD  Past Medical History:   Diagnosis Date    Arthritis     At risk for sleep apnea 12/18/2019    Stop Bang 5     Atrial fibrillation ([address])     Atrial Flutter-Wittkamp    Cancer ([address])     prostate cancer    Colon cancer ([address])     Diabetes ([address])     DM2    Elevated cholesterol     History of blood transfusion     History of kidney stones     [place] (hard of hearing)     bilateral hearing aids    Hypertension     PUD (peptic ulcer disease) 1980    Skin cancer       Past Surgical History:   Procedure Laterality Date    APPENDECTOMY      1960's    CATARACT REMOVAL Bilateral     COLONOSCOPY N/A 09/27/2017    COLONOSCOPY performed by Rodrigo Pool MD at [facility] ENDOSCOPY    COLONOSCOPY N/A 12/30/2019    COLONOSCOPY WITH POLYPECTOMY performed by Rodrigo Pool MD at [facility] AMBULATORY OR    COLONOSCOPY N/A 11/30/2022    COLONOSCOPY performed by Anabel Lion MD at [facility] ENDOSCOPY    COLONOSCOPY N/A 10/11/2021    COLONOSCOPY performed by Rodrigo Pool MD at [facility] ENDOSCOPY    COLONOSCOPY N/A 8/18/2023    COLONOSCOPY performed by Lorena Neri MD at [facility] ENDOSCOPY    HERNIA REPAIR      1960's    LITHOTRIPSY      PROSTATECTOMY      TOTAL HIP ARTHROPLASTY Right     TOTAL KNEE ARTHROPLASTY Right     UPPER GASTROINTESTINAL

## 2023-11-14 NOTE — PATIENT INSTRUCTIONS
STOP taking Metoprolol Tartrate and START taking Metoprolol Succinate 12.5 mg once a day. Start taking Amiodarone 100 mg a day only. Call Dr. Nean Ashley,  your GI doctor for your Abdominal pain.   Get your blood work today

## 2023-11-16 ENCOUNTER — APPOINTMENT (OUTPATIENT)
Facility: HOSPITAL | Age: 83
DRG: 417 | End: 2023-11-16
Payer: MEDICARE

## 2023-11-16 ENCOUNTER — APPOINTMENT (OUTPATIENT)
Facility: HOSPITAL | Age: 83
DRG: 417 | End: 2023-11-16
Attending: EMERGENCY MEDICINE
Payer: MEDICARE

## 2023-11-16 ENCOUNTER — HOSPITAL ENCOUNTER (INPATIENT)
Facility: HOSPITAL | Age: 83
LOS: 5 days | Discharge: HOME | DRG: 417 | End: 2023-11-21
Attending: EMERGENCY MEDICINE | Admitting: STUDENT IN AN ORGANIZED HEALTH CARE EDUCATION/TRAINING PROGRAM
Payer: MEDICARE

## 2023-11-16 DIAGNOSIS — K83.1 BILIARY OBSTRUCTION: Primary | ICD-10-CM

## 2023-11-16 DIAGNOSIS — K85.90 ACUTE PANCREATITIS, UNSPECIFIED COMPLICATION STATUS, UNSPECIFIED PANCREATITIS TYPE: ICD-10-CM

## 2023-11-16 DIAGNOSIS — K80.20 GALLSTONES: ICD-10-CM

## 2023-11-16 LAB
ALBUMIN SERPL-MCNC: 3.3 G/DL (ref 3.5–5)
ALBUMIN/GLOB SERPL: 0.8 (ref 1.1–2.2)
ALP SERPL-CCNC: 376 U/L (ref 45–117)
ALT SERPL-CCNC: 150 U/L (ref 12–78)
ANION GAP SERPL CALC-SCNC: 5 MMOL/L (ref 5–15)
APPEARANCE UR: ABNORMAL
AST SERPL-CCNC: 166 U/L (ref 15–37)
BACTERIA URNS QL MICRO: ABNORMAL /HPF
BASOPHILS # BLD: 0 K/UL (ref 0–0.1)
BASOPHILS NFR BLD: 0 % (ref 0–1)
BILIRUB SERPL-MCNC: 5.2 MG/DL (ref 0.2–1)
BILIRUB UR QL CFM: POSITIVE
BUN SERPL-MCNC: 19 MG/DL (ref 6–20)
BUN/CREAT SERPL: 17 (ref 12–20)
CALCIUM SERPL-MCNC: 9.3 MG/DL (ref 8.5–10.1)
CHLORIDE SERPL-SCNC: 107 MMOL/L (ref 97–108)
CO2 SERPL-SCNC: 27 MMOL/L (ref 21–32)
COLOR UR: ABNORMAL
CREAT SERPL-MCNC: 1.12 MG/DL (ref 0.7–1.3)
DIFFERENTIAL METHOD BLD: ABNORMAL
EOSINOPHIL # BLD: 0 K/UL (ref 0–0.4)
EOSINOPHIL NFR BLD: 0 % (ref 0–7)
EPITH CASTS URNS QL MICRO: ABNORMAL /LPF
ERYTHROCYTE [DISTWIDTH] IN BLOOD BY AUTOMATED COUNT: 14.3 % (ref 11.5–14.5)
GLOBULIN SER CALC-MCNC: 4.3 G/DL (ref 2–4)
GLUCOSE BLD STRIP.AUTO-MCNC: 112 MG/DL (ref 65–117)
GLUCOSE BLD STRIP.AUTO-MCNC: 93 MG/DL (ref 65–117)
GLUCOSE BLD STRIP.AUTO-MCNC: 94 MG/DL (ref 65–117)
GLUCOSE SERPL-MCNC: 138 MG/DL (ref 65–100)
GLUCOSE UR STRIP.AUTO-MCNC: NEGATIVE MG/DL
HCT VFR BLD AUTO: 37.1 % (ref 36.6–50.3)
HGB BLD-MCNC: 12.3 G/DL (ref 12.1–17)
HGB UR QL STRIP: ABNORMAL
IMM GRANULOCYTES # BLD AUTO: 0 K/UL (ref 0–0.04)
IMM GRANULOCYTES NFR BLD AUTO: 0 % (ref 0–0.5)
KETONES UR QL STRIP.AUTO: NEGATIVE MG/DL
LACTATE BLD-SCNC: 0.65 MMOL/L (ref 0.4–2)
LEUKOCYTE ESTERASE UR QL STRIP.AUTO: ABNORMAL
LIPASE SERPL-CCNC: 3435 U/L (ref 13–75)
LYMPHOCYTES # BLD: 0.7 K/UL (ref 0.8–3.5)
LYMPHOCYTES NFR BLD: 9 % (ref 12–49)
MCH RBC QN AUTO: 29.4 PG (ref 26–34)
MCHC RBC AUTO-ENTMCNC: 33.2 G/DL (ref 30–36.5)
MCV RBC AUTO: 88.8 FL (ref 80–99)
MONOCYTES # BLD: 0.8 K/UL (ref 0–1)
MONOCYTES NFR BLD: 10 % (ref 5–13)
NEUTS SEG # BLD: 6.8 K/UL (ref 1.8–8)
NEUTS SEG NFR BLD: 81 % (ref 32–75)
NITRITE UR QL STRIP.AUTO: POSITIVE
NRBC # BLD: 0 K/UL (ref 0–0.01)
NRBC BLD-RTO: 0 PER 100 WBC
PH UR STRIP: 6 (ref 5–8)
PLATELET # BLD AUTO: 233 K/UL (ref 150–400)
PMV BLD AUTO: 10.3 FL (ref 8.9–12.9)
POTASSIUM SERPL-SCNC: 3.5 MMOL/L (ref 3.5–5.1)
PROT SERPL-MCNC: 7.6 G/DL (ref 6.4–8.2)
PROT UR STRIP-MCNC: 100 MG/DL
RBC # BLD AUTO: 4.18 M/UL (ref 4.1–5.7)
RBC #/AREA URNS HPF: ABNORMAL /HPF (ref 0–5)
RBC MORPH BLD: ABNORMAL
SERVICE CMNT-IMP: NORMAL
SODIUM SERPL-SCNC: 139 MMOL/L (ref 136–145)
SP GR UR REFRACTOMETRY: 1.02 (ref 1–1.03)
TROPONIN I SERPL HS-MCNC: 12 NG/L (ref 0–76)
URINE CULTURE IF INDICATED: ABNORMAL
UROBILINOGEN UR QL STRIP.AUTO: >8 EU/DL (ref 0.2–1)
WBC # BLD AUTO: 8.3 K/UL (ref 4.1–11.1)
WBC URNS QL MICRO: >100 /HPF (ref 0–4)

## 2023-11-16 PROCEDURE — 96375 TX/PRO/DX INJ NEW DRUG ADDON: CPT

## 2023-11-16 PROCEDURE — 6370000000 HC RX 637 (ALT 250 FOR IP): Performed by: STUDENT IN AN ORGANIZED HEALTH CARE EDUCATION/TRAINING PROGRAM

## 2023-11-16 PROCEDURE — 1100000003 HC PRIVATE W/ TELEMETRY

## 2023-11-16 PROCEDURE — 87086 URINE CULTURE/COLONY COUNT: CPT

## 2023-11-16 PROCEDURE — 2580000003 HC RX 258: Performed by: NURSE PRACTITIONER

## 2023-11-16 PROCEDURE — 99231 SBSQ HOSP IP/OBS SF/LOW 25: CPT | Performed by: STUDENT IN AN ORGANIZED HEALTH CARE EDUCATION/TRAINING PROGRAM

## 2023-11-16 PROCEDURE — 96374 THER/PROPH/DIAG INJ IV PUSH: CPT

## 2023-11-16 PROCEDURE — 2580000003 HC RX 258: Performed by: STUDENT IN AN ORGANIZED HEALTH CARE EDUCATION/TRAINING PROGRAM

## 2023-11-16 PROCEDURE — 76705 ECHO EXAM OF ABDOMEN: CPT

## 2023-11-16 PROCEDURE — 81001 URINALYSIS AUTO W/SCOPE: CPT

## 2023-11-16 PROCEDURE — 87040 BLOOD CULTURE FOR BACTERIA: CPT

## 2023-11-16 PROCEDURE — 82962 GLUCOSE BLOOD TEST: CPT

## 2023-11-16 PROCEDURE — 2500000003 HC RX 250 WO HCPCS: Performed by: EMERGENCY MEDICINE

## 2023-11-16 PROCEDURE — 96376 TX/PRO/DX INJ SAME DRUG ADON: CPT

## 2023-11-16 PROCEDURE — 71045 X-RAY EXAM CHEST 1 VIEW: CPT

## 2023-11-16 PROCEDURE — 99285 EMERGENCY DEPT VISIT HI MDM: CPT

## 2023-11-16 PROCEDURE — 2580000003 HC RX 258: Performed by: EMERGENCY MEDICINE

## 2023-11-16 PROCEDURE — 83690 ASSAY OF LIPASE: CPT

## 2023-11-16 PROCEDURE — 87077 CULTURE AEROBIC IDENTIFY: CPT

## 2023-11-16 PROCEDURE — 87186 SC STD MICRODIL/AGAR DIL: CPT

## 2023-11-16 PROCEDURE — 84484 ASSAY OF TROPONIN QUANT: CPT

## 2023-11-16 PROCEDURE — 6360000002 HC RX W HCPCS: Performed by: NURSE PRACTITIONER

## 2023-11-16 PROCEDURE — 93005 ELECTROCARDIOGRAM TRACING: CPT | Performed by: EMERGENCY MEDICINE

## 2023-11-16 PROCEDURE — 85025 COMPLETE CBC W/AUTO DIFF WBC: CPT

## 2023-11-16 PROCEDURE — 6360000002 HC RX W HCPCS: Performed by: EMERGENCY MEDICINE

## 2023-11-16 PROCEDURE — 36415 COLL VENOUS BLD VENIPUNCTURE: CPT

## 2023-11-16 PROCEDURE — 74177 CT ABD & PELVIS W/CONTRAST: CPT

## 2023-11-16 PROCEDURE — 6360000004 HC RX CONTRAST MEDICATION: Performed by: EMERGENCY MEDICINE

## 2023-11-16 PROCEDURE — 6360000002 HC RX W HCPCS

## 2023-11-16 PROCEDURE — 80053 COMPREHEN METABOLIC PANEL: CPT

## 2023-11-16 PROCEDURE — 83605 ASSAY OF LACTIC ACID: CPT

## 2023-11-16 PROCEDURE — 6360000002 HC RX W HCPCS: Performed by: STUDENT IN AN ORGANIZED HEALTH CARE EDUCATION/TRAINING PROGRAM

## 2023-11-16 RX ORDER — SODIUM CHLORIDE 0.9 % (FLUSH) 0.9 %
5-40 SYRINGE (ML) INJECTION EVERY 12 HOURS SCHEDULED
Status: DISCONTINUED | OUTPATIENT
Start: 2023-11-16 | End: 2023-11-21 | Stop reason: HOSPADM

## 2023-11-16 RX ORDER — HYDROMORPHONE HYDROCHLORIDE 1 MG/ML
0.5 INJECTION, SOLUTION INTRAMUSCULAR; INTRAVENOUS; SUBCUTANEOUS
Status: COMPLETED | OUTPATIENT
Start: 2023-11-16 | End: 2023-11-16

## 2023-11-16 RX ORDER — POLYETHYLENE GLYCOL 3350 17 G/17G
17 POWDER, FOR SOLUTION ORAL DAILY PRN
Status: DISCONTINUED | OUTPATIENT
Start: 2023-11-16 | End: 2023-11-21 | Stop reason: HOSPADM

## 2023-11-16 RX ORDER — ACETAMINOPHEN 325 MG/1
650 TABLET ORAL EVERY 6 HOURS PRN
Status: DISCONTINUED | OUTPATIENT
Start: 2023-11-16 | End: 2023-11-21 | Stop reason: HOSPADM

## 2023-11-16 RX ORDER — ONDANSETRON 2 MG/ML
INJECTION INTRAMUSCULAR; INTRAVENOUS
Status: COMPLETED
Start: 2023-11-16 | End: 2023-11-16

## 2023-11-16 RX ORDER — POTASSIUM CHLORIDE 7.45 MG/ML
10 INJECTION INTRAVENOUS PRN
Status: DISCONTINUED | OUTPATIENT
Start: 2023-11-16 | End: 2023-11-18

## 2023-11-16 RX ORDER — SODIUM CHLORIDE 0.9 % (FLUSH) 0.9 %
5-40 SYRINGE (ML) INJECTION EVERY 12 HOURS SCHEDULED
Status: DISCONTINUED | OUTPATIENT
Start: 2023-11-16 | End: 2023-11-17 | Stop reason: HOSPADM

## 2023-11-16 RX ORDER — ONDANSETRON 4 MG/1
4 TABLET, ORALLY DISINTEGRATING ORAL EVERY 8 HOURS PRN
Status: DISCONTINUED | OUTPATIENT
Start: 2023-11-16 | End: 2023-11-21 | Stop reason: HOSPADM

## 2023-11-16 RX ORDER — INSULIN LISPRO 100 [IU]/ML
0-4 INJECTION, SOLUTION INTRAVENOUS; SUBCUTANEOUS NIGHTLY
Status: DISCONTINUED | OUTPATIENT
Start: 2023-11-16 | End: 2023-11-21 | Stop reason: HOSPADM

## 2023-11-16 RX ORDER — SODIUM CHLORIDE, SODIUM LACTATE, POTASSIUM CHLORIDE, AND CALCIUM CHLORIDE .6; .31; .03; .02 G/100ML; G/100ML; G/100ML; G/100ML
500 INJECTION, SOLUTION INTRAVENOUS ONCE
Status: COMPLETED | OUTPATIENT
Start: 2023-11-16 | End: 2023-11-16

## 2023-11-16 RX ORDER — AMIODARONE HYDROCHLORIDE 200 MG/1
100 TABLET ORAL DAILY
Status: DISCONTINUED | OUTPATIENT
Start: 2023-11-16 | End: 2023-11-21 | Stop reason: HOSPADM

## 2023-11-16 RX ORDER — ONDANSETRON 2 MG/ML
4 INJECTION INTRAMUSCULAR; INTRAVENOUS EVERY 6 HOURS PRN
Status: DISCONTINUED | OUTPATIENT
Start: 2023-11-16 | End: 2023-11-21 | Stop reason: HOSPADM

## 2023-11-16 RX ORDER — INSULIN LISPRO 100 [IU]/ML
0-8 INJECTION, SOLUTION INTRAVENOUS; SUBCUTANEOUS
Status: DISCONTINUED | OUTPATIENT
Start: 2023-11-16 | End: 2023-11-21 | Stop reason: HOSPADM

## 2023-11-16 RX ORDER — HYDROMORPHONE HYDROCHLORIDE 1 MG/ML
0.25 INJECTION, SOLUTION INTRAMUSCULAR; INTRAVENOUS; SUBCUTANEOUS
Status: COMPLETED | OUTPATIENT
Start: 2023-11-16 | End: 2023-11-16

## 2023-11-16 RX ORDER — ACETAMINOPHEN 650 MG/1
650 SUPPOSITORY RECTAL EVERY 6 HOURS PRN
Status: DISCONTINUED | OUTPATIENT
Start: 2023-11-16 | End: 2023-11-21 | Stop reason: HOSPADM

## 2023-11-16 RX ORDER — SODIUM CHLORIDE 0.9 % (FLUSH) 0.9 %
5-40 SYRINGE (ML) INJECTION PRN
Status: DISCONTINUED | OUTPATIENT
Start: 2023-11-16 | End: 2023-11-21 | Stop reason: HOSPADM

## 2023-11-16 RX ORDER — OXYCODONE HYDROCHLORIDE 5 MG/1
5 TABLET ORAL EVERY 4 HOURS PRN
Status: DISCONTINUED | OUTPATIENT
Start: 2023-11-16 | End: 2023-11-21 | Stop reason: HOSPADM

## 2023-11-16 RX ORDER — SODIUM CHLORIDE 9 MG/ML
25 INJECTION, SOLUTION INTRAVENOUS PRN
Status: DISCONTINUED | OUTPATIENT
Start: 2023-11-16 | End: 2023-11-17 | Stop reason: HOSPADM

## 2023-11-16 RX ORDER — POTASSIUM CHLORIDE 20 MEQ/1
40 TABLET, EXTENDED RELEASE ORAL PRN
Status: DISCONTINUED | OUTPATIENT
Start: 2023-11-16 | End: 2023-11-18

## 2023-11-16 RX ORDER — 0.9 % SODIUM CHLORIDE 0.9 %
500 INTRAVENOUS SOLUTION INTRAVENOUS ONCE
Status: COMPLETED | OUTPATIENT
Start: 2023-11-16 | End: 2023-11-16

## 2023-11-16 RX ORDER — ONDANSETRON 2 MG/ML
4 INJECTION INTRAMUSCULAR; INTRAVENOUS ONCE
Status: COMPLETED | OUTPATIENT
Start: 2023-11-16 | End: 2023-11-16

## 2023-11-16 RX ORDER — MAGNESIUM SULFATE IN WATER 40 MG/ML
2000 INJECTION, SOLUTION INTRAVENOUS PRN
Status: DISCONTINUED | OUTPATIENT
Start: 2023-11-16 | End: 2023-11-18

## 2023-11-16 RX ORDER — SODIUM CHLORIDE, SODIUM LACTATE, POTASSIUM CHLORIDE, CALCIUM CHLORIDE 600; 310; 30; 20 MG/100ML; MG/100ML; MG/100ML; MG/100ML
INJECTION, SOLUTION INTRAVENOUS CONTINUOUS
Status: DISCONTINUED | OUTPATIENT
Start: 2023-11-16 | End: 2023-11-17

## 2023-11-16 RX ORDER — AMLODIPINE BESYLATE 5 MG/1
5 TABLET ORAL DAILY
Status: DISCONTINUED | OUTPATIENT
Start: 2023-11-16 | End: 2023-11-18

## 2023-11-16 RX ORDER — M-VIT,TX,IRON,MINS/CALC/FOLIC 27MG-0.4MG
1 TABLET ORAL DAILY
Status: DISCONTINUED | OUTPATIENT
Start: 2023-11-16 | End: 2023-11-21 | Stop reason: HOSPADM

## 2023-11-16 RX ORDER — SODIUM CHLORIDE 0.9 % (FLUSH) 0.9 %
5-40 SYRINGE (ML) INJECTION PRN
Status: DISCONTINUED | OUTPATIENT
Start: 2023-11-16 | End: 2023-11-17 | Stop reason: HOSPADM

## 2023-11-16 RX ORDER — PRAVASTATIN SODIUM 10 MG
20 TABLET ORAL NIGHTLY
Status: DISCONTINUED | OUTPATIENT
Start: 2023-11-16 | End: 2023-11-21 | Stop reason: HOSPADM

## 2023-11-16 RX ORDER — DEXTROSE MONOHYDRATE 100 MG/ML
INJECTION, SOLUTION INTRAVENOUS CONTINUOUS PRN
Status: DISCONTINUED | OUTPATIENT
Start: 2023-11-16 | End: 2023-11-21 | Stop reason: HOSPADM

## 2023-11-16 RX ORDER — SODIUM CHLORIDE 9 MG/ML
INJECTION, SOLUTION INTRAVENOUS PRN
Status: DISCONTINUED | OUTPATIENT
Start: 2023-11-16 | End: 2023-11-21 | Stop reason: HOSPADM

## 2023-11-16 RX ORDER — METOPROLOL SUCCINATE 25 MG/1
12.5 TABLET, EXTENDED RELEASE ORAL DAILY
Status: DISCONTINUED | OUTPATIENT
Start: 2023-11-16 | End: 2023-11-21 | Stop reason: HOSPADM

## 2023-11-16 RX ORDER — LOSARTAN POTASSIUM 100 MG/1
100 TABLET ORAL DAILY
Status: DISCONTINUED | OUTPATIENT
Start: 2023-11-16 | End: 2023-11-21 | Stop reason: HOSPADM

## 2023-11-16 RX ORDER — MORPHINE SULFATE 2 MG/ML
2 INJECTION, SOLUTION INTRAMUSCULAR; INTRAVENOUS EVERY 4 HOURS PRN
Status: DISCONTINUED | OUTPATIENT
Start: 2023-11-16 | End: 2023-11-21 | Stop reason: HOSPADM

## 2023-11-16 RX ADMIN — SODIUM CHLORIDE 500 ML: 9 INJECTION, SOLUTION INTRAVENOUS at 12:35

## 2023-11-16 RX ADMIN — SODIUM CHLORIDE, POTASSIUM CHLORIDE, SODIUM LACTATE AND CALCIUM CHLORIDE: 600; 310; 30; 20 INJECTION, SOLUTION INTRAVENOUS at 14:44

## 2023-11-16 RX ADMIN — HYDROMORPHONE HYDROCHLORIDE 0.5 MG: 1 INJECTION, SOLUTION INTRAMUSCULAR; INTRAVENOUS; SUBCUTANEOUS at 12:35

## 2023-11-16 RX ADMIN — PIPERACILLIN AND TAZOBACTAM 3375 MG: 3; .375 INJECTION, POWDER, LYOPHILIZED, FOR SOLUTION INTRAVENOUS at 21:55

## 2023-11-16 RX ADMIN — ONDANSETRON 4 MG: 2 INJECTION INTRAMUSCULAR; INTRAVENOUS at 11:01

## 2023-11-16 RX ADMIN — ONDANSETRON HYDROCHLORIDE 4 MG: 2 INJECTION, SOLUTION INTRAMUSCULAR; INTRAVENOUS at 14:30

## 2023-11-16 RX ADMIN — SODIUM CHLORIDE, SODIUM LACTATE, POTASSIUM CHLORIDE, AND CALCIUM CHLORIDE 500 ML: 600; 310; 30; 20 INJECTION, SOLUTION INTRAVENOUS at 14:51

## 2023-11-16 RX ADMIN — ONDANSETRON 4 MG: 2 INJECTION INTRAMUSCULAR; INTRAVENOUS at 14:30

## 2023-11-16 RX ADMIN — IOPAMIDOL 100 ML: 755 INJECTION, SOLUTION INTRAVENOUS at 12:28

## 2023-11-16 RX ADMIN — OXYCODONE 5 MG: 5 TABLET ORAL at 19:52

## 2023-11-16 RX ADMIN — HYDROMORPHONE HYDROCHLORIDE 0.25 MG: 1 INJECTION, SOLUTION INTRAMUSCULAR; INTRAVENOUS; SUBCUTANEOUS at 11:01

## 2023-11-16 RX ADMIN — LOSARTAN POTASSIUM 100 MG: 100 TABLET, FILM COATED ORAL at 16:30

## 2023-11-16 RX ADMIN — SODIUM CHLORIDE, PRESERVATIVE FREE 10 ML: 5 INJECTION INTRAVENOUS at 21:58

## 2023-11-16 RX ADMIN — PIPERACILLIN AND TAZOBACTAM 4500 MG: 4; .5 INJECTION, POWDER, LYOPHILIZED, FOR SOLUTION INTRAVENOUS at 14:21

## 2023-11-16 RX ADMIN — AMIODARONE HYDROCHLORIDE 100 MG: 200 TABLET ORAL at 16:31

## 2023-11-16 RX ADMIN — METOPROLOL SUCCINATE 12.5 MG: 25 TABLET, EXTENDED RELEASE ORAL at 16:30

## 2023-11-16 RX ADMIN — AMLODIPINE BESYLATE 5 MG: 5 TABLET ORAL at 16:31

## 2023-11-16 ASSESSMENT — PAIN SCALES - GENERAL
PAINLEVEL_OUTOF10: 2
PAINLEVEL_OUTOF10: 1
PAINLEVEL_OUTOF10: 5

## 2023-11-16 ASSESSMENT — PAIN DESCRIPTION - LOCATION
LOCATION: ABDOMEN
LOCATION: ABDOMEN

## 2023-11-16 ASSESSMENT — PAIN DESCRIPTION - ORIENTATION
ORIENTATION: MID;UPPER
ORIENTATION: RIGHT;UPPER

## 2023-11-16 ASSESSMENT — PAIN - FUNCTIONAL ASSESSMENT: PAIN_FUNCTIONAL_ASSESSMENT: 0-10

## 2023-11-16 ASSESSMENT — PAIN DESCRIPTION - DESCRIPTORS: DESCRIPTORS: ACHING

## 2023-11-16 NOTE — CONSULTS
Acute Care Surgery Consult    Consulted by: Dr. Radha Kay  PCP: Roc Patel MD      Assessment:     In summary, this is an 50-year-old gentleman who is presenting with obstructing choledocholithiasis with superimposed pancreatitis. Clinical picture is consistent with cholangitis and pancreatitis. Recommend inpatient admission, broad-spectrum antibiotics, and ERCP once stable from an anticoagulation standpoint. After he has cooled down, we will discuss with the patient the risks and benefits of cholecystectomy during the same admission. Plan:     Recommend inpatient admission  Broad-spectrum antibiotics  NPO status  Appreciate GI input for ERCP  Recommend against percutaneous cholecystostomy as this will not decompress the extrahepatic biliary tree if he has cholecystitis  Hold Eliquis      HPI:      Rahel Danielson is a 79 y.o. male who is seen in consultation for cholecystitis in the setting of cholangitis, pancreatitis. The patient reports bandlike epigastric abdominal pain that has been going on for approximately 2 days. It is quite severe and feels like a previous bout of pancreatitis. His surgical history significant for a partial colectomy. He also has DM2, hypercholesterolemia, A-fib on Eliquis. In the ER work-up was notable for transaminitis in the 300s, elevated T. bili to almost 5.5, lipase greater than 3500. No leukocytosis. CT scan demonstrated a dilated common bile duct to 1.2 cm with a stone near the ampulla. Concomitant concern for cholecystitis. He is hemodynamically stable and is complaining of some nausea but is not in extreme pain or discomfort. He does endorse some chills, denies fevers, myalgias, bloody stools. Review of Systems:    A 10 point review of systems was negative aside from what is noted in the HPI.     Problem List:     Past Medical History:   Diagnosis Date    Arthritis     At risk for sleep apnea 12/18/2019    Stop Bang 5     Atrial fibrillation ([address])
Brief Surgery Consult    Full note to follow. Patient seen and examined. Patient is an 78-year-old who presents with abdominal pain and clinical picture consistent for cholangitis and pancreatitis with a T. bili of 5 and lipase >3,000, dilated extrahepatic bile ducts. Patient needs ERCP, antibiotics. Note he is on apixiban for afib. Can discuss possible cholecystectomy during the same admission. Recommend medical admission and GI consult. Any questions, please call me.       Alma Zaman MD  General Surgery
RBC Comment NORMOCYTIC, NORMOCHROMIC     CMP    Collection Time: 11/16/23 11:05 AM   Result Value Ref Range    Sodium 139 136 - 145 mmol/L    Potassium 3.5 3.5 - 5.1 mmol/L    Chloride 107 97 - 108 mmol/L    CO2 27 21 - 32 mmol/L    Anion Gap 5 5 - 15 mmol/L    Glucose 138 (H) 65 - 100 mg/dL    BUN 19 6 - 20 MG/DL    Creatinine 1.12 0.70 - 1.30 MG/DL    Bun/Cre Ratio 17 12 - 20      Est, Glom Filt Rate >60 >60 ml/min/1.73m2    Calcium 9.3 8.5 - 10.1 MG/DL    Total Bilirubin 5.2 (H) 0.2 - 1.0 MG/DL     (H) 12 - 78 U/L     (H) 15 - 37 U/L    Alk Phosphatase 376 (H) 45 - 117 U/L    Total Protein 7.6 6.4 - 8.2 g/dL    Albumin 3.3 (L) 3.5 - 5.0 g/dL    Globulin 4.3 (H) 2.0 - 4.0 g/dL    Albumin/Globulin Ratio 0.8 (L) 1.1 - 2.2     Lipase    Collection Time: 11/16/23 11:05 AM   Result Value Ref Range    Lipase 3,435 (H) 13 - 75 U/L   Troponin    Collection Time: 11/16/23 11:05 AM   Result Value Ref Range    Troponin, High Sensitivity 12 0 - 76 ng/L   POC Lactic Acid    Collection Time: 11/16/23 11:13 AM   Result Value Ref Range    POC Lactic Acid 0.65 0.40 - 2.00 mmol/L         Assessment/Plan:     Active Problems:    * No active hospital problems. *  Resolved Problems:    * No resolved hospital problems. *       See above narrative for full detail.     SHARON Pulliam - NP

## 2023-11-16 NOTE — ED PROVIDER NOTES
RBC, UA  0 - 5 /hpf    Epithelial Cells UA MANY (A) FEW /lpf    BACTERIA, URINE 4+ (A) NEG /hpf    Urine Culture if Indicated URINE CULTURE ORDERED (A) CNI     Bilirubin, Confirmatory    Collection Time: 11/16/23  1:59 PM   Result Value Ref Range    Bilirubin Confirmation, UA Positive (A) NEG     POCT Glucose    Collection Time: 11/16/23  2:37 PM   Result Value Ref Range    POC Glucose 94 65 - 117 mg/dL    Performed by: Amor Thornton RN        EKG: If performed, independent interpretation documented below in the MDM section     RADIOLOGY:  Non-plain film images such as CT, Ultrasound and MRI are read by the radiologist. Plain radiographic images are visualized and preliminarily interpreted by the ED Provider with the findings documented in the MDM section. Interpretation per the Radiologist below, if available at the time of this note:     [address] organ? GALLBLADDER   Final Result   1. Gallbladder stones, wall thickening, and tenderness. Findings could reflect   acute cholecystitis in the correct clinical setting. 2.  Severe right hydronephrosis. Nonobstructing right renal calculus. 3.  Unremarkable liver. CT ABDOMEN PELVIS W IV CONTRAST Additional Contrast? None   Final Result   1. Cholelithiasis with findings compatible with acute cholecystitis. 2.  Choledocholithiasis with biliary obstruction and findings suggestive of   cholangitis. 3.  Nonobstructing right nephrolithiasis. Chronic severe right hydronephrosis to   the ureteropelvic junction. 4.  Trace right pleural effusion, could be reactive. 5.  Incidental findings as above.          XR CHEST PORTABLE   Final Result      No acute process or change compared to the prior exam.         [facility] XR TECHNOLOGIST SERVICE    (Results Pending)        PROCEDURES   Unless otherwise noted below, none  Critical Care    Performed by: Carlos Alberto Rebolledo MD  Authorized by: Carlos Alberto Rebolledo MD    Critical care provider statement:

## 2023-11-17 ENCOUNTER — ANESTHESIA EVENT (OUTPATIENT)
Facility: HOSPITAL | Age: 83
End: 2023-11-17
Payer: MEDICARE

## 2023-11-17 ENCOUNTER — APPOINTMENT (OUTPATIENT)
Facility: HOSPITAL | Age: 83
DRG: 417 | End: 2023-11-17
Payer: MEDICARE

## 2023-11-17 ENCOUNTER — ANESTHESIA (OUTPATIENT)
Facility: HOSPITAL | Age: 83
End: 2023-11-17
Payer: MEDICARE

## 2023-11-17 LAB
ANION GAP SERPL CALC-SCNC: 5 MMOL/L (ref 5–15)
BASOPHILS # BLD: 0 K/UL (ref 0–0.1)
BASOPHILS # BLD: 0 K/UL (ref 0–0.1)
BASOPHILS NFR BLD: 0 % (ref 0–1)
BASOPHILS NFR BLD: 0 % (ref 0–1)
BUN SERPL-MCNC: 15 MG/DL (ref 6–20)
BUN/CREAT SERPL: 16 (ref 12–20)
CALCIUM SERPL-MCNC: 8.9 MG/DL (ref 8.5–10.1)
CHLORIDE SERPL-SCNC: 108 MMOL/L (ref 97–108)
CO2 SERPL-SCNC: 25 MMOL/L (ref 21–32)
CREAT SERPL-MCNC: 0.94 MG/DL (ref 0.7–1.3)
DIFFERENTIAL METHOD BLD: ABNORMAL
DIFFERENTIAL METHOD BLD: ABNORMAL
EKG ATRIAL RATE: 54 BPM
EKG DIAGNOSIS: NORMAL
EKG P AXIS: 75 DEGREES
EKG P-R INTERVAL: 212 MS
EKG Q-T INTERVAL: 502 MS
EKG QRS DURATION: 126 MS
EKG QTC CALCULATION (BAZETT): 476 MS
EKG R AXIS: 8 DEGREES
EKG T AXIS: -2 DEGREES
EKG VENTRICULAR RATE: 54 BPM
EOSINOPHIL # BLD: 0 K/UL (ref 0–0.4)
EOSINOPHIL # BLD: 0 K/UL (ref 0–0.4)
EOSINOPHIL NFR BLD: 0 % (ref 0–7)
EOSINOPHIL NFR BLD: 0 % (ref 0–7)
ERYTHROCYTE [DISTWIDTH] IN BLOOD BY AUTOMATED COUNT: 14.5 % (ref 11.5–14.5)
ERYTHROCYTE [DISTWIDTH] IN BLOOD BY AUTOMATED COUNT: 14.6 % (ref 11.5–14.5)
GLUCOSE BLD STRIP.AUTO-MCNC: 109 MG/DL (ref 65–117)
GLUCOSE BLD STRIP.AUTO-MCNC: 136 MG/DL (ref 65–117)
GLUCOSE BLD STRIP.AUTO-MCNC: 54 MG/DL (ref 65–117)
GLUCOSE BLD STRIP.AUTO-MCNC: 55 MG/DL (ref 65–117)
GLUCOSE BLD STRIP.AUTO-MCNC: 57 MG/DL (ref 65–117)
GLUCOSE BLD STRIP.AUTO-MCNC: 63 MG/DL (ref 65–117)
GLUCOSE BLD STRIP.AUTO-MCNC: 63 MG/DL (ref 65–117)
GLUCOSE BLD STRIP.AUTO-MCNC: 77 MG/DL (ref 65–117)
GLUCOSE BLD STRIP.AUTO-MCNC: 98 MG/DL (ref 65–117)
GLUCOSE SERPL-MCNC: 64 MG/DL (ref 65–100)
HCT VFR BLD AUTO: 32.1 % (ref 36.6–50.3)
HCT VFR BLD AUTO: 36 % (ref 36.6–50.3)
HGB BLD-MCNC: 10.3 G/DL (ref 12.1–17)
HGB BLD-MCNC: 12 G/DL (ref 12.1–17)
IMM GRANULOCYTES # BLD AUTO: 0 K/UL (ref 0–0.04)
IMM GRANULOCYTES # BLD AUTO: 0 K/UL (ref 0–0.04)
IMM GRANULOCYTES NFR BLD AUTO: 0 % (ref 0–0.5)
IMM GRANULOCYTES NFR BLD AUTO: 1 % (ref 0–0.5)
LYMPHOCYTES # BLD: 0.8 K/UL (ref 0.8–3.5)
LYMPHOCYTES # BLD: 0.8 K/UL (ref 0.8–3.5)
LYMPHOCYTES NFR BLD: 11 % (ref 12–49)
LYMPHOCYTES NFR BLD: 12 % (ref 12–49)
MCH RBC QN AUTO: 29.3 PG (ref 26–34)
MCH RBC QN AUTO: 29.6 PG (ref 26–34)
MCHC RBC AUTO-ENTMCNC: 32.1 G/DL (ref 30–36.5)
MCHC RBC AUTO-ENTMCNC: 33.3 G/DL (ref 30–36.5)
MCV RBC AUTO: 88.9 FL (ref 80–99)
MCV RBC AUTO: 91.5 FL (ref 80–99)
MONOCYTES # BLD: 0.9 K/UL (ref 0–1)
MONOCYTES # BLD: 0.9 K/UL (ref 0–1)
MONOCYTES NFR BLD: 12 % (ref 5–13)
MONOCYTES NFR BLD: 13 % (ref 5–13)
NEUTS SEG # BLD: 5.1 K/UL (ref 1.8–8)
NEUTS SEG # BLD: 5.9 K/UL (ref 1.8–8)
NEUTS SEG NFR BLD: 75 % (ref 32–75)
NEUTS SEG NFR BLD: 77 % (ref 32–75)
NRBC # BLD: 0 K/UL (ref 0–0.01)
NRBC # BLD: 0 K/UL (ref 0–0.01)
NRBC BLD-RTO: 0 PER 100 WBC
NRBC BLD-RTO: 0 PER 100 WBC
PLATELET # BLD AUTO: 170 K/UL (ref 150–400)
PLATELET # BLD AUTO: 198 K/UL (ref 150–400)
PMV BLD AUTO: 10.2 FL (ref 8.9–12.9)
PMV BLD AUTO: 10.4 FL (ref 8.9–12.9)
POTASSIUM SERPL-SCNC: 3.3 MMOL/L (ref 3.5–5.1)
RBC # BLD AUTO: 3.51 M/UL (ref 4.1–5.7)
RBC # BLD AUTO: 4.05 M/UL (ref 4.1–5.7)
SERVICE CMNT-IMP: ABNORMAL
SERVICE CMNT-IMP: NORMAL
SODIUM SERPL-SCNC: 138 MMOL/L (ref 136–145)
WBC # BLD AUTO: 6.8 K/UL (ref 4.1–11.1)
WBC # BLD AUTO: 7.7 K/UL (ref 4.1–11.1)

## 2023-11-17 PROCEDURE — 2580000003 HC RX 258: Performed by: STUDENT IN AN ORGANIZED HEALTH CARE EDUCATION/TRAINING PROGRAM

## 2023-11-17 PROCEDURE — 2720000010 HC SURG SUPPLY STERILE: Performed by: INTERNAL MEDICINE

## 2023-11-17 PROCEDURE — 1100000003 HC PRIVATE W/ TELEMETRY

## 2023-11-17 PROCEDURE — 2580000003 HC RX 258: Performed by: NURSE PRACTITIONER

## 2023-11-17 PROCEDURE — 7100000001 HC PACU RECOVERY - ADDTL 15 MIN: Performed by: INTERNAL MEDICINE

## 2023-11-17 PROCEDURE — 3600007503: Performed by: INTERNAL MEDICINE

## 2023-11-17 PROCEDURE — 36415 COLL VENOUS BLD VENIPUNCTURE: CPT

## 2023-11-17 PROCEDURE — C9113 INJ PANTOPRAZOLE SODIUM, VIA: HCPCS | Performed by: NURSE PRACTITIONER

## 2023-11-17 PROCEDURE — 6360000002 HC RX W HCPCS

## 2023-11-17 PROCEDURE — 0FC98ZZ EXTIRPATION OF MATTER FROM COMMON BILE DUCT, VIA NATURAL OR ARTIFICIAL OPENING ENDOSCOPIC: ICD-10-PCS | Performed by: INTERNAL MEDICINE

## 2023-11-17 PROCEDURE — 3600007513: Performed by: INTERNAL MEDICINE

## 2023-11-17 PROCEDURE — 6360000002 HC RX W HCPCS: Performed by: STUDENT IN AN ORGANIZED HEALTH CARE EDUCATION/TRAINING PROGRAM

## 2023-11-17 PROCEDURE — 3700000000 HC ANESTHESIA ATTENDED CARE: Performed by: INTERNAL MEDICINE

## 2023-11-17 PROCEDURE — 80048 BASIC METABOLIC PNL TOTAL CA: CPT

## 2023-11-17 PROCEDURE — 2709999900 HC NON-CHARGEABLE SUPPLY: Performed by: INTERNAL MEDICINE

## 2023-11-17 PROCEDURE — 2500000003 HC RX 250 WO HCPCS

## 2023-11-17 PROCEDURE — A4216 STERILE WATER/SALINE, 10 ML: HCPCS | Performed by: NURSE PRACTITIONER

## 2023-11-17 PROCEDURE — 82962 GLUCOSE BLOOD TEST: CPT

## 2023-11-17 PROCEDURE — 7100000000 HC PACU RECOVERY - FIRST 15 MIN: Performed by: INTERNAL MEDICINE

## 2023-11-17 PROCEDURE — 6360000004 HC RX CONTRAST MEDICATION: Performed by: INTERNAL MEDICINE

## 2023-11-17 PROCEDURE — 0F798DZ DILATION OF COMMON BILE DUCT WITH INTRALUMINAL DEVICE, VIA NATURAL OR ARTIFICIAL OPENING ENDOSCOPIC: ICD-10-PCS | Performed by: INTERNAL MEDICINE

## 2023-11-17 PROCEDURE — 6360000002 HC RX W HCPCS: Performed by: NURSE PRACTITIONER

## 2023-11-17 PROCEDURE — C2617 STENT, NON-COR, TEM W/O DEL: HCPCS | Performed by: INTERNAL MEDICINE

## 2023-11-17 PROCEDURE — 2580000003 HC RX 258

## 2023-11-17 PROCEDURE — 6370000000 HC RX 637 (ALT 250 FOR IP): Performed by: STUDENT IN AN ORGANIZED HEALTH CARE EDUCATION/TRAINING PROGRAM

## 2023-11-17 PROCEDURE — 85025 COMPLETE CBC W/AUTO DIFF WBC: CPT

## 2023-11-17 PROCEDURE — 3700000001 HC ADD 15 MINUTES (ANESTHESIA): Performed by: INTERNAL MEDICINE

## 2023-11-17 DEVICE — BILIARY STENT
Type: IMPLANTABLE DEVICE | Site: BILE DUCT | Status: FUNCTIONAL
Brand: ADVANIX™ BILIARY

## 2023-11-17 RX ORDER — POTASSIUM CHLORIDE 7.45 MG/ML
10 INJECTION INTRAVENOUS
Status: COMPLETED | OUTPATIENT
Start: 2023-11-17 | End: 2023-11-17

## 2023-11-17 RX ORDER — DEXTROSE MONOHYDRATE 25 G/50ML
12.5 INJECTION, SOLUTION INTRAVENOUS ONCE
Status: COMPLETED | OUTPATIENT
Start: 2023-11-17 | End: 2023-11-17

## 2023-11-17 RX ORDER — ONDANSETRON 2 MG/ML
INJECTION INTRAMUSCULAR; INTRAVENOUS PRN
Status: DISCONTINUED | OUTPATIENT
Start: 2023-11-17 | End: 2023-11-17 | Stop reason: SDUPTHER

## 2023-11-17 RX ORDER — LIDOCAINE HYDROCHLORIDE 20 MG/ML
INJECTION, SOLUTION EPIDURAL; INFILTRATION; INTRACAUDAL; PERINEURAL PRN
Status: DISCONTINUED | OUTPATIENT
Start: 2023-11-17 | End: 2023-11-17 | Stop reason: SDUPTHER

## 2023-11-17 RX ORDER — SENNA AND DOCUSATE SODIUM 50; 8.6 MG/1; MG/1
2 TABLET, FILM COATED ORAL DAILY PRN
Status: DISCONTINUED | OUTPATIENT
Start: 2023-11-17 | End: 2023-11-21 | Stop reason: HOSPADM

## 2023-11-17 RX ORDER — DEXTROSE, SODIUM CHLORIDE, SODIUM LACTATE, POTASSIUM CHLORIDE, AND CALCIUM CHLORIDE 5; .6; .31; .03; .02 G/100ML; G/100ML; G/100ML; G/100ML; G/100ML
INJECTION, SOLUTION INTRAVENOUS CONTINUOUS
Status: DISCONTINUED | OUTPATIENT
Start: 2023-11-17 | End: 2023-11-20

## 2023-11-17 RX ORDER — SUCCINYLCHOLINE CHLORIDE 20 MG/ML
INJECTION INTRAMUSCULAR; INTRAVENOUS PRN
Status: DISCONTINUED | OUTPATIENT
Start: 2023-11-17 | End: 2023-11-17 | Stop reason: SDUPTHER

## 2023-11-17 RX ORDER — FENTANYL CITRATE 50 UG/ML
INJECTION, SOLUTION INTRAMUSCULAR; INTRAVENOUS PRN
Status: DISCONTINUED | OUTPATIENT
Start: 2023-11-17 | End: 2023-11-17 | Stop reason: SDUPTHER

## 2023-11-17 RX ORDER — SODIUM CHLORIDE, SODIUM LACTATE, POTASSIUM CHLORIDE, CALCIUM CHLORIDE 600; 310; 30; 20 MG/100ML; MG/100ML; MG/100ML; MG/100ML
INJECTION, SOLUTION INTRAVENOUS CONTINUOUS PRN
Status: DISCONTINUED | OUTPATIENT
Start: 2023-11-17 | End: 2023-11-17 | Stop reason: SDUPTHER

## 2023-11-17 RX ORDER — DEXTROSE MONOHYDRATE 25 G/50ML
INJECTION, SOLUTION INTRAVENOUS
Status: COMPLETED
Start: 2023-11-17 | End: 2023-11-17

## 2023-11-17 RX ADMIN — PIPERACILLIN AND TAZOBACTAM 3375 MG: 3; .375 INJECTION, POWDER, LYOPHILIZED, FOR SOLUTION INTRAVENOUS at 05:58

## 2023-11-17 RX ADMIN — POTASSIUM CHLORIDE 10 MEQ: 7.46 INJECTION, SOLUTION INTRAVENOUS at 20:42

## 2023-11-17 RX ADMIN — SODIUM CHLORIDE, SODIUM LACTATE, POTASSIUM CHLORIDE, CALCIUM CHLORIDE AND DEXTROSE MONOHYDRATE: 5; 600; 310; 30; 20 INJECTION, SOLUTION INTRAVENOUS at 22:02

## 2023-11-17 RX ADMIN — FENTANYL CITRATE 50 MCG: 50 INJECTION, SOLUTION INTRAMUSCULAR; INTRAVENOUS at 08:17

## 2023-11-17 RX ADMIN — LIDOCAINE HYDROCHLORIDE 100 MG: 20 INJECTION, SOLUTION EPIDURAL; INFILTRATION; INTRACAUDAL; PERINEURAL at 07:50

## 2023-11-17 RX ADMIN — SODIUM CHLORIDE, POTASSIUM CHLORIDE, SODIUM LACTATE AND CALCIUM CHLORIDE: 600; 310; 30; 20 INJECTION, SOLUTION INTRAVENOUS at 02:01

## 2023-11-17 RX ADMIN — Medication 16 G: at 12:25

## 2023-11-17 RX ADMIN — POTASSIUM CHLORIDE 10 MEQ: 7.46 INJECTION, SOLUTION INTRAVENOUS at 22:09

## 2023-11-17 RX ADMIN — PIPERACILLIN AND TAZOBACTAM 3375 MG: 3; .375 INJECTION, POWDER, LYOPHILIZED, FOR SOLUTION INTRAVENOUS at 13:29

## 2023-11-17 RX ADMIN — OXYCODONE 5 MG: 5 TABLET ORAL at 05:56

## 2023-11-17 RX ADMIN — PROPOFOL 160 MG: 10 INJECTION, EMULSION INTRAVENOUS at 07:50

## 2023-11-17 RX ADMIN — PIPERACILLIN AND TAZOBACTAM 3375 MG: 3; .375 INJECTION, POWDER, LYOPHILIZED, FOR SOLUTION INTRAVENOUS at 20:43

## 2023-11-17 RX ADMIN — SODIUM CHLORIDE 40 MG: 9 INJECTION INTRAMUSCULAR; INTRAVENOUS; SUBCUTANEOUS at 22:07

## 2023-11-17 RX ADMIN — DEXTROSE MONOHYDRATE 12.5 G: 25 INJECTION, SOLUTION INTRAVENOUS at 09:10

## 2023-11-17 RX ADMIN — SODIUM CHLORIDE, POTASSIUM CHLORIDE, SODIUM LACTATE AND CALCIUM CHLORIDE: 600; 310; 30; 20 INJECTION, SOLUTION INTRAVENOUS at 07:44

## 2023-11-17 RX ADMIN — POTASSIUM CHLORIDE 10 MEQ: 7.46 INJECTION, SOLUTION INTRAVENOUS at 22:03

## 2023-11-17 RX ADMIN — SODIUM CHLORIDE, SODIUM LACTATE, POTASSIUM CHLORIDE, CALCIUM CHLORIDE AND DEXTROSE MONOHYDRATE: 5; 600; 310; 30; 20 INJECTION, SOLUTION INTRAVENOUS at 12:47

## 2023-11-17 RX ADMIN — ONDANSETRON HYDROCHLORIDE 4 MG: 2 INJECTION, SOLUTION INTRAMUSCULAR; INTRAVENOUS at 07:46

## 2023-11-17 RX ADMIN — SODIUM CHLORIDE, PRESERVATIVE FREE 10 ML: 5 INJECTION INTRAVENOUS at 21:13

## 2023-11-17 RX ADMIN — SUCCINYLCHOLINE CHLORIDE 160 MG: 20 INJECTION, SOLUTION INTRAMUSCULAR; INTRAVENOUS at 07:51

## 2023-11-17 ASSESSMENT — PAIN DESCRIPTION - DESCRIPTORS
DESCRIPTORS: ACHING
DESCRIPTORS: BURNING

## 2023-11-17 ASSESSMENT — PAIN SCALES - GENERAL: PAINLEVEL_OUTOF10: 6

## 2023-11-17 ASSESSMENT — PAIN - FUNCTIONAL ASSESSMENT: PAIN_FUNCTIONAL_ASSESSMENT: 0-10

## 2023-11-17 ASSESSMENT — PAIN DESCRIPTION - ORIENTATION: ORIENTATION: MID;UPPER

## 2023-11-17 ASSESSMENT — PAIN DESCRIPTION - LOCATION: LOCATION: ABDOMEN

## 2023-11-17 NOTE — H&P
Pre-endoscopy H and P    The patient was seen and examined in the room/pre-op holding area. The airway was assessed and documented. The problem list, past medical history, and medications were reviewed. Patient Active Problem List   Diagnosis    Combined forms of age-related cataract of right eye    Rectal bleed    Atrial flutter, paroxysmal (HCC)    Colonic mass    Colon polyp    Biliary obstruction     Social History     Socioeconomic History    Marital status:      Spouse name: Not on file    Number of children: Not on file    Years of education: Not on file    Highest education level: Not on file   Occupational History    Not on file   Tobacco Use    Smoking status: Former     Packs/day: 2     Types: Cigarettes, Pipe, Cigars     Quit date: 1965     Years since quittin.1     Passive exposure: Past    Smokeless tobacco: Never   Vaping Use    Vaping Use: Never used   Substance and Sexual Activity    Alcohol use: No    Drug use: Never    Sexual activity: Not on file   Other Topics Concern    Not on file   Social History Narrative    Not on file     Social Determinants of Health     Financial Resource Strain: Not on file   Food Insecurity: Not on file   Transportation Needs: Not on file   Physical Activity: Not on file   Stress: Not on file   Social Connections: Not on file   Intimate Partner Violence: Not on file   Housing Stability: Not on file     Past Medical History:   Diagnosis Date    Arthritis     At risk for sleep apnea 2019    Stop Bang 5     Atrial fibrillation ([address])     Atrial Flutter-Wittkamp    Cancer ([address]     prostate cancer    Colon cancer ([address])     Diabetes ([address])     DM2    Elevated cholesterol     History of blood transfusion     History of kidney stones     [place] (hard of hearing)     bilateral hearing aids    Hypertension     PUD (peptic ulcer disease) 1980    Skin cancer          Prior to Admission Medications   Prescriptions Last Dose Informant Patient Reported?
lower pole. No masses bilaterally. Right lower pole cyst; no dedicated follow-up recommended. STOMACH: Unremarkable. SMALL BOWEL: No dilatation or wall thickening. COLON: Partial sigmoidectomy. Right colectomy. No dilatation or wall thickening. APPENDIX: Absent. PERITONEUM: No ascites or pneumoperitoneum. RETROPERITONEUM: No lymphadenopathy. Atherosclerosis without aneurysm. REPRODUCTIVE ORGANS: Prostatectomy. URINARY BLADDER: No mass or calculus. BONES: No destructive bone lesion. Degenerative changes. Right total hip arthroplasty. ABDOMINAL WALL: No mass or hernia. ADDITIONAL COMMENTS: N/A     1. Cholelithiasis with findings compatible with acute cholecystitis. 2.  Choledocholithiasis with biliary obstruction and findings suggestive of cholangitis. 3.  Nonobstructing right nephrolithiasis. Chronic severe right hydronephrosis to the ureteropelvic junction. 4.  Trace right pleural effusion, could be reactive. 5.  Incidental findings as above. US ABDOMEN LIMITED Specify organ? GALLBLADDER    Result Date: 11/16/2023  EXAM: US ABDOMEN LIMITED INDICATION: pain COMPARISON: MRI abdomen 7/31/2023 TECHNIQUE: Limited abdominal ultrasound. FINDINGS: Liver: echogenicity is within normal limits. No focal liver lesion. Main portal vein flow: Toward the liver. Fluid: No ascites. Gallbladder: Gallstones. Tenderness to transducer palpation. Wall thickening up to 6 mm. No pericholecystic fluid. Negative sonographic Trivedi sign. Bile ducts: Mild intrahepatic biliary ductal dilatation. The common bile duct measures 5 mm. Pancreas: The visualized portions are within normal limits. Kidneys: Right length: 12.4 cm. Severe hydronephrosis. 10 mm nonobstructing upper pole calculus. Lower pole cyst.     1.  Gallbladder stones, wall thickening, and tenderness. Findings could reflect acute cholecystitis in the correct clinical setting. 2.  Severe right hydronephrosis. Nonobstructing right renal calculus. 3.  Unremarkable liver.      XR

## 2023-11-17 NOTE — ANESTHESIA POSTPROCEDURE EVALUATION
Department of Anesthesiology  Postprocedure Note    Patient: Jc Salgado  MRN: [id number]  YOB: 1940  Date of evaluation: 11/17/2023      Procedure Summary     Date: 11/17/23 Room / Location: [facility] 04 / [facility] ENDOSCOPY    Anesthesia Start: 7869 Anesthesia Stop: 2681    Procedure: ERCP ENDOSCOPIC RETROGRADE CHOLANGIOPANCREATOGRAPHY (Upper GI Region) Diagnosis:       Cholangitis      (Cholangitis [K83.09])    Surgeons: Jamin Purvis MD Responsible Provider: Raisa Rock MD    Anesthesia Type: General ASA Status: 3          Anesthesia Type: General    Shemar Phase I: Shemar Score: 9    Shemar Phase II:        Anesthesia Post Evaluation    Patient location during evaluation: PACU  Patient participation: complete - patient participated  Level of consciousness: awake and alert  Airway patency: patent  Nausea & Vomiting: no nausea  Complications: no  Cardiovascular status: hemodynamically stable  Respiratory status: acceptable  Hydration status: euvolemic

## 2023-11-18 LAB
ALBUMIN SERPL-MCNC: 2.3 G/DL (ref 3.5–5)
ALBUMIN/GLOB SERPL: 0.6 (ref 1.1–2.2)
ALP SERPL-CCNC: 296 U/L (ref 45–117)
ALT SERPL-CCNC: 91 U/L (ref 12–78)
ANION GAP SERPL CALC-SCNC: 4 MMOL/L (ref 5–15)
AST SERPL-CCNC: 80 U/L (ref 15–37)
BASOPHILS # BLD: 0 K/UL (ref 0–0.1)
BASOPHILS NFR BLD: 0 % (ref 0–1)
BILIRUB SERPL-MCNC: 6.1 MG/DL (ref 0.2–1)
BUN SERPL-MCNC: 13 MG/DL (ref 6–20)
BUN/CREAT SERPL: 13 (ref 12–20)
CALCIUM SERPL-MCNC: 8.3 MG/DL (ref 8.5–10.1)
CHLORIDE SERPL-SCNC: 108 MMOL/L (ref 97–108)
CO2 SERPL-SCNC: 27 MMOL/L (ref 21–32)
CREAT SERPL-MCNC: 0.98 MG/DL (ref 0.7–1.3)
DIFFERENTIAL METHOD BLD: ABNORMAL
EOSINOPHIL # BLD: 0.1 K/UL (ref 0–0.4)
EOSINOPHIL NFR BLD: 2 % (ref 0–7)
ERYTHROCYTE [DISTWIDTH] IN BLOOD BY AUTOMATED COUNT: 14.6 % (ref 11.5–14.5)
GLOBULIN SER CALC-MCNC: 3.6 G/DL (ref 2–4)
GLUCOSE BLD STRIP.AUTO-MCNC: 112 MG/DL (ref 65–117)
GLUCOSE BLD STRIP.AUTO-MCNC: 133 MG/DL (ref 65–117)
GLUCOSE BLD STRIP.AUTO-MCNC: 161 MG/DL (ref 65–117)
GLUCOSE BLD STRIP.AUTO-MCNC: 178 MG/DL (ref 65–117)
GLUCOSE SERPL-MCNC: 99 MG/DL (ref 65–100)
HCT VFR BLD AUTO: 31.7 % (ref 36.6–50.3)
HGB BLD-MCNC: 10.5 G/DL (ref 12.1–17)
IMM GRANULOCYTES # BLD AUTO: 0 K/UL (ref 0–0.04)
IMM GRANULOCYTES NFR BLD AUTO: 1 % (ref 0–0.5)
LIPASE SERPL-CCNC: 82 U/L (ref 13–75)
LYMPHOCYTES # BLD: 0.7 K/UL (ref 0.8–3.5)
LYMPHOCYTES NFR BLD: 17 % (ref 12–49)
MCH RBC QN AUTO: 29.7 PG (ref 26–34)
MCHC RBC AUTO-ENTMCNC: 33.1 G/DL (ref 30–36.5)
MCV RBC AUTO: 89.5 FL (ref 80–99)
MONOCYTES # BLD: 0.6 K/UL (ref 0–1)
MONOCYTES NFR BLD: 14 % (ref 5–13)
NEUTS SEG # BLD: 2.7 K/UL (ref 1.8–8)
NEUTS SEG NFR BLD: 67 % (ref 32–75)
NRBC # BLD: 0 K/UL (ref 0–0.01)
NRBC BLD-RTO: 0 PER 100 WBC
PLATELET # BLD AUTO: 166 K/UL (ref 150–400)
PMV BLD AUTO: 10.7 FL (ref 8.9–12.9)
POTASSIUM SERPL-SCNC: 3.3 MMOL/L (ref 3.5–5.1)
PROT SERPL-MCNC: 5.9 G/DL (ref 6.4–8.2)
RBC # BLD AUTO: 3.54 M/UL (ref 4.1–5.7)
SERVICE CMNT-IMP: ABNORMAL
SERVICE CMNT-IMP: NORMAL
SODIUM SERPL-SCNC: 139 MMOL/L (ref 136–145)
WBC # BLD AUTO: 4.1 K/UL (ref 4.1–11.1)

## 2023-11-18 PROCEDURE — 36415 COLL VENOUS BLD VENIPUNCTURE: CPT

## 2023-11-18 PROCEDURE — 97161 PT EVAL LOW COMPLEX 20 MIN: CPT | Performed by: PHYSICAL THERAPIST

## 2023-11-18 PROCEDURE — 6360000002 HC RX W HCPCS: Performed by: NURSE PRACTITIONER

## 2023-11-18 PROCEDURE — 97535 SELF CARE MNGMENT TRAINING: CPT

## 2023-11-18 PROCEDURE — 6370000000 HC RX 637 (ALT 250 FOR IP): Performed by: STUDENT IN AN ORGANIZED HEALTH CARE EDUCATION/TRAINING PROGRAM

## 2023-11-18 PROCEDURE — 83690 ASSAY OF LIPASE: CPT

## 2023-11-18 PROCEDURE — 97165 OT EVAL LOW COMPLEX 30 MIN: CPT

## 2023-11-18 PROCEDURE — 82962 GLUCOSE BLOOD TEST: CPT

## 2023-11-18 PROCEDURE — 2700000000 HC OXYGEN THERAPY PER DAY

## 2023-11-18 PROCEDURE — A4216 STERILE WATER/SALINE, 10 ML: HCPCS | Performed by: NURSE PRACTITIONER

## 2023-11-18 PROCEDURE — 1100000003 HC PRIVATE W/ TELEMETRY

## 2023-11-18 PROCEDURE — 6360000002 HC RX W HCPCS: Performed by: STUDENT IN AN ORGANIZED HEALTH CARE EDUCATION/TRAINING PROGRAM

## 2023-11-18 PROCEDURE — 85025 COMPLETE CBC W/AUTO DIFF WBC: CPT

## 2023-11-18 PROCEDURE — C9113 INJ PANTOPRAZOLE SODIUM, VIA: HCPCS | Performed by: NURSE PRACTITIONER

## 2023-11-18 PROCEDURE — A4216 STERILE WATER/SALINE, 10 ML: HCPCS | Performed by: STUDENT IN AN ORGANIZED HEALTH CARE EDUCATION/TRAINING PROGRAM

## 2023-11-18 PROCEDURE — 80053 COMPREHEN METABOLIC PANEL: CPT

## 2023-11-18 PROCEDURE — 2580000003 HC RX 258: Performed by: NURSE PRACTITIONER

## 2023-11-18 PROCEDURE — 2580000003 HC RX 258: Performed by: STUDENT IN AN ORGANIZED HEALTH CARE EDUCATION/TRAINING PROGRAM

## 2023-11-18 PROCEDURE — 97116 GAIT TRAINING THERAPY: CPT | Performed by: PHYSICAL THERAPIST

## 2023-11-18 PROCEDURE — 99231 SBSQ HOSP IP/OBS SF/LOW 25: CPT | Performed by: SURGERY

## 2023-11-18 RX ORDER — POTASSIUM CHLORIDE 7.45 MG/ML
10 INJECTION INTRAVENOUS
Status: COMPLETED | OUTPATIENT
Start: 2023-11-18 | End: 2023-11-18

## 2023-11-18 RX ORDER — HYDRALAZINE HYDROCHLORIDE 20 MG/ML
5 INJECTION INTRAMUSCULAR; INTRAVENOUS EVERY 6 HOURS PRN
Status: DISCONTINUED | OUTPATIENT
Start: 2023-11-18 | End: 2023-11-19 | Stop reason: HOSPADM

## 2023-11-18 RX ORDER — AMLODIPINE BESYLATE 5 MG/1
10 TABLET ORAL DAILY
Status: DISCONTINUED | OUTPATIENT
Start: 2023-11-19 | End: 2023-11-19

## 2023-11-18 RX ADMIN — PIPERACILLIN AND TAZOBACTAM 3375 MG: 3; .375 INJECTION, POWDER, LYOPHILIZED, FOR SOLUTION INTRAVENOUS at 05:45

## 2023-11-18 RX ADMIN — POTASSIUM CHLORIDE 10 MEQ: 10 INJECTION, SOLUTION INTRAVENOUS at 19:10

## 2023-11-18 RX ADMIN — SODIUM CHLORIDE, PRESERVATIVE FREE 10 ML: 5 INJECTION INTRAVENOUS at 21:35

## 2023-11-18 RX ADMIN — ACETAMINOPHEN 650 MG: 325 TABLET ORAL at 09:03

## 2023-11-18 RX ADMIN — MULTIPLE VITAMINS W/ MINERALS TAB 1 TABLET: TAB at 09:57

## 2023-11-18 RX ADMIN — AMIODARONE HYDROCHLORIDE 100 MG: 200 TABLET ORAL at 09:58

## 2023-11-18 RX ADMIN — SODIUM CHLORIDE, SODIUM LACTATE, POTASSIUM CHLORIDE, CALCIUM CHLORIDE AND DEXTROSE MONOHYDRATE: 5; 600; 310; 30; 20 INJECTION, SOLUTION INTRAVENOUS at 18:13

## 2023-11-18 RX ADMIN — SODIUM CHLORIDE, SODIUM LACTATE, POTASSIUM CHLORIDE, CALCIUM CHLORIDE AND DEXTROSE MONOHYDRATE: 5; 600; 310; 30; 20 INJECTION, SOLUTION INTRAVENOUS at 07:58

## 2023-11-18 RX ADMIN — SODIUM CHLORIDE, PRESERVATIVE FREE 10 ML: 5 INJECTION INTRAVENOUS at 10:02

## 2023-11-18 RX ADMIN — AMLODIPINE BESYLATE 5 MG: 5 TABLET ORAL at 09:57

## 2023-11-18 RX ADMIN — POTASSIUM CHLORIDE 10 MEQ: 10 INJECTION, SOLUTION INTRAVENOUS at 16:52

## 2023-11-18 RX ADMIN — POTASSIUM CHLORIDE 10 MEQ: 10 INJECTION, SOLUTION INTRAVENOUS at 18:05

## 2023-11-18 RX ADMIN — PIPERACILLIN AND TAZOBACTAM 3375 MG: 3; .375 INJECTION, POWDER, LYOPHILIZED, FOR SOLUTION INTRAVENOUS at 21:35

## 2023-11-18 RX ADMIN — LOSARTAN POTASSIUM 100 MG: 100 TABLET, FILM COATED ORAL at 09:58

## 2023-11-18 RX ADMIN — HYDRALAZINE HYDROCHLORIDE 5 MG: 20 INJECTION, SOLUTION INTRAMUSCULAR; INTRAVENOUS at 16:48

## 2023-11-18 RX ADMIN — SODIUM CHLORIDE 40 MG: 9 INJECTION INTRAMUSCULAR; INTRAVENOUS; SUBCUTANEOUS at 09:58

## 2023-11-18 RX ADMIN — PIPERACILLIN AND TAZOBACTAM 3375 MG: 3; .375 INJECTION, POWDER, LYOPHILIZED, FOR SOLUTION INTRAVENOUS at 13:34

## 2023-11-18 ASSESSMENT — PAIN SCALES - GENERAL: PAINLEVEL_OUTOF10: 2

## 2023-11-18 ASSESSMENT — PAIN DESCRIPTION - LOCATION: LOCATION: BACK

## 2023-11-19 ENCOUNTER — ANESTHESIA EVENT (OUTPATIENT)
Facility: HOSPITAL | Age: 83
End: 2023-11-19
Payer: MEDICARE

## 2023-11-19 ENCOUNTER — APPOINTMENT (OUTPATIENT)
Facility: HOSPITAL | Age: 83
DRG: 417 | End: 2023-11-19
Payer: MEDICARE

## 2023-11-19 ENCOUNTER — ANESTHESIA (OUTPATIENT)
Facility: HOSPITAL | Age: 83
End: 2023-11-19
Payer: MEDICARE

## 2023-11-19 LAB
ALBUMIN SERPL-MCNC: 2.5 G/DL (ref 3.5–5)
ALBUMIN/GLOB SERPL: 0.6 (ref 1.1–2.2)
ALP SERPL-CCNC: 315 U/L (ref 45–117)
ALT SERPL-CCNC: 83 U/L (ref 12–78)
ANION GAP SERPL CALC-SCNC: 5 MMOL/L (ref 5–15)
AST SERPL-CCNC: 64 U/L (ref 15–37)
BACTERIA SPEC CULT: ABNORMAL
BACTERIA SPEC CULT: ABNORMAL
BASOPHILS # BLD: 0 K/UL (ref 0–0.1)
BASOPHILS NFR BLD: 0 % (ref 0–1)
BILIRUB SERPL-MCNC: 4.7 MG/DL (ref 0.2–1)
BUN SERPL-MCNC: 10 MG/DL (ref 6–20)
BUN/CREAT SERPL: 11 (ref 12–20)
CALCIUM SERPL-MCNC: 8.4 MG/DL (ref 8.5–10.1)
CC UR VC: ABNORMAL
CHLORIDE SERPL-SCNC: 106 MMOL/L (ref 97–108)
CO2 SERPL-SCNC: 27 MMOL/L (ref 21–32)
CREAT SERPL-MCNC: 0.92 MG/DL (ref 0.7–1.3)
DIFFERENTIAL METHOD BLD: ABNORMAL
EOSINOPHIL # BLD: 0.1 K/UL (ref 0–0.4)
EOSINOPHIL NFR BLD: 2 % (ref 0–7)
ERYTHROCYTE [DISTWIDTH] IN BLOOD BY AUTOMATED COUNT: 14.5 % (ref 11.5–14.5)
GLOBULIN SER CALC-MCNC: 3.9 G/DL (ref 2–4)
GLUCOSE BLD STRIP.AUTO-MCNC: 124 MG/DL (ref 65–117)
GLUCOSE BLD STRIP.AUTO-MCNC: 148 MG/DL (ref 65–117)
GLUCOSE BLD STRIP.AUTO-MCNC: 159 MG/DL (ref 65–117)
GLUCOSE BLD STRIP.AUTO-MCNC: 217 MG/DL (ref 65–117)
GLUCOSE SERPL-MCNC: 138 MG/DL (ref 65–100)
HCT VFR BLD AUTO: 32.8 % (ref 36.6–50.3)
HGB BLD-MCNC: 11.1 G/DL (ref 12.1–17)
IMM GRANULOCYTES # BLD AUTO: 0 K/UL (ref 0–0.04)
IMM GRANULOCYTES NFR BLD AUTO: 0 % (ref 0–0.5)
LACTATE SERPL-SCNC: 1.6 MMOL/L (ref 0.4–2)
LIPASE SERPL-CCNC: 79 U/L (ref 13–75)
LYMPHOCYTES # BLD: 1 K/UL (ref 0.8–3.5)
LYMPHOCYTES NFR BLD: 20 % (ref 12–49)
MCH RBC QN AUTO: 29.8 PG (ref 26–34)
MCHC RBC AUTO-ENTMCNC: 33.8 G/DL (ref 30–36.5)
MCV RBC AUTO: 88.2 FL (ref 80–99)
MONOCYTES # BLD: 0.6 K/UL (ref 0–1)
MONOCYTES NFR BLD: 11 % (ref 5–13)
NEUTS SEG # BLD: 3.3 K/UL (ref 1.8–8)
NEUTS SEG NFR BLD: 67 % (ref 32–75)
NRBC # BLD: 0 K/UL (ref 0–0.01)
NRBC BLD-RTO: 0 PER 100 WBC
PLATELET # BLD AUTO: 190 K/UL (ref 150–400)
PMV BLD AUTO: 10.9 FL (ref 8.9–12.9)
POTASSIUM SERPL-SCNC: 3.6 MMOL/L (ref 3.5–5.1)
PROT SERPL-MCNC: 6.4 G/DL (ref 6.4–8.2)
RBC # BLD AUTO: 3.72 M/UL (ref 4.1–5.7)
SERVICE CMNT-IMP: ABNORMAL
SODIUM SERPL-SCNC: 138 MMOL/L (ref 136–145)
WBC # BLD AUTO: 5 K/UL (ref 4.1–11.1)

## 2023-11-19 PROCEDURE — 83605 ASSAY OF LACTIC ACID: CPT

## 2023-11-19 PROCEDURE — 82962 GLUCOSE BLOOD TEST: CPT

## 2023-11-19 PROCEDURE — BF141ZZ FLUOROSCOPY OF GALLBLADDER, BILE DUCTS AND PANCREATIC DUCTS USING LOW OSMOLAR CONTRAST: ICD-10-PCS | Performed by: SURGERY

## 2023-11-19 PROCEDURE — 6360000002 HC RX W HCPCS: Performed by: SURGERY

## 2023-11-19 PROCEDURE — 6360000002 HC RX W HCPCS: Performed by: STUDENT IN AN ORGANIZED HEALTH CARE EDUCATION/TRAINING PROGRAM

## 2023-11-19 PROCEDURE — 0FT44ZZ RESECTION OF GALLBLADDER, PERCUTANEOUS ENDOSCOPIC APPROACH: ICD-10-PCS | Performed by: SURGERY

## 2023-11-19 PROCEDURE — A4216 STERILE WATER/SALINE, 10 ML: HCPCS | Performed by: NURSE PRACTITIONER

## 2023-11-19 PROCEDURE — 6370000000 HC RX 637 (ALT 250 FOR IP): Performed by: NURSE PRACTITIONER

## 2023-11-19 PROCEDURE — 85025 COMPLETE CBC W/AUTO DIFF WBC: CPT

## 2023-11-19 PROCEDURE — 80053 COMPREHEN METABOLIC PANEL: CPT

## 2023-11-19 PROCEDURE — 1100000003 HC PRIVATE W/ TELEMETRY

## 2023-11-19 PROCEDURE — 2720000010 HC SURG SUPPLY STERILE: Performed by: SURGERY

## 2023-11-19 PROCEDURE — 3700000001 HC ADD 15 MINUTES (ANESTHESIA): Performed by: SURGERY

## 2023-11-19 PROCEDURE — 2580000003 HC RX 258: Performed by: NURSE ANESTHETIST, CERTIFIED REGISTERED

## 2023-11-19 PROCEDURE — 87070 CULTURE OTHR SPECIMN AEROBIC: CPT

## 2023-11-19 PROCEDURE — 2709999900 HC NON-CHARGEABLE SUPPLY: Performed by: SURGERY

## 2023-11-19 PROCEDURE — 3700000000 HC ANESTHESIA ATTENDED CARE: Performed by: SURGERY

## 2023-11-19 PROCEDURE — 2580000003 HC RX 258: Performed by: STUDENT IN AN ORGANIZED HEALTH CARE EDUCATION/TRAINING PROGRAM

## 2023-11-19 PROCEDURE — 6370000000 HC RX 637 (ALT 250 FOR IP): Performed by: STUDENT IN AN ORGANIZED HEALTH CARE EDUCATION/TRAINING PROGRAM

## 2023-11-19 PROCEDURE — 3600000004 HC SURGERY LEVEL 4 BASE: Performed by: SURGERY

## 2023-11-19 PROCEDURE — 7100000000 HC PACU RECOVERY - FIRST 15 MIN: Performed by: SURGERY

## 2023-11-19 PROCEDURE — 6360000002 HC RX W HCPCS: Performed by: NURSE PRACTITIONER

## 2023-11-19 PROCEDURE — 36415 COLL VENOUS BLD VENIPUNCTURE: CPT

## 2023-11-19 PROCEDURE — 51798 US URINE CAPACITY MEASURE: CPT

## 2023-11-19 PROCEDURE — 2700000000 HC OXYGEN THERAPY PER DAY

## 2023-11-19 PROCEDURE — 7100000001 HC PACU RECOVERY - ADDTL 15 MIN: Performed by: SURGERY

## 2023-11-19 PROCEDURE — C1713 ANCHOR/SCREW BN/BN,TIS/BN: HCPCS | Performed by: SURGERY

## 2023-11-19 PROCEDURE — 2500000003 HC RX 250 WO HCPCS: Performed by: STUDENT IN AN ORGANIZED HEALTH CARE EDUCATION/TRAINING PROGRAM

## 2023-11-19 PROCEDURE — 88304 TISSUE EXAM BY PATHOLOGIST: CPT

## 2023-11-19 PROCEDURE — 6370000000 HC RX 637 (ALT 250 FOR IP): Performed by: SURGERY

## 2023-11-19 PROCEDURE — C9113 INJ PANTOPRAZOLE SODIUM, VIA: HCPCS | Performed by: NURSE PRACTITIONER

## 2023-11-19 PROCEDURE — 6360000002 HC RX W HCPCS: Performed by: NURSE ANESTHETIST, CERTIFIED REGISTERED

## 2023-11-19 PROCEDURE — 47563 LAPARO CHOLECYSTECTOMY/GRAPH: CPT | Performed by: SURGERY

## 2023-11-19 PROCEDURE — 87075 CULTR BACTERIA EXCEPT BLOOD: CPT

## 2023-11-19 PROCEDURE — 2580000003 HC RX 258: Performed by: NURSE PRACTITIONER

## 2023-11-19 PROCEDURE — 2580000003 HC RX 258: Performed by: SURGERY

## 2023-11-19 PROCEDURE — 3600000014 HC SURGERY LEVEL 4 ADDTL 15MIN: Performed by: SURGERY

## 2023-11-19 PROCEDURE — 87205 SMEAR GRAM STAIN: CPT

## 2023-11-19 PROCEDURE — 6360000004 HC RX CONTRAST MEDICATION: Performed by: SURGERY

## 2023-11-19 PROCEDURE — 2500000003 HC RX 250 WO HCPCS: Performed by: NURSE ANESTHETIST, CERTIFIED REGISTERED

## 2023-11-19 PROCEDURE — 6360000002 HC RX W HCPCS

## 2023-11-19 PROCEDURE — 83690 ASSAY OF LIPASE: CPT

## 2023-11-19 RX ORDER — GLYCOPYRROLATE 0.2 MG/ML
INJECTION INTRAMUSCULAR; INTRAVENOUS PRN
Status: DISCONTINUED | OUTPATIENT
Start: 2023-11-19 | End: 2023-11-19 | Stop reason: SDUPTHER

## 2023-11-19 RX ORDER — FENTANYL CITRATE 50 UG/ML
INJECTION, SOLUTION INTRAMUSCULAR; INTRAVENOUS PRN
Status: DISCONTINUED | OUTPATIENT
Start: 2023-11-19 | End: 2023-11-19 | Stop reason: SDUPTHER

## 2023-11-19 RX ORDER — ONDANSETRON 2 MG/ML
INJECTION INTRAMUSCULAR; INTRAVENOUS PRN
Status: DISCONTINUED | OUTPATIENT
Start: 2023-11-19 | End: 2023-11-19 | Stop reason: SDUPTHER

## 2023-11-19 RX ORDER — ROCURONIUM BROMIDE 10 MG/ML
INJECTION, SOLUTION INTRAVENOUS PRN
Status: DISCONTINUED | OUTPATIENT
Start: 2023-11-19 | End: 2023-11-19 | Stop reason: SDUPTHER

## 2023-11-19 RX ORDER — ONDANSETRON 2 MG/ML
4 INJECTION INTRAMUSCULAR; INTRAVENOUS
Status: DISCONTINUED | OUTPATIENT
Start: 2023-11-19 | End: 2023-11-19 | Stop reason: HOSPADM

## 2023-11-19 RX ORDER — PROCHLORPERAZINE EDISYLATE 5 MG/ML
5 INJECTION INTRAMUSCULAR; INTRAVENOUS
Status: DISCONTINUED | OUTPATIENT
Start: 2023-11-19 | End: 2023-11-19 | Stop reason: HOSPADM

## 2023-11-19 RX ORDER — SODIUM CHLORIDE, SODIUM LACTATE, POTASSIUM CHLORIDE, CALCIUM CHLORIDE 600; 310; 30; 20 MG/100ML; MG/100ML; MG/100ML; MG/100ML
INJECTION, SOLUTION INTRAVENOUS CONTINUOUS PRN
Status: DISCONTINUED | OUTPATIENT
Start: 2023-11-19 | End: 2023-11-19 | Stop reason: SDUPTHER

## 2023-11-19 RX ORDER — AMLODIPINE BESYLATE 5 MG/1
5 TABLET ORAL DAILY
Status: DISCONTINUED | OUTPATIENT
Start: 2023-11-20 | End: 2023-11-21 | Stop reason: HOSPADM

## 2023-11-19 RX ORDER — PROPOFOL 10 MG/ML
INJECTION, EMULSION INTRAVENOUS PRN
Status: DISCONTINUED | OUTPATIENT
Start: 2023-11-19 | End: 2023-11-19 | Stop reason: SDUPTHER

## 2023-11-19 RX ORDER — FENTANYL CITRATE 50 UG/ML
INJECTION, SOLUTION INTRAMUSCULAR; INTRAVENOUS
Status: COMPLETED
Start: 2023-11-19 | End: 2023-11-19

## 2023-11-19 RX ORDER — HYDROMORPHONE HYDROCHLORIDE 2 MG/ML
INJECTION, SOLUTION INTRAMUSCULAR; INTRAVENOUS; SUBCUTANEOUS PRN
Status: DISCONTINUED | OUTPATIENT
Start: 2023-11-19 | End: 2023-11-19 | Stop reason: SDUPTHER

## 2023-11-19 RX ORDER — DEXMEDETOMIDINE HYDROCHLORIDE 100 UG/ML
INJECTION, SOLUTION INTRAVENOUS PRN
Status: DISCONTINUED | OUTPATIENT
Start: 2023-11-19 | End: 2023-11-19 | Stop reason: SDUPTHER

## 2023-11-19 RX ORDER — SODIUM CHLORIDE 0.9 % (FLUSH) 0.9 %
5-40 SYRINGE (ML) INJECTION PRN
Status: DISCONTINUED | OUTPATIENT
Start: 2023-11-19 | End: 2023-11-19 | Stop reason: HOSPADM

## 2023-11-19 RX ORDER — EPHEDRINE SULFATE/0.9% NACL/PF 50 MG/5 ML
SYRINGE (ML) INTRAVENOUS PRN
Status: DISCONTINUED | OUTPATIENT
Start: 2023-11-19 | End: 2023-11-19 | Stop reason: SDUPTHER

## 2023-11-19 RX ORDER — BUPIVACAINE HYDROCHLORIDE 5 MG/ML
INJECTION, SOLUTION EPIDURAL; INTRACAUDAL PRN
Status: DISCONTINUED | OUTPATIENT
Start: 2023-11-19 | End: 2023-11-19 | Stop reason: ALTCHOICE

## 2023-11-19 RX ORDER — DEXAMETHASONE SODIUM PHOSPHATE 4 MG/ML
INJECTION, SOLUTION INTRA-ARTICULAR; INTRALESIONAL; INTRAMUSCULAR; INTRAVENOUS; SOFT TISSUE PRN
Status: DISCONTINUED | OUTPATIENT
Start: 2023-11-19 | End: 2023-11-19 | Stop reason: SDUPTHER

## 2023-11-19 RX ORDER — LIDOCAINE HYDROCHLORIDE 20 MG/ML
INJECTION, SOLUTION EPIDURAL; INFILTRATION; INTRACAUDAL; PERINEURAL PRN
Status: DISCONTINUED | OUTPATIENT
Start: 2023-11-19 | End: 2023-11-19 | Stop reason: SDUPTHER

## 2023-11-19 RX ORDER — METOPROLOL TARTRATE 5 MG/5ML
INJECTION INTRAVENOUS PRN
Status: DISCONTINUED | OUTPATIENT
Start: 2023-11-19 | End: 2023-11-19 | Stop reason: SDUPTHER

## 2023-11-19 RX ORDER — PHENYLEPHRINE HCL IN 0.9% NACL 0.4MG/10ML
SYRINGE (ML) INTRAVENOUS PRN
Status: DISCONTINUED | OUTPATIENT
Start: 2023-11-19 | End: 2023-11-19 | Stop reason: SDUPTHER

## 2023-11-19 RX ORDER — SODIUM CHLORIDE 9 MG/ML
INJECTION, SOLUTION INTRAVENOUS PRN
Status: DISCONTINUED | OUTPATIENT
Start: 2023-11-19 | End: 2023-11-19 | Stop reason: HOSPADM

## 2023-11-19 RX ORDER — FENTANYL CITRATE 50 UG/ML
25 INJECTION, SOLUTION INTRAMUSCULAR; INTRAVENOUS EVERY 5 MIN PRN
Status: COMPLETED | OUTPATIENT
Start: 2023-11-19 | End: 2023-11-19

## 2023-11-19 RX ORDER — DEXTROSE MONOHYDRATE 100 MG/ML
INJECTION, SOLUTION INTRAVENOUS CONTINUOUS PRN
Status: DISCONTINUED | OUTPATIENT
Start: 2023-11-19 | End: 2023-11-19 | Stop reason: HOSPADM

## 2023-11-19 RX ORDER — SODIUM CHLORIDE 0.9 % (FLUSH) 0.9 %
5-40 SYRINGE (ML) INJECTION EVERY 12 HOURS SCHEDULED
Status: DISCONTINUED | OUTPATIENT
Start: 2023-11-19 | End: 2023-11-19 | Stop reason: HOSPADM

## 2023-11-19 RX ORDER — NEOSTIGMINE METHYLSULFATE 1 MG/ML
INJECTION, SOLUTION INTRAVENOUS PRN
Status: DISCONTINUED | OUTPATIENT
Start: 2023-11-19 | End: 2023-11-19 | Stop reason: SDUPTHER

## 2023-11-19 RX ORDER — HYDROMORPHONE HYDROCHLORIDE 1 MG/ML
0.5 INJECTION, SOLUTION INTRAMUSCULAR; INTRAVENOUS; SUBCUTANEOUS EVERY 5 MIN PRN
Status: COMPLETED | OUTPATIENT
Start: 2023-11-19 | End: 2023-11-19

## 2023-11-19 RX ADMIN — ROCURONIUM BROMIDE 10 MG: 10 INJECTION INTRAVENOUS at 15:02

## 2023-11-19 RX ADMIN — SODIUM CHLORIDE, SODIUM LACTATE, POTASSIUM CHLORIDE, CALCIUM CHLORIDE AND DEXTROSE MONOHYDRATE: 5; 600; 310; 30; 20 INJECTION, SOLUTION INTRAVENOUS at 04:28

## 2023-11-19 RX ADMIN — Medication 40 MCG: at 13:04

## 2023-11-19 RX ADMIN — ROCURONIUM BROMIDE 40 MG: 10 INJECTION INTRAVENOUS at 13:05

## 2023-11-19 RX ADMIN — GLYCOPYRROLATE 0.1 MG: 0.2 INJECTION INTRAMUSCULAR; INTRAVENOUS at 13:17

## 2023-11-19 RX ADMIN — ACETAMINOPHEN 650 MG: 325 TABLET ORAL at 04:25

## 2023-11-19 RX ADMIN — HYDROMORPHONE HYDROCHLORIDE 0.5 MG: 1 INJECTION, SOLUTION INTRAMUSCULAR; INTRAVENOUS; SUBCUTANEOUS at 17:05

## 2023-11-19 RX ADMIN — DEXMEDETOMIDINE HYDROCHLORIDE 2 MCG: 100 INJECTION, SOLUTION, CONCENTRATE INTRAVENOUS at 15:40

## 2023-11-19 RX ADMIN — FENTANYL CITRATE 50 MCG: 50 INJECTION, SOLUTION INTRAMUSCULAR; INTRAVENOUS at 13:04

## 2023-11-19 RX ADMIN — AMLODIPINE BESYLATE 10 MG: 5 TABLET ORAL at 08:31

## 2023-11-19 RX ADMIN — DEXMEDETOMIDINE HYDROCHLORIDE 2 MCG: 100 INJECTION, SOLUTION, CONCENTRATE INTRAVENOUS at 15:37

## 2023-11-19 RX ADMIN — HYDROMORPHONE HYDROCHLORIDE 0.5 MG: 1 INJECTION, SOLUTION INTRAMUSCULAR; INTRAVENOUS; SUBCUTANEOUS at 17:00

## 2023-11-19 RX ADMIN — Medication 5 MG: at 14:16

## 2023-11-19 RX ADMIN — PROPOFOL 100 MG: 10 INJECTION, EMULSION INTRAVENOUS at 13:04

## 2023-11-19 RX ADMIN — PROPOFOL 100 MCG/KG/MIN: 10 INJECTION, EMULSION INTRAVENOUS at 13:07

## 2023-11-19 RX ADMIN — Medication 5 MG: at 13:42

## 2023-11-19 RX ADMIN — FENTANYL CITRATE 25 MCG: 50 INJECTION INTRAMUSCULAR; INTRAVENOUS at 16:45

## 2023-11-19 RX ADMIN — GLYCOPYRROLATE 0.2 MG: 0.2 INJECTION INTRAMUSCULAR; INTRAVENOUS at 13:27

## 2023-11-19 RX ADMIN — FENTANYL CITRATE 25 MCG: 50 INJECTION INTRAMUSCULAR; INTRAVENOUS at 16:52

## 2023-11-19 RX ADMIN — GLYCOPYRROLATE 0.6 MG: 0.2 INJECTION INTRAMUSCULAR; INTRAVENOUS at 15:51

## 2023-11-19 RX ADMIN — ONDANSETRON 4 MG: 2 INJECTION INTRAMUSCULAR; INTRAVENOUS at 12:50

## 2023-11-19 RX ADMIN — Medication 5 MG: at 13:40

## 2023-11-19 RX ADMIN — ROCURONIUM BROMIDE 10 MG: 10 INJECTION INTRAVENOUS at 14:09

## 2023-11-19 RX ADMIN — ROCURONIUM BROMIDE 10 MG: 10 INJECTION INTRAVENOUS at 13:30

## 2023-11-19 RX ADMIN — PHENYLEPHRINE HYDROCHLORIDE 20 MCG/MIN: 10 INJECTION INTRAVENOUS at 14:33

## 2023-11-19 RX ADMIN — FENTANYL CITRATE 25 MCG: 50 INJECTION INTRAMUSCULAR; INTRAVENOUS at 16:31

## 2023-11-19 RX ADMIN — SODIUM CHLORIDE, POTASSIUM CHLORIDE, SODIUM LACTATE AND CALCIUM CHLORIDE: 600; 310; 30; 20 INJECTION, SOLUTION INTRAVENOUS at 12:55

## 2023-11-19 RX ADMIN — LOSARTAN POTASSIUM 100 MG: 100 TABLET, FILM COATED ORAL at 08:31

## 2023-11-19 RX ADMIN — METOPROLOL TARTRATE 1 MG: 5 INJECTION INTRAVENOUS at 16:01

## 2023-11-19 RX ADMIN — SODIUM CHLORIDE, POTASSIUM CHLORIDE, SODIUM LACTATE AND CALCIUM CHLORIDE: 600; 310; 30; 20 INJECTION, SOLUTION INTRAVENOUS at 15:16

## 2023-11-19 RX ADMIN — LIDOCAINE HYDROCHLORIDE 80 MG: 20 INJECTION, SOLUTION EPIDURAL; INFILTRATION; INTRACAUDAL; PERINEURAL at 13:04

## 2023-11-19 RX ADMIN — Medication 5 MG: at 14:27

## 2023-11-19 RX ADMIN — FENTANYL CITRATE 25 MCG: 50 INJECTION, SOLUTION INTRAMUSCULAR; INTRAVENOUS at 13:34

## 2023-11-19 RX ADMIN — SODIUM CHLORIDE, PRESERVATIVE FREE 10 ML: 5 INJECTION INTRAVENOUS at 08:36

## 2023-11-19 RX ADMIN — HYDROMORPHONE HYDROCHLORIDE 0.2 MG: 2 INJECTION INTRAMUSCULAR; INTRAVENOUS; SUBCUTANEOUS at 15:26

## 2023-11-19 RX ADMIN — GLYCOPYRROLATE 0.1 MG: 0.2 INJECTION INTRAMUSCULAR; INTRAVENOUS at 13:10

## 2023-11-19 RX ADMIN — ROCURONIUM BROMIDE 10 MG: 10 INJECTION INTRAVENOUS at 14:01

## 2023-11-19 RX ADMIN — HYDROMORPHONE HYDROCHLORIDE 0.6 MG: 2 INJECTION INTRAMUSCULAR; INTRAVENOUS; SUBCUTANEOUS at 16:19

## 2023-11-19 RX ADMIN — PROPOFOL 30 MG: 10 INJECTION, EMULSION INTRAVENOUS at 13:06

## 2023-11-19 RX ADMIN — Medication 3 MG: at 15:51

## 2023-11-19 RX ADMIN — FENTANYL CITRATE 25 MCG: 50 INJECTION, SOLUTION INTRAMUSCULAR; INTRAVENOUS at 15:03

## 2023-11-19 RX ADMIN — SODIUM CHLORIDE, PRESERVATIVE FREE 10 ML: 5 INJECTION INTRAVENOUS at 21:46

## 2023-11-19 RX ADMIN — MULTIPLE VITAMINS W/ MINERALS TAB 1 TABLET: TAB at 08:31

## 2023-11-19 RX ADMIN — SODIUM CHLORIDE 40 MG: 9 INJECTION INTRAMUSCULAR; INTRAVENOUS; SUBCUTANEOUS at 08:31

## 2023-11-19 RX ADMIN — DEXAMETHASONE SODIUM PHOSPHATE 4 MG: 4 INJECTION INTRA-ARTICULAR; INTRALESIONAL; INTRAMUSCULAR; INTRAVENOUS; SOFT TISSUE at 13:15

## 2023-11-19 RX ADMIN — FENTANYL CITRATE 25 MCG: 50 INJECTION INTRAMUSCULAR; INTRAVENOUS at 16:40

## 2023-11-19 RX ADMIN — PIPERACILLIN AND TAZOBACTAM 3375 MG: 3; .375 INJECTION, POWDER, LYOPHILIZED, FOR SOLUTION INTRAVENOUS at 13:15

## 2023-11-19 RX ADMIN — PIPERACILLIN AND TAZOBACTAM 3375 MG: 3; .375 INJECTION, POWDER, LYOPHILIZED, FOR SOLUTION INTRAVENOUS at 21:46

## 2023-11-19 RX ADMIN — MORPHINE SULFATE 2 MG: 2 INJECTION, SOLUTION INTRAMUSCULAR; INTRAVENOUS at 21:46

## 2023-11-19 RX ADMIN — PIPERACILLIN AND TAZOBACTAM 3375 MG: 3; .375 INJECTION, POWDER, LYOPHILIZED, FOR SOLUTION INTRAVENOUS at 05:38

## 2023-11-19 RX ADMIN — OXYCODONE 5 MG: 5 TABLET ORAL at 18:46

## 2023-11-19 RX ADMIN — AMIODARONE HYDROCHLORIDE 100 MG: 200 TABLET ORAL at 08:32

## 2023-11-19 RX ADMIN — DEXMEDETOMIDINE HYDROCHLORIDE 2 MCG: 100 INJECTION, SOLUTION, CONCENTRATE INTRAVENOUS at 15:28

## 2023-11-19 RX ADMIN — HYDROMORPHONE HYDROCHLORIDE 0.2 MG: 2 INJECTION INTRAMUSCULAR; INTRAVENOUS; SUBCUTANEOUS at 15:58

## 2023-11-19 ASSESSMENT — PAIN DESCRIPTION - DESCRIPTORS
DESCRIPTORS: ACHING

## 2023-11-19 ASSESSMENT — PAIN DESCRIPTION - LOCATION
LOCATION: ABDOMEN

## 2023-11-19 ASSESSMENT — PAIN DESCRIPTION - ORIENTATION
ORIENTATION: MID;LOWER;UPPER
ORIENTATION: MID;LOWER;UPPER
ORIENTATION: LOWER;MID;UPPER
ORIENTATION: MID;LOWER;UPPER
ORIENTATION: LOWER;MID;UPPER
ORIENTATION: MID;LOWER;UPPER
ORIENTATION: LOWER;MID;UPPER
ORIENTATION: MID;LOWER;UPPER

## 2023-11-19 ASSESSMENT — PAIN SCALES - GENERAL
PAINLEVEL_OUTOF10: 7
PAINLEVEL_OUTOF10: 10
PAINLEVEL_OUTOF10: 5
PAINLEVEL_OUTOF10: 7
PAINLEVEL_OUTOF10: 6

## 2023-11-19 ASSESSMENT — PAIN DESCRIPTION - PAIN TYPE
TYPE: SURGICAL PAIN

## 2023-11-19 NOTE — OP NOTE
NAME:  Rahel Danielson   :   1940   MRN:   [id number]     Date/Time:  2023 8:38 AM    Procedure Type:   ERCPwith biliary sphincterotomy, biliary stone removal, biliary stent placement     Indications:   jaundice, abnormal CT/MRCP, common bile duct stone, biliary obstruction, acute pancreatitis    Pre-operative Diagnosis: See indication above    Post-operative Diagnosis:  See findings below    : Karie Shetty MD    Referring Provider: --Roc Patel MD    Sedation:  General Anesthesia    Procedure Details:      After informed consent was obtained with all risks and benefits of procedure explained, the patient was brought to the endo suite and given general anesthesia in a supine position on the ERCP table. The patient was then carefully moved to a modified prone position on a bean bag. All bony prominences were protected. The head and neck were appropriately aligned. Both legs were padded and appropriately positioned. SCDs were placed on both legs. The patient was secured to the table. Upon giving sedation as per above, the Olympus duodenoscope  was inserted via the mouthpeice and carefully advanced to the second portion of the duodenum. The quality of visualization was adequate. The duodenoscope was withdrawn into a short position. Findings:       Endoscopic: Esophagus: Normal(as seen with a side-viewing scope), Stomach: normal mucosa, Duodenum: normal folds    Ampulla: impacted stone, inflamed bulging ampulla, oozing noted    Cholangiogram: -bile duct is dilated to 11 mm. CBD has irregular borders. Multiple filling defects are noted    Pancreatogram: not performed    Procedure in detail:    Using the [employer] preloaded with a 0.025 inch Dreamwire, the common bile duct was successfully cannulated. Copious pus with debris was seen coming coming out initially. Proper positioning was confirmed with bile aspiration.    Contrast was injected and  it revealed
dissect out the space between the infundibulum and the gallbladder bed, and then blunt dissection was used to dissect out the cystic duct and  cystic artery. A critical view was obtained where these were the only 2 structures entering the gallbladder. Once this critical view was obtained, a clip was placed at the base of the infundibulum. A choledochotomy was made in the very distal cystic duct. Then a 14-gauge angiocatheter was used to introduce an Arrow cholangiogram catheter into the abdominal cavity. The catheter was irrigated with saline and then inserted into the choledochotomy. Once within the cystic duct, the balloon was inflated and the cystic duct irrigated with normal saline. There was no evidence of any leakage out through the choledochotomy, indicating good seal with the balloon. Of note, the saline irrigated easily without much resistance. All instruments were removed from the patient's abdomen. She was placed in a supine position and a C-arm was brought into the field. A  film was shot to obtain proper orientation and alignment of the C-arm, and then, under real-time fluoroscopy, contrast was injected slowly by hand. Contrast was seen flowing through the cystic duct and into a common bile duct. There was no filling defect seen in the common bile duct and the duct was not dilated. The stent was visible. Good  flow into the duodenum was visualized. The C-arm was removed, the cholangiogram catheter removed. A n Endoloop was used to seal the cystic duct. completed with laparoscopic scissors. The artery was divided with the harmonic. The infundibulum of the gallbladder was then retracted towards the anterior abdominal wall and the gallbladder removed from the gallbladder fossa with electrocautery. Due to the chronic inflammation the gallbladder was removed in a piece meal fashion. The gallbladder was then placed over the right lobe of the liver.   An Endocatch bag was then inserted

## 2023-11-20 LAB
ALBUMIN SERPL-MCNC: 2.6 G/DL (ref 3.5–5)
ALBUMIN/GLOB SERPL: 0.6 (ref 1.1–2.2)
ALP SERPL-CCNC: 293 U/L (ref 45–117)
ALT SERPL-CCNC: 81 U/L (ref 12–78)
ANION GAP SERPL CALC-SCNC: 6 MMOL/L (ref 5–15)
AST SERPL-CCNC: 62 U/L (ref 15–37)
BASOPHILS # BLD: 0 K/UL (ref 0–0.1)
BASOPHILS NFR BLD: 0 % (ref 0–1)
BILIRUB SERPL-MCNC: 3.4 MG/DL (ref 0.2–1)
BUN SERPL-MCNC: 10 MG/DL (ref 6–20)
BUN/CREAT SERPL: 10 (ref 12–20)
CALCIUM SERPL-MCNC: 8.9 MG/DL (ref 8.5–10.1)
CHLORIDE SERPL-SCNC: 104 MMOL/L (ref 97–108)
CO2 SERPL-SCNC: 27 MMOL/L (ref 21–32)
CREAT SERPL-MCNC: 1.02 MG/DL (ref 0.7–1.3)
DIFFERENTIAL METHOD BLD: ABNORMAL
EOSINOPHIL # BLD: 0 K/UL (ref 0–0.4)
EOSINOPHIL NFR BLD: 0 % (ref 0–7)
ERYTHROCYTE [DISTWIDTH] IN BLOOD BY AUTOMATED COUNT: 14.7 % (ref 11.5–14.5)
GLOBULIN SER CALC-MCNC: 4.4 G/DL (ref 2–4)
GLUCOSE BLD STRIP.AUTO-MCNC: 167 MG/DL (ref 65–117)
GLUCOSE BLD STRIP.AUTO-MCNC: 191 MG/DL (ref 65–117)
GLUCOSE BLD STRIP.AUTO-MCNC: 199 MG/DL (ref 65–117)
GLUCOSE BLD STRIP.AUTO-MCNC: 221 MG/DL (ref 65–117)
GLUCOSE SERPL-MCNC: 218 MG/DL (ref 65–100)
HCT VFR BLD AUTO: 36.2 % (ref 36.6–50.3)
HGB BLD-MCNC: 11.9 G/DL (ref 12.1–17)
IMM GRANULOCYTES # BLD AUTO: 0.1 K/UL (ref 0–0.04)
IMM GRANULOCYTES NFR BLD AUTO: 1 % (ref 0–0.5)
LIPASE SERPL-CCNC: 36 U/L (ref 13–75)
LYMPHOCYTES # BLD: 0.5 K/UL (ref 0.8–3.5)
LYMPHOCYTES NFR BLD: 4 % (ref 12–49)
MAGNESIUM SERPL-MCNC: 2 MG/DL (ref 1.6–2.4)
MCH RBC QN AUTO: 29.1 PG (ref 26–34)
MCHC RBC AUTO-ENTMCNC: 32.9 G/DL (ref 30–36.5)
MCV RBC AUTO: 88.5 FL (ref 80–99)
MONOCYTES # BLD: 0.6 K/UL (ref 0–1)
MONOCYTES NFR BLD: 6 % (ref 5–13)
NEUTS SEG # BLD: 9 K/UL (ref 1.8–8)
NEUTS SEG NFR BLD: 89 % (ref 32–75)
NRBC # BLD: 0 K/UL (ref 0–0.01)
NRBC BLD-RTO: 0 PER 100 WBC
PLATELET # BLD AUTO: 268 K/UL (ref 150–400)
PMV BLD AUTO: 10.9 FL (ref 8.9–12.9)
POTASSIUM SERPL-SCNC: 3.4 MMOL/L (ref 3.5–5.1)
PROT SERPL-MCNC: 7 G/DL (ref 6.4–8.2)
RBC # BLD AUTO: 4.09 M/UL (ref 4.1–5.7)
SERVICE CMNT-IMP: ABNORMAL
SODIUM SERPL-SCNC: 137 MMOL/L (ref 136–145)
WBC # BLD AUTO: 10.1 K/UL (ref 4.1–11.1)

## 2023-11-20 PROCEDURE — 6360000002 HC RX W HCPCS: Performed by: SURGERY

## 2023-11-20 PROCEDURE — 82962 GLUCOSE BLOOD TEST: CPT

## 2023-11-20 PROCEDURE — 2580000003 HC RX 258: Performed by: SURGERY

## 2023-11-20 PROCEDURE — 36415 COLL VENOUS BLD VENIPUNCTURE: CPT

## 2023-11-20 PROCEDURE — A4216 STERILE WATER/SALINE, 10 ML: HCPCS | Performed by: SURGERY

## 2023-11-20 PROCEDURE — 83690 ASSAY OF LIPASE: CPT

## 2023-11-20 PROCEDURE — 6370000000 HC RX 637 (ALT 250 FOR IP): Performed by: SURGERY

## 2023-11-20 PROCEDURE — 83735 ASSAY OF MAGNESIUM: CPT

## 2023-11-20 PROCEDURE — 6370000000 HC RX 637 (ALT 250 FOR IP): Performed by: NURSE PRACTITIONER

## 2023-11-20 PROCEDURE — 2700000000 HC OXYGEN THERAPY PER DAY

## 2023-11-20 PROCEDURE — 87040 BLOOD CULTURE FOR BACTERIA: CPT

## 2023-11-20 PROCEDURE — 80053 COMPREHEN METABOLIC PANEL: CPT

## 2023-11-20 PROCEDURE — 6370000000 HC RX 637 (ALT 250 FOR IP): Performed by: STUDENT IN AN ORGANIZED HEALTH CARE EDUCATION/TRAINING PROGRAM

## 2023-11-20 PROCEDURE — C9113 INJ PANTOPRAZOLE SODIUM, VIA: HCPCS | Performed by: SURGERY

## 2023-11-20 PROCEDURE — 1100000003 HC PRIVATE W/ TELEMETRY

## 2023-11-20 PROCEDURE — 85025 COMPLETE CBC W/AUTO DIFF WBC: CPT

## 2023-11-20 PROCEDURE — 6370000000 HC RX 637 (ALT 250 FOR IP): Performed by: INTERNAL MEDICINE

## 2023-11-20 RX ORDER — POTASSIUM CHLORIDE 750 MG/1
20 TABLET, FILM COATED, EXTENDED RELEASE ORAL ONCE
Status: COMPLETED | OUTPATIENT
Start: 2023-11-20 | End: 2023-11-20

## 2023-11-20 RX ORDER — HYDRALAZINE HYDROCHLORIDE 20 MG/ML
10 INJECTION INTRAMUSCULAR; INTRAVENOUS EVERY 6 HOURS PRN
Status: DISCONTINUED | OUTPATIENT
Start: 2023-11-20 | End: 2023-11-21 | Stop reason: HOSPADM

## 2023-11-20 RX ADMIN — METOPROLOL SUCCINATE 12.5 MG: 25 TABLET, EXTENDED RELEASE ORAL at 09:07

## 2023-11-20 RX ADMIN — AMIODARONE HYDROCHLORIDE 100 MG: 200 TABLET ORAL at 09:07

## 2023-11-20 RX ADMIN — SODIUM CHLORIDE, PRESERVATIVE FREE 10 ML: 5 INJECTION INTRAVENOUS at 09:09

## 2023-11-20 RX ADMIN — AMLODIPINE BESYLATE 5 MG: 5 TABLET ORAL at 09:07

## 2023-11-20 RX ADMIN — OXYCODONE 5 MG: 5 TABLET ORAL at 11:54

## 2023-11-20 RX ADMIN — SODIUM CHLORIDE 40 MG: 9 INJECTION INTRAMUSCULAR; INTRAVENOUS; SUBCUTANEOUS at 09:08

## 2023-11-20 RX ADMIN — PIPERACILLIN AND TAZOBACTAM 3375 MG: 3; .375 INJECTION, POWDER, LYOPHILIZED, FOR SOLUTION INTRAVENOUS at 21:53

## 2023-11-20 RX ADMIN — LOSARTAN POTASSIUM 100 MG: 100 TABLET, FILM COATED ORAL at 09:07

## 2023-11-20 RX ADMIN — SODIUM CHLORIDE, SODIUM LACTATE, POTASSIUM CHLORIDE, CALCIUM CHLORIDE AND DEXTROSE MONOHYDRATE: 5; 600; 310; 30; 20 INJECTION, SOLUTION INTRAVENOUS at 03:59

## 2023-11-20 RX ADMIN — INSULIN LISPRO 2 UNITS: 100 INJECTION, SOLUTION INTRAVENOUS; SUBCUTANEOUS at 09:47

## 2023-11-20 RX ADMIN — MORPHINE SULFATE 2 MG: 2 INJECTION, SOLUTION INTRAMUSCULAR; INTRAVENOUS at 02:21

## 2023-11-20 RX ADMIN — SODIUM CHLORIDE, PRESERVATIVE FREE 10 ML: 5 INJECTION INTRAVENOUS at 22:08

## 2023-11-20 RX ADMIN — POTASSIUM CHLORIDE 20 MEQ: 750 TABLET, FILM COATED, EXTENDED RELEASE ORAL at 09:47

## 2023-11-20 RX ADMIN — PIPERACILLIN AND TAZOBACTAM 3375 MG: 3; .375 INJECTION, POWDER, LYOPHILIZED, FOR SOLUTION INTRAVENOUS at 13:19

## 2023-11-20 RX ADMIN — MULTIPLE VITAMINS W/ MINERALS TAB 1 TABLET: TAB at 09:06

## 2023-11-20 RX ADMIN — PIPERACILLIN AND TAZOBACTAM 3375 MG: 3; .375 INJECTION, POWDER, LYOPHILIZED, FOR SOLUTION INTRAVENOUS at 06:10

## 2023-11-20 ASSESSMENT — PAIN SCALES - GENERAL
PAINLEVEL_OUTOF10: 7
PAINLEVEL_OUTOF10: 2
PAINLEVEL_OUTOF10: 2
PAINLEVEL_OUTOF10: 0
PAINLEVEL_OUTOF10: 5

## 2023-11-20 ASSESSMENT — PAIN DESCRIPTION - LOCATION
LOCATION: ABDOMEN
LOCATION: INCISION;ABDOMEN

## 2023-11-20 ASSESSMENT — PAIN DESCRIPTION - DESCRIPTORS
DESCRIPTORS: BURNING;ACHING
DESCRIPTORS: ACHING

## 2023-11-20 ASSESSMENT — PAIN DESCRIPTION - ORIENTATION
ORIENTATION: ANTERIOR
ORIENTATION: MID

## 2023-11-20 ASSESSMENT — PAIN - FUNCTIONAL ASSESSMENT: PAIN_FUNCTIONAL_ASSESSMENT: ACTIVITIES ARE NOT PREVENTED

## 2023-11-20 ASSESSMENT — PAIN DESCRIPTION - PAIN TYPE: TYPE: SURGICAL PAIN

## 2023-11-20 NOTE — PLAN OF CARE
Problem: Pain  Goal: Verbalizes/displays adequate comfort level or baseline comfort level  Outcome: Progressing  Flowsheets (Taken 11/19/2023 1945)  Verbalizes/displays adequate comfort level or baseline comfort level:   Encourage patient to monitor pain and request assistance   Assess pain using appropriate pain scale   Administer analgesics based on type and severity of pain and evaluate response   Implement non-pharmacological measures as appropriate and evaluate response     Problem: Safety - Adult  Goal: Free from fall injury  Outcome: Progressing  Flowsheets (Taken 11/19/2023 1945)  Free From Fall Injury: Instruct family/caregiver on patient safety     Problem: Chronic Conditions and Co-morbidities  Goal: Patient's chronic conditions and co-morbidity symptoms are monitored and maintained or improved  Outcome: Progressing  Flowsheets (Taken 11/19/2023 1945)  Care Plan - Patient's Chronic Conditions and Co-Morbidity Symptoms are Monitored and Maintained or Improved:   Monitor and assess patient's chronic conditions and comorbid symptoms for stability, deterioration, or improvement   Collaborate with multidisciplinary team to address chronic and comorbid conditions and prevent exacerbation or deterioration   Update acute care plan with appropriate goals if chronic or comorbid symptoms are exacerbated and prevent overall improvement and discharge     Problem: Skin/Tissue Integrity  Goal: Absence of new skin breakdown  Description: 1. Monitor for areas of redness and/or skin breakdown  2. Assess vascular access sites hourly  3. Every 4-6 hours minimum:  Change oxygen saturation probe site  4. Every 4-6 hours:  If on nasal continuous positive airway pressure, respiratory therapy assess nares and determine need for appliance change or resting period.   Outcome: Progressing

## 2023-11-21 VITALS
DIASTOLIC BLOOD PRESSURE: 67 MMHG | TEMPERATURE: 98.8 F | OXYGEN SATURATION: 96 % | SYSTOLIC BLOOD PRESSURE: 175 MMHG | RESPIRATION RATE: 16 BRPM | HEIGHT: 67 IN | BODY MASS INDEX: 33.84 KG/M2 | WEIGHT: 215.61 LBS | HEART RATE: 61 BPM

## 2023-11-21 LAB
ALBUMIN SERPL-MCNC: 2.5 G/DL (ref 3.5–5)
ALBUMIN/GLOB SERPL: 0.6 (ref 1.1–2.2)
ALP SERPL-CCNC: 250 U/L (ref 45–117)
ALT SERPL-CCNC: 64 U/L (ref 12–78)
ANION GAP SERPL CALC-SCNC: 4 MMOL/L (ref 5–15)
AST SERPL-CCNC: 42 U/L (ref 15–37)
BASOPHILS # BLD: 0 K/UL (ref 0–0.1)
BASOPHILS NFR BLD: 0 % (ref 0–1)
BILIRUB SERPL-MCNC: 2.7 MG/DL (ref 0.2–1)
BUN SERPL-MCNC: 10 MG/DL (ref 6–20)
BUN/CREAT SERPL: 10 (ref 12–20)
CALCIUM SERPL-MCNC: 8.7 MG/DL (ref 8.5–10.1)
CHLORIDE SERPL-SCNC: 102 MMOL/L (ref 97–108)
CO2 SERPL-SCNC: 30 MMOL/L (ref 21–32)
CREAT SERPL-MCNC: 1 MG/DL (ref 0.7–1.3)
DIFFERENTIAL METHOD BLD: ABNORMAL
EKG ATRIAL RATE: 58 BPM
EKG DIAGNOSIS: NORMAL
EKG P AXIS: 53 DEGREES
EKG P-R INTERVAL: 200 MS
EKG Q-T INTERVAL: 462 MS
EKG QRS DURATION: 130 MS
EKG QTC CALCULATION (BAZETT): 453 MS
EKG R AXIS: -19 DEGREES
EKG T AXIS: -17 DEGREES
EKG VENTRICULAR RATE: 58 BPM
EOSINOPHIL # BLD: 0.1 K/UL (ref 0–0.4)
EOSINOPHIL NFR BLD: 1 % (ref 0–7)
ERYTHROCYTE [DISTWIDTH] IN BLOOD BY AUTOMATED COUNT: 14.7 % (ref 11.5–14.5)
GLOBULIN SER CALC-MCNC: 4.2 G/DL (ref 2–4)
GLUCOSE BLD STRIP.AUTO-MCNC: 156 MG/DL (ref 65–117)
GLUCOSE BLD STRIP.AUTO-MCNC: 157 MG/DL (ref 65–117)
GLUCOSE SERPL-MCNC: 153 MG/DL (ref 65–100)
HCT VFR BLD AUTO: 36.1 % (ref 36.6–50.3)
HGB BLD-MCNC: 11.6 G/DL (ref 12.1–17)
IMM GRANULOCYTES # BLD AUTO: 0 K/UL (ref 0–0.04)
IMM GRANULOCYTES NFR BLD AUTO: 1 % (ref 0–0.5)
LYMPHOCYTES # BLD: 1.2 K/UL (ref 0.8–3.5)
LYMPHOCYTES NFR BLD: 14 % (ref 12–49)
MCH RBC QN AUTO: 28.7 PG (ref 26–34)
MCHC RBC AUTO-ENTMCNC: 32.1 G/DL (ref 30–36.5)
MCV RBC AUTO: 89.4 FL (ref 80–99)
MONOCYTES # BLD: 0.8 K/UL (ref 0–1)
MONOCYTES NFR BLD: 10 % (ref 5–13)
NEUTS SEG # BLD: 6.1 K/UL (ref 1.8–8)
NEUTS SEG NFR BLD: 74 % (ref 32–75)
NRBC # BLD: 0 K/UL (ref 0–0.01)
NRBC BLD-RTO: 0 PER 100 WBC
PLATELET # BLD AUTO: 269 K/UL (ref 150–400)
PMV BLD AUTO: 10.7 FL (ref 8.9–12.9)
POTASSIUM SERPL-SCNC: 3.7 MMOL/L (ref 3.5–5.1)
PROT SERPL-MCNC: 6.7 G/DL (ref 6.4–8.2)
RBC # BLD AUTO: 4.04 M/UL (ref 4.1–5.7)
SERVICE CMNT-IMP: ABNORMAL
SERVICE CMNT-IMP: ABNORMAL
SODIUM SERPL-SCNC: 136 MMOL/L (ref 136–145)
WBC # BLD AUTO: 8.2 K/UL (ref 4.1–11.1)

## 2023-11-21 PROCEDURE — A4216 STERILE WATER/SALINE, 10 ML: HCPCS | Performed by: SURGERY

## 2023-11-21 PROCEDURE — C9113 INJ PANTOPRAZOLE SODIUM, VIA: HCPCS | Performed by: SURGERY

## 2023-11-21 PROCEDURE — 6370000000 HC RX 637 (ALT 250 FOR IP): Performed by: NURSE PRACTITIONER

## 2023-11-21 PROCEDURE — 2580000003 HC RX 258: Performed by: SURGERY

## 2023-11-21 PROCEDURE — 82962 GLUCOSE BLOOD TEST: CPT

## 2023-11-21 PROCEDURE — 6370000000 HC RX 637 (ALT 250 FOR IP): Performed by: SURGERY

## 2023-11-21 PROCEDURE — 6370000000 HC RX 637 (ALT 250 FOR IP): Performed by: INTERNAL MEDICINE

## 2023-11-21 PROCEDURE — 93005 ELECTROCARDIOGRAM TRACING: CPT | Performed by: INTERNAL MEDICINE

## 2023-11-21 PROCEDURE — 6360000002 HC RX W HCPCS: Performed by: SURGERY

## 2023-11-21 PROCEDURE — 85025 COMPLETE CBC W/AUTO DIFF WBC: CPT

## 2023-11-21 PROCEDURE — 36415 COLL VENOUS BLD VENIPUNCTURE: CPT

## 2023-11-21 PROCEDURE — 6370000000 HC RX 637 (ALT 250 FOR IP): Performed by: STUDENT IN AN ORGANIZED HEALTH CARE EDUCATION/TRAINING PROGRAM

## 2023-11-21 PROCEDURE — 80053 COMPREHEN METABOLIC PANEL: CPT

## 2023-11-21 RX ORDER — LEVOFLOXACIN 750 MG/1
750 TABLET, FILM COATED ORAL DAILY
Qty: 6 TABLET | Refills: 0 | Status: SHIPPED | OUTPATIENT
Start: 2023-11-22 | End: 2023-11-28

## 2023-11-21 RX ORDER — AMOXICILLIN AND CLAVULANATE POTASSIUM 875; 125 MG/1; MG/1
1 TABLET, FILM COATED ORAL 2 TIMES DAILY
Qty: 10 TABLET | Refills: 0 | Status: SHIPPED | OUTPATIENT
Start: 2023-11-21 | End: 2023-11-26

## 2023-11-21 RX ORDER — LEVOFLOXACIN 750 MG/1
750 TABLET, FILM COATED ORAL DAILY
Status: DISCONTINUED | OUTPATIENT
Start: 2023-11-21 | End: 2023-11-21 | Stop reason: HOSPADM

## 2023-11-21 RX ORDER — OXYCODONE HYDROCHLORIDE 5 MG/1
5 TABLET ORAL EVERY 6 HOURS PRN
Qty: 12 TABLET | Refills: 0 | Status: SHIPPED | OUTPATIENT
Start: 2023-11-21 | End: 2023-11-24

## 2023-11-21 RX ADMIN — LEVOFLOXACIN 750 MG: 750 TABLET, FILM COATED ORAL at 09:43

## 2023-11-21 RX ADMIN — SODIUM CHLORIDE, PRESERVATIVE FREE 10 ML: 5 INJECTION INTRAVENOUS at 09:43

## 2023-11-21 RX ADMIN — AMIODARONE HYDROCHLORIDE 100 MG: 200 TABLET ORAL at 09:43

## 2023-11-21 RX ADMIN — SODIUM CHLORIDE 40 MG: 9 INJECTION INTRAMUSCULAR; INTRAVENOUS; SUBCUTANEOUS at 09:43

## 2023-11-21 RX ADMIN — LOSARTAN POTASSIUM 100 MG: 100 TABLET, FILM COATED ORAL at 09:43

## 2023-11-21 RX ADMIN — PIPERACILLIN AND TAZOBACTAM 3375 MG: 3; .375 INJECTION, POWDER, LYOPHILIZED, FOR SOLUTION INTRAVENOUS at 05:17

## 2023-11-21 RX ADMIN — METOPROLOL SUCCINATE 12.5 MG: 25 TABLET, EXTENDED RELEASE ORAL at 09:43

## 2023-11-21 RX ADMIN — AMLODIPINE BESYLATE 5 MG: 5 TABLET ORAL at 09:43

## 2023-11-21 ASSESSMENT — PAIN SCALES - GENERAL
PAINLEVEL_OUTOF10: 0
PAINLEVEL_OUTOF10: 0

## 2023-11-21 NOTE — DISCHARGE INSTRUCTIONS
Please call [phone] to make a follow up appointment with Braydon Keith or Valentin in 2 weeks     [facility] of [address], [address] [place], [address] [facility]  [phone]  Fax [phone]    Please follow up with GI to remove stent. Please hold eliquis until 11/24. Resume from 11/25. Please follow up with urology for right sided kidney stone and chronic right hydronephrosis    Please return to ED if your condition gets worse- if you have fever, chills, worsening pain, generalized weakness.

## 2023-11-21 NOTE — DISCHARGE SUMMARY
Discharge Summary    Name: Sandra Reddy  [id number]  YOB: 1940 (Age: 79 y.o.)   Date of Admission: 11/16/2023  Date of Discharge: 11/21/2023  Attending Physician: Shaniqua Powell MD    Discharge Diagnosis:   Acute cholecystitis  Acute cholangitis  Choledocholithiasis  Atrial flutter  Chronic anticoagulation  Elevated LFTs  Acute UTI- ESBL  Type II DM  Hypertension  Hx of prostate and colon cancer  Acute hypokalemia      Consultations:  IP CONSULT TO GI  IP CONSULT TO GENERAL SURGERY      Brief Admission History/Reason for Admission Per Rojelio Fernandez MD:     Ayad Sheridan is a 79 y.o.  male with PMHx significant for diabetes, a flutter, prostate cancer, colon cancer, diabetes, hyperlipidemia, and hypertension presenting for right upper quadrant pain. Patient reports that he has had right upper quadrant/epigastric pain for the past week. He denies fever he denies nausea or vomiting. He has had decreased p.o. intake over the past week. He had a complete work-up with Dr. Zaina Reilly earlier this year. Brief Hospital Course by Main Problems:   Afebrile, respirations 16, heart rate 55, blood pressure 177/66, SPO2 99% on room air  CMP with glucose 138, albumin 3.3, globulin 4.3, alk phos 376, , , total bilirubin 5.2, lipase 3435. CBC with noted increased neutrophil percentage to 81%, otherwise values within range. UA with large blood, 100 protein, greater than 8.0 urobilinogen, positive nitrite, moderate leukocyte Estrace, 4+ bacteria. Urine and blood cultures sent. Imaging consistent with acute cholecystitis, cholangitis, and pancreatitis. General surgery and GI consulted. Patient was started on broad spectrum antibiotics. Patient had ERCP with biliary sphincterotomy, biliary stone removal, biliary stent placement 11/17/2 and laparoscopic cholecystectomy 11/19/2020. Urine culture showed ESBL E.coli. Peritoneal culture negative so far.  Patient is

## 2023-11-22 ENCOUNTER — TELEPHONE (OUTPATIENT)
Age: 83
End: 2023-11-22

## 2023-11-22 LAB
BACTERIA SPEC CULT: NORMAL
BACTERIA SPEC CULT: NORMAL
SERVICE CMNT-IMP: NORMAL
SERVICE CMNT-IMP: NORMAL

## 2023-11-22 NOTE — TELEPHONE ENCOUNTER
----- Message from Delano Griffith MD sent at 11/22/2023  9:13 AM EST -----  Please advise cholesterol was at goal recently. Noted that he came in with gallstones and had to have them removed. Noted that his liver functions which were elevated were improving at last check. Continue same care for cholesterol. Hope he is doing better. Verified Patient with two identifiers  Spoke with patient regarding results and recommendations. Patient voiced understanding.

## 2023-11-22 NOTE — RESULT ENCOUNTER NOTE
Please advise cholesterol was at goal recently.  Noted that he came in with gallstones and had to have them removed.  Noted that his liver functions which were elevated were improving at last check.  Continue same care for cholesterol.  Hope he is doing better.

## 2023-11-23 LAB
BACTERIA SPEC CULT: NORMAL
BACTERIA SPEC CULT: NORMAL
GRAM STN SPEC: NORMAL
GRAM STN SPEC: NORMAL
SERVICE CMNT-IMP: NORMAL
SERVICE CMNT-IMP: NORMAL

## 2024-01-03 ENCOUNTER — TELEPHONE (OUTPATIENT)
Age: 84
End: 2024-01-03

## 2024-01-03 NOTE — TELEPHONE ENCOUNTER
Pre-Procedure Cardiac Clearance Request:    Facility: [facility]    Procedure Date: 01-    Procedure: ERCP    Surgeon: MARICEL Santiago MD    Anesthesia: MAC    Request for Interruption of Anticoagulants: Eliquis    May stop anticoagulant how many days prior and when to resume    Is patient cleared from a cardiac standpoint to proceed with upcoming procedure.   Please advise.

## 2024-01-04 NOTE — TELEPHONE ENCOUNTER
The patient is at low cardiac risk to proceed with the procedure, and can interrupt blood thinners if necessary, for the following length of time individualized for each blood thinner, with the shorter and or longer end of the duration depending on surgical bleeding risk as per the proceduralist/surgeon:    Aspirin/Plavix/Effient/ Brilinta: 5 to 7 days  Eliquis/Xarelto/Pradaxa/Savaysa: 2 to 3 days  Coumadin: 5 days      Please advise that whenever a blood thinner is temporarily stopped for the treatment of atrial fibrillation, there is always a tiny (less than 1%) chance of a stroke, and even less for people that are not always in atrial fibrillation.  Usually, the benefit of interrupting the blood thinner for the procedure exceeds the risk.      Resume blood thinners as per the surgeon/proceduralist when felt to be safe from a surgical/procedural standpoint.

## 2024-01-08 ENCOUNTER — TELEPHONE (OUTPATIENT)
Age: 84
End: 2024-01-08

## 2024-01-08 NOTE — TELEPHONE ENCOUNTER
Clearance note, recent progress note and EKG faxed to A, office of dr. Santiago at [phone].

## 2024-01-10 ENCOUNTER — OFFICE VISIT (OUTPATIENT)
Age: 84
End: 2024-01-10

## 2024-01-10 VITALS
OXYGEN SATURATION: 96 % | WEIGHT: 208.4 LBS | HEART RATE: 60 BPM | SYSTOLIC BLOOD PRESSURE: 136 MMHG | BODY MASS INDEX: 32.71 KG/M2 | RESPIRATION RATE: 20 BRPM | HEIGHT: 67 IN | TEMPERATURE: 97.9 F | DIASTOLIC BLOOD PRESSURE: 67 MMHG

## 2024-01-10 DIAGNOSIS — Z09 POSTOPERATIVE EXAMINATION: Primary | ICD-10-CM

## 2024-01-10 PROCEDURE — 99024 POSTOP FOLLOW-UP VISIT: CPT | Performed by: SURGERY

## 2024-01-10 NOTE — PROGRESS NOTES
Identified pt with two pt identifiers(name and ). Reviewed record in preparation for visit and have obtained necessary documentation. All patient medications has been reviewed.    Chief Complaint   Patient presents with    Post-Op Check     S/P laparoscopic cholecystectomy 2023       Health Maintenance Due   Topic    COVID-19 Vaccine (1)    Pneumococcal 65+ years Vaccine (1 - PCV)    DTaP/Tdap/Td vaccine (1 - Tdap)    Shingles vaccine (1 of 2)    Respiratory Syncytial Virus (RSV) Pregnant or age 60 yrs+ (1 - 1-dose 60+ series)    Flu vaccine (1)    Annual Wellness Visit (Medicare Advantage)        [unfilled]    4.Have you been to the ER, urgent care clinic since your last visit?  Hospitalized since your last visit?no    5. Have you seen or consulted any other health care providers outside of the [facility] since your last visit?  Include any pap smears or colon screening. no      Patient is accompanied by self I have received verbal consent from Darius Burgos to discuss any/all medical information while they are present in the room.

## 2024-01-10 NOTE — PROGRESS NOTES
Postop Progress Note    Subjective    Darius Burgos presents to the office for postop follow up s/p lap cholecystectomy with Dr. Johnson.  He is doing well.  He intermittently needs stool softeners but otherwise is normal.    Objective    There were no vitals filed for this visit.  General: alert, cooperative and no distress  Incision: healing well    Assessment  Doing well postoperatively. Wounds c/d/i    Plan  1. Continue any current medications  2. Wound care discussed  3. Pt is to increase activities as tolerated  4. Usual diet  5. Follow up: as needed    Electronically signed by Vinnie Wiggins MD on 1/10/2024 at 12:53 PM

## 2024-01-18 ENCOUNTER — ANESTHESIA EVENT (OUTPATIENT)
Facility: HOSPITAL | Age: 84
End: 2024-01-18
Payer: MEDICARE

## 2024-01-18 ENCOUNTER — HOSPITAL ENCOUNTER (OUTPATIENT)
Facility: HOSPITAL | Age: 84
Setting detail: OUTPATIENT SURGERY
Discharge: HOME | End: 2024-01-18
Attending: INTERNAL MEDICINE | Admitting: INTERNAL MEDICINE
Payer: MEDICARE

## 2024-01-18 ENCOUNTER — ANESTHESIA (OUTPATIENT)
Facility: HOSPITAL | Age: 84
End: 2024-01-18
Payer: MEDICARE

## 2024-01-18 ENCOUNTER — APPOINTMENT (OUTPATIENT)
Facility: HOSPITAL | Age: 84
End: 2024-01-18
Attending: INTERNAL MEDICINE
Payer: MEDICARE

## 2024-01-18 VITALS
HEART RATE: 51 BPM | OXYGEN SATURATION: 95 % | TEMPERATURE: 97.5 F | RESPIRATION RATE: 14 BRPM | WEIGHT: 205 LBS | DIASTOLIC BLOOD PRESSURE: 62 MMHG | HEIGHT: 67 IN | SYSTOLIC BLOOD PRESSURE: 156 MMHG | BODY MASS INDEX: 32.18 KG/M2

## 2024-01-18 PROCEDURE — 6360000002 HC RX W HCPCS: Performed by: NURSE ANESTHETIST, CERTIFIED REGISTERED

## 2024-01-18 PROCEDURE — 6360000002 HC RX W HCPCS

## 2024-01-18 PROCEDURE — 2720000010 HC SURG SUPPLY STERILE: Performed by: INTERNAL MEDICINE

## 2024-01-18 PROCEDURE — 7100000011 HC PHASE II RECOVERY - ADDTL 15 MIN: Performed by: INTERNAL MEDICINE

## 2024-01-18 PROCEDURE — C1769 GUIDE WIRE: HCPCS | Performed by: INTERNAL MEDICINE

## 2024-01-18 PROCEDURE — 2500000003 HC RX 250 WO HCPCS: Performed by: NURSE ANESTHETIST, CERTIFIED REGISTERED

## 2024-01-18 PROCEDURE — 3700000001 HC ADD 15 MINUTES (ANESTHESIA): Performed by: INTERNAL MEDICINE

## 2024-01-18 PROCEDURE — 3600007513: Performed by: INTERNAL MEDICINE

## 2024-01-18 PROCEDURE — 2580000003 HC RX 258: Performed by: INTERNAL MEDICINE

## 2024-01-18 PROCEDURE — 6360000002 HC RX W HCPCS: Performed by: INTERNAL MEDICINE

## 2024-01-18 PROCEDURE — 2709999900 HC NON-CHARGEABLE SUPPLY: Performed by: INTERNAL MEDICINE

## 2024-01-18 PROCEDURE — 7100000010 HC PHASE II RECOVERY - FIRST 15 MIN: Performed by: INTERNAL MEDICINE

## 2024-01-18 PROCEDURE — 3700000000 HC ANESTHESIA ATTENDED CARE: Performed by: INTERNAL MEDICINE

## 2024-01-18 PROCEDURE — 3600007503: Performed by: INTERNAL MEDICINE

## 2024-01-18 RX ORDER — ONDANSETRON 2 MG/ML
4 INJECTION INTRAMUSCULAR; INTRAVENOUS
Status: DISCONTINUED | OUTPATIENT
Start: 2024-01-18 | End: 2024-01-18 | Stop reason: HOSPADM

## 2024-01-18 RX ORDER — FENTANYL CITRATE 50 UG/ML
25 INJECTION, SOLUTION INTRAMUSCULAR; INTRAVENOUS EVERY 5 MIN PRN
Status: DISCONTINUED | OUTPATIENT
Start: 2024-01-18 | End: 2024-01-18 | Stop reason: HOSPADM

## 2024-01-18 RX ORDER — SODIUM CHLORIDE 0.9 % (FLUSH) 0.9 %
5-40 SYRINGE (ML) INJECTION PRN
Status: DISCONTINUED | OUTPATIENT
Start: 2024-01-18 | End: 2024-01-18 | Stop reason: HOSPADM

## 2024-01-18 RX ORDER — LEVOFLOXACIN 5 MG/ML
500 INJECTION, SOLUTION INTRAVENOUS
Status: COMPLETED | OUTPATIENT
Start: 2024-01-18 | End: 2024-01-18

## 2024-01-18 RX ORDER — DEXAMETHASONE SODIUM PHOSPHATE 4 MG/ML
INJECTION, SOLUTION INTRA-ARTICULAR; INTRALESIONAL; INTRAMUSCULAR; INTRAVENOUS; SOFT TISSUE PRN
Status: DISCONTINUED | OUTPATIENT
Start: 2024-01-18 | End: 2024-01-18 | Stop reason: SDUPTHER

## 2024-01-18 RX ORDER — LIDOCAINE HYDROCHLORIDE 20 MG/ML
INJECTION, SOLUTION EPIDURAL; INFILTRATION; INTRACAUDAL; PERINEURAL PRN
Status: DISCONTINUED | OUTPATIENT
Start: 2024-01-18 | End: 2024-01-18 | Stop reason: SDUPTHER

## 2024-01-18 RX ORDER — SODIUM CHLORIDE 9 MG/ML
INJECTION, SOLUTION INTRAVENOUS PRN
Status: DISCONTINUED | OUTPATIENT
Start: 2024-01-18 | End: 2024-01-18 | Stop reason: HOSPADM

## 2024-01-18 RX ORDER — SODIUM CHLORIDE 0.9 % (FLUSH) 0.9 %
5-40 SYRINGE (ML) INJECTION PRN
Status: DISCONTINUED | OUTPATIENT
Start: 2024-01-18 | End: 2024-01-18

## 2024-01-18 RX ORDER — FENTANYL CITRATE 50 UG/ML
INJECTION, SOLUTION INTRAMUSCULAR; INTRAVENOUS PRN
Status: DISCONTINUED | OUTPATIENT
Start: 2024-01-18 | End: 2024-01-18 | Stop reason: SDUPTHER

## 2024-01-18 RX ORDER — ONDANSETRON 2 MG/ML
INJECTION INTRAMUSCULAR; INTRAVENOUS PRN
Status: DISCONTINUED | OUTPATIENT
Start: 2024-01-18 | End: 2024-01-18 | Stop reason: SDUPTHER

## 2024-01-18 RX ORDER — SUCCINYLCHOLINE CHLORIDE 20 MG/ML
INJECTION INTRAMUSCULAR; INTRAVENOUS PRN
Status: DISCONTINUED | OUTPATIENT
Start: 2024-01-18 | End: 2024-01-18 | Stop reason: SDUPTHER

## 2024-01-18 RX ORDER — SODIUM CHLORIDE 0.9 % (FLUSH) 0.9 %
5-40 SYRINGE (ML) INJECTION EVERY 12 HOURS SCHEDULED
Status: DISCONTINUED | OUTPATIENT
Start: 2024-01-18 | End: 2024-01-18

## 2024-01-18 RX ORDER — HYDROMORPHONE HYDROCHLORIDE 1 MG/ML
0.5 INJECTION, SOLUTION INTRAMUSCULAR; INTRAVENOUS; SUBCUTANEOUS EVERY 5 MIN PRN
Status: DISCONTINUED | OUTPATIENT
Start: 2024-01-18 | End: 2024-01-18 | Stop reason: HOSPADM

## 2024-01-18 RX ORDER — PHENYLEPHRINE HCL IN 0.9% NACL 0.4MG/10ML
SYRINGE (ML) INTRAVENOUS PRN
Status: DISCONTINUED | OUTPATIENT
Start: 2024-01-18 | End: 2024-01-18 | Stop reason: SDUPTHER

## 2024-01-18 RX ORDER — PROCHLORPERAZINE EDISYLATE 5 MG/ML
5 INJECTION INTRAMUSCULAR; INTRAVENOUS
Status: DISCONTINUED | OUTPATIENT
Start: 2024-01-18 | End: 2024-01-18 | Stop reason: HOSPADM

## 2024-01-18 RX ORDER — SODIUM CHLORIDE, SODIUM LACTATE, POTASSIUM CHLORIDE, CALCIUM CHLORIDE 600; 310; 30; 20 MG/100ML; MG/100ML; MG/100ML; MG/100ML
INJECTION, SOLUTION INTRAVENOUS CONTINUOUS
Status: DISCONTINUED | OUTPATIENT
Start: 2024-01-18 | End: 2024-01-18 | Stop reason: HOSPADM

## 2024-01-18 RX ORDER — SODIUM CHLORIDE 9 MG/ML
25 INJECTION, SOLUTION INTRAVENOUS PRN
Status: DISCONTINUED | OUTPATIENT
Start: 2024-01-18 | End: 2024-01-18 | Stop reason: HOSPADM

## 2024-01-18 RX ORDER — SODIUM CHLORIDE 0.9 % (FLUSH) 0.9 %
5-40 SYRINGE (ML) INJECTION EVERY 12 HOURS SCHEDULED
Status: DISCONTINUED | OUTPATIENT
Start: 2024-01-18 | End: 2024-01-18 | Stop reason: HOSPADM

## 2024-01-18 RX ADMIN — LEVOFLOXACIN 500 MG: 5 INJECTION, SOLUTION INTRAVENOUS at 11:12

## 2024-01-18 RX ADMIN — FENTANYL CITRATE 25 MCG: 50 INJECTION, SOLUTION INTRAMUSCULAR; INTRAVENOUS at 11:57

## 2024-01-18 RX ADMIN — SODIUM CHLORIDE, POTASSIUM CHLORIDE, SODIUM LACTATE AND CALCIUM CHLORIDE: 600; 310; 30; 20 INJECTION, SOLUTION INTRAVENOUS at 11:08

## 2024-01-18 RX ADMIN — DEXAMETHASONE SODIUM PHOSPHATE 4 MG: 4 INJECTION, SOLUTION INTRA-ARTICULAR; INTRALESIONAL; INTRAMUSCULAR; INTRAVENOUS; SOFT TISSUE at 11:56

## 2024-01-18 RX ADMIN — PROPOFOL 100 MG: 10 INJECTION, EMULSION INTRAVENOUS at 11:51

## 2024-01-18 RX ADMIN — SUCCINYLCHOLINE CHLORIDE 160 MG: 20 INJECTION, SOLUTION INTRAMUSCULAR; INTRAVENOUS at 11:53

## 2024-01-18 RX ADMIN — FENTANYL CITRATE 25 MCG: 50 INJECTION, SOLUTION INTRAMUSCULAR; INTRAVENOUS at 12:15

## 2024-01-18 RX ADMIN — FENTANYL CITRATE 25 MCG: 50 INJECTION, SOLUTION INTRAMUSCULAR; INTRAVENOUS at 11:51

## 2024-01-18 RX ADMIN — Medication 80 MCG: at 11:51

## 2024-01-18 RX ADMIN — LIDOCAINE HYDROCHLORIDE 100 MG: 20 INJECTION, SOLUTION EPIDURAL; INFILTRATION; INTRACAUDAL; PERINEURAL at 11:51

## 2024-01-18 RX ADMIN — FENTANYL CITRATE 25 MCG: 50 INJECTION, SOLUTION INTRAMUSCULAR; INTRAVENOUS at 12:01

## 2024-01-18 RX ADMIN — PROPOFOL 30 MG: 10 INJECTION, EMULSION INTRAVENOUS at 12:10

## 2024-01-18 RX ADMIN — ONDANSETRON HYDROCHLORIDE 4 MG: 2 INJECTION, SOLUTION INTRAMUSCULAR; INTRAVENOUS at 12:44

## 2024-01-18 RX ADMIN — PROPOFOL 40 MG: 10 INJECTION, EMULSION INTRAVENOUS at 11:52

## 2024-01-18 ASSESSMENT — PAIN - FUNCTIONAL ASSESSMENT: PAIN_FUNCTIONAL_ASSESSMENT: 0-10

## 2024-01-18 NOTE — ANESTHESIA PRE PROCEDURE
injection 5-40 mL  5-40 mL IntraVENous PRN Ghanshyam Santiago MD       • 0.9 % sodium chloride infusion  25 mL IntraVENous PRN Ghanshyam Santiago MD           Allergies:  No Known Allergies    Problem List:    Patient Active Problem List   Diagnosis Code   • Combined forms of age-related cataract of right eye H25.811   • Rectal bleed K62.5   • Atrial flutter, paroxysmal (HCC) I48.92   • Colonic mass K63.89   • Colon polyp K63.5   • Biliary obstruction K83.1       Past Medical History:        Diagnosis Date   • Arthritis    • At risk for sleep apnea 12/18/2019    Stop Bang 5    • Atrial fibrillation (HCC)     Atrial Flutter-Wittkamp   • Cancer (HCC)     prostate cancer   • Colon cancer (HCC)    • Diabetes (HCC)     DM2   • Elevated cholesterol    • History of blood transfusion     over 25 years   • History of kidney stones    • [place] (hard of hearing)     bilateral hearing aids   • Hypertension    • PUD (peptic ulcer disease) 1980   • Skin cancer        Past Surgical History:        Procedure Laterality Date   • APPENDECTOMY      1960's   • CATARACT REMOVAL Bilateral    • CHOLECYSTECTOMY, LAPAROSCOPIC N/A 11/19/2023    LAPAROSCOPIC CHOLCYSTECTOMY  WITH GRAMS performed by Miguel A Johnson MD at [facility] MAIN OR   • COLONOSCOPY N/A 09/27/2017    COLONOSCOPY performed by Shmuel Santiago MD at [facility] ENDOSCOPY   • COLONOSCOPY N/A 12/30/2019    COLONOSCOPY WITH POLYPECTOMY performed by Shmuel Santiago MD at [facility] AMBULATORY OR   • COLONOSCOPY N/A 11/30/2022    COLONOSCOPY performed by Miguel A Haynes MD at [facility] ENDOSCOPY   • COLONOSCOPY N/A 10/11/2021    COLONOSCOPY performed by Shmuel Santiago MD at [facility] ENDOSCOPY   • COLONOSCOPY N/A 8/18/2023    COLONOSCOPY performed by Miguel A Haynes II, MD at [facility] ENDOSCOPY   • ERCP N/A 11/17/2023    ERCP ENDOSCOPIC RETROGRADE CHOLANGIOPANCREATOGRAPHY performed by Ghanshyam Santiago MD at [facility] ENDOSCOPY   • HERNIA REPAIR      1960's   • LITHOTRIPSY     • PROSTATECTOMY     •

## 2024-01-18 NOTE — PROGRESS NOTES
1235 PACU called. They are on hold and do not have a bed available for the patient.     1247 Silvia Whaley RN (Charge Nurse) informed of the above. She will be immediately available for questions regarding recovering general sedation patients.     1250 Procedure scope was pre-cleaned, per protocol, at bedside by Jeovany.      1310 Report received from anesthesia.  See anesthesia flowsheet for intra-procedure vital signs and events. Dr. Jorge Glass available for questions and concerns.     1315 report given to Audra Munoz RN

## 2024-01-18 NOTE — DISCHARGE INSTRUCTIONS
[place] Office: [phone]    Darius Burgos  [id number]  1940    EGD/COLONOSCOPY DISCHARGE INSTRUCTIONS  Discomfort:  Sore throat- throat lozenges or warm salt water gargle  redness at IV site- apply warm compress to area; if redness or soreness persist- contact your physician  Gaseous discomfort- walking, belching will help relieve any discomfort  You may not operate a vehicle for 12 hours  You may not engage in an occupation involving machinery or appliances for rest of today.  You may not drink alcoholic beverages for at least 12 hours  Avoid making any critical decisions for at least 24 hour  DIET  You may resume your regular diet - however -  remember your colon is empty and a heavy meal will produce gas.   Avoid these foods:  fried / greasy foods, excessive carbonated drinks or too much caffeine  MEDICATIONS   Regarding Aspirin or Nonsteroidal medications specifically, please see below.  ACTIVITY  You may resume your normal daily activities.   Spend the remainder of the day resting -  avoid any strenuous activity.    CALL M.D.  ANY SIGN OF   Increasing pain, nausea, vomiting  Abdominal distension (swelling)  New increased bleeding (oral or rectal)  Fever (chills)  Pain in chest area  Bloody discharge from nose or mouth  Shortness of breath    You may not take any Advil, Aspirin, Ibuprofen, Motrin or Aleve(NSAIDs) for 5days, ONLY  Tylenol as needed for pain.    Findings:  An old double pig-tail stent was seen coming out of the ampulla.This was removed with a rat tooth forceps easily. Mild debris seen coming out of the ampulla.     Using the [email or web address] RX44 Dreamtome preloaded with a 0.035 inch Dreamwire, the common bile duct was successfully cannulated.      Proper positioning was confirmed with bile aspiration and under xray.     Contrast was injected and revealed mildly dilated irregular appearing  common bile duct.      Next, biliary sphincterotomy was extended.       Using a [employer]  hours or if unable to take medications  Any signs of decreased circulation or nerve impairment to extremity: change in color, persistent numbness, tingling, coldness or increase pain  Any questions or concerns    IF YOU REPORT TO AN EMERGENCY ROOM, DOCTOR'S OFFICE OR HOSPITAL WITHIN 24 HOURS AFTER YOUR PROCEDURE, BRING THIS SHEET AND YOUR AFTER VISIT SUMMARY WITH YOU AND GIVE IT TO THE PHYSICIAN OR NURSE ATTENDING YOU.

## 2024-01-18 NOTE — PROGRESS NOTES
..The risks and benefits of the bite block have been explained to patient.  Patient verbalizes understanding.     Pts abdomen is soft, non-distended without pain.

## 2024-01-18 NOTE — H&P
Pre-endoscopy H and P    The patient was seen and examined in the room/pre-op holding area.  The airway was assessed and documented.  The problem list, past medical history, and medications were reviewed.     Patient Active Problem List   Diagnosis    Combined forms of age-related cataract of right eye    Rectal bleed    Atrial flutter, paroxysmal (HCC)    Colonic mass    Colon polyp    Biliary obstruction     Social History     Socioeconomic History    Marital status:      Spouse name: Not on file    Number of children: Not on file    Years of education: Not on file    Highest education level: Not on file   Occupational History    Not on file   Tobacco Use    Smoking status: Former     Current packs/day: 0.00     Types: Cigarettes, Pipe, Cigars     Quit date: 1965     Years since quittin.3     Passive exposure: Past    Smokeless tobacco: Never   Vaping Use    Vaping Use: Never used   Substance and Sexual Activity    Alcohol use: No    Drug use: Never    Sexual activity: Not on file   Other Topics Concern    Not on file   Social History Narrative    Not on file     Social Determinants of Health     Financial Resource Strain: Not on file   Food Insecurity: Not on file   Transportation Needs: Not on file   Physical Activity: Not on file   Stress: Not on file   Social Connections: Not on file   Intimate Partner Violence: Not on file   Housing Stability: Not on file     Past Medical History:   Diagnosis Date    Arthritis     At risk for sleep apnea 2019    Stop Bang 5     Atrial fibrillation (HCC)     Atrial Flutter-Wittkamp    Cancer (HCC)     prostate cancer    Colon cancer (HCC)     Diabetes (HCC)     DM2    Elevated cholesterol     History of blood transfusion     over 25 years    History of kidney stones     [place] (hard of hearing)     bilateral hearing aids    Hypertension     PUD (peptic ulcer disease)     Skin cancer          Prior to Admission Medications   Prescriptions Last Dose  Informant Patient Reported? Taking?   acetaminophen (TYLENOL) 500 MG tablet Past Month  Yes No   Sig: Take 2 tablets by mouth every 6 hours as needed   amLODIPine (NORVASC) 5 MG tablet 1/17/2024  Yes No   Sig: Take 1 tablet by mouth daily   amiodarone (PACERONE) 100 MG tablet 1/17/2024  No No   Sig: Take 1 tablet by mouth daily   apixaban (ELIQUIS) 5 MG TABS tablet 1/14/2024  No No   Sig: Take 1 tablet by mouth 2 times daily Hold until 11/24- start taking from 11/25   furosemide (LASIX) 20 MG tablet Unknown  Yes No   Sig: Take 1 tablet by mouth as needed   glimepiride (AMARYL) 1 MG tablet 1/17/2024  Yes No   Sig: TAKE 1 TABLET EVERY MORNING WITH BREAKFAST   losartan (COZAAR) 100 MG tablet 1/17/2024  Yes No   Sig: Take 1 tablet by mouth daily   metFORMIN (GLUCOPHAGE) 1000 MG tablet 1/17/2024  Yes No   Sig: Take 1 tablet by mouth 2 times daily   metoprolol succinate (TOPROL XL) 25 MG extended release tablet 1/17/2024  No No   Sig: Take 0.5 tablets by mouth daily   Patient taking differently: Take 0.5 tablets by mouth 2 times daily   pravastatin (PRAVACHOL) 20 MG tablet 1/17/2024  Yes No   Sig: Take 1 tablet by mouth nightly      Facility-Administered Medications: None           The review of systems is:  Negative  for shortness of breath or chest pain      The heart, lungs, and mental status were satisfactory for the administration of deep sedation and for the procedure.      I discussed with the patient the objectives, risks, consequences and alternatives to the procedure.      Ghanshyam Santiago MD  1/18/2024  11:14 AM

## 2024-01-18 NOTE — OP NOTE
NAME:  Darius Burgos   :   1940   MRN:   [id number]     Date/Time:  2024 12:45 PM    Procedure Type:   ERCPwith biliary sphincterotomy, biliary debris  removal, biliary stent removal     Indications:   jaundice, abnormal CT/MRCP    Pre-operative Diagnosis: See indication above    Post-operative Diagnosis:  See findings below    : Shmuel Santiago MD    Referring Provider: --Maria Luisa Gramajo MD    Sedation:  General Anesthesia,IV lev aquin    Procedure Details:      After informed consent was obtained with all risks and benefits of procedure explained, the patient was brought to the endo room and given general anesthesia in a supine position on the ERCP table.  The patient was then carefully moved to a LL decubitus position (stiff neck) on a soft bag.  All bony prominences were protected.  The head and neck were appropriately aligned.  Both legs were padded and appropriately positioned.  SCDs were placed on both legs.  The patient was secured to the table.    Upon giving sedation as per above, the Olympus duodenoscope  was inserted via the mouthpeice and carefully advanced to the second portion of the duodenum.   The quality of visualization was adequate.  The duodenoscope was withdrawn into a short position.      Findings:       Endoscopic: Esophagus: Normal(as seen with a side-viewing scope), Stomach: normal mucosa, Duodenum: normal folds    Ampulla:  Old stent seen    Cholangiogram: -Dilated Bile duct to 10 mm with mildly irregular borders.    Pancreatogram: not performed    Procedure in detail:    An old double pig-tail stent was seen coming out of the ampulla.This was removed with a rat tooth forceps easily. Mild debris seen coming out of the ampulla.    Using the [employer] RX44 Dreamtome preloaded with a 0.035 inch Dreamwire, the common bile duct was successfully cannulated.     Proper positioning was confirmed with bile aspiration and under xray.    Contrast was injected and revealed

## 2024-01-18 NOTE — ANESTHESIA POSTPROCEDURE EVALUATION
Department of Anesthesiology  Postprocedure Note    Patient: Darius Burgos  MRN: [id number]  YOB: 1940  Date of evaluation: 1/18/2024    Procedure Summary       Date: 01/18/24 Room / Location: [facility] ENDO 04 / [facility]    Anesthesia Start: 1147 Anesthesia Stop: 1311    Procedure: ENDOSCOPIC RETROGRADE CHOLANGIOPANCREATOGRAPHY (ERCP) Diagnosis:       Abnormal CT scan      Cyst of pancreas      Dysphagia, unspecified type      Personal history of colon cancer      Personal history of colonic polyps      Loss of weight      (Abnormal CT scan [R93.89])      (Cyst of pancreas [K86.2])      (Dysphagia, unspecified type [R13.10])      (Personal history of colon cancer [Z85.038])      (Personal history of colonic polyps [Z86.010])      (Loss of weight [R63.4])    Surgeons: Ghanshyam Santiago MD Responsible Provider: Jorge Glass MD    Anesthesia Type: General ASA Status: 3            Anesthesia Type: General    Shemar Phase I: Shemar Score: 10    Shemar Phase II: Shemar Score: 10    Anesthesia Post Evaluation    Patient location during evaluation: bedside  Patient participation: complete - patient participated  Level of consciousness: awake  Pain score: 0  Airway patency: patent  Nausea & Vomiting: no nausea and no vomiting  Cardiovascular status: blood pressure returned to baseline  Respiratory status: acceptable  Hydration status: euvolemic  Pain management: adequate    No notable events documented.

## 2024-01-18 NOTE — PROGRESS NOTES
Phase II Recovery    1315: Report received from Maddy Figueroa RN (procedure and phase I recovery nurse for patient). Patient awake, alert, oriented x4.   Respiratory status: patient on 2L NC, even and unlabored breathing. GI soft and non-distended.  No complaints of pain.     EKG Rhythm: sinus bradycardia    Per endoscopy charge nurse Silvia Whaley, will continue to monitor patients vital signs for a minimum of 30 minutes post procedure and patient returns to baseline. See flow sheets for vitals.       1354: Patient returned to baseline, vital signs stable (see vital sign flowsheet). Patient offered liquids and tolerated well. Respiratory status within defined limits, patient on room air. Abdomen soft not tender. Skin with in defined limits. Responsible party driving patient home was given the opportunity to ask questions. Patient discharged with documented belongings including paperwork, dentures, and glasses.

## 2024-01-22 ENCOUNTER — TELEPHONE (OUTPATIENT)
Age: 84
End: 2024-01-22

## 2024-01-22 NOTE — TELEPHONE ENCOUNTER
[place] [employer] letter received with the approval for Eliquis qiana of charge from 01- through 12-.

## 2024-02-05 ENCOUNTER — TELEPHONE (OUTPATIENT)
Age: 84
End: 2024-02-05

## 2024-05-14 ENCOUNTER — OFFICE VISIT (OUTPATIENT)
Age: 84
End: 2024-05-14
Payer: MEDICARE

## 2024-05-14 VITALS
OXYGEN SATURATION: 99 % | BODY MASS INDEX: 32.65 KG/M2 | HEIGHT: 67 IN | WEIGHT: 208 LBS | RESPIRATION RATE: 18 BRPM | DIASTOLIC BLOOD PRESSURE: 60 MMHG | HEART RATE: 66 BPM | SYSTOLIC BLOOD PRESSURE: 132 MMHG

## 2024-05-14 DIAGNOSIS — E78.2 MIXED HYPERLIPIDEMIA: ICD-10-CM

## 2024-05-14 DIAGNOSIS — I48.0 PAF (PAROXYSMAL ATRIAL FIBRILLATION) (HCC): ICD-10-CM

## 2024-05-14 DIAGNOSIS — R06.02 SHORTNESS OF BREATH: ICD-10-CM

## 2024-05-14 DIAGNOSIS — I35.1 AORTIC VALVE INSUFFICIENCY, ETIOLOGY OF CARDIAC VALVE DISEASE UNSPECIFIED: ICD-10-CM

## 2024-05-14 DIAGNOSIS — I35.1 AORTIC VALVE INSUFFICIENCY, ETIOLOGY OF CARDIAC VALVE DISEASE UNSPECIFIED: Primary | ICD-10-CM

## 2024-05-14 DIAGNOSIS — I71.21 ANEURYSM OF ASCENDING AORTA WITHOUT RUPTURE (HCC): ICD-10-CM

## 2024-05-14 DIAGNOSIS — R79.89 ELEVATED LFTS: ICD-10-CM

## 2024-05-14 DIAGNOSIS — I10 ESSENTIAL (PRIMARY) HYPERTENSION: ICD-10-CM

## 2024-05-14 DIAGNOSIS — I34.0 NONRHEUMATIC MITRAL (VALVE) INSUFFICIENCY: ICD-10-CM

## 2024-05-14 DIAGNOSIS — I34.0 MITRAL VALVE INSUFFICIENCY, UNSPECIFIED ETIOLOGY: ICD-10-CM

## 2024-05-14 LAB
ALBUMIN SERPL-MCNC: 3.6 G/DL (ref 3.5–5)
ALBUMIN/GLOB SERPL: 1 (ref 1.1–2.2)
ALP SERPL-CCNC: 82 U/L (ref 45–117)
ALT SERPL-CCNC: 17 U/L (ref 12–78)
ANION GAP SERPL CALC-SCNC: 1 MMOL/L (ref 5–15)
AST SERPL-CCNC: 17 U/L (ref 15–37)
BILIRUB SERPL-MCNC: 0.7 MG/DL (ref 0.2–1)
BUN SERPL-MCNC: 22 MG/DL (ref 6–20)
BUN/CREAT SERPL: 18 (ref 12–20)
CALCIUM SERPL-MCNC: 10 MG/DL (ref 8.5–10.1)
CHLORIDE SERPL-SCNC: 106 MMOL/L (ref 97–108)
CO2 SERPL-SCNC: 30 MMOL/L (ref 21–32)
CREAT SERPL-MCNC: 1.23 MG/DL (ref 0.7–1.3)
GLOBULIN SER CALC-MCNC: 3.7 G/DL (ref 2–4)
GLUCOSE SERPL-MCNC: 232 MG/DL (ref 65–100)
POTASSIUM SERPL-SCNC: 4.1 MMOL/L (ref 3.5–5.1)
PROT SERPL-MCNC: 7.3 G/DL (ref 6.4–8.2)
SODIUM SERPL-SCNC: 137 MMOL/L (ref 136–145)

## 2024-05-14 PROCEDURE — G8417 CALC BMI ABV UP PARAM F/U: HCPCS | Performed by: INTERNAL MEDICINE

## 2024-05-14 PROCEDURE — 3078F DIAST BP <80 MM HG: CPT | Performed by: INTERNAL MEDICINE

## 2024-05-14 PROCEDURE — 1123F ACP DISCUSS/DSCN MKR DOCD: CPT | Performed by: INTERNAL MEDICINE

## 2024-05-14 PROCEDURE — G8427 DOCREV CUR MEDS BY ELIG CLIN: HCPCS | Performed by: INTERNAL MEDICINE

## 2024-05-14 PROCEDURE — 93005 ELECTROCARDIOGRAM TRACING: CPT | Performed by: INTERNAL MEDICINE

## 2024-05-14 PROCEDURE — 3075F SYST BP GE 130 - 139MM HG: CPT | Performed by: INTERNAL MEDICINE

## 2024-05-14 PROCEDURE — 99214 OFFICE O/P EST MOD 30 MIN: CPT | Performed by: INTERNAL MEDICINE

## 2024-05-14 PROCEDURE — 93010 ELECTROCARDIOGRAM REPORT: CPT | Performed by: INTERNAL MEDICINE

## 2024-05-14 PROCEDURE — 1036F TOBACCO NON-USER: CPT | Performed by: INTERNAL MEDICINE

## 2024-05-14 RX ORDER — LISINOPRIL 40 MG/1
1 TABLET ORAL
COMMUNITY

## 2024-05-14 ASSESSMENT — PATIENT HEALTH QUESTIONNAIRE - PHQ9
SUM OF ALL RESPONSES TO PHQ QUESTIONS 1-9: 0
SUM OF ALL RESPONSES TO PHQ9 QUESTIONS 1 & 2: 0
SUM OF ALL RESPONSES TO PHQ QUESTIONS 1-9: 0
2. FEELING DOWN, DEPRESSED OR HOPELESS: NOT AT ALL
SUM OF ALL RESPONSES TO PHQ QUESTIONS 1-9: 0
SUM OF ALL RESPONSES TO PHQ QUESTIONS 1-9: 0
1. LITTLE INTEREST OR PLEASURE IN DOING THINGS: NOT AT ALL

## 2024-05-14 NOTE — PROGRESS NOTES
[address] [place] [address] [phone]    [address] [place] [address]     Subjective:        Darius Burgos is a 83 y.o. male is here for routine f/u.  Last seen by me 11/14/2023.  The patient denies chest pain/ shortness of breath, orthopnea, PND, LE edema, palpitations, syncope, presyncope or fatigue.    Patient Active Problem List    Diagnosis Date Noted    Biliary obstruction 11/16/2023    Colon polyp 02/21/2023    Rectal bleed 10/09/2021    Atrial flutter, paroxysmal (HCC) 10/09/2021    Colonic mass 10/09/2021    Combined forms of age-related cataract of right eye 01/09/2020      Maria Luisa Gramajo MD  Past Medical History:   Diagnosis Date    Arthritis     At risk for sleep apnea 12/18/2019    Stop Bang 5     Atrial fibrillation (HCC)     Atrial Flutter-Wittkamp    Cancer (HCC)     prostate cancer    Colon cancer (HCC)     Diabetes (HCC)     DM2    Elevated cholesterol     History of blood transfusion     over 25 years    History of kidney stones     Circle (hard of hearing)     bilateral hearing aids    Hypertension     PUD (peptic ulcer disease) 1980    Skin cancer       Past Surgical History:   Procedure Laterality Date    APPENDECTOMY      1960's    CATARACT REMOVAL Bilateral     CHOLECYSTECTOMY, LAPAROSCOPIC N/A 11/19/2023    LAPAROSCOPIC CHOLCYSTECTOMY  WITH GRAMS performed by Miguel A Johnson MD at [facility]    COLONOSCOPY N/A 09/27/2017    COLONOSCOPY performed by Shmuel Santiago MD at [facility] END[facility]    COLONOSCOPY N/A 12/30/2019    COLONOSCOPY WITH POLYPECTOMY performed by Shmuel Santiago MD at [facility] AM[facility] OR    COLONOSCOPY N/A 11/30/2022    COLONOSCOPY performed by Miguel A Haynes MD at [facility]    COLONOSCOPY N/A 10/11/2021    COLONOSCOPY performed by Shmuel Santiago MD at [facility]    COLONOSCOPY N/A 8/18/2023    COLONOSCOPY performed by Miguel A Haynes II, MD at [facility]    ERCP N/A 11/17/2023    ERCP ENDOSCOPIC RETROGRADE

## 2024-05-21 ENCOUNTER — TELEPHONE (OUTPATIENT)
Age: 84
End: 2024-05-21

## 2024-05-21 NOTE — TELEPHONE ENCOUNTER
TC to pt, ID verified. Advised pt his LFTs have normalized on recent blood work per Dr. Kingston. Confirmed echo appt. No further questions.

## 2024-05-21 NOTE — TELEPHONE ENCOUNTER
----- Message from Konrad Kingston MD sent at 5/20/2024 10:28 PM EDT -----  Please advise liver functions have now normalized after the resolution of his gallbladder episode.

## 2024-07-18 NOTE — PROGRESS NOTES
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)  - Primary   OT Tx Diagnosis decreased ADL/IADL independence   Precautions/Limitations falls precaution due to low vision   Quick Add  Certification   Progress Note/Certification   Start Of Care Date 12/18/23   Onset of Illness/Injury or Date of Surgery 09/27/23   Therapy Frequency 1x/week, decreasing frequency as indicated   Predicted Duration 6 more months (extended due to scheduling conflicts)   Certification date from 06/14/24   Certification date to 09/11/24   Progress Note Due Date 12/11/24   Progress Note Completed Date 12/18/23   Goals   OT Goals 1;2;3;4   OT Goal 1   Goal Identifier Near Vision   Goal Description Patient will demonstrate 3 pieces of adaptive equipment and/or adaptive techniques in regards to magnification, lighting, contrast and glare for increased ADL/IADL independence with near vision tasks (reading, meal prep, medication management, writing).   Rationale In order to maximize safety and independence with performance of self-care activities;In order to safely and appropriately apply compensatory strategies with ADL/IADL performance   Goal Progress Good progress towards goal; goal continues to be appropriate until fully met/all areas addressed. Education provided in so far: Numerous adaptations and features for technology: strong encouragement to use screen readers/audio more than vision; Seeing  gigi (Optical Character Recognition/OCR technology); contrast options; spoken content/screen reader; voiceover - another screen reader option; pop socket for phone; voice commands/Grace; turn off passcode; wallpaper black/easier contrast; apple Watch: Turned on Tap to Talk under Accessibility; Hey Grace is on); Find settings to change ring tones for text, voicemail, etc. so you know what they are; turned on auto  End of Shift Note    Bedside shift change report given to CHARISSE Miranda (oncoming nurse) by Michael Giraldo RN (offgoing nurse). Report included the following information SBAR and Kardex    Shift worked:  7a-7p     Shift summary and any significant changes:     Pt received all care well. Wound care done. Still no BM. Concerns for physician to address:  none     Zone phone for oncoming shift:   0924       Activity:  Activity Level: Up ad gissel  Number times ambulated in hallways past shift: 4  Number of times OOB to chair past shift: 3    Cardiac:   Cardiac Monitoring: No      Cardiac Rhythm: Atrial Flutter    Access:   Current line(s): central line     Genitourinary:   Urinary status: voiding    Respiratory:   O2 Device: None (Room air)  Chronic home O2 use?: NO  Incentive spirometer at bedside: YES  Actual Volume (ml): 2500 ml  GI:  Last Bowel Movement Date: 10/11/21  Current diet:  ADULT DIET Regular; 4 carb choices (60 gm/meal); Low Fiber  Passing flatus: YES  Tolerating current diet: YES       Pain Management:   Patient states pain is manageable on current regimen: YES    Skin:  Arvin Score: 23  Interventions: increase time out of bed, foam dressing and limit briefs    Patient Safety:  Fall Score:  Total Score: 2  Interventions: gripper socks, pt to call before getting OOB and stay with me (per policy)  High Fall Risk: Yes    Length of Stay:  Expected LOS: 5d 16h  Actual LOS: [address] RN answer after set # seconds so doesn't have to manually answer; stylus successful to help with touching the correct part of the screen; Zoom feature;   Target Date 06/13/24   OT Goal 2   Goal Identifier Environmental modifications   Goal Description Patient will demonstrate and/or verbalize 2 environmentally-based ADL modifications to improve visual-based ADL/IADL activities.   Rationale In order to maximize safety and independence with performance of self-care activities;In order to safely and appropriately apply compensatory strategies with ADL/IADL performance   Goal Progress Good progress towards goal; goal continues to be appropriate until fully met/all areas addressed. Education provided in so far: tactile strategies/bump dots for marking things; use more audio and tactile overall - sensory substitution; ergonomics with AE and electronic equipment: stand for ipad best; take breaks on electronic devices   Target Date 06/13/24   OT Goal 3   Goal Identifier Resource Education   Goal Description Patient will verbalize awareness of 2 community resources for increased ADL/IADL completion.   Rationale In order to maximize safety and independence with performance of self-care activities   Goal Progress Good progress towards goal; goal continues to be appropriate until fully met/all areas addressed. 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   Rationale In order to maximize safety and independence with performance of self-care activities;In order to safely and appropriately apply compensatory strategies with ADL/IADL performance   Goal Progress Good progress towards goal; goal continues to be appropriate until fully met/all areas addressed. Education provided in so far: filter options (darker shade of FL-41, sports/wrap around style, solid top, etc.; educated in contrast with mobility; contact [facility] for further white cane training - strongly recommended   Target Date 06/13/24   Subjective Report   Subjective Report Patient got hearing aids - still trying to figure those out.  Increased his medications for anxiety but side effects too great so needed to decrease dose again.  Had to change some things on ipad and now contrast isn't working out great. Patient has been working with [employer] (\"boot camp\" - cooking, [employer], etc. - great to meet with others who have low vision.   Objective Measures   Objective Measures Objective Measure 1   Objective Measure 1   Objective Measure MNRead without contact and without glasses   Details 3/11/24: at ~1-2\" from chart: 1.3M with much effort but much improved over 6.3M at eval with contact in R eye (didn't have glasses which he would typically wear over the contact)   Treatment Interventions (OT)   Interventions Self Care/Home Management   Self Care/Home Management   Self-Care/Home Mgmt/ADL, Compensatory, Meal Prep Minutes (30902) 54 Minutes   Self Care 1 Adaptive equipment and adapted strategies to maximize visual based ADLs/IADLs   Skilled Intervention Please see education section below for details of all areas of education completed today including education in AE and adapted techniques to increase visibility for ADL/IADLs.  The most successful techniques and AE are listed below.  Patient demonstrated and/or verbalized use of all of the adapated " "techniques and AE with min to mod cues (technology).  Custom typed out instructions and resources were provided in large bold print regarding education completed today.   Patient Response/Progress progress in goals 1,2,3   Education   Learner/Method Patient;Listening;Reading;Demonstration   Education Comments Continued adaptations and features for technology: extensive education in use of Seeing  (short text and document features) for reading print out loud (Optical Character Recognition/OCR technology) - use Grace to open gigi, how to work around, etc.; ipad: contrast options (turned off smart invert and turned on/left on dark mode - likely don't do both at the same time, changed wallpaper to black for optimal contrast); options to read [place] Times: Try spoken content when there is no audio option to read articles on ipad, can continue to try voiceover option; pop socket to hang onto phone eaiser with Seeing    Plan   Home program NEW:get pop socket for ph, Seeing  (short text and document only so far), [place] Times on ipad - use spoken content and/or voiceover, dark mode only for contrast option; CONT: work with Emilia - commands, etc.; CML; call NYTimes, etc. for audio, do Spotify, etc. on Emilia (connect), SSB - white cane (Rebecca hasn't gotten back to them; more voiceover; change ph. wallpaper to black, set sounds to differ on watch for text vs. vm, etc.; auto answer work on watch after # of sec.?; consider stylus to change zoom levels (hopefully don't need to do); cont. to use ipad stand and get case!; answer/decline call with smart watch and use of bump dot; Voiceover (\"start voiceover\" on cell and hit home button to make the voiceover bar go away); Zoom (quick and double tap on controller primarily), more Grace, consider clamp on or stand stahl for ipad; more options for Spoken Content, consider +10.0 prismatic's; PRL inferior, try Grace to turn off timers/alarms   Plan for next session cont. per POC   Total " Session Time   Timed Code Treatment Minutes 54   Total Treatment Time (sum of timed and untimed services) 54         [facility]                                                                                   OUTPATIENT OCCUPATIONAL THERAPY    PLAN OF TREATMENT FOR OUTPATIENT REHABILITATION   Patient's Last Name, First Name, Jefry Garzon YOB: 1953   Provider's Name   [facility]   Medical Record No.  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              Referring Provider:  Lisa Chang    Initial Assessment  See Epic Evaluation- 12/18/23          PLAN  Continue therapy per current plan of care.    Beginning/End Dates of Progress Note Reporting Period:  12/18/23 to 07/18/2024    Referring Provider:  Lisa Chang

## 2024-08-30 ENCOUNTER — HOSPITAL ENCOUNTER (INPATIENT)
Facility: HOSPITAL | Age: 84
LOS: 11 days | Discharge: HOME HEALTH | DRG: 871 | End: 2024-09-10
Attending: EMERGENCY MEDICINE | Admitting: INTERNAL MEDICINE
Payer: MEDICARE

## 2024-08-30 ENCOUNTER — APPOINTMENT (OUTPATIENT)
Facility: HOSPITAL | Age: 84
DRG: 871 | End: 2024-08-30
Payer: MEDICARE

## 2024-08-30 DIAGNOSIS — A41.9 SEPSIS WITHOUT ACUTE ORGAN DYSFUNCTION, DUE TO UNSPECIFIED ORGANISM (HCC): Primary | ICD-10-CM

## 2024-08-30 DIAGNOSIS — R60.0 EDEMA OF LEFT UPPER EXTREMITY: ICD-10-CM

## 2024-08-30 LAB
ALBUMIN SERPL-MCNC: 3.9 G/DL (ref 3.5–5)
ALBUMIN/GLOB SERPL: 0.8 (ref 1.1–2.2)
ALP SERPL-CCNC: 86 U/L (ref 45–117)
ALT SERPL-CCNC: 16 U/L (ref 12–78)
ANION GAP SERPL CALC-SCNC: 7 MMOL/L (ref 5–15)
APPEARANCE UR: ABNORMAL
AST SERPL-CCNC: 12 U/L (ref 15–37)
BACTERIA URNS QL MICRO: NEGATIVE /HPF
BASOPHILS # BLD: 0 K/UL (ref 0–0.1)
BASOPHILS NFR BLD: 0 % (ref 0–1)
BILIRUB SERPL-MCNC: 1.3 MG/DL (ref 0.2–1)
BILIRUB UR QL: NEGATIVE
BUN SERPL-MCNC: 35 MG/DL (ref 6–20)
BUN/CREAT SERPL: 18 (ref 12–20)
CALCIUM SERPL-MCNC: 9.3 MG/DL (ref 8.5–10.1)
CHLORIDE SERPL-SCNC: 93 MMOL/L (ref 97–108)
CO2 SERPL-SCNC: 30 MMOL/L (ref 21–32)
COLOR UR: ABNORMAL
CREAT SERPL-MCNC: 1.97 MG/DL (ref 0.7–1.3)
DIFFERENTIAL METHOD BLD: ABNORMAL
EOSINOPHIL # BLD: 0 K/UL (ref 0–0.4)
EOSINOPHIL NFR BLD: 0 % (ref 0–7)
EPITH CASTS URNS QL MICRO: ABNORMAL /LPF
ERYTHROCYTE [DISTWIDTH] IN BLOOD BY AUTOMATED COUNT: 12.7 % (ref 11.5–14.5)
FLUAV RNA SPEC QL NAA+PROBE: NOT DETECTED
FLUBV RNA SPEC QL NAA+PROBE: NOT DETECTED
GLOBULIN SER CALC-MCNC: 4.7 G/DL (ref 2–4)
GLUCOSE BLD STRIP.AUTO-MCNC: 218 MG/DL (ref 65–117)
GLUCOSE SERPL-MCNC: 227 MG/DL (ref 65–100)
GLUCOSE UR STRIP.AUTO-MCNC: 250 MG/DL
HCT VFR BLD AUTO: 38 % (ref 36.6–50.3)
HGB BLD-MCNC: 13.1 G/DL (ref 12.1–17)
HGB UR QL STRIP: NEGATIVE
HYALINE CASTS URNS QL MICRO: ABNORMAL /LPF (ref 0–2)
IMM GRANULOCYTES # BLD AUTO: 0.1 K/UL (ref 0–0.04)
IMM GRANULOCYTES NFR BLD AUTO: 1 % (ref 0–0.5)
KETONES UR QL STRIP.AUTO: NEGATIVE MG/DL
LACTATE BLD-SCNC: 2.63 MMOL/L (ref 0.4–2)
LACTATE SERPL-SCNC: 2.8 MMOL/L (ref 0.4–2)
LEUKOCYTE ESTERASE UR QL STRIP.AUTO: NEGATIVE
LYMPHOCYTES # BLD: 0.6 K/UL (ref 0.8–3.5)
LYMPHOCYTES NFR BLD: 6 % (ref 12–49)
MCH RBC QN AUTO: 30.8 PG (ref 26–34)
MCHC RBC AUTO-ENTMCNC: 34.5 G/DL (ref 30–36.5)
MCV RBC AUTO: 89.2 FL (ref 80–99)
MONOCYTES # BLD: 0.4 K/UL (ref 0–1)
MONOCYTES NFR BLD: 4 % (ref 5–13)
NEUTS SEG # BLD: 9 K/UL (ref 1.8–8)
NEUTS SEG NFR BLD: 89 % (ref 32–75)
NITRITE UR QL STRIP.AUTO: NEGATIVE
NRBC # BLD: 0 K/UL (ref 0–0.01)
NRBC BLD-RTO: 0 PER 100 WBC
PH UR STRIP: 5.5 (ref 5–8)
PLATELET # BLD AUTO: 254 K/UL (ref 150–400)
PMV BLD AUTO: 9.8 FL (ref 8.9–12.9)
POTASSIUM SERPL-SCNC: 3.5 MMOL/L (ref 3.5–5.1)
PROCALCITONIN SERPL-MCNC: 0.16 NG/ML
PROT SERPL-MCNC: 8.6 G/DL (ref 6.4–8.2)
PROT UR STRIP-MCNC: ABNORMAL MG/DL
RBC # BLD AUTO: 4.26 M/UL (ref 4.1–5.7)
RBC #/AREA URNS HPF: ABNORMAL /HPF (ref 0–5)
RBC MORPH BLD: ABNORMAL
SARS-COV-2 RNA RESP QL NAA+PROBE: NOT DETECTED
SERVICE CMNT-IMP: ABNORMAL
SODIUM SERPL-SCNC: 130 MMOL/L (ref 136–145)
SOURCE: NORMAL
SP GR UR REFRACTOMETRY: >1.03 (ref 1–1.03)
SPECIMEN HOLD: NORMAL
TROPONIN I SERPL HS-MCNC: 23 NG/L (ref 0–76)
UROBILINOGEN UR QL STRIP.AUTO: 0.2 EU/DL (ref 0.2–1)
WBC # BLD AUTO: 10.1 K/UL (ref 4.1–11.1)
WBC URNS QL MICRO: ABNORMAL /HPF (ref 0–4)

## 2024-08-30 PROCEDURE — 80053 COMPREHEN METABOLIC PANEL: CPT

## 2024-08-30 PROCEDURE — 1100000000 HC RM PRIVATE

## 2024-08-30 PROCEDURE — 85025 COMPLETE CBC W/AUTO DIFF WBC: CPT

## 2024-08-30 PROCEDURE — 96365 THER/PROPH/DIAG IV INF INIT: CPT

## 2024-08-30 PROCEDURE — 93005 ELECTROCARDIOGRAM TRACING: CPT | Performed by: EMERGENCY MEDICINE

## 2024-08-30 PROCEDURE — 84145 PROCALCITONIN (PCT): CPT

## 2024-08-30 PROCEDURE — 87636 SARSCOV2 & INF A&B AMP PRB: CPT

## 2024-08-30 PROCEDURE — 6370000000 HC RX 637 (ALT 250 FOR IP): Performed by: EMERGENCY MEDICINE

## 2024-08-30 PROCEDURE — 84484 ASSAY OF TROPONIN QUANT: CPT

## 2024-08-30 PROCEDURE — 99285 EMERGENCY DEPT VISIT HI MDM: CPT

## 2024-08-30 PROCEDURE — 83605 ASSAY OF LACTIC ACID: CPT

## 2024-08-30 PROCEDURE — 81001 URINALYSIS AUTO W/SCOPE: CPT

## 2024-08-30 PROCEDURE — 82962 GLUCOSE BLOOD TEST: CPT

## 2024-08-30 PROCEDURE — 6360000002 HC RX W HCPCS: Performed by: EMERGENCY MEDICINE

## 2024-08-30 PROCEDURE — 71045 X-RAY EXAM CHEST 1 VIEW: CPT

## 2024-08-30 PROCEDURE — 96361 HYDRATE IV INFUSION ADD-ON: CPT

## 2024-08-30 PROCEDURE — 2580000003 HC RX 258: Performed by: EMERGENCY MEDICINE

## 2024-08-30 PROCEDURE — 36415 COLL VENOUS BLD VENIPUNCTURE: CPT

## 2024-08-30 RX ORDER — POTASSIUM CHLORIDE 7.45 MG/ML
10 INJECTION INTRAVENOUS PRN
Status: DISCONTINUED | OUTPATIENT
Start: 2024-08-30 | End: 2024-09-10 | Stop reason: HOSPADM

## 2024-08-30 RX ORDER — SODIUM CHLORIDE 0.9 % (FLUSH) 0.9 %
5-40 SYRINGE (ML) INJECTION EVERY 12 HOURS SCHEDULED
Status: DISCONTINUED | OUTPATIENT
Start: 2024-08-30 | End: 2024-09-10 | Stop reason: HOSPADM

## 2024-08-30 RX ORDER — LANOLIN ALCOHOL/MO/W.PET/CERES
3 CREAM (GRAM) TOPICAL NIGHTLY PRN
Status: DISCONTINUED | OUTPATIENT
Start: 2024-08-30 | End: 2024-09-10 | Stop reason: HOSPADM

## 2024-08-30 RX ORDER — SODIUM CHLORIDE 9 MG/ML
INJECTION, SOLUTION INTRAVENOUS PRN
Status: DISCONTINUED | OUTPATIENT
Start: 2024-08-30 | End: 2024-09-10 | Stop reason: HOSPADM

## 2024-08-30 RX ORDER — POTASSIUM CHLORIDE 750 MG/1
40 TABLET, EXTENDED RELEASE ORAL PRN
Status: DISCONTINUED | OUTPATIENT
Start: 2024-08-30 | End: 2024-09-10 | Stop reason: HOSPADM

## 2024-08-30 RX ORDER — ACETAMINOPHEN 325 MG/1
650 TABLET ORAL EVERY 6 HOURS PRN
Status: DISCONTINUED | OUTPATIENT
Start: 2024-08-30 | End: 2024-09-10 | Stop reason: HOSPADM

## 2024-08-30 RX ORDER — POLYETHYLENE GLYCOL 3350 17 G/17G
17 POWDER, FOR SOLUTION ORAL DAILY PRN
Status: DISCONTINUED | OUTPATIENT
Start: 2024-08-30 | End: 2024-09-10 | Stop reason: HOSPADM

## 2024-08-30 RX ORDER — ONDANSETRON 4 MG/1
4 TABLET, ORALLY DISINTEGRATING ORAL EVERY 8 HOURS PRN
Status: DISCONTINUED | OUTPATIENT
Start: 2024-08-30 | End: 2024-09-10 | Stop reason: HOSPADM

## 2024-08-30 RX ORDER — METRONIDAZOLE 500 MG/100ML
500 INJECTION, SOLUTION INTRAVENOUS EVERY 8 HOURS
Status: DISCONTINUED | OUTPATIENT
Start: 2024-08-30 | End: 2024-08-31

## 2024-08-30 RX ORDER — ONDANSETRON 2 MG/ML
4 INJECTION INTRAMUSCULAR; INTRAVENOUS EVERY 6 HOURS PRN
Status: DISCONTINUED | OUTPATIENT
Start: 2024-08-30 | End: 2024-09-10 | Stop reason: HOSPADM

## 2024-08-30 RX ORDER — SODIUM CHLORIDE, SODIUM LACTATE, POTASSIUM CHLORIDE, CALCIUM CHLORIDE 600; 310; 30; 20 MG/100ML; MG/100ML; MG/100ML; MG/100ML
INJECTION, SOLUTION INTRAVENOUS CONTINUOUS
Status: DISPENSED | OUTPATIENT
Start: 2024-08-30 | End: 2024-08-31

## 2024-08-30 RX ORDER — 0.9 % SODIUM CHLORIDE 0.9 %
1000 INTRAVENOUS SOLUTION INTRAVENOUS ONCE
Status: COMPLETED | OUTPATIENT
Start: 2024-08-30 | End: 2024-08-30

## 2024-08-30 RX ORDER — IBUPROFEN 800 MG/1
800 TABLET, FILM COATED ORAL
Status: COMPLETED | OUTPATIENT
Start: 2024-08-30 | End: 2024-08-30

## 2024-08-30 RX ORDER — ENOXAPARIN SODIUM 100 MG/ML
40 INJECTION SUBCUTANEOUS DAILY
Status: DISCONTINUED | OUTPATIENT
Start: 2024-08-31 | End: 2024-08-31

## 2024-08-30 RX ORDER — MAGNESIUM SULFATE IN WATER 40 MG/ML
2000 INJECTION, SOLUTION INTRAVENOUS PRN
Status: DISCONTINUED | OUTPATIENT
Start: 2024-08-30 | End: 2024-09-10 | Stop reason: HOSPADM

## 2024-08-30 RX ORDER — ACETAMINOPHEN 500 MG
1000 TABLET ORAL
Status: COMPLETED | OUTPATIENT
Start: 2024-08-30 | End: 2024-08-30

## 2024-08-30 RX ORDER — ACETAMINOPHEN 650 MG/1
650 SUPPOSITORY RECTAL EVERY 6 HOURS PRN
Status: DISCONTINUED | OUTPATIENT
Start: 2024-08-30 | End: 2024-09-10 | Stop reason: HOSPADM

## 2024-08-30 RX ORDER — SODIUM CHLORIDE 0.9 % (FLUSH) 0.9 %
5-40 SYRINGE (ML) INJECTION PRN
Status: DISCONTINUED | OUTPATIENT
Start: 2024-08-30 | End: 2024-09-10 | Stop reason: HOSPADM

## 2024-08-30 RX ADMIN — CEFEPIME 2000 MG: 2 INJECTION, POWDER, FOR SOLUTION INTRAVENOUS at 20:08

## 2024-08-30 RX ADMIN — SODIUM CHLORIDE 1000 ML: 9 INJECTION, SOLUTION INTRAVENOUS at 20:35

## 2024-08-30 RX ADMIN — ACETAMINOPHEN 1000 MG: 500 TABLET ORAL at 18:39

## 2024-08-30 RX ADMIN — IBUPROFEN 800 MG: 800 TABLET, FILM COATED ORAL at 22:42

## 2024-08-30 RX ADMIN — SODIUM CHLORIDE 1000 ML: 9 INJECTION, SOLUTION INTRAVENOUS at 18:40

## 2024-08-30 ASSESSMENT — LIFESTYLE VARIABLES
HOW OFTEN DO YOU HAVE A DRINK CONTAINING ALCOHOL: NEVER
HOW MANY STANDARD DRINKS CONTAINING ALCOHOL DO YOU HAVE ON A TYPICAL DAY: PATIENT DOES NOT DRINK

## 2024-08-30 ASSESSMENT — PAIN SCALES - GENERAL: PAINLEVEL_OUTOF10: 0

## 2024-08-31 ENCOUNTER — HOSPITAL ENCOUNTER (INPATIENT)
Facility: HOSPITAL | Age: 84
DRG: 871 | End: 2024-08-31
Payer: MEDICARE

## 2024-08-31 ENCOUNTER — APPOINTMENT (OUTPATIENT)
Facility: HOSPITAL | Age: 84
DRG: 871 | End: 2024-08-31
Payer: MEDICARE

## 2024-08-31 LAB
ALBUMIN SERPL-MCNC: 2.8 G/DL (ref 3.5–5)
ALBUMIN/GLOB SERPL: 1 (ref 1.1–2.2)
ALP SERPL-CCNC: 58 U/L (ref 45–117)
ALT SERPL-CCNC: 11 U/L (ref 12–78)
ANION GAP SERPL CALC-SCNC: 7 MMOL/L (ref 5–15)
ANION GAP SERPL CALC-SCNC: 9 MMOL/L (ref 5–15)
AST SERPL-CCNC: 17 U/L (ref 15–37)
B PERT DNA SPEC QL NAA+PROBE: NOT DETECTED
BASOPHILS # BLD: 0 K/UL (ref 0–0.1)
BASOPHILS NFR BLD: 0 % (ref 0–1)
BILIRUB DIRECT SERPL-MCNC: 0.6 MG/DL (ref 0–0.2)
BILIRUB SERPL-MCNC: 1.4 MG/DL (ref 0.2–1)
BORDETELLA PARAPERTUSSIS BY PCR: NOT DETECTED
BUN SERPL-MCNC: 39 MG/DL (ref 6–20)
BUN SERPL-MCNC: 45 MG/DL (ref 6–20)
BUN/CREAT SERPL: 20 (ref 12–20)
BUN/CREAT SERPL: 25 (ref 12–20)
C PNEUM DNA SPEC QL NAA+PROBE: NOT DETECTED
CALCIUM SERPL-MCNC: 7.8 MG/DL (ref 8.5–10.1)
CALCIUM SERPL-MCNC: 8 MG/DL (ref 8.5–10.1)
CHLORIDE SERPL-SCNC: 102 MMOL/L (ref 97–108)
CHLORIDE SERPL-SCNC: 104 MMOL/L (ref 97–108)
CO2 SERPL-SCNC: 24 MMOL/L (ref 21–32)
CO2 SERPL-SCNC: 24 MMOL/L (ref 21–32)
CREAT SERPL-MCNC: 1.83 MG/DL (ref 0.7–1.3)
CREAT SERPL-MCNC: 1.94 MG/DL (ref 0.7–1.3)
CREAT UR-MCNC: 222 MG/DL
DIFFERENTIAL METHOD BLD: ABNORMAL
EKG ATRIAL RATE: 89 BPM
EKG DIAGNOSIS: NORMAL
EKG P AXIS: 65 DEGREES
EKG P-R INTERVAL: 172 MS
EKG Q-T INTERVAL: 396 MS
EKG QRS DURATION: 142 MS
EKG QTC CALCULATION (BAZETT): 481 MS
EKG R AXIS: -24 DEGREES
EKG T AXIS: -24 DEGREES
EKG VENTRICULAR RATE: 89 BPM
EOSINOPHIL # BLD: 0 K/UL (ref 0–0.4)
EOSINOPHIL NFR BLD: 0 % (ref 0–7)
ERYTHROCYTE [DISTWIDTH] IN BLOOD BY AUTOMATED COUNT: 12.7 % (ref 11.5–14.5)
FLUAV SUBTYP SPEC NAA+PROBE: NOT DETECTED
FLUBV RNA SPEC QL NAA+PROBE: NOT DETECTED
GLOBULIN SER CALC-MCNC: 2.9 G/DL (ref 2–4)
GLUCOSE BLD STRIP.AUTO-MCNC: 189 MG/DL (ref 65–117)
GLUCOSE SERPL-MCNC: 180 MG/DL (ref 65–100)
GLUCOSE SERPL-MCNC: 205 MG/DL (ref 65–100)
HADV DNA SPEC QL NAA+PROBE: NOT DETECTED
HCOV 229E RNA SPEC QL NAA+PROBE: NOT DETECTED
HCOV HKU1 RNA SPEC QL NAA+PROBE: NOT DETECTED
HCOV NL63 RNA SPEC QL NAA+PROBE: NOT DETECTED
HCOV OC43 RNA SPEC QL NAA+PROBE: NOT DETECTED
HCT VFR BLD AUTO: 34.7 % (ref 36.6–50.3)
HGB BLD-MCNC: 12 G/DL (ref 12.1–17)
HMPV RNA SPEC QL NAA+PROBE: NOT DETECTED
HPIV1 RNA SPEC QL NAA+PROBE: NOT DETECTED
HPIV2 RNA SPEC QL NAA+PROBE: NOT DETECTED
HPIV3 RNA SPEC QL NAA+PROBE: NOT DETECTED
HPIV4 RNA SPEC QL NAA+PROBE: NOT DETECTED
IMM GRANULOCYTES # BLD AUTO: 0 K/UL
IMM GRANULOCYTES NFR BLD AUTO: 0 %
LACTATE SERPL-SCNC: 2.6 MMOL/L (ref 0.4–2)
LACTATE SERPL-SCNC: 3.1 MMOL/L (ref 0.4–2)
LACTATE SERPL-SCNC: 3.1 MMOL/L (ref 0.4–2)
LACTATE SERPL-SCNC: 3.6 MMOL/L (ref 0.4–2)
LIPASE SERPL-CCNC: 40 U/L (ref 13–75)
LYMPHOCYTES # BLD: 0.3 K/UL (ref 0.8–3.5)
LYMPHOCYTES NFR BLD: 2 % (ref 12–49)
M PNEUMO DNA SPEC QL NAA+PROBE: NOT DETECTED
MAGNESIUM SERPL-MCNC: 1.5 MG/DL (ref 1.6–2.4)
MCH RBC QN AUTO: 30.8 PG (ref 26–34)
MCHC RBC AUTO-ENTMCNC: 34.6 G/DL (ref 30–36.5)
MCV RBC AUTO: 89.2 FL (ref 80–99)
METAMYELOCYTES NFR BLD MANUAL: 3 %
MONOCYTES # BLD: 0.1 K/UL (ref 0–1)
MONOCYTES NFR BLD: 1 % (ref 5–13)
NEUTS BAND NFR BLD MANUAL: 43 % (ref 0–6)
NEUTS SEG # BLD: 12.9 K/UL (ref 1.8–8)
NEUTS SEG NFR BLD: 51 % (ref 32–75)
NRBC # BLD: 0 K/UL (ref 0–0.01)
NRBC BLD-RTO: 0 PER 100 WBC
NT PRO BNP: 1998 PG/ML
PHOSPHATE SERPL-MCNC: 3.2 MG/DL (ref 2.6–4.7)
PLATELET # BLD AUTO: 193 K/UL (ref 150–400)
PMV BLD AUTO: 10 FL (ref 8.9–12.9)
POTASSIUM SERPL-SCNC: 3 MMOL/L (ref 3.5–5.1)
POTASSIUM SERPL-SCNC: 3.1 MMOL/L (ref 3.5–5.1)
PROCALCITONIN SERPL-MCNC: 14.7 NG/ML
PROT SERPL-MCNC: 5.7 G/DL (ref 6.4–8.2)
RBC # BLD AUTO: 3.89 M/UL (ref 4.1–5.7)
RBC MORPH BLD: ABNORMAL
RSV RNA SPEC QL NAA+PROBE: NOT DETECTED
RV+EV RNA SPEC QL NAA+PROBE: NOT DETECTED
SARS-COV-2 RNA RESP QL NAA+PROBE: NOT DETECTED
SERVICE CMNT-IMP: ABNORMAL
SODIUM SERPL-SCNC: 135 MMOL/L (ref 136–145)
SODIUM SERPL-SCNC: 135 MMOL/L (ref 136–145)
SODIUM UR-SCNC: 11 MMOL/L
UUN UR-MCNC: 692 MG/DL
WBC # BLD AUTO: 13.7 K/UL (ref 4.1–11.1)
WBC MORPH BLD: ABNORMAL

## 2024-08-31 PROCEDURE — 6360000002 HC RX W HCPCS: Performed by: STUDENT IN AN ORGANIZED HEALTH CARE EDUCATION/TRAINING PROGRAM

## 2024-08-31 PROCEDURE — 93010 ELECTROCARDIOGRAM REPORT: CPT | Performed by: SPECIALIST

## 2024-08-31 PROCEDURE — 84540 ASSAY OF URINE/UREA-N: CPT

## 2024-08-31 PROCEDURE — 2580000003 HC RX 258: Performed by: INTERNAL MEDICINE

## 2024-08-31 PROCEDURE — 6370000000 HC RX 637 (ALT 250 FOR IP): Performed by: INTERNAL MEDICINE

## 2024-08-31 PROCEDURE — 0202U NFCT DS 22 TRGT SARS-COV-2: CPT

## 2024-08-31 PROCEDURE — 83690 ASSAY OF LIPASE: CPT

## 2024-08-31 PROCEDURE — 97530 THERAPEUTIC ACTIVITIES: CPT

## 2024-08-31 PROCEDURE — 2580000003 HC RX 258: Performed by: STUDENT IN AN ORGANIZED HEALTH CARE EDUCATION/TRAINING PROGRAM

## 2024-08-31 PROCEDURE — 6360000002 HC RX W HCPCS: Performed by: INTERNAL MEDICINE

## 2024-08-31 PROCEDURE — 2580000003 HC RX 258: Performed by: EMERGENCY MEDICINE

## 2024-08-31 PROCEDURE — 84145 PROCALCITONIN (PCT): CPT

## 2024-08-31 PROCEDURE — 36415 COLL VENOUS BLD VENIPUNCTURE: CPT

## 2024-08-31 PROCEDURE — 97162 PT EVAL MOD COMPLEX 30 MIN: CPT

## 2024-08-31 PROCEDURE — 84300 ASSAY OF URINE SODIUM: CPT

## 2024-08-31 PROCEDURE — 80048 BASIC METABOLIC PNL TOTAL CA: CPT

## 2024-08-31 PROCEDURE — 6360000002 HC RX W HCPCS: Performed by: EMERGENCY MEDICINE

## 2024-08-31 PROCEDURE — 83880 ASSAY OF NATRIURETIC PEPTIDE: CPT

## 2024-08-31 PROCEDURE — 83605 ASSAY OF LACTIC ACID: CPT

## 2024-08-31 PROCEDURE — 2000000000 HC ICU R&B

## 2024-08-31 PROCEDURE — 2500000003 HC RX 250 WO HCPCS: Performed by: INTERNAL MEDICINE

## 2024-08-31 PROCEDURE — 82570 ASSAY OF URINE CREATININE: CPT

## 2024-08-31 PROCEDURE — 76770 US EXAM ABDO BACK WALL COMP: CPT

## 2024-08-31 PROCEDURE — 85025 COMPLETE CBC W/AUTO DIFF WBC: CPT

## 2024-08-31 PROCEDURE — 94761 N-INVAS EAR/PLS OXIMETRY MLT: CPT

## 2024-08-31 PROCEDURE — 74176 CT ABD & PELVIS W/O CONTRAST: CPT

## 2024-08-31 PROCEDURE — 82962 GLUCOSE BLOOD TEST: CPT

## 2024-08-31 PROCEDURE — 80076 HEPATIC FUNCTION PANEL: CPT

## 2024-08-31 PROCEDURE — 84100 ASSAY OF PHOSPHORUS: CPT

## 2024-08-31 PROCEDURE — 83735 ASSAY OF MAGNESIUM: CPT

## 2024-08-31 PROCEDURE — 97165 OT EVAL LOW COMPLEX 30 MIN: CPT

## 2024-08-31 RX ORDER — SODIUM CHLORIDE, SODIUM LACTATE, POTASSIUM CHLORIDE, AND CALCIUM CHLORIDE .6; .31; .03; .02 G/100ML; G/100ML; G/100ML; G/100ML
500 INJECTION, SOLUTION INTRAVENOUS ONCE
Status: COMPLETED | OUTPATIENT
Start: 2024-08-31 | End: 2024-08-31

## 2024-08-31 RX ORDER — 0.9 % SODIUM CHLORIDE 0.9 %
500 INTRAVENOUS SOLUTION INTRAVENOUS ONCE
Status: COMPLETED | OUTPATIENT
Start: 2024-08-31 | End: 2024-08-31

## 2024-08-31 RX ORDER — NOREPINEPHRINE BITARTRATE 0.06 MG/ML
1-100 INJECTION, SOLUTION INTRAVENOUS CONTINUOUS
Status: DISCONTINUED | OUTPATIENT
Start: 2024-08-31 | End: 2024-09-01

## 2024-08-31 RX ORDER — SENNA AND DOCUSATE SODIUM 50; 8.6 MG/1; MG/1
2 TABLET, FILM COATED ORAL DAILY PRN
Status: DISCONTINUED | OUTPATIENT
Start: 2024-08-31 | End: 2024-09-10 | Stop reason: HOSPADM

## 2024-08-31 RX ORDER — HEPARIN SODIUM 5000 [USP'U]/ML
5000 INJECTION, SOLUTION INTRAVENOUS; SUBCUTANEOUS EVERY 8 HOURS SCHEDULED
Status: DISCONTINUED | OUTPATIENT
Start: 2024-09-01 | End: 2024-09-02

## 2024-08-31 RX ORDER — SODIUM CHLORIDE, SODIUM LACTATE, POTASSIUM CHLORIDE, AND CALCIUM CHLORIDE .6; .31; .03; .02 G/100ML; G/100ML; G/100ML; G/100ML
1000 INJECTION, SOLUTION INTRAVENOUS ONCE
Status: COMPLETED | OUTPATIENT
Start: 2024-08-31 | End: 2024-08-31

## 2024-08-31 RX ADMIN — SODIUM CHLORIDE, POTASSIUM CHLORIDE, SODIUM LACTATE AND CALCIUM CHLORIDE 500 ML: 600; 310; 30; 20 INJECTION, SOLUTION INTRAVENOUS at 15:04

## 2024-08-31 RX ADMIN — VANCOMYCIN HYDROCHLORIDE 2250 MG: 5 INJECTION, POWDER, LYOPHILIZED, FOR SOLUTION INTRAVENOUS at 01:41

## 2024-08-31 RX ADMIN — SODIUM CHLORIDE, PRESERVATIVE FREE 10 ML: 5 INJECTION INTRAVENOUS at 21:35

## 2024-08-31 RX ADMIN — SODIUM CHLORIDE, POTASSIUM CHLORIDE, SODIUM LACTATE AND CALCIUM CHLORIDE: 600; 310; 30; 20 INJECTION, SOLUTION INTRAVENOUS at 00:26

## 2024-08-31 RX ADMIN — ENOXAPARIN SODIUM 40 MG: 100 INJECTION SUBCUTANEOUS at 08:31

## 2024-08-31 RX ADMIN — METRONIDAZOLE 500 MG: 500 INJECTION, SOLUTION INTRAVENOUS at 08:19

## 2024-08-31 RX ADMIN — POTASSIUM BICARBONATE 40 MEQ: 782 TABLET, EFFERVESCENT ORAL at 11:59

## 2024-08-31 RX ADMIN — PANTOPRAZOLE SODIUM 40 MG: 40 INJECTION, POWDER, FOR SOLUTION INTRAVENOUS at 08:30

## 2024-08-31 RX ADMIN — SODIUM CHLORIDE 5 MCG/MIN: 9 INJECTION, SOLUTION INTRAVENOUS at 01:04

## 2024-08-31 RX ADMIN — SENNOSIDES AND DOCUSATE SODIUM 2 TABLET: 50; 8.6 TABLET ORAL at 11:59

## 2024-08-31 RX ADMIN — CEFEPIME 2000 MG: 2 INJECTION, POWDER, FOR SOLUTION INTRAVENOUS at 21:34

## 2024-08-31 RX ADMIN — METRONIDAZOLE 500 MG: 500 INJECTION, SOLUTION INTRAVENOUS at 00:28

## 2024-08-31 RX ADMIN — CEFEPIME 2000 MG: 2 INJECTION, POWDER, FOR SOLUTION INTRAVENOUS at 08:14

## 2024-08-31 RX ADMIN — MAGNESIUM SULFATE HEPTAHYDRATE 2000 MG: 40 INJECTION, SOLUTION INTRAVENOUS at 12:03

## 2024-08-31 RX ADMIN — SODIUM CHLORIDE, PRESERVATIVE FREE 10 ML: 5 INJECTION INTRAVENOUS at 08:20

## 2024-08-31 RX ADMIN — SODIUM CHLORIDE, POTASSIUM CHLORIDE, SODIUM LACTATE AND CALCIUM CHLORIDE 1000 ML: 600; 310; 30; 20 INJECTION, SOLUTION INTRAVENOUS at 08:30

## 2024-08-31 RX ADMIN — SODIUM CHLORIDE 500 ML: 9 INJECTION, SOLUTION INTRAVENOUS at 02:03

## 2024-09-01 LAB
ALBUMIN SERPL-MCNC: 2.2 G/DL (ref 3.5–5)
ALBUMIN/GLOB SERPL: 0.8 (ref 1.1–2.2)
ALP SERPL-CCNC: 51 U/L (ref 45–117)
ALT SERPL-CCNC: 12 U/L (ref 12–78)
ANION GAP SERPL CALC-SCNC: 8 MMOL/L (ref 5–15)
AST SERPL-CCNC: 18 U/L (ref 15–37)
BACTERIA SPEC CULT: NORMAL
BACTERIA SPEC CULT: NORMAL
BASOPHILS # BLD: 0 K/UL (ref 0–0.1)
BASOPHILS NFR BLD: 0 % (ref 0–1)
BILIRUB DIRECT SERPL-MCNC: 0.3 MG/DL (ref 0–0.2)
BILIRUB SERPL-MCNC: 0.9 MG/DL (ref 0.2–1)
BUN SERPL-MCNC: 40 MG/DL (ref 6–20)
BUN/CREAT SERPL: 25 (ref 12–20)
CALCIUM SERPL-MCNC: 7.6 MG/DL (ref 8.5–10.1)
CHLORIDE SERPL-SCNC: 104 MMOL/L (ref 97–108)
CO2 SERPL-SCNC: 22 MMOL/L (ref 21–32)
CREAT SERPL-MCNC: 1.58 MG/DL (ref 0.7–1.3)
DIFFERENTIAL METHOD BLD: ABNORMAL
EOSINOPHIL # BLD: 0.1 K/UL (ref 0–0.4)
EOSINOPHIL NFR BLD: 1 % (ref 0–7)
ERYTHROCYTE [DISTWIDTH] IN BLOOD BY AUTOMATED COUNT: 12.6 % (ref 11.5–14.5)
GLOBULIN SER CALC-MCNC: 2.8 G/DL (ref 2–4)
GLUCOSE SERPL-MCNC: 126 MG/DL (ref 65–100)
HCT VFR BLD AUTO: 32.6 % (ref 36.6–50.3)
HGB BLD-MCNC: 11.1 G/DL (ref 12.1–17)
IMM GRANULOCYTES # BLD AUTO: 0 K/UL
IMM GRANULOCYTES NFR BLD AUTO: 0 %
LACTATE SERPL-SCNC: 1.7 MMOL/L (ref 0.4–2)
LYMPHOCYTES # BLD: 0.2 K/UL (ref 0.8–3.5)
LYMPHOCYTES NFR BLD: 2 % (ref 12–49)
MAGNESIUM SERPL-MCNC: 2 MG/DL (ref 1.6–2.4)
MCH RBC QN AUTO: 30.4 PG (ref 26–34)
MCHC RBC AUTO-ENTMCNC: 34 G/DL (ref 30–36.5)
MCV RBC AUTO: 89.3 FL (ref 80–99)
MONOCYTES # BLD: 0.5 K/UL (ref 0–1)
MONOCYTES NFR BLD: 5 % (ref 5–13)
NEUTS BAND NFR BLD MANUAL: 20 % (ref 0–6)
NEUTS SEG # BLD: 8.9 K/UL (ref 1.8–8)
NEUTS SEG NFR BLD: 72 % (ref 32–75)
NRBC # BLD: 0 K/UL (ref 0–0.01)
NRBC BLD-RTO: 0 PER 100 WBC
PHOSPHATE SERPL-MCNC: 1.5 MG/DL (ref 2.6–4.7)
PLATELET # BLD AUTO: 157 K/UL (ref 150–400)
PMV BLD AUTO: 10.9 FL (ref 8.9–12.9)
POTASSIUM SERPL-SCNC: 3.3 MMOL/L (ref 3.5–5.1)
PROCALCITONIN SERPL-MCNC: 24.55 NG/ML
PROT SERPL-MCNC: 5 G/DL (ref 6.4–8.2)
RBC # BLD AUTO: 3.65 M/UL (ref 4.1–5.7)
RBC MORPH BLD: ABNORMAL
SERVICE CMNT-IMP: NORMAL
SODIUM SERPL-SCNC: 134 MMOL/L (ref 136–145)
VANCOMYCIN SERPL-MCNC: 7.4 UG/ML
WBC # BLD AUTO: 9.7 K/UL (ref 4.1–11.1)

## 2024-09-01 PROCEDURE — 85025 COMPLETE CBC W/AUTO DIFF WBC: CPT

## 2024-09-01 PROCEDURE — 80202 ASSAY OF VANCOMYCIN: CPT

## 2024-09-01 PROCEDURE — 83605 ASSAY OF LACTIC ACID: CPT

## 2024-09-01 PROCEDURE — 2580000003 HC RX 258: Performed by: INTERNAL MEDICINE

## 2024-09-01 PROCEDURE — 84145 PROCALCITONIN (PCT): CPT

## 2024-09-01 PROCEDURE — 84100 ASSAY OF PHOSPHORUS: CPT

## 2024-09-01 PROCEDURE — 6360000002 HC RX W HCPCS: Performed by: STUDENT IN AN ORGANIZED HEALTH CARE EDUCATION/TRAINING PROGRAM

## 2024-09-01 PROCEDURE — 6360000002 HC RX W HCPCS: Performed by: INTERNAL MEDICINE

## 2024-09-01 PROCEDURE — 2580000003 HC RX 258: Performed by: STUDENT IN AN ORGANIZED HEALTH CARE EDUCATION/TRAINING PROGRAM

## 2024-09-01 PROCEDURE — 80076 HEPATIC FUNCTION PANEL: CPT

## 2024-09-01 PROCEDURE — 80048 BASIC METABOLIC PNL TOTAL CA: CPT

## 2024-09-01 PROCEDURE — 6370000000 HC RX 637 (ALT 250 FOR IP): Performed by: INTERNAL MEDICINE

## 2024-09-01 PROCEDURE — 2500000003 HC RX 250 WO HCPCS: Performed by: STUDENT IN AN ORGANIZED HEALTH CARE EDUCATION/TRAINING PROGRAM

## 2024-09-01 PROCEDURE — 83735 ASSAY OF MAGNESIUM: CPT

## 2024-09-01 PROCEDURE — 2060000000 HC ICU INTERMEDIATE R&B

## 2024-09-01 PROCEDURE — 6370000000 HC RX 637 (ALT 250 FOR IP): Performed by: STUDENT IN AN ORGANIZED HEALTH CARE EDUCATION/TRAINING PROGRAM

## 2024-09-01 PROCEDURE — 36415 COLL VENOUS BLD VENIPUNCTURE: CPT

## 2024-09-01 PROCEDURE — 87040 BLOOD CULTURE FOR BACTERIA: CPT

## 2024-09-01 RX ORDER — MIDODRINE HYDROCHLORIDE 5 MG/1
2.5 TABLET ORAL 3 TIMES DAILY PRN
Status: DISCONTINUED | OUTPATIENT
Start: 2024-09-01 | End: 2024-09-10 | Stop reason: HOSPADM

## 2024-09-01 RX ORDER — DICYCLOMINE HYDROCHLORIDE 10 MG/1
10 CAPSULE ORAL 4 TIMES DAILY PRN
Status: DISCONTINUED | OUTPATIENT
Start: 2024-09-01 | End: 2024-09-10 | Stop reason: HOSPADM

## 2024-09-01 RX ADMIN — DICYCLOMINE HYDROCHLORIDE 10 MG: 10 CAPSULE ORAL at 16:33

## 2024-09-01 RX ADMIN — CEFEPIME 2000 MG: 2 INJECTION, POWDER, FOR SOLUTION INTRAVENOUS at 20:16

## 2024-09-01 RX ADMIN — HEPARIN SODIUM 5000 UNITS: 5000 INJECTION INTRAVENOUS; SUBCUTANEOUS at 23:10

## 2024-09-01 RX ADMIN — ACETAMINOPHEN 650 MG: 325 TABLET ORAL at 08:58

## 2024-09-01 RX ADMIN — PANTOPRAZOLE SODIUM 40 MG: 40 INJECTION, POWDER, FOR SOLUTION INTRAVENOUS at 09:05

## 2024-09-01 RX ADMIN — POTASSIUM PHOSPHATE, MONOBASIC POTASSIUM PHOSPHATE, DIBASIC 20 MMOL: 224; 236 INJECTION, SOLUTION, CONCENTRATE INTRAVENOUS at 10:45

## 2024-09-01 RX ADMIN — SODIUM CHLORIDE, PRESERVATIVE FREE 10 ML: 5 INJECTION INTRAVENOUS at 09:11

## 2024-09-01 RX ADMIN — ACETAMINOPHEN 650 MG: 325 TABLET ORAL at 01:30

## 2024-09-01 RX ADMIN — HEPARIN SODIUM 5000 UNITS: 5000 INJECTION INTRAVENOUS; SUBCUTANEOUS at 06:53

## 2024-09-01 RX ADMIN — HEPARIN SODIUM 5000 UNITS: 5000 INJECTION INTRAVENOUS; SUBCUTANEOUS at 14:23

## 2024-09-01 RX ADMIN — CEFEPIME 2000 MG: 2 INJECTION, POWDER, FOR SOLUTION INTRAVENOUS at 09:04

## 2024-09-01 ASSESSMENT — PAIN DESCRIPTION - ORIENTATION
ORIENTATION: RIGHT;LEFT
ORIENTATION: LEFT;LOWER
ORIENTATION: LEFT;LOWER

## 2024-09-01 ASSESSMENT — PAIN DESCRIPTION - PAIN TYPE: TYPE: ACUTE PAIN

## 2024-09-01 ASSESSMENT — PAIN DESCRIPTION - FREQUENCY: FREQUENCY: CONTINUOUS

## 2024-09-01 ASSESSMENT — PAIN SCALES - GENERAL
PAINLEVEL_OUTOF10: 2
PAINLEVEL_OUTOF10: 4
PAINLEVEL_OUTOF10: 9

## 2024-09-01 ASSESSMENT — PAIN - FUNCTIONAL ASSESSMENT: PAIN_FUNCTIONAL_ASSESSMENT: ACTIVITIES ARE NOT PREVENTED

## 2024-09-01 ASSESSMENT — PAIN DESCRIPTION - LOCATION
LOCATION: ARM
LOCATION: ARM
LOCATION: ABDOMEN

## 2024-09-01 ASSESSMENT — PAIN DESCRIPTION - ONSET: ONSET: SUDDEN

## 2024-09-01 ASSESSMENT — PAIN DESCRIPTION - DESCRIPTORS
DESCRIPTORS: TENDER;OTHER (COMMENT)
DESCRIPTORS: CRAMPING

## 2024-09-02 ENCOUNTER — APPOINTMENT (OUTPATIENT)
Facility: HOSPITAL | Age: 84
DRG: 871 | End: 2024-09-02
Payer: MEDICARE

## 2024-09-02 LAB
ALBUMIN SERPL-MCNC: 2.3 G/DL (ref 3.5–5)
ALBUMIN/GLOB SERPL: 0.7 (ref 1.1–2.2)
ALP SERPL-CCNC: 59 U/L (ref 45–117)
ALT SERPL-CCNC: 12 U/L (ref 12–78)
ANION GAP SERPL CALC-SCNC: 5 MMOL/L (ref 5–15)
AST SERPL-CCNC: 17 U/L (ref 15–37)
BASOPHILS # BLD: 0.1 K/UL (ref 0–0.1)
BASOPHILS NFR BLD: 1 % (ref 0–1)
BILIRUB DIRECT SERPL-MCNC: 0.3 MG/DL (ref 0–0.2)
BILIRUB SERPL-MCNC: 0.7 MG/DL (ref 0.2–1)
BUN SERPL-MCNC: 35 MG/DL (ref 6–20)
BUN/CREAT SERPL: 26 (ref 12–20)
CALCIUM SERPL-MCNC: 8.1 MG/DL (ref 8.5–10.1)
CHLORIDE SERPL-SCNC: 103 MMOL/L (ref 97–108)
CO2 SERPL-SCNC: 24 MMOL/L (ref 21–32)
CREAT SERPL-MCNC: 1.33 MG/DL (ref 0.7–1.3)
DIFFERENTIAL METHOD BLD: ABNORMAL
EOSINOPHIL # BLD: 0.1 K/UL (ref 0–0.4)
EOSINOPHIL NFR BLD: 1 % (ref 0–7)
ERYTHROCYTE [DISTWIDTH] IN BLOOD BY AUTOMATED COUNT: 12.6 % (ref 11.5–14.5)
EST. AVERAGE GLUCOSE BLD GHB EST-MCNC: 143 MG/DL
GLOBULIN SER CALC-MCNC: 3.2 G/DL (ref 2–4)
GLUCOSE BLD STRIP.AUTO-MCNC: 162 MG/DL (ref 65–117)
GLUCOSE BLD STRIP.AUTO-MCNC: 199 MG/DL (ref 65–117)
GLUCOSE BLD STRIP.AUTO-MCNC: 239 MG/DL (ref 65–117)
GLUCOSE SERPL-MCNC: 141 MG/DL (ref 65–100)
HBA1C MFR BLD: 6.6 % (ref 4–5.6)
HCT VFR BLD AUTO: 32.3 % (ref 36.6–50.3)
HGB BLD-MCNC: 11.1 G/DL (ref 12.1–17)
IMM GRANULOCYTES # BLD AUTO: 0 K/UL
IMM GRANULOCYTES NFR BLD AUTO: 0 %
LACTATE SERPL-SCNC: 1.2 MMOL/L (ref 0.4–2)
LACTATE SERPL-SCNC: 1.6 MMOL/L (ref 0.4–2)
LYMPHOCYTES # BLD: 0.4 K/UL (ref 0.8–3.5)
LYMPHOCYTES NFR BLD: 6 % (ref 12–49)
MAGNESIUM SERPL-MCNC: 2.1 MG/DL (ref 1.6–2.4)
MCH RBC QN AUTO: 30.4 PG (ref 26–34)
MCHC RBC AUTO-ENTMCNC: 34.4 G/DL (ref 30–36.5)
MCV RBC AUTO: 88.5 FL (ref 80–99)
MONOCYTES # BLD: 0.2 K/UL (ref 0–1)
MONOCYTES NFR BLD: 3 % (ref 5–13)
NEUTS BAND NFR BLD MANUAL: 23 % (ref 0–6)
NEUTS SEG # BLD: 6.4 K/UL (ref 1.8–8)
NEUTS SEG NFR BLD: 66 % (ref 32–75)
NRBC # BLD: 0 K/UL (ref 0–0.01)
NRBC BLD-RTO: 0 PER 100 WBC
PHOSPHATE SERPL-MCNC: 1.6 MG/DL (ref 2.6–4.7)
PLATELET # BLD AUTO: 140 K/UL (ref 150–400)
PMV BLD AUTO: 10.7 FL (ref 8.9–12.9)
POTASSIUM SERPL-SCNC: 3.2 MMOL/L (ref 3.5–5.1)
PROT SERPL-MCNC: 5.5 G/DL (ref 6.4–8.2)
RBC # BLD AUTO: 3.65 M/UL (ref 4.1–5.7)
RBC MORPH BLD: ABNORMAL
SERVICE CMNT-IMP: ABNORMAL
SODIUM SERPL-SCNC: 132 MMOL/L (ref 136–145)
WBC # BLD AUTO: 7.2 K/UL (ref 4.1–11.1)

## 2024-09-02 PROCEDURE — 83036 HEMOGLOBIN GLYCOSYLATED A1C: CPT

## 2024-09-02 PROCEDURE — 2580000003 HC RX 258: Performed by: STUDENT IN AN ORGANIZED HEALTH CARE EDUCATION/TRAINING PROGRAM

## 2024-09-02 PROCEDURE — 6370000000 HC RX 637 (ALT 250 FOR IP): Performed by: INTERNAL MEDICINE

## 2024-09-02 PROCEDURE — 36415 COLL VENOUS BLD VENIPUNCTURE: CPT

## 2024-09-02 PROCEDURE — 6370000000 HC RX 637 (ALT 250 FOR IP): Performed by: STUDENT IN AN ORGANIZED HEALTH CARE EDUCATION/TRAINING PROGRAM

## 2024-09-02 PROCEDURE — 80076 HEPATIC FUNCTION PANEL: CPT

## 2024-09-02 PROCEDURE — 82962 GLUCOSE BLOOD TEST: CPT

## 2024-09-02 PROCEDURE — 94761 N-INVAS EAR/PLS OXIMETRY MLT: CPT

## 2024-09-02 PROCEDURE — 6360000002 HC RX W HCPCS: Performed by: INTERNAL MEDICINE

## 2024-09-02 PROCEDURE — 6360000002 HC RX W HCPCS: Performed by: STUDENT IN AN ORGANIZED HEALTH CARE EDUCATION/TRAINING PROGRAM

## 2024-09-02 PROCEDURE — 2580000003 HC RX 258: Performed by: INTERNAL MEDICINE

## 2024-09-02 PROCEDURE — 73200 CT UPPER EXTREMITY W/O DYE: CPT

## 2024-09-02 PROCEDURE — 85025 COMPLETE CBC W/AUTO DIFF WBC: CPT

## 2024-09-02 PROCEDURE — 80048 BASIC METABOLIC PNL TOTAL CA: CPT

## 2024-09-02 PROCEDURE — 1100000000 HC RM PRIVATE

## 2024-09-02 PROCEDURE — 83605 ASSAY OF LACTIC ACID: CPT

## 2024-09-02 PROCEDURE — 83735 ASSAY OF MAGNESIUM: CPT

## 2024-09-02 PROCEDURE — 84100 ASSAY OF PHOSPHORUS: CPT

## 2024-09-02 RX ORDER — INSULIN LISPRO 100 [IU]/ML
0-8 INJECTION, SOLUTION INTRAVENOUS; SUBCUTANEOUS
Status: DISCONTINUED | OUTPATIENT
Start: 2024-09-02 | End: 2024-09-10 | Stop reason: HOSPADM

## 2024-09-02 RX ORDER — DEXTROSE MONOHYDRATE 100 MG/ML
INJECTION, SOLUTION INTRAVENOUS CONTINUOUS PRN
Status: DISCONTINUED | OUTPATIENT
Start: 2024-09-02 | End: 2024-09-10 | Stop reason: HOSPADM

## 2024-09-02 RX ORDER — AMIODARONE HYDROCHLORIDE 200 MG/1
100 TABLET ORAL DAILY
Status: DISCONTINUED | OUTPATIENT
Start: 2024-09-03 | End: 2024-09-10 | Stop reason: HOSPADM

## 2024-09-02 RX ORDER — INSULIN LISPRO 100 [IU]/ML
0-4 INJECTION, SOLUTION INTRAVENOUS; SUBCUTANEOUS NIGHTLY
Status: DISCONTINUED | OUTPATIENT
Start: 2024-09-02 | End: 2024-09-10 | Stop reason: HOSPADM

## 2024-09-02 RX ORDER — SODIUM CHLORIDE 9 MG/ML
INJECTION, SOLUTION INTRAVENOUS CONTINUOUS
Status: DISCONTINUED | OUTPATIENT
Start: 2024-09-02 | End: 2024-09-08

## 2024-09-02 RX ORDER — PRAVASTATIN SODIUM 20 MG
20 TABLET ORAL NIGHTLY
Status: DISCONTINUED | OUTPATIENT
Start: 2024-09-02 | End: 2024-09-10 | Stop reason: HOSPADM

## 2024-09-02 RX ADMIN — SODIUM CHLORIDE, PRESERVATIVE FREE 10 ML: 5 INJECTION INTRAVENOUS at 20:26

## 2024-09-02 RX ADMIN — SODIUM CHLORIDE: 9 INJECTION, SOLUTION INTRAVENOUS at 12:37

## 2024-09-02 RX ADMIN — APIXABAN 5 MG: 5 TABLET, FILM COATED ORAL at 12:37

## 2024-09-02 RX ADMIN — VANCOMYCIN HYDROCHLORIDE 2250 MG: 10 INJECTION, POWDER, LYOPHILIZED, FOR SOLUTION INTRAVENOUS at 14:34

## 2024-09-02 RX ADMIN — PANTOPRAZOLE SODIUM 40 MG: 40 INJECTION, POWDER, FOR SOLUTION INTRAVENOUS at 08:38

## 2024-09-02 RX ADMIN — CEFEPIME 2000 MG: 2 INJECTION, POWDER, FOR SOLUTION INTRAVENOUS at 20:14

## 2024-09-02 RX ADMIN — SODIUM CHLORIDE, PRESERVATIVE FREE 10 ML: 5 INJECTION INTRAVENOUS at 08:38

## 2024-09-02 RX ADMIN — POTASSIUM CHLORIDE 40 MEQ: 750 TABLET, FILM COATED, EXTENDED RELEASE ORAL at 11:29

## 2024-09-02 RX ADMIN — ACETAMINOPHEN 650 MG: 325 TABLET ORAL at 15:54

## 2024-09-02 RX ADMIN — CEFEPIME 2000 MG: 2 INJECTION, POWDER, FOR SOLUTION INTRAVENOUS at 08:37

## 2024-09-02 RX ADMIN — PRAVASTATIN SODIUM 20 MG: 20 TABLET ORAL at 20:07

## 2024-09-02 RX ADMIN — SODIUM CHLORIDE: 9 INJECTION, SOLUTION INTRAVENOUS at 08:34

## 2024-09-02 RX ADMIN — HEPARIN SODIUM 5000 UNITS: 5000 INJECTION INTRAVENOUS; SUBCUTANEOUS at 06:24

## 2024-09-02 RX ADMIN — ACETAMINOPHEN 650 MG: 325 TABLET ORAL at 06:24

## 2024-09-02 RX ADMIN — APIXABAN 5 MG: 5 TABLET, FILM COATED ORAL at 20:07

## 2024-09-02 ASSESSMENT — PAIN SCALES - GENERAL: PAINLEVEL_OUTOF10: 3

## 2024-09-03 LAB
ANION GAP SERPL CALC-SCNC: 7 MMOL/L (ref 5–15)
BASOPHILS # BLD: 0 K/UL (ref 0–0.1)
BASOPHILS NFR BLD: 0 % (ref 0–1)
BUN SERPL-MCNC: 22 MG/DL (ref 6–20)
BUN/CREAT SERPL: 22 (ref 12–20)
CALCIUM SERPL-MCNC: 7.6 MG/DL (ref 8.5–10.1)
CHLORIDE SERPL-SCNC: 104 MMOL/L (ref 97–108)
CO2 SERPL-SCNC: 22 MMOL/L (ref 21–32)
CREAT SERPL-MCNC: 0.98 MG/DL (ref 0.7–1.3)
DIFFERENTIAL METHOD BLD: ABNORMAL
EOSINOPHIL # BLD: 0.2 K/UL (ref 0–0.4)
EOSINOPHIL NFR BLD: 3 % (ref 0–7)
ERYTHROCYTE [DISTWIDTH] IN BLOOD BY AUTOMATED COUNT: 12.7 % (ref 11.5–14.5)
EST. AVERAGE GLUCOSE BLD GHB EST-MCNC: 128 MG/DL
GLUCOSE BLD STRIP.AUTO-MCNC: 106 MG/DL (ref 65–117)
GLUCOSE BLD STRIP.AUTO-MCNC: 216 MG/DL (ref 65–117)
GLUCOSE BLD STRIP.AUTO-MCNC: 222 MG/DL (ref 65–117)
GLUCOSE SERPL-MCNC: 146 MG/DL (ref 65–100)
HBA1C MFR BLD: 6.1 % (ref 4–5.6)
HCT VFR BLD AUTO: 28.4 % (ref 36.6–50.3)
HGB BLD-MCNC: 9.7 G/DL (ref 12.1–17)
IMM GRANULOCYTES # BLD AUTO: 0 K/UL (ref 0–0.04)
IMM GRANULOCYTES NFR BLD AUTO: 1 % (ref 0–0.5)
LACTATE SERPL-SCNC: 1 MMOL/L (ref 0.4–2)
LYMPHOCYTES # BLD: 0.5 K/UL (ref 0.8–3.5)
LYMPHOCYTES NFR BLD: 8 % (ref 12–49)
MAGNESIUM SERPL-MCNC: 2 MG/DL (ref 1.6–2.4)
MCH RBC QN AUTO: 30 PG (ref 26–34)
MCHC RBC AUTO-ENTMCNC: 34.2 G/DL (ref 30–36.5)
MCV RBC AUTO: 87.9 FL (ref 80–99)
MONOCYTES # BLD: 0.4 K/UL (ref 0–1)
MONOCYTES NFR BLD: 6 % (ref 5–13)
NEUTS SEG # BLD: 5.2 K/UL (ref 1.8–8)
NEUTS SEG NFR BLD: 83 % (ref 32–75)
NRBC # BLD: 0 K/UL (ref 0–0.01)
NRBC BLD-RTO: 0 PER 100 WBC
PHOSPHATE SERPL-MCNC: 0.9 MG/DL (ref 2.6–4.7)
PLATELET # BLD AUTO: 127 K/UL (ref 150–400)
PMV BLD AUTO: 10.8 FL (ref 8.9–12.9)
POTASSIUM SERPL-SCNC: 3.3 MMOL/L (ref 3.5–5.1)
PROCALCITONIN SERPL-MCNC: 6.92 NG/ML
RBC # BLD AUTO: 3.23 M/UL (ref 4.1–5.7)
SERVICE CMNT-IMP: ABNORMAL
SERVICE CMNT-IMP: ABNORMAL
SERVICE CMNT-IMP: NORMAL
SODIUM SERPL-SCNC: 133 MMOL/L (ref 136–145)
WBC # BLD AUTO: 6.3 K/UL (ref 4.1–11.1)

## 2024-09-03 PROCEDURE — 2580000003 HC RX 258: Performed by: STUDENT IN AN ORGANIZED HEALTH CARE EDUCATION/TRAINING PROGRAM

## 2024-09-03 PROCEDURE — 97116 GAIT TRAINING THERAPY: CPT

## 2024-09-03 PROCEDURE — 97530 THERAPEUTIC ACTIVITIES: CPT

## 2024-09-03 PROCEDURE — 83036 HEMOGLOBIN GLYCOSYLATED A1C: CPT

## 2024-09-03 PROCEDURE — 2580000003 HC RX 258: Performed by: INTERNAL MEDICINE

## 2024-09-03 PROCEDURE — 84145 PROCALCITONIN (PCT): CPT

## 2024-09-03 PROCEDURE — 84100 ASSAY OF PHOSPHORUS: CPT

## 2024-09-03 PROCEDURE — 83735 ASSAY OF MAGNESIUM: CPT

## 2024-09-03 PROCEDURE — 36415 COLL VENOUS BLD VENIPUNCTURE: CPT

## 2024-09-03 PROCEDURE — 94761 N-INVAS EAR/PLS OXIMETRY MLT: CPT

## 2024-09-03 PROCEDURE — 97535 SELF CARE MNGMENT TRAINING: CPT

## 2024-09-03 PROCEDURE — 1100000000 HC RM PRIVATE

## 2024-09-03 PROCEDURE — 83605 ASSAY OF LACTIC ACID: CPT

## 2024-09-03 PROCEDURE — 6360000002 HC RX W HCPCS: Performed by: INTERNAL MEDICINE

## 2024-09-03 PROCEDURE — 6370000000 HC RX 637 (ALT 250 FOR IP): Performed by: INTERNAL MEDICINE

## 2024-09-03 PROCEDURE — 6370000000 HC RX 637 (ALT 250 FOR IP): Performed by: STUDENT IN AN ORGANIZED HEALTH CARE EDUCATION/TRAINING PROGRAM

## 2024-09-03 PROCEDURE — 80048 BASIC METABOLIC PNL TOTAL CA: CPT

## 2024-09-03 PROCEDURE — 85025 COMPLETE CBC W/AUTO DIFF WBC: CPT

## 2024-09-03 PROCEDURE — 2500000003 HC RX 250 WO HCPCS: Performed by: INTERNAL MEDICINE

## 2024-09-03 PROCEDURE — 82962 GLUCOSE BLOOD TEST: CPT

## 2024-09-03 RX ADMIN — ACETAMINOPHEN 650 MG: 325 TABLET ORAL at 05:53

## 2024-09-03 RX ADMIN — APIXABAN 5 MG: 5 TABLET, FILM COATED ORAL at 08:05

## 2024-09-03 RX ADMIN — AMIODARONE HYDROCHLORIDE 100 MG: 200 TABLET ORAL at 08:05

## 2024-09-03 RX ADMIN — SODIUM CHLORIDE: 9 INJECTION, SOLUTION INTRAVENOUS at 05:22

## 2024-09-03 RX ADMIN — SODIUM CHLORIDE, PRESERVATIVE FREE 10 ML: 5 INJECTION INTRAVENOUS at 08:06

## 2024-09-03 RX ADMIN — SODIUM CHLORIDE, PRESERVATIVE FREE 10 ML: 5 INJECTION INTRAVENOUS at 20:51

## 2024-09-03 RX ADMIN — PRAVASTATIN SODIUM 20 MG: 20 TABLET ORAL at 20:50

## 2024-09-03 RX ADMIN — APIXABAN 5 MG: 5 TABLET, FILM COATED ORAL at 20:50

## 2024-09-03 RX ADMIN — CEFEPIME 2000 MG: 2 INJECTION, POWDER, FOR SOLUTION INTRAVENOUS at 08:08

## 2024-09-03 RX ADMIN — POTASSIUM PHOSPHATE, MONOBASIC POTASSIUM PHOSPHATE, DIBASIC 30 MMOL: 224; 236 INJECTION, SOLUTION, CONCENTRATE INTRAVENOUS at 09:42

## 2024-09-03 RX ADMIN — CEFEPIME 2000 MG: 2 INJECTION, POWDER, FOR SOLUTION INTRAVENOUS at 21:00

## 2024-09-03 RX ADMIN — INSULIN LISPRO 2 UNITS: 100 INJECTION, SOLUTION INTRAVENOUS; SUBCUTANEOUS at 13:01

## 2024-09-03 RX ADMIN — SODIUM CHLORIDE 1500 MG: 9 INJECTION, SOLUTION INTRAVENOUS at 13:42

## 2024-09-03 ASSESSMENT — PAIN - FUNCTIONAL ASSESSMENT: PAIN_FUNCTIONAL_ASSESSMENT: ACTIVITIES ARE NOT PREVENTED

## 2024-09-03 ASSESSMENT — PAIN DESCRIPTION - LOCATION: LOCATION: ARM;LEG;SHOULDER

## 2024-09-03 ASSESSMENT — PAIN DESCRIPTION - DESCRIPTORS: DESCRIPTORS: ACHING;DISCOMFORT

## 2024-09-03 ASSESSMENT — PAIN SCALES - GENERAL: PAINLEVEL_OUTOF10: 10

## 2024-09-04 PROBLEM — D72.829 LEUKOCYTOSIS: Status: ACTIVE | Noted: 2024-09-04

## 2024-09-04 PROBLEM — T80.1XXA INTRAVENOUS INFILTRATION: Status: ACTIVE | Noted: 2024-09-04

## 2024-09-04 LAB
ANION GAP SERPL CALC-SCNC: 4 MMOL/L (ref 5–15)
BUN SERPL-MCNC: 20 MG/DL (ref 6–20)
BUN/CREAT SERPL: 20 (ref 12–20)
CALCIUM SERPL-MCNC: 8.1 MG/DL (ref 8.5–10.1)
CHLORIDE SERPL-SCNC: 109 MMOL/L (ref 97–108)
CO2 SERPL-SCNC: 23 MMOL/L (ref 21–32)
CREAT SERPL-MCNC: 1.01 MG/DL (ref 0.7–1.3)
ERYTHROCYTE [DISTWIDTH] IN BLOOD BY AUTOMATED COUNT: 12.8 % (ref 11.5–14.5)
GLUCOSE BLD STRIP.AUTO-MCNC: 122 MG/DL (ref 65–117)
GLUCOSE BLD STRIP.AUTO-MCNC: 159 MG/DL (ref 65–117)
GLUCOSE BLD STRIP.AUTO-MCNC: 205 MG/DL (ref 65–117)
GLUCOSE BLD STRIP.AUTO-MCNC: 228 MG/DL (ref 65–117)
GLUCOSE SERPL-MCNC: 139 MG/DL (ref 65–100)
HCT VFR BLD AUTO: 30.6 % (ref 36.6–50.3)
HGB BLD-MCNC: 10.3 G/DL (ref 12.1–17)
MAGNESIUM SERPL-MCNC: 2 MG/DL (ref 1.6–2.4)
MCH RBC QN AUTO: 29.9 PG (ref 26–34)
MCHC RBC AUTO-ENTMCNC: 33.7 G/DL (ref 30–36.5)
MCV RBC AUTO: 89 FL (ref 80–99)
NRBC # BLD: 0 K/UL (ref 0–0.01)
NRBC BLD-RTO: 0 PER 100 WBC
PLATELET # BLD AUTO: 139 K/UL (ref 150–400)
PMV BLD AUTO: 10.5 FL (ref 8.9–12.9)
POTASSIUM SERPL-SCNC: 3.6 MMOL/L (ref 3.5–5.1)
PROCALCITONIN SERPL-MCNC: 4.33 NG/ML
RBC # BLD AUTO: 3.44 M/UL (ref 4.1–5.7)
SERVICE CMNT-IMP: ABNORMAL
SODIUM SERPL-SCNC: 136 MMOL/L (ref 136–145)
VANCOMYCIN SERPL-MCNC: 15.8 UG/ML
WBC # BLD AUTO: 5.2 K/UL (ref 4.1–11.1)

## 2024-09-04 PROCEDURE — 6360000002 HC RX W HCPCS: Performed by: INTERNAL MEDICINE

## 2024-09-04 PROCEDURE — 80048 BASIC METABOLIC PNL TOTAL CA: CPT

## 2024-09-04 PROCEDURE — 6370000000 HC RX 637 (ALT 250 FOR IP): Performed by: STUDENT IN AN ORGANIZED HEALTH CARE EDUCATION/TRAINING PROGRAM

## 2024-09-04 PROCEDURE — 97530 THERAPEUTIC ACTIVITIES: CPT

## 2024-09-04 PROCEDURE — 2580000003 HC RX 258: Performed by: INTERNAL MEDICINE

## 2024-09-04 PROCEDURE — 83735 ASSAY OF MAGNESIUM: CPT

## 2024-09-04 PROCEDURE — 36415 COLL VENOUS BLD VENIPUNCTURE: CPT

## 2024-09-04 PROCEDURE — 97116 GAIT TRAINING THERAPY: CPT

## 2024-09-04 PROCEDURE — 84145 PROCALCITONIN (PCT): CPT

## 2024-09-04 PROCEDURE — 80202 ASSAY OF VANCOMYCIN: CPT

## 2024-09-04 PROCEDURE — 1100000000 HC RM PRIVATE

## 2024-09-04 PROCEDURE — 99223 1ST HOSP IP/OBS HIGH 75: CPT | Performed by: INTERNAL MEDICINE

## 2024-09-04 PROCEDURE — 2580000003 HC RX 258: Performed by: STUDENT IN AN ORGANIZED HEALTH CARE EDUCATION/TRAINING PROGRAM

## 2024-09-04 PROCEDURE — 97535 SELF CARE MNGMENT TRAINING: CPT

## 2024-09-04 PROCEDURE — 6370000000 HC RX 637 (ALT 250 FOR IP): Performed by: INTERNAL MEDICINE

## 2024-09-04 PROCEDURE — 94761 N-INVAS EAR/PLS OXIMETRY MLT: CPT

## 2024-09-04 PROCEDURE — 82962 GLUCOSE BLOOD TEST: CPT

## 2024-09-04 PROCEDURE — 85027 COMPLETE CBC AUTOMATED: CPT

## 2024-09-04 RX ADMIN — SODIUM CHLORIDE: 9 INJECTION, SOLUTION INTRAVENOUS at 02:39

## 2024-09-04 RX ADMIN — SODIUM CHLORIDE 1500 MG: 9 INJECTION, SOLUTION INTRAVENOUS at 13:30

## 2024-09-04 RX ADMIN — AMIODARONE HYDROCHLORIDE 100 MG: 200 TABLET ORAL at 08:52

## 2024-09-04 RX ADMIN — SODIUM CHLORIDE, PRESERVATIVE FREE 10 ML: 5 INJECTION INTRAVENOUS at 08:52

## 2024-09-04 RX ADMIN — APIXABAN 5 MG: 5 TABLET, FILM COATED ORAL at 08:52

## 2024-09-04 RX ADMIN — SODIUM CHLORIDE, PRESERVATIVE FREE 10 ML: 5 INJECTION INTRAVENOUS at 20:23

## 2024-09-04 RX ADMIN — APIXABAN 5 MG: 5 TABLET, FILM COATED ORAL at 20:22

## 2024-09-04 RX ADMIN — SODIUM CHLORIDE: 9 INJECTION, SOLUTION INTRAVENOUS at 20:20

## 2024-09-04 RX ADMIN — INSULIN LISPRO 2 UNITS: 100 INJECTION, SOLUTION INTRAVENOUS; SUBCUTANEOUS at 12:28

## 2024-09-04 RX ADMIN — ACETAMINOPHEN 650 MG: 325 TABLET ORAL at 03:13

## 2024-09-04 RX ADMIN — PRAVASTATIN SODIUM 20 MG: 20 TABLET ORAL at 20:22

## 2024-09-04 RX ADMIN — CEFEPIME 2000 MG: 2 INJECTION, POWDER, FOR SOLUTION INTRAVENOUS at 20:21

## 2024-09-04 RX ADMIN — CEFEPIME 2000 MG: 2 INJECTION, POWDER, FOR SOLUTION INTRAVENOUS at 08:54

## 2024-09-04 ASSESSMENT — PAIN - FUNCTIONAL ASSESSMENT: PAIN_FUNCTIONAL_ASSESSMENT: ACTIVITIES ARE NOT PREVENTED

## 2024-09-04 ASSESSMENT — PAIN DESCRIPTION - LOCATION: LOCATION: ARM;KNEE

## 2024-09-04 ASSESSMENT — PAIN SCALES - GENERAL: PAINLEVEL_OUTOF10: 9

## 2024-09-04 ASSESSMENT — PAIN DESCRIPTION - ORIENTATION: ORIENTATION: LEFT

## 2024-09-04 ASSESSMENT — PAIN DESCRIPTION - DESCRIPTORS: DESCRIPTORS: ACHING;DISCOMFORT

## 2024-09-05 LAB
ANION GAP SERPL CALC-SCNC: 4 MMOL/L (ref 2–12)
BUN SERPL-MCNC: 14 MG/DL (ref 6–20)
BUN/CREAT SERPL: 15 (ref 12–20)
CALCIUM SERPL-MCNC: 8.1 MG/DL (ref 8.5–10.1)
CHLORIDE SERPL-SCNC: 109 MMOL/L (ref 97–108)
CO2 SERPL-SCNC: 24 MMOL/L (ref 21–32)
COMMENT:: NORMAL
CREAT SERPL-MCNC: 0.94 MG/DL (ref 0.7–1.3)
GLUCOSE BLD STRIP.AUTO-MCNC: 141 MG/DL (ref 65–117)
GLUCOSE BLD STRIP.AUTO-MCNC: 152 MG/DL (ref 65–117)
GLUCOSE BLD STRIP.AUTO-MCNC: 225 MG/DL (ref 65–117)
GLUCOSE BLD STRIP.AUTO-MCNC: 232 MG/DL (ref 65–117)
GLUCOSE SERPL-MCNC: 159 MG/DL (ref 65–100)
POTASSIUM SERPL-SCNC: 3.4 MMOL/L (ref 3.5–5.1)
SERVICE CMNT-IMP: ABNORMAL
SODIUM SERPL-SCNC: 137 MMOL/L (ref 136–145)
SPECIMEN HOLD: NORMAL

## 2024-09-05 PROCEDURE — 2580000003 HC RX 258: Performed by: INTERNAL MEDICINE

## 2024-09-05 PROCEDURE — 1100000000 HC RM PRIVATE

## 2024-09-05 PROCEDURE — 6370000000 HC RX 637 (ALT 250 FOR IP): Performed by: STUDENT IN AN ORGANIZED HEALTH CARE EDUCATION/TRAINING PROGRAM

## 2024-09-05 PROCEDURE — 6360000002 HC RX W HCPCS: Performed by: INTERNAL MEDICINE

## 2024-09-05 PROCEDURE — 97535 SELF CARE MNGMENT TRAINING: CPT

## 2024-09-05 PROCEDURE — 6370000000 HC RX 637 (ALT 250 FOR IP): Performed by: INTERNAL MEDICINE

## 2024-09-05 PROCEDURE — 80048 BASIC METABOLIC PNL TOTAL CA: CPT

## 2024-09-05 PROCEDURE — 94761 N-INVAS EAR/PLS OXIMETRY MLT: CPT

## 2024-09-05 PROCEDURE — 82962 GLUCOSE BLOOD TEST: CPT

## 2024-09-05 PROCEDURE — 99232 SBSQ HOSP IP/OBS MODERATE 35: CPT | Performed by: INTERNAL MEDICINE

## 2024-09-05 PROCEDURE — 97110 THERAPEUTIC EXERCISES: CPT

## 2024-09-05 RX ADMIN — AMIODARONE HYDROCHLORIDE 100 MG: 200 TABLET ORAL at 09:27

## 2024-09-05 RX ADMIN — SODIUM CHLORIDE 3000 MG: 900 INJECTION INTRAVENOUS at 15:22

## 2024-09-05 RX ADMIN — SODIUM CHLORIDE, PRESERVATIVE FREE 10 ML: 5 INJECTION INTRAVENOUS at 21:06

## 2024-09-05 RX ADMIN — INSULIN LISPRO 2 UNITS: 100 INJECTION, SOLUTION INTRAVENOUS; SUBCUTANEOUS at 12:00

## 2024-09-05 RX ADMIN — SODIUM CHLORIDE 1500 MG: 9 INJECTION, SOLUTION INTRAVENOUS at 15:09

## 2024-09-05 RX ADMIN — PRAVASTATIN SODIUM 20 MG: 20 TABLET ORAL at 21:07

## 2024-09-05 RX ADMIN — SODIUM CHLORIDE 3000 MG: 900 INJECTION INTRAVENOUS at 21:06

## 2024-09-05 RX ADMIN — ACETAMINOPHEN 650 MG: 325 TABLET ORAL at 06:20

## 2024-09-05 RX ADMIN — APIXABAN 5 MG: 5 TABLET, FILM COATED ORAL at 09:28

## 2024-09-05 RX ADMIN — CEFEPIME 2000 MG: 2 INJECTION, POWDER, FOR SOLUTION INTRAVENOUS at 09:35

## 2024-09-05 RX ADMIN — APIXABAN 5 MG: 5 TABLET, FILM COATED ORAL at 21:07

## 2024-09-05 ASSESSMENT — PAIN DESCRIPTION - DESCRIPTORS: DESCRIPTORS: ACHING;SORE

## 2024-09-05 ASSESSMENT — PAIN DESCRIPTION - ORIENTATION: ORIENTATION: LEFT

## 2024-09-05 ASSESSMENT — PAIN DESCRIPTION - LOCATION: LOCATION: KNEE

## 2024-09-05 ASSESSMENT — PAIN SCALES - GENERAL
PAINLEVEL_OUTOF10: 4
PAINLEVEL_OUTOF10: 6

## 2024-09-06 LAB
ANION GAP SERPL CALC-SCNC: 6 MMOL/L (ref 2–12)
BUN SERPL-MCNC: 13 MG/DL (ref 6–20)
BUN/CREAT SERPL: 15 (ref 12–20)
CALCIUM SERPL-MCNC: 7.9 MG/DL (ref 8.5–10.1)
CHLORIDE SERPL-SCNC: 110 MMOL/L (ref 97–108)
CO2 SERPL-SCNC: 23 MMOL/L (ref 21–32)
COMMENT:: NORMAL
CREAT SERPL-MCNC: 0.85 MG/DL (ref 0.7–1.3)
GLUCOSE BLD STRIP.AUTO-MCNC: 140 MG/DL (ref 65–117)
GLUCOSE BLD STRIP.AUTO-MCNC: 235 MG/DL (ref 65–117)
GLUCOSE BLD STRIP.AUTO-MCNC: 249 MG/DL (ref 65–117)
GLUCOSE SERPL-MCNC: 186 MG/DL (ref 65–100)
POTASSIUM SERPL-SCNC: 3.4 MMOL/L (ref 3.5–5.1)
SERVICE CMNT-IMP: ABNORMAL
SODIUM SERPL-SCNC: 139 MMOL/L (ref 136–145)
SPECIMEN HOLD: NORMAL

## 2024-09-06 PROCEDURE — 97530 THERAPEUTIC ACTIVITIES: CPT

## 2024-09-06 PROCEDURE — 6370000000 HC RX 637 (ALT 250 FOR IP): Performed by: STUDENT IN AN ORGANIZED HEALTH CARE EDUCATION/TRAINING PROGRAM

## 2024-09-06 PROCEDURE — 1100000000 HC RM PRIVATE

## 2024-09-06 PROCEDURE — 2580000003 HC RX 258: Performed by: INTERNAL MEDICINE

## 2024-09-06 PROCEDURE — 97116 GAIT TRAINING THERAPY: CPT

## 2024-09-06 PROCEDURE — 82962 GLUCOSE BLOOD TEST: CPT

## 2024-09-06 PROCEDURE — 80048 BASIC METABOLIC PNL TOTAL CA: CPT

## 2024-09-06 PROCEDURE — 97535 SELF CARE MNGMENT TRAINING: CPT

## 2024-09-06 PROCEDURE — 94761 N-INVAS EAR/PLS OXIMETRY MLT: CPT

## 2024-09-06 PROCEDURE — 6360000002 HC RX W HCPCS: Performed by: INTERNAL MEDICINE

## 2024-09-06 RX ADMIN — APIXABAN 5 MG: 5 TABLET, FILM COATED ORAL at 10:56

## 2024-09-06 RX ADMIN — APIXABAN 5 MG: 5 TABLET, FILM COATED ORAL at 20:50

## 2024-09-06 RX ADMIN — SODIUM CHLORIDE 3000 MG: 900 INJECTION INTRAVENOUS at 21:03

## 2024-09-06 RX ADMIN — SODIUM CHLORIDE: 9 INJECTION, SOLUTION INTRAVENOUS at 01:20

## 2024-09-06 RX ADMIN — SODIUM CHLORIDE 3000 MG: 900 INJECTION INTRAVENOUS at 15:23

## 2024-09-06 RX ADMIN — SODIUM CHLORIDE 3000 MG: 900 INJECTION INTRAVENOUS at 03:00

## 2024-09-06 RX ADMIN — SODIUM CHLORIDE 3000 MG: 900 INJECTION INTRAVENOUS at 10:59

## 2024-09-06 RX ADMIN — PRAVASTATIN SODIUM 20 MG: 20 TABLET ORAL at 20:50

## 2024-09-06 RX ADMIN — SODIUM CHLORIDE, PRESERVATIVE FREE 10 ML: 5 INJECTION INTRAVENOUS at 10:57

## 2024-09-06 RX ADMIN — INSULIN LISPRO 2 UNITS: 100 INJECTION, SOLUTION INTRAVENOUS; SUBCUTANEOUS at 12:29

## 2024-09-06 RX ADMIN — AMIODARONE HYDROCHLORIDE 100 MG: 200 TABLET ORAL at 10:56

## 2024-09-06 ASSESSMENT — PAIN SCALES - GENERAL: PAINLEVEL_OUTOF10: 0

## 2024-09-07 LAB
BACTERIA SPEC CULT: NORMAL
BACTERIA SPEC CULT: NORMAL
GLUCOSE BLD STRIP.AUTO-MCNC: 172 MG/DL (ref 65–117)
GLUCOSE BLD STRIP.AUTO-MCNC: 174 MG/DL (ref 65–117)
GLUCOSE BLD STRIP.AUTO-MCNC: 199 MG/DL (ref 65–117)
GLUCOSE BLD STRIP.AUTO-MCNC: 232 MG/DL (ref 65–117)
SERVICE CMNT-IMP: ABNORMAL
SERVICE CMNT-IMP: NORMAL
SERVICE CMNT-IMP: NORMAL

## 2024-09-07 PROCEDURE — 82962 GLUCOSE BLOOD TEST: CPT

## 2024-09-07 PROCEDURE — 2580000003 HC RX 258: Performed by: INTERNAL MEDICINE

## 2024-09-07 PROCEDURE — 6360000002 HC RX W HCPCS: Performed by: INTERNAL MEDICINE

## 2024-09-07 PROCEDURE — 94761 N-INVAS EAR/PLS OXIMETRY MLT: CPT

## 2024-09-07 PROCEDURE — 6370000000 HC RX 637 (ALT 250 FOR IP): Performed by: STUDENT IN AN ORGANIZED HEALTH CARE EDUCATION/TRAINING PROGRAM

## 2024-09-07 PROCEDURE — 1100000000 HC RM PRIVATE

## 2024-09-07 RX ADMIN — SODIUM CHLORIDE 3000 MG: 900 INJECTION INTRAVENOUS at 21:01

## 2024-09-07 RX ADMIN — INSULIN LISPRO 6 UNITS: 100 INJECTION, SOLUTION INTRAVENOUS; SUBCUTANEOUS at 18:14

## 2024-09-07 RX ADMIN — SODIUM CHLORIDE 3000 MG: 900 INJECTION INTRAVENOUS at 10:09

## 2024-09-07 RX ADMIN — PRAVASTATIN SODIUM 20 MG: 20 TABLET ORAL at 21:00

## 2024-09-07 RX ADMIN — SODIUM CHLORIDE: 9 INJECTION, SOLUTION INTRAVENOUS at 00:27

## 2024-09-07 RX ADMIN — SODIUM CHLORIDE 3000 MG: 900 INJECTION INTRAVENOUS at 18:17

## 2024-09-07 RX ADMIN — APIXABAN 5 MG: 5 TABLET, FILM COATED ORAL at 21:00

## 2024-09-07 RX ADMIN — APIXABAN 5 MG: 5 TABLET, FILM COATED ORAL at 10:05

## 2024-09-07 RX ADMIN — SODIUM CHLORIDE 3000 MG: 900 INJECTION INTRAVENOUS at 03:04

## 2024-09-07 RX ADMIN — AMIODARONE HYDROCHLORIDE 100 MG: 200 TABLET ORAL at 10:05

## 2024-09-07 ASSESSMENT — PAIN SCALES - GENERAL
PAINLEVEL_OUTOF10: 0

## 2024-09-08 LAB
GLUCOSE BLD STRIP.AUTO-MCNC: 132 MG/DL (ref 65–117)
GLUCOSE BLD STRIP.AUTO-MCNC: 190 MG/DL (ref 65–117)
GLUCOSE BLD STRIP.AUTO-MCNC: 223 MG/DL (ref 65–117)
GLUCOSE BLD STRIP.AUTO-MCNC: 289 MG/DL (ref 65–117)
SERVICE CMNT-IMP: ABNORMAL

## 2024-09-08 PROCEDURE — 1100000000 HC RM PRIVATE

## 2024-09-08 PROCEDURE — 82962 GLUCOSE BLOOD TEST: CPT

## 2024-09-08 PROCEDURE — 2580000003 HC RX 258: Performed by: INTERNAL MEDICINE

## 2024-09-08 PROCEDURE — 6360000002 HC RX W HCPCS: Performed by: INTERNAL MEDICINE

## 2024-09-08 PROCEDURE — 94761 N-INVAS EAR/PLS OXIMETRY MLT: CPT

## 2024-09-08 PROCEDURE — 6370000000 HC RX 637 (ALT 250 FOR IP): Performed by: INTERNAL MEDICINE

## 2024-09-08 PROCEDURE — 6370000000 HC RX 637 (ALT 250 FOR IP): Performed by: STUDENT IN AN ORGANIZED HEALTH CARE EDUCATION/TRAINING PROGRAM

## 2024-09-08 RX ORDER — FUROSEMIDE 10 MG/ML
20 INJECTION INTRAMUSCULAR; INTRAVENOUS DAILY
Status: DISCONTINUED | OUTPATIENT
Start: 2024-09-09 | End: 2024-09-10 | Stop reason: HOSPADM

## 2024-09-08 RX ADMIN — APIXABAN 5 MG: 5 TABLET, FILM COATED ORAL at 21:00

## 2024-09-08 RX ADMIN — PRAVASTATIN SODIUM 20 MG: 20 TABLET ORAL at 21:00

## 2024-09-08 RX ADMIN — SODIUM CHLORIDE 3000 MG: 900 INJECTION INTRAVENOUS at 03:34

## 2024-09-08 RX ADMIN — INSULIN LISPRO 4 UNITS: 100 INJECTION, SOLUTION INTRAVENOUS; SUBCUTANEOUS at 17:32

## 2024-09-08 RX ADMIN — Medication 3 MG: at 21:00

## 2024-09-08 RX ADMIN — SODIUM CHLORIDE 3000 MG: 900 INJECTION INTRAVENOUS at 21:01

## 2024-09-08 RX ADMIN — SODIUM CHLORIDE, PRESERVATIVE FREE 10 ML: 5 INJECTION INTRAVENOUS at 10:23

## 2024-09-08 RX ADMIN — APIXABAN 5 MG: 5 TABLET, FILM COATED ORAL at 10:22

## 2024-09-08 RX ADMIN — INSULIN LISPRO 2 UNITS: 100 INJECTION, SOLUTION INTRAVENOUS; SUBCUTANEOUS at 13:00

## 2024-09-08 RX ADMIN — AMIODARONE HYDROCHLORIDE 100 MG: 200 TABLET ORAL at 10:22

## 2024-09-08 RX ADMIN — SODIUM CHLORIDE 3000 MG: 900 INJECTION INTRAVENOUS at 16:12

## 2024-09-08 RX ADMIN — SODIUM CHLORIDE 3000 MG: 900 INJECTION INTRAVENOUS at 10:26

## 2024-09-08 ASSESSMENT — PAIN SCALES - GENERAL
PAINLEVEL_OUTOF10: 0

## 2024-09-09 ENCOUNTER — APPOINTMENT (OUTPATIENT)
Dept: VASCULAR SURGERY | Facility: HOSPITAL | Age: 84
DRG: 871 | End: 2024-09-09
Attending: INTERNAL MEDICINE
Payer: MEDICARE

## 2024-09-09 LAB
ANION GAP SERPL CALC-SCNC: 5 MMOL/L (ref 2–12)
BUN SERPL-MCNC: 13 MG/DL (ref 6–20)
BUN/CREAT SERPL: 17 (ref 12–20)
CALCIUM SERPL-MCNC: 8.1 MG/DL (ref 8.5–10.1)
CHLORIDE SERPL-SCNC: 109 MMOL/L (ref 97–108)
CO2 SERPL-SCNC: 25 MMOL/L (ref 21–32)
CREAT SERPL-MCNC: 0.76 MG/DL (ref 0.7–1.3)
ERYTHROCYTE [DISTWIDTH] IN BLOOD BY AUTOMATED COUNT: 13 % (ref 11.5–14.5)
GLUCOSE BLD STRIP.AUTO-MCNC: 149 MG/DL (ref 65–117)
GLUCOSE BLD STRIP.AUTO-MCNC: 150 MG/DL (ref 65–117)
GLUCOSE BLD STRIP.AUTO-MCNC: 236 MG/DL (ref 65–117)
GLUCOSE BLD STRIP.AUTO-MCNC: 240 MG/DL (ref 65–117)
GLUCOSE BLD STRIP.AUTO-MCNC: 251 MG/DL (ref 65–117)
GLUCOSE SERPL-MCNC: 165 MG/DL (ref 65–100)
HCT VFR BLD AUTO: 28.4 % (ref 36.6–50.3)
HGB BLD-MCNC: 9.4 G/DL (ref 12.1–17)
MAGNESIUM SERPL-MCNC: 1.8 MG/DL (ref 1.6–2.4)
MAGNESIUM SERPL-MCNC: 1.8 MG/DL (ref 1.6–2.4)
MCH RBC QN AUTO: 29.7 PG (ref 26–34)
MCHC RBC AUTO-ENTMCNC: 33.1 G/DL (ref 30–36.5)
MCV RBC AUTO: 89.9 FL (ref 80–99)
NRBC # BLD: 0 K/UL (ref 0–0.01)
NRBC BLD-RTO: 0 PER 100 WBC
PHOSPHATE SERPL-MCNC: 2.5 MG/DL (ref 2.6–4.7)
PLATELET # BLD AUTO: 227 K/UL (ref 150–400)
PMV BLD AUTO: 10 FL (ref 8.9–12.9)
POTASSIUM SERPL-SCNC: 3.3 MMOL/L (ref 3.5–5.1)
RBC # BLD AUTO: 3.16 M/UL (ref 4.1–5.7)
SERVICE CMNT-IMP: ABNORMAL
SODIUM SERPL-SCNC: 139 MMOL/L (ref 136–145)
WBC # BLD AUTO: 4.9 K/UL (ref 4.1–11.1)

## 2024-09-09 PROCEDURE — 6370000000 HC RX 637 (ALT 250 FOR IP): Performed by: STUDENT IN AN ORGANIZED HEALTH CARE EDUCATION/TRAINING PROGRAM

## 2024-09-09 PROCEDURE — 83735 ASSAY OF MAGNESIUM: CPT

## 2024-09-09 PROCEDURE — 94761 N-INVAS EAR/PLS OXIMETRY MLT: CPT

## 2024-09-09 PROCEDURE — 6370000000 HC RX 637 (ALT 250 FOR IP): Performed by: INTERNAL MEDICINE

## 2024-09-09 PROCEDURE — 1100000000 HC RM PRIVATE

## 2024-09-09 PROCEDURE — 2580000003 HC RX 258: Performed by: INTERNAL MEDICINE

## 2024-09-09 PROCEDURE — 93971 EXTREMITY STUDY: CPT

## 2024-09-09 PROCEDURE — 36415 COLL VENOUS BLD VENIPUNCTURE: CPT

## 2024-09-09 PROCEDURE — 97116 GAIT TRAINING THERAPY: CPT

## 2024-09-09 PROCEDURE — 82962 GLUCOSE BLOOD TEST: CPT

## 2024-09-09 PROCEDURE — 2500000003 HC RX 250 WO HCPCS: Performed by: INTERNAL MEDICINE

## 2024-09-09 PROCEDURE — 99232 SBSQ HOSP IP/OBS MODERATE 35: CPT | Performed by: INTERNAL MEDICINE

## 2024-09-09 PROCEDURE — 97535 SELF CARE MNGMENT TRAINING: CPT

## 2024-09-09 PROCEDURE — 6360000002 HC RX W HCPCS: Performed by: INTERNAL MEDICINE

## 2024-09-09 PROCEDURE — 84100 ASSAY OF PHOSPHORUS: CPT

## 2024-09-09 PROCEDURE — 97530 THERAPEUTIC ACTIVITIES: CPT

## 2024-09-09 PROCEDURE — 80048 BASIC METABOLIC PNL TOTAL CA: CPT

## 2024-09-09 PROCEDURE — 85027 COMPLETE CBC AUTOMATED: CPT

## 2024-09-09 RX ADMIN — SODIUM CHLORIDE, PRESERVATIVE FREE 10 ML: 5 INJECTION INTRAVENOUS at 21:23

## 2024-09-09 RX ADMIN — AMIODARONE HYDROCHLORIDE 100 MG: 200 TABLET ORAL at 09:56

## 2024-09-09 RX ADMIN — Medication 3 MG: at 21:22

## 2024-09-09 RX ADMIN — PRAVASTATIN SODIUM 20 MG: 20 TABLET ORAL at 21:22

## 2024-09-09 RX ADMIN — APIXABAN 5 MG: 5 TABLET, FILM COATED ORAL at 21:22

## 2024-09-09 RX ADMIN — APIXABAN 5 MG: 5 TABLET, FILM COATED ORAL at 09:56

## 2024-09-09 RX ADMIN — POTASSIUM PHOSPHATE, MONOBASIC POTASSIUM PHOSPHATE, DIBASIC 30 MMOL: 224; 236 INJECTION, SOLUTION, CONCENTRATE INTRAVENOUS at 11:41

## 2024-09-09 RX ADMIN — INSULIN LISPRO 2 UNITS: 100 INJECTION, SOLUTION INTRAVENOUS; SUBCUTANEOUS at 13:24

## 2024-09-09 RX ADMIN — FUROSEMIDE 20 MG: 10 INJECTION, SOLUTION INTRAMUSCULAR; INTRAVENOUS at 09:56

## 2024-09-09 RX ADMIN — POTASSIUM BICARBONATE 40 MEQ: 782 TABLET, EFFERVESCENT ORAL at 07:04

## 2024-09-09 RX ADMIN — SODIUM CHLORIDE 3000 MG: 900 INJECTION INTRAVENOUS at 03:25

## 2024-09-09 ASSESSMENT — PAIN SCALES - GENERAL
PAINLEVEL_OUTOF10: 0

## 2024-09-10 VITALS
HEART RATE: 67 BPM | DIASTOLIC BLOOD PRESSURE: 65 MMHG | HEIGHT: 67 IN | RESPIRATION RATE: 17 BRPM | OXYGEN SATURATION: 97 % | TEMPERATURE: 97.5 F | BODY MASS INDEX: 32.18 KG/M2 | SYSTOLIC BLOOD PRESSURE: 163 MMHG | WEIGHT: 205 LBS

## 2024-09-10 LAB
GLUCOSE BLD STRIP.AUTO-MCNC: 172 MG/DL (ref 65–117)
SERVICE CMNT-IMP: ABNORMAL

## 2024-09-10 PROCEDURE — 6370000000 HC RX 637 (ALT 250 FOR IP): Performed by: STUDENT IN AN ORGANIZED HEALTH CARE EDUCATION/TRAINING PROGRAM

## 2024-09-10 PROCEDURE — 97116 GAIT TRAINING THERAPY: CPT

## 2024-09-10 PROCEDURE — 82962 GLUCOSE BLOOD TEST: CPT

## 2024-09-10 PROCEDURE — 2580000003 HC RX 258: Performed by: INTERNAL MEDICINE

## 2024-09-10 PROCEDURE — 94761 N-INVAS EAR/PLS OXIMETRY MLT: CPT

## 2024-09-10 PROCEDURE — 6360000002 HC RX W HCPCS: Performed by: INTERNAL MEDICINE

## 2024-09-10 PROCEDURE — 6370000000 HC RX 637 (ALT 250 FOR IP): Performed by: INTERNAL MEDICINE

## 2024-09-10 RX ORDER — DICYCLOMINE HYDROCHLORIDE 10 MG/1
10 CAPSULE ORAL 4 TIMES DAILY PRN
Qty: 120 CAPSULE | Refills: 3 | Status: SHIPPED | OUTPATIENT
Start: 2024-09-10

## 2024-09-10 RX ORDER — LANOLIN ALCOHOL/MO/W.PET/CERES
3 CREAM (GRAM) TOPICAL NIGHTLY PRN
Qty: 30 TABLET | Refills: 0 | Status: SHIPPED | OUTPATIENT
Start: 2024-09-10 | End: 2024-10-10

## 2024-09-10 RX ORDER — MIDODRINE HYDROCHLORIDE 2.5 MG/1
2.5 TABLET ORAL 3 TIMES DAILY PRN
Qty: 90 TABLET | Refills: 3 | Status: SHIPPED | OUTPATIENT
Start: 2024-09-10

## 2024-09-10 RX ADMIN — ACETAMINOPHEN 650 MG: 325 TABLET ORAL at 06:15

## 2024-09-10 RX ADMIN — AMIODARONE HYDROCHLORIDE 100 MG: 200 TABLET ORAL at 09:32

## 2024-09-10 RX ADMIN — SODIUM CHLORIDE, PRESERVATIVE FREE 10 ML: 5 INJECTION INTRAVENOUS at 09:33

## 2024-09-10 RX ADMIN — APIXABAN 5 MG: 5 TABLET, FILM COATED ORAL at 09:32

## 2024-09-10 RX ADMIN — FUROSEMIDE 20 MG: 10 INJECTION, SOLUTION INTRAMUSCULAR; INTRAVENOUS at 09:33

## 2024-09-10 ASSESSMENT — PAIN DESCRIPTION - LOCATION: LOCATION: HEAD

## 2024-09-10 ASSESSMENT — PAIN SCALES - GENERAL: PAINLEVEL_OUTOF10: 8

## 2024-09-11 LAB — ECHO BSA: 2.1 M2

## 2024-11-11 DIAGNOSIS — I48.0 PAF (PAROXYSMAL ATRIAL FIBRILLATION) (HCC): ICD-10-CM

## 2024-11-11 DIAGNOSIS — E78.2 MIXED HYPERLIPIDEMIA: ICD-10-CM

## 2024-11-11 DIAGNOSIS — Z79.899 LONG TERM CURRENT USE OF AMIODARONE: ICD-10-CM

## 2024-11-11 DIAGNOSIS — I71.21 ANEURYSM OF ASCENDING AORTA WITHOUT RUPTURE (HCC): ICD-10-CM

## 2024-11-11 DIAGNOSIS — I10 ESSENTIAL (PRIMARY) HYPERTENSION: Primary | ICD-10-CM

## 2024-11-13 RX ORDER — AMIODARONE HYDROCHLORIDE 200 MG/1
200 TABLET ORAL DAILY
Qty: 45 TABLET | Refills: 2 | Status: SHIPPED
Start: 2024-11-13

## 2024-11-13 RX ORDER — AMIODARONE HYDROCHLORIDE 200 MG/1
TABLET ORAL
Qty: 45 TABLET | Refills: 2 | Status: SHIPPED | OUTPATIENT
Start: 2024-11-13 | End: 2024-11-13

## 2024-11-13 NOTE — TELEPHONE ENCOUNTER
Noted patient did not have recent blood work. Patient will need TSH, LFT. Chest X-Ray done in 08-  No refills of amiodarone sent at this time. Until blood work done.   Spoke with patient , verified patient with two identifiers, regarding results and recommendations. Patient voiced understanding.

## 2024-11-14 RX ORDER — AMIODARONE HYDROCHLORIDE 200 MG/1
TABLET ORAL
Qty: 45 TABLET | Refills: 2 | OUTPATIENT
Start: 2024-11-14

## 2024-11-14 NOTE — TELEPHONE ENCOUNTER
Requested Prescriptions     Refused Prescriptions Disp Refills    amiodarone (CORDARONE) 200 MG tablet [Pharmacy Med Name: amiodarone 200 mg tablet] 45 tablet 2     Sig: take 1/2 tablet (100mg) BY MOUTH DAILY     Refused By: STEVEN ROONEY     Reason for Refusal: Duplicate Request     script sent 11/13     Future Appointments   Date Time Provider Department Center   12/3/2024 10:20 AM Konrad Kingston MD [facility]

## 2024-11-20 RX ORDER — AMIODARONE HYDROCHLORIDE 200 MG/1
TABLET ORAL
Qty: 45 TABLET | Refills: 0 | Status: SHIPPED | OUTPATIENT
Start: 2024-11-20

## 2024-11-20 NOTE — TELEPHONE ENCOUNTER
Refill Request Received for the Following Medication     Requested Prescriptions     Pending Prescriptions Disp Refills    amiodarone (CORDARONE) 200 MG tablet [Pharmacy Med Name: amiodarone 200 mg tablet] 45 tablet 2     Sig: take 1/2 tablet (100mg) BY MOUTH DAILY       Last Prescribed: 11-    Last Appointment With Me:  5/14/2024     Future Appointments:  Future Appointments   Date Time Provider Department Center   12/3/2024 10:20 AM Konrad Kingston MD [facility]

## 2024-12-01 ENCOUNTER — APPOINTMENT (OUTPATIENT)
Facility: HOSPITAL | Age: 84
End: 2024-12-01
Payer: MEDICARE

## 2024-12-01 ENCOUNTER — HOSPITAL ENCOUNTER (EMERGENCY)
Facility: HOSPITAL | Age: 84
Discharge: HOME | End: 2024-12-01
Payer: MEDICARE

## 2024-12-01 VITALS
TEMPERATURE: 97.5 F | HEIGHT: 67 IN | SYSTOLIC BLOOD PRESSURE: 185 MMHG | HEART RATE: 62 BPM | BODY MASS INDEX: 32.18 KG/M2 | OXYGEN SATURATION: 97 % | DIASTOLIC BLOOD PRESSURE: 81 MMHG | RESPIRATION RATE: 16 BRPM | WEIGHT: 205 LBS

## 2024-12-01 DIAGNOSIS — R03.0 ELEVATED BLOOD PRESSURE READING: ICD-10-CM

## 2024-12-01 DIAGNOSIS — R93.89 ABNORMAL CT SCAN: ICD-10-CM

## 2024-12-01 DIAGNOSIS — E04.1 THYROID NODULE: ICD-10-CM

## 2024-12-01 DIAGNOSIS — S22.089A CLOSED FRACTURE OF TWELFTH THORACIC VERTEBRA, UNSPECIFIED FRACTURE MORPHOLOGY, INITIAL ENCOUNTER (HCC): ICD-10-CM

## 2024-12-01 DIAGNOSIS — S32.019A CLOSED FRACTURE OF FIRST LUMBAR VERTEBRA, UNSPECIFIED FRACTURE MORPHOLOGY, INITIAL ENCOUNTER (HCC): ICD-10-CM
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
## 2024-12-01 DIAGNOSIS — M19.90 OSTEOARTHRITIS, UNSPECIFIED OSTEOARTHRITIS TYPE, UNSPECIFIED SITE: ICD-10-CM

## 2024-12-01 PROCEDURE — 72192 CT PELVIS W/O DYE: CPT

## 2024-12-01 PROCEDURE — 6370000000 HC RX 637 (ALT 250 FOR IP): Performed by: PHYSICIAN ASSISTANT

## 2024-12-01 PROCEDURE — 72131 CT LUMBAR SPINE W/O DYE: CPT

## 2024-12-01 PROCEDURE — 6360000002 HC RX W HCPCS: Performed by: PHYSICIAN ASSISTANT

## 2024-12-01 PROCEDURE — 70450 CT HEAD/BRAIN W/O DYE: CPT

## 2024-12-01 PROCEDURE — 72125 CT NECK SPINE W/O DYE: CPT

## 2024-12-01 PROCEDURE — 99284 EMERGENCY DEPT VISIT MOD MDM: CPT

## 2024-12-01 RX ORDER — LIDOCAINE 50 MG/G
1 PATCH TOPICAL DAILY
Qty: 10 PATCH | Refills: 0 | Status: SHIPPED | OUTPATIENT
Start: 2024-12-01 | End: 2024-12-11

## 2024-12-01 RX ORDER — AMLODIPINE BESYLATE 5 MG/1
5 TABLET ORAL DAILY
COMMUNITY

## 2024-12-01 RX ORDER — LIDOCAINE 4 G/G
1 PATCH TOPICAL
Status: DISCONTINUED | OUTPATIENT
Start: 2024-12-01 | End: 2024-12-01 | Stop reason: HOSPADM

## 2024-12-01 RX ORDER — LOSARTAN POTASSIUM 100 MG/1
100 TABLET ORAL DAILY
Status: DISCONTINUED | OUTPATIENT
Start: 2024-12-01 | End: 2024-12-01 | Stop reason: HOSPADM

## 2024-12-01 RX ORDER — DEXAMETHASONE SODIUM PHOSPHATE 10 MG/ML
6 INJECTION, SOLUTION INTRAMUSCULAR; INTRAVENOUS ONCE
Status: COMPLETED | OUTPATIENT
Start: 2024-12-01 | End: 2024-12-01

## 2024-12-01 RX ORDER — AMLODIPINE BESYLATE 5 MG/1
5 TABLET ORAL
Status: COMPLETED | OUTPATIENT
Start: 2024-12-01 | End: 2024-12-01

## 2024-12-01 RX ORDER — LOSARTAN POTASSIUM 100 MG/1
100 TABLET ORAL DAILY
COMMUNITY

## 2024-12-01 RX ADMIN — AMLODIPINE BESYLATE 5 MG: 5 TABLET ORAL at 12:11

## 2024-12-01 RX ADMIN — LOSARTAN POTASSIUM 100 MG: 100 TABLET, FILM COATED ORAL at 12:11

## 2024-12-01 RX ADMIN — DEXAMETHASONE SODIUM PHOSPHATE 6 MG: 10 INJECTION, SOLUTION INTRAMUSCULAR; INTRAVENOUS at 12:12

## 2024-12-01 ASSESSMENT — PAIN SCALES - GENERAL: PAINLEVEL_OUTOF10: 9

## 2024-12-01 ASSESSMENT — PAIN - FUNCTIONAL ASSESSMENT: PAIN_FUNCTIONAL_ASSESSMENT: 0-10

## 2024-12-01 NOTE — DISCHARGE INSTRUCTIONS
Thank You!    It was a pleasure taking care of you in our Emergency Department today. We know that when you come to [facility], you are entrusting us with your health, comfort, and safety. Our clinicians honor that trust, and truly appreciate the opportunity to care for you and your loved ones.    If you receive a survey about your Emergency Department experience today, please fill it out.  We value your feedback. Thank you.      Sabine Leung PA-C    ___________________________________  I have included a copy of your lab results and/or radiologic studies from today's visit so you can have them easily available at your follow-up visit.   No results found for this or any previous visit (from the past 12 hour(s)).    CT CSpine W/O Contrast   Final Result         1. No evidence for acute cervical fracture.   2. A couple small thyroid nodules.   3. Borderline enlarged right paratracheal lymph node.                     Electronically signed by Kahlil Messina      CT Head W/O Contrast   Final Result   1. No evidence of acute infarct or intracranial hemorrhage.   2. Periventricular white matter disease is likely secondary to chronic small   vessel ischemic changes.         Electronically signed by Vinnie Huber MD      CT PELVIS WO CONTRAST Additional Contrast? None   Final Result      1. No acute osseous abnormality.   2. Intact right total hip arthroplasty.         Electronically signed by Vinnie Huber MD      CT LUMBAR SPINE WO CONTRAST   Final Result      1. Acute superior endplate compression fracture at L1 with mild loss of height.   2. Age-indeterminate superior endplate compression fracture at T12 with mild   loss of height.   3. Moderate multilevel degenerative spondylosis.         Electronically signed by Vinnie Huebr MD        [unfilled]

## 2024-12-01 NOTE — ED PROVIDER NOTES
[facility] EMERGENCY DEPT  EMERGENCY DEPARTMENT ENCOUNTER       Pt Name: Darius Burgos  MRN: [id number]  Birthdate 1940  Date of evaluation: 12/1/2024  Provider: RHIANNON Hatch   PCP: Maria Luisa Gramajo MD  Note Started: 12:34 PM EST 12/1/24     CHIEF COMPLAINT       Chief Complaint   Patient presents with    Back Pain     Patient lost his balance and fell backwards a few days ago landing on his butt.  Patient reports lower back pain.  Pt. States he did hit his head on the car door but denies LOC. Pt. Ambulatory to triage.         HISTORY OF PRESENT ILLNESS: 1 or more elements      History From: Patient  None     Darius Burgos is a 84 y.o. male with a history of A-fib on Eliquis, and additional history as noted below, who presents to the ED for evaluation of low back pain after a fall that occurred Tuesday.  States he lost his balance and fell backwards onto his buttock, states he did hit his head on the side of the railing.  No loss of consciousness.  He is on Eliquis.  States he attempted to go to urgent care but they did not do x-rays on the weekends, and that his wife is being discharged in the hospital today so figured given his persistent soreness he would be seen here today for her discharge.  Again, he did hit his head but denies loss of consciousness. States he has had a chronic headache for the past several months, this is unchanged and denies any new or worsened head pain. Denies new vision changes, dizziness.  Denies any neck pain.  States he does have low back pain and right hip pain (endorses history of arthroplasty to right hip) states the back pain is an achy soreness associated with certain movements, particularly bending and twisting.  States it is also hard to find a comfortable position.  He has been able to ambulate, but pain is worse with ambulation.  Denies any new extremity weakness, numbness, or tingling.  No changes in bowel or bladder habits. Denies any bowel or bladder

## 2024-12-02 RX ORDER — AMIODARONE HYDROCHLORIDE 200 MG/1
TABLET ORAL
Qty: 45 TABLET | Refills: 0 | OUTPATIENT
Start: 2024-12-02

## 2024-12-02 NOTE — TELEPHONE ENCOUNTER
Amiodarone recently ordered (11-)for 3 months.   Noted labs were requested to keep on refilling. Labs still pending.

## 2024-12-26 RX ORDER — METOPROLOL SUCCINATE 25 MG/1
12.5 TABLET, EXTENDED RELEASE ORAL DAILY
Qty: 45 TABLET | Refills: 0 | Status: SHIPPED | OUTPATIENT
Start: 2024-12-26

## 2024-12-26 NOTE — TELEPHONE ENCOUNTER
Refill Request Received for the Following Medication     Requested Prescriptions     Pending Prescriptions Disp Refills    metoprolol succinate (TOPROL XL) 25 MG extended release tablet [Pharmacy Med Name: metoprolol succinate ER 25 mg tablet,extended release 24 hr] 45 tablet 3     Sig: take 1/2 tablet BY MOUTH DAILY       Last Prescribed: 11-    Last Appointment With Me:  5/14/2024     Future Appointments:  Future Appointments   Date Time Provider Department Center   1/7/2025  3:40 PM Konrad Kingston MD [facility]

## 2025-02-14 ENCOUNTER — TELEPHONE (OUTPATIENT)
Age: 85
End: 2025-02-14

## 2025-02-14 ENCOUNTER — OFFICE VISIT (OUTPATIENT)
Age: 85
End: 2025-02-14
Payer: MEDICARE

## 2025-02-14 DIAGNOSIS — I10 ESSENTIAL (PRIMARY) HYPERTENSION: ICD-10-CM

## 2025-02-14 DIAGNOSIS — I48.0 PAROXYSMAL ATRIAL FIBRILLATION (HCC): ICD-10-CM

## 2025-02-14 DIAGNOSIS — I34.0 NONRHEUMATIC MITRAL (VALVE) INSUFFICIENCY: ICD-10-CM

## 2025-02-14 DIAGNOSIS — D64.9 ANEMIA, UNSPECIFIED TYPE: ICD-10-CM

## 2025-02-14 DIAGNOSIS — I48.0 PAF (PAROXYSMAL ATRIAL FIBRILLATION) (HCC): Primary | ICD-10-CM

## 2025-02-14 DIAGNOSIS — I35.1 AORTIC VALVE INSUFFICIENCY, ETIOLOGY OF CARDIAC VALVE DISEASE UNSPECIFIED: ICD-10-CM

## 2025-02-14 DIAGNOSIS — R79.89 ELEVATED LFTS: ICD-10-CM

## 2025-02-14 DIAGNOSIS — I34.0 MITRAL VALVE INSUFFICIENCY, UNSPECIFIED ETIOLOGY: ICD-10-CM

## 2025-02-14 DIAGNOSIS — I71.21 ANEURYSM OF ASCENDING AORTA WITHOUT RUPTURE (HCC): ICD-10-CM

## 2025-02-14 DIAGNOSIS — E78.2 MIXED HYPERLIPIDEMIA: ICD-10-CM

## 2025-02-14 PROCEDURE — 93005 ELECTROCARDIOGRAM TRACING: CPT | Performed by: INTERNAL MEDICINE

## 2025-02-14 PROCEDURE — 99214 OFFICE O/P EST MOD 30 MIN: CPT | Performed by: INTERNAL MEDICINE

## 2025-02-14 RX ORDER — PRAVASTATIN SODIUM 20 MG
TABLET ORAL
COMMUNITY
Start: 2025-01-27

## 2025-02-14 ASSESSMENT — PATIENT HEALTH QUESTIONNAIRE - PHQ9
SUM OF ALL RESPONSES TO PHQ9 QUESTIONS 1 & 2: 0
SUM OF ALL RESPONSES TO PHQ QUESTIONS 1-9: 0
SUM OF ALL RESPONSES TO PHQ QUESTIONS 1-9: 0
1. LITTLE INTEREST OR PLEASURE IN DOING THINGS: NOT AT ALL
2. FEELING DOWN, DEPRESSED OR HOPELESS: NOT AT ALL
SUM OF ALL RESPONSES TO PHQ QUESTIONS 1-9: 0
SUM OF ALL RESPONSES TO PHQ QUESTIONS 1-9: 0

## 2025-02-14 NOTE — PROGRESS NOTES
[address] [place] [address] [phone]    [address] [place] [address]     Subjective:        Darius Burgos is a 84 y.o. male is here for routine f/u.  The patient denies chest pain/ shortness of breath, orthopnea, PND, LE edema, palpitations, syncope, presyncope or fatigue.    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 01/09/2020      Maria Luisa Gramajo MD  Past Medical History:   Diagnosis Date    Arthritis     At risk for sleep apnea 12/18/2019    Stop Bang 5     Atrial fibrillation (HCC)     Atrial Flutter-Wittkamp    Cancer (HCC)     prostate cancer    Colon cancer (HCC)     Diabetes (HCC)     DM2    Elevated cholesterol     History of blood transfusion     over 25 years    History of kidney stones     [place] (hard of hearing)     bilateral hearing aids    Hypertension     PUD (peptic ulcer disease) 1980    Skin cancer       Past Surgical History:   Procedure Laterality Date    APPENDECTOMY      1960's    CATARACT REMOVAL Bilateral     CHOLECYSTECTOMY, LAPAROSCOPIC N/A 11/19/2023    LAPAROSCOPIC CHOLCYSTECTOMY  WITH GRAMS performed by Miguel A Johnson MD at [facility] MAIN OR    COLONOSCOPY N/A 09/27/2017    COLONOSCOPY performed by Shmuel Santiago MD at [facility] ENDOSCOPY    COLONOSCOPY N/A 12/30/2019    COLONOSCOPY WITH POLYPECTOMY performed by Shmuel Santiago MD at [facility] AMBULATORY OR    COLONOSCOPY N/A 11/30/2022    COLONOSCOPY performed by Miguel A Haynes MD at [facility] ENDOSCOPY    COLONOSCOPY N/A 10/11/2021    COLONOSCOPY performed by Shmuel Santiago MD at [facility] ENDOSCOPY    COLONOSCOPY N/A 8/18/2023    COLONOSCOPY performed by Miguel A Haynes II, MD at [facility] ENDOSCOPY

## 2025-02-14 NOTE — TELEPHONE ENCOUNTER
Enrolled with [employer] - Ordered and being shipped to patient's home address on file.  ETA within 5-7 business days.         Message  Received: Today  Delmi Duke, Aleisha Hughes  Please request a one week E. Holter for P. Afib per dr. Kingston.  Thanks  Delmi

## 2025-02-16 ENCOUNTER — TELEPHONE (OUTPATIENT)
Age: 85
End: 2025-02-16

## 2025-02-16 VITALS
DIASTOLIC BLOOD PRESSURE: 72 MMHG | OXYGEN SATURATION: 95 % | HEIGHT: 67 IN | SYSTOLIC BLOOD PRESSURE: 128 MMHG | BODY MASS INDEX: 32.58 KG/M2 | WEIGHT: 207.6 LBS | HEART RATE: 61 BPM

## 2025-02-24 RX ORDER — AMIODARONE HYDROCHLORIDE 200 MG/1
TABLET ORAL
Qty: 45 TABLET | Refills: 0 | Status: SHIPPED | OUTPATIENT
Start: 2025-02-24

## 2025-02-24 NOTE — TELEPHONE ENCOUNTER
Requested Prescriptions     Signed Prescriptions Disp Refills    amiodarone (CORDARONE) 200 MG tablet 45 tablet 0     Sig: take 1/2 tablet (100mg) BY MOUTH DAILY     Authorizing Provider: KONRAD KINGSTON     Ordering User: STEVEN ROONEY per MD    Future Appointments   Date Time Provider Department Center   3/17/2025  9:20 AM Yemi Ariza MD [facility] BS [facility]   8/22/2025 10:40 AM Konrad Kingston MD [facility] BS AMB

## 2025-02-28 DIAGNOSIS — I10 HIGH BLOOD PRESSURE: Primary | ICD-10-CM

## 2025-02-28 DIAGNOSIS — I48.92 ATRIAL FLUTTER, PAROXYSMAL (HCC): ICD-10-CM

## 2025-02-28 DIAGNOSIS — I10 ESSENTIAL (PRIMARY) HYPERTENSION: ICD-10-CM

## 2025-03-03 NOTE — TELEPHONE ENCOUNTER
Refill Request Received for the Following Medication     Requested Prescriptions     Pending Prescriptions Disp Refills    losartan (COZAAR) 100 MG tablet [Pharmacy Med Name: losartan 100 mg tablet] 90 tablet 1     Sig: TAKE ONE TABLET BY MOUTH EVERY DAY       Last Prescribed:    Last Appointment With Me:  2/14/2025     Future Appointments:  Future Appointments   Date Time Provider Department Center   3/17/2025  9:20 AM Yemi Ariza MD [facility] BS AMB   8/22/2025 10:40 AM Konrad Kingston MD [facility] BS AMB

## 2025-03-05 RX ORDER — LOSARTAN POTASSIUM 100 MG/1
100 TABLET ORAL DAILY
Qty: 60 TABLET | Refills: 0 | Status: SHIPPED | OUTPATIENT
Start: 2025-03-05

## 2025-03-27 RX ORDER — METOPROLOL SUCCINATE 25 MG/1
12.5 TABLET, EXTENDED RELEASE ORAL DAILY
Qty: 45 TABLET | Refills: 3 | Status: SHIPPED | OUTPATIENT
Start: 2025-03-27

## 2025-03-27 NOTE — TELEPHONE ENCOUNTER
Refill Request Received for the Following Medication     Requested Prescriptions     Pending Prescriptions Disp Refills    metoprolol succinate (TOPROL XL) 25 MG extended release tablet [Pharmacy Med Name: metoprolol succinate ER 25 mg tablet,extended release 24 hr] 45 tablet 0     Sig: take 1/2 tablet BY MOUTH DAILY       Last Prescribed: 12-    Last Appointment With Me:  2/14/2025     Future Appointments:  Future Appointments   Date Time Provider Department Center   8/22/2025 10:40 AM Konrad Kingston MD [facility] BS AMB

## 2025-04-04 ENCOUNTER — TELEPHONE (OUTPATIENT)
Age: 85
End: 2025-04-04

## 2025-04-04 DIAGNOSIS — I48.0 PAF (PAROXYSMAL ATRIAL FIBRILLATION) (HCC): ICD-10-CM

## 2025-04-04 DIAGNOSIS — I71.21 ANEURYSM OF ASCENDING AORTA WITHOUT RUPTURE: ICD-10-CM

## 2025-04-04 DIAGNOSIS — I34.0 NONRHEUMATIC MITRAL (VALVE) INSUFFICIENCY: ICD-10-CM

## 2025-04-04 DIAGNOSIS — I35.1 NONRHEUMATIC AORTIC (VALVE) INSUFFICIENCY: ICD-10-CM

## 2025-04-04 DIAGNOSIS — R60.0 BILATERAL LEG EDEMA: ICD-10-CM

## 2025-04-04 DIAGNOSIS — R06.02 SHORTNESS OF BREATH: Primary | ICD-10-CM

## 2025-04-04 DIAGNOSIS — I34.0 MITRAL VALVE INSUFFICIENCY, UNSPECIFIED ETIOLOGY: ICD-10-CM

## 2025-04-04 RX ORDER — FUROSEMIDE 20 MG/1
TABLET ORAL
Qty: 90 TABLET | Refills: 3 | Status: SHIPPED | OUTPATIENT
Start: 2025-04-04

## 2025-04-04 NOTE — TELEPHONE ENCOUNTER
Aleisha GOULD, [employer] tech called informing that she was talking to son in law but communication dropped. Pt not wearing Holter.  New phone call received, mr. Darius Burgos gave verbal permission to speak with step son Chavez Jones.  Step son explained that legs are very swollen with busted blood vessels all the way to the groin. red spots around legs and groin.   Not taking daily weights, denied SOB. Patient went to PCP twice this week.  Patient is not taking Furosemide at all, stated.    Consulted with dr. With dr Kingston. New order for Furosemide 20 mg bid for 3 days, then take one daily.  Cmp and Mag in 10 days. Echocardiogram in th near future for LLE. Patient to go to the ED for evaluation and treatment if not improvement or new symptoms after started Lasix. Step son voiced understanding.    Holter will be place on Monday at 1pm.  Mr Jones requested to schedule Echocardiogram when in clinic and do not sent lab slips in the mail, just give them to them in person.

## 2025-04-04 NOTE — TELEPHONE ENCOUNTER
Patient is calling back because his call was disconnected.patient is trying to find out if we can requested a heart monitor to be sent to him again.    Patient states the last one didn't work so he was unable to wear the monitor.Patient also has some leg swelling go on right now and he is wondering if he needs to make an appointment to come in and see the doctor.Please assist.    Patient [phone]

## 2025-04-07 ENCOUNTER — ANCILLARY PROCEDURE (OUTPATIENT)
Age: 85
End: 2025-04-07
Payer: MEDICARE

## 2025-04-07 ENCOUNTER — OFFICE VISIT (OUTPATIENT)
Age: 85
End: 2025-04-07
Payer: MEDICARE

## 2025-04-07 VITALS
HEART RATE: 63 BPM | BODY MASS INDEX: 33.74 KG/M2 | WEIGHT: 215 LBS | DIASTOLIC BLOOD PRESSURE: 78 MMHG | HEIGHT: 67 IN | OXYGEN SATURATION: 95 % | SYSTOLIC BLOOD PRESSURE: 182 MMHG

## 2025-04-07 DIAGNOSIS — R06.09 DOE (DYSPNEA ON EXERTION): ICD-10-CM

## 2025-04-07 DIAGNOSIS — I48.0 PAROXYSMAL ATRIAL FIBRILLATION (HCC): ICD-10-CM

## 2025-04-07 DIAGNOSIS — R06.02 SHORTNESS OF BREATH: ICD-10-CM

## 2025-04-07 DIAGNOSIS — R60.0 BILATERAL EDEMA OF LOWER EXTREMITY: Primary | ICD-10-CM

## 2025-04-07 DIAGNOSIS — I48.0 PAF (PAROXYSMAL ATRIAL FIBRILLATION) (HCC): ICD-10-CM

## 2025-04-07 DIAGNOSIS — I71.21 ANEURYSM OF ASCENDING AORTA WITHOUT RUPTURE: ICD-10-CM

## 2025-04-07 DIAGNOSIS — E78.2 MIXED HYPERLIPIDEMIA: ICD-10-CM

## 2025-04-07 DIAGNOSIS — I34.0 MITRAL VALVE INSUFFICIENCY, UNSPECIFIED ETIOLOGY: ICD-10-CM

## 2025-04-07 DIAGNOSIS — I10 ESSENTIAL (PRIMARY) HYPERTENSION: ICD-10-CM

## 2025-04-07 PROCEDURE — 93005 ELECTROCARDIOGRAM TRACING: CPT | Performed by: INTERNAL MEDICINE

## 2025-04-07 PROCEDURE — 99214 OFFICE O/P EST MOD 30 MIN: CPT | Performed by: INTERNAL MEDICINE

## 2025-04-07 RX ORDER — PANTOPRAZOLE SODIUM 40 MG/1
40 TABLET, DELAYED RELEASE ORAL EVERY MORNING
Status: ON HOLD | COMMUNITY
Start: 2025-04-04

## 2025-04-07 RX ORDER — AMLODIPINE BESYLATE 5 MG/1
5 TABLET ORAL DAILY
Qty: 90 TABLET | Refills: 2 | Status: ON HOLD | OUTPATIENT
Start: 2025-04-07

## 2025-04-07 RX ORDER — FUROSEMIDE 40 MG/1
40 TABLET ORAL DAILY
Qty: 90 TABLET | Refills: 1 | Status: ON HOLD | OUTPATIENT
Start: 2025-04-07

## 2025-04-07 ASSESSMENT — PATIENT HEALTH QUESTIONNAIRE - PHQ9
SUM OF ALL RESPONSES TO PHQ QUESTIONS 1-9: 0
SUM OF ALL RESPONSES TO PHQ QUESTIONS 1-9: 0
2. FEELING DOWN, DEPRESSED OR HOPELESS: NOT AT ALL
SUM OF ALL RESPONSES TO PHQ QUESTIONS 1-9: 0
SUM OF ALL RESPONSES TO PHQ QUESTIONS 1-9: 0
1. LITTLE INTEREST OR PLEASURE IN DOING THINGS: NOT AT ALL

## 2025-04-07 NOTE — PROGRESS NOTES
1. Have you been to the ER, urgent care clinic since your last visit?  Hospitalized since your last visit?  [facility] 12/1/2024 for a fall      2. Have you seen or consulted any other health care providers outside of the [facility] since your last visit?  Include any pap smears or colon screening.   No  
Essential (primary) hypertension        9. Paroxysmal atrial fibrillation (HCC)            Orders Placed This Encounter   Procedures    Comprehensive Metabolic Panel     Standing Status:   Future     Expected Date:   4/18/2025     Expiration Date:   4/7/2026    CBC     Standing Status:   Future     Expected Date:   4/18/2025     Expiration Date:   4/7/2026    Magnesium     Standing Status:   Future     Expected Date:   4/18/2025     Expiration Date:   4/7/2026    EKG 12 Lead     Reason for Exam?:   Other              Routine    LEXISCAN/CARDIOLITE- Clinic Performed     Standing Status:   Future     Expected Date:   4/7/2025     Expiration Date:   10/7/2026     What stress agent should be used?:   Lexiscan     [employer]:   Per Facility Protocol        Plan:     New onset of bilateral lower extremity edema with an erythematous rash 4/7/2025:  -Patient states he had labs done by his PCP recently and there were no remarkable abnormalities on the blood count or electrolytes/kidney function  - 4/7/2025-increase Lasix to 40 mg daily, with an extra in the afternoon as needed weight gain greater than 4 pounds in a day or 6 pounds in a week  - Check echo and Lexiscan Cardiolite to rule out new structural heart disease or ischemia  - Check CMP, mag, CBC in about 10 days  -Monitor daily weights, bring to appointments  - Follow-up in 1 to 2 months with me or nurse practitioner to reassess  - If no cardiac cause of new edema found, recommend further evaluation/imaging through PCP perhaps of abdomen/liver/inferior vena cava  - Diet has also changed to eating all junk food according to the patient's daughter-question hypoalbuminemia?  Last Weight Metrics:      4/7/2025    12:40 PM 2/14/2025     3:19 PM 12/1/2024     9:40 AM 9/9/2024    10:48 AM 8/30/2024     5:58 PM 5/14/2024    10:26 AM 1/18/2024    10:57 AM   Weight Loss Metrics   Height 5' 7.01\" 5' 7.008\" 5' 7\" 5' 7.008\" 5' 7\" 5' 7\" 5' 7\"   Weight - Scale 215 lbs

## 2025-04-07 NOTE — PATIENT INSTRUCTIONS
You will be scheduled for a Nuclear Stress Test after your appointment today.    Nuclear stress testing evaluates blood flow to your heart muscle and assesses cardiac function. There are 2 parts (Rest/Stress) to this procedure and will include  an IV administration of a stressing medication called Lexiscan. *Please arrive 15 minutes prior to your appointment time    Test Duration:    -One day testing will take 4 hours    Day of testing instructions:    NO CAFFEINE (not even decaffeinated products) 24 HOURS PRIOR TO TESTING. This includes coffee, soda, tea, chocolate, multivitamins, and migraine medication, like Excedrin or Fioricet that contains caffeine.  Nothing to eat or drink 4 HOURS prior to testing  NO NICOTINE 12 hours prior to testing  Hold any medications requested by your cardiologist. Otherwise take medications as directed with a few sips of water. If you are unsure you may bring your medications with you to take after instructed by your stressing nurse. It is recommended you hold Metoprolol 24 hours  prior to your test. DIABETIC PATIENTS: Take half of your insulin with a light meal 4 hours before your test.  Wear comfortable clothes and shoes (Shirts with no metal, shorts or pants, tennis shoes, no heels or flip flops)    IMPORTANT: This testing involves a cardiac tracer ordered specifically for you. If you are unable to make your appointment, please call to cancel/reschedule AT LEAST 24 hours prior to your appointment so your tracer can be cancelled. [phone].    Set up an appointment with MISSY.

## 2025-04-08 ENCOUNTER — HOSPITAL ENCOUNTER (INPATIENT)
Facility: HOSPITAL | Age: 85
LOS: 14 days | Discharge: NURSING, REHAB OR LONG-TERM CARE | DRG: 659 | End: 2025-04-22
Attending: EMERGENCY MEDICINE | Admitting: INTERNAL MEDICINE
Payer: MEDICARE

## 2025-04-08 ENCOUNTER — APPOINTMENT (OUTPATIENT)
Facility: HOSPITAL | Age: 85
DRG: 659 | End: 2025-04-08
Payer: MEDICARE

## 2025-04-08 DIAGNOSIS — I50.9 CONGESTIVE HEART FAILURE, UNSPECIFIED HF CHRONICITY, UNSPECIFIED HEART FAILURE TYPE (HCC): ICD-10-CM

## 2025-04-08 DIAGNOSIS — R73.9 STEROID-INDUCED HYPERGLYCEMIA: ICD-10-CM

## 2025-04-08 DIAGNOSIS — T38.0X5A STEROID-INDUCED HYPERGLYCEMIA: ICD-10-CM

## 2025-04-08 DIAGNOSIS — R10.9 ABDOMINAL PAIN, UNSPECIFIED ABDOMINAL LOCATION: ICD-10-CM

## 2025-04-08 DIAGNOSIS — D69.2 PURPURA: ICD-10-CM

## 2025-04-08 DIAGNOSIS — N17.9 ACUTE KIDNEY INJURY: ICD-10-CM

## 2025-04-08 DIAGNOSIS — E87.70 HYPERVOLEMIA, UNSPECIFIED HYPERVOLEMIA TYPE: Primary | ICD-10-CM

## 2025-04-08 LAB
ALBUMIN SERPL-MCNC: 2.8 G/DL (ref 3.5–5)
ALBUMIN/GLOB SERPL: 0.8 (ref 1.1–2.2)
ALP SERPL-CCNC: 61 U/L (ref 45–117)
ALT SERPL-CCNC: 11 U/L (ref 12–78)
ANION GAP SERPL CALC-SCNC: 6 MMOL/L (ref 2–12)
APPEARANCE UR: ABNORMAL
AST SERPL-CCNC: 16 U/L (ref 15–37)
BACTERIA URNS QL MICRO: NEGATIVE /HPF
BASOPHILS # BLD: 0.01 K/UL (ref 0–0.1)
BASOPHILS NFR BLD: 0.3 % (ref 0–1)
BILIRUB SERPL-MCNC: 0.8 MG/DL (ref 0.2–1)
BILIRUB UR QL: NEGATIVE
BUN SERPL-MCNC: 39 MG/DL (ref 6–20)
BUN/CREAT SERPL: 15 (ref 12–20)
CALCIUM SERPL-MCNC: 8.6 MG/DL (ref 8.5–10.1)
CHLORIDE SERPL-SCNC: 104 MMOL/L (ref 97–108)
CO2 SERPL-SCNC: 27 MMOL/L (ref 21–32)
COLOR UR: YELLOW
COMMENT:: NORMAL
CREAT SERPL-MCNC: 2.53 MG/DL (ref 0.7–1.3)
DIFFERENTIAL METHOD BLD: ABNORMAL
EKG ATRIAL RATE: 70 BPM
EKG DIAGNOSIS: NORMAL
EKG P AXIS: 36 DEGREES
EKG P-R INTERVAL: 178 MS
EKG Q-T INTERVAL: 456 MS
EKG QRS DURATION: 148 MS
EKG QTC CALCULATION (BAZETT): 492 MS
EKG R AXIS: -20 DEGREES
EKG T AXIS: 0 DEGREES
EKG VENTRICULAR RATE: 70 BPM
EOSINOPHIL # BLD: 0.02 K/UL (ref 0–0.4)
EOSINOPHIL NFR BLD: 0.6 % (ref 0–7)
EPITH CASTS URNS QL MICRO: ABNORMAL /LPF
ERYTHROCYTE [DISTWIDTH] IN BLOOD BY AUTOMATED COUNT: 14.1 % (ref 11.5–14.5)
GLOBULIN SER CALC-MCNC: 3.3 G/DL (ref 2–4)
GLUCOSE BLD STRIP.AUTO-MCNC: 221 MG/DL (ref 65–117)
GLUCOSE BLD STRIP.AUTO-MCNC: 228 MG/DL (ref 65–117)
GLUCOSE BLD STRIP.AUTO-MCNC: 266 MG/DL (ref 65–117)
GLUCOSE SERPL-MCNC: 214 MG/DL (ref 65–100)
GLUCOSE UR STRIP.AUTO-MCNC: 250 MG/DL
HCT VFR BLD AUTO: 36.8 % (ref 36.6–50.3)
HGB BLD-MCNC: 11.8 G/DL (ref 12.1–17)
HGB UR QL STRIP: ABNORMAL
HYALINE CASTS URNS QL MICRO: ABNORMAL /LPF (ref 0–2)
IMM GRANULOCYTES # BLD AUTO: 0.02 K/UL (ref 0–0.04)
IMM GRANULOCYTES NFR BLD AUTO: 0.6 % (ref 0–0.5)
INR PPP: 1.1 (ref 0.9–1.1)
KETONES UR QL STRIP.AUTO: NEGATIVE MG/DL
LACTATE SERPL-SCNC: 1.4 MMOL/L (ref 0.4–2)
LEUKOCYTE ESTERASE UR QL STRIP.AUTO: ABNORMAL
LIPASE SERPL-CCNC: 49 U/L (ref 13–75)
LYMPHOCYTES # BLD: 0.69 K/UL (ref 0.8–3.5)
LYMPHOCYTES NFR BLD: 20.2 % (ref 12–49)
MCH RBC QN AUTO: 27.7 PG (ref 26–34)
MCHC RBC AUTO-ENTMCNC: 32.1 G/DL (ref 30–36.5)
MCV RBC AUTO: 86.4 FL (ref 80–99)
MONOCYTES # BLD: 0.3 K/UL (ref 0–1)
MONOCYTES NFR BLD: 8.9 % (ref 5–13)
NEUTS SEG # BLD: 2.36 K/UL (ref 1.8–8)
NEUTS SEG NFR BLD: 69.4 % (ref 32–75)
NITRITE UR QL STRIP.AUTO: NEGATIVE
NRBC # BLD: 0 K/UL (ref 0–0.01)
NRBC BLD-RTO: 0 PER 100 WBC
PH UR STRIP: 5.5 (ref 5–8)
PLATELET # BLD AUTO: 236 K/UL (ref 150–400)
PMV BLD AUTO: 10 FL (ref 8.9–12.9)
POTASSIUM SERPL-SCNC: 4.1 MMOL/L (ref 3.5–5.1)
PROT SERPL-MCNC: 6.1 G/DL (ref 6.4–8.2)
PROT UR STRIP-MCNC: 300 MG/DL
PROTHROMBIN TIME: 12 SEC (ref 9.2–11.2)
RBC # BLD AUTO: 4.26 M/UL (ref 4.1–5.7)
RBC #/AREA URNS HPF: >100 /HPF (ref 0–5)
RBC MORPH BLD: ABNORMAL
SERVICE CMNT-IMP: ABNORMAL
SODIUM SERPL-SCNC: 137 MMOL/L (ref 136–145)
SP GR UR REFRACTOMETRY: 1.02
SPECIMEN HOLD: NORMAL
TROPONIN I SERPL HS-MCNC: 17 NG/L (ref 0–76)
URINE CULTURE IF INDICATED: ABNORMAL
UROBILINOGEN UR QL STRIP.AUTO: 0.2 EU/DL (ref 0.2–1)
WBC # BLD AUTO: 3.4 K/UL (ref 4.1–11.1)
WBC URNS QL MICRO: ABNORMAL /HPF (ref 0–4)

## 2025-04-08 PROCEDURE — 87086 URINE CULTURE/COLONY COUNT: CPT

## 2025-04-08 PROCEDURE — 6370000000 HC RX 637 (ALT 250 FOR IP): Performed by: INTERNAL MEDICINE

## 2025-04-08 PROCEDURE — 2500000003 HC RX 250 WO HCPCS: Performed by: INTERNAL MEDICINE

## 2025-04-08 PROCEDURE — 2060000000 HC ICU INTERMEDIATE R&B

## 2025-04-08 PROCEDURE — 2500000003 HC RX 250 WO HCPCS: Performed by: EMERGENCY MEDICINE

## 2025-04-08 PROCEDURE — 96374 THER/PROPH/DIAG INJ IV PUSH: CPT

## 2025-04-08 PROCEDURE — 83605 ASSAY OF LACTIC ACID: CPT

## 2025-04-08 PROCEDURE — 36415 COLL VENOUS BLD VENIPUNCTURE: CPT

## 2025-04-08 PROCEDURE — 82962 GLUCOSE BLOOD TEST: CPT

## 2025-04-08 PROCEDURE — 6360000002 HC RX W HCPCS: Performed by: INTERNAL MEDICINE

## 2025-04-08 PROCEDURE — 85025 COMPLETE CBC W/AUTO DIFF WBC: CPT

## 2025-04-08 PROCEDURE — 96375 TX/PRO/DX INJ NEW DRUG ADDON: CPT

## 2025-04-08 PROCEDURE — 6360000002 HC RX W HCPCS: Performed by: EMERGENCY MEDICINE

## 2025-04-08 PROCEDURE — 81001 URINALYSIS AUTO W/SCOPE: CPT

## 2025-04-08 PROCEDURE — 6370000000 HC RX 637 (ALT 250 FOR IP): Performed by: STUDENT IN AN ORGANIZED HEALTH CARE EDUCATION/TRAINING PROGRAM

## 2025-04-08 PROCEDURE — 85610 PROTHROMBIN TIME: CPT

## 2025-04-08 PROCEDURE — 83690 ASSAY OF LIPASE: CPT

## 2025-04-08 PROCEDURE — 74176 CT ABD & PELVIS W/O CONTRAST: CPT

## 2025-04-08 PROCEDURE — 93005 ELECTROCARDIOGRAM TRACING: CPT | Performed by: EMERGENCY MEDICINE

## 2025-04-08 PROCEDURE — 2580000003 HC RX 258: Performed by: INTERNAL MEDICINE

## 2025-04-08 PROCEDURE — 99285 EMERGENCY DEPT VISIT HI MDM: CPT

## 2025-04-08 PROCEDURE — 80053 COMPREHEN METABOLIC PANEL: CPT

## 2025-04-08 PROCEDURE — 84484 ASSAY OF TROPONIN QUANT: CPT

## 2025-04-08 RX ORDER — HEPARIN SODIUM 5000 [USP'U]/ML
5000 INJECTION, SOLUTION INTRAVENOUS; SUBCUTANEOUS EVERY 8 HOURS SCHEDULED
Status: DISCONTINUED | OUTPATIENT
Start: 2025-04-09 | End: 2025-04-18

## 2025-04-08 RX ORDER — DEXTROSE MONOHYDRATE 100 MG/ML
INJECTION, SOLUTION INTRAVENOUS CONTINUOUS PRN
Status: DISCONTINUED | OUTPATIENT
Start: 2025-04-08 | End: 2025-04-22 | Stop reason: HOSPADM

## 2025-04-08 RX ORDER — HYDROMORPHONE HYDROCHLORIDE 1 MG/ML
0.5 INJECTION, SOLUTION INTRAMUSCULAR; INTRAVENOUS; SUBCUTANEOUS
Status: DISCONTINUED | OUTPATIENT
Start: 2025-04-08 | End: 2025-04-08

## 2025-04-08 RX ORDER — AMLODIPINE BESYLATE 5 MG/1
5 TABLET ORAL DAILY
Status: DISCONTINUED | OUTPATIENT
Start: 2025-04-08 | End: 2025-04-18

## 2025-04-08 RX ORDER — METOPROLOL SUCCINATE 25 MG/1
12.5 TABLET, EXTENDED RELEASE ORAL DAILY
Status: DISCONTINUED | OUTPATIENT
Start: 2025-04-08 | End: 2025-04-22 | Stop reason: HOSPADM

## 2025-04-08 RX ORDER — PRAVASTATIN SODIUM 10 MG
20 TABLET ORAL NIGHTLY
Status: DISCONTINUED | OUTPATIENT
Start: 2025-04-08 | End: 2025-04-22 | Stop reason: HOSPADM

## 2025-04-08 RX ORDER — ACETAMINOPHEN 650 MG/1
650 SUPPOSITORY RECTAL EVERY 6 HOURS PRN
Status: DISCONTINUED | OUTPATIENT
Start: 2025-04-08 | End: 2025-04-22 | Stop reason: HOSPADM

## 2025-04-08 RX ORDER — SODIUM CHLORIDE 9 MG/ML
INJECTION, SOLUTION INTRAVENOUS CONTINUOUS
Status: DISCONTINUED | OUTPATIENT
Start: 2025-04-08 | End: 2025-04-10

## 2025-04-08 RX ORDER — HYDRALAZINE HYDROCHLORIDE 25 MG/1
25 TABLET, FILM COATED ORAL EVERY 8 HOURS SCHEDULED
Status: DISCONTINUED | OUTPATIENT
Start: 2025-04-08 | End: 2025-04-22

## 2025-04-08 RX ORDER — ACETAMINOPHEN 325 MG/1
650 TABLET ORAL EVERY 6 HOURS PRN
Status: DISCONTINUED | OUTPATIENT
Start: 2025-04-08 | End: 2025-04-22 | Stop reason: HOSPADM

## 2025-04-08 RX ORDER — POLYETHYLENE GLYCOL 3350 17 G/17G
17 POWDER, FOR SOLUTION ORAL DAILY PRN
Status: DISCONTINUED | OUTPATIENT
Start: 2025-04-08 | End: 2025-04-14

## 2025-04-08 RX ORDER — SODIUM CHLORIDE 9 MG/ML
INJECTION, SOLUTION INTRAVENOUS PRN
Status: DISCONTINUED | OUTPATIENT
Start: 2025-04-08 | End: 2025-04-22 | Stop reason: HOSPADM

## 2025-04-08 RX ORDER — ONDANSETRON 2 MG/ML
4 INJECTION INTRAMUSCULAR; INTRAVENOUS EVERY 6 HOURS PRN
Status: DISCONTINUED | OUTPATIENT
Start: 2025-04-08 | End: 2025-04-22 | Stop reason: HOSPADM

## 2025-04-08 RX ORDER — ONDANSETRON 2 MG/ML
4 INJECTION INTRAMUSCULAR; INTRAVENOUS EVERY 6 HOURS PRN
Status: DISCONTINUED | OUTPATIENT
Start: 2025-04-08 | End: 2025-04-08 | Stop reason: SDUPTHER

## 2025-04-08 RX ORDER — GLUCAGON 1 MG/ML
1 KIT INJECTION PRN
Status: DISCONTINUED | OUTPATIENT
Start: 2025-04-08 | End: 2025-04-22 | Stop reason: HOSPADM

## 2025-04-08 RX ORDER — INSULIN LISPRO 100 [IU]/ML
0-4 INJECTION, SOLUTION INTRAVENOUS; SUBCUTANEOUS EVERY 6 HOURS SCHEDULED
Status: DISCONTINUED | OUTPATIENT
Start: 2025-04-08 | End: 2025-04-12

## 2025-04-08 RX ORDER — ONDANSETRON 4 MG/1
4 TABLET, ORALLY DISINTEGRATING ORAL EVERY 8 HOURS PRN
Status: DISCONTINUED | OUTPATIENT
Start: 2025-04-08 | End: 2025-04-22 | Stop reason: HOSPADM

## 2025-04-08 RX ORDER — FUROSEMIDE 10 MG/ML
40 INJECTION INTRAMUSCULAR; INTRAVENOUS ONCE
Status: COMPLETED | OUTPATIENT
Start: 2025-04-08 | End: 2025-04-08

## 2025-04-08 RX ORDER — HYDROMORPHONE HYDROCHLORIDE 1 MG/ML
0.5 INJECTION, SOLUTION INTRAMUSCULAR; INTRAVENOUS; SUBCUTANEOUS
Status: DISPENSED | OUTPATIENT
Start: 2025-04-08 | End: 2025-04-10

## 2025-04-08 RX ORDER — HYDRALAZINE HYDROCHLORIDE 20 MG/ML
10 INJECTION INTRAMUSCULAR; INTRAVENOUS EVERY 6 HOURS PRN
Status: DISCONTINUED | OUTPATIENT
Start: 2025-04-08 | End: 2025-04-22 | Stop reason: HOSPADM

## 2025-04-08 RX ORDER — SODIUM CHLORIDE 0.9 % (FLUSH) 0.9 %
5-40 SYRINGE (ML) INJECTION PRN
Status: DISCONTINUED | OUTPATIENT
Start: 2025-04-08 | End: 2025-04-22 | Stop reason: HOSPADM

## 2025-04-08 RX ORDER — SODIUM CHLORIDE 0.9 % (FLUSH) 0.9 %
5-40 SYRINGE (ML) INJECTION EVERY 12 HOURS SCHEDULED
Status: DISCONTINUED | OUTPATIENT
Start: 2025-04-08 | End: 2025-04-22 | Stop reason: HOSPADM

## 2025-04-08 RX ORDER — AMIODARONE HYDROCHLORIDE 200 MG/1
100 TABLET ORAL DAILY
Status: DISCONTINUED | OUTPATIENT
Start: 2025-04-08 | End: 2025-04-22 | Stop reason: HOSPADM

## 2025-04-08 RX ORDER — PANTOPRAZOLE SODIUM 40 MG/1
40 TABLET, DELAYED RELEASE ORAL
Status: DISCONTINUED | OUTPATIENT
Start: 2025-04-08 | End: 2025-04-08

## 2025-04-08 RX ORDER — CALCIUM CARBONATE 500 MG/1
500 TABLET, CHEWABLE ORAL 3 TIMES DAILY PRN
Status: DISCONTINUED | OUTPATIENT
Start: 2025-04-08 | End: 2025-04-22 | Stop reason: HOSPADM

## 2025-04-08 RX ORDER — PANTOPRAZOLE SODIUM 40 MG/1
40 TABLET, DELAYED RELEASE ORAL
Status: DISCONTINUED | OUTPATIENT
Start: 2025-04-09 | End: 2025-04-22 | Stop reason: HOSPADM

## 2025-04-08 RX ORDER — FERROUS SULFATE 325(65) MG
325 TABLET ORAL EVERY OTHER DAY
COMMUNITY

## 2025-04-08 RX ORDER — FENTANYL CITRATE 50 UG/ML
50 INJECTION, SOLUTION INTRAMUSCULAR; INTRAVENOUS
Refills: 0 | Status: COMPLETED | OUTPATIENT
Start: 2025-04-08 | End: 2025-04-08

## 2025-04-08 RX ADMIN — HYDRALAZINE HYDROCHLORIDE 25 MG: 25 TABLET ORAL at 22:28

## 2025-04-08 RX ADMIN — HYDROMORPHONE HYDROCHLORIDE 0.5 MG: 1 INJECTION, SOLUTION INTRAMUSCULAR; INTRAVENOUS; SUBCUTANEOUS at 18:11

## 2025-04-08 RX ADMIN — FUROSEMIDE 40 MG: 10 INJECTION, SOLUTION INTRAMUSCULAR; INTRAVENOUS at 08:53

## 2025-04-08 RX ADMIN — HYDRALAZINE HYDROCHLORIDE 25 MG: 25 TABLET ORAL at 14:22

## 2025-04-08 RX ADMIN — HYDROMORPHONE HYDROCHLORIDE 0.5 MG: 1 INJECTION, SOLUTION INTRAMUSCULAR; INTRAVENOUS; SUBCUTANEOUS at 08:54

## 2025-04-08 RX ADMIN — MEROPENEM 1000 MG: 1 INJECTION INTRAVENOUS at 14:15

## 2025-04-08 RX ADMIN — HYDRALAZINE HYDROCHLORIDE 10 MG: 20 INJECTION INTRAMUSCULAR; INTRAVENOUS at 17:07

## 2025-04-08 RX ADMIN — FENTANYL CITRATE 50 MCG: 50 INJECTION INTRAMUSCULAR; INTRAVENOUS at 07:01

## 2025-04-08 RX ADMIN — SODIUM CHLORIDE: 9 INJECTION, SOLUTION INTRAVENOUS at 11:56

## 2025-04-08 RX ADMIN — SODIUM CHLORIDE, PRESERVATIVE FREE 10 ML: 5 INJECTION INTRAVENOUS at 22:27

## 2025-04-08 RX ADMIN — AMLODIPINE BESYLATE 5 MG: 5 TABLET ORAL at 10:28

## 2025-04-08 RX ADMIN — CALCIUM CARBONATE (ANTACID) CHEW TAB 500 MG 500 MG: 500 CHEW TAB at 23:37

## 2025-04-08 RX ADMIN — HYDRALAZINE HYDROCHLORIDE 10 MG: 20 INJECTION INTRAMUSCULAR; INTRAVENOUS at 10:29

## 2025-04-08 RX ADMIN — PRAVASTATIN SODIUM 20 MG: 10 TABLET ORAL at 22:28

## 2025-04-08 RX ADMIN — AMIODARONE HYDROCHLORIDE 100 MG: 200 TABLET ORAL at 10:28

## 2025-04-08 RX ADMIN — ONDANSETRON 4 MG: 2 INJECTION, SOLUTION INTRAMUSCULAR; INTRAVENOUS at 14:16

## 2025-04-08 RX ADMIN — ONDANSETRON 4 MG: 2 INJECTION, SOLUTION INTRAMUSCULAR; INTRAVENOUS at 09:05

## 2025-04-08 RX ADMIN — INSULIN LISPRO 2 UNITS: 100 INJECTION, SOLUTION INTRAVENOUS; SUBCUTANEOUS at 23:38

## 2025-04-08 RX ADMIN — METOPROLOL SUCCINATE 12.5 MG: 25 TABLET, EXTENDED RELEASE ORAL at 10:27

## 2025-04-08 ASSESSMENT — PAIN SCALES - GENERAL
PAINLEVEL_OUTOF10: 6
PAINLEVEL_OUTOF10: 0
PAINLEVEL_OUTOF10: 8
PAINLEVEL_OUTOF10: 7

## 2025-04-08 ASSESSMENT — PAIN DESCRIPTION - LOCATION
LOCATION: ABDOMEN

## 2025-04-08 ASSESSMENT — PAIN DESCRIPTION - PAIN TYPE: TYPE: ACUTE PAIN

## 2025-04-08 ASSESSMENT — PAIN DESCRIPTION - ORIENTATION
ORIENTATION: LEFT;RIGHT;UPPER
ORIENTATION: RIGHT;LEFT;UPPER

## 2025-04-08 ASSESSMENT — PAIN - FUNCTIONAL ASSESSMENT
PAIN_FUNCTIONAL_ASSESSMENT: ACTIVITIES ARE NOT PREVENTED
PAIN_FUNCTIONAL_ASSESSMENT: ACTIVITIES ARE NOT PREVENTED

## 2025-04-08 ASSESSMENT — PAIN DESCRIPTION - FREQUENCY: FREQUENCY: CONTINUOUS

## 2025-04-08 ASSESSMENT — PAIN DESCRIPTION - DESCRIPTORS
DESCRIPTORS: SHARP
DESCRIPTORS: SHARP

## 2025-04-08 ASSESSMENT — PAIN DESCRIPTION - ONSET: ONSET: SUDDEN

## 2025-04-08 NOTE — ED NOTES
TRANSFER - OUT REPORT:    Verbal report given to BRIAN Kelley on Darius Burgos  being transferred to [facility] for routine progression of patient care       Report consisted of patient's Situation, Background, Assessment and   Recommendations(SBAR).     Information from the following report(s) Nurse Handoff Report, Index, ED Encounter Summary, ED SBAR, Adult Overview, MAR, Recent Results, and Cardiac Rhythm NSR  was reviewed with the receiving nurse.    [place] Fall Assessment:    Presents to emergency department  because of falls (Syncope, seizure, or loss of consciousness): No  Age > 70: Yes  Altered Mental Status, Intoxication with alcohol or substance confusion (Disorientation, impaired judgment, poor safety awaremess, or inability to follow instructions): No  Impaired Mobility: Ambulates or transfers with assistive devices or assistance; Unable to ambulate or transer.: No  Nursing Judgement: Yes          Lines:   Peripheral IV 04/08/25 Left Antecubital (Active)        Opportunity for questions and clarification was provided.      Patient transported with:  Monitor and Registered Nurse

## 2025-04-08 NOTE — ED NOTES
Bedside and Verbal shift change report given to Dennise (oncoming nurse) by Jason (offgoing nurse). Report included the following information Nurse Handoff Report, Index, ED Encounter Summary, and ED SBAR.

## 2025-04-08 NOTE — ED NOTES
Bedside and Verbal shift change report given to Ritu Weston (oncoming nurse) by RITU Bowden (offgoing nurse). Report included the following information Nurse Handoff Report, Index, ED Encounter Summary, ED SBAR, Adult Overview, Intake/Output, MAR, Recent Results, Med Rec Status, Cardiac Rhythm  , Quality Measures, Neuro Assessment, and Event Log.

## 2025-04-08 NOTE — ED TRIAGE NOTES
Pt bought in by EMS. Pt has a hx of HTN, DM2 and is on eliquis. Pt had his gallbladder removed within the past year.

## 2025-04-08 NOTE — ED NOTES
Patient resting with equal chest rise and fall.  Family at bedside.  Patient aware plan for CT then disposition.   Callbell within reach.

## 2025-04-08 NOTE — H&P
Hospitalist Admission Note    NAME:   Darius Burgos   : 1940   MRN: [id number]     Date/Time: 2025 11:54 AM    Patient PCP: Maria Luisa Gramajo MD    ______________________________________________________________________  Given the patient's current clinical presentation, I have a high level of concern for decompensation if discharged from the emergency department.  Complex decision making was performed, which includes reviewing the patient's available past medical records, laboratory results, and x-ray films.       My assessment of this patient's clinical condition and my plan of care is as follows.    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     JOEY:  Start iv fluids  Will consult nephrology        Acute UTI:  Start meropenem given hx of ESBL  Will follow urine culture    CT findings multiple markedly thickened loops of proximal jejunum:  -will consider repeating CT abdomen and pelvis with iv and oral contrast if abdominal pain doesn't improve and creatinine improves      Atrial fibrillation  Continue metoprolol  Continue amiodarone  Hold eliquis for now    Hypertension  Hyperlipidemia  Continue metoprolol, and amlodipine  Start po hydralazine 25 mg po TID and titrate as needed  Hold losartan in the setting of JOEY  Start hydralazine 10 mg iv q6h prn if SBP>160mm Hg      Type II DM:  Hold metformin  Start insulin lispro sliding scale            Medical Decision Making:   I personally reviewed labs: cbc- low white blood cell count to 3.4  BMP: elevated creatinine to 2.53 from 0.76  Lactic acid 1.4    I personally reviewed imaging:CT abdomen and pelvis  I personally reviewed EKG: interpreted by me- sinus rhythm with premature atrial complex  Toxic drug monitoring:   Discussed case with: ED provider. After discussion I am in agreement that

## 2025-04-08 NOTE — ED PROVIDER NOTES
[facility] EMERGENCY DEPARTMENT  EMERGENCY DEPARTMENT ENCOUNTER       Pt Name: Darius Burgos  MRN: [id number]  Birthdate 1940  Date of evaluation: 4/8/2025  Provider: Don Grady MD   PCP: Maria Luisa Gramajo MD  Note Started: 6:30 AM EDT 4/8/25     CHIEF COMPLAINT       Chief Complaint   Patient presents with    Abdominal Pain     Pt by ems with cc of abdominal pain x 1 day. Pt states the pain Is in the LUQ        HISTORY OF PRESENT ILLNESS: 1 or more elements      History From: patient, EMS, History limited by: none     Darius Burgos is a 84 y.o. male with a history of atrial fibrillation on Eliquis, colon cancer, hypertension and peptic ulcer disease presents to the Emergency Department with a chief complaint of abdominal pain.    Patient reports symptoms began 1 to 2 days ago.  Reports pain to left upper quadrant associate with nausea and vomiting.  Reports pain is intermittent.  No chest pain or shortness of breath.  Denies diarrhea.  Does report some bloody urine however resolved.    Of note, patient reports he noticed a rash over his lower extremities.  He saw his cardiologist yesterday.       Please See MDM for Additional Details of the HPI/PMH  Nursing Notes were all reviewed and agreed with or any disagreements were addressed in the HPI.     REVIEW OF SYSTEMS        Positives and Pertinent negatives as per HPI.    PAST HISTORY     Past Medical History:  Past Medical History:   Diagnosis Date    Arthritis     At risk for sleep apnea 12/18/2019    Stop Bang 5     Atrial fibrillation (HCC)     Atrial Flutter-Wittkamp    Cancer (HCC)     prostate cancer    Colon cancer (HCC)     Diabetes (HCC)     DM2    Elevated cholesterol     History of blood transfusion     over 25 years    History of kidney stones     [place] (hard of hearing)     bilateral hearing aids    Hypertension     PUD (peptic ulcer disease) 1980    Skin cancer        Past Surgical History:  Past Surgical History:   Procedure Laterality

## 2025-04-09 ENCOUNTER — APPOINTMENT (OUTPATIENT)
Facility: HOSPITAL | Age: 85
DRG: 659 | End: 2025-04-09
Payer: MEDICARE

## 2025-04-09 ENCOUNTER — APPOINTMENT (OUTPATIENT)
Facility: HOSPITAL | Age: 85
DRG: 659 | End: 2025-04-09
Attending: INTERNAL MEDICINE
Payer: MEDICARE

## 2025-04-09 LAB
ALBUMIN SERPL-MCNC: 2.5 G/DL (ref 3.5–5)
ANION GAP SERPL CALC-SCNC: 4 MMOL/L (ref 2–12)
ANION GAP SERPL CALC-SCNC: 7 MMOL/L (ref 2–12)
BACTERIA SPEC CULT: NORMAL
BASOPHILS # BLD: 0.01 K/UL (ref 0–0.1)
BASOPHILS NFR BLD: 0.3 % (ref 0–1)
BUN SERPL-MCNC: 43 MG/DL (ref 6–20)
BUN SERPL-MCNC: 47 MG/DL (ref 6–20)
BUN/CREAT SERPL: 17 (ref 12–20)
BUN/CREAT SERPL: 18 (ref 12–20)
CALCIUM SERPL-MCNC: 8.3 MG/DL (ref 8.5–10.1)
CALCIUM SERPL-MCNC: 8.3 MG/DL (ref 8.5–10.1)
CHLORIDE SERPL-SCNC: 106 MMOL/L (ref 97–108)
CHLORIDE SERPL-SCNC: 107 MMOL/L (ref 97–108)
CO2 SERPL-SCNC: 25 MMOL/L (ref 21–32)
CO2 SERPL-SCNC: 27 MMOL/L (ref 21–32)
CREAT SERPL-MCNC: 2.53 MG/DL (ref 0.7–1.3)
CREAT SERPL-MCNC: 2.56 MG/DL (ref 0.7–1.3)
CRP SERPL-MCNC: 6.68 MG/DL (ref 0–0.3)
DIFFERENTIAL METHOD BLD: ABNORMAL
EOSINOPHIL # BLD: 0.04 K/UL (ref 0–0.4)
EOSINOPHIL NFR BLD: 1 % (ref 0–7)
ERYTHROCYTE [DISTWIDTH] IN BLOOD BY AUTOMATED COUNT: 14.7 % (ref 11.5–14.5)
ERYTHROCYTE [SEDIMENTATION RATE] IN BLOOD: 8 MM/HR (ref 0–20)
EST. AVERAGE GLUCOSE BLD GHB EST-MCNC: 143 MG/DL
GLUCOSE BLD STRIP.AUTO-MCNC: 189 MG/DL (ref 65–117)
GLUCOSE BLD STRIP.AUTO-MCNC: 200 MG/DL (ref 65–117)
GLUCOSE BLD STRIP.AUTO-MCNC: 208 MG/DL (ref 65–117)
GLUCOSE BLD STRIP.AUTO-MCNC: 212 MG/DL (ref 65–117)
GLUCOSE SERPL-MCNC: 182 MG/DL (ref 65–100)
GLUCOSE SERPL-MCNC: 224 MG/DL (ref 65–100)
HBA1C MFR BLD: 6.6 % (ref 4–5.6)
HBV SURFACE AB SER QL: REACTIVE
HBV SURFACE AB SER-ACNC: 71.02 MIU/ML
HBV SURFACE AG SER QL: 0.12 INDEX
HBV SURFACE AG SER QL: NEGATIVE
HCT VFR BLD AUTO: 37.1 % (ref 36.6–50.3)
HCV AB SER IA-ACNC: 0.04 INDEX
HCV AB SERPL QL IA: NONREACTIVE
HGB BLD-MCNC: 11.8 G/DL (ref 12.1–17)
IMM GRANULOCYTES # BLD AUTO: 0.02 K/UL (ref 0–0.04)
IMM GRANULOCYTES NFR BLD AUTO: 0.5 % (ref 0–0.5)
LYMPHOCYTES # BLD: 0.69 K/UL (ref 0.8–3.5)
LYMPHOCYTES NFR BLD: 17.7 % (ref 12–49)
MCH RBC QN AUTO: 27.6 PG (ref 26–34)
MCHC RBC AUTO-ENTMCNC: 31.8 G/DL (ref 30–36.5)
MCV RBC AUTO: 86.7 FL (ref 80–99)
MONOCYTES # BLD: 0.47 K/UL (ref 0–1)
MONOCYTES NFR BLD: 12.1 % (ref 5–13)
NEUTS SEG # BLD: 2.67 K/UL (ref 1.8–8)
NEUTS SEG NFR BLD: 68.4 % (ref 32–75)
NRBC # BLD: 0 K/UL (ref 0–0.01)
NRBC BLD-RTO: 0 PER 100 WBC
PHOSPHATE SERPL-MCNC: 3.9 MG/DL (ref 2.6–4.7)
PLATELET # BLD AUTO: 283 K/UL (ref 150–400)
PMV BLD AUTO: 9.8 FL (ref 8.9–12.9)
POTASSIUM SERPL-SCNC: 4.1 MMOL/L (ref 3.5–5.1)
POTASSIUM SERPL-SCNC: 4.3 MMOL/L (ref 3.5–5.1)
RBC # BLD AUTO: 4.28 M/UL (ref 4.1–5.7)
SERVICE CMNT-IMP: ABNORMAL
SERVICE CMNT-IMP: NORMAL
SODIUM SERPL-SCNC: 138 MMOL/L (ref 136–145)
SODIUM SERPL-SCNC: 138 MMOL/L (ref 136–145)
WBC # BLD AUTO: 3.9 K/UL (ref 4.1–11.1)

## 2025-04-09 PROCEDURE — 2060000000 HC ICU INTERMEDIATE R&B

## 2025-04-09 PROCEDURE — 6370000000 HC RX 637 (ALT 250 FOR IP): Performed by: STUDENT IN AN ORGANIZED HEALTH CARE EDUCATION/TRAINING PROGRAM

## 2025-04-09 PROCEDURE — 51798 US URINE CAPACITY MEASURE: CPT

## 2025-04-09 PROCEDURE — 6360000002 HC RX W HCPCS: Performed by: INTERNAL MEDICINE

## 2025-04-09 PROCEDURE — 2500000003 HC RX 250 WO HCPCS: Performed by: INTERNAL MEDICINE

## 2025-04-09 PROCEDURE — 85025 COMPLETE CBC W/AUTO DIFF WBC: CPT

## 2025-04-09 PROCEDURE — 82962 GLUCOSE BLOOD TEST: CPT

## 2025-04-09 PROCEDURE — 76770 US EXAM ABDO BACK WALL COMP: CPT

## 2025-04-09 PROCEDURE — 86706 HEP B SURFACE ANTIBODY: CPT

## 2025-04-09 PROCEDURE — 86038 ANTINUCLEAR ANTIBODIES: CPT

## 2025-04-09 PROCEDURE — 86160 COMPLEMENT ANTIGEN: CPT

## 2025-04-09 PROCEDURE — 83036 HEMOGLOBIN GLYCOSYLATED A1C: CPT

## 2025-04-09 PROCEDURE — 86803 HEPATITIS C AB TEST: CPT

## 2025-04-09 PROCEDURE — 85652 RBC SED RATE AUTOMATED: CPT

## 2025-04-09 PROCEDURE — 86037 ANCA TITER EACH ANTIBODY: CPT

## 2025-04-09 PROCEDURE — 82784 ASSAY IGA/IGD/IGG/IGM EACH: CPT

## 2025-04-09 PROCEDURE — 80069 RENAL FUNCTION PANEL: CPT

## 2025-04-09 PROCEDURE — 86140 C-REACTIVE PROTEIN: CPT

## 2025-04-09 PROCEDURE — 80048 BASIC METABOLIC PNL TOTAL CA: CPT

## 2025-04-09 PROCEDURE — 36415 COLL VENOUS BLD VENIPUNCTURE: CPT

## 2025-04-09 PROCEDURE — 99223 1ST HOSP IP/OBS HIGH 75: CPT | Performed by: SURGERY

## 2025-04-09 PROCEDURE — 6370000000 HC RX 637 (ALT 250 FOR IP): Performed by: INTERNAL MEDICINE

## 2025-04-09 PROCEDURE — 83516 IMMUNOASSAY NONANTIBODY: CPT

## 2025-04-09 PROCEDURE — 87340 HEPATITIS B SURFACE AG IA: CPT

## 2025-04-09 PROCEDURE — 86225 DNA ANTIBODY NATIVE: CPT

## 2025-04-09 PROCEDURE — 2580000003 HC RX 258: Performed by: INTERNAL MEDICINE

## 2025-04-09 RX ADMIN — ONDANSETRON 4 MG: 4 TABLET, ORALLY DISINTEGRATING ORAL at 09:37

## 2025-04-09 RX ADMIN — HYDROMORPHONE HYDROCHLORIDE 0.5 MG: 1 INJECTION, SOLUTION INTRAMUSCULAR; INTRAVENOUS; SUBCUTANEOUS at 15:12

## 2025-04-09 RX ADMIN — MEROPENEM 500 MG: 500 INJECTION INTRAVENOUS at 01:45

## 2025-04-09 RX ADMIN — HEPARIN SODIUM 5000 UNITS: 5000 INJECTION INTRAVENOUS; SUBCUTANEOUS at 06:23

## 2025-04-09 RX ADMIN — HYDRALAZINE HYDROCHLORIDE 25 MG: 25 TABLET ORAL at 15:06

## 2025-04-09 RX ADMIN — PRAVASTATIN SODIUM 20 MG: 10 TABLET ORAL at 22:24

## 2025-04-09 RX ADMIN — SODIUM CHLORIDE, PRESERVATIVE FREE 10 ML: 5 INJECTION INTRAVENOUS at 09:00

## 2025-04-09 RX ADMIN — SODIUM CHLORIDE: 9 INJECTION, SOLUTION INTRAVENOUS at 01:44

## 2025-04-09 RX ADMIN — SODIUM CHLORIDE: 9 INJECTION, SOLUTION INTRAVENOUS at 16:54

## 2025-04-09 RX ADMIN — HEPARIN SODIUM 5000 UNITS: 5000 INJECTION INTRAVENOUS; SUBCUTANEOUS at 22:25

## 2025-04-09 RX ADMIN — INSULIN LISPRO 1 UNITS: 100 INJECTION, SOLUTION INTRAVENOUS; SUBCUTANEOUS at 06:23

## 2025-04-09 RX ADMIN — AMIODARONE HYDROCHLORIDE 100 MG: 200 TABLET ORAL at 09:28

## 2025-04-09 RX ADMIN — HYDRALAZINE HYDROCHLORIDE 25 MG: 25 TABLET ORAL at 06:22

## 2025-04-09 RX ADMIN — METOPROLOL SUCCINATE 12.5 MG: 25 TABLET, EXTENDED RELEASE ORAL at 09:29

## 2025-04-09 RX ADMIN — AMLODIPINE BESYLATE 5 MG: 5 TABLET ORAL at 09:29

## 2025-04-09 RX ADMIN — HYDRALAZINE HYDROCHLORIDE 25 MG: 25 TABLET ORAL at 22:24

## 2025-04-09 RX ADMIN — SODIUM CHLORIDE, PRESERVATIVE FREE 10 ML: 5 INJECTION INTRAVENOUS at 22:24

## 2025-04-09 RX ADMIN — HEPARIN SODIUM 5000 UNITS: 5000 INJECTION INTRAVENOUS; SUBCUTANEOUS at 15:12

## 2025-04-09 RX ADMIN — CALCIUM CARBONATE (ANTACID) CHEW TAB 500 MG 500 MG: 500 CHEW TAB at 15:13

## 2025-04-09 RX ADMIN — PANTOPRAZOLE SODIUM 40 MG: 40 TABLET, DELAYED RELEASE ORAL at 06:22

## 2025-04-09 ASSESSMENT — PAIN DESCRIPTION - LOCATION: LOCATION: ABDOMEN

## 2025-04-09 ASSESSMENT — PAIN SCALES - GENERAL
PAINLEVEL_OUTOF10: 0
PAINLEVEL_OUTOF10: 0
PAINLEVEL_OUTOF10: 7
PAINLEVEL_OUTOF10: 0
PAINLEVEL_OUTOF10: 0

## 2025-04-09 ASSESSMENT — PAIN DESCRIPTION - PAIN TYPE: TYPE: ACUTE PAIN

## 2025-04-09 ASSESSMENT — PAIN DESCRIPTION - DESCRIPTORS: DESCRIPTORS: BURNING

## 2025-04-09 ASSESSMENT — PAIN DESCRIPTION - ORIENTATION: ORIENTATION: UPPER

## 2025-04-09 NOTE — CONSULTS
Consult Note            Date:4/9/2025        Patient Name:Darius Burgos     YOB: 1940     Age:84 y.o.    Consults    Chief Complaint     Chief Complaint   Patient presents with    Abdominal Pain     Pt by ems with cc of abdominal pain x 1 day. Pt states the pain Is in the LUQ      ”abdominal pain”    History Obtained From   patient, family member - daughter Melany Landis, electronic medical record    History of Present Illness   The patient developed feeling poorly about a week and a half ago.  He went to the cardiologist earlier in the week and with plans for a future echo.  He developed a rash last about 5 days ago.  He was then developed severe epigastric pain 2 days ago with associated with nausea and vomiting and this has persisted he ended up coming to the emergency room was admitted.  He continues to have some emesis at times.  A lot of nausea.  Pain is manageable.  He denies diarrhea constipation or melena.  He previously had undergone a partial colectomy for colon cancer he is also undergoing lap allan and had common duct stones and ERCP.  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  And history of prostate cancer he denies fevers chills or sweats but does feel fatigue and loss of appetite.  He has noticed decreased urine output.  He came to the emergency room yesterday noted to be in have acute kidney injury and had a CT scan on admission thickening of the proximal jejunmum.  The CT was not with oral or IV contrast looked at some of the findings.  He was seen by Dr. Hoyos from rheumatology who felt that he discussed possibly a vasculitis vasculitis problem from and wants to consider starting high-dose steroids.  Urology has been consulted as well to address problem as well as nephrology.    Past Medical History     Past Medical History:   Diagnosis Date    Arthritis     At risk for sleep apnea 12/18/2019    Stop Bang 5     Atrial fibrillation (HCC)     Atrial Flutter-Wittkamp    Cancer 
Nephrology Consult Note     [facility]                Phone - [phone]   Patient: Darius Burgos   YOB: 1940    Date- 4/9/2025  MRN: [id number]             CONSULTING PHYSICIAN: Dr. Richardson  REASON FOR CONSULTATION: JOEY  ADMIT DATE:4/8/2025 PATIENT PCP:Maria Luisa Gramajo MD     IMPRESSION & PLAN:   JOEY stage III(multifactorial, obstructive uropathy, possible GN, volume depletion)  Purpuric rash  Colitis?  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  - Order renal ultrasound  - Patient will need urology evaluation, might need ureteral stent placement.  - Check daily labs  - Will need rheumatology evaluation  -Ordered UPCR to quantify proteinuria  - So far no  plans for renal biopsy at this moment  - Discussed with Dr. Richardson         Principal Problem:    Acute kidney injury  Resolved Problems:    * No resolved hospital problems. *      [] High complexity decision making was performed  [] Patient is at high-risk of decompensation with multiple organ involvement    Subjective:   HPI: Darius Burgos is a 84 y.o. male who is not a good medical historian came to the ED with complaints of right flank pain and purpuric rash to bilateral lower extremities.  He has been having very poor p.o. intake.  He is also complaining about abdominal discomfort along with nausea.  His past medical history significant for A-fib,, type 2 diabetes, hypertension.  He was placed on IV fluids CT scan was ordered that showed multiple mildly thickened loops of proximal jejunum.  He also was shown to have persistent dilatation of the right renal collecting system with renal calculi.  His baseline creatinine is normal, he presented with a creatinine of 2.5 which is staying stable at 2.5.  Renal consult 
Rheumatology    Aske to see pt for ? HSP per Dr. Richardson [facility]  IMPRESSION:   Possible HSP affecting kidney skin GI and joints.  ARF with hematuria  Abd pain- ? Serositis    Discussion: could be hypersensitivity to recent Abx as he has had no recent infections other than R toes.  He would benefit from skin Bx or renal Bx.  IgA ordered along with ESR, CRP , DESEAN.    Rec: probably would benefit from steroid - IV as he is not keeping food down- say medrol 40 BID.  B/C of abd pain with some rebound, I have asked surgery to see _ [place]      83 yo with HTN, DM, Afib, PUD  Admitted with onset of rash in legs starting last week, then edema hands and feet, then abd pain, N/V.    He is admitted with ARF and hematuria, and rash.  He also states he was started on Abx recently by PCP.  Abx preceded rash.    He states he has a Hx of pancreatitis then had GB out- this kemal feels like his pancreatitis.    ROS- no recent URI, HA, change in vision, has chronic cough, no CP, + edema hands and feet, + Rash. + N/V abd pain.  + elbow pain with ROM, No change in MS, movement or sensation.  No SOB.    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  
Rheumatology    Chart rev'd, I will see at lunch today.    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   I ordered some labs.  
Noman Allen MD; Signed By: Noman Allen MD; change: 2;  Asymptomatic bacteriuria - Onset: 07/21/2023 - Asymptomatic bacteriuria; Entered By: Lincoln Garcia MD; Signed By: Lincoln Garcia MD; change: 2;    Family History  Reviewed Family History  Father - Family history of kidney disease   Social History  Reviewed Social History   Substance Use  Do you or have you ever smoked tobacco?: Former smoker  How much tobacco do you smoke?: None  When did you quit smoking?: 11-15 years since last cigarette  What is your level of alcohol consumption?: None  Advance Directive  Do you have an advance directive?: No    Surgical History  Reviewed Surgical History  Cataract Surgery  Colonoscopy  Hernia Repair  Orthopedic Surgery  Prostate Surgery  Prostatectomy    Past Medical History  Reviewed Past Medical History  Bowel Problems: Y  Cancer: Y  Heart Disease: Y  High Blood Pressure: Y   HPI  He has a history of a chronically obstructed right bifid collecting system and kidney stones, recurrent UTI. His renogram was last updated 7/2023 and demonstrated 31% function on the right with half-life of 29 minutes, 69% on the left and 2 minutes. We have previously performed ureteroscopy to evaluate his proximal ureter and treat renal calculi. No evidence of any urothelial malignancy on biopsies. Unable to clear entirety of stone burden    He has declined chronic stents, pyeloplasty or nephrectomy.     Returns for annual follow-up. Renal ultrasound demonstrates persistent severe right hydronephrosis. Possible small lower pole stone not correlating on KUB.  He had 1 episode of suspected stone passage with associated small blood in the urine which resolved within a day. Urinalysis is clear today. He is accompanied by his stepson.  No interval UTIs    Physical Exam  Well-developed, NAD      Assessment / Plan  1. History of malignant neoplasm of prostate  Z85.46: Personal history of malignant neoplasm of 
Parent(s)

## 2025-04-09 NOTE — WOUND CARE
Wound care consult for the buttocks wound and rash present on admission.  Chart reviewed and patient assessed. Pt.  Is being seen by rheumatologist today for definitive dx for the rash suspected to be HSP or IgA vasculitis.   Assessment and Treatment: Pt. Is alert, oriented but feels quite nauseated at times and keeps an emesis bag at his bedside.    He has disseminated purpura in scattered patches, some of them are palpable but all are purple. He stated that the purple areas have been present for two weeks.  Although he denies pain overall, he admits to mild arthralgias in the arms and hips.   There is a skin tear on the right buttocks but the wound bed is blanchable. This was cleansed and then a foam dressing applied. Staff using zinc oxide for the rest of the skin on the buttocks for the incontinence.       Disseminated Purpura - some patches are   Palpable.              Groin purpura      Pt. Also had been treated with an antibiotic (PO) from his PCP for a right 4th toe infection that developed after he cut his nails. This is now cleared up and the callus is hard, dry and ready to come off. It was easily removed today without pain and the site cleaned and then a band-aid applied to the toe. No drainage noted, just a purple area of skin where the callus was removed. No bleeding.   Will order some neosporin for the toe.   Plan: Pressure injury prevention with significant turns and float the heels.   Wound care with zinc oxide for incontinence care and for the skin tear on the right buttocks. Foam dressing on the skin tear for extra protection.   Joana Johansen RN, BSN, CWON

## 2025-04-10 PROBLEM — D69.2 PURPURA: Status: ACTIVE | Noted: 2025-04-10

## 2025-04-10 PROBLEM — E43 SEVERE PROTEIN-CALORIE MALNUTRITION: Status: ACTIVE | Noted: 2025-04-10

## 2025-04-10 LAB
ANION GAP SERPL CALC-SCNC: 5 MMOL/L (ref 2–12)
BASOPHILS # BLD: 0.01 K/UL (ref 0–0.1)
BASOPHILS NFR BLD: 0.2 % (ref 0–1)
BUN SERPL-MCNC: 58 MG/DL (ref 6–20)
BUN/CREAT SERPL: 23 (ref 12–20)
C-ANCA TITR SER IF: NORMAL TITER
CALCIUM SERPL-MCNC: 8.2 MG/DL (ref 8.5–10.1)
CHLORIDE SERPL-SCNC: 110 MMOL/L (ref 97–108)
CO2 SERPL-SCNC: 25 MMOL/L (ref 21–32)
CREAT SERPL-MCNC: 2.52 MG/DL (ref 0.7–1.3)
CREAT UR-MCNC: 189 MG/DL
DIFFERENTIAL METHOD BLD: ABNORMAL
DSDNA AB SER-ACNC: <1 IU/ML (ref 0–9)
EOSINOPHIL # BLD: 0.03 K/UL (ref 0–0.4)
EOSINOPHIL NFR BLD: 0.5 % (ref 0–7)
ERYTHROCYTE [DISTWIDTH] IN BLOOD BY AUTOMATED COUNT: 14.7 % (ref 11.5–14.5)
GLUCOSE BLD STRIP.AUTO-MCNC: 156 MG/DL (ref 65–117)
GLUCOSE BLD STRIP.AUTO-MCNC: 165 MG/DL (ref 65–117)
GLUCOSE BLD STRIP.AUTO-MCNC: 165 MG/DL (ref 65–117)
GLUCOSE BLD STRIP.AUTO-MCNC: 196 MG/DL (ref 65–117)
GLUCOSE BLD STRIP.AUTO-MCNC: 231 MG/DL (ref 65–117)
GLUCOSE SERPL-MCNC: 199 MG/DL (ref 65–100)
HCT VFR BLD AUTO: 36.6 % (ref 36.6–50.3)
HGB BLD-MCNC: 11.5 G/DL (ref 12.1–17)
IGA SERPL-MCNC: 306 MG/DL (ref 70–400)
IGG SERPL-MCNC: 863 MG/DL (ref 700–1600)
IGM SERPL-MCNC: 61 MG/DL (ref 40–230)
IMM GRANULOCYTES # BLD AUTO: 0.02 K/UL (ref 0–0.04)
IMM GRANULOCYTES NFR BLD AUTO: 0.3 % (ref 0–0.5)
LYMPHOCYTES # BLD: 0.74 K/UL (ref 0.8–3.5)
LYMPHOCYTES NFR BLD: 12.2 % (ref 12–49)
MCH RBC QN AUTO: 27.2 PG (ref 26–34)
MCHC RBC AUTO-ENTMCNC: 31.4 G/DL (ref 30–36.5)
MCV RBC AUTO: 86.5 FL (ref 80–99)
MONOCYTES # BLD: 0.56 K/UL (ref 0–1)
MONOCYTES NFR BLD: 9.1 % (ref 5–13)
MYELOPEROXIDASE AB SER IA-ACNC: <0.2 UNITS (ref 0–0.9)
NEUTS SEG # BLD: 4.74 K/UL (ref 1.8–8)
NEUTS SEG NFR BLD: 77.7 % (ref 32–75)
NRBC # BLD: 0 K/UL (ref 0–0.01)
NRBC BLD-RTO: 0 PER 100 WBC
P-ANCA ATYPICAL TITR SER IF: NORMAL TITER
P-ANCA TITR SER IF: NORMAL TITER
PLATELET # BLD AUTO: 298 K/UL (ref 150–400)
PMV BLD AUTO: 9.6 FL (ref 8.9–12.9)
POTASSIUM SERPL-SCNC: 4.1 MMOL/L (ref 3.5–5.1)
PROT UR-MCNC: 276 MG/DL (ref 0–11.9)
PROT/CREAT UR-RTO: 1.5
PROTEINASE3 AB SER IA-ACNC: <0.2 UNITS (ref 0–0.9)
RBC # BLD AUTO: 4.23 M/UL (ref 4.1–5.7)
RBC MORPH BLD: ABNORMAL
SERVICE CMNT-IMP: ABNORMAL
SODIUM SERPL-SCNC: 140 MMOL/L (ref 136–145)
WBC # BLD AUTO: 6.1 K/UL (ref 4.1–11.1)

## 2025-04-10 PROCEDURE — 6370000000 HC RX 637 (ALT 250 FOR IP): Performed by: INTERNAL MEDICINE

## 2025-04-10 PROCEDURE — 51798 US URINE CAPACITY MEASURE: CPT

## 2025-04-10 PROCEDURE — 97535 SELF CARE MNGMENT TRAINING: CPT | Performed by: OCCUPATIONAL THERAPIST

## 2025-04-10 PROCEDURE — 82962 GLUCOSE BLOOD TEST: CPT

## 2025-04-10 PROCEDURE — 11104 PUNCH BX SKIN SINGLE LESION: CPT | Performed by: SURGERY

## 2025-04-10 PROCEDURE — 88305 TISSUE EXAM BY PATHOLOGIST: CPT

## 2025-04-10 PROCEDURE — 36415 COLL VENOUS BLD VENIPUNCTURE: CPT

## 2025-04-10 PROCEDURE — 6360000002 HC RX W HCPCS: Performed by: INTERNAL MEDICINE

## 2025-04-10 PROCEDURE — 82570 ASSAY OF URINE CREATININE: CPT

## 2025-04-10 PROCEDURE — 97162 PT EVAL MOD COMPLEX 30 MIN: CPT

## 2025-04-10 PROCEDURE — 0HB7XZX EXCISION OF ABDOMEN SKIN, EXTERNAL APPROACH, DIAGNOSTIC: ICD-10-PCS | Performed by: SURGERY

## 2025-04-10 PROCEDURE — 85025 COMPLETE CBC W/AUTO DIFF WBC: CPT

## 2025-04-10 PROCEDURE — 2580000003 HC RX 258: Performed by: INTERNAL MEDICINE

## 2025-04-10 PROCEDURE — 99233 SBSQ HOSP IP/OBS HIGH 50: CPT | Performed by: SURGERY

## 2025-04-10 PROCEDURE — 80048 BASIC METABOLIC PNL TOTAL CA: CPT

## 2025-04-10 PROCEDURE — 97166 OT EVAL MOD COMPLEX 45 MIN: CPT | Performed by: OCCUPATIONAL THERAPIST

## 2025-04-10 PROCEDURE — 84156 ASSAY OF PROTEIN URINE: CPT

## 2025-04-10 PROCEDURE — 2060000000 HC ICU INTERMEDIATE R&B

## 2025-04-10 PROCEDURE — 2500000003 HC RX 250 WO HCPCS: Performed by: INTERNAL MEDICINE

## 2025-04-10 PROCEDURE — 51702 INSERT TEMP BLADDER CATH: CPT

## 2025-04-10 PROCEDURE — 97116 GAIT TRAINING THERAPY: CPT

## 2025-04-10 PROCEDURE — 6370000000 HC RX 637 (ALT 250 FOR IP): Performed by: STUDENT IN AN ORGANIZED HEALTH CARE EDUCATION/TRAINING PROGRAM

## 2025-04-10 RX ADMIN — HYDRALAZINE HYDROCHLORIDE 25 MG: 25 TABLET ORAL at 20:57

## 2025-04-10 RX ADMIN — HYDRALAZINE HYDROCHLORIDE 25 MG: 25 TABLET ORAL at 06:30

## 2025-04-10 RX ADMIN — HYDROMORPHONE HYDROCHLORIDE 0.5 MG: 1 INJECTION, SOLUTION INTRAMUSCULAR; INTRAVENOUS; SUBCUTANEOUS at 10:11

## 2025-04-10 RX ADMIN — HYDROMORPHONE HYDROCHLORIDE 0.5 MG: 1 INJECTION, SOLUTION INTRAMUSCULAR; INTRAVENOUS; SUBCUTANEOUS at 03:27

## 2025-04-10 RX ADMIN — INSULIN LISPRO 1 UNITS: 100 INJECTION, SOLUTION INTRAVENOUS; SUBCUTANEOUS at 00:31

## 2025-04-10 RX ADMIN — ACETAMINOPHEN 650 MG: 325 TABLET ORAL at 23:23

## 2025-04-10 RX ADMIN — HEPARIN SODIUM 5000 UNITS: 5000 INJECTION INTRAVENOUS; SUBCUTANEOUS at 06:32

## 2025-04-10 RX ADMIN — METOPROLOL SUCCINATE 12.5 MG: 25 TABLET, EXTENDED RELEASE ORAL at 10:12

## 2025-04-10 RX ADMIN — CALCIUM CARBONATE (ANTACID) CHEW TAB 500 MG 500 MG: 500 CHEW TAB at 23:23

## 2025-04-10 RX ADMIN — INSULIN LISPRO 1 UNITS: 100 INJECTION, SOLUTION INTRAVENOUS; SUBCUTANEOUS at 14:50

## 2025-04-10 RX ADMIN — PRAVASTATIN SODIUM 20 MG: 10 TABLET ORAL at 20:57

## 2025-04-10 RX ADMIN — SODIUM CHLORIDE, PRESERVATIVE FREE 10 ML: 5 INJECTION INTRAVENOUS at 20:59

## 2025-04-10 RX ADMIN — SODIUM CHLORIDE, PRESERVATIVE FREE 10 ML: 5 INJECTION INTRAVENOUS at 10:11

## 2025-04-10 RX ADMIN — AMLODIPINE BESYLATE 5 MG: 5 TABLET ORAL at 10:12

## 2025-04-10 RX ADMIN — PANTOPRAZOLE SODIUM 40 MG: 40 TABLET, DELAYED RELEASE ORAL at 06:30

## 2025-04-10 RX ADMIN — HYDRALAZINE HYDROCHLORIDE 10 MG: 20 INJECTION INTRAMUSCULAR; INTRAVENOUS at 03:27

## 2025-04-10 RX ADMIN — HEPARIN SODIUM 5000 UNITS: 5000 INJECTION INTRAVENOUS; SUBCUTANEOUS at 20:59

## 2025-04-10 RX ADMIN — HYDRALAZINE HYDROCHLORIDE 25 MG: 25 TABLET ORAL at 14:50

## 2025-04-10 RX ADMIN — AMIODARONE HYDROCHLORIDE 100 MG: 200 TABLET ORAL at 10:12

## 2025-04-10 RX ADMIN — HEPARIN SODIUM 5000 UNITS: 5000 INJECTION INTRAVENOUS; SUBCUTANEOUS at 14:49

## 2025-04-10 RX ADMIN — SODIUM CHLORIDE: 9 INJECTION, SOLUTION INTRAVENOUS at 04:23

## 2025-04-10 ASSESSMENT — PAIN SCALES - GENERAL
PAINLEVEL_OUTOF10: 0
PAINLEVEL_OUTOF10: 7
PAINLEVEL_OUTOF10: 5
PAINLEVEL_OUTOF10: 2
PAINLEVEL_OUTOF10: 7

## 2025-04-10 ASSESSMENT — PAIN DESCRIPTION - ORIENTATION
ORIENTATION: LOWER
ORIENTATION: ANTERIOR
ORIENTATION: RIGHT

## 2025-04-10 ASSESSMENT — PAIN DESCRIPTION - DESCRIPTORS
DESCRIPTORS: BURNING;ACHING
DESCRIPTORS: ACHING
DESCRIPTORS: ACHING;DISCOMFORT

## 2025-04-10 ASSESSMENT — PAIN DESCRIPTION - LOCATION
LOCATION: ABDOMEN

## 2025-04-10 ASSESSMENT — PAIN - FUNCTIONAL ASSESSMENT: PAIN_FUNCTIONAL_ASSESSMENT: ACTIVITIES ARE NOT PREVENTED

## 2025-04-10 NOTE — CARE COORDINATION
Care Management Initial Assessment       RUR: 18  Readmission? No  1st IM letter given? Yes 4/8/25  1st  letter given: No         04/10/25 [id number]   Service Assessment   Patient Orientation Alert and Oriented;Person;Place;Situation;Self   Cognition Alert   History Provided By Patient   Primary Caregiver Self   Support Systems Spouse/Significant Other;Children   Patient's Healthcare Decision Maker is: Legal Next of Kin   PCP Verified by CM Yes  (Dr. Maria Luisa Gramajo)   Last Visit to PCP Within last 3 months  (\"a wk ago\")   Prior Functional Level Independent in ADLs/IADLs   Current Functional Level Assistance with the following:;Mobility   Can patient return to prior living arrangement Yes   Ability to make needs known: Good   Family able to assist with home care needs: No   Would you like for me to discuss the discharge plan with any other family members/significant others, and if so, who? Yes  (spouse and daughter, Evelyn)   Financial Resources Medicare   Social/Functional History   Lives With Spouse   Type of Home House  (1 fl 0 levar)   Home Equipment None   Active  Yes   Discharge Planning   Type of Residence House  (vs rehab (family wants him to go).)         CM spoke with pt and pt's daughter, Melany, to introduce self/role, verify demographics and complete initial assessment.  Pt lives with his spouse and is her primary caregiver.  Pt was independent with ADLs/IADLs pta.  Pt denied the use of any DME.  Pt is an active .  Pt has had HH previously but denied a hx of SNF/IPR.  Pt uses the [facility].  Pt's stepson can transport at d/c.  Pt's daughter expressed that she would like pt evaluated by PT/OT.      Advance Care Planning     General Advance Care Planning (ACP) Conversation    Date of Conversation: 4/10/2025  Conducted with: Patient with Decision Making Capacity  Other persons present: None    Healthcare Decision Maker: No healthcare decision makers have been documented.     Today we

## 2025-04-10 NOTE — PROCEDURES
PROCEDURE NOTE  Date: 4/10/2025   Name: Darius Burgos  YOB: 1940    Procedures    Procedure Note    Pre OP Dx:  Purpura of skin  Post OP Dx Purpura of skin  Procedure Incisional punch biopsy of skin on right anterior thigh  Surgeon Bhakti  Anesthesia 1% Lidocaine  6 ml  EBL   Minimal  Pathology Right thigh lesion    Procedure  After informed consent and time out, theright anterior thigh was prepped with chlorhexidine and draped sterilely.  Local anesthetic was injected into the skin and subcutaneous tissues around the abnormal skin.  Two 6 mm punch skin biopsies were taken.  The wounds were approximated with 3-0 nylon.  A sterile dressing was applied.  The patient tolerated the procedure well.    Konrad Ya MD FACS

## 2025-04-11 ENCOUNTER — ANESTHESIA EVENT (OUTPATIENT)
Facility: HOSPITAL | Age: 85
End: 2025-04-11
Payer: MEDICARE

## 2025-04-11 ENCOUNTER — APPOINTMENT (OUTPATIENT)
Facility: HOSPITAL | Age: 85
DRG: 659 | End: 2025-04-11
Payer: MEDICARE

## 2025-04-11 ENCOUNTER — APPOINTMENT (OUTPATIENT)
Facility: HOSPITAL | Age: 85
DRG: 659 | End: 2025-04-11
Attending: INTERNAL MEDICINE
Payer: MEDICARE

## 2025-04-11 ENCOUNTER — ANESTHESIA (OUTPATIENT)
Facility: HOSPITAL | Age: 85
End: 2025-04-11
Payer: MEDICARE

## 2025-04-11 LAB
ANA SER QL: NEGATIVE
ANION GAP SERPL CALC-SCNC: 7 MMOL/L (ref 2–12)
BASOPHILS # BLD: 0.01 K/UL (ref 0–0.1)
BASOPHILS NFR BLD: 0.2 % (ref 0–1)
BUN SERPL-MCNC: 77 MG/DL (ref 6–20)
BUN/CREAT SERPL: 25 (ref 12–20)
C3 SERPL-MCNC: 118 MG/DL (ref 82–167)
C4 SERPL-MCNC: 19 MG/DL (ref 12–38)
CALCIUM SERPL-MCNC: 8 MG/DL (ref 8.5–10.1)
CHLORIDE SERPL-SCNC: 109 MMOL/L (ref 97–108)
CO2 SERPL-SCNC: 22 MMOL/L (ref 21–32)
CREAT SERPL-MCNC: 3.05 MG/DL (ref 0.7–1.3)
DIFFERENTIAL METHOD BLD: ABNORMAL
ECHO AO ROOT DIAM: 4 CM
ECHO AO ROOT INDEX: 1.91 CM/M2
ECHO AV AREA PEAK VELOCITY: 1.1 CM2
ECHO AV AREA PEAK VELOCITY: 1.2 CM2
ECHO AV AREA VTI: 1.3 CM2
ECHO AV AREA/BSA VTI: 0.6 CM2/M2
ECHO AV MEAN GRADIENT: 14 MMHG
ECHO AV MEAN VELOCITY: 1.8 M/S
ECHO AV PEAK GRADIENT: 29 MMHG
ECHO AV PEAK GRADIENT: 30 MMHG
ECHO AV PEAK VELOCITY: 2.7 M/S
ECHO AV PEAK VELOCITY: 2.7 M/S
ECHO AV VTI: 44.2 CM
ECHO BSA: 2.15 M2
ECHO EST RA PRESSURE: 10 MMHG
ECHO LV EF PHYSICIAN: 55 %
ECHO LV FRACTIONAL SHORTENING: 44 % (ref 28–44)
ECHO LV INTERNAL DIMENSION DIASTOLE INDEX: 1.96 CM/M2
ECHO LV INTERNAL DIMENSION DIASTOLIC: 4.1 CM (ref 4.2–5.9)
ECHO LV INTERNAL DIMENSION SYSTOLIC INDEX: 1.1 CM/M2
ECHO LV INTERNAL DIMENSION SYSTOLIC: 2.3 CM
ECHO LV IVSD: 1.2 CM (ref 0.6–1)
ECHO LV MASS 2D: 171.7 G (ref 88–224)
ECHO LV MASS INDEX 2D: 82.2 G/M2 (ref 49–115)
ECHO LV POSTERIOR WALL DIASTOLIC: 1.2 CM (ref 0.6–1)
ECHO LV RELATIVE WALL THICKNESS RATIO: 0.59
ECHO LVOT AREA: 3.1 CM2
ECHO LVOT AV VTI INDEX: 0.41
ECHO LVOT DIAM: 2 CM
ECHO LVOT MEAN GRADIENT: 2 MMHG
ECHO LVOT PEAK GRADIENT: 4 MMHG
ECHO LVOT PEAK GRADIENT: 4 MMHG
ECHO LVOT PEAK VELOCITY: 1 M/S
ECHO LVOT PEAK VELOCITY: 1 M/S
ECHO LVOT STROKE VOLUME INDEX: 27.3 ML/M2
ECHO LVOT SV: 57.1 ML
ECHO LVOT VTI: 18.2 CM
ECHO MV AREA VTI: 2.1 CM2
ECHO MV LVOT VTI INDEX: 1.48
ECHO MV MAX VELOCITY: 0.8 M/S
ECHO MV MEAN GRADIENT: 1 MMHG
ECHO MV MEAN VELOCITY: 0.5 M/S
ECHO MV PEAK GRADIENT: 3 MMHG
ECHO MV VTI: 27 CM
ECHO RIGHT VENTRICULAR SYSTOLIC PRESSURE (RVSP): 35 MMHG
ECHO RV FREE WALL PEAK S': 22.9 CM/S
ECHO TV REGURGITANT MAX VELOCITY: 2.51 M/S
ECHO TV REGURGITANT PEAK GRADIENT: 25 MMHG
EOSINOPHIL # BLD: 0.07 K/UL (ref 0–0.4)
EOSINOPHIL NFR BLD: 1.2 % (ref 0–7)
ERYTHROCYTE [DISTWIDTH] IN BLOOD BY AUTOMATED COUNT: 14.8 % (ref 11.5–14.5)
GLUCOSE BLD STRIP.AUTO-MCNC: 156 MG/DL (ref 65–117)
GLUCOSE BLD STRIP.AUTO-MCNC: 163 MG/DL (ref 65–117)
GLUCOSE BLD STRIP.AUTO-MCNC: 180 MG/DL (ref 65–117)
GLUCOSE BLD STRIP.AUTO-MCNC: 190 MG/DL (ref 65–117)
GLUCOSE BLD STRIP.AUTO-MCNC: 385 MG/DL (ref 65–117)
GLUCOSE SERPL-MCNC: 156 MG/DL (ref 65–100)
HCT VFR BLD AUTO: 37.9 % (ref 36.6–50.3)
HGB BLD-MCNC: 12 G/DL (ref 12.1–17)
IMM GRANULOCYTES # BLD AUTO: 0.04 K/UL (ref 0–0.04)
IMM GRANULOCYTES NFR BLD AUTO: 0.7 % (ref 0–0.5)
LYMPHOCYTES # BLD: 0.86 K/UL (ref 0.8–3.5)
LYMPHOCYTES NFR BLD: 15.3 % (ref 12–49)
MCH RBC QN AUTO: 27.5 PG (ref 26–34)
MCHC RBC AUTO-ENTMCNC: 31.7 G/DL (ref 30–36.5)
MCV RBC AUTO: 86.7 FL (ref 80–99)
MONOCYTES # BLD: 0.49 K/UL (ref 0–1)
MONOCYTES NFR BLD: 8.7 % (ref 5–13)
NEUTS SEG # BLD: 4.16 K/UL (ref 1.8–8)
NEUTS SEG NFR BLD: 73.9 % (ref 32–75)
NRBC # BLD: 0 K/UL (ref 0–0.01)
NRBC BLD-RTO: 0 PER 100 WBC
PLATELET # BLD AUTO: 328 K/UL (ref 150–400)
PMV BLD AUTO: 9.8 FL (ref 8.9–12.9)
POTASSIUM SERPL-SCNC: 4.4 MMOL/L (ref 3.5–5.1)
RBC # BLD AUTO: 4.37 M/UL (ref 4.1–5.7)
SERVICE CMNT-IMP: ABNORMAL
SODIUM SERPL-SCNC: 138 MMOL/L (ref 136–145)
WBC # BLD AUTO: 5.6 K/UL (ref 4.1–11.1)

## 2025-04-11 PROCEDURE — 7100000000 HC PACU RECOVERY - FIRST 15 MIN: Performed by: UROLOGY

## 2025-04-11 PROCEDURE — 93321 DOPPLER ECHO F-UP/LMTD STD: CPT

## 2025-04-11 PROCEDURE — C1758 CATHETER, URETERAL: HCPCS | Performed by: UROLOGY

## 2025-04-11 PROCEDURE — 6370000000 HC RX 637 (ALT 250 FOR IP): Performed by: INTERNAL MEDICINE

## 2025-04-11 PROCEDURE — 3600000013 HC SURGERY LEVEL 3 ADDTL 15MIN: Performed by: UROLOGY

## 2025-04-11 PROCEDURE — 6360000002 HC RX W HCPCS: Performed by: INTERNAL MEDICINE

## 2025-04-11 PROCEDURE — 2709999900 HC NON-CHARGEABLE SUPPLY: Performed by: UROLOGY

## 2025-04-11 PROCEDURE — 85025 COMPLETE CBC W/AUTO DIFF WBC: CPT

## 2025-04-11 PROCEDURE — 3700000001 HC ADD 15 MINUTES (ANESTHESIA): Performed by: UROLOGY

## 2025-04-11 PROCEDURE — 76000 FLUOROSCOPY <1 HR PHYS/QHP: CPT

## 2025-04-11 PROCEDURE — 74018 RADEX ABDOMEN 1 VIEW: CPT

## 2025-04-11 PROCEDURE — 3600000003 HC SURGERY LEVEL 3 BASE: Performed by: UROLOGY

## 2025-04-11 PROCEDURE — 2500000003 HC RX 250 WO HCPCS

## 2025-04-11 PROCEDURE — 0T768DZ DILATION OF RIGHT URETER WITH INTRALUMINAL DEVICE, VIA NATURAL OR ARTIFICIAL OPENING ENDOSCOPIC: ICD-10-PCS | Performed by: UROLOGY

## 2025-04-11 PROCEDURE — BT141ZZ FLUOROSCOPY OF KIDNEYS, URETERS AND BLADDER USING LOW OSMOLAR CONTRAST: ICD-10-PCS | Performed by: UROLOGY

## 2025-04-11 PROCEDURE — 7100000001 HC PACU RECOVERY - ADDTL 15 MIN: Performed by: UROLOGY

## 2025-04-11 PROCEDURE — 2500000003 HC RX 250 WO HCPCS: Performed by: INTERNAL MEDICINE

## 2025-04-11 PROCEDURE — C2617 STENT, NON-COR, TEM W/O DEL: HCPCS | Performed by: UROLOGY

## 2025-04-11 PROCEDURE — 2580000003 HC RX 258

## 2025-04-11 PROCEDURE — 82962 GLUCOSE BLOOD TEST: CPT

## 2025-04-11 PROCEDURE — 51702 INSERT TEMP BLADDER CATH: CPT

## 2025-04-11 PROCEDURE — 6370000000 HC RX 637 (ALT 250 FOR IP): Performed by: STUDENT IN AN ORGANIZED HEALTH CARE EDUCATION/TRAINING PROGRAM

## 2025-04-11 PROCEDURE — 99232 SBSQ HOSP IP/OBS MODERATE 35: CPT | Performed by: SURGERY

## 2025-04-11 PROCEDURE — 2060000000 HC ICU INTERMEDIATE R&B

## 2025-04-11 PROCEDURE — 36415 COLL VENOUS BLD VENIPUNCTURE: CPT

## 2025-04-11 PROCEDURE — 3700000000 HC ANESTHESIA ATTENDED CARE: Performed by: UROLOGY

## 2025-04-11 PROCEDURE — 6360000002 HC RX W HCPCS

## 2025-04-11 PROCEDURE — 80048 BASIC METABOLIC PNL TOTAL CA: CPT

## 2025-04-11 DEVICE — URETERAL STENT
Type: IMPLANTABLE DEVICE | Site: URETER | Status: FUNCTIONAL
Brand: POLARIS™ ULTRA

## 2025-04-11 RX ORDER — SODIUM CHLORIDE 0.9 % (FLUSH) 0.9 %
5-40 SYRINGE (ML) INJECTION EVERY 12 HOURS SCHEDULED
Status: DISCONTINUED | OUTPATIENT
Start: 2025-04-11 | End: 2025-04-11 | Stop reason: HOSPADM

## 2025-04-11 RX ORDER — SODIUM CHLORIDE 0.9 % (FLUSH) 0.9 %
5-40 SYRINGE (ML) INJECTION EVERY 12 HOURS SCHEDULED
Status: CANCELLED | OUTPATIENT
Start: 2025-04-11

## 2025-04-11 RX ORDER — NALOXONE HYDROCHLORIDE 0.4 MG/ML
INJECTION, SOLUTION INTRAMUSCULAR; INTRAVENOUS; SUBCUTANEOUS PRN
Status: DISCONTINUED | OUTPATIENT
Start: 2025-04-11 | End: 2025-04-11 | Stop reason: HOSPADM

## 2025-04-11 RX ORDER — HYDROMORPHONE HYDROCHLORIDE 1 MG/ML
0.25 INJECTION, SOLUTION INTRAMUSCULAR; INTRAVENOUS; SUBCUTANEOUS EVERY 5 MIN PRN
Status: DISCONTINUED | OUTPATIENT
Start: 2025-04-11 | End: 2025-04-11 | Stop reason: HOSPADM

## 2025-04-11 RX ORDER — MORPHINE SULFATE 2 MG/ML
1 INJECTION, SOLUTION INTRAMUSCULAR; INTRAVENOUS
Status: DISCONTINUED | OUTPATIENT
Start: 2025-04-11 | End: 2025-04-22 | Stop reason: HOSPADM

## 2025-04-11 RX ORDER — SODIUM CHLORIDE 9 MG/ML
INJECTION, SOLUTION INTRAVENOUS PRN
Status: CANCELLED | OUTPATIENT
Start: 2025-04-11

## 2025-04-11 RX ORDER — PROPOFOL 10 MG/ML
INJECTION, EMULSION INTRAVENOUS
Status: DISCONTINUED | OUTPATIENT
Start: 2025-04-11 | End: 2025-04-11 | Stop reason: SDUPTHER

## 2025-04-11 RX ORDER — PROCHLORPERAZINE EDISYLATE 5 MG/ML
5 INJECTION INTRAMUSCULAR; INTRAVENOUS
Status: DISCONTINUED | OUTPATIENT
Start: 2025-04-11 | End: 2025-04-11 | Stop reason: HOSPADM

## 2025-04-11 RX ORDER — SUCCINYLCHOLINE CHLORIDE 20 MG/ML
INJECTION INTRAMUSCULAR; INTRAVENOUS
Status: DISCONTINUED | OUTPATIENT
Start: 2025-04-11 | End: 2025-04-11 | Stop reason: SDUPTHER

## 2025-04-11 RX ORDER — SODIUM CHLORIDE, SODIUM LACTATE, POTASSIUM CHLORIDE, CALCIUM CHLORIDE 600; 310; 30; 20 MG/100ML; MG/100ML; MG/100ML; MG/100ML
INJECTION, SOLUTION INTRAVENOUS
Status: DISCONTINUED | OUTPATIENT
Start: 2025-04-11 | End: 2025-04-11 | Stop reason: SDUPTHER

## 2025-04-11 RX ORDER — SODIUM CHLORIDE 9 MG/ML
INJECTION, SOLUTION INTRAVENOUS PRN
Status: DISCONTINUED | OUTPATIENT
Start: 2025-04-11 | End: 2025-04-11 | Stop reason: HOSPADM

## 2025-04-11 RX ORDER — FENTANYL CITRATE 50 UG/ML
INJECTION, SOLUTION INTRAMUSCULAR; INTRAVENOUS
Status: DISCONTINUED | OUTPATIENT
Start: 2025-04-11 | End: 2025-04-11 | Stop reason: SDUPTHER

## 2025-04-11 RX ORDER — PROCHLORPERAZINE EDISYLATE 5 MG/ML
5 INJECTION INTRAMUSCULAR; INTRAVENOUS
Status: CANCELLED | OUTPATIENT
Start: 2025-04-11

## 2025-04-11 RX ORDER — SODIUM CHLORIDE 0.9 % (FLUSH) 0.9 %
5-40 SYRINGE (ML) INJECTION PRN
Status: CANCELLED | OUTPATIENT
Start: 2025-04-11

## 2025-04-11 RX ORDER — SODIUM CHLORIDE 0.9 % (FLUSH) 0.9 %
5-40 SYRINGE (ML) INJECTION PRN
Status: DISCONTINUED | OUTPATIENT
Start: 2025-04-11 | End: 2025-04-11 | Stop reason: HOSPADM

## 2025-04-11 RX ORDER — LIDOCAINE HYDROCHLORIDE 20 MG/ML
INJECTION, SOLUTION EPIDURAL; INFILTRATION; INTRACAUDAL; PERINEURAL
Status: DISCONTINUED | OUTPATIENT
Start: 2025-04-11 | End: 2025-04-11 | Stop reason: SDUPTHER

## 2025-04-11 RX ORDER — CEFAZOLIN SODIUM 1 G/3ML
INJECTION, POWDER, FOR SOLUTION INTRAMUSCULAR; INTRAVENOUS
Status: DISCONTINUED | OUTPATIENT
Start: 2025-04-11 | End: 2025-04-11 | Stop reason: SDUPTHER

## 2025-04-11 RX ORDER — NALOXONE HYDROCHLORIDE 0.4 MG/ML
INJECTION, SOLUTION INTRAMUSCULAR; INTRAVENOUS; SUBCUTANEOUS PRN
Status: CANCELLED | OUTPATIENT
Start: 2025-04-11

## 2025-04-11 RX ORDER — HYDROMORPHONE HYDROCHLORIDE 1 MG/ML
0.5 INJECTION, SOLUTION INTRAMUSCULAR; INTRAVENOUS; SUBCUTANEOUS EVERY 5 MIN PRN
Refills: 0 | Status: CANCELLED | OUTPATIENT
Start: 2025-04-11

## 2025-04-11 RX ORDER — MEPERIDINE HYDROCHLORIDE 25 MG/ML
12.5 INJECTION INTRAMUSCULAR; INTRAVENOUS; SUBCUTANEOUS EVERY 5 MIN PRN
Refills: 0 | Status: CANCELLED | OUTPATIENT
Start: 2025-04-11

## 2025-04-11 RX ORDER — ROCURONIUM BROMIDE 10 MG/ML
INJECTION, SOLUTION INTRAVENOUS
Status: DISCONTINUED | OUTPATIENT
Start: 2025-04-11 | End: 2025-04-11 | Stop reason: SDUPTHER

## 2025-04-11 RX ORDER — INSULIN LISPRO 100 [IU]/ML
8 INJECTION, SOLUTION INTRAVENOUS; SUBCUTANEOUS ONCE
Status: COMPLETED | OUTPATIENT
Start: 2025-04-11 | End: 2025-04-11

## 2025-04-11 RX ORDER — EPHEDRINE SULFATE/0.9% NACL/PF 50 MG/5 ML
SYRINGE (ML) INTRAVENOUS
Status: DISCONTINUED | OUTPATIENT
Start: 2025-04-11 | End: 2025-04-11 | Stop reason: SDUPTHER

## 2025-04-11 RX ORDER — ONDANSETRON 2 MG/ML
INJECTION INTRAMUSCULAR; INTRAVENOUS
Status: DISCONTINUED | OUTPATIENT
Start: 2025-04-11 | End: 2025-04-11 | Stop reason: SDUPTHER

## 2025-04-11 RX ORDER — ONDANSETRON 2 MG/ML
4 INJECTION INTRAMUSCULAR; INTRAVENOUS
Status: CANCELLED | OUTPATIENT
Start: 2025-04-11

## 2025-04-11 RX ORDER — FENTANYL CITRATE 50 UG/ML
50 INJECTION, SOLUTION INTRAMUSCULAR; INTRAVENOUS EVERY 5 MIN PRN
Refills: 0 | Status: CANCELLED | OUTPATIENT
Start: 2025-04-11

## 2025-04-11 RX ORDER — FENTANYL CITRATE 50 UG/ML
50 INJECTION, SOLUTION INTRAMUSCULAR; INTRAVENOUS EVERY 5 MIN PRN
Status: DISCONTINUED | OUTPATIENT
Start: 2025-04-11 | End: 2025-04-11 | Stop reason: HOSPADM

## 2025-04-11 RX ADMIN — LIDOCAINE HYDROCHLORIDE 100 MG: 20 INJECTION, SOLUTION EPIDURAL; INFILTRATION; INTRACAUDAL; PERINEURAL at 16:30

## 2025-04-11 RX ADMIN — PANTOPRAZOLE SODIUM 40 MG: 40 TABLET, DELAYED RELEASE ORAL at 05:44

## 2025-04-11 RX ADMIN — ROCURONIUM BROMIDE 10 MG: 10 INJECTION INTRAVENOUS at 16:30

## 2025-04-11 RX ADMIN — Medication 5 MG: at 16:50

## 2025-04-11 RX ADMIN — PHENYLEPHRINE HYDROCHLORIDE 40 MCG/MIN: 10 INJECTION INTRAVENOUS at 16:34

## 2025-04-11 RX ADMIN — CEFAZOLIN 2 G: 1 INJECTION, POWDER, FOR SOLUTION INTRAMUSCULAR; INTRAVENOUS; PARENTERAL at 16:37

## 2025-04-11 RX ADMIN — AMIODARONE HYDROCHLORIDE 100 MG: 200 TABLET ORAL at 17:50

## 2025-04-11 RX ADMIN — PHENYLEPHRINE HYDROCHLORIDE 40 MCG: 10 INJECTION INTRAVENOUS at 16:55

## 2025-04-11 RX ADMIN — HYDRALAZINE HYDROCHLORIDE 25 MG: 25 TABLET ORAL at 20:53

## 2025-04-11 RX ADMIN — SODIUM CHLORIDE, PRESERVATIVE FREE 10 ML: 5 INJECTION INTRAVENOUS at 20:53

## 2025-04-11 RX ADMIN — FENTANYL CITRATE 50 MCG: 50 INJECTION, SOLUTION INTRAMUSCULAR; INTRAVENOUS at 16:30

## 2025-04-11 RX ADMIN — SUCCINYLCHOLINE CHLORIDE 140 MG: 20 INJECTION, SOLUTION INTRAMUSCULAR; INTRAVENOUS at 16:30

## 2025-04-11 RX ADMIN — METOPROLOL SUCCINATE 12.5 MG: 25 TABLET, EXTENDED RELEASE ORAL at 17:50

## 2025-04-11 RX ADMIN — HEPARIN SODIUM 5000 UNITS: 5000 INJECTION INTRAVENOUS; SUBCUTANEOUS at 05:42

## 2025-04-11 RX ADMIN — SODIUM CHLORIDE, PRESERVATIVE FREE 10 ML: 5 INJECTION INTRAVENOUS at 09:00

## 2025-04-11 RX ADMIN — WATER 250 MG: 1 INJECTION INTRAMUSCULAR; INTRAVENOUS; SUBCUTANEOUS at 12:26

## 2025-04-11 RX ADMIN — ACETAMINOPHEN 650 MG: 325 TABLET ORAL at 09:12

## 2025-04-11 RX ADMIN — SODIUM CHLORIDE, POTASSIUM CHLORIDE, SODIUM LACTATE AND CALCIUM CHLORIDE: 600; 310; 30; 20 INJECTION, SOLUTION INTRAVENOUS at 16:23

## 2025-04-11 RX ADMIN — HEPARIN SODIUM 5000 UNITS: 5000 INJECTION INTRAVENOUS; SUBCUTANEOUS at 20:54

## 2025-04-11 RX ADMIN — ONDANSETRON 4 MG: 2 INJECTION, SOLUTION INTRAMUSCULAR; INTRAVENOUS at 16:40

## 2025-04-11 RX ADMIN — Medication 5 MG: at 16:55

## 2025-04-11 RX ADMIN — ONDANSETRON 4 MG: 2 INJECTION, SOLUTION INTRAMUSCULAR; INTRAVENOUS at 10:11

## 2025-04-11 RX ADMIN — PROPOFOL 120 MG: 10 INJECTION, EMULSION INTRAVENOUS at 16:30

## 2025-04-11 RX ADMIN — PRAVASTATIN SODIUM 20 MG: 10 TABLET ORAL at 20:53

## 2025-04-11 RX ADMIN — HYDRALAZINE HYDROCHLORIDE 25 MG: 25 TABLET ORAL at 05:44

## 2025-04-11 RX ADMIN — Medication 5 MG: at 16:36

## 2025-04-11 RX ADMIN — MORPHINE SULFATE 1 MG: 2 INJECTION, SOLUTION INTRAMUSCULAR; INTRAVENOUS at 12:33

## 2025-04-11 RX ADMIN — ACETAMINOPHEN 650 MG: 325 TABLET ORAL at 17:25

## 2025-04-11 RX ADMIN — INSULIN LISPRO 4 UNITS: 100 INJECTION, SOLUTION INTRAVENOUS; SUBCUTANEOUS at 21:54

## 2025-04-11 RX ADMIN — MORPHINE SULFATE 1 MG: 2 INJECTION, SOLUTION INTRAMUSCULAR; INTRAVENOUS at 09:38

## 2025-04-11 ASSESSMENT — PAIN SCALES - GENERAL
PAINLEVEL_OUTOF10: 0
PAINLEVEL_OUTOF10: 7
PAINLEVEL_OUTOF10: 9
PAINLEVEL_OUTOF10: 9
PAINLEVEL_OUTOF10: 0
PAINLEVEL_OUTOF10: 0
PAINLEVEL_OUTOF10: 5
PAINLEVEL_OUTOF10: 3
PAINLEVEL_OUTOF10: 7

## 2025-04-11 ASSESSMENT — PAIN DESCRIPTION - LOCATION
LOCATION: ABDOMEN
LOCATION: ABDOMEN
LOCATION: BACK
LOCATION: ABDOMEN

## 2025-04-11 ASSESSMENT — PAIN DESCRIPTION - DESCRIPTORS
DESCRIPTORS: ACHING
DESCRIPTORS: DULL
DESCRIPTORS: ACHING;DISCOMFORT
DESCRIPTORS: ACHING

## 2025-04-11 ASSESSMENT — PAIN DESCRIPTION - ORIENTATION
ORIENTATION: UPPER
ORIENTATION: UPPER
ORIENTATION: MID;LOWER
ORIENTATION: UPPER

## 2025-04-11 NOTE — FLOWSHEET NOTE
04/11/25 1715   Handoff   Communication Given Transfer Handoff   Handoff phase Phase I transferring   Handoff Given To Manisha TORRES   Handoff Received From RAIANA Cortez RN and ABDOULAYE Cesar CRNA   Handoff Communication Face to Face

## 2025-04-11 NOTE — PERIOP NOTE
TRANSFER - IN REPORT:    Verbal report received from BRIAN Gaytan on Darius Burgos  being received from 2157 for ordered procedure      Report consisted of patient's Situation, Background, Assessment and   Recommendations(SBAR).     Information from the following report(s) Nurse Handoff Report, Index, Intake/Output, Recent Results, and Med Rec Status was reviewed with the receiving nurse.    Opportunity for questions and clarification was provided.      Assessment completed upon patient's arrival to unit and care assumed.

## 2025-04-11 NOTE — CARE COORDINATION
Transition of Care Plan:    RUR: 17  Prior Level of Functioning: independent  Disposition: rehab  [place]: 4/14/25  If SNF or IPR: Date FOC offered:   Date FOC received:   Accepting facility:   Date authorization started with reference number:   Date authorization received and expires:   Follow up appointments:   DME needed: none  Transportation at discharge: BLS  IM/IMM Medicare/ letter given: needed  Is patient a [place] and connected with VA?    If yes, was  transfer form completed and VA notified?   Caregiver Contact: Melany Parada [phone]  Discharge Caregiver contacted prior to discharge?   Care Conference needed?   Barriers to discharge:     CM called and followed up with pt's daughter, Melany, to determine if they had spoke about rehab referrals.  Pt's spouse is currently hospitalized here as well.  CM reviewed recommendations.  Melany would like referrals sent to [facility],  and [facility] proactively.      12:26 p.m. [facility].    Wilda Reed, VLADIMIRSW, LCSW  Care Management, [facility]  [phone]

## 2025-04-11 NOTE — FLOWSHEET NOTE
04/11/25 1735   Handoff   Communication Given Transfer Handoff   Handoff phase Phase I transferring   Handoff Given To Lucila TORRES   Handoff Received From Manisha TORRES   Handoff Communication Telephone

## 2025-04-11 NOTE — OP NOTE
[facility]              [address] [place]  [address]                            OPERATIVE REPORT      PATIENT NAME: ELENITA DIAZ               : 1940  MED REC NO: [id number]                       ROOM: 2157  ACCOUNT NO: [id number]                       ADMIT DATE: 2025  PROVIDER: Chemo Rees MD    DATE OF SERVICE:  2025    PREOPERATIVE DIAGNOSES:  Right hydronephrosis and right renal calculi.    POSTOPERATIVE DIAGNOSES:  Right hydronephrosis and right renal calculi.    PROCEDURES PERFORMED:  Cystoscopy, bilateral retrograde pyelograms, right ureteral stent placement.    SURGEON:  Chemo Rees MD    ASSISTANT:  ***    ANESTHESIA:  General.    ESTIMATED BLOOD LOSS:  NA.    SPECIMENS REMOVED:  None.    INTRAOPERATIVE FINDINGS:  The patient was noted to have a normal retrograde pyelogram on the patient's left side.  On the right side, the ureter had normal course and caliber, and the renal pelvis was not dilated.  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  There was some mild dilation of the branch leading to the upper pole.  The limb leading to the upper pole calyx did not appear to be dilated, but the calyx itself appeared to be somewhat dilated and there was impaired contrast from this portion of the collecting system.  Therefore, stent was placed within it.     COMPLICATIONS:  None.    IMPLANTS:  ***    INDICATIONS:  This is an 84-year-old male previously noted to have right hydronephrosis and suspected obstruction, previously had declined any intervention.  Now, he was admitted to the hospital for acute on chronic renal insufficiency and it has been arranged for him to undergo cystoscopy with retrograde pyelogram and possible right ureteral stent placement after all risks and benefits of procedure including alternatives were discussed with him.    DESCRIPTION OF PROCEDURE:

## 2025-04-11 NOTE — ANESTHESIA PRE PROCEDURE
Department of Anesthesiology  Preprocedure Note       Name:  Darius Burgos   Age:  84 y.o.  :  1940                                          MRN:  [id number]         Date:  2025      Surgeon: Surgeon(s):  Chemo Rees MD    Procedure: Procedure(s):  CYSTOSCOPY RIGHT URETERAL STENT INSERTION    Medications prior to admission:   Prior to Admission medications    Medication Sig Start Date End Date Taking? Authorizing Provider   ferrous sulfate (IRON 325) 325 (65 Fe) MG tablet Take 1 tablet by mouth every other day   Yes Ochoa Ferguson MD   pantoprazole (PROTONIX) 40 MG tablet Take 1 tablet by mouth every morning 25  Yes Ochoa Ferguson MD   furosemide (LASIX) 40 MG tablet Take 1 tablet by mouth daily Can take an extra tab in the afternoon if weight >3 lbs in a day or 6 Lbs in a week. Up to 3 times. 25  Yes Konrad Kingston MD   amLODIPine (NORVASC) 5 MG tablet Take 1 tablet by mouth daily 25  Yes Konrad Kingston MD   metoprolol succinate (TOPROL XL) 25 MG extended release tablet take 1/2 tablet BY MOUTH DAILY 3/27/25  Yes Konrad Kingston MD   losartan (COZAAR) 100 MG tablet Take 1 tablet by mouth daily . Blood work needed for further refills. 3/5/25  Yes Konrad Kingston MD   metFORMIN (GLUCOPHAGE) 1000 MG tablet Take 1 tablet by mouth 2 times daily 3/4/25  Yes Konrad Kingston MD   amiodarone (CORDARONE) 200 MG tablet take 1/2 tablet (100mg) BY MOUTH DAILY 25  Yes Konrad Kingston MD   pravastatin (PRAVACHOL) 20 MG tablet TAKE 1 TABLET BY MOUTH EVERY NIGHT AT BEDTIME FOR CHOLESTEROL 25  Yes Ochoa Ferguson MD   dicyclomine (BENTYL) 10 MG capsule Take 1 capsule by mouth 4 times daily as needed (as needed fro abdominal pain) 9/10/24  Yes Tonya Meza MD   apixaban (ELIQUIS) 5 MG TABS tablet Take 1 tablet by mouth 2 times daily Hold until - start taking from   Patient taking differently: Take 1 tablet by mouth 2 times

## 2025-04-12 LAB
ALBUMIN SERPL-MCNC: 2.3 G/DL (ref 3.5–5)
ANION GAP SERPL CALC-SCNC: 9 MMOL/L (ref 2–12)
BASOPHILS # BLD: 0.02 K/UL (ref 0–0.1)
BASOPHILS NFR BLD: 0.1 % (ref 0–1)
BUN SERPL-MCNC: 101 MG/DL (ref 6–20)
BUN/CREAT SERPL: 28 (ref 12–20)
CALCIUM SERPL-MCNC: 7.7 MG/DL (ref 8.5–10.1)
CHLORIDE SERPL-SCNC: 103 MMOL/L (ref 97–108)
CO2 SERPL-SCNC: 20 MMOL/L (ref 21–32)
CREAT SERPL-MCNC: 3.61 MG/DL (ref 0.7–1.3)
DIFFERENTIAL METHOD BLD: ABNORMAL
EOSINOPHIL # BLD: 0 K/UL (ref 0–0.4)
EOSINOPHIL NFR BLD: 0 % (ref 0–7)
ERYTHROCYTE [DISTWIDTH] IN BLOOD BY AUTOMATED COUNT: 14.7 % (ref 11.5–14.5)
GLUCOSE BLD STRIP.AUTO-MCNC: 288 MG/DL (ref 65–117)
GLUCOSE BLD STRIP.AUTO-MCNC: 337 MG/DL (ref 65–117)
GLUCOSE BLD STRIP.AUTO-MCNC: 373 MG/DL (ref 65–117)
GLUCOSE BLD STRIP.AUTO-MCNC: 376 MG/DL (ref 65–117)
GLUCOSE BLD STRIP.AUTO-MCNC: 380 MG/DL (ref 65–117)
GLUCOSE BLD STRIP.AUTO-MCNC: 383 MG/DL (ref 65–117)
GLUCOSE BLD STRIP.AUTO-MCNC: 420 MG/DL (ref 65–117)
GLUCOSE BLD STRIP.AUTO-MCNC: 444 MG/DL (ref 65–117)
GLUCOSE SERPL-MCNC: 348 MG/DL (ref 65–100)
HCT VFR BLD AUTO: 35.2 % (ref 36.6–50.3)
HGB BLD-MCNC: 11.4 G/DL (ref 12.1–17)
IMM GRANULOCYTES # BLD AUTO: 0.08 K/UL (ref 0–0.04)
IMM GRANULOCYTES NFR BLD AUTO: 0.5 % (ref 0–0.5)
LYMPHOCYTES # BLD: 0.8 K/UL (ref 0.8–3.5)
LYMPHOCYTES NFR BLD: 5.4 % (ref 12–49)
MCH RBC QN AUTO: 27.5 PG (ref 26–34)
MCHC RBC AUTO-ENTMCNC: 32.4 G/DL (ref 30–36.5)
MCV RBC AUTO: 85 FL (ref 80–99)
MONOCYTES # BLD: 0.83 K/UL (ref 0–1)
MONOCYTES NFR BLD: 5.6 % (ref 5–13)
NEUTS SEG # BLD: 13.01 K/UL (ref 1.8–8)
NEUTS SEG NFR BLD: 88.4 % (ref 32–75)
NRBC # BLD: 0 K/UL (ref 0–0.01)
NRBC BLD-RTO: 0 PER 100 WBC
PHOSPHATE SERPL-MCNC: 5.1 MG/DL (ref 2.6–4.7)
PLATELET # BLD AUTO: 377 K/UL (ref 150–400)
PMV BLD AUTO: 9.6 FL (ref 8.9–12.9)
POTASSIUM SERPL-SCNC: 4.7 MMOL/L (ref 3.5–5.1)
RBC # BLD AUTO: 4.14 M/UL (ref 4.1–5.7)
SERVICE CMNT-IMP: ABNORMAL
SODIUM SERPL-SCNC: 132 MMOL/L (ref 136–145)
WBC # BLD AUTO: 14.7 K/UL (ref 4.1–11.1)

## 2025-04-12 PROCEDURE — 6360000002 HC RX W HCPCS: Performed by: INTERNAL MEDICINE

## 2025-04-12 PROCEDURE — 2580000003 HC RX 258: Performed by: INTERNAL MEDICINE

## 2025-04-12 PROCEDURE — 85025 COMPLETE CBC W/AUTO DIFF WBC: CPT

## 2025-04-12 PROCEDURE — 6370000000 HC RX 637 (ALT 250 FOR IP): Performed by: STUDENT IN AN ORGANIZED HEALTH CARE EDUCATION/TRAINING PROGRAM

## 2025-04-12 PROCEDURE — 80069 RENAL FUNCTION PANEL: CPT

## 2025-04-12 PROCEDURE — 2500000003 HC RX 250 WO HCPCS: Performed by: INTERNAL MEDICINE

## 2025-04-12 PROCEDURE — 36415 COLL VENOUS BLD VENIPUNCTURE: CPT

## 2025-04-12 PROCEDURE — 2060000000 HC ICU INTERMEDIATE R&B

## 2025-04-12 PROCEDURE — 82962 GLUCOSE BLOOD TEST: CPT

## 2025-04-12 PROCEDURE — 6370000000 HC RX 637 (ALT 250 FOR IP): Performed by: INTERNAL MEDICINE

## 2025-04-12 RX ORDER — INSULIN LISPRO 100 [IU]/ML
0-4 INJECTION, SOLUTION INTRAVENOUS; SUBCUTANEOUS
Status: DISCONTINUED | OUTPATIENT
Start: 2025-04-12 | End: 2025-04-12

## 2025-04-12 RX ORDER — INSULIN LISPRO 100 [IU]/ML
8 INJECTION, SOLUTION INTRAVENOUS; SUBCUTANEOUS ONCE
Status: COMPLETED | OUTPATIENT
Start: 2025-04-12 | End: 2025-04-12

## 2025-04-12 RX ORDER — SODIUM CHLORIDE 9 MG/ML
INJECTION, SOLUTION INTRAVENOUS CONTINUOUS
Status: ACTIVE | OUTPATIENT
Start: 2025-04-12 | End: 2025-04-13

## 2025-04-12 RX ORDER — INSULIN LISPRO 100 [IU]/ML
0-8 INJECTION, SOLUTION INTRAVENOUS; SUBCUTANEOUS
Status: DISCONTINUED | OUTPATIENT
Start: 2025-04-12 | End: 2025-04-22 | Stop reason: HOSPADM

## 2025-04-12 RX ORDER — INSULIN LISPRO 100 [IU]/ML
0-4 INJECTION, SOLUTION INTRAVENOUS; SUBCUTANEOUS ONCE
Status: COMPLETED | OUTPATIENT
Start: 2025-04-12 | End: 2025-04-12

## 2025-04-12 RX ADMIN — PANTOPRAZOLE SODIUM 40 MG: 40 TABLET, DELAYED RELEASE ORAL at 05:19

## 2025-04-12 RX ADMIN — AMIODARONE HYDROCHLORIDE 100 MG: 200 TABLET ORAL at 08:22

## 2025-04-12 RX ADMIN — ONDANSETRON 4 MG: 4 TABLET, ORALLY DISINTEGRATING ORAL at 21:28

## 2025-04-12 RX ADMIN — INSULIN LISPRO 4 UNITS: 100 INJECTION, SOLUTION INTRAVENOUS; SUBCUTANEOUS at 08:30

## 2025-04-12 RX ADMIN — SODIUM CHLORIDE, PRESERVATIVE FREE 10 ML: 5 INJECTION INTRAVENOUS at 08:24

## 2025-04-12 RX ADMIN — WATER 250 MG: 1 INJECTION INTRAMUSCULAR; INTRAVENOUS; SUBCUTANEOUS at 08:22

## 2025-04-12 RX ADMIN — MELATONIN 3 MG: at 00:09

## 2025-04-12 RX ADMIN — HYDRALAZINE HYDROCHLORIDE 25 MG: 25 TABLET ORAL at 05:18

## 2025-04-12 RX ADMIN — HYDRALAZINE HYDROCHLORIDE 25 MG: 25 TABLET ORAL at 13:55

## 2025-04-12 RX ADMIN — HEPARIN SODIUM 5000 UNITS: 5000 INJECTION INTRAVENOUS; SUBCUTANEOUS at 05:18

## 2025-04-12 RX ADMIN — Medication 4 UNITS: at 21:26

## 2025-04-12 RX ADMIN — MELATONIN 3 MG: at 21:28

## 2025-04-12 RX ADMIN — AMLODIPINE BESYLATE 5 MG: 5 TABLET ORAL at 08:22

## 2025-04-12 RX ADMIN — SODIUM CHLORIDE: 0.9 INJECTION, SOLUTION INTRAVENOUS at 14:06

## 2025-04-12 RX ADMIN — INSULIN HUMAN 10 UNITS: 100 INJECTION, SUSPENSION SUBCUTANEOUS at 17:15

## 2025-04-12 RX ADMIN — INSULIN LISPRO 8 UNITS: 100 INJECTION, SOLUTION INTRAVENOUS; SUBCUTANEOUS at 17:15

## 2025-04-12 RX ADMIN — HEPARIN SODIUM 5000 UNITS: 5000 INJECTION INTRAVENOUS; SUBCUTANEOUS at 21:30

## 2025-04-12 RX ADMIN — HEPARIN SODIUM 5000 UNITS: 5000 INJECTION INTRAVENOUS; SUBCUTANEOUS at 13:55

## 2025-04-12 RX ADMIN — INSULIN LISPRO 4 UNITS: 100 INJECTION, SOLUTION INTRAVENOUS; SUBCUTANEOUS at 12:45

## 2025-04-12 RX ADMIN — PRAVASTATIN SODIUM 20 MG: 10 TABLET ORAL at 21:28

## 2025-04-12 RX ADMIN — HYDRALAZINE HYDROCHLORIDE 25 MG: 25 TABLET ORAL at 21:28

## 2025-04-12 RX ADMIN — METOPROLOL SUCCINATE 12.5 MG: 25 TABLET, EXTENDED RELEASE ORAL at 08:22

## 2025-04-12 ASSESSMENT — PAIN SCALES - GENERAL
PAINLEVEL_OUTOF10: 0
PAINLEVEL_OUTOF10: 5
PAINLEVEL_OUTOF10: 0
PAINLEVEL_OUTOF10: 2

## 2025-04-12 NOTE — ANESTHESIA POSTPROCEDURE EVALUATION
Department of Anesthesiology  Postprocedure Note    Patient: Darius Burgos  MRN: [id number]  YOB: 1940  Date of evaluation: 4/12/2025    Procedure Summary       Date: 04/11/25 Room / Location: [facility] MAIN OR  / [facility] MA[facility] OR    Anesthesia Start: 1622 Anesthesia Stop: 1719    Procedure: CYSTOSCOPY RIGHT URETERAL STENT INSERTION, BILATERAL RETROGRADE PYELOGRAM (Right: Ureter) Diagnosis:       Right ureteral stone      (Right ureteral stone [N20.1])    Providers: Chemo Rees MD Responsible Provider: Don Jacome MD    Anesthesia Type: General ASA Status: 3            Anesthesia Type: General    Shemar Phase I: Shemar Score: 10    Shemar Phase II:      Anesthesia Post Evaluation    Patient location during evaluation: PACU  Patient participation: complete - patient participated  Level of consciousness: sleepy but conscious and responsive to verbal stimuli  Airway patency: patent  Nausea & Vomiting: no vomiting and no nausea  Cardiovascular status: blood pressure returned to baseline and hemodynamically stable  Respiratory status: acceptable  Hydration status: stable    No notable events documented.

## 2025-04-13 ENCOUNTER — APPOINTMENT (OUTPATIENT)
Facility: HOSPITAL | Age: 85
DRG: 659 | End: 2025-04-13
Payer: MEDICARE

## 2025-04-13 LAB
ALBUMIN SERPL-MCNC: 2.3 G/DL (ref 3.5–5)
ANION GAP SERPL CALC-SCNC: 8 MMOL/L (ref 2–12)
BASOPHILS # BLD: 0.02 K/UL (ref 0–0.1)
BASOPHILS NFR BLD: 0.2 % (ref 0–1)
BUN SERPL-MCNC: 105 MG/DL (ref 6–20)
BUN/CREAT SERPL: 30 (ref 12–20)
CALCIUM SERPL-MCNC: 7.8 MG/DL (ref 8.5–10.1)
CHLORIDE SERPL-SCNC: 104 MMOL/L (ref 97–108)
CO2 SERPL-SCNC: 20 MMOL/L (ref 21–32)
CREAT SERPL-MCNC: 3.5 MG/DL (ref 0.7–1.3)
DIFFERENTIAL METHOD BLD: ABNORMAL
EOSINOPHIL # BLD: 0 K/UL (ref 0–0.4)
EOSINOPHIL NFR BLD: 0 % (ref 0–7)
ERYTHROCYTE [DISTWIDTH] IN BLOOD BY AUTOMATED COUNT: 15 % (ref 11.5–14.5)
GLUCOSE BLD STRIP.AUTO-MCNC: 224 MG/DL (ref 65–117)
GLUCOSE BLD STRIP.AUTO-MCNC: 240 MG/DL (ref 65–117)
GLUCOSE BLD STRIP.AUTO-MCNC: 270 MG/DL (ref 65–117)
GLUCOSE BLD STRIP.AUTO-MCNC: 291 MG/DL (ref 65–117)
GLUCOSE SERPL-MCNC: 224 MG/DL (ref 65–100)
HCT VFR BLD AUTO: 37.8 % (ref 36.6–50.3)
HGB BLD-MCNC: 12.5 G/DL (ref 12.1–17)
IMM GRANULOCYTES # BLD AUTO: 0.07 K/UL (ref 0–0.04)
IMM GRANULOCYTES NFR BLD AUTO: 0.6 % (ref 0–0.5)
LYMPHOCYTES # BLD: 0.83 K/UL (ref 0.8–3.5)
LYMPHOCYTES NFR BLD: 7.2 % (ref 12–49)
MCH RBC QN AUTO: 27.7 PG (ref 26–34)
MCHC RBC AUTO-ENTMCNC: 33.1 G/DL (ref 30–36.5)
MCV RBC AUTO: 83.8 FL (ref 80–99)
MONOCYTES # BLD: 0.89 K/UL (ref 0–1)
MONOCYTES NFR BLD: 7.7 % (ref 5–13)
NEUTS SEG # BLD: 9.74 K/UL (ref 1.8–8)
NEUTS SEG NFR BLD: 84.3 % (ref 32–75)
NRBC # BLD: 0 K/UL (ref 0–0.01)
NRBC BLD-RTO: 0 PER 100 WBC
PHOSPHATE SERPL-MCNC: 4.7 MG/DL (ref 2.6–4.7)
PLATELET # BLD AUTO: 446 K/UL (ref 150–400)
PMV BLD AUTO: 9.4 FL (ref 8.9–12.9)
POTASSIUM SERPL-SCNC: 4.7 MMOL/L (ref 3.5–5.1)
RBC # BLD AUTO: 4.51 M/UL (ref 4.1–5.7)
SERVICE CMNT-IMP: ABNORMAL
SODIUM SERPL-SCNC: 132 MMOL/L (ref 136–145)
WBC # BLD AUTO: 11.6 K/UL (ref 4.1–11.1)

## 2025-04-13 PROCEDURE — 2060000000 HC ICU INTERMEDIATE R&B

## 2025-04-13 PROCEDURE — 2500000003 HC RX 250 WO HCPCS: Performed by: INTERNAL MEDICINE

## 2025-04-13 PROCEDURE — 36415 COLL VENOUS BLD VENIPUNCTURE: CPT

## 2025-04-13 PROCEDURE — 6370000000 HC RX 637 (ALT 250 FOR IP): Performed by: STUDENT IN AN ORGANIZED HEALTH CARE EDUCATION/TRAINING PROGRAM

## 2025-04-13 PROCEDURE — 6360000002 HC RX W HCPCS: Performed by: INTERNAL MEDICINE

## 2025-04-13 PROCEDURE — 76700 US EXAM ABDOM COMPLETE: CPT

## 2025-04-13 PROCEDURE — 82962 GLUCOSE BLOOD TEST: CPT

## 2025-04-13 PROCEDURE — 80069 RENAL FUNCTION PANEL: CPT

## 2025-04-13 PROCEDURE — 6370000000 HC RX 637 (ALT 250 FOR IP): Performed by: INTERNAL MEDICINE

## 2025-04-13 PROCEDURE — 2580000003 HC RX 258: Performed by: INTERNAL MEDICINE

## 2025-04-13 PROCEDURE — 85025 COMPLETE CBC W/AUTO DIFF WBC: CPT

## 2025-04-13 RX ADMIN — PANTOPRAZOLE SODIUM 40 MG: 40 TABLET, DELAYED RELEASE ORAL at 05:34

## 2025-04-13 RX ADMIN — INSULIN HUMAN 10 UNITS: 100 INJECTION, SUSPENSION SUBCUTANEOUS at 09:30

## 2025-04-13 RX ADMIN — HEPARIN SODIUM 5000 UNITS: 5000 INJECTION INTRAVENOUS; SUBCUTANEOUS at 13:53

## 2025-04-13 RX ADMIN — HEPARIN SODIUM 5000 UNITS: 5000 INJECTION INTRAVENOUS; SUBCUTANEOUS at 05:34

## 2025-04-13 RX ADMIN — ACETAMINOPHEN 650 MG: 325 TABLET ORAL at 06:42

## 2025-04-13 RX ADMIN — SODIUM CHLORIDE, PRESERVATIVE FREE 10 ML: 5 INJECTION INTRAVENOUS at 09:27

## 2025-04-13 RX ADMIN — INSULIN HUMAN 10 UNITS: 100 INJECTION, SUSPENSION SUBCUTANEOUS at 17:33

## 2025-04-13 RX ADMIN — Medication 4 UNITS: at 12:36

## 2025-04-13 RX ADMIN — PRAVASTATIN SODIUM 20 MG: 10 TABLET ORAL at 22:08

## 2025-04-13 RX ADMIN — HEPARIN SODIUM 5000 UNITS: 5000 INJECTION INTRAVENOUS; SUBCUTANEOUS at 22:06

## 2025-04-13 RX ADMIN — HYDRALAZINE HYDROCHLORIDE 25 MG: 25 TABLET ORAL at 05:34

## 2025-04-13 RX ADMIN — SODIUM CHLORIDE, PRESERVATIVE FREE 10 ML: 5 INJECTION INTRAVENOUS at 22:09

## 2025-04-13 RX ADMIN — Medication 4 UNITS: at 22:09

## 2025-04-13 RX ADMIN — WATER 250 MG: 1 INJECTION INTRAMUSCULAR; INTRAVENOUS; SUBCUTANEOUS at 09:28

## 2025-04-13 RX ADMIN — Medication 2 UNITS: at 09:30

## 2025-04-13 RX ADMIN — HYDRALAZINE HYDROCHLORIDE 25 MG: 25 TABLET ORAL at 13:53

## 2025-04-13 RX ADMIN — AMIODARONE HYDROCHLORIDE 100 MG: 200 TABLET ORAL at 09:26

## 2025-04-13 RX ADMIN — SODIUM CHLORIDE: 0.9 INJECTION, SOLUTION INTRAVENOUS at 05:38

## 2025-04-13 RX ADMIN — HYDRALAZINE HYDROCHLORIDE 25 MG: 25 TABLET ORAL at 22:08

## 2025-04-13 RX ADMIN — Medication 2 UNITS: at 17:32

## 2025-04-13 ASSESSMENT — PAIN SCALES - GENERAL
PAINLEVEL_OUTOF10: 0
PAINLEVEL_OUTOF10: 0
PAINLEVEL_OUTOF10: 3

## 2025-04-13 ASSESSMENT — PAIN DESCRIPTION - ORIENTATION: ORIENTATION: LEFT

## 2025-04-13 ASSESSMENT — PAIN DESCRIPTION - LOCATION: LOCATION: HEAD

## 2025-04-13 ASSESSMENT — PAIN - FUNCTIONAL ASSESSMENT: PAIN_FUNCTIONAL_ASSESSMENT: ACTIVITIES ARE NOT PREVENTED

## 2025-04-13 ASSESSMENT — PAIN DESCRIPTION - DESCRIPTORS: DESCRIPTORS: ACHING

## 2025-04-14 ENCOUNTER — APPOINTMENT (OUTPATIENT)
Facility: HOSPITAL | Age: 85
DRG: 659 | End: 2025-04-14
Payer: MEDICARE

## 2025-04-14 LAB
ALBUMIN SERPL-MCNC: 2.5 G/DL (ref 3.5–5)
ANION GAP SERPL CALC-SCNC: 10 MMOL/L (ref 2–12)
BASOPHILS # BLD: 0.03 K/UL (ref 0–0.1)
BASOPHILS NFR BLD: 0.4 % (ref 0–1)
BUN SERPL-MCNC: 114 MG/DL (ref 6–20)
BUN/CREAT SERPL: 32 (ref 12–20)
CALCIUM SERPL-MCNC: 7.7 MG/DL (ref 8.5–10.1)
CHLORIDE SERPL-SCNC: 101 MMOL/L (ref 97–108)
CO2 SERPL-SCNC: 20 MMOL/L (ref 21–32)
CREAT SERPL-MCNC: 3.59 MG/DL (ref 0.7–1.3)
DIFFERENTIAL METHOD BLD: ABNORMAL
ECHO BSA: 2.15 M2
EOSINOPHIL # BLD: 0 K/UL (ref 0–0.4)
EOSINOPHIL NFR BLD: 0 % (ref 0–7)
ERYTHROCYTE [DISTWIDTH] IN BLOOD BY AUTOMATED COUNT: 15.1 % (ref 11.5–14.5)
GLUCOSE BLD STRIP.AUTO-MCNC: 157 MG/DL (ref 65–117)
GLUCOSE BLD STRIP.AUTO-MCNC: 176 MG/DL (ref 65–117)
GLUCOSE BLD STRIP.AUTO-MCNC: 250 MG/DL (ref 65–117)
GLUCOSE BLD STRIP.AUTO-MCNC: 304 MG/DL (ref 65–117)
GLUCOSE BLD STRIP.AUTO-MCNC: 304 MG/DL (ref 65–117)
GLUCOSE BLD STRIP.AUTO-MCNC: 321 MG/DL (ref 65–117)
GLUCOSE BLD STRIP.AUTO-MCNC: 350 MG/DL (ref 65–117)
GLUCOSE BLD STRIP.AUTO-MCNC: 455 MG/DL (ref 65–117)
GLUCOSE SERPL-MCNC: 257 MG/DL (ref 65–100)
HCT VFR BLD AUTO: 36 % (ref 36.6–50.3)
HGB BLD-MCNC: 11.6 G/DL (ref 12.1–17)
IMM GRANULOCYTES # BLD AUTO: 0.08 K/UL (ref 0–0.04)
IMM GRANULOCYTES NFR BLD AUTO: 1 % (ref 0–0.5)
LYMPHOCYTES # BLD: 0.54 K/UL (ref 0.8–3.5)
LYMPHOCYTES NFR BLD: 6.5 % (ref 12–49)
MCH RBC QN AUTO: 27.2 PG (ref 26–34)
MCHC RBC AUTO-ENTMCNC: 32.2 G/DL (ref 30–36.5)
MCV RBC AUTO: 84.3 FL (ref 80–99)
MONOCYTES # BLD: 0.66 K/UL (ref 0–1)
MONOCYTES NFR BLD: 8 % (ref 5–13)
NEUTS SEG # BLD: 6.98 K/UL (ref 1.8–8)
NEUTS SEG NFR BLD: 84.1 % (ref 32–75)
NRBC # BLD: 0 K/UL (ref 0–0.01)
NRBC BLD-RTO: 0 PER 100 WBC
PHOSPHATE SERPL-MCNC: 5.3 MG/DL (ref 2.6–4.7)
PLATELET # BLD AUTO: 377 K/UL (ref 150–400)
PMV BLD AUTO: 9.4 FL (ref 8.9–12.9)
POTASSIUM SERPL-SCNC: 4.6 MMOL/L (ref 3.5–5.1)
RBC # BLD AUTO: 4.27 M/UL (ref 4.1–5.7)
SERVICE CMNT-IMP: ABNORMAL
SODIUM SERPL-SCNC: 131 MMOL/L (ref 136–145)
WBC # BLD AUTO: 8.3 K/UL (ref 4.1–11.1)

## 2025-04-14 PROCEDURE — 36415 COLL VENOUS BLD VENIPUNCTURE: CPT

## 2025-04-14 PROCEDURE — 97535 SELF CARE MNGMENT TRAINING: CPT

## 2025-04-14 PROCEDURE — 82962 GLUCOSE BLOOD TEST: CPT

## 2025-04-14 PROCEDURE — 6370000000 HC RX 637 (ALT 250 FOR IP): Performed by: STUDENT IN AN ORGANIZED HEALTH CARE EDUCATION/TRAINING PROGRAM

## 2025-04-14 PROCEDURE — 6370000000 HC RX 637 (ALT 250 FOR IP): Performed by: HOSPITALIST

## 2025-04-14 PROCEDURE — 99232 SBSQ HOSP IP/OBS MODERATE 35: CPT | Performed by: SURGERY

## 2025-04-14 PROCEDURE — 97530 THERAPEUTIC ACTIVITIES: CPT

## 2025-04-14 PROCEDURE — 2060000000 HC ICU INTERMEDIATE R&B

## 2025-04-14 PROCEDURE — 80069 RENAL FUNCTION PANEL: CPT

## 2025-04-14 PROCEDURE — 85025 COMPLETE CBC W/AUTO DIFF WBC: CPT

## 2025-04-14 PROCEDURE — 6360000002 HC RX W HCPCS: Performed by: INTERNAL MEDICINE

## 2025-04-14 PROCEDURE — 6370000000 HC RX 637 (ALT 250 FOR IP): Performed by: INTERNAL MEDICINE

## 2025-04-14 PROCEDURE — 93971 EXTREMITY STUDY: CPT

## 2025-04-14 PROCEDURE — 2500000003 HC RX 250 WO HCPCS: Performed by: INTERNAL MEDICINE

## 2025-04-14 PROCEDURE — 97116 GAIT TRAINING THERAPY: CPT

## 2025-04-14 RX ORDER — SENNA AND DOCUSATE SODIUM 50; 8.6 MG/1; MG/1
2 TABLET, FILM COATED ORAL DAILY PRN
Status: DISCONTINUED | OUTPATIENT
Start: 2025-04-14 | End: 2025-04-22 | Stop reason: HOSPADM

## 2025-04-14 RX ORDER — POLYETHYLENE GLYCOL 3350 17 G/17G
17 POWDER, FOR SOLUTION ORAL DAILY
Status: DISCONTINUED | OUTPATIENT
Start: 2025-04-14 | End: 2025-04-22 | Stop reason: HOSPADM

## 2025-04-14 RX ORDER — BISACODYL 10 MG
10 SUPPOSITORY, RECTAL RECTAL DAILY PRN
Status: DISCONTINUED | OUTPATIENT
Start: 2025-04-14 | End: 2025-04-22 | Stop reason: HOSPADM

## 2025-04-14 RX ADMIN — HEPARIN SODIUM 5000 UNITS: 5000 INJECTION INTRAVENOUS; SUBCUTANEOUS at 22:26

## 2025-04-14 RX ADMIN — PANTOPRAZOLE SODIUM 40 MG: 40 TABLET, DELAYED RELEASE ORAL at 05:00

## 2025-04-14 RX ADMIN — HEPARIN SODIUM 5000 UNITS: 5000 INJECTION INTRAVENOUS; SUBCUTANEOUS at 15:51

## 2025-04-14 RX ADMIN — SENNOSIDES AND DOCUSATE SODIUM 2 TABLET: 50; 8.6 TABLET ORAL at 11:32

## 2025-04-14 RX ADMIN — SODIUM CHLORIDE, PRESERVATIVE FREE 10 ML: 5 INJECTION INTRAVENOUS at 22:26

## 2025-04-14 RX ADMIN — Medication 6 UNITS: at 12:44

## 2025-04-14 RX ADMIN — PRAVASTATIN SODIUM 20 MG: 10 TABLET ORAL at 22:24

## 2025-04-14 RX ADMIN — AMLODIPINE BESYLATE 5 MG: 5 TABLET ORAL at 10:07

## 2025-04-14 RX ADMIN — SODIUM CHLORIDE, PRESERVATIVE FREE 10 ML: 5 INJECTION INTRAVENOUS at 10:08

## 2025-04-14 RX ADMIN — HYDRALAZINE HYDROCHLORIDE 25 MG: 25 TABLET ORAL at 04:54

## 2025-04-14 RX ADMIN — METOPROLOL SUCCINATE 12.5 MG: 25 TABLET, EXTENDED RELEASE ORAL at 10:07

## 2025-04-14 RX ADMIN — HYDRALAZINE HYDROCHLORIDE 25 MG: 25 TABLET ORAL at 22:24

## 2025-04-14 RX ADMIN — HEPARIN SODIUM 5000 UNITS: 5000 INJECTION INTRAVENOUS; SUBCUTANEOUS at 04:54

## 2025-04-14 RX ADMIN — Medication 4 UNITS: at 10:06

## 2025-04-14 RX ADMIN — INSULIN HUMAN 10 UNITS: 100 INJECTION, SUSPENSION SUBCUTANEOUS at 10:06

## 2025-04-14 RX ADMIN — POLYETHYLENE GLYCOL 3350 17 G: 17 POWDER, FOR SOLUTION ORAL at 11:32

## 2025-04-14 RX ADMIN — HYDRALAZINE HYDROCHLORIDE 25 MG: 25 TABLET ORAL at 15:51

## 2025-04-14 RX ADMIN — INSULIN HUMAN 10 UNITS: 100 INJECTION, SUSPENSION SUBCUTANEOUS at 18:27

## 2025-04-14 RX ADMIN — AMIODARONE HYDROCHLORIDE 100 MG: 200 TABLET ORAL at 10:07

## 2025-04-14 ASSESSMENT — PAIN SCALES - GENERAL: PAINLEVEL_OUTOF10: 0

## 2025-04-14 NOTE — CARE COORDINATION
Transition of Care Plan:     RUR: 17    Prior Level of Functioning: independent    [facility]  [address] [place] [address]  RN will call report to [phone]    Insurance: [employer] Medicare will require auth once verified medically stable.     [place]: 4/15/25    9:39 AM  ALEXEI completed chart review.   Yesterday's Note indicated possibly stable 4/14.  Needs Nephrology, Urology, Rheumatology clearance.  Improvement with Creatine.  Rash/ABD improved.     Talked with Med Tele CM who is taking care of wife and plan is for them to go to [facility] together.  CM called SNF and left VM and sent updates. CM to talk to Pt and family after rounds.     IPR declined and rec SNF.     If SNF or IPR: Date FOC offered: 4/11/25  Date FOC received: 4/11/25  Accepting facility: [facility]  Date authorization started with reference number:   Date authorization received and expires:     Follow up appointments: after SNF  DME needed: none  Transportation at discharge: BLS  IM/IMM Medicare/[facility] letter given: needed  Is patient a [place] and connected with VA?               If yes, was [place] transfer form completed and VA notified?   Caregiver Contact: Melany Parada [phone]  Discharge Caregiver contacted prior to discharge?   Care Conference needed?   Barriers to discharge: Needs Nephrology, Urology, Rheumatology clearance.  Improvement with Creatine.  Rash/ABD improved.   Auth.     Lisa Gaspar CM  [phone]

## 2025-04-15 ENCOUNTER — HOSPITAL ENCOUNTER (INPATIENT)
Facility: HOSPITAL | Age: 85
Discharge: HOME | DRG: 659 | End: 2025-04-18
Attending: INTERNAL MEDICINE
Payer: MEDICARE

## 2025-04-15 LAB
ALBUMIN SERPL-MCNC: 2.2 G/DL (ref 3.5–5)
ANA TITR SER IF: NEGATIVE
ANION GAP SERPL CALC-SCNC: 7 MMOL/L (ref 2–12)
BASOPHILS # BLD: 0 K/UL (ref 0–0.1)
BASOPHILS NFR BLD: 0 % (ref 0–1)
BUN SERPL-MCNC: 116 MG/DL (ref 6–20)
BUN/CREAT SERPL: 35 (ref 12–20)
CALCIUM SERPL-MCNC: 8.1 MG/DL (ref 8.5–10.1)
CHLORIDE SERPL-SCNC: 103 MMOL/L (ref 97–108)
CO2 SERPL-SCNC: 21 MMOL/L (ref 21–32)
CREAT SERPL-MCNC: 3.31 MG/DL (ref 0.7–1.3)
DIFFERENTIAL METHOD BLD: ABNORMAL
EOSINOPHIL # BLD: 0.04 K/UL (ref 0–0.4)
EOSINOPHIL NFR BLD: 0.5 % (ref 0–7)
ERYTHROCYTE [DISTWIDTH] IN BLOOD BY AUTOMATED COUNT: 14.9 % (ref 11.5–14.5)
GLUCOSE BLD STRIP.AUTO-MCNC: 120 MG/DL (ref 65–117)
GLUCOSE BLD STRIP.AUTO-MCNC: 183 MG/DL (ref 65–117)
GLUCOSE BLD STRIP.AUTO-MCNC: 184 MG/DL (ref 65–117)
GLUCOSE BLD STRIP.AUTO-MCNC: 230 MG/DL (ref 65–117)
GLUCOSE SERPL-MCNC: 123 MG/DL (ref 65–100)
HCT VFR BLD AUTO: 34.4 % (ref 36.6–50.3)
HGB BLD-MCNC: 11.3 G/DL (ref 12.1–17)
IMM GRANULOCYTES # BLD AUTO: 0.04 K/UL (ref 0–0.04)
IMM GRANULOCYTES NFR BLD AUTO: 0.5 % (ref 0–0.5)
LYMPHOCYTES # BLD: 0.8 K/UL (ref 0.8–3.5)
LYMPHOCYTES NFR BLD: 10.2 % (ref 12–49)
MCH RBC QN AUTO: 27.6 PG (ref 26–34)
MCHC RBC AUTO-ENTMCNC: 32.8 G/DL (ref 30–36.5)
MCV RBC AUTO: 84.1 FL (ref 80–99)
MONOCYTES # BLD: 0.81 K/UL (ref 0–1)
MONOCYTES NFR BLD: 10.4 % (ref 5–13)
NEUTS SEG # BLD: 6.11 K/UL (ref 1.8–8)
NEUTS SEG NFR BLD: 78.4 % (ref 32–75)
NRBC # BLD: 0 K/UL (ref 0–0.01)
NRBC BLD-RTO: 0 PER 100 WBC
PHOSPHATE SERPL-MCNC: 5.1 MG/DL (ref 2.6–4.7)
PLATELET # BLD AUTO: 285 K/UL (ref 150–400)
PMV BLD AUTO: 9.4 FL (ref 8.9–12.9)
POTASSIUM SERPL-SCNC: 4.6 MMOL/L (ref 3.5–5.1)
RBC # BLD AUTO: 4.09 M/UL (ref 4.1–5.7)
RBC MORPH BLD: ABNORMAL
SERVICE CMNT-IMP: ABNORMAL
SODIUM SERPL-SCNC: 131 MMOL/L (ref 136–145)
WBC # BLD AUTO: 7.8 K/UL (ref 4.1–11.1)

## 2025-04-15 PROCEDURE — 97116 GAIT TRAINING THERAPY: CPT

## 2025-04-15 PROCEDURE — 6370000000 HC RX 637 (ALT 250 FOR IP): Performed by: HOSPITALIST

## 2025-04-15 PROCEDURE — 36415 COLL VENOUS BLD VENIPUNCTURE: CPT

## 2025-04-15 PROCEDURE — 6370000000 HC RX 637 (ALT 250 FOR IP): Performed by: STUDENT IN AN ORGANIZED HEALTH CARE EDUCATION/TRAINING PROGRAM

## 2025-04-15 PROCEDURE — 2500000003 HC RX 250 WO HCPCS: Performed by: INTERNAL MEDICINE

## 2025-04-15 PROCEDURE — 2060000000 HC ICU INTERMEDIATE R&B

## 2025-04-15 PROCEDURE — 97530 THERAPEUTIC ACTIVITIES: CPT

## 2025-04-15 PROCEDURE — 97535 SELF CARE MNGMENT TRAINING: CPT

## 2025-04-15 PROCEDURE — 85025 COMPLETE CBC W/AUTO DIFF WBC: CPT

## 2025-04-15 PROCEDURE — 80069 RENAL FUNCTION PANEL: CPT

## 2025-04-15 PROCEDURE — 6370000000 HC RX 637 (ALT 250 FOR IP): Performed by: INTERNAL MEDICINE

## 2025-04-15 PROCEDURE — 6360000002 HC RX W HCPCS: Performed by: INTERNAL MEDICINE

## 2025-04-15 PROCEDURE — 82962 GLUCOSE BLOOD TEST: CPT

## 2025-04-15 RX ADMIN — Medication 2 UNITS: at 12:36

## 2025-04-15 RX ADMIN — HYDRALAZINE HYDROCHLORIDE 25 MG: 25 TABLET ORAL at 21:23

## 2025-04-15 RX ADMIN — SODIUM CHLORIDE, PRESERVATIVE FREE 10 ML: 5 INJECTION INTRAVENOUS at 21:24

## 2025-04-15 RX ADMIN — INSULIN HUMAN 10 UNITS: 100 INJECTION, SUSPENSION SUBCUTANEOUS at 16:41

## 2025-04-15 RX ADMIN — HYDRALAZINE HYDROCHLORIDE 25 MG: 25 TABLET ORAL at 04:40

## 2025-04-15 RX ADMIN — METOPROLOL SUCCINATE 12.5 MG: 25 TABLET, EXTENDED RELEASE ORAL at 09:20

## 2025-04-15 RX ADMIN — SODIUM CHLORIDE, PRESERVATIVE FREE 10 ML: 5 INJECTION INTRAVENOUS at 09:21

## 2025-04-15 RX ADMIN — POLYETHYLENE GLYCOL 3350 17 G: 17 POWDER, FOR SOLUTION ORAL at 09:20

## 2025-04-15 RX ADMIN — PRAVASTATIN SODIUM 20 MG: 10 TABLET ORAL at 21:23

## 2025-04-15 RX ADMIN — PANTOPRAZOLE SODIUM 40 MG: 40 TABLET, DELAYED RELEASE ORAL at 09:21

## 2025-04-15 RX ADMIN — AMLODIPINE BESYLATE 5 MG: 5 TABLET ORAL at 09:22

## 2025-04-15 RX ADMIN — HYDRALAZINE HYDROCHLORIDE 25 MG: 25 TABLET ORAL at 15:08

## 2025-04-15 RX ADMIN — Medication 2 UNITS: at 16:41

## 2025-04-15 RX ADMIN — HEPARIN SODIUM 5000 UNITS: 5000 INJECTION INTRAVENOUS; SUBCUTANEOUS at 04:40

## 2025-04-15 RX ADMIN — AMIODARONE HYDROCHLORIDE 100 MG: 200 TABLET ORAL at 09:21

## 2025-04-15 RX ADMIN — INSULIN HUMAN 10 UNITS: 100 INJECTION, SUSPENSION SUBCUTANEOUS at 09:20

## 2025-04-15 ASSESSMENT — PAIN SCALES - GENERAL
PAINLEVEL_OUTOF10: 0

## 2025-04-15 NOTE — PROGRESS NOTES
0809: pt arrived to xray recovery via stretcher with transport.     0825: pt received heparin dose this morning. Per MD Enciso, we will not be able to do procedure today. Pt aware. MD Richardson made aware.     0830: report given to primary nurse. Advised nurse procedure will be done tomorrow - pt can eat as far as IR is concerned for today, but will need to be NPO at midnight and all blood thinners held.

## 2025-04-15 NOTE — CARE COORDINATION
Transition of Care Plan:     RUR: 17     Prior Level of Functioning: Independent with ADLs.      Disposition: SNF at [facility] [id number] [place] [address]  RN will call report to [phone]     [employer] Medicare Auth approved: Ref#: [id number]  Service Dates: 04/16/2025-04/18/2025    [place]: 4/16/25 or 4/17/25?     9:39 AM  CM completed chart review.   Yesterday's Note indicated possibly stable 4/14.  Needs Nephrology, Urology, Rheumatology clearance. Plan for Renal Biopsy 4/16.     CM let SNF: [facility] know.      CM specialist indicated Auth was approved:   Auth ID#: [id number]  Ref#: [id number]  Service Dates: 04/16/2025-04/18/2025     If SNF or IPR: Date FOC offered: 4/11/25  Date FOC received: 4/11/25  Accepting facility: [facility]  Date authorization started with reference number: 4/15/25  Date authorization received and expires: Auth received 4/15/25.  Auth ID#: TBD  Ref#: [id number]  Service Dates: 04/16/2025-04/18/2025    Follow up appointments: after SNF  DME needed: none  Transportation at discharge: BLS  IM/IMM Medicare/ letter given: needed  Is patient a [place] and connected with VA?               If yes, was [place] transfer form completed and VA notified?   Caregiver Contact: Melany Parada [phone]  Discharge Caregiver contacted prior to discharge?   Care Conference needed?   Barriers to discharge: Needs Nephrology, Urology, Rheumatology clearance.  Improvement with Creatine.  Rash/ABD improved.   Auth.      Lisa Gaspar, ALEXEI  [phone]

## 2025-04-16 ENCOUNTER — HOSPITAL ENCOUNTER (INPATIENT)
Facility: HOSPITAL | Age: 85
Discharge: HOME | DRG: 659 | End: 2025-04-19
Attending: INTERNAL MEDICINE
Payer: MEDICARE

## 2025-04-16 VITALS
TEMPERATURE: 97.4 F | SYSTOLIC BLOOD PRESSURE: 130 MMHG | DIASTOLIC BLOOD PRESSURE: 67 MMHG | RESPIRATION RATE: 20 BRPM | OXYGEN SATURATION: 9 % | HEART RATE: 67 BPM

## 2025-04-16 LAB
ALBUMIN SERPL-MCNC: 2.2 G/DL (ref 3.5–5)
ANION GAP SERPL CALC-SCNC: 8 MMOL/L (ref 2–12)
BASOPHILS # BLD: 0.01 K/UL (ref 0–0.1)
BASOPHILS NFR BLD: 0.1 % (ref 0–1)
BUN SERPL-MCNC: 111 MG/DL (ref 6–20)
BUN/CREAT SERPL: 37 (ref 12–20)
CALCIUM SERPL-MCNC: 7.9 MG/DL (ref 8.5–10.1)
CHLORIDE SERPL-SCNC: 107 MMOL/L (ref 97–108)
CO2 SERPL-SCNC: 19 MMOL/L (ref 21–32)
CREAT SERPL-MCNC: 3.02 MG/DL (ref 0.7–1.3)
DIFFERENTIAL METHOD BLD: ABNORMAL
EOSINOPHIL # BLD: 0.07 K/UL (ref 0–0.4)
EOSINOPHIL NFR BLD: 1 % (ref 0–7)
ERYTHROCYTE [DISTWIDTH] IN BLOOD BY AUTOMATED COUNT: 15.1 % (ref 11.5–14.5)
GLUCOSE BLD STRIP.AUTO-MCNC: 159 MG/DL (ref 65–117)
GLUCOSE BLD STRIP.AUTO-MCNC: 233 MG/DL (ref 65–117)
GLUCOSE BLD STRIP.AUTO-MCNC: 89 MG/DL (ref 65–117)
GLUCOSE SERPL-MCNC: 95 MG/DL (ref 65–100)
HCT VFR BLD AUTO: 32.7 % (ref 36.6–50.3)
HCT VFR BLD AUTO: 33.7 % (ref 36.6–50.3)
HGB BLD-MCNC: 10.4 G/DL (ref 12.1–17)
HGB BLD-MCNC: 10.8 G/DL (ref 12.1–17)
IMM GRANULOCYTES # BLD AUTO: 0.05 K/UL (ref 0–0.04)
IMM GRANULOCYTES NFR BLD AUTO: 0.7 % (ref 0–0.5)
LYMPHOCYTES # BLD: 0.68 K/UL (ref 0.8–3.5)
LYMPHOCYTES NFR BLD: 9.9 % (ref 12–49)
MCH RBC QN AUTO: 27.7 PG (ref 26–34)
MCHC RBC AUTO-ENTMCNC: 32 G/DL (ref 30–36.5)
MCV RBC AUTO: 86.4 FL (ref 80–99)
MONOCYTES # BLD: 0.68 K/UL (ref 0–1)
MONOCYTES NFR BLD: 9.9 % (ref 5–13)
NEUTS SEG # BLD: 5.41 K/UL (ref 1.8–8)
NEUTS SEG NFR BLD: 78.4 % (ref 32–75)
NRBC # BLD: 0 K/UL (ref 0–0.01)
NRBC BLD-RTO: 0 PER 100 WBC
PHOSPHATE SERPL-MCNC: 4.7 MG/DL (ref 2.6–4.7)
PLATELET # BLD AUTO: 264 K/UL (ref 150–400)
PMV BLD AUTO: 9.3 FL (ref 8.9–12.9)
POTASSIUM SERPL-SCNC: 4.5 MMOL/L (ref 3.5–5.1)
RBC # BLD AUTO: 3.9 M/UL (ref 4.1–5.7)
RBC MORPH BLD: ABNORMAL
SERVICE CMNT-IMP: ABNORMAL
SERVICE CMNT-IMP: ABNORMAL
SERVICE CMNT-IMP: NORMAL
SODIUM SERPL-SCNC: 134 MMOL/L (ref 136–145)
WBC # BLD AUTO: 6.9 K/UL (ref 4.1–11.1)

## 2025-04-16 PROCEDURE — 6360000002 HC RX W HCPCS: Performed by: STUDENT IN AN ORGANIZED HEALTH CARE EDUCATION/TRAINING PROGRAM

## 2025-04-16 PROCEDURE — 85025 COMPLETE CBC W/AUTO DIFF WBC: CPT

## 2025-04-16 PROCEDURE — 2060000000 HC ICU INTERMEDIATE R&B

## 2025-04-16 PROCEDURE — 2709999900 CT BIOPSY RENAL

## 2025-04-16 PROCEDURE — 36415 COLL VENOUS BLD VENIPUNCTURE: CPT

## 2025-04-16 PROCEDURE — 82962 GLUCOSE BLOOD TEST: CPT

## 2025-04-16 PROCEDURE — 80069 RENAL FUNCTION PANEL: CPT

## 2025-04-16 PROCEDURE — 6370000000 HC RX 637 (ALT 250 FOR IP): Performed by: INTERNAL MEDICINE

## 2025-04-16 PROCEDURE — 85018 HEMOGLOBIN: CPT

## 2025-04-16 PROCEDURE — 6360000002 HC RX W HCPCS: Performed by: INTERNAL MEDICINE

## 2025-04-16 PROCEDURE — 88305 TISSUE EXAM BY PATHOLOGIST: CPT

## 2025-04-16 PROCEDURE — 6370000000 HC RX 637 (ALT 250 FOR IP): Performed by: HOSPITALIST

## 2025-04-16 PROCEDURE — 85014 HEMATOCRIT: CPT

## 2025-04-16 PROCEDURE — 0TB03ZX EXCISION OF RIGHT KIDNEY, PERCUTANEOUS APPROACH, DIAGNOSTIC: ICD-10-PCS | Performed by: STUDENT IN AN ORGANIZED HEALTH CARE EDUCATION/TRAINING PROGRAM

## 2025-04-16 PROCEDURE — 2500000003 HC RX 250 WO HCPCS: Performed by: INTERNAL MEDICINE

## 2025-04-16 PROCEDURE — 6370000000 HC RX 637 (ALT 250 FOR IP): Performed by: STUDENT IN AN ORGANIZED HEALTH CARE EDUCATION/TRAINING PROGRAM

## 2025-04-16 RX ORDER — MIDAZOLAM HYDROCHLORIDE 5 MG/ML
INJECTION, SOLUTION INTRAMUSCULAR; INTRAVENOUS PRN
Status: COMPLETED | OUTPATIENT
Start: 2025-04-16 | End: 2025-04-16

## 2025-04-16 RX ORDER — FENTANYL CITRATE 50 UG/ML
INJECTION, SOLUTION INTRAMUSCULAR; INTRAVENOUS PRN
Status: COMPLETED | OUTPATIENT
Start: 2025-04-16 | End: 2025-04-16

## 2025-04-16 RX ORDER — MIDAZOLAM HYDROCHLORIDE 5 MG/5ML
INJECTION, SOLUTION INTRAMUSCULAR; INTRAVENOUS PRN
Status: COMPLETED | OUTPATIENT
Start: 2025-04-16 | End: 2025-04-16

## 2025-04-16 RX ADMIN — INSULIN HUMAN 10 UNITS: 100 INJECTION, SUSPENSION SUBCUTANEOUS at 10:43

## 2025-04-16 RX ADMIN — FENTANYL CITRATE 50 MCG: 50 INJECTION INTRAMUSCULAR; INTRAVENOUS at 09:10

## 2025-04-16 RX ADMIN — HEPARIN SODIUM 5000 UNITS: 5000 INJECTION INTRAVENOUS; SUBCUTANEOUS at 22:40

## 2025-04-16 RX ADMIN — POLYETHYLENE GLYCOL 3350 17 G: 17 POWDER, FOR SOLUTION ORAL at 10:44

## 2025-04-16 RX ADMIN — AMIODARONE HYDROCHLORIDE 100 MG: 200 TABLET ORAL at 10:44

## 2025-04-16 RX ADMIN — SODIUM CHLORIDE, PRESERVATIVE FREE 10 ML: 5 INJECTION INTRAVENOUS at 22:57

## 2025-04-16 RX ADMIN — ACETAMINOPHEN 650 MG: 325 TABLET ORAL at 10:48

## 2025-04-16 RX ADMIN — METOPROLOL SUCCINATE 12.5 MG: 25 TABLET, EXTENDED RELEASE ORAL at 10:44

## 2025-04-16 RX ADMIN — MIDAZOLAM HYDROCHLORIDE 1 MG: 5 INJECTION, SOLUTION INTRAMUSCULAR; INTRAVENOUS at 09:30

## 2025-04-16 RX ADMIN — PANTOPRAZOLE SODIUM 40 MG: 40 TABLET, DELAYED RELEASE ORAL at 05:46

## 2025-04-16 RX ADMIN — AMLODIPINE BESYLATE 5 MG: 5 TABLET ORAL at 10:44

## 2025-04-16 RX ADMIN — INSULIN HUMAN 10 UNITS: 100 INJECTION, SUSPENSION SUBCUTANEOUS at 16:10

## 2025-04-16 RX ADMIN — HYDRALAZINE HYDROCHLORIDE 25 MG: 25 TABLET ORAL at 22:42

## 2025-04-16 RX ADMIN — Medication 2 UNITS: at 16:41

## 2025-04-16 RX ADMIN — SODIUM CHLORIDE, PRESERVATIVE FREE 10 ML: 5 INJECTION INTRAVENOUS at 10:44

## 2025-04-16 RX ADMIN — HYDROMORPHONE HYDROCHLORIDE 0.25 MG: 1 INJECTION, SOLUTION INTRAMUSCULAR; INTRAVENOUS; SUBCUTANEOUS at 11:35

## 2025-04-16 RX ADMIN — MIDAZOLAM HYDROCHLORIDE 1 MG: 5 INJECTION, SOLUTION INTRAMUSCULAR; INTRAVENOUS at 09:32

## 2025-04-16 RX ADMIN — FENTANYL CITRATE 50 MCG: 50 INJECTION INTRAMUSCULAR; INTRAVENOUS at 09:30

## 2025-04-16 RX ADMIN — HYDRALAZINE HYDROCHLORIDE 25 MG: 25 TABLET ORAL at 05:46

## 2025-04-16 RX ADMIN — PRAVASTATIN SODIUM 20 MG: 10 TABLET ORAL at 22:41

## 2025-04-16 RX ADMIN — MIDAZOLAM HYDROCHLORIDE 1 MG: 1 INJECTION, SOLUTION INTRAMUSCULAR; INTRAVENOUS at 09:09

## 2025-04-16 ASSESSMENT — PAIN SCALES - GENERAL
PAINLEVEL_OUTOF10: 0
PAINLEVEL_OUTOF10: 0
PAINLEVEL_OUTOF10: 1
PAINLEVEL_OUTOF10: 9
PAINLEVEL_OUTOF10: 9
PAINLEVEL_OUTOF10: 0
PAINLEVEL_OUTOF10: 9
PAINLEVEL_OUTOF10: 0

## 2025-04-16 ASSESSMENT — PAIN DESCRIPTION - LOCATION
LOCATION: BACK

## 2025-04-16 ASSESSMENT — PAIN - FUNCTIONAL ASSESSMENT: PAIN_FUNCTIONAL_ASSESSMENT: NONE - DENIES PAIN

## 2025-04-16 NOTE — PROGRESS NOTES
Name of procedure: Renal bopsy    Sedation medications given:    Versed: 2 mg    Fentanyl: 100 mcg    Reversal Agent Used: none    Sedation tolerated: well    Sedation start:  0909    Sedation end:  0931    Vital Signs: stable

## 2025-04-16 NOTE — PROGRESS NOTES
TRANSFER - OUT REPORT:    Verbal report given to BRIAN Regalado on Darius Burgos  being transferred to [facility] for routine progression of patient care       Report consisted of patient's Situation, Background, Assessment and   Recommendations(SBAR).     Information from the following report(s) Nurse Handoff Report was reviewed with the receiving nurse.           Lines:   Peripheral IV 04/09/25 Right;Anterior Forearm (Active)   Site Assessment Clean, dry & intact 04/15/25 1945   Line Status Flushed;Capped 04/15/25 1945   Line Care Connections checked and tightened 04/15/25 1945   Phlebitis Assessment No symptoms 04/15/25 1945   Infiltration Assessment 0 04/15/25 1945   Alcohol Cap Used Yes 04/15/25 1945   Dressing Status Clean, dry & intact 04/15/25 1945   Dressing Type Transparent 04/15/25 1945   Dressing Intervention New 04/09/25 2311        Opportunity for questions and clarification was provided.      Patient transported with:  Monitor

## 2025-04-16 NOTE — H&P
INTERVENTIONAL RADIOLOGY  Preoperative History and Physical      Patient:  Darius Burgos  :  1940  Age:  84 y.o.  MRN:  [id number]  Today's Date:  2025      CC / HPI   Darius Burgos is a 84 y.o. male with a history of JOEY with proteinuria who presents for CT guided core renal biopsy.    PAST MEDICAL HISTORY  Past Medical History:   Diagnosis Date    Arthritis     At risk for sleep apnea 2019    Stop Bang 5     Atrial fibrillation (HCC)     Atrial Flutter-Wittkamp    Cancer (HCC)     prostate cancer    Colon cancer (HCC)     Diabetes (HCC)     DM2    Elevated cholesterol     History of blood transfusion     over 25 years    History of kidney stones     [place] (hard of hearing)     bilateral hearing aids    Hypertension     PUD (peptic ulcer disease) 1980    Skin cancer        PAST SURGICAL HISTORY  Past Surgical History:   Procedure Laterality Date    APPENDECTOMY          CATARACT REMOVAL Bilateral     CHOLECYSTECTOMY, LAPAROSCOPIC N/A 2023    LAPAROSCOPIC CHOLCYSTECTOMY  WITH GRAMS performed by Miguel A Johnson MD at hospitals MAIN OR    COLONOSCOPY N/A 2017    COLONOSCOPY performed by Shmuel Santiago MD at hospitals ENDOSCOPY    COLONOSCOPY N/A 2019    COLONOSCOPY WITH POLYPECTOMY performed by Shmuel Santiago MD at hospitals AMBULATORY OR    COLONOSCOPY N/A 2022    COLONOSCOPY performed by Miguel A Haynes MD at hospitals ENDOSCOPY    COLONOSCOPY N/A 10/11/2021    COLONOSCOPY performed by Shmuel Santiago MD at hospitals ENDOSCOPY    COLONOSCOPY N/A 2023    COLONOSCOPY performed by Miguel A Haynes II, MD at hospitals ENDOSCOPY    CYSTOSCOPY Right 2025    CYSTOSCOPY RIGHT URETERAL STENT INSERTION, BILATERAL RETROGRADE PYELOGRAM performed by Chemo Rees MD at hospitals MAIN OR    ERCP N/A 2023    ERCP ENDOSCOPIC RETROGRADE CHOLANGIOPANCREATOGRAPHY performed by Ghanshyam Santiago MD at hospitals ENDOSCOPY    ERCP N/A 2024    ENDOSCOPIC RETROGRADE CHOLANGIOPANCREATOGRAPHY (ERCP) performed

## 2025-04-16 NOTE — PROGRESS NOTES
Procedure reviewed with patient by RHIANNON Springer.  Opportunity to verbalize questions and concerns.  Consent obtained.

## 2025-04-16 NOTE — CARE COORDINATION
Transition of Care Plan:     RUR: 21     Prior Level of Functioning: Independent with ADLs.      [facility]  [facility] [place] [address]  RN will call report to [phone]     [employer] Medicare Auth approved: Ref#: [id number]  Service Dates: 04/16/2025-04/18/2025     [place]: 4/17/25  If SNF or IPR: Date FOC offered: 4/11/25  Date FOC received: 4/11/25  Accepting facility: [facility]  Date authorization started with reference number: 4/15/25  Date authorization received and expires: Auth received 4/15/25.  Auth ID#: TBD  Ref#: [id number]  Service Dates: 04/16/2025-04/18/2025     Follow up appointments: after SNF  DME needed: none  Transportation at discharge: BLS  IM/IMM Medicare/ letter given: needed  Is patient a  and connected with VA?               If yes, was [place] transfer form completed and VA notified?   Caregiver Contact: Melany Parada [phone]  Discharge Caregiver contacted prior to discharge?   Care Conference needed?   Barriers to discharge: renal biopsy    CM updated [facility] of updated [place].    Wilda Reed, VLADIMIRSW, LCSW  Care Management, [facility]  [phone]

## 2025-04-16 NOTE — PROCEDURES
PROCEDURE NOTE  Date: 4/16/2025   Name: Darius Burgos  YOB: 1940    Procedures    Interventional Radiology Brief Postoperative Note    Procedure Date:  4/16/2025    Procedure:  CT guided core renal biopsy    :  RHIANNON Mcgill    Attending:  Sam Richardson MD    Preoperative Diagnosis:  JOEY with proteinuria    Postoperative Diagnosis:  JOEY with proteinuria    Complications:  none    Estimated Blood Loss:  less than 5cc    Procedure Findings:  Technically successful CT guided core renal biopsy.     Specimens:  4 18-gauge cores    Condition of Patient:  The patient tolerated the procedure without difficulty and remained in stable condition throughout.

## 2025-04-17 LAB
ALBUMIN SERPL-MCNC: 2.3 G/DL (ref 3.5–5)
ANION GAP SERPL CALC-SCNC: 5 MMOL/L (ref 2–12)
BASOPHILS # BLD: 0 K/UL (ref 0–0.1)
BASOPHILS # BLD: 0.01 K/UL (ref 0–0.1)
BASOPHILS NFR BLD: 0 % (ref 0–1)
BASOPHILS NFR BLD: 0.1 % (ref 0–1)
BUN SERPL-MCNC: 104 MG/DL (ref 6–20)
BUN/CREAT SERPL: 36 (ref 12–20)
CALCIUM SERPL-MCNC: 7.9 MG/DL (ref 8.5–10.1)
CHLORIDE SERPL-SCNC: 108 MMOL/L (ref 97–108)
CO2 SERPL-SCNC: 23 MMOL/L (ref 21–32)
CREAT SERPL-MCNC: 2.85 MG/DL (ref 0.7–1.3)
DIFFERENTIAL METHOD BLD: ABNORMAL
DIFFERENTIAL METHOD BLD: ABNORMAL
EOSINOPHIL # BLD: 0.1 K/UL (ref 0–0.4)
EOSINOPHIL # BLD: 0.1 K/UL (ref 0–0.4)
EOSINOPHIL NFR BLD: 1.4 % (ref 0–7)
EOSINOPHIL NFR BLD: 1.4 % (ref 0–7)
ERYTHROCYTE [DISTWIDTH] IN BLOOD BY AUTOMATED COUNT: 14.9 % (ref 11.5–14.5)
ERYTHROCYTE [DISTWIDTH] IN BLOOD BY AUTOMATED COUNT: 15.2 % (ref 11.5–14.5)
GLUCOSE BLD STRIP.AUTO-MCNC: 119 MG/DL (ref 65–117)
GLUCOSE BLD STRIP.AUTO-MCNC: 210 MG/DL (ref 65–117)
GLUCOSE BLD STRIP.AUTO-MCNC: 238 MG/DL (ref 65–117)
GLUCOSE BLD STRIP.AUTO-MCNC: 254 MG/DL (ref 65–117)
GLUCOSE BLD STRIP.AUTO-MCNC: 281 MG/DL (ref 65–117)
GLUCOSE SERPL-MCNC: 86 MG/DL (ref 65–100)
HCT VFR BLD AUTO: 31.2 % (ref 36.6–50.3)
HCT VFR BLD AUTO: 32.1 % (ref 36.6–50.3)
HGB BLD-MCNC: 10.2 G/DL (ref 12.1–17)
HGB BLD-MCNC: 10.5 G/DL (ref 12.1–17)
IMM GRANULOCYTES # BLD AUTO: 0.04 K/UL (ref 0–0.04)
IMM GRANULOCYTES # BLD AUTO: 0.05 K/UL (ref 0–0.04)
IMM GRANULOCYTES NFR BLD AUTO: 0.6 % (ref 0–0.5)
IMM GRANULOCYTES NFR BLD AUTO: 0.7 % (ref 0–0.5)
LYMPHOCYTES # BLD: 0.64 K/UL (ref 0.8–3.5)
LYMPHOCYTES # BLD: 0.71 K/UL (ref 0.8–3.5)
LYMPHOCYTES NFR BLD: 10.2 % (ref 12–49)
LYMPHOCYTES NFR BLD: 9.1 % (ref 12–49)
MCH RBC QN AUTO: 27.8 PG (ref 26–34)
MCH RBC QN AUTO: 28.2 PG (ref 26–34)
MCHC RBC AUTO-ENTMCNC: 32.7 G/DL (ref 30–36.5)
MCHC RBC AUTO-ENTMCNC: 32.7 G/DL (ref 30–36.5)
MCV RBC AUTO: 85 FL (ref 80–99)
MCV RBC AUTO: 86.1 FL (ref 80–99)
MONOCYTES # BLD: 0.73 K/UL (ref 0–1)
MONOCYTES # BLD: 0.76 K/UL (ref 0–1)
MONOCYTES NFR BLD: 10.4 % (ref 5–13)
MONOCYTES NFR BLD: 10.9 % (ref 5–13)
NEUTS SEG # BLD: 5.38 K/UL (ref 1.8–8)
NEUTS SEG # BLD: 5.47 K/UL (ref 1.8–8)
NEUTS SEG NFR BLD: 76.9 % (ref 32–75)
NEUTS SEG NFR BLD: 78.3 % (ref 32–75)
NRBC # BLD: 0 K/UL (ref 0–0.01)
NRBC # BLD: 0 K/UL (ref 0–0.01)
NRBC BLD-RTO: 0 PER 100 WBC
NRBC BLD-RTO: 0 PER 100 WBC
PHOSPHATE SERPL-MCNC: 4.3 MG/DL (ref 2.6–4.7)
PLATELET # BLD AUTO: 252 K/UL (ref 150–400)
PLATELET # BLD AUTO: 273 K/UL (ref 150–400)
PMV BLD AUTO: 9.7 FL (ref 8.9–12.9)
PMV BLD AUTO: 9.7 FL (ref 8.9–12.9)
POTASSIUM SERPL-SCNC: 4.7 MMOL/L (ref 3.5–5.1)
RBC # BLD AUTO: 3.67 M/UL (ref 4.1–5.7)
RBC # BLD AUTO: 3.73 M/UL (ref 4.1–5.7)
RBC MORPH BLD: ABNORMAL
SERVICE CMNT-IMP: ABNORMAL
SODIUM SERPL-SCNC: 136 MMOL/L (ref 136–145)
WBC # BLD AUTO: 7 K/UL (ref 4.1–11.1)
WBC # BLD AUTO: 7 K/UL (ref 4.1–11.1)

## 2025-04-17 PROCEDURE — 97535 SELF CARE MNGMENT TRAINING: CPT

## 2025-04-17 PROCEDURE — 80069 RENAL FUNCTION PANEL: CPT

## 2025-04-17 PROCEDURE — 6370000000 HC RX 637 (ALT 250 FOR IP): Performed by: STUDENT IN AN ORGANIZED HEALTH CARE EDUCATION/TRAINING PROGRAM

## 2025-04-17 PROCEDURE — 97530 THERAPEUTIC ACTIVITIES: CPT

## 2025-04-17 PROCEDURE — 2500000003 HC RX 250 WO HCPCS: Performed by: INTERNAL MEDICINE

## 2025-04-17 PROCEDURE — 97116 GAIT TRAINING THERAPY: CPT

## 2025-04-17 PROCEDURE — 36415 COLL VENOUS BLD VENIPUNCTURE: CPT

## 2025-04-17 PROCEDURE — 2060000000 HC ICU INTERMEDIATE R&B

## 2025-04-17 PROCEDURE — 85025 COMPLETE CBC W/AUTO DIFF WBC: CPT

## 2025-04-17 PROCEDURE — 6370000000 HC RX 637 (ALT 250 FOR IP): Performed by: INTERNAL MEDICINE

## 2025-04-17 PROCEDURE — 82962 GLUCOSE BLOOD TEST: CPT

## 2025-04-17 RX ADMIN — PANTOPRAZOLE SODIUM 40 MG: 40 TABLET, DELAYED RELEASE ORAL at 06:26

## 2025-04-17 RX ADMIN — SODIUM CHLORIDE, PRESERVATIVE FREE 10 ML: 5 INJECTION INTRAVENOUS at 19:58

## 2025-04-17 RX ADMIN — SODIUM CHLORIDE, PRESERVATIVE FREE 10 ML: 5 INJECTION INTRAVENOUS at 08:52

## 2025-04-17 RX ADMIN — HYDRALAZINE HYDROCHLORIDE 25 MG: 25 TABLET ORAL at 06:26

## 2025-04-17 RX ADMIN — METOPROLOL SUCCINATE 12.5 MG: 25 TABLET, EXTENDED RELEASE ORAL at 08:54

## 2025-04-17 RX ADMIN — PRAVASTATIN SODIUM 20 MG: 10 TABLET ORAL at 19:58

## 2025-04-17 RX ADMIN — HYDRALAZINE HYDROCHLORIDE 25 MG: 25 TABLET ORAL at 14:16

## 2025-04-17 RX ADMIN — AMIODARONE HYDROCHLORIDE 100 MG: 200 TABLET ORAL at 08:53

## 2025-04-17 RX ADMIN — INSULIN HUMAN 10 UNITS: 100 INJECTION, SUSPENSION SUBCUTANEOUS at 18:17

## 2025-04-17 RX ADMIN — Medication 2 UNITS: at 18:17

## 2025-04-17 RX ADMIN — HYDRALAZINE HYDROCHLORIDE 25 MG: 25 TABLET ORAL at 20:02

## 2025-04-17 RX ADMIN — ACETAMINOPHEN 650 MG: 325 TABLET ORAL at 20:01

## 2025-04-17 RX ADMIN — Medication 4 UNITS: at 12:41

## 2025-04-17 RX ADMIN — INSULIN HUMAN 10 UNITS: 100 INJECTION, SUSPENSION SUBCUTANEOUS at 08:53

## 2025-04-17 RX ADMIN — AMLODIPINE BESYLATE 5 MG: 5 TABLET ORAL at 08:54

## 2025-04-17 RX ADMIN — Medication 2 UNITS: at 19:58

## 2025-04-17 ASSESSMENT — PAIN DESCRIPTION - LOCATION: LOCATION: ELBOW

## 2025-04-17 ASSESSMENT — PAIN SCALES - GENERAL
PAINLEVEL_OUTOF10: 0
PAINLEVEL_OUTOF10: 3
PAINLEVEL_OUTOF10: 0

## 2025-04-18 LAB
ALBUMIN SERPL-MCNC: 2.1 G/DL (ref 3.5–5)
ANION GAP SERPL CALC-SCNC: 6 MMOL/L (ref 2–12)
BASOPHILS # BLD: 0.01 K/UL (ref 0–0.1)
BASOPHILS NFR BLD: 0.2 % (ref 0–1)
BUN SERPL-MCNC: 102 MG/DL (ref 6–20)
BUN/CREAT SERPL: 39 (ref 12–20)
CALCIUM SERPL-MCNC: 7.9 MG/DL (ref 8.5–10.1)
CHLORIDE SERPL-SCNC: 110 MMOL/L (ref 97–108)
CO2 SERPL-SCNC: 20 MMOL/L (ref 21–32)
CREAT SERPL-MCNC: 2.62 MG/DL (ref 0.7–1.3)
DIFFERENTIAL METHOD BLD: ABNORMAL
EOSINOPHIL # BLD: 0.14 K/UL (ref 0–0.4)
EOSINOPHIL NFR BLD: 2.9 % (ref 0–7)
ERYTHROCYTE [DISTWIDTH] IN BLOOD BY AUTOMATED COUNT: 15.1 % (ref 11.5–14.5)
GLUCOSE BLD STRIP.AUTO-MCNC: 137 MG/DL (ref 65–117)
GLUCOSE BLD STRIP.AUTO-MCNC: 203 MG/DL (ref 65–117)
GLUCOSE BLD STRIP.AUTO-MCNC: 266 MG/DL (ref 65–117)
GLUCOSE BLD STRIP.AUTO-MCNC: 277 MG/DL (ref 65–117)
GLUCOSE BLD STRIP.AUTO-MCNC: 349 MG/DL (ref 65–117)
GLUCOSE SERPL-MCNC: 89 MG/DL (ref 65–100)
HCT VFR BLD AUTO: 28.7 % (ref 36.6–50.3)
HGB BLD-MCNC: 9.2 G/DL (ref 12.1–17)
IMM GRANULOCYTES # BLD AUTO: 0.05 K/UL (ref 0–0.04)
IMM GRANULOCYTES NFR BLD AUTO: 1 % (ref 0–0.5)
LYMPHOCYTES # BLD: 0.65 K/UL (ref 0.8–3.5)
LYMPHOCYTES NFR BLD: 13.4 % (ref 12–49)
MCH RBC QN AUTO: 27.5 PG (ref 26–34)
MCHC RBC AUTO-ENTMCNC: 32.1 G/DL (ref 30–36.5)
MCV RBC AUTO: 85.7 FL (ref 80–99)
MONOCYTES # BLD: 0.67 K/UL (ref 0–1)
MONOCYTES NFR BLD: 13.8 % (ref 5–13)
NEUTS SEG # BLD: 3.34 K/UL (ref 1.8–8)
NEUTS SEG NFR BLD: 68.7 % (ref 32–75)
NRBC # BLD: 0 K/UL (ref 0–0.01)
NRBC BLD-RTO: 0 PER 100 WBC
PHOSPHATE SERPL-MCNC: 3.4 MG/DL (ref 2.6–4.7)
PLATELET # BLD AUTO: 207 K/UL (ref 150–400)
PMV BLD AUTO: 9.5 FL (ref 8.9–12.9)
POTASSIUM SERPL-SCNC: 4.3 MMOL/L (ref 3.5–5.1)
RBC # BLD AUTO: 3.35 M/UL (ref 4.1–5.7)
SERVICE CMNT-IMP: ABNORMAL
SODIUM SERPL-SCNC: 136 MMOL/L (ref 136–145)
WBC # BLD AUTO: 4.9 K/UL (ref 4.1–11.1)

## 2025-04-18 PROCEDURE — 80069 RENAL FUNCTION PANEL: CPT

## 2025-04-18 PROCEDURE — 6370000000 HC RX 637 (ALT 250 FOR IP): Performed by: STUDENT IN AN ORGANIZED HEALTH CARE EDUCATION/TRAINING PROGRAM

## 2025-04-18 PROCEDURE — 51798 US URINE CAPACITY MEASURE: CPT

## 2025-04-18 PROCEDURE — 97116 GAIT TRAINING THERAPY: CPT

## 2025-04-18 PROCEDURE — 6370000000 HC RX 637 (ALT 250 FOR IP): Performed by: INTERNAL MEDICINE

## 2025-04-18 PROCEDURE — 6360000002 HC RX W HCPCS: Performed by: STUDENT IN AN ORGANIZED HEALTH CARE EDUCATION/TRAINING PROGRAM

## 2025-04-18 PROCEDURE — 82962 GLUCOSE BLOOD TEST: CPT

## 2025-04-18 PROCEDURE — 36415 COLL VENOUS BLD VENIPUNCTURE: CPT

## 2025-04-18 PROCEDURE — 83883 ASSAY NEPHELOMETRY NOT SPEC: CPT

## 2025-04-18 PROCEDURE — 85025 COMPLETE CBC W/AUTO DIFF WBC: CPT

## 2025-04-18 PROCEDURE — 2500000003 HC RX 250 WO HCPCS: Performed by: INTERNAL MEDICINE

## 2025-04-18 PROCEDURE — 97535 SELF CARE MNGMENT TRAINING: CPT

## 2025-04-18 PROCEDURE — 99231 SBSQ HOSP IP/OBS SF/LOW 25: CPT | Performed by: NURSE PRACTITIONER

## 2025-04-18 PROCEDURE — 84165 PROTEIN E-PHORESIS SERUM: CPT

## 2025-04-18 PROCEDURE — 6370000000 HC RX 637 (ALT 250 FOR IP)

## 2025-04-18 PROCEDURE — 97530 THERAPEUTIC ACTIVITIES: CPT

## 2025-04-18 PROCEDURE — 6360000002 HC RX W HCPCS: Performed by: INTERNAL MEDICINE

## 2025-04-18 PROCEDURE — 2580000003 HC RX 258: Performed by: INTERNAL MEDICINE

## 2025-04-18 PROCEDURE — 2060000000 HC ICU INTERMEDIATE R&B

## 2025-04-18 RX ORDER — LOSARTAN POTASSIUM 25 MG/1
25 TABLET ORAL DAILY
Status: DISCONTINUED | OUTPATIENT
Start: 2025-04-19 | End: 2025-04-22 | Stop reason: HOSPADM

## 2025-04-18 RX ORDER — SULFAMETHOXAZOLE AND TRIMETHOPRIM 400; 80 MG/1; MG/1
1 TABLET ORAL
Status: DISCONTINUED | OUTPATIENT
Start: 2025-04-18 | End: 2025-04-22 | Stop reason: HOSPADM

## 2025-04-18 RX ORDER — SODIUM BICARBONATE 650 MG/1
650 TABLET ORAL 2 TIMES DAILY
Status: DISCONTINUED | OUTPATIENT
Start: 2025-04-18 | End: 2025-04-22 | Stop reason: HOSPADM

## 2025-04-18 RX ORDER — PREDNISONE 20 MG/1
40 TABLET ORAL DAILY
Status: DISCONTINUED | OUTPATIENT
Start: 2025-04-19 | End: 2025-04-22 | Stop reason: HOSPADM

## 2025-04-18 RX ORDER — PREDNISONE 20 MG/1
60 TABLET ORAL DAILY
Status: DISCONTINUED | OUTPATIENT
Start: 2025-04-18 | End: 2025-04-18

## 2025-04-18 RX ADMIN — HYDRALAZINE HYDROCHLORIDE 25 MG: 25 TABLET ORAL at 05:00

## 2025-04-18 RX ADMIN — AMIODARONE HYDROCHLORIDE 100 MG: 200 TABLET ORAL at 08:46

## 2025-04-18 RX ADMIN — Medication 1 LOZENGE: at 05:00

## 2025-04-18 RX ADMIN — METOPROLOL SUCCINATE 12.5 MG: 25 TABLET, EXTENDED RELEASE ORAL at 08:47

## 2025-04-18 RX ADMIN — SULFAMETHOXAZOLE AND TRIMETHOPRIM 1 TABLET: 400; 80 TABLET ORAL at 12:17

## 2025-04-18 RX ADMIN — INSULIN HUMAN 10 UNITS: 100 INJECTION, SUSPENSION SUBCUTANEOUS at 17:04

## 2025-04-18 RX ADMIN — APIXABAN 2.5 MG: 2.5 TABLET, FILM COATED ORAL at 21:16

## 2025-04-18 RX ADMIN — Medication 1 LOZENGE: at 21:23

## 2025-04-18 RX ADMIN — PRAVASTATIN SODIUM 20 MG: 10 TABLET ORAL at 21:16

## 2025-04-18 RX ADMIN — Medication 4 UNITS: at 12:16

## 2025-04-18 RX ADMIN — ACETAMINOPHEN 650 MG: 325 TABLET ORAL at 08:46

## 2025-04-18 RX ADMIN — HYDRALAZINE HYDROCHLORIDE 25 MG: 25 TABLET ORAL at 21:16

## 2025-04-18 RX ADMIN — SODIUM CHLORIDE, PRESERVATIVE FREE 10 ML: 5 INJECTION INTRAVENOUS at 08:48

## 2025-04-18 RX ADMIN — INSULIN HUMAN 10 UNITS: 100 INJECTION, SUSPENSION SUBCUTANEOUS at 08:47

## 2025-04-18 RX ADMIN — HYDRALAZINE HYDROCHLORIDE 25 MG: 25 TABLET ORAL at 14:08

## 2025-04-18 RX ADMIN — Medication 2 UNITS: at 17:03

## 2025-04-18 RX ADMIN — SODIUM BICARBONATE 650 MG: 650 TABLET ORAL at 21:16

## 2025-04-18 RX ADMIN — SODIUM BICARBONATE 650 MG: 650 TABLET ORAL at 12:17

## 2025-04-18 RX ADMIN — AMLODIPINE BESYLATE 5 MG: 5 TABLET ORAL at 08:47

## 2025-04-18 RX ADMIN — Medication 6 UNITS: at 21:20

## 2025-04-18 RX ADMIN — METHYLPREDNISOLONE SODIUM SUCCINATE 750 MG: 500 INJECTION INTRAMUSCULAR; INTRAVENOUS at 12:23

## 2025-04-18 RX ADMIN — Medication 1 LOZENGE: at 12:18

## 2025-04-18 RX ADMIN — SODIUM CHLORIDE, PRESERVATIVE FREE 10 ML: 5 INJECTION INTRAVENOUS at 21:17

## 2025-04-18 RX ADMIN — ERYTHROPOIETIN 20000 UNITS: 20000 INJECTION, SOLUTION INTRAVENOUS; SUBCUTANEOUS at 23:44

## 2025-04-18 RX ADMIN — PANTOPRAZOLE SODIUM 40 MG: 40 TABLET, DELAYED RELEASE ORAL at 05:00

## 2025-04-18 ASSESSMENT — PAIN SCALES - GENERAL
PAINLEVEL_OUTOF10: 7
PAINLEVEL_OUTOF10: 0
PAINLEVEL_OUTOF10: 3
PAINLEVEL_OUTOF10: 3

## 2025-04-18 ASSESSMENT — PAIN DESCRIPTION - ORIENTATION: ORIENTATION: RIGHT

## 2025-04-18 ASSESSMENT — PAIN DESCRIPTION - LOCATION
LOCATION: SHOULDER
LOCATION: SHOULDER

## 2025-04-18 ASSESSMENT — PAIN DESCRIPTION - DESCRIPTORS: DESCRIPTORS: ACHING

## 2025-04-18 NOTE — CARE COORDINATION
Transition of Care Plan:     RUR: 22%     Prior Level of Functioning: Independent with ADLs.      Disposition: SNF at [facility]  [facility] [place] [address]  RN will call report to [phone]     [employer] Medicare Auth approved: Ref#: [id number]  Service Dates: 04/16/2025-04/18/2025     [place]: 4/18/25  If SNF or IPR: Date FOC offered: 4/11/25  Date FOC received: 4/11/25  Accepting facility: [facility]  Date authorization started with reference number: 4/15/25  Date authorization received and expires: Auth received 4/15/25.  Auth ID#: TBD  Ref#: [id number]  Service Dates: 04/16/2025-04/18/2025     Follow up appointments: after SNF  DME needed: none  Transportation at discharge: BLS  IM/IMM Medicare/ letter given: needed  Is patient a  and connected with VA?               If yes, was  transfer form completed and VA notified?   Caregiver Contact: Melany Parada [phone]  Discharge Caregiver contacted prior to discharge?   Care Conference needed?   Barriers to discharge: renal biopsy     CM sent message to [facility] to check on bed availability for today.      2:14 p.m. CM informed pt's new [place] is 4/21.  Auth will need to be restarted once pt is medically stable.     3:10 p.m. ALEXEI provided an update to [facility].     Wilda Reed, VLADIMIRSW, LCSW  Care Management, [facility]  [phone]

## 2025-04-19 LAB
ALBUMIN SERPL-MCNC: 2.3 G/DL (ref 3.5–5)
ANION GAP SERPL CALC-SCNC: 8 MMOL/L (ref 2–12)
BASOPHILS # BLD: 0 K/UL (ref 0–0.1)
BASOPHILS NFR BLD: 0 % (ref 0–1)
BUN SERPL-MCNC: 97 MG/DL (ref 6–20)
BUN/CREAT SERPL: 35 (ref 12–20)
CALCIUM SERPL-MCNC: 7.9 MG/DL (ref 8.5–10.1)
CHLORIDE SERPL-SCNC: 106 MMOL/L (ref 97–108)
CO2 SERPL-SCNC: 20 MMOL/L (ref 21–32)
CREAT SERPL-MCNC: 2.79 MG/DL (ref 0.7–1.3)
DIFFERENTIAL METHOD BLD: ABNORMAL
EOSINOPHIL # BLD: 0 K/UL (ref 0–0.4)
EOSINOPHIL NFR BLD: 0 % (ref 0–7)
ERYTHROCYTE [DISTWIDTH] IN BLOOD BY AUTOMATED COUNT: 15.1 % (ref 11.5–14.5)
GLUCOSE BLD STRIP.AUTO-MCNC: 243 MG/DL (ref 65–117)
GLUCOSE BLD STRIP.AUTO-MCNC: 339 MG/DL (ref 65–117)
GLUCOSE BLD STRIP.AUTO-MCNC: 350 MG/DL (ref 65–117)
GLUCOSE BLD STRIP.AUTO-MCNC: 363 MG/DL (ref 65–117)
GLUCOSE BLD STRIP.AUTO-MCNC: 393 MG/DL (ref 65–117)
GLUCOSE BLD STRIP.AUTO-MCNC: 401 MG/DL (ref 65–117)
GLUCOSE BLD STRIP.AUTO-MCNC: 440 MG/DL (ref 65–117)
GLUCOSE BLD STRIP.AUTO-MCNC: 446 MG/DL (ref 65–117)
GLUCOSE BLD STRIP.AUTO-MCNC: 475 MG/DL (ref 65–117)
GLUCOSE SERPL-MCNC: 331 MG/DL (ref 65–100)
HCT VFR BLD AUTO: 28.7 % (ref 36.6–50.3)
HGB BLD-MCNC: 9.4 G/DL (ref 12.1–17)
IMM GRANULOCYTES # BLD AUTO: 0.08 K/UL (ref 0–0.04)
IMM GRANULOCYTES NFR BLD AUTO: 1.7 % (ref 0–0.5)
LYMPHOCYTES # BLD: 0.31 K/UL (ref 0.8–3.5)
LYMPHOCYTES NFR BLD: 6.8 % (ref 12–49)
MAGNESIUM SERPL-MCNC: 2 MG/DL (ref 1.6–2.4)
MCH RBC QN AUTO: 27.8 PG (ref 26–34)
MCHC RBC AUTO-ENTMCNC: 32.8 G/DL (ref 30–36.5)
MCV RBC AUTO: 84.9 FL (ref 80–99)
MONOCYTES # BLD: 0.05 K/UL (ref 0–1)
MONOCYTES NFR BLD: 1.1 % (ref 5–13)
NEUTS SEG # BLD: 4.16 K/UL (ref 1.8–8)
NEUTS SEG NFR BLD: 90.4 % (ref 32–75)
NRBC # BLD: 0 K/UL (ref 0–0.01)
NRBC BLD-RTO: 0 PER 100 WBC
PHOSPHATE SERPL-MCNC: 2.8 MG/DL (ref 2.6–4.7)
PLATELET # BLD AUTO: 223 K/UL (ref 150–400)
PMV BLD AUTO: 9.8 FL (ref 8.9–12.9)
POTASSIUM SERPL-SCNC: 4.7 MMOL/L (ref 3.5–5.1)
RBC # BLD AUTO: 3.38 M/UL (ref 4.1–5.7)
RBC MORPH BLD: ABNORMAL
SERVICE CMNT-IMP: ABNORMAL
SODIUM SERPL-SCNC: 134 MMOL/L (ref 136–145)
WBC # BLD AUTO: 4.6 K/UL (ref 4.1–11.1)

## 2025-04-19 PROCEDURE — 36415 COLL VENOUS BLD VENIPUNCTURE: CPT

## 2025-04-19 PROCEDURE — 2060000000 HC ICU INTERMEDIATE R&B

## 2025-04-19 PROCEDURE — 86364 TISS TRNSGLTMNASE EA IG CLAS: CPT

## 2025-04-19 PROCEDURE — 6370000000 HC RX 637 (ALT 250 FOR IP): Performed by: STUDENT IN AN ORGANIZED HEALTH CARE EDUCATION/TRAINING PROGRAM

## 2025-04-19 PROCEDURE — 6370000000 HC RX 637 (ALT 250 FOR IP): Performed by: INTERNAL MEDICINE

## 2025-04-19 PROCEDURE — 85025 COMPLETE CBC W/AUTO DIFF WBC: CPT

## 2025-04-19 PROCEDURE — 80069 RENAL FUNCTION PANEL: CPT

## 2025-04-19 PROCEDURE — 82962 GLUCOSE BLOOD TEST: CPT

## 2025-04-19 PROCEDURE — 86231 EMA EACH IG CLASS: CPT

## 2025-04-19 PROCEDURE — 82784 ASSAY IGA/IGD/IGG/IGM EACH: CPT

## 2025-04-19 PROCEDURE — 83735 ASSAY OF MAGNESIUM: CPT

## 2025-04-19 PROCEDURE — 86334 IMMUNOFIX E-PHORESIS SERUM: CPT

## 2025-04-19 PROCEDURE — 84165 PROTEIN E-PHORESIS SERUM: CPT

## 2025-04-19 PROCEDURE — 83883 ASSAY NEPHELOMETRY NOT SPEC: CPT

## 2025-04-19 PROCEDURE — 2500000003 HC RX 250 WO HCPCS: Performed by: INTERNAL MEDICINE

## 2025-04-19 RX ADMIN — APIXABAN 2.5 MG: 2.5 TABLET, FILM COATED ORAL at 22:09

## 2025-04-19 RX ADMIN — APIXABAN 2.5 MG: 2.5 TABLET, FILM COATED ORAL at 08:13

## 2025-04-19 RX ADMIN — INSULIN HUMAN 5 UNITS: 100 INJECTION, SUSPENSION SUBCUTANEOUS at 09:03

## 2025-04-19 RX ADMIN — AMIODARONE HYDROCHLORIDE 100 MG: 200 TABLET ORAL at 08:14

## 2025-04-19 RX ADMIN — HYDRALAZINE HYDROCHLORIDE 25 MG: 25 TABLET ORAL at 14:08

## 2025-04-19 RX ADMIN — Medication 8 UNITS: at 12:55

## 2025-04-19 RX ADMIN — INSULIN HUMAN 8 UNITS: 100 INJECTION, SOLUTION PARENTERAL at 14:08

## 2025-04-19 RX ADMIN — SODIUM CHLORIDE, PRESERVATIVE FREE 10 ML: 5 INJECTION INTRAVENOUS at 08:15

## 2025-04-19 RX ADMIN — Medication 2 UNITS: at 22:08

## 2025-04-19 RX ADMIN — INSULIN HUMAN 10 UNITS: 100 INJECTION, SUSPENSION SUBCUTANEOUS at 08:14

## 2025-04-19 RX ADMIN — SODIUM CHLORIDE, PRESERVATIVE FREE 10 ML: 5 INJECTION INTRAVENOUS at 22:17

## 2025-04-19 RX ADMIN — ACETAMINOPHEN 650 MG: 325 TABLET ORAL at 22:29

## 2025-04-19 RX ADMIN — INSULIN HUMAN 10 UNITS: 100 INJECTION, SUSPENSION SUBCUTANEOUS at 22:09

## 2025-04-19 RX ADMIN — PREDNISONE 40 MG: 20 TABLET ORAL at 08:13

## 2025-04-19 RX ADMIN — HYDRALAZINE HYDROCHLORIDE 25 MG: 25 TABLET ORAL at 06:06

## 2025-04-19 RX ADMIN — HYDRALAZINE HYDROCHLORIDE 25 MG: 25 TABLET ORAL at 22:09

## 2025-04-19 RX ADMIN — SODIUM BICARBONATE 650 MG: 650 TABLET ORAL at 08:13

## 2025-04-19 RX ADMIN — METOPROLOL SUCCINATE 12.5 MG: 25 TABLET, EXTENDED RELEASE ORAL at 08:13

## 2025-04-19 RX ADMIN — SODIUM BICARBONATE 650 MG: 650 TABLET ORAL at 22:09

## 2025-04-19 RX ADMIN — PRAVASTATIN SODIUM 20 MG: 10 TABLET ORAL at 22:09

## 2025-04-19 RX ADMIN — Medication 8 UNITS: at 08:14

## 2025-04-19 RX ADMIN — LOSARTAN POTASSIUM 25 MG: 25 TABLET, FILM COATED ORAL at 08:14

## 2025-04-19 RX ADMIN — PANTOPRAZOLE SODIUM 40 MG: 40 TABLET, DELAYED RELEASE ORAL at 07:00

## 2025-04-19 RX ADMIN — Medication 8 UNITS: at 17:22

## 2025-04-19 ASSESSMENT — PAIN SCALES - GENERAL
PAINLEVEL_OUTOF10: 0
PAINLEVEL_OUTOF10: 2
PAINLEVEL_OUTOF10: 0
PAINLEVEL_OUTOF10: 0
PAINLEVEL_OUTOF10: 2
PAINLEVEL_OUTOF10: 0

## 2025-04-19 ASSESSMENT — PAIN DESCRIPTION - ORIENTATION: ORIENTATION: ANTERIOR

## 2025-04-19 ASSESSMENT — PAIN DESCRIPTION - DESCRIPTORS: DESCRIPTORS: ACHING

## 2025-04-19 ASSESSMENT — PAIN DESCRIPTION - LOCATION
LOCATION: HEAD
LOCATION: SHOULDER

## 2025-04-19 ASSESSMENT — PAIN SCALES - WONG BAKER: WONGBAKER_NUMERICALRESPONSE: NO HURT

## 2025-04-20 LAB
ALBUMIN SERPL-MCNC: 2.4 G/DL (ref 3.5–5)
ANION GAP SERPL CALC-SCNC: 4 MMOL/L (ref 2–12)
BASOPHILS # BLD: 0.03 K/UL (ref 0–0.1)
BASOPHILS NFR BLD: 0.2 % (ref 0–1)
BUN SERPL-MCNC: 96 MG/DL (ref 6–20)
BUN/CREAT SERPL: 35 (ref 12–20)
CALCIUM SERPL-MCNC: 8.4 MG/DL (ref 8.5–10.1)
CHLORIDE SERPL-SCNC: 107 MMOL/L (ref 97–108)
CO2 SERPL-SCNC: 24 MMOL/L (ref 21–32)
CREAT SERPL-MCNC: 2.75 MG/DL (ref 0.7–1.3)
DIFFERENTIAL METHOD BLD: ABNORMAL
EOSINOPHIL # BLD: 0 K/UL (ref 0–0.4)
EOSINOPHIL NFR BLD: 0 % (ref 0–7)
ERYTHROCYTE [DISTWIDTH] IN BLOOD BY AUTOMATED COUNT: 15.4 % (ref 11.5–14.5)
GLUCOSE BLD STRIP.AUTO-MCNC: 200 MG/DL (ref 65–117)
GLUCOSE BLD STRIP.AUTO-MCNC: 273 MG/DL (ref 65–117)
GLUCOSE BLD STRIP.AUTO-MCNC: 317 MG/DL (ref 65–117)
GLUCOSE BLD STRIP.AUTO-MCNC: 355 MG/DL (ref 65–117)
GLUCOSE BLD STRIP.AUTO-MCNC: 361 MG/DL (ref 65–117)
GLUCOSE SERPL-MCNC: 211 MG/DL (ref 65–100)
HCT VFR BLD AUTO: 30 % (ref 36.6–50.3)
HGB BLD-MCNC: 9.8 G/DL (ref 12.1–17)
IMM GRANULOCYTES # BLD AUTO: 0.2 K/UL (ref 0–0.04)
IMM GRANULOCYTES NFR BLD AUTO: 1.6 % (ref 0–0.5)
LYMPHOCYTES # BLD: 0.58 K/UL (ref 0.8–3.5)
LYMPHOCYTES NFR BLD: 4.5 % (ref 12–49)
MAGNESIUM SERPL-MCNC: 2.3 MG/DL (ref 1.6–2.4)
MCH RBC QN AUTO: 27.8 PG (ref 26–34)
MCHC RBC AUTO-ENTMCNC: 32.7 G/DL (ref 30–36.5)
MCV RBC AUTO: 85.2 FL (ref 80–99)
MONOCYTES # BLD: 1.04 K/UL (ref 0–1)
MONOCYTES NFR BLD: 8.1 % (ref 5–13)
NEUTS SEG # BLD: 10.96 K/UL (ref 1.8–8)
NEUTS SEG NFR BLD: 85.6 % (ref 32–75)
NRBC # BLD: 0 K/UL (ref 0–0.01)
NRBC BLD-RTO: 0 PER 100 WBC
PHOSPHATE SERPL-MCNC: 2.9 MG/DL (ref 2.6–4.7)
PLATELET # BLD AUTO: 277 K/UL (ref 150–400)
PMV BLD AUTO: 9.7 FL (ref 8.9–12.9)
POTASSIUM SERPL-SCNC: 4.8 MMOL/L (ref 3.5–5.1)
RBC # BLD AUTO: 3.52 M/UL (ref 4.1–5.7)
RBC MORPH BLD: ABNORMAL
SERVICE CMNT-IMP: ABNORMAL
SODIUM SERPL-SCNC: 135 MMOL/L (ref 136–145)
WBC # BLD AUTO: 12.8 K/UL (ref 4.1–11.1)

## 2025-04-20 PROCEDURE — 6370000000 HC RX 637 (ALT 250 FOR IP): Performed by: STUDENT IN AN ORGANIZED HEALTH CARE EDUCATION/TRAINING PROGRAM

## 2025-04-20 PROCEDURE — 80069 RENAL FUNCTION PANEL: CPT

## 2025-04-20 PROCEDURE — 2060000000 HC ICU INTERMEDIATE R&B

## 2025-04-20 PROCEDURE — 36415 COLL VENOUS BLD VENIPUNCTURE: CPT

## 2025-04-20 PROCEDURE — 83735 ASSAY OF MAGNESIUM: CPT

## 2025-04-20 PROCEDURE — 85025 COMPLETE CBC W/AUTO DIFF WBC: CPT

## 2025-04-20 PROCEDURE — 6370000000 HC RX 637 (ALT 250 FOR IP): Performed by: INTERNAL MEDICINE

## 2025-04-20 PROCEDURE — 82962 GLUCOSE BLOOD TEST: CPT

## 2025-04-20 PROCEDURE — 2500000003 HC RX 250 WO HCPCS: Performed by: INTERNAL MEDICINE

## 2025-04-20 RX ADMIN — INSULIN HUMAN 15 UNITS: 100 INJECTION, SUSPENSION SUBCUTANEOUS at 08:35

## 2025-04-20 RX ADMIN — PRAVASTATIN SODIUM 20 MG: 10 TABLET ORAL at 20:30

## 2025-04-20 RX ADMIN — SODIUM CHLORIDE, PRESERVATIVE FREE 10 ML: 5 INJECTION INTRAVENOUS at 20:31

## 2025-04-20 RX ADMIN — SODIUM BICARBONATE 650 MG: 650 TABLET ORAL at 08:35

## 2025-04-20 RX ADMIN — AMIODARONE HYDROCHLORIDE 100 MG: 200 TABLET ORAL at 08:35

## 2025-04-20 RX ADMIN — Medication 8 UNITS: at 20:30

## 2025-04-20 RX ADMIN — APIXABAN 2.5 MG: 2.5 TABLET, FILM COATED ORAL at 08:35

## 2025-04-20 RX ADMIN — METOPROLOL SUCCINATE 12.5 MG: 25 TABLET, EXTENDED RELEASE ORAL at 08:35

## 2025-04-20 RX ADMIN — Medication 4 UNITS: at 17:43

## 2025-04-20 RX ADMIN — PREDNISONE 40 MG: 20 TABLET ORAL at 08:35

## 2025-04-20 RX ADMIN — Medication 8 UNITS: at 12:01

## 2025-04-20 RX ADMIN — SODIUM CHLORIDE, PRESERVATIVE FREE 10 ML: 5 INJECTION INTRAVENOUS at 08:36

## 2025-04-20 RX ADMIN — PANTOPRAZOLE SODIUM 40 MG: 40 TABLET, DELAYED RELEASE ORAL at 07:16

## 2025-04-20 RX ADMIN — SODIUM BICARBONATE 650 MG: 650 TABLET ORAL at 20:30

## 2025-04-20 RX ADMIN — INSULIN HUMAN 5 UNITS: 100 INJECTION, SUSPENSION SUBCUTANEOUS at 13:01

## 2025-04-20 RX ADMIN — APIXABAN 2.5 MG: 2.5 TABLET, FILM COATED ORAL at 20:30

## 2025-04-20 RX ADMIN — INSULIN HUMAN 10 UNITS: 100 INJECTION, SUSPENSION SUBCUTANEOUS at 20:31

## 2025-04-20 RX ADMIN — Medication 2 UNITS: at 08:38

## 2025-04-20 RX ADMIN — HYDRALAZINE HYDROCHLORIDE 25 MG: 25 TABLET ORAL at 13:02

## 2025-04-20 RX ADMIN — HYDRALAZINE HYDROCHLORIDE 25 MG: 25 TABLET ORAL at 20:30

## 2025-04-20 RX ADMIN — LOSARTAN POTASSIUM 25 MG: 25 TABLET, FILM COATED ORAL at 08:35

## 2025-04-20 RX ADMIN — HYDRALAZINE HYDROCHLORIDE 25 MG: 25 TABLET ORAL at 07:16

## 2025-04-20 ASSESSMENT — PAIN SCALES - GENERAL
PAINLEVEL_OUTOF10: 0
PAINLEVEL_OUTOF10: 0

## 2025-04-20 ASSESSMENT — PAIN SCALES - WONG BAKER: WONGBAKER_NUMERICALRESPONSE: NO HURT

## 2025-04-21 PROBLEM — E87.70 HYPERVOLEMIA: Status: ACTIVE | Noted: 2025-04-21

## 2025-04-21 LAB
ALBUMIN SERPL-MCNC: 2.3 G/DL (ref 3.5–5)
ANION GAP SERPL CALC-SCNC: 4 MMOL/L (ref 2–12)
BASOPHILS # BLD: 0.01 K/UL (ref 0–0.1)
BASOPHILS NFR BLD: 0.1 % (ref 0–1)
BUN SERPL-MCNC: 93 MG/DL (ref 6–20)
BUN/CREAT SERPL: 36 (ref 12–20)
CALCIUM SERPL-MCNC: 8.2 MG/DL (ref 8.5–10.1)
CHLORIDE SERPL-SCNC: 109 MMOL/L (ref 97–108)
CO2 SERPL-SCNC: 23 MMOL/L (ref 21–32)
CREAT SERPL-MCNC: 2.61 MG/DL (ref 0.7–1.3)
DIFFERENTIAL METHOD BLD: ABNORMAL
EOSINOPHIL # BLD: 0 K/UL (ref 0–0.4)
EOSINOPHIL NFR BLD: 0 % (ref 0–7)
ERYTHROCYTE [DISTWIDTH] IN BLOOD BY AUTOMATED COUNT: 15.6 % (ref 11.5–14.5)
GLUCOSE BLD STRIP.AUTO-MCNC: 185 MG/DL (ref 65–117)
GLUCOSE BLD STRIP.AUTO-MCNC: 231 MG/DL (ref 65–117)
GLUCOSE BLD STRIP.AUTO-MCNC: 301 MG/DL (ref 65–117)
GLUCOSE BLD STRIP.AUTO-MCNC: 315 MG/DL (ref 65–117)
GLUCOSE BLD STRIP.AUTO-MCNC: 317 MG/DL (ref 65–117)
GLUCOSE BLD STRIP.AUTO-MCNC: 346 MG/DL (ref 65–117)
GLUCOSE BLD STRIP.AUTO-MCNC: 352 MG/DL (ref 65–117)
GLUCOSE BLD STRIP.AUTO-MCNC: 419 MG/DL (ref 65–117)
GLUCOSE BLD STRIP.AUTO-MCNC: NORMAL MG/DL (ref 65–117)
GLUCOSE SERPL-MCNC: 134 MG/DL (ref 65–100)
HCT VFR BLD AUTO: 32.1 % (ref 36.6–50.3)
HGB BLD-MCNC: 10.5 G/DL (ref 12.1–17)
IMM GRANULOCYTES # BLD AUTO: 0.21 K/UL (ref 0–0.04)
IMM GRANULOCYTES NFR BLD AUTO: 1.8 % (ref 0–0.5)
LYMPHOCYTES # BLD: 0.92 K/UL (ref 0.8–3.5)
LYMPHOCYTES NFR BLD: 7.9 % (ref 12–49)
MAGNESIUM SERPL-MCNC: 2 MG/DL (ref 1.6–2.4)
MCH RBC QN AUTO: 28 PG (ref 26–34)
MCHC RBC AUTO-ENTMCNC: 32.7 G/DL (ref 30–36.5)
MCV RBC AUTO: 85.6 FL (ref 80–99)
MONOCYTES # BLD: 1.19 K/UL (ref 0–1)
MONOCYTES NFR BLD: 10.2 % (ref 5–13)
NEUTS SEG # BLD: 9.33 K/UL (ref 1.8–8)
NEUTS SEG NFR BLD: 80 % (ref 32–75)
NRBC # BLD: 0.02 K/UL (ref 0–0.01)
NRBC BLD-RTO: 0.2 PER 100 WBC
PHOSPHATE SERPL-MCNC: 2.7 MG/DL (ref 2.6–4.7)
PLATELET # BLD AUTO: 297 K/UL (ref 150–400)
PMV BLD AUTO: 9.9 FL (ref 8.9–12.9)
POTASSIUM SERPL-SCNC: 4.5 MMOL/L (ref 3.5–5.1)
RBC # BLD AUTO: 3.75 M/UL (ref 4.1–5.7)
SERVICE CMNT-IMP: ABNORMAL
SERVICE CMNT-IMP: NORMAL
SODIUM SERPL-SCNC: 136 MMOL/L (ref 136–145)
WBC # BLD AUTO: 11.7 K/UL (ref 4.1–11.1)

## 2025-04-21 PROCEDURE — 82962 GLUCOSE BLOOD TEST: CPT

## 2025-04-21 PROCEDURE — 6370000000 HC RX 637 (ALT 250 FOR IP): Performed by: INTERNAL MEDICINE

## 2025-04-21 PROCEDURE — 6370000000 HC RX 637 (ALT 250 FOR IP)

## 2025-04-21 PROCEDURE — 97110 THERAPEUTIC EXERCISES: CPT

## 2025-04-21 PROCEDURE — 80069 RENAL FUNCTION PANEL: CPT

## 2025-04-21 PROCEDURE — 36415 COLL VENOUS BLD VENIPUNCTURE: CPT

## 2025-04-21 PROCEDURE — 6370000000 HC RX 637 (ALT 250 FOR IP): Performed by: HOSPITALIST

## 2025-04-21 PROCEDURE — 99221 1ST HOSP IP/OBS SF/LOW 40: CPT

## 2025-04-21 PROCEDURE — 83735 ASSAY OF MAGNESIUM: CPT

## 2025-04-21 PROCEDURE — 2500000003 HC RX 250 WO HCPCS: Performed by: INTERNAL MEDICINE

## 2025-04-21 PROCEDURE — 97116 GAIT TRAINING THERAPY: CPT

## 2025-04-21 PROCEDURE — 85025 COMPLETE CBC W/AUTO DIFF WBC: CPT

## 2025-04-21 PROCEDURE — 6370000000 HC RX 637 (ALT 250 FOR IP): Performed by: PHYSICIAN ASSISTANT

## 2025-04-21 PROCEDURE — 2060000000 HC ICU INTERMEDIATE R&B

## 2025-04-21 PROCEDURE — 6370000000 HC RX 637 (ALT 250 FOR IP): Performed by: STUDENT IN AN ORGANIZED HEALTH CARE EDUCATION/TRAINING PROGRAM

## 2025-04-21 RX ORDER — BUMETANIDE 1 MG/1
1 TABLET ORAL 2 TIMES DAILY
Status: DISCONTINUED | OUTPATIENT
Start: 2025-04-21 | End: 2025-04-22 | Stop reason: HOSPADM

## 2025-04-21 RX ADMIN — APIXABAN 2.5 MG: 2.5 TABLET, FILM COATED ORAL at 09:18

## 2025-04-21 RX ADMIN — BUMETANIDE 1 MG: 1 TABLET ORAL at 13:43

## 2025-04-21 RX ADMIN — LOSARTAN POTASSIUM 25 MG: 25 TABLET, FILM COATED ORAL at 09:17

## 2025-04-21 RX ADMIN — SODIUM CHLORIDE, PRESERVATIVE FREE 10 ML: 5 INJECTION INTRAVENOUS at 21:53

## 2025-04-21 RX ADMIN — Medication 2 UNITS: at 17:22

## 2025-04-21 RX ADMIN — SODIUM CHLORIDE, PRESERVATIVE FREE 10 ML: 5 INJECTION INTRAVENOUS at 09:19

## 2025-04-21 RX ADMIN — BUMETANIDE 1 MG: 1 TABLET ORAL at 17:25

## 2025-04-21 RX ADMIN — PANTOPRAZOLE SODIUM 40 MG: 40 TABLET, DELAYED RELEASE ORAL at 06:01

## 2025-04-21 RX ADMIN — POLYETHYLENE GLYCOL 3350 17 G: 17 POWDER, FOR SOLUTION ORAL at 09:18

## 2025-04-21 RX ADMIN — Medication 2 UNITS: at 09:18

## 2025-04-21 RX ADMIN — HYDRALAZINE HYDROCHLORIDE 25 MG: 25 TABLET ORAL at 21:49

## 2025-04-21 RX ADMIN — APIXABAN 2.5 MG: 2.5 TABLET, FILM COATED ORAL at 21:50

## 2025-04-21 RX ADMIN — HYDRALAZINE HYDROCHLORIDE 25 MG: 25 TABLET ORAL at 06:01

## 2025-04-21 RX ADMIN — SODIUM BICARBONATE 650 MG: 650 TABLET ORAL at 21:49

## 2025-04-21 RX ADMIN — METOPROLOL SUCCINATE 12.5 MG: 25 TABLET, EXTENDED RELEASE ORAL at 09:18

## 2025-04-21 RX ADMIN — SODIUM BICARBONATE 650 MG: 650 TABLET ORAL at 09:18

## 2025-04-21 RX ADMIN — PREDNISONE 40 MG: 20 TABLET ORAL at 09:18

## 2025-04-21 RX ADMIN — HYDRALAZINE HYDROCHLORIDE 25 MG: 25 TABLET ORAL at 13:43

## 2025-04-21 RX ADMIN — PRAVASTATIN SODIUM 20 MG: 10 TABLET ORAL at 21:49

## 2025-04-21 RX ADMIN — INSULIN HUMAN 20 UNITS: 100 INJECTION, SUSPENSION SUBCUTANEOUS at 09:19

## 2025-04-21 RX ADMIN — AMIODARONE HYDROCHLORIDE 100 MG: 200 TABLET ORAL at 09:18

## 2025-04-21 RX ADMIN — INSULIN HUMAN 20 UNITS: 100 INJECTION, SUSPENSION SUBCUTANEOUS at 17:22

## 2025-04-21 RX ADMIN — SULFAMETHOXAZOLE AND TRIMETHOPRIM 1 TABLET: 400; 80 TABLET ORAL at 09:17

## 2025-04-21 RX ADMIN — Medication 6 UNITS: at 21:48

## 2025-04-21 RX ADMIN — Medication 6 UNITS: at 13:42

## 2025-04-21 ASSESSMENT — PAIN SCALES - GENERAL: PAINLEVEL_OUTOF10: 0

## 2025-04-21 ASSESSMENT — PAIN SCALES - WONG BAKER: WONGBAKER_NUMERICALRESPONSE: NO HURT

## 2025-04-21 NOTE — CARE COORDINATION
Transition of Care Plan:     RUR: 22%     Prior Level of Functioning: Independent with ADLs.      Disposition: SNF at [address] [address]  RN will call report to [phone]     [employer] Medicare Auth approved: Ref#: [id number]  Service Dates: 2025-2025- .     Requesting New [employer] Medicare Auth today, 25  CM called [employer]: [phone]  New Reference Number is [id number]  CM faxing new request with SOC 25 Auth Pending.     [place]: 25 or 25 Pending Auth    11:31 AM   CM called [employer] and restarted auth.  CM faxing referral now to restart auth.  Should have a decision back in 24 hours. Auth Pending.     11:23 AM  CM completed chart review and participated in IDR.    MD stated clear for DC.. DM Educator will see Pt today.   CM explained that Previous Auth  on . Needs to be restarted today.       CM called [employer]    If SNF or IPR: Date FOC offered: 25  Date FOC received: 25  Accepting facility: [facility]  Date authorization started with reference number: 4/15/25  Date authorization received and expires: Auth received 4/15/25.  Auth ID#: TBD  Ref#: [id number]  Service Dates: 2025-2025     Follow up appointments: after SNF  DME needed: none  Transportation at discharge: BLS  IM/[facility] Medicare/ letter given: needed  Is patient a [place] and connected with VA?               If yes, was [place] transfer form completed and VA notified?   Caregiver Contact: Melany Parada [phone]  Discharge Caregiver contacted prior to discharge?   Care Conference needed?   Barriers to discharge: renal biopsy

## 2025-04-21 NOTE — DIABETES MGMT
[facility]  DIABETES MANAGEMENT CONSULT    Consulted by Provider for advanced nursing evaluation and care for inpatient blood glucose management.    Evaluation and Action Plan   Darius BurgosIs it 84 year-old male patient with the history of type two diabetes. The patient was admitted with acute kidney injury. Since being here he has had a urinary stent placed. He has also been started on a steroid. Which has increased his blood sugar. The patient reportedly takes metformin only at home with good blood sugar control as evidenced by an A1c of 6.6%. The patient is anticipated to be discharged on 40 mg of prednisone for several months. He will require NPH insulin dosing to help override the effects of the steroids. Diabetes management consulted to assist with inpatient insulin dosing and discharge dosing.    Blood glucose pattern          Management Rationale Action Plan   Medication   Corrective insulin Using medium dose sensitivity based on weight    Overriding steroid effect Using 0.4 units/kg/D based on weight  Use 40 units NPH for each 40 mg prednisone   Additional orders  Carb consistent diet (60g CHO/meal)       Diabetes Discharge Plan   Medication  TBD   Referral  []        Outpatient diabetes education   Additional orders            Initial Presentation   Darius Burgos is a 84 y.o. male who presented to the ED on 4/08/25  LAB: Glucose 214. Creatine 2.53. GFR 24. A1c 6.6%  Narrative & Impression  INDICATION: Right lower quadrant, left upper quadrant abdominal pain.     Exam: Noncontrast CT of the abdomen and pelvis is performed with 5 mm  collimation. Sagittal and coronal reformatted images were also performed.     CT dose reduction was achieved through the use of a standardized protocol  tailored for this examination and automatic exposure control for dose  modulation.     Direct comparison is made to prior CT dated August 2024.     FINDINGS:     Evaluation is somewhat limited

## 2025-04-22 VITALS
TEMPERATURE: 97.8 F | SYSTOLIC BLOOD PRESSURE: 138 MMHG | RESPIRATION RATE: 13 BRPM | BODY MASS INDEX: 35.54 KG/M2 | DIASTOLIC BLOOD PRESSURE: 49 MMHG | HEART RATE: 57 BPM | OXYGEN SATURATION: 96 % | HEIGHT: 67 IN | WEIGHT: 226.41 LBS

## 2025-04-22 PROBLEM — T38.0X5A STEROID-INDUCED HYPERGLYCEMIA: Status: ACTIVE | Noted: 2025-04-22

## 2025-04-22 PROBLEM — R73.9 STEROID-INDUCED HYPERGLYCEMIA: Status: ACTIVE | Noted: 2025-04-22

## 2025-04-22 LAB
ALBUMIN SERPL ELPH-MCNC: 2.6 G/DL (ref 2.9–4.4)
ALBUMIN SERPL-MCNC: 2.3 G/DL (ref 3.5–5)
ALBUMIN/GLOB SERPL: 1 (ref 0.7–1.7)
ALPHA1 GLOB SERPL ELPH-MCNC: 0.3 G/DL (ref 0–0.4)
ALPHA2 GLOB SERPL ELPH-MCNC: 0.6 G/DL (ref 0.4–1)
ANION GAP SERPL CALC-SCNC: 5 MMOL/L (ref 2–12)
B-GLOBULIN SERPL ELPH-MCNC: 0.8 G/DL (ref 0.7–1.3)
BASOPHILS # BLD: 0.02 K/UL (ref 0–0.1)
BASOPHILS NFR BLD: 0.2 % (ref 0–1)
BUN SERPL-MCNC: 86 MG/DL (ref 6–20)
BUN/CREAT SERPL: 31 (ref 12–20)
CALCIUM SERPL-MCNC: 8.3 MG/DL (ref 8.5–10.1)
CHLORIDE SERPL-SCNC: 108 MMOL/L (ref 97–108)
CO2 SERPL-SCNC: 24 MMOL/L (ref 21–32)
CREAT SERPL-MCNC: 2.79 MG/DL (ref 0.7–1.3)
DIFFERENTIAL METHOD BLD: ABNORMAL
EKG ATRIAL RATE: 63 BPM
EKG DIAGNOSIS: NORMAL
EKG P AXIS: 64 DEGREES
EKG P-R INTERVAL: 176 MS
EKG Q-T INTERVAL: 448 MS
EKG QRS DURATION: 150 MS
EKG QTC CALCULATION (BAZETT): 458 MS
EKG R AXIS: 10 DEGREES
EKG T AXIS: 14 DEGREES
EKG VENTRICULAR RATE: 63 BPM
EOSINOPHIL # BLD: 0 K/UL (ref 0–0.4)
EOSINOPHIL NFR BLD: 0 % (ref 0–7)
ERYTHROCYTE [DISTWIDTH] IN BLOOD BY AUTOMATED COUNT: 15.6 % (ref 11.5–14.5)
GAMMA GLOB SERPL ELPH-MCNC: 0.8 G/DL (ref 0.4–1.8)
GLOBULIN SER CALC-MCNC: 2.5 G/DL (ref 2.2–3.9)
GLUCOSE BLD STRIP.AUTO-MCNC: 184 MG/DL (ref 65–117)
GLUCOSE BLD STRIP.AUTO-MCNC: 295 MG/DL (ref 65–117)
GLUCOSE SERPL-MCNC: 182 MG/DL (ref 65–100)
HCT VFR BLD AUTO: 31.3 % (ref 36.6–50.3)
HGB BLD-MCNC: 9.8 G/DL (ref 12.1–17)
IGA SERPL-MCNC: 283 MG/DL (ref 61–437)
IMM GRANULOCYTES # BLD AUTO: 0.12 K/UL (ref 0–0.04)
IMM GRANULOCYTES NFR BLD AUTO: 1.2 % (ref 0–0.5)
KAPPA LC FREE SER-MCNC: 66.6 MG/L (ref 3.3–19.4)
KAPPA LC FREE/LAMBDA FREE SER: 1.48 (ref 0.26–1.65)
LABORATORY COMMENT REPORT: ABNORMAL
LAMBDA LC FREE SERPL-MCNC: 44.9 MG/L (ref 5.7–26.3)
LYMPHOCYTES # BLD: 0.81 K/UL (ref 0.8–3.5)
LYMPHOCYTES NFR BLD: 8 % (ref 12–49)
M PROTEIN SERPL ELPH-MCNC: ABNORMAL G/DL
MAGNESIUM SERPL-MCNC: 2 MG/DL (ref 1.6–2.4)
MCH RBC QN AUTO: 27.4 PG (ref 26–34)
MCHC RBC AUTO-ENTMCNC: 31.3 G/DL (ref 30–36.5)
MCV RBC AUTO: 87.4 FL (ref 80–99)
MONOCYTES # BLD: 1.13 K/UL (ref 0–1)
MONOCYTES NFR BLD: 11.2 % (ref 5–13)
NEUTS SEG # BLD: 8.05 K/UL (ref 1.8–8)
NEUTS SEG NFR BLD: 79.4 % (ref 32–75)
NRBC # BLD: 0.02 K/UL (ref 0–0.01)
NRBC BLD-RTO: 0.2 PER 100 WBC
PHOSPHATE SERPL-MCNC: 2.6 MG/DL (ref 2.6–4.7)
PLATELET # BLD AUTO: 250 K/UL (ref 150–400)
PMV BLD AUTO: 9.5 FL (ref 8.9–12.9)
POTASSIUM SERPL-SCNC: 4.5 MMOL/L (ref 3.5–5.1)
PROT SERPL-MCNC: 5.1 G/DL (ref 6–8.5)
RBC # BLD AUTO: 3.58 M/UL (ref 4.1–5.7)
SERVICE CMNT-IMP: ABNORMAL
SERVICE CMNT-IMP: ABNORMAL
SODIUM SERPL-SCNC: 137 MMOL/L (ref 136–145)
TTG IGA SER-ACNC: <2 U/ML (ref 0–3)
WBC # BLD AUTO: 10.1 K/UL (ref 4.1–11.1)

## 2025-04-22 PROCEDURE — 80069 RENAL FUNCTION PANEL: CPT

## 2025-04-22 PROCEDURE — 2500000003 HC RX 250 WO HCPCS: Performed by: INTERNAL MEDICINE

## 2025-04-22 PROCEDURE — 6370000000 HC RX 637 (ALT 250 FOR IP)

## 2025-04-22 PROCEDURE — 6370000000 HC RX 637 (ALT 250 FOR IP): Performed by: INTERNAL MEDICINE

## 2025-04-22 PROCEDURE — 93005 ELECTROCARDIOGRAM TRACING: CPT | Performed by: INTERNAL MEDICINE

## 2025-04-22 PROCEDURE — 6370000000 HC RX 637 (ALT 250 FOR IP): Performed by: STUDENT IN AN ORGANIZED HEALTH CARE EDUCATION/TRAINING PROGRAM

## 2025-04-22 PROCEDURE — 83735 ASSAY OF MAGNESIUM: CPT

## 2025-04-22 PROCEDURE — 6370000000 HC RX 637 (ALT 250 FOR IP): Performed by: PHYSICIAN ASSISTANT

## 2025-04-22 PROCEDURE — 97535 SELF CARE MNGMENT TRAINING: CPT

## 2025-04-22 PROCEDURE — 99232 SBSQ HOSP IP/OBS MODERATE 35: CPT

## 2025-04-22 PROCEDURE — 85025 COMPLETE CBC W/AUTO DIFF WBC: CPT

## 2025-04-22 PROCEDURE — 36415 COLL VENOUS BLD VENIPUNCTURE: CPT

## 2025-04-22 PROCEDURE — 82962 GLUCOSE BLOOD TEST: CPT

## 2025-04-22 RX ORDER — PREDNISONE 20 MG/1
40 TABLET ORAL DAILY
Qty: 60 TABLET | Refills: 1 | Status: SHIPPED | OUTPATIENT
Start: 2025-04-22 | End: 2025-06-18

## 2025-04-22 RX ORDER — HYDRALAZINE HYDROCHLORIDE 25 MG/1
25 TABLET, FILM COATED ORAL EVERY 8 HOURS SCHEDULED
Qty: 90 TABLET | Refills: 3 | Status: SHIPPED | OUTPATIENT
Start: 2025-04-22 | End: 2025-04-22 | Stop reason: HOSPADM

## 2025-04-22 RX ORDER — HYDRALAZINE HYDROCHLORIDE 50 MG/1
50 TABLET, FILM COATED ORAL EVERY 8 HOURS SCHEDULED
Status: DISCONTINUED | OUTPATIENT
Start: 2025-04-22 | End: 2025-04-22 | Stop reason: HOSPADM

## 2025-04-22 RX ORDER — SODIUM BICARBONATE 650 MG/1
650 TABLET ORAL 2 TIMES DAILY
Qty: 60 TABLET | Refills: 0 | Status: SHIPPED | OUTPATIENT
Start: 2025-04-22

## 2025-04-22 RX ORDER — LOSARTAN POTASSIUM 25 MG/1
25 TABLET ORAL DAILY
Qty: 30 TABLET | Refills: 0 | Status: SHIPPED | OUTPATIENT
Start: 2025-04-22

## 2025-04-22 RX ORDER — HYDRALAZINE HYDROCHLORIDE 50 MG/1
50 TABLET, FILM COATED ORAL EVERY 8 HOURS SCHEDULED
Qty: 90 TABLET | Refills: 0 | Status: SHIPPED | OUTPATIENT
Start: 2025-04-22

## 2025-04-22 RX ORDER — BUMETANIDE 1 MG/1
1 TABLET ORAL 2 TIMES DAILY
Qty: 30 TABLET | Refills: 3 | Status: SHIPPED | OUTPATIENT
Start: 2025-04-22

## 2025-04-22 RX ORDER — CALCIUM CARBONATE 500 MG/1
500 TABLET, CHEWABLE ORAL 3 TIMES DAILY PRN
Qty: 90 TABLET | Refills: 0 | Status: SHIPPED | OUTPATIENT
Start: 2025-04-22 | End: 2025-05-22

## 2025-04-22 RX ORDER — AMIODARONE HYDROCHLORIDE 100 MG/1
100 TABLET ORAL DAILY
Qty: 30 TABLET | Refills: 0 | Status: SHIPPED | OUTPATIENT
Start: 2025-04-22

## 2025-04-22 RX ORDER — SULFAMETHOXAZOLE AND TRIMETHOPRIM 400; 80 MG/1; MG/1
1 TABLET ORAL
Qty: 15 TABLET | Refills: 0 | Status: SHIPPED | OUTPATIENT
Start: 2025-04-23 | End: 2025-05-27

## 2025-04-22 RX ORDER — ONDANSETRON 4 MG/1
4 TABLET, ORALLY DISINTEGRATING ORAL EVERY 8 HOURS PRN
Qty: 21 TABLET | Refills: 0 | Status: SHIPPED | OUTPATIENT
Start: 2025-04-22

## 2025-04-22 RX ORDER — INSULIN HUMAN 100 [IU]/ML
40 INJECTION, SUSPENSION SUBCUTANEOUS EVERY MORNING
Qty: 5 ADJUSTABLE DOSE PRE-FILLED PEN SYRINGE | Refills: 3 | Status: SHIPPED | OUTPATIENT
Start: 2025-04-22

## 2025-04-22 RX ORDER — METOPROLOL SUCCINATE 25 MG/1
12.5 TABLET, EXTENDED RELEASE ORAL DAILY
Qty: 30 TABLET | Refills: 3 | Status: SHIPPED | OUTPATIENT
Start: 2025-04-22

## 2025-04-22 RX ADMIN — Medication 4 UNITS: at 11:53

## 2025-04-22 RX ADMIN — HYDRALAZINE HYDROCHLORIDE 25 MG: 25 TABLET ORAL at 05:25

## 2025-04-22 RX ADMIN — SODIUM BICARBONATE 650 MG: 650 TABLET ORAL at 09:38

## 2025-04-22 RX ADMIN — BUMETANIDE 1 MG: 1 TABLET ORAL at 09:38

## 2025-04-22 RX ADMIN — INSULIN HUMAN 40 UNITS: 100 INJECTION, SUSPENSION SUBCUTANEOUS at 09:37

## 2025-04-22 RX ADMIN — PANTOPRAZOLE SODIUM 40 MG: 40 TABLET, DELAYED RELEASE ORAL at 05:25

## 2025-04-22 RX ADMIN — APIXABAN 2.5 MG: 2.5 TABLET, FILM COATED ORAL at 09:38

## 2025-04-22 RX ADMIN — METOPROLOL SUCCINATE 12.5 MG: 25 TABLET, EXTENDED RELEASE ORAL at 09:39

## 2025-04-22 RX ADMIN — SODIUM CHLORIDE, PRESERVATIVE FREE 10 ML: 5 INJECTION INTRAVENOUS at 09:39

## 2025-04-22 RX ADMIN — HYDRALAZINE HYDROCHLORIDE 50 MG: 50 TABLET ORAL at 13:57

## 2025-04-22 RX ADMIN — LOSARTAN POTASSIUM 25 MG: 25 TABLET, FILM COATED ORAL at 09:38

## 2025-04-22 RX ADMIN — AMIODARONE HYDROCHLORIDE 100 MG: 200 TABLET ORAL at 09:38

## 2025-04-22 RX ADMIN — PREDNISONE 40 MG: 20 TABLET ORAL at 09:38

## 2025-04-22 RX ADMIN — Medication 2 UNITS: at 09:37

## 2025-04-22 NOTE — DIABETES MGMT
[facility]  DIABETES MANAGEMENT CONSULT    Consulted by Provider for advanced nursing evaluation and care for inpatient blood glucose management.    Evaluation and Action Plan   Darius BurgosIs it 84 year-old male patient with the history of type two diabetes. The patient was admitted with acute kidney injury. Since being here he has had a urinary stent placed. He has also been started on a steroid. Which has increased his blood sugar. The patient reportedly takes metformin only at home with good blood sugar control as evidenced by an A1c of 6.6%. The patient is anticipated to be discharged on 40 mg of prednisone for several months. He will require NPH insulin dosing to help override the effects of the steroids. Diabetes management consulted to assist with inpatient insulin dosing and discharge dosing.  Insulin pen administration practiced with fake skin model. Bg's improving. Steroid continues. Scripts sent    Blood glucose pattern          Management Rationale Action Plan   Medication   Corrective insulin Using medium dose sensitivity based on weight    Overriding steroid effect Using 0.4 units/kg/D based on weight  Use 40 units NPH for each 40 mg prednisone   Additional orders  Carb consistent diet (60g CHO/meal)       Diabetes Discharge Plan   Medication  Use 40 units NPH with each 40 mg of prednisone  Monitor Bg trends  Follow-up with PCP for future blodd glucose management   Referral  []        Outpatient diabetes education   Additional orders            Initial Presentation   Darius Burgos is a 84 y.o. male who presented to the ED on 4/08/25  LAB: Glucose 214. Creatine 2.53. GFR 24. A1c 6.6%  Narrative & Impression  INDICATION: Right lower quadrant, left upper quadrant abdominal pain.     Exam: Noncontrast CT of the abdomen and pelvis is performed with 5 mm  collimation. Sagittal and coronal reformatted images were also performed.     CT dose reduction was achieved through the

## 2025-04-22 NOTE — DISCHARGE INSTRUCTIONS
Recommendations as per wound care nurse:    Keep the skin clean and as dry as possible:  Pressure injury prevention with significant turns and float the heels.   Wound care with zinc oxide for incontinence care and for the skin tear on the right buttocks. Foam dressing on the skin tear for extra protection.    Wound care for the right 4th toe wound:  Cleanse the toe with NS and wipe firmly with gauze.  Dry the skin and then apply a Band-aid to the wound.    Continue taking prednisone and insulin as recommended.  Please follow-up with PCP, rheumatology, nephrology and endocrinology outpatient.

## 2025-04-22 NOTE — DISCHARGE SUMMARY
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   -Pt/Ot eval per daughter for DC planning- SNF/rehab recommended, CM working with Family  Discharging to [facility]  4/22: Continue taking 40 mg of prednisone taper down over 4 months.  Continue with 1 mg of Bumex twice daily.  Continue taking insulin as recommended by endocrinology.  Also continue taking losartan 25 mg daily, hydralazine 50 mg 3 times daily, metoprolol 12.5 mg daily.  Continue Bactrim for PJP prophylaxis.  Please follow-up with PCP, rheumatology, nephrology and endocrinology outpatient.     IGA Nephritis  JOEY   Obstructive uropathy, R Hydronephrosis with ureteral stone POA  S/p Cystoscopy right ureteral stent insertion , bilateral retrograde pyelogram on 4/11  Suspected UTI POA- ruled out with neg Cx  S/p IV meropenem given hx of ESBL-- Discontinued 4/9  as Urine Cx came back negative  Immunological workup- negative so far   -IP nephrology consulted- Dr Nowak following  -Started the patient on pulse steroids with Solu-Medrol 250 mg IV daily x 3 days due to worsening Cr  -IP Urology consulted for persistent R sided Hydroureteronephrosis on CT imaging. S/p stent with no improvement in creatinine, less likely post renal cause. Recommended to continue with chatman unless creatinine issues sorted.    - On 4/12: hyponatremia, BUN increasing - 101, P-5.1, K-4.7   No signs of uremic encephalopathy at present. Leucocytosis possible related to high dose of steroids.  Started on IVF as per nephro. No RRT today .   - On 4/13:  Na- stable at 132 . BUN /Cr- 105/3.5. S/p IVF hydration. Minimal change in renal function.  Patient complains of Right sided abdominal pain with some distension.  Possible from stent placement.- will order US abdomen .  Appreciated urology recommendations.

## 2025-04-22 NOTE — PLAN OF CARE
Problem: ABCDS Injury Assessment  Goal: Absence of physical injury  Outcome: Progressing     Problem: Safety - Adult  Goal: Free from fall injury  Outcome: Progressing     Problem: Pain  Goal: Verbalizes/displays adequate comfort level or baseline comfort level  Outcome: Progressing     
  Problem: Chronic Conditions and Co-morbidities  Goal: Patient's chronic conditions and co-morbidity symptoms are monitored and maintained or improved  4/11/2025 2013 by Katelynn Morley RN  Outcome: Progressing  Flowsheets (Taken 4/11/2025 1915)  Care Plan - Patient's Chronic Conditions and Co-Morbidity Symptoms are Monitored and Maintained or Improved: Monitor and assess patient's chronic conditions and comorbid symptoms for stability, deterioration, or improvement  4/11/2025 1101 by Lucila Schwartz RN  Outcome: Progressing     Problem: Discharge Planning  Goal: Discharge to home or other facility with appropriate resources  Outcome: Progressing  Flowsheets (Taken 4/11/2025 1915)  Discharge to home or other facility with appropriate resources: Identify barriers to discharge with patient and caregiver     Problem: ABCDS Injury Assessment  Goal: Absence of physical injury  4/11/2025 2013 by Katelynn Morley RN  Outcome: Progressing  4/11/2025 1101 by Lucila Schwartz RN  Outcome: Progressing     Problem: Safety - Adult  Goal: Free from fall injury  4/11/2025 2013 by Katelynn Morley RN  Outcome: Progressing  4/11/2025 1101 by Lucila Schwartz RN  Outcome: Progressing     Problem: Pain  Goal: Verbalizes/displays adequate comfort level or baseline comfort level  4/11/2025 2013 by Katelynn Morley RN  Outcome: Progressing  Flowsheets (Taken 4/11/2025 1915)  Verbalizes/displays adequate comfort level or baseline comfort level:   Encourage patient to monitor pain and request assistance   Assess pain using appropriate pain scale   Administer analgesics based on type and severity of pain and evaluate response   Implement non-pharmacological measures as appropriate and evaluate response   Consider cultural and social influences on pain and pain management  4/11/2025 1101 by Lucila Schwartz RN  Outcome: Progressing     Problem: Skin/Tissue Integrity  Goal: Skin integrity remains intact  Description: 1.  Monitor for areas of 
  Problem: Chronic Conditions and Co-morbidities  Goal: Patient's chronic conditions and co-morbidity symptoms are monitored and maintained or improved  4/12/2025 0746 by Lucila Schwartz RN  Outcome: Progressing  4/11/2025 2013 by Katelynn Morley RN  Outcome: Progressing  Flowsheets (Taken 4/11/2025 1915)  Care Plan - Patient's Chronic Conditions and Co-Morbidity Symptoms are Monitored and Maintained or Improved: Monitor and assess patient's chronic conditions and comorbid symptoms for stability, deterioration, or improvement     Problem: Discharge Planning  Goal: Discharge to home or other facility with appropriate resources  4/12/2025 0746 by Lucila Schwartz RN  Outcome: Progressing  4/11/2025 2013 by Katelynn Morley RN  Outcome: Progressing  Flowsheets (Taken 4/11/2025 1915)  Discharge to home or other facility with appropriate resources: Identify barriers to discharge with patient and caregiver     Problem: ABCDS Injury Assessment  Goal: Absence of physical injury  4/12/2025 0746 by Lucila Schwartz RN  Outcome: Progressing  4/11/2025 2013 by Katelynn Morley RN  Outcome: Progressing     Problem: Safety - Adult  Goal: Free from fall injury  4/12/2025 0746 by Lucila Schwartz RN  Outcome: Progressing  4/11/2025 2013 by Katelynn Morley RN  Outcome: Progressing     Problem: Pain  Goal: Verbalizes/displays adequate comfort level or baseline comfort level  4/11/2025 2013 by Katelynn Morley RN  Outcome: Progressing  Flowsheets (Taken 4/11/2025 1915)  Verbalizes/displays adequate comfort level or baseline comfort level:   Encourage patient to monitor pain and request assistance   Assess pain using appropriate pain scale   Administer analgesics based on type and severity of pain and evaluate response   Implement non-pharmacological measures as appropriate and evaluate response   Consider cultural and social influences on pain and pain management     Problem: Skin/Tissue Integrity  Goal: Skin integrity remains 
  Problem: Chronic Conditions and Co-morbidities  Goal: Patient's chronic conditions and co-morbidity symptoms are monitored and maintained or improved  4/12/2025 2024 by Katelynn Morley RN  Outcome: Progressing  Flowsheets (Taken 4/12/2025 1915)  Care Plan - Patient's Chronic Conditions and Co-Morbidity Symptoms are Monitored and Maintained or Improved:   Monitor and assess patient's chronic conditions and comorbid symptoms for stability, deterioration, or improvement   Collaborate with multidisciplinary team to address chronic and comorbid conditions and prevent exacerbation or deterioration  4/12/2025 0746 by Lucila Schwartz RN  Outcome: Progressing     Problem: Discharge Planning  Goal: Discharge to home or other facility with appropriate resources  4/12/2025 2024 by Katelynn Morley RN  Outcome: Progressing  Flowsheets (Taken 4/12/2025 1915)  Discharge to home or other facility with appropriate resources:   Identify barriers to discharge with patient and caregiver   Arrange for needed discharge resources and transportation as appropriate   Identify discharge learning needs (meds, wound care, etc)   Arrange for interpreters to assist at discharge as needed  4/12/2025 0746 by Lucila Schwartz RN  Outcome: Progressing     Problem: ABCDS Injury Assessment  Goal: Absence of physical injury  4/12/2025 2024 by Katelynn Morley RN  Outcome: Progressing  4/12/2025 0746 by Lucila Schwartz RN  Outcome: Progressing     Problem: Safety - Adult  Goal: Free from fall injury  4/12/2025 2024 by Katelynn Morley RN  Outcome: Progressing  4/12/2025 0746 by Lucila Schwartz RN  Outcome: Progressing     Problem: Pain  Goal: Verbalizes/displays adequate comfort level or baseline comfort level  Outcome: Progressing  Flowsheets (Taken 4/12/2025 1915)  Verbalizes/displays adequate comfort level or baseline comfort level:   Encourage patient to monitor pain and request assistance   Assess pain using appropriate pain scale   Administer 
  Problem: Chronic Conditions and Co-morbidities  Goal: Patient's chronic conditions and co-morbidity symptoms are monitored and maintained or improved  4/19/2025 1217 by Lucila Schwartz RN  Outcome: Progressing  4/19/2025 0612 by Lidia Cannon RN  Outcome: Progressing     Problem: Discharge Planning  Goal: Discharge to home or other facility with appropriate resources  4/19/2025 1217 by Lucila Schwartz RN  Outcome: Progressing  4/19/2025 0612 by Lidia Cannon RN  Outcome: Progressing     Problem: ABCDS Injury Assessment  Goal: Absence of physical injury  4/19/2025 1217 by Lucila Schwartz RN  Outcome: Progressing  4/19/2025 0612 by Lidia Cannon RN  Outcome: Progressing     Problem: Safety - Adult  Goal: Free from fall injury  4/19/2025 1217 by Lucila Schwartz RN  Outcome: Progressing  4/19/2025 0612 by Lidia Cannon RN  Outcome: Progressing     Problem: Pain  Goal: Verbalizes/displays adequate comfort level or baseline comfort level  4/19/2025 0612 by Lidia Cannon RN  Outcome: Progressing     Problem: Skin/Tissue Integrity  Goal: Skin integrity remains intact  Description: 1.  Monitor for areas of redness and/or skin breakdown  2.  Assess vascular access sites hourly  3.  Every 4-6 hours minimum:  Change oxygen saturation probe site  4.  Every 4-6 hours:  If on nasal continuous positive airway pressure, respiratory therapy assess nares and determine need for appliance change or resting period  4/19/2025 0612 by Lidia Cannon RN  Outcome: Progressing     Problem: Nutrition Deficit:  Goal: Optimize nutritional status  4/19/2025 0612 by Lidia Cannon RN  Outcome: Progressing     Problem: Neurosensory - Adult  Goal: Achieves stable or improved neurological status  4/19/2025 0612 by Lidia Cannon RN  Outcome: Progressing  Goal: Absence of seizures  4/19/2025 0612 by Lidia Cannon RN  Outcome: Progressing  Goal: Remains free of injury related to seizures 
  Problem: Chronic Conditions and Co-morbidities  Goal: Patient's chronic conditions and co-morbidity symptoms are monitored and maintained or improved  4/19/2025 1217 by Lucila Schwartz RN  Outcome: Progressing  4/19/2025 0612 by Lidia Cannon RN  Outcome: Progressing     Problem: Discharge Planning  Goal: Discharge to home or other facility with appropriate resources  4/19/2025 1936 by Jenna Torres RN  Outcome: Progressing  4/19/2025 1217 by Lucila Schwartz RN  Outcome: Progressing  4/19/2025 0612 by Lidia Cannon RN  Outcome: Progressing     Problem: ABCDS Injury Assessment  Goal: Absence of physical injury  4/19/2025 1936 by Jenna Torres RN  Outcome: Progressing  4/19/2025 1217 by Lucila Schwartz RN  Outcome: Progressing  4/19/2025 0612 by Lidia Cannon RN  Outcome: Progressing     
  Problem: Chronic Conditions and Co-morbidities  Goal: Patient's chronic conditions and co-morbidity symptoms are monitored and maintained or improved  Outcome: Progressing     Problem: ABCDS Injury Assessment  Goal: Absence of physical injury  Outcome: Progressing     Problem: Safety - Adult  Goal: Free from fall injury  Outcome: Progressing     Problem: Cardiovascular - Adult  Goal: Maintains optimal cardiac output and hemodynamic stability  Outcome: Progressing     
  Problem: Chronic Conditions and Co-morbidities  Goal: Patient's chronic conditions and co-morbidity symptoms are monitored and maintained or improved  Outcome: Progressing     Problem: ABCDS Injury Assessment  Goal: Absence of physical injury  Outcome: Progressing     Problem: Safety - Adult  Goal: Free from fall injury  Outcome: Progressing     Problem: Pain  Goal: Verbalizes/displays adequate comfort level or baseline comfort level  Outcome: Progressing     Problem: Skin/Tissue Integrity  Goal: Skin integrity remains intact  Description: 1.  Monitor for areas of redness and/or skin breakdown  2.  Assess vascular access sites hourly  3.  Every 4-6 hours minimum:  Change oxygen saturation probe site  4.  Every 4-6 hours:  If on nasal continuous positive airway pressure, respiratory therapy assess nares and determine need for appliance change or resting period  Outcome: Progressing     Problem: Skin/Tissue Integrity - Adult  Goal: Skin integrity remains intact  Description: 1.  Monitor for areas of redness and/or skin breakdown  2.  Assess vascular access sites hourly  3.  Every 4-6 hours minimum:  Change oxygen saturation probe site  4.  Every 4-6 hours:  If on nasal continuous positive airway pressure, respiratory therapy assess nares and determine need for appliance change or resting period  Outcome: Progressing     
  Problem: Chronic Conditions and Co-morbidities  Goal: Patient's chronic conditions and co-morbidity symptoms are monitored and maintained or improved  Outcome: Progressing     Problem: Discharge Planning  Goal: Discharge to home or other facility with appropriate resources  Outcome: Progressing     Problem: ABCDS Injury Assessment  Goal: Absence of physical injury  Outcome: Progressing     Problem: Safety - Adult  Goal: Free from fall injury  4/15/2025 1042 by Sabine Pires, RN  Outcome: Progressing  4/15/2025 0436 by Kylah Hernandez, RN  Outcome: Progressing     Problem: Pain  Goal: Verbalizes/displays adequate comfort level or baseline comfort level  Outcome: Progressing     Problem: Skin/Tissue Integrity  Goal: Skin integrity remains intact  Description: 1.  Monitor for areas of redness and/or skin breakdown  2.  Assess vascular access sites hourly  3.  Every 4-6 hours minimum:  Change oxygen saturation probe site  4.  Every 4-6 hours:  If on nasal continuous positive airway pressure, respiratory therapy assess nares and determine need for appliance change or resting period  Outcome: Progressing  Flowsheets (Taken 4/15/2025 4665)  Skin Integrity Remains Intact:   Monitor for areas of redness and/or skin breakdown   Assess vascular access sites hourly     
  Problem: Chronic Conditions and Co-morbidities  Goal: Patient's chronic conditions and co-morbidity symptoms are monitored and maintained or improved  Outcome: Progressing     Problem: Discharge Planning  Goal: Discharge to home or other facility with appropriate resources  Outcome: Progressing     Problem: ABCDS Injury Assessment  Goal: Absence of physical injury  Outcome: Progressing     Problem: Safety - Adult  Goal: Free from fall injury  Outcome: Progressing     
  Problem: Chronic Conditions and Co-morbidities  Goal: Patient's chronic conditions and co-morbidity symptoms are monitored and maintained or improved  Outcome: Progressing     Problem: Discharge Planning  Goal: Discharge to home or other facility with appropriate resources  Outcome: Progressing     Problem: ABCDS Injury Assessment  Goal: Absence of physical injury  Outcome: Progressing     Problem: Safety - Adult  Goal: Free from fall injury  Outcome: Progressing     Problem: Pain  Goal: Verbalizes/displays adequate comfort level or baseline comfort level  Outcome: Progressing     Problem: Skin/Tissue Integrity  Goal: Skin integrity remains intact  Description: 1.  Monitor for areas of redness and/or skin breakdown  2.  Assess vascular access sites hourly  3.  Every 4-6 hours minimum:  Change oxygen saturation probe site  4.  Every 4-6 hours:  If on nasal continuous positive airway pressure, respiratory therapy assess nares and determine need for appliance change or resting period  Outcome: Progressing     Problem: Nutrition Deficit:  Goal: Optimize nutritional status  Outcome: Progressing     Problem: Neurosensory - Adult  Goal: Achieves stable or improved neurological status  Outcome: Progressing  Goal: Absence of seizures  Outcome: Progressing  Goal: Remains free of injury related to seizures activity  Outcome: Progressing  Goal: Achieves maximal functionality and self care  Outcome: Progressing     Problem: Respiratory - Adult  Goal: Achieves optimal ventilation and oxygenation  Outcome: Progressing     Problem: Cardiovascular - Adult  Goal: Maintains optimal cardiac output and hemodynamic stability  Outcome: Progressing  Goal: Absence of cardiac dysrhythmias or at baseline  Outcome: Progressing     Problem: Skin/Tissue Integrity - Adult  Goal: Skin integrity remains intact  Description: 1.  Monitor for areas of redness and/or skin breakdown  2.  Assess vascular access sites hourly  3.  Every 4-6 hours 
  Problem: Chronic Conditions and Co-morbidities  Goal: Patient's chronic conditions and co-morbidity symptoms are monitored and maintained or improved  Outcome: Progressing     Problem: Discharge Planning  Goal: Discharge to home or other facility with appropriate resources  Outcome: Progressing     Problem: ABCDS Injury Assessment  Goal: Absence of physical injury  Outcome: Progressing     Problem: Safety - Adult  Goal: Free from fall injury  Outcome: Progressing     Problem: Pain  Goal: Verbalizes/displays adequate comfort level or baseline comfort level  Outcome: Progressing     Problem: Skin/Tissue Integrity  Goal: Skin integrity remains intact  Description: 1.  Monitor for areas of redness and/or skin breakdown  2.  Assess vascular access sites hourly  3.  Every 4-6 hours minimum:  Change oxygen saturation probe site  4.  Every 4-6 hours:  If on nasal continuous positive airway pressure, respiratory therapy assess nares and determine need for appliance change or resting period  Outcome: Progressing     Problem: Physical Therapy - Adult  Goal: By Discharge: Performs mobility at highest level of function for planned discharge setting.  See evaluation for individualized goals.  Description: FUNCTIONAL STATUS PRIOR TO ADMISSION: Pt reports independence w/ ambulation and ADLs. Reports 1 recent falls. States he assists wife as needed with her mobility and ADLs (Wife ambulates with RW).     HOME SUPPORT PRIOR TO ADMISSION: The patient lived with wife and is wife's primary caregiver. Wife currently hospitalized at [facility].    Physical Therapy Goals  Initiated 4/10/2025 - remain appropriate 4/17/25  1.  Patient will move from supine to sit and sit to supine, scoot up and down, and roll side to side in bed with supervision/set-up within 7 day(s).    2.  Patient will perform sit to stand with contact guard assist within 7 day(s).  3.  Patient will transfer from bed to chair and chair to bed with contact guard assist using 
  Problem: Chronic Conditions and Co-morbidities  Goal: Patient's chronic conditions and co-morbidity symptoms are monitored and maintained or improved  Outcome: Progressing     Problem: Discharge Planning  Goal: Discharge to home or other facility with appropriate resources  Outcome: Progressing     Problem: ABCDS Injury Assessment  Goal: Absence of physical injury  Outcome: Progressing     Problem: Safety - Adult  Goal: Free from fall injury  Outcome: Progressing     Problem: Pain  Goal: Verbalizes/displays adequate comfort level or baseline comfort level  Outcome: Progressing     Problem: Skin/Tissue Integrity  Goal: Skin integrity remains intact  Description: 1.  Monitor for areas of redness and/or skin breakdown  2.  Assess vascular access sites hourly  3.  Every 4-6 hours minimum:  Change oxygen saturation probe site  4.  Every 4-6 hours:  If on nasal continuous positive airway pressure, respiratory therapy assess nares and determine need for appliance change or resting period  Outcome: Progressing     Problem: Physical Therapy - Adult  Goal: By Discharge: Performs mobility at highest level of function for planned discharge setting.  See evaluation for individualized goals.  Description: FUNCTIONAL STATUS PRIOR TO ADMISSION: Pt reports independence w/ ambulation and ADLs. Reports 1 recent falls. States he assists wife as needed with her mobility and ADLs (Wife ambulates with RW).     HOME SUPPORT PRIOR TO ADMISSION: The patient lived with wife and is wife's primary caregiver. Wife currently hospitalized at [facility].    Physical Therapy Goals  Initiated 4/10/2025  1.  Patient will move from supine to sit and sit to supine, scoot up and down, and roll side to side in bed with supervision/set-up within 7 day(s).    2.  Patient will perform sit to stand with contact guard assist within 7 day(s).  3.  Patient will transfer from bed to chair and chair to bed with contact guard assist using the least restrictive device 
  Problem: Chronic Conditions and Co-morbidities  Goal: Patient's chronic conditions and co-morbidity symptoms are monitored and maintained or improved  Outcome: Progressing     Problem: Discharge Planning  Goal: Discharge to home or other facility with appropriate resources  Outcome: Progressing     Problem: ABCDS Injury Assessment  Goal: Absence of physical injury  Outcome: Progressing     Problem: Safety - Adult  Goal: Free from fall injury  Outcome: Progressing     Problem: Pain  Goal: Verbalizes/displays adequate comfort level or baseline comfort level  Outcome: Progressing     Problem: Skin/Tissue Integrity  Goal: Skin integrity remains intact  Description: 1.  Monitor for areas of redness and/or skin breakdown  2.  Assess vascular access sites hourly  3.  Every 4-6 hours minimum:  Change oxygen saturation probe site  4.  Every 4-6 hours:  If on nasal continuous positive airway pressure, respiratory therapy assess nares and determine need for appliance change or resting period  Outcome: Progressing  Flowsheets (Taken 4/16/2025 0314)  Skin Integrity Remains Intact:   Monitor for areas of redness and/or skin breakdown   Assess vascular access sites hourly     
  Problem: Chronic Conditions and Co-morbidities  Goal: Patient's chronic conditions and co-morbidity symptoms are monitored and maintained or improved  Outcome: Progressing     Problem: Discharge Planning  Goal: Discharge to home or other facility with appropriate resources  Outcome: Progressing     Problem: ABCDS Injury Assessment  Goal: Absence of physical injury  Outcome: Progressing     Problem: Safety - Adult  Goal: Free from fall injury  Outcome: Progressing     Problem: Physical Therapy - Adult  Goal: By Discharge: Performs mobility at highest level of function for planned discharge setting.  See evaluation for individualized goals.  Description: FUNCTIONAL STATUS PRIOR TO ADMISSION: Pt reports independence w/ ambulation and ADLs. Reports 1 recent falls. States he assists wife as needed with her mobility and ADLs (Wife ambulates with RW).     HOME SUPPORT PRIOR TO ADMISSION: The patient lived with wife and is wife's primary caregiver. Wife currently hospitalized at [facility].    Physical Therapy Goals  Initiated 4/10/2025 - remain appropriate 4/17/25  1.  Patient will move from supine to sit and sit to supine, scoot up and down, and roll side to side in bed with supervision/set-up within 7 day(s).    2.  Patient will perform sit to stand with contact guard assist within 7 day(s).  3.  Patient will transfer from bed to chair and chair to bed with contact guard assist using the least restrictive device within 7 day(s).  4.  Patient will ambulate with contact guard assist for 50 feet with the least restrictive device within 7 day(s).   4/21/2025 1033 by Kaylyn Dunlap, PT  Outcome: Progressing     
  Problem: Discharge Planning  Goal: Discharge to home or other facility with appropriate resources  Outcome: Progressing     Problem: ABCDS Injury Assessment  Goal: Absence of physical injury  Outcome: Progressing     Problem: Safety - Adult  Goal: Free from fall injury  Outcome: Progressing     Problem: Pain  Goal: Verbalizes/displays adequate comfort level or baseline comfort level  Outcome: Progressing  Flowsheets (Taken 4/9/2025 1512 by Delmi Villegas RN)  Verbalizes/displays adequate comfort level or baseline comfort level:   Encourage patient to monitor pain and request assistance   Assess pain using appropriate pain scale     Problem: Skin/Tissue Integrity  Goal: Skin integrity remains intact  Description: 1.  Monitor for areas of redness and/or skin breakdown  2.  Assess vascular access sites hourly  3.  Every 4-6 hours minimum:  Change oxygen saturation probe site  4.  Every 4-6 hours:  If on nasal continuous positive airway pressure, respiratory therapy assess nares and determine need for appliance change or resting period  Outcome: Progressing     
  Problem: Occupational Therapy - Adult  Goal: By Discharge: Performs self-care activities at highest level of function for planned discharge setting.  See evaluation for individualized goals.  Description: FUNCTIONAL STATUS PRIOR TO ADMISSION:  ambulated without assist devices, is the caregiver for his wife whom walks with a rolling walker and needs physical assist at times, reports falls,  able to perform ADLS on his own, cooks, family assists with grocery shopping and yard care, has a house keeper   ,  ,  ,  ,  ,  ,  ,  ,  ,  , Active : Yes     HOME SUPPORT: Patient lived wife whom pt is the caregiver for and has supportive children that live locally.      Occupational Therapy Goals:  Initiated 4/10/2025  1.  Patient will perform self-feeding with Modified [place] within 7 day(s).  2.  Patient will perform grooming with Supervision within 7 day(s).  3.  Patient will perform dressing with Supervision within 7 day(s).  4.  Patient will perform toilet transfers with Supervision  within 7 day(s).  5.  Patient will perform all aspects of toileting with Supervision within 7 day(s).    Outcome: Progressing   OCCUPATIONAL THERAPY TREATMENT  Patient: Darius Burgos (84 y.o. male)  Date: 4/14/2025  Primary Diagnosis: Acute kidney injury [N17.9]  Abdominal pain, unspecified abdominal location [R10.9]  Hypervolemia, unspecified hypervolemia type [E87.70]  Congestive heart failure, unspecified HF chronicity, unspecified heart failure type (HCC) [I50.9]  Procedure(s) (LRB):  CYSTOSCOPY RIGHT URETERAL STENT INSERTION, BILATERAL RETROGRADE PYELOGRAM (Right) 3 Days Post-Op   Precautions: Bed Alarm, Fall Risk (Contact,MDRO, ESBL)                Chart, occupational therapy assessment, plan of care, and goals were reviewed.    ASSESSMENT  Patient continues to benefit from skilled OT services and is slowly progressing towards goals. Pt was supine in bed and stated that he had just gotten back to bed and was comfortable but 
  Problem: Occupational Therapy - Adult  Goal: By Discharge: Performs self-care activities at highest level of function for planned discharge setting.  See evaluation for individualized goals.  Description: FUNCTIONAL STATUS PRIOR TO ADMISSION:  ambulated without assist devices, is the caregiver for his wife whom walks with a rolling walker and needs physical assist at times, reports falls,  able to perform ADLS on his own, cooks, family assists with grocery shopping and yard care, has a house keeper   ,  ,  ,  ,  ,  ,  ,  ,  ,  , Active : Yes     HOME SUPPORT: Patient lived wife whom pt is the caregiver for and has supportive children that live locally.      Occupational Therapy Goals:  Initiated 4/10/2025  1.  Patient will perform self-feeding with Modified [place] within 7 day(s).  2.  Patient will perform grooming with Supervision within 7 day(s).  3.  Patient will perform dressing with Supervision within 7 day(s).  4.  Patient will perform toilet transfers with Supervision  within 7 day(s).  5.  Patient will perform all aspects of toileting with Supervision within 7 day(s).    Outcome: Progressing    OCCUPATIONAL THERAPY TREATMENT  Patient: Darius Burgos (84 y.o. male)  Date: 4/15/2025  Primary Diagnosis: Acute kidney injury [N17.9]  Abdominal pain, unspecified abdominal location [R10.9]  Hypervolemia, unspecified hypervolemia type [E87.70]  Congestive heart failure, unspecified HF chronicity, unspecified heart failure type (HCC) [I50.9]  Procedure(s) (LRB):  CYSTOSCOPY RIGHT URETERAL STENT INSERTION, BILATERAL RETROGRADE PYELOGRAM (Right) 4 Days Post-Op   Precautions: Bed Alarm, Fall Risk (Contact,MDRO, ESBL)                Chart, occupational therapy assessment, plan of care, and goals were reviewed.    ASSESSMENT  Patient continues to benefit from skilled OT services and is progressing towards goals. Pt noted with improved seated balance and activity tolerance this date, with good engagement with 
  Problem: Occupational Therapy - Adult  Goal: By Discharge: Performs self-care activities at highest level of function for planned discharge setting.  See evaluation for individualized goals.  Description: FUNCTIONAL STATUS PRIOR TO ADMISSION:  ambulated without assist devices, is the caregiver for his wife whom walks with a rolling walker and needs physical assist at times, reports falls,  able to perform ADLS on his own, cooks, family assists with grocery shopping and yard care, has a house keeper   ,  ,  ,  ,  ,  ,  ,  ,  ,  , Active : Yes     HOME SUPPORT: Patient lived wife whom pt is the caregiver for and has supportive children that live locally.      Occupational Therapy Goals:  Initiated 4/10/2025. Goals reviewed 4/17/25 at weekly reassessment. Continue all goals below. All goals remain relevant  1.  Patient will perform self-feeding with Modified [place] within 7 day(s).  2.  Patient will perform grooming with Supervision within 7 day(s).  3.  Patient will perform dressing with Supervision within 7 day(s).  4.  Patient will perform toilet transfers with Supervision  within 7 day(s).  5.  Patient will perform all aspects of toileting with Supervision within 7 day(s).    Outcome: Progressing   OCCUPATIONAL THERAPY TREATMENT  Patient: Darius Burgos (84 y.o. male)  Date: 4/18/2025  Primary Diagnosis: Acute kidney injury [N17.9]  Abdominal pain, unspecified abdominal location [R10.9]  Hypervolemia, unspecified hypervolemia type [E87.70]  Congestive heart failure, unspecified HF chronicity, unspecified heart failure type (HCC) [I50.9]  Procedure(s) (LRB):  CYSTOSCOPY RIGHT URETERAL STENT INSERTION, BILATERAL RETROGRADE PYELOGRAM (Right) 7 Days Post-Op   Precautions: Bed Alarm, Fall Risk (Contact,MDRO, ESBL)                Chart, occupational therapy assessment, plan of care, and goals were reviewed.    ASSESSMENT  Patient continues to benefit from skilled OT services and is slowly progressing 
  Problem: Occupational Therapy - Adult  Goal: By Discharge: Performs self-care activities at highest level of function for planned discharge setting.  See evaluation for individualized goals.  Description: FUNCTIONAL STATUS PRIOR TO ADMISSION:  ambulated without assist devices, is the caregiver for his wife whom walks with a rolling walker and needs physical assist at times, reports falls,  able to perform ADLS on his own, cooks, family assists with grocery shopping and yard care, has a house keeper   ,  ,  ,  ,  ,  ,  ,  ,  ,  , Active : Yes     HOME SUPPORT: Patient lived wife whom pt is the caregiver for and has supportive children that live locally.      Occupational Therapy Goals:  Initiated 4/10/2025. Goals reviewed 4/17/25 at weekly reassessment. Continue all goals below. All goals remain relevant  1.  Patient will perform self-feeding with Modified [place] within 7 day(s).  2.  Patient will perform grooming with Supervision within 7 day(s).  3.  Patient will perform dressing with Supervision within 7 day(s).  4.  Patient will perform toilet transfers with Supervision  within 7 day(s).  5.  Patient will perform all aspects of toileting with Supervision within 7 day(s).    Outcome: Progressing     OCCUPATIONAL THERAPY TREATMENT: WEEKLY REASSESSMENT    Patient: Darius Burgos (84 y.o. male)  Date: 4/17/2025  Primary Diagnosis: Acute kidney injury [N17.9]  Abdominal pain, unspecified abdominal location [R10.9]  Hypervolemia, unspecified hypervolemia type [E87.70]  Congestive heart failure, unspecified HF chronicity, unspecified heart failure type (HCC) [I50.9]  Procedure(s) (LRB):  CYSTOSCOPY RIGHT URETERAL STENT INSERTION, BILATERAL RETROGRADE PYELOGRAM (Right) 6 Days Post-Op   Precautions: Bed Alarm, Fall Risk (Contact,MDRO, ESBL)                Chart, occupational therapy assessment, plan of care, and goals were reviewed.    ASSESSMENT  Patient continues to benefit from skilled OT services and is 
  Problem: Occupational Therapy - Adult  Goal: By Discharge: Performs self-care activities at highest level of function for planned discharge setting.  See evaluation for individualized goals.  Description: FUNCTIONAL STATUS PRIOR TO ADMISSION:  ambulated without assist devices, is the caregiver for his wife whom walks with a rolling walker and needs physical assist at times, reports falls,  able to perform ADLS on his own, cooks, family assists with grocery shopping and yard care, has a house keeper   ,  ,  ,  ,  ,  ,  ,  ,  ,  , Active : Yes     HOME SUPPORT: Patient lived wife whom pt is the caregiver for and has supportive children that live locally.      Occupational Therapy Goals:  Initiated 4/10/2025. Goals reviewed 4/17/25 at weekly reassessment. Continue all goals below. All goals remain relevant  1.  Patient will perform self-feeding with Modified [place] within 7 day(s).  2.  Patient will perform grooming with Supervision within 7 day(s).  3.  Patient will perform dressing with Supervision within 7 day(s).  4.  Patient will perform toilet transfers with Supervision  within 7 day(s).  5.  Patient will perform all aspects of toileting with Supervision within 7 day(s).    Outcome: Progressing   OCCUPATIONAL THERAPY TREATMENT  Patient: Darius Burgos (84 y.o. male)  Date: 4/22/2025  Primary Diagnosis: Acute kidney injury [N17.9]  Abdominal pain, unspecified abdominal location [R10.9]  Hypervolemia, unspecified hypervolemia type [E87.70]  Congestive heart failure, unspecified HF chronicity, unspecified heart failure type (HCC) [I50.9]  Procedure(s) (LRB):  CYSTOSCOPY RIGHT URETERAL STENT INSERTION, BILATERAL RETROGRADE PYELOGRAM (Right) 11 Days Post-Op   Precautions: Bed Alarm, Fall Risk (Contact,MDRO, ESBL)                Chart, occupational therapy assessment, plan of care, and goals were reviewed.    ASSESSMENT  Patient continues to benefit from skilled OT services and is progressing towards 
  Problem: Physical Therapy - Adult  Goal: By Discharge: Performs mobility at highest level of function for planned discharge setting.  See evaluation for individualized goals.  Description: FUNCTIONAL STATUS PRIOR TO ADMISSION: Pt reports independence w/ ambulation and ADLs. Reports 1 recent falls. States he assists wife as needed with her mobility and ADLs (Wife ambulates with RW).     HOME SUPPORT PRIOR TO ADMISSION: The patient lived with wife and is wife's primary caregiver. Wife currently hospitalized at [facility].    Physical Therapy Goals  Initiated 4/10/2025  1.  Patient will move from supine to sit and sit to supine, scoot up and down, and roll side to side in bed with supervision/set-up within 7 day(s).    2.  Patient will perform sit to stand with contact guard assist within 7 day(s).  3.  Patient will transfer from bed to chair and chair to bed with contact guard assist using the least restrictive device within 7 day(s).  4.  Patient will ambulate with contact guard assist for 50 feet with the least restrictive device within 7 day(s).   Outcome: Progressing   PHYSICAL THERAPY EVALUATION    Patient: Darius Burgos (84 y.o. male)  Date: 4/10/2025  Primary Diagnosis: Acute kidney injury [N17.9]  Abdominal pain, unspecified abdominal location [R10.9]  Hypervolemia, unspecified hypervolemia type [E87.70]  Congestive heart failure, unspecified HF chronicity, unspecified heart failure type (HCC) [I50.9]  Procedure(s) (LRB):  CYSTOSCOPY RIGHT URETERAL STENT INSERTION (Right) * Surgery Date in Future *   Precautions: Restrictions/Precautions  Restrictions/Precautions: Bed Alarm, Fall Risk (Contact,MDRO, ESBL)            ASSESSMENT :   DEFICITS/IMPAIRMENTS:   The patient is limited by orthostatic hypotension, increased weakness, altered gait pattern, impaired balance, increased pain in bilateral elbows which impacts AROM, decreased AROM of bilateral hands/decreased  strength, and overall impaired functional 
  Problem: Physical Therapy - Adult  Goal: By Discharge: Performs mobility at highest level of function for planned discharge setting.  See evaluation for individualized goals.  Description: FUNCTIONAL STATUS PRIOR TO ADMISSION: Pt reports independence w/ ambulation and ADLs. Reports 1 recent falls. States he assists wife as needed with her mobility and ADLs (Wife ambulates with RW).     HOME SUPPORT PRIOR TO ADMISSION: The patient lived with wife and is wife's primary caregiver. Wife currently hospitalized at [facility].    Physical Therapy Goals  Initiated 4/10/2025 - remain appropriate 4/17/25  1.  Patient will move from supine to sit and sit to supine, scoot up and down, and roll side to side in bed with supervision/set-up within 7 day(s).    2.  Patient will perform sit to stand with contact guard assist within 7 day(s).  3.  Patient will transfer from bed to chair and chair to bed with contact guard assist using the least restrictive device within 7 day(s).  4.  Patient will ambulate with contact guard assist for 50 feet with the least restrictive device within 7 day(s).   Outcome: Progressing   PHYSICAL THERAPY TREATMENT: WEEKLY REASSESSMENT    Patient: Darius Burgos (84 y.o. male)  Date: 4/17/2025  Primary Diagnosis: Acute kidney injury [N17.9]  Abdominal pain, unspecified abdominal location [R10.9]  Hypervolemia, unspecified hypervolemia type [E87.70]  Congestive heart failure, unspecified HF chronicity, unspecified heart failure type (HCC) [I50.9]  Procedure(s) (LRB):  CYSTOSCOPY RIGHT URETERAL STENT INSERTION, BILATERAL RETROGRADE PYELOGRAM (Right) 6 Days Post-Op   Precautions: Restrictions/Precautions  Restrictions/Precautions: Bed Alarm, Fall Risk (Contact,MDRO, ESBL)          ASSESSMENT :  Patient continues to benefit from skilled PT services and is slowly progressing towards goals however limited by complaints of back pain, R elbow pain, and sacral pain this date. Pt also continues to present with 
  Problem: Physical Therapy - Adult  Goal: By Discharge: Performs mobility at highest level of function for planned discharge setting.  See evaluation for individualized goals.  Description: FUNCTIONAL STATUS PRIOR TO ADMISSION: Pt reports independence w/ ambulation and ADLs. Reports 1 recent falls. States he assists wife as needed with her mobility and ADLs (Wife ambulates with RW).     HOME SUPPORT PRIOR TO ADMISSION: The patient lived with wife and is wife's primary caregiver. Wife currently hospitalized at [facility].    Physical Therapy Goals  Initiated 4/10/2025 - remain appropriate 4/17/25  1.  Patient will move from supine to sit and sit to supine, scoot up and down, and roll side to side in bed with supervision/set-up within 7 day(s).    2.  Patient will perform sit to stand with contact guard assist within 7 day(s).  3.  Patient will transfer from bed to chair and chair to bed with contact guard assist using the least restrictive device within 7 day(s).  4.  Patient will ambulate with contact guard assist for 50 feet with the least restrictive device within 7 day(s).   Outcome: Progressing   PHYSICAL THERAPY TREATMENT    Patient: Darius Burgos (84 y.o. male)  Date: 4/21/2025  Diagnosis: Acute kidney injury [N17.9]  Abdominal pain, unspecified abdominal location [R10.9]  Hypervolemia, unspecified hypervolemia type [E87.70]  Congestive heart failure, unspecified HF chronicity, unspecified heart failure type (HCC) [I50.9] Acute kidney injury  Procedure(s) (LRB):  CYSTOSCOPY RIGHT URETERAL STENT INSERTION, BILATERAL RETROGRADE PYELOGRAM (Right) 10 Days Post-Op  Precautions: Restrictions/Precautions  Restrictions/Precautions: Bed Alarm, Fall Risk (Contact,MDRO, ESBL)            ASSESSMENT:  Patient continues to benefit from skilled PT services and is progressing towards goals however continues to function below his baseline independent level secondary to impaired activity tolerance, decreased endurance, generalized 
  Problem: Physical Therapy - Adult  Goal: By Discharge: Performs mobility at highest level of function for planned discharge setting.  See evaluation for individualized goals.  Description: FUNCTIONAL STATUS PRIOR TO ADMISSION: Pt reports independence w/ ambulation and ADLs. Reports 1 recent falls. States he assists wife as needed with her mobility and ADLs (Wife ambulates with RW).     HOME SUPPORT PRIOR TO ADMISSION: The patient lived with wife and is wife's primary caregiver. Wife currently hospitalized at [facility].    Physical Therapy Goals  Initiated 4/10/2025  1.  Patient will move from supine to sit and sit to supine, scoot up and down, and roll side to side in bed with supervision/set-up within 7 day(s).    2.  Patient will perform sit to stand with contact guard assist within 7 day(s).  3.  Patient will transfer from bed to chair and chair to bed with contact guard assist using the least restrictive device within 7 day(s).  4.  Patient will ambulate with contact guard assist for 50 feet with the least restrictive device within 7 day(s).   Outcome: Progressing   PHYSICAL THERAPY TREATMENT    Patient: Darius Burgos (84 y.o. male)  Date: 4/15/2025  Diagnosis: Acute kidney injury [N17.9]  Abdominal pain, unspecified abdominal location [R10.9]  Hypervolemia, unspecified hypervolemia type [E87.70]  Congestive heart failure, unspecified HF chronicity, unspecified heart failure type (HCC) [I50.9] Acute kidney injury  Procedure(s) (LRB):  CYSTOSCOPY RIGHT URETERAL STENT INSERTION, BILATERAL RETROGRADE PYELOGRAM (Right) 4 Days Post-Op  Precautions: Restrictions/Precautions  Restrictions/Precautions: Bed Alarm, Fall Risk (Contact,MDRO, ESBL)            ASSESSMENT:  Patient continues to benefit from skilled PT services and is slowly progressing towards goals. Pt received seated eob, voicing fatigue, but agreeable to PT session.  Noted improved sitting balance, standing balance, gait mechanics, and activity tolerance 
  Problem: Physical Therapy - Adult  Goal: By Discharge: Performs mobility at highest level of function for planned discharge setting.  See evaluation for individualized goals.  Description: FUNCTIONAL STATUS PRIOR TO ADMISSION: Pt reports independence w/ ambulation and ADLs. Reports 1 recent falls. States he assists wife as needed with her mobility and ADLs (Wife ambulates with RW).     HOME SUPPORT PRIOR TO ADMISSION: The patient lived with wife and is wife's primary caregiver. Wife currently hospitalized at [facility].    Physical Therapy Goals  Initiated 4/10/2025 - remain appropriate 4/17/25  1.  Patient will move from supine to sit and sit to supine, scoot up and down, and roll side to side in bed with supervision/set-up within 7 day(s).    2.  Patient will perform sit to stand with contact guard assist within 7 day(s).  3.  Patient will transfer from bed to chair and chair to bed with contact guard assist using the least restrictive device within 7 day(s).  4.  Patient will ambulate with contact guard assist for 50 feet with the least restrictive device within 7 day(s).   Outcome: Progressing   PHYSICAL THERAPY TREATMENT    Patient: Darius Burgos (84 y.o. male)  Date: 4/18/2025  Diagnosis: Acute kidney injury [N17.9]  Abdominal pain, unspecified abdominal location [R10.9]  Hypervolemia, unspecified hypervolemia type [E87.70]  Congestive heart failure, unspecified HF chronicity, unspecified heart failure type (HCC) [I50.9] Acute kidney injury  Procedure(s) (LRB):  CYSTOSCOPY RIGHT URETERAL STENT INSERTION, BILATERAL RETROGRADE PYELOGRAM (Right) 7 Days Post-Op  Precautions: Restrictions/Precautions  Restrictions/Precautions: Bed Alarm, Fall Risk (Contact,MDRO, ESBL)            ASSESSMENT:  Patient continues to benefit from skilled PT services and is progressing towards goals. Pt independence with functional mobility remains limited by general weakness, impaired standing balance, decreased activity tolerance, gait 
  Problem: Safety - Adult  Goal: Free from fall injury  4/20/2025 2239 by Jenna Torres, RN  Outcome: Progressing  4/20/2025 1044 by Lucila Schwartz, RN  Outcome: Progressing     Problem: Pain  Goal: Verbalizes/displays adequate comfort level or baseline comfort level  Outcome: Progressing     
  Problem: Safety - Adult  Goal: Free from fall injury  Outcome: Progressing     
  Problem: Safety - Adult  Goal: Free from fall injury  Outcome: Progressing     Problem: Occupational Therapy - Adult  Goal: By Discharge: Performs self-care activities at highest level of function for planned discharge setting.  See evaluation for individualized goals.  Description: FUNCTIONAL STATUS PRIOR TO ADMISSION:  ambulated without assist devices, is the caregiver for his wife whom walks with a rolling walker and needs physical assist at times, reports falls,  able to perform ADLS on his own, cooks, family assists with grocery shopping and yard care, has a house keeper   ,  ,  ,  ,  ,  ,  ,  ,  ,  , Active : Yes     HOME SUPPORT: Patient lived wife whom pt is the caregiver for and has supportive children that live locally.      Occupational Therapy Goals:  Initiated 4/10/2025  1.  Patient will perform self-feeding with Modified [place] within 7 day(s).  2.  Patient will perform grooming with Supervision within 7 day(s).  3.  Patient will perform dressing with Supervision within 7 day(s).  4.  Patient will perform toilet transfers with Supervision  within 7 day(s).  5.  Patient will perform all aspects of toileting with Supervision within 7 day(s).    4/14/2025 1517 by Polly Weiss, OT  Outcome: Progressing     
Problem: Occupational Therapy - Adult  Goal: By Discharge: Performs self-care activities at highest level of function for planned discharge setting.  See evaluation for individualized goals.  Description: FUNCTIONAL STATUS PRIOR TO ADMISSION:  ambulated without assist devices, is the caregiver for his wife whom walks with a rolling walker and needs physical assist at times, reports falls,  able to perform ADLS on his own, cooks, family assists with grocery shopping and yard care, has a house keeper   ,  ,  ,  ,  ,  ,  ,  ,  ,  , Active : Yes     HOME SUPPORT: Patient lived wife whom pt is the caregiver for and has supportive children that live locally.      Occupational Therapy Goals:  Initiated 4/10/2025  1.  Patient will perform self-feeding with Modified [place] within 7 day(s).  2.  Patient will perform grooming with Supervision within 7 day(s).  3.  Patient will perform dressing with Supervision within 7 day(s).  4.  Patient will perform toilet transfers with Supervision  within 7 day(s).  5.  Patient will perform all aspects of toileting with Supervision within 7 day(s).    Outcome: Progressing  OCCUPATIONAL THERAPY EVALUATION    Patient: Darius Burgos (84 y.o. male)  Date: 4/10/2025  Primary Diagnosis: Acute kidney injury [N17.9]  Abdominal pain, unspecified abdominal location [R10.9]  Hypervolemia, unspecified hypervolemia type [E87.70]  Congestive heart failure, unspecified HF chronicity, unspecified heart failure type (HCC) [I50.9]  Procedure(s) (LRB):  CYSTOSCOPY RIGHT URETERAL STENT INSERTION (Right) * Surgery Date in Future *     Precautions: Bed Alarm, Fall Risk (Contact,MDRO, ESBL)                  ASSESSMENT :  The patient is limited by decreased functional mobility, independence in ADLs, ROM, strength, body mechanics, activity tolerance, safety awareness, coordination, balance, posture, orthostatic hypotension.    Based on the impairments listed above pt was in bed upon arrival and 
Progressing  4/17/2025 0009 by Lidia Cannon RN  Outcome: Progressing  Goal: Absence of seizures  4/17/2025 0742 by Allie Apodaca RN  Outcome: Progressing  4/17/2025 0009 by Lidia Cannon RN  Outcome: Progressing  Goal: Remains free of injury related to seizures activity  4/17/2025 0742 by Allie Apodaca RN  Outcome: Progressing  4/17/2025 0009 by Lidia Cannon RN  Outcome: Progressing  Goal: Achieves maximal functionality and self care  4/17/2025 0742 by Allie Apodaca RN  Outcome: Progressing  4/17/2025 0009 by Lidia Cannon RN  Outcome: Progressing     Problem: Respiratory - Adult  Goal: Achieves optimal ventilation and oxygenation  4/17/2025 0742 by Allie Apodaca RN  Outcome: Progressing  4/17/2025 0009 by Lidia Cannon RN  Outcome: Progressing     Problem: Cardiovascular - Adult  Goal: Maintains optimal cardiac output and hemodynamic stability  4/17/2025 0742 by Allie Apodaca RN  Outcome: Progressing  4/17/2025 0009 by Lidia Cannon RN  Outcome: Progressing  Goal: Absence of cardiac dysrhythmias or at baseline  4/17/2025 0742 by Allie Apodaca RN  Outcome: Progressing  4/17/2025 0009 by Lidia Cannon RN  Outcome: Progressing     Problem: Skin/Tissue Integrity - Adult  Goal: Skin integrity remains intact  Description: 1.  Monitor for areas of redness and/or skin breakdown  2.  Assess vascular access sites hourly  3.  Every 4-6 hours minimum:  Change oxygen saturation probe site  4.  Every 4-6 hours:  If on nasal continuous positive airway pressure, respiratory therapy assess nares and determine need for appliance change or resting period  4/17/2025 0742 by Allie Apodaca RN  Outcome: Progressing  4/17/2025 0009 by Lidia Cannon RN  Outcome: Progressing  Goal: Incisions, wounds, or drain sites healing without S/S of infection  4/17/2025 0742 by Allie Apodaca RN  Outcome: Progressing  4/17/2025 0009 by Lidia Cannon RN  Outcome: Progressing  Goal: Oral mucous membranes 
minimum:  Change oxygen saturation probe site  4.  Every 4-6 hours:  If on nasal continuous positive airway pressure, respiratory therapy assess nares and determine need for appliance change or resting period  Outcome: Progressing  Goal: Incisions, wounds, or drain sites healing without S/S of infection  Outcome: Progressing  Goal: Oral mucous membranes remain intact  Outcome: Progressing     Problem: Musculoskeletal - Adult  Goal: Return mobility to safest level of function  Outcome: Progressing  Goal: Maintain proper alignment of affected body part  Outcome: Progressing  Goal: Return ADL status to a safe level of function  Outcome: Progressing     Problem: Gastrointestinal - Adult  Goal: Minimal or absence of nausea and vomiting  Outcome: Progressing  Goal: Maintains or returns to baseline bowel function  Outcome: Progressing  Goal: Maintains adequate nutritional intake  Outcome: Progressing  Goal: Establish and maintain optimal ostomy function  Outcome: Progressing     Problem: Genitourinary - Adult  Goal: Absence of urinary retention  Outcome: Progressing  Goal: Urinary catheter remains patent  Outcome: Progressing     Problem: Infection - Adult  Goal: Absence of infection at discharge  Outcome: Progressing  Goal: Absence of infection during hospitalization  Outcome: Progressing  Goal: Absence of fever/infection during anticipated neutropenic period  Outcome: Progressing     Problem: Metabolic/Fluid and Electrolytes - Adult  Goal: Electrolytes maintained within normal limits  Outcome: Progressing  Goal: Hemodynamic stability and optimal renal function maintained  Outcome: Progressing  Goal: Glucose maintained within prescribed range  Outcome: Progressing     
assistance  Transfers:  Transfer Training  Transfer Training: Yes  Interventions: Safety awareness training;Tactile cues;Verbal cues  Sit to Stand: Minimal assistance  Stand to Sit: Minimal assistance  Balance:  Balance  Sitting: Impaired  Sitting - Static: Good (unsupported)  Sitting - Dynamic: Good (unsupported);Fair (occasional)  Standing: Impaired  Standing - Static: Constant support;Good  Standing - Dynamic: Constant support;Fair   Ambulation/Gait Training:     Gait  Gait Training: Yes  Overall Level of Assistance: Contact guard assistance  Distance (ft): 8 Feet (4 ft forward, 4 ft backward with cues for step sequencing and posture)  Assistive Device: Walker, rolling  Interventions: Verbal cues;Tactile cues  Speed/Crystal: Shuffled;Pace decreased (< 100 feet/min)  Step Length: Right shortened;Left shortened  Gait Abnormalities: Decreased step clearance (Decreased coordination and strength)        Therex  Seated marches, 2 sets of 15   Encouraged ankle pumps and LAQs when sitting in chair              Pain Rating:  Moderate  Pain Intervention(s):   nursing notified and addressing    Activity Tolerance:   Good and requires rest breaks    After treatment:   Patient left in no apparent distress in bed, Call bell within reach, Bed/ chair alarm activated, Side rails x3, Heels elevated for pressure relief, and Patient offloaded in partial R side lying for pressure relief      COMMUNICATION/EDUCATION:   The patient's plan of care was discussed with: occupational therapist and registered nurse    Patient Education  Education Given To: Patient  Education Provided: Role of Therapy;Plan of Care;Home Exercise Program  Education Provided Comments: Log roll to reduce abdominal and back pain  Education Method: Verbal;Teach Back  Barriers to Learning: None  Education Outcome: Verbalized understanding;Demonstrated understanding;Continued education needed      Devan Quinn PT  Minutes: 28

## 2025-04-22 NOTE — CARE COORDINATION
Transition of Care Plan:     RUR: 22%     Prior Level of Functioning: Independent with ADLs.      Disposition: SNF at [facility] [id number] [place] [address]  RN will call report to [phone]  Room 110  [facility] ETA 2:30 PM      [employer] Auth approved: Ref#: [id number]  Service Dates: 2025-2025- .      New [employer] Medicare Auth today, 25  CM called [facility]: [phone]  New Reference Number is [id number] approved on 25  DOS approved 25 to 25  Next Review Date: 25.    CM is Lupe Aden  Fax Notes: [phone]   Phone: [phone]     [place]: 25     2:10 PM   CM checked [facility] and they are delayed.. new ETA 2:30 PM to 3 PM     12:50 PM   CM spent about 30 minutes trying to get authorization from [employer]: Reference number is [id number]  They requested that I send clinicals for review.      CM talked to CM Manager and she is going to try to call them to clarify process for team.     PCS form on bedside chart   DC Summary was uploaded to [facility].     11 AM  Waiting on DC summary to be put in by MD.   MD is aware.   SNF does not have [facility].. so DC Summary needs to be uploaded prior to DC per Admissions at [facility]     8:52 AM  CM received call from Dagoberto indicating Pt is approved.  See Above.     ALEXEI talked to Livia at [facility] and they can take Pt today.  She is going to let me know the room number and what time to send Pt.    ALEXEI sent a perfect serve to Hospitalist to put in DC order and summary.      If SNF or IPR: Date FOC offered: 25  Date FOC received: 25  Accepting facility: [facility]  Date authorization started with reference number: 4/15/25  Date authorization received and expires: Auth received 4/15/25.  Auth ID#: [id number]  Ref#: [id number]  Service Dates: 2025-2025     Follow up appointments: after SNF  DME needed: none  Transportation at discharge: BLS  IM/IMM Medicare/ letter given: needed  Is patient a

## 2025-04-22 NOTE — PROGRESS NOTES
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   -Pt/Ot eval per daughter for DC planning- SNF/rehab recommended, CM working with Family     JOEY POA-   Obstructive uropathy  ,R Hydronephrosis with ureteral stone POA  S/p Cystoscopy right ureteral stent insertion , bilateral retrograde pyelogram on   Suspected UTI POA- ruled out with neg Cx  S/p IV meropenem given hx of ESBL-- Discontinued   as Urine Cx came back negative  Immunological workup- negative so far     -IP nephrology consulted- Dr Nowak following  -Started the patient on pulse steroids with Solu-Medrol 250 mg IV daily x 3 days due to worsening Cr  -IP Urology consulted for persistent R sided Hydroureteronephrosis on CT imaging. S/p stent with no improvement in creatinine, less likely post renal cause. Recommended to continue with chatman unless creatinine issues sorted.    - On : hyponatremia, BUN increasing - 101, P-5.1, K-4.7   No signs of uremic encephalopathy at present. Leucocytosis possible related to high dose of steroids.  Started on IVF as per nephro. No RRT today .   - On :  Na- stable at 132 . BUN /Cr- 105/3.5. S/p IVF hydration. Minimal change in renal function.  Patient complains of Right sided abdominal pain with some distension.  Possible from stent placement.- will order US abdomen .  Appreciated urology recommendations.   - 
      Hospitalist Progress Note    NAME:   Darius Burgos   : 1940   MRN: [id number]     Date/Time: 4/15/2025 4:43 PM  Patient PCP: Maria Luisa Gramajo MD    Estimated discharge date: ?, Home? HH  Barriers: Nephro, Urology, Rheum clearance, improvement with Cr, Rash/abd pain improved        Assessment / Plan:   Henoch-Schonlein purpura POA  Skin biopsy showed HSP  -Suspect HSP given rash, abdominal pain, renal failure and arthralgia  -IP rheumatology consulted- Dr Dias following- probably would benefit from steroid - IV as he is not keeping food down- Solumedrol higher dose 250 mg  daily as recommended by  renal  s/p  skin biopsy on 4/10- Showing HSP   -Cont Opioids prn  -Tylenol prn for pain.   -Pt/Ot eval per daughter for DC planning- SNF/rehab recommended, CM working with Family     JOEY POA- Cr worsened to 3.5  Due to IGA nephropathy  Obstructive uropathy  ,R Hydronephrosis with ureteral stone POA  S/p Cystoscopy right ureteral stent insertion , bilateral retrograde pyelogram on   Suspected UTI POA- ruled out with neg Cx  S/p IV meropenem given hx of ESBL-- Discontinued   as Urine Cx came back negative  Immunological workup- negative so far     -IP nephrology consulted- Dr Nowak following,  Patient at this time has declined renal biopsy.   -Started the patient on pulse steroids with Solu-Medrol 250 mg IV daily x 3 days due to worsening Cr  -IP Urology consulted for persistent R sided Hydroureteronephrosis on CT imaging. S/p stent with no improvement in creatinine, less likely post renal cause. Recommended to continue with chatman unless creatinine issues sorted.    - On : hyponatremia, BUN increasing - 101, P-5.1, K-4.7   No signs of uremic encephalopathy at present. Leucocytosis possible related to high dose of steroids.  Started on IVF as per nephro. No RRT today .   - On :  Na- stable at 132 . BUN /Cr- 105/3.5. S/p IVF hydration. Minimal change in renal function.  Patient complains of 
      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   -Pt/Ot eval per daughter for DC planning- SNF/rehab recommended, CM working with Family  Discharging to [facility]     IGA Nephritis  JOEY   Obstructive uropathy, R Hydronephrosis with ureteral stone POA  S/p Cystoscopy right ureteral stent insertion , bilateral retrograde pyelogram on   Suspected UTI POA- ruled out with neg Cx  S/p IV meropenem given hx of ESBL-- Discontinued   as Urine Cx came back negative  Immunological workup- negative so far     -IP nephrology consulted- Dr Nowak following  -Started the patient on pulse steroids with Solu-Medrol 250 mg IV daily x 3 days due to worsening Cr  -IP Urology consulted for persistent R sided Hydroureteronephrosis on CT imaging. S/p stent with no improvement in creatinine, less likely post renal cause. Recommended to continue with chatman unless creatinine issues sorted.    - On : hyponatremia, BUN increasing - 101, P-5.1, K-4.7   No signs of uremic encephalopathy at present. Leucocytosis possible related to high dose of steroids.  Started on IVF as per nephro. No RRT today .   - On :  Na- stable at 132 . BUN /Cr- 105/3.5. S/p IVF hydration. Minimal change in renal function.  Patient complains of Right sided abdominal pain with some distension.  Possible from stent placement.- will order US abdomen .  Appreciated 
      Hospitalist Progress Note    NAME:   Darius Burgos   : 1940   MRN: [id number]     Date/Time: 2025 3:21 PM  Patient PCP: Maria Luisa Gramajo MD    Estimated discharge date: , Home? HH  Barriers: Nephro, Uro, Rheum clearance, improvement with Cr, Rash, abd pain      Assessment / Plan:  Henoch-Schonlein purpura-  Suspect HSP given rash, abdominal pain, renal failure and arthralgia  IP rheumatology consulted- Dr Dias following- probably would benefit from steroid - IV as he is not keeping food down- say medrol 40 BID.  Due to abd pain with some rebound, I have asked surgery to see ([facility]Doug[place])  Cont Dilaudid 0.5 mg iv q3h prn for severe pain  Tylenol prn for pain.      JOEY POA  Cont IVF  IP nephrology consulted- Dr Eliu cunningham, serology ordered, recc Rheum consult, no plans for renal biopsy yet  Recommends Urology consult for persistent R sided Hydroureteronephrosis on CT imaging- Urology consulted        Suspected UTI POA- ruled out with neg Cx  S/p IV meropenem given hx of ESBL-- DC today as Urine Cx came back negative       Atrial fibrillation  Continue metoprolol  Continue amiodarone  Hold eliquis for now     Hypertension  Hyperlipidemia  Continue metoprolol, and amlodipine  Start po hydralazine 25 mg po TID and titrate as needed  Hold losartan in the setting of JOEY  Start hydralazine 10 mg iv q6h prn if SBP>160mm Hg        Type II DM:  Hold metformin  Start insulin lispro sliding scale                 Medical Decision Making:   I personally reviewed labs: Y BMP, cRP 6.6, A1c 6.6, Lfts, CBC, ESR 8, Urine cx neg, UA,   I personally reviewed imaging: Y CT abdomen and pelvis  I personally reviewed EKG: Y 70 bpm, sinus  Toxic drug monitoring: none  Discussed case with: Pt, RN, CM on IDRs, Dr Nowak (nephrology)        Code Status: full  DVT Prophylaxis: heparin sc      Subjective:     Chief Complaint / Reason for Physician Visit:  F/U for HSP, JOEY, UTI, Afib, DM, R sided Hydroureteronephrosis due to 
      Hospitalist Progress Note    NAME:   Darius Burgos   : 1940   MRN: [id number]     Date/Time: 2025 4:49 PM  Patient PCP: Maria Luisa Gramajo MD    Estimated discharge date: ?, Home? HH  Barriers: Nephro, Urology, Rheum clearance, improvement with Cr, Rash/abd pain improved        Assessment / Plan:   Henoch-Schonlein purpura POA  Skin biopsy showed HSP  -Suspect HSP given rash, abdominal pain, renal failure and arthralgia  -IP rheumatology consulted- Dr Dias following- probably would benefit from steroid - IV as he is not keeping food down- Solumedrol higher dose 250 mg  daily as recommended by  renal  s/p  skin biopsy on 4/10- Showing HSP   -Cont Opioids prn  -Tylenol prn for pain.   -Pt/Ot eval per daughter for DC planning- SNF/rehab recommended, CM working with Family     JOEY POA- Cr worsened to 3.5  Due to IGA nephropathy  Obstructive uropathy  ,R Hydronephrosis with ureteral stone POA  S/p Cystoscopy right ureteral stent insertion , bilateral retrograde pyelogram on   Suspected UTI POA- ruled out with neg Cx  S/p IV meropenem given hx of ESBL-- Discontinued   as Urine Cx came back negative  Immunological workup- negative so far     -IP nephrology consulted- Dr Nowak following,  Patient at this time has declined renal biopsy.   -Started the patient on pulse steroids with Solu-Medrol 250 mg IV daily x 3 days due to worsening Cr  -IP Urology consulted for persistent R sided Hydroureteronephrosis on CT imaging. S/p stent with no improvement in creatinine, less likely post renal cause. Recommended to continue with chatman unless creatinine issues sorted.    - On : hyponatremia, BUN increasing - 101, P-5.1, K-4.7   No signs of uremic encephalopathy at present. Leucocytosis possible related to high dose of steroids.  Started on IVF as per nephro. No RRT today .   - On :  Na- stable at 132 . BUN /Cr- 105/3.5. S/p IVF hydration. Minimal change in renal function.  Patient complains of 
      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   -Pt/Ot eval per daughter for DC planning- SNF/rehab recommended, CM working with Family  Discharging to [facility]     IGA Nephritis  JOEY   Obstructive uropathy, R Hydronephrosis with ureteral stone POA  S/p Cystoscopy right ureteral stent insertion , bilateral retrograde pyelogram on   Suspected UTI POA- ruled out with neg Cx  S/p IV meropenem given hx of ESBL-- Discontinued   as Urine Cx came back negative  Immunological workup- negative so far     -IP nephrology consulted- Dr Nowak following  -Started the patient on pulse steroids with Solu-Medrol 250 mg IV daily x 3 days due to worsening Cr  -IP Urology consulted for persistent R sided Hydroureteronephrosis on CT imaging. S/p stent with no improvement in creatinine, less likely post renal cause. Recommended to continue with chatman unless creatinine issues sorted.    - On : hyponatremia, BUN increasing - 101, P-5.1, K-4.7   No signs of uremic encephalopathy at present. Leucocytosis possible related to high dose of steroids.  Started on IVF as per nephro. No RRT today .   - On :  Na- stable at 132 . BUN /Cr- 105/3.5. S/p IVF hydration. Minimal change in renal function.  Patient complains of Right sided abdominal pain with some distension.  Possible from stent placement.- will order US abdomen .  
      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   -Pt/Ot eval per daughter for DC planning- SNF/rehab recommended, CM working with Family  Discharging to [facility]     IGA Nephritis  JOEY   Obstructive uropathy, R Hydronephrosis with ureteral stone POA  S/p Cystoscopy right ureteral stent insertion , bilateral retrograde pyelogram on   Suspected UTI POA- ruled out with neg Cx  S/p IV meropenem given hx of ESBL-- Discontinued   as Urine Cx came back negative  Immunological workup- negative so far     -IP nephrology consulted- Dr Nowak following  -Started the patient on pulse steroids with Solu-Medrol 250 mg IV daily x 3 days due to worsening Cr  -IP Urology consulted for persistent R sided Hydroureteronephrosis on CT imaging. S/p stent with no improvement in creatinine, less likely post renal cause. Recommended to continue with chatman unless creatinine issues sorted.    - On : hyponatremia, BUN increasing - 101, P-5.1, K-4.7   No signs of uremic encephalopathy at present. Leucocytosis possible related to high dose of steroids.  Started on IVF as per nephro. No RRT today .   - On :  Na- stable at 132 . BUN /Cr- 105/3.5. S/p IVF hydration. Minimal change in renal function.  Patient complains of Right sided abdominal pain with some distension.  Possible from stent placement.- will order US abdomen .  Appreciated 
      Hospitalist Progress Note    NAME:   Darius Burgos   : 1940   MRN: [id number]     Date/Time: 2025 7:41 AM  Patient PCP: Maria Luisa Gramajo MD    Estimated discharge date: ?, Home? HH  Barriers: Nephro, Urology, Rheum clearance, improvement with Cr, Rash/abd pain improved        Assessment / Plan:  Likely Henoch-Schonlein purpura POA  -Suspect HSP given rash, abdominal pain, renal failure and arthralgia  -IP rheumatology consulted- Dr Dias following- probably would benefit from steroid - IV as he is not keeping food down- Solumedrol higher dose 250 mg  daily as recommended by  renal  s/p  skin biopsy on 4/10- results pending  -Cont Opioids prn  -Tylenol prn for pain.   -Pt/Ot eval per daughter for DC planning- SNF/rehab recommended, CM working with Family     JOEY POA- Cr worsened to 3.6  Due to IGA nephropathy  Obstructive uropathy  ,R Hydronephrosis with ureteral stone POA  S/p Cystoscopy right ureteral stent insertion , bilateral retrograde pyelogram on   Suspected UTI POA- ruled out with neg Cx  S/p IV meropenem given hx of ESBL-- Discontinued   as Urine Cx came back negative  Immunological workup- negative so far        -IP nephrology consulted- Dr Nowak following,  Patient at this time has declined renal biopsy.   -Started the patient on pulse steroids with Solu-Medrol 250 mg IV daily x 3 days due to worsening Cr  -IP Urology consulted for persistent R sided Hydroureteronephrosis on CT imaging. S/p stent with no improvement in creatinine, less likely post renal cause. Recommended to continue with chatman unless creatinine issues sorted.    - On : hyponatremia, BUN increasing - 101, P-5.1, K-4.7   No signs of uremic encephalopathy at present. Leucocytosis possible related to high dose of steroids.  Started on IVF as per nephro. No RRT today .            Atrial fibrillation  Continue metoprolol  Continue amiodarone  Hold eliquis for now     Hypertension  Hyperlipidemia  Continue 
      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   -Pt/Ot eval per daughter for DC planning- SNF/rehab recommended, CM working with Family     JOEY POA-   Obstructive uropathy  ,R Hydronephrosis with ureteral stone POA  S/p Cystoscopy right ureteral stent insertion , bilateral retrograde pyelogram on   Suspected UTI POA- ruled out with neg Cx  S/p IV meropenem given hx of ESBL-- Discontinued   as Urine Cx came back negative  Immunological workup- negative so far     -IP nephrology consulted- Dr Nowak following  -Started the patient on pulse steroids with Solu-Medrol 250 mg IV daily x 3 days due to worsening Cr  -IP Urology consulted for persistent R sided Hydroureteronephrosis on CT imaging. S/p stent with no improvement in creatinine, less likely post renal cause. Recommended to continue with chatman unless creatinine issues sorted.    - On : hyponatremia, BUN increasing - 101, P-5.1, K-4.7   No signs of uremic encephalopathy at present. Leucocytosis possible related to high dose of steroids.  Started on IVF as per nephro. No RRT today .   - On :  Na- stable at 132 . BUN /Cr- 105/3.5. S/p IVF hydration. Minimal change in renal function.  Patient complains of Right sided abdominal pain with some distension.  Possible from stent placement.- will order US abdomen .  Appreciated urology recommendations.   - 
      Hospitalist Progress Note    NAME:   Darius Burgos   : 1940   MRN: [id number]     Date/Time: 4/10/2025 12:52 PM  Patient PCP: Maria Luisa Gramajo MD    Estimated discharge date: ?, Home?   Barriers: Nephro, Uro, Rheum clearance, improvement with Cr, Rash/abd pain improved, Gen surgery/Skin Biopsy      Assessment / Plan:  Henoch-Schonlein purpura-  Suspect HSP given rash, abdominal pain, renal failure and arthralgia  IP rheumatology consulted- Dr Dias following- probably would benefit from steroid - IV as he is not keeping food down- Solumedrol 40 BID but would need Gen Surgery to get Skin Biopsy first-  Dr Zambrano planning biopsy today  Cont Dilaudid 0.5 mg iv q3h prn for severe pain  Tylenol prn for pain.   Pt/Ot eval per daughter for DC planning     JOEY POA- Cr stable ~ 2.5    S/p  IVF- Dcd per nephrology  IP nephrology consulted- Dr Nowak following, contemplating on  starting steroids, although patient has proteinuria, hematuria and elevated creatinine he meets the criteria for steroid initiation , would wait for the skin biopsy for now.  There is no need for renal biopsy at this moment as there is enough evidence with combining the skin biopsy for possible IgA nephritis.  If the proteinuria is less than 1 g/dL then there is no need to start steroids and in that case we need to manage it with supportive treatment that includes ACE/ARB and SGLT2 inhibitors.  If steroid needs to be started then he will need pulse steroids for 3 days and then 0.5 mg/kg over tapered over few months  IP Urology consulted for persistent R sided Hydroureteronephrosis on CT imaging- following, ?ureteral stent placement discussions today        Suspected UTI POA- ruled out with neg Cx  S/p IV meropenem given hx of ESBL-- DCd   as Urine Cx came back negative       Atrial fibrillation  Continue metoprolol  Continue amiodarone  Hold eliquis for now     Hypertension  Hyperlipidemia  Continue metoprolol, and 
      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   -Pt/Ot eval per daughter for DC planning- SNF/rehab recommended, CM working with Family  Discharging to [facility]     IGA Nephritis  JOEY   Obstructive uropathy, R Hydronephrosis with ureteral stone POA  S/p Cystoscopy right ureteral stent insertion , bilateral retrograde pyelogram on   Suspected UTI POA- ruled out with neg Cx  S/p IV meropenem given hx of ESBL-- Discontinued   as Urine Cx came back negative  Immunological workup- negative so far     -IP nephrology consulted- Dr Nowak following  -Started the patient on pulse steroids with Solu-Medrol 250 mg IV daily x 3 days due to worsening Cr  -IP Urology consulted for persistent R sided Hydroureteronephrosis on CT imaging. S/p stent with no improvement in creatinine, less likely post renal cause. Recommended to continue with chatman unless creatinine issues sorted.    - On : hyponatremia, BUN increasing - 101, P-5.1, K-4.7   No signs of uremic encephalopathy at present. Leucocytosis possible related to high dose of steroids.  Started on IVF as per nephro. No RRT today .   - On :  Na- stable at 132 . BUN /Cr- 105/3.5. S/p IVF hydration. Minimal change in renal function.  Patient complains of Right sided abdominal pain with some distension.  Possible from stent placement.- will order US abdomen .  
      Hospitalist Progress Note    NAME:   Darius Burgos   : 1940   MRN: [id number]     Date/Time: 2025 2:01 PM  Patient PCP: Maria Luisa Gramajo MD    Estimated discharge date: ?, Home?   Barriers: Nephro, Urology, Rheum clearance, improvement with Cr, Rash/abd pain improved      Assessment / Plan:  Likely Henoch-Schonlein purpura POA  Suspect HSP given rash, abdominal pain, renal failure and arthralgia  IP rheumatology consulted- Dr Dias following- probably would benefit from steroid - IV as he is not keeping food down- Solumedrol 40 BID but would need Gen Surgery to get Skin Biopsy first-  Dr Zambrano s/p  skin biopsy yesterday- results pending  Cont Opioids prn  Tylenol prn for pain.   Pt/Ot eval per daughter for DC planning- SNF/rehab recommended, CM working with Family     JOEY POA- Cr worsened to 3.0  Due to IGA nephropathy  R Hydronephrosis with ureteral stone POA    S/p  IVF- Dcd per nephrology  IP nephrology consulted- Dr Nowak following,  Patient at this time has declined renal biopsy.   Started the patient on pulse steroids with Solu-Medrol 250 mg IV daily x 3 days due to worsening Cr  IP Urology consulted for persistent R sided Hydroureteronephrosis on CT imaging-   Cont Mark intact with clear yellow urine  Cysto with R ureteral stent placement today PM noted        Suspected UTI POA- ruled out with neg Cx  S/p IV meropenem given hx of ESBL-- DCd   as Urine Cx came back negative       Atrial fibrillation  Continue metoprolol  Continue amiodarone  Hold eliquis for now     Hypertension  Hyperlipidemia  Continue metoprolol, and amlodipine  Start po hydralazine 25 mg po TID and titrate as needed  Hold losartan in the setting of JOEY  Start hydralazine 10 mg iv q6h prn if SBP>160mm Hg        Type II DM:  Hold metformin  Start insulin lispro sliding scale                 Medical Decision Making:   I personally reviewed labs: Y BMP, cRP 6.6, A1c 6.6, Lfts, CBC, ESR 8, Urine cx neg, UA,   I 
    [facility]ERE[facility]      Patient: Darius Burgos MRN: [id number]  SSN: [id number]    YOB: 1940  Age: 84 y.o.  Sex: male        ADMITTED: 4/8/2025 to Shira Richardson MD by Carmen Root MD for Acute kidney injury [N17.9]  Abdominal pain, unspecified abdominal location [R10.9]  Hypervolemia, unspecified hypervolemia type [E87.70]  Congestive heart failure, unspecified HF chronicity, unspecified heart failure type (HCC) [I50.9]    Assessment:   Obstructive uropathy known dilation of right renal collecting system with several right renal calci  - Previously refused urological interventions  - renogram was last updated 7/2023 and demonstrated 31% function on the right with half-life of 29 minutes, 69% on the left and 2 minutes.   - 4/8/25 CT:  Persistent dilatation of the right renal collecting system to the level of  the right UPJ. 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 Az Rees at 3:30 pm tomorrow.  Trend daily labs    Reviewed with supervising MD: Dr. Avalos  Interval History     Darius Burgos is seen and examine this AM he continues with abdominal discomfort. Long discussion again regarding urological issues- nurse present in the room.  Today he is agreeable for placement of chatman catheter and placement of stent . Renal function is unchanged 2.5 mg/dl , WBC and Hgb stable.   cc  Rheumatology has been consulted for suspicion of HSP- W/u in progress by Nephrology.    Current Medications: all current  Medications have been eviewed in 
    [facility]      Patient: Darius Burgos MRN: [id number]  SSN: [id number]    YOB: 1940  Age: 84 y.o.  Sex: male        ADMITTED: 4/8/2025 to Shira Richardson MD by Carmen Root MD for Acute kidney injury [N17.9]  Abdominal pain, unspecified abdominal location [R10.9]  Hypervolemia, unspecified hypervolemia type [E87.70]  Congestive heart failure, unspecified HF chronicity, unspecified heart failure type (HCC) [I50.9]    Assessment:   Obstructive uropathy known dilation of right renal collecting system with several right renal calci  - Previously refused urological interventions  - renogram was last updated 7/2023 and demonstrated 31% function on the right with half-life of 29 minutes, 69% on the left and 2 minutes.   - 4/8/25 CT:  Persistent dilatation of the right renal collecting system to the level of  the right UPJ. 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  Patient to remain NPO  Nursing to verify consent  Urology will continue to follow.    Reviewed with supervising MD: Dr. Avalos  Interval History     Darius Burgos is seen and examine this morning. Mark in place with clear yellow urine, Creatinine continues to rise now 3.0 mg/dl- he continues  with abdominal discomfort. He is going for ECHO this morning. WBC stable with Hgb 12.0. Good  cc overnight. BP improved overnight.    Current Medications: all current  Medications have been eviewed in EPIC  Review of Systems: Pertinent items are noted in HPI.    Objective:  Vitals:    Vitals:    04/10/25 1941 04/10/25 2320 04/11/25 0416 04/11/25 0938   BP: 135/62 
  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  Unilateral hydro - stented. Suspect most of ARMINDA is not post-renal in origin.     now with chatman.    Will be available for questions   Chatman until creatinine issues sorted  Outpatient followup Dr. Ceballos.  
  Physician Progress Note      PATIENT:               ELENITA DIAZ  CSN #:                  [id number]  :                       1940  ADMIT DATE:       2025 6:09 AM  DISCH DATE:  RESPONDING  PROVIDER #:        David L Mendel MD          QUERY TEXT:    Please clarify the patient?s nutritional status:    The clinical indicators include:  Malnutrition Assessment:  Malnutrition Status:  Severe malnutrition (04/10/25 1513)  Context:  Acute Illness  Findings of the 6 clinical characteristics of malnutrition:  Energy Intake:  50% or less of estimated energy requirements for 5 or more   days  Weight Loss:  Unable to assess  Body Fat Loss:  Moderate body fat loss (Fat loss ranging from mild to   moderate) Buccal region, Orbital, Triceps  Muscle Mass Loss:  Moderate muscle mass loss Scapula (trapezius), Temples   (temporalis), Thigh (quadriceps), Clavicles (pectoralis & deltoids)  Fluid Accumulation:  Moderate to Severe Extremities   Strength:  Not Performed  Anthropometric Measures:  Height: 170.2 cm (5' 7.01\")  Ideal Body Weight (IBW): 148 lbs (67 kg)    Current Body Weight: 97.5 kg (214 lb 15.2 oz), 145.2 % IBW. Weight Source: Not   specified  Current BMI (kg/m2): 33.7  Nutrition Diagnosis:  -         Severe malnutrition, in context of acute illness or injury related   to inadequate protein-energy intake, decreased appetite as evidenced by   criteria as identified in malnutrition assessment, NPO or clear liquid status   due to medical condition, poor intake prior to admission    Nutrition Interventions:  Food and/or Nutrient Delivery: Continue Current Diet, Continue Oral Nutrition   Supplement  Nutrition Education/Counseling: No recommendation at this time  Coordination of Nutrition Care: Continue to monitor while inpatient (from   nutrition PN on  by Allie Sanabria RD)      Thank you,  GENNY Prieto, CDS  Options provided:  -- Protein calorie malnutrition severe  -- Other - I will add my own 
  [facility]  Chart Reveiw    Assessment:   Obstructive uropathy known dilation of right renal collecting system with several right renal calci  - renogram was last updated 7/2023 and demonstrated 31% function on the right with half-life of 29 minutes, 69% on the left and 2 minutes.   - 4/8/25 CT:  Persistent dilatation of the right renal collecting system to the level of the right UPJ. Right renal calculi,Multiple markedly thickened loops of proximal jejunum   - 4/11 s/p cysto with right stent placement with Dr. 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  Patient will need to have outpatient follow up with Dr. Ceballos with [facility] for stent evaluation and discussion for definitive stone treatment.  Urology signing off- please call for questions or concerns.    Reviewed with supervising MD: Dr. Avalos  Interval History       Objective:  Vitals:    Vitals:    04/18/25 0737 04/18/25 1206 04/18/25 1408 04/18/25 1538   BP: (!) 153/59 (!) 128/49 (!) 146/50 (!) 130/50   Pulse: 70 64 61 61   Resp: 15 18 19 16   Temp:    97.9 °F (36.6 °C)   TempSrc: Oral Oral  Oral   SpO2: 95% 96%     Weight:       Height:         Intake and Output:  04/18 0701 - 04/18 1900  In: 560 [P.O.:560]  Out: 1100 [Urine:1100]  04/16 1901 - 04/18 0700  In: 960 [P.O.:950; I.V.:10]  Out: 2350 [Urine:2350]    LABS:  Recent Labs     04/18/25  0335 04/17/25  0213 04/16/25  0405    136 134*   K 4.3 4.7 4.5   * 108 107   CO2 20* 23 19*   * 104* 111*   CREATININE 2.62* 2.85* 3.02*   CALCIUM 7.9* 7.9* 7.9*   PHOS 3.4 4.3 4.7     Recent Labs     04/18/25  0335 04/17/25  0213 04/16/25  2252   WBC 4.9 7.0 7.0   HGB 9.2* 10.2* 10.5*   HCT 28.7* 31.2* 32.1* 
  [facility]         NAME:Darius Burgos  MRN:[id number]   :1940     Cr high    No RRT indicated today    Patient has agreed to get renal bx  We will schedule renal bx tomorrow          Vitals:    25 0412 25 0816 25 1007 25 1113   BP: (!) 153/64 (!) 151/66 (!) 151/66    Pulse:  79 75    Resp:  20     Temp: 98 °F (36.7 °C) 97.6 °F (36.4 °C)     TempSrc: Oral Axillary     SpO2:  99%     Weight:       Height:    1.702 m (5' 7.01\")      Acute kidney injury [N17.9]  Abdominal pain, unspecified abdominal location [R10.9]  Hypervolemia, unspecified hypervolemia type [E87.70]  Congestive heart failure, unspecified HF chronicity, unspecified heart failure type (HCC) [I50.9]    has a past medical history of Arthritis, At risk for sleep apnea, Atrial fibrillation (HCC), Cancer (HCC), Colon cancer (HCC), Diabetes (HCC), Elevated cholesterol, History of blood transfusion, History of kidney stones, [place] (hard of hearing), Hypertension, PUD (peptic ulcer disease), and Skin cancer.    has a past surgical history that includes Colonoscopy (N/A, 2017); hernia repair; Colonoscopy (N/A, 2019); Total hip arthroplasty (Right); Cataract removal (Bilateral); Prostatectomy; Colonoscopy (N/A, 2022); Colonoscopy (N/A, 10/11/2021); Total knee arthroplasty (Right); Lithotripsy; Appendectomy; Upper gastrointestinal endoscopy (N/A, 2023); Colonoscopy (N/A, 2023); Cholecystectomy, laparoscopic (N/A, 2023); ERCP (N/A, 2023); ERCP (N/A, 2024); and Cystoscopy (Right, 2025).   Hugh Richardson MD  [facility]  [facility] Office  [address] [facility] [address]  Phone - [phone]         Fax - [phone] [address] [place] [address]  Phone - [phone]        Fax - [phone]                                                                       
4/9/25 0844 Nephrology consult called to [facility], Dr. Nowak on call. - Elizabeth Petersen RN     4/9/25 0843 Rheumatology consult called to [facility]. Dr. Sanchez Dias is on call. - Trinity, Charge RN  
4/9/25 1136 Urology consult called. SHARON Stephens is on today. -Trinity, Charge RN  
Admit Date: 2025    POD * No surgery found *    Procedure:  * No surgery found *      Assessment:   Principal Problem:    Acute kidney injury  Resolved Problems:    * No resolved hospital problems. *    Epigastric pain and N/V.  Pain still present but no further emesis.  Seems a little better today.  Likely due to the ongoing vasculitis  Purpura ?HSP.  Acute kidney injury.  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  Continue abx per primary team  2.  IVF per nephrology  3.  Plan skin bx later today  4.  Unfortunately still not a candidate for IV contrast on CT but clinically slightly better so ok to start steroids  5.  Cont PPI    Subjective:     Patient has complaints of pain.     Objective:     Blood pressure (!) 128/57, pulse 87, temperature 98.1 °F (36.7 °C), temperature source Oral, resp. rate 15, height 1.702 m (5' 7.01\"), weight 97.5 kg (215 lb), SpO2 95%.    Temp (24hrs), Av.4 °F (36.9 °C), Min:97.8 °F (36.6 °C), Max:98.8 °F (37.1 °C)      Physical Exam:  General appearance: alert, appears stated age, and cooperative  Lungs: clear to auscultation bilaterally  Heart: regular rate and rhythm, S1, S2 normal, no murmur, click, rub or gallop and irregularly irregular rhythm  Abdomen:  soft non distended mild tender in epigastrium without rebound or guarding  Skin:  extensive ecchymosis and petichiae    Labs:   Recent Results (from the past 48 hours)   POCT Glucose    Collection Time: 25  2:08 PM   Result Value Ref Range    POC Glucose 228 (H) 65 - 117 mg/dL    Performed by: Nabeel Vaughn RN    POCT Glucose    Collection Time: 25  5:26 PM   Result Value Ref Range    POC Glucose 221 (H) 65 - 117 mg/dL    Performed by: Lanre Deal PCT    POCT Glucose    Collection Time: 25 10:59 PM   Result Value Ref Range    POC Glucose 266 (H) 65 - 117 mg/dL    Performed by: Sriram Miranda RN    Basic Metabolic Panel    
Admit Date: 2025    POD 3 Days Post-Op    Procedure:  * No surgery found *      Assessment:   Principal Problem:    Acute kidney injury  Active Problems:    Purpura    Severe protein-calorie malnutrition  Resolved Problems:    * No resolved hospital problems. *    Epigastric/right sided pain and N/V.  Improved and tolerating diet  Purpura ?HSP.    Acute kidney injury.    Hx MDRO  UTI POA   Biopsy consistent with vasculitis  RUE swelling      Plan/Recommendations/Medical Decision Makin.  Continue abx per primary team  2.  IVF per nephrology  3.  Reg diet  4.  Sutures on RLE can come out in 5-7 days  5.  Cont PPI  6.  Check RUE venous duplex to r/o DVT  7.  Will sign off and be available as needed    Subjective:     Patient complains of right arm swelling    Objective:     Blood pressure (!) 163/68, pulse 70, temperature 98 °F (36.7 °C), temperature source Oral, resp. rate 28, height 1.702 m (5' 7.01\"), weight 97.5 kg (215 lb), SpO2 93%.    Temp (24hrs), Av.8 °F (36.6 °C), Min:97.3 °F (36.3 °C), Max:98.2 °F (36.8 °C)      Physical Exam:  General appearance: alert, appears stated age, and cooperative  Lungs: clear to auscultation bilaterally  Heart: regular rate and rhythm, S1, S2 normal, no murmur, click, rub or gallop and irregularly irregular rhythm  Abdomen: soft non distended, non tender, BS present  Skin:  extensive ecchymosis and petichiae   RLE leg incisions CDI  EXT:   RUE swelling more than left      Labs:   Recent Results (from the past 48 hours)   Renal Function Panel    Collection Time: 25  8:34 AM   Result Value Ref Range    Sodium 132 (L) 136 - 145 mmol/L    Potassium 4.7 3.5 - 5.1 mmol/L    Chloride 103 97 - 108 mmol/L    CO2 20 (L) 21 - 32 mmol/L    Anion Gap 9 2 - 12 mmol/L    Glucose 348 (H) 65 - 100 mg/dL     (H) 6 - 20 MG/DL    Creatinine 3.61 (H) 0.70 - 1.30 MG/DL    BUN/Creatinine Ratio 28 (H) 12 - 20      Est, Glom Filt Rate 16 (L) >60 ml/min/1.73m2    Calcium 7.7 
Chart reviewed for biopsy tomorrow  
Chart reviewed. Pt currently on bedrest following renal biopsy this AM. RN states that bedrest order will end at 2:30pm. Will defer however continue to follow.    Kaylyn Dunlap, PT, DPT  
Comprehensive Nutrition Assessment    Type and Reason for Visit:  Initial, Wound    Nutrition Recommendations/Plan:   Advance diet as medically able  Please monitor %PO intakes in I/O's     Malnutrition Assessment:  Malnutrition Status:  Severe malnutrition (04/10/25 1513)    Context:  Acute Illness     Findings of the 6 clinical characteristics of malnutrition:  Energy Intake:  50% or less of estimated energy requirements for 5 or more days  Weight Loss:  Unable to assess     Body Fat Loss:  Moderate body fat loss (Fat loss ranging from mild to moderate) Buccal region, Orbital, Triceps   Muscle Mass Loss:  Moderate muscle mass loss Scapula (trapezius), Temples (temporalis), Thigh (quadriceps), Clavicles (pectoralis & deltoids)  Fluid Accumulation:  Moderate to Severe Extremities   Strength:  Not Performed    Nutrition Assessment:    Patient seen for intial and wound visit. Admitted for acute kidney injury and purpura. PMHx DM2, a fib, HTN, PUD, skin cancer, colon cancer, cholecystectomy, and leukocytosis. Pt has been NPO since admission due to several procedures. Said that he has not eaten  since Monday (4/7). Reported that his appetite has been low while in the hospital and for about a month prior to admission. Experiencing nausea, vomitting, and diarrhea. Current wt may be inaccurate due to fluid retention. Pt reported abdominal pain with eating, even when just eating ice chips. He said that he had an esophageal issue a year ago that caused a soreness and difficulty eating. This led to a 68# wt loss. Stated that he had a procedure to dilate his esophagus and the issue resolved following. Physical signs of malnutrition present. Fluid accumulation in RLE and LLE. Consider initiation of nutrition support if PO is not tolerated due to stomach pain.    Nutrition Related Findings:    -165-231, BUN 47, HgB 11.5.   Lispro, protonix, pravachol.   Edema RLE - pitting, LLE - pitting.   BM 4/7.   Wound Type: Stage 
Comprehensive Nutrition Assessment    Type and Reason for Visit:  Reassess    Nutrition Recommendations/Plan:   Continue Regular 4 carb choice/cardiac/GI diet  Add Ensure HP BID  Please monitor and record I/O's in flowsheets     Malnutrition Assessment:  Malnutrition Status:  Severe malnutrition (04/10/25 1513)    Context:  Acute Illness     Findings of the 6 clinical characteristics of malnutrition:  Energy Intake:  50% or less of estimated energy requirements for 5 or more days  Weight Loss:  Unable to assess     Body Fat Loss:  Moderate body fat loss (Fat loss ranging from mild to moderate) Buccal region, Orbital, Triceps   Muscle Mass Loss:  Moderate muscle mass loss Scapula (trapezius), Temples (temporalis), Thigh (quadriceps), Clavicles (pectoralis & deltoids)  Fluid Accumulation:  Moderate to Severe Extremities   Strength:  Not Performed    Nutrition Assessment:    Seen for reassessment. Pt stated that appetite is improving and had eaten most of the food on his breakfast tray. During visit he began experiencing stomach pain, possibly due to eating more than he had recently. He stated that he has stomach pain every time he eats but this time it was worse. Nurse was notified of pain. Pt has not had any nausea, vomiting, or diarrhea but has been experiencing constipation with last BM 4/7. Bowel regimen started. Ensure HP added BID.      Nutrition Related Findings:    Na 131, , -257-270, Hgb 11.6.   Humalog, humulin, solu-medrol, protonix, glycolax, pravachol.   Edema: RUE +1, LUE +1, RLE trace, LLE trace.   BM 4/7 - bowel regimen initiatied. (steroid)   Wound Type: Pressure Injury, Stage II       Current Nutrition Intake & Therapies:    Average Meal Intake: 51-75%, % (I/O's report low intake. pt had eaten most of breakfast tray at time of visit.)  Average Supplements Intake: None Ordered  ADULT DIET; Regular; 4 carb choices (60 gm/meal); Low Fat/Low Chol/High Fiber/2 gm Na; GI [place] 
Comprehensive Nutrition Assessment    Type and Reason for Visit:  Reassess    Nutrition Recommendations/Plan:   Continue current diet and supplements  Please document % meals and supplements consumed in flowsheet I/O's under intake      Malnutrition Assessment:  Malnutrition Status:  Severe malnutrition (04/10/25 1513)    Context:  Acute Illness     Findings of the 6 clinical characteristics of malnutrition:  Energy Intake:  50% or less of estimated energy requirements for 5 or more days  Weight Loss:  Unable to assess     Body Fat Loss:  Moderate body fat loss (Fat loss ranging from mild to moderate) Buccal region, Orbital, Triceps   Muscle Mass Loss:  Moderate muscle mass loss Scapula (trapezius), Temples (temporalis), Thigh (quadriceps), Clavicles (pectoralis & deltoids)  Fluid Accumulation:  Moderate to Severe Extremities   Strength:  Not Performed    Nutrition Assessment:    Chart reviewed and case discussed during IDR. Pt reports feeling much better today since last nutrition assessment. He was NPO during breakfast but ate most of lunch and has been drinking the supplements consistently. Tolerance of PO has significantly improved. No new nutrition concerns at this time.     Patient Vitals for the past 120 hrs:   PO Meals Eaten (%)   04/15/25 1500 26 - 50%   04/14/25 1022 76 - 100%   04/13/25 1734 1 - 25%   04/13/25 1307 1 - 25%   04/13/25 0926 26 - 50%   04/12/25 1832 26 - 50%   04/12/25 1200 26 - 50%   04/12/25 0800 26 - 50%   04/11/25 1844 26 - 50%     Wt Readings from Last 5 Encounters:   04/11/25 97.5 kg (215 lb)   04/07/25 97.5 kg (215 lb)   02/14/25 94.2 kg (207 lb 9.6 oz)   12/01/24 93 kg (205 lb)   08/30/24 93 kg (205 lb)   ]    Nutrition Related Findings:    Labs: Na 134, Cr 3.02, , -230.   Meds: insulin NPH, protonix, glycolax, pravastatin.   Edema: +3 pitting/weeping RUE, +2 pitting LUE, trace BLE.   BM 4/16.   Wound Type: Pressure Injury, Stage II       Current Nutrition Intake & 
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  Unilateral hydro - stented. Suspect most of ARMINDA is not post-renal in origin.     UOP 800ccs/24hrs with chatman.     Will be available for questions   Chatman until creatinine issues sorted  Outpatient followup Dr. Ceballos.  
End of Shift Note    Bedside shift change report given to  chalo  (oncoming nurse) by Cal Valencia RN (offgoing nurse).  Report included the following information SBAR    Shift worked:   7pm to 7am      Shift summary and any significant changes:     Patient had a calm night with episode of  1st  degree av block on cardiac monitor EKG also done with no other concerns     Concerns for physician to address:  ? Cardiac evaluation prior discharge     Zone phone for oncoming shift:          Activity:  Level of Assistance: Moderate assist, patient does 50-74%  Number times ambulated in hallways past shift: 0  Number of times OOB to chair past shift: 1    Cardiac:   Cardiac Monitoring: Yes      Cardiac Rhythm: Sinus rhythm    Access:  Current line(s): PIV     Genitourinary:   Urinary Status: Voiding, Bathroom privileges    Respiratory:   O2 Device: None (Room air)  Chronic home O2 use?: NO  Incentive spirometer at bedside: YES    GI:  Last BM (including prior to admit): 04/21/25  Current diet:  ADULT DIET; Regular; 4 carb choices (60 gm/meal); Low Fat/Low Chol/High Fiber/2 gm Na; GI [place] (GERD/Peptic Ulcer)  ADULT ORAL NUTRITION SUPPLEMENT; Breakfast, Lunch, Dinner; Diabetic Oral Supplement  Passing flatus: YES    Pain Management:   Patient states pain is manageable on current regimen: YES    Skin:  Rafael Scale Score: 17  Interventions: Wound Offloading (Prevention Methods): Bed, pressure redistribution/air    Patient Safety:  Fall Risk: Nursing Judgement-Fall Risk High(Add Comments): Yes  Fall Risk Interventions  Nursing Judgement-Fall Risk High(Add Comments): Yes  Toilet Every 2 Hours-In Advance of Need: Yes  Hourly Visual Checks: In bed  Fall Visual Posted: Socks  Room Door Open: Yes  Alarm On: Bed, Chair  Patient Moved Closer to Nursing Station: No    Active Consults:   IP CONSULT TO NEPHROLOGY  IP CONSULT TO RHEUMATOLOGY  IP CONSULT TO UROLOGY  IP CONSULT TO DIABETES MANAGEMENT  IP CONSULT TO DIETITIAN    Length 
End of Shift Note    Bedside shift change report given to BRIAN Grace (oncoming nurse) by Kylah Dinh RN (offgoing nurse).  Report included the following information SBAR, Kardex, and MAR    Shift worked:  Days     Shift summary and any significant changes:     No acute changes, NS IVF d/c. US of [facility] conducted     Concerns for physician to address:  -     Zone phone for oncoming shift:   -       Active Consults:   IP CONSULT TO NEPHROLOGY  IP CONSULT TO RHEUMATOLOGY  IP CONSULT TO UROLOGY    Length of Stay:  Expected LOS: 6  Actual LOS: 5    Kylah Dinh, RN                            
End of Shift Note    Bedside shift change report given to BRIAN Montero (oncoming nurse) by Allie Archibald RN (offgoing nurse).  Report included the following information SBAR, Kardex, Intake/Output, MAR, and Accordion    Shift worked:  7a-7p     Shift summary and any significant changes:    Pt admitted from ED. PRN dilaudid given x1, PRN hydralazine given by ED RN prior to transfer to the floor. Pt oriented to room and call bell, resting with eyes closed.   Concerns for physician to address:    Zone phone for oncoming shift:       Activity:     Number times ambulated in hallways past shift: 0  Number of times OOB to chair past shift: 0    Cardiac:   Cardiac Monitoring: Yes           Access:  Current line(s): PIV     Genitourinary:        Respiratory:   O2 Device: None (Room air)  Chronic home O2 use?: NO  Incentive spirometer at bedside: NO    GI:     Current diet:  Diet NPO  Passing flatus: YES    Pain Management:   Patient states pain is manageable on current regimen: YES    Skin:  Rafael Scale Score: 19  Interventions:      Patient Safety:  Fall Risk:         Active Consults:   IP CONSULT TO NEPHROLOGY  IP CONSULT TO RHEUMATOLOGY    Length of Stay:  Expected LOS: 4  Actual LOS: 0    Allie Archibald RN                            
End of Shift Note    Bedside shift change report given to RN (oncoming nurse) by FLAVIA ALFONSO RN (offgoing nurse).  Report included the following information SBAR, Kardex, Intake/Output, MAR, and Recent Results    Shift worked: 7-7pm     Shift summary and any significant changes:     Pt is alert and oriented. Currently on room air, VS are stable.Np pain reported today.   Concerns for physician to address:       Zone phone for oncoming shift:            FLAVIA ALFONSO RN            
End of Shift Note    Bedside shift change report given to RN (oncoming nurse) by FLAVIA ALFONSO RN (offgoing nurse).  Report included the following information SBAR, Kardex, Intake/Output, MAR, and Recent Results    Shift worked: 7-7pm     Shift summary and any significant changes:     Pt is alert and oriented. Currently on room air, VS are stable.Underwent urethral stent placement    Concerns for physician to address:       Zone phone for oncoming shift:            FLAVIA ALFONSO RN                            
End of Shift Note    Bedside shift change report given to RN (oncoming nurse) by FLAVIA ALFONSO RN (offgoing nurse).  Report included the following information SBAR, Kardex, Procedure Summary, Intake/Output, MAR, and Recent Results    Shift worked:  7-7pm     Shift summary and any significant changes:     No significant changes, VS are stable.  Continue to monitor BG    Concerns for physician to address:       Zone phone for oncoming shift:          Jamia ALFONSO RN            
End of Shift Note    Bedside shift change report given to RN (oncoming nurse) by FLAVIA ALFONSO RN (offgoing nurse).  Report included the following information SBAR, Kardex, Procedure Summary, Intake/Output, MAR, and Recent Results    Shift worked:  7-7pm     Shift summary and any significant changes:     No significant changes, VS are stable.  Mark removed per order, pt has been voiding.    Concerns for physician to address:       Zone phone for oncoming shift:          Jamia ALFONSO RN                
End of Shift Note    Bedside shift change report given to RN (oncoming nurse) by Venecia Epps RN (offgoing nurse).  Report included the following information SBAR    Shift worked:  7am - 7pm     Shift summary and any significant changes:     Patient seen by diabetes management team today and adjustments made to NPH. Patient up to chair for all 3 meals today. Ambulatory with walker and stand-by assist to bathroom multiple times today. Patient complaints of BLE swelling and stated he usually takes Lasix at home, noted +3 - +4 pitting edema in BLE, Dr. Mendel notified and patient started on oral Bumex BID. Plan is for patient to discharge to skilled rehab facility tomorrow, pending authorization.      Concerns for physician to address:    Zone phone for oncoming shift:       Activity:  Level of Assistance: Moderate assist, patient does 50-74%  Number times ambulated in hallways past shift: 0  Number of times OOB to chair past shift: 1    Cardiac:   Cardiac Monitoring: Yes      Cardiac Rhythm: Sinus rhythm    Access:  Current line(s): PIV     Genitourinary:   Urinary Status: Voiding    Respiratory:   O2 Device: None (Room air)  Chronic home O2 use?: NO  Incentive spirometer at bedside: YES    GI:  Last BM (including prior to admit): 04/19/25  Current diet:  ADULT DIET; Regular; 4 carb choices (60 gm/meal); Low Fat/Low Chol/High Fiber/2 gm Na; GI [place] (GERD/Peptic Ulcer)  ADULT ORAL NUTRITION SUPPLEMENT; Breakfast, Lunch, Dinner; Diabetic Oral Supplement  Passing flatus: YES    Pain Management:   Patient states pain is manageable on current regimen: YES    Skin:  Rafael Scale Score: 19  Interventions: Wound Offloading (Prevention Methods): Bed, pressure reduction mattress, Pillows, Repositioning, Elevate heels, Foam silicone    Patient Safety:  Fall Risk: Nursing Judgement-Fall Risk High(Add Comments): Yes  Fall Risk Interventions  Nursing Judgement-Fall Risk High(Add Comments): Yes  Toilet Every 2 Hours-In Advance of 
End of Shift Note    Bedside shift change report given to Sabine TORRES  (oncoming nurse) by Allie Apodaca RN (offgoing nurse).  Report included the following information SBAR and MAR    Shift worked:  Days      Shift summary and any significant changes:     Nephrology clearance chatman patent and draining pink tinged urine heparin held this shift       Concerns for physician to address:       Zone phone for oncoming shift:          Activity:  Level of Assistance: Moderate assist, patient does 50-74%  Number times ambulated in hallways past shift: 0  Number of times OOB to chair past shift: 3    Cardiac:   Cardiac Monitoring: Yes      Cardiac Rhythm: Sinus rhythm    Access:  Current line(s): PIV     Genitourinary:   Urinary Status: [place]    Respiratory:   O2 Device: None (Room air)  Chronic home O2 use?: NO  Incentive spirometer at bedside: N/A    GI:  Last BM (including prior to admit): 04/16/25  Current diet:  ADULT DIET; Regular; 4 carb choices (60 gm/meal); Low Fat/Low Chol/High Fiber/2 gm Na; GI [place] (GERD/Peptic Ulcer)  ADULT ORAL NUTRITION SUPPLEMENT; Breakfast, Lunch, Dinner; Low Calorie/High Protein Oral Supplement  Passing flatus: YES    Pain Management:   Patient states pain is manageable on current regimen: N/A    Skin:  Rafael Scale Score: 18  Interventions: Wound Offloading (Prevention Methods): Bed, pressure reduction mattress, Repositioning, Pillows, Turning, Elevate heels, Chair cushion    Patient Safety:  Fall Risk: Nursing Judgement-Fall Risk High(Add Comments): No  Fall Risk Interventions  Nursing Judgement-Fall Risk High(Add Comments): No  Toilet Every 2 Hours-In Advance of Need: Yes  Hourly Visual Checks: Awake, In chair  Fall Visual Posted: Armband, Socks  Room Door Open: Deferred to decrease stimulation  Alarm On: Bed, Chair  Patient Moved Closer to Nursing Station: No    Active Consults:   IP CONSULT TO NEPHROLOGY  IP CONSULT TO RHEUMATOLOGY  IP CONSULT TO UROLOGY    Length of Stay:  Expected 
End of Shift Note    Bedside shift report received from ____, RN. Report included the following information Nurse Handoff Report, MAR, Telemetry status, Recent Results, and plan of care. This RN verbalized understanding of plan of care with opportunity for clarification and questions. Care of patient assumed at this time. Dual skin check performed at this time.     Bedside shift report given to  ____, RN. Report included the following information Nurse Handoff Report, MAR, Telemetry status, Recent Results, and plan of care. Oncoming RN verbalized understanding of plan of care with opportunity for clarification and questions. Dual skin check performed at this time.       Shift worked:  0700 - 1930   Shift summary and any significant changes:     Mark placed per MD order. OOB to chair.   Concerns for physician to address:  cystoscopy     Zone phone for oncoming shift:          Activity:  Level of Assistance:  (bed rest at this time)  Number times ambulated in hallways past shift: 0  Number of times OOB to chair past shift: 1    Cardiac:   Cardiac Monitoring: Yes      Cardiac Rhythm: Atrial fib, Sinus rhythm (rate controlled)    Access:  Current line(s): PIV     Genitourinary:   Urinary Status: External catheter    Respiratory:   O2 Device: None (Room air)  Chronic home O2 use?: NO  Incentive spirometer at bedside: N/A    GI:  Last BM (including prior to admit): 04/07/25  Current diet:  Diet NPO  Passing flatus: YES    Pain Management:   Patient states pain is manageable on current regimen: YES    Skin:  Rafael Scale Score: 13  Interventions: Wound Offloading (Prevention Methods): Bed, pressure reduction mattress, Pillows, Repositioning    Patient Safety:  Fall Risk: Nursing Judgement-Fall Risk High(Add Comments): Yes  Fall Risk Interventions  Nursing Judgement-Fall Risk High(Add Comments): Yes  Toilet Every 2 Hours-In Advance of Need: Yes  Hourly Visual Checks: Awake, In bed  Fall Visual Posted: Socks, Fall sign 
Mark catheter was order by the nephrologist. Pt refused. Pt voided and stated that he will let me know if for any reason he can not void.   
Nephrology Progress Note  [facility]NA 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    Objective:   Vitals:    04/16/25 2242 04/17/25 0223 04/17/25 0730 04/17/25 0853   BP: (!) 169/54 (!) 162/55 (!) 157/58 (!) 117/53   Pulse: 74 73 74 76   Resp: 20 20 15    Temp: 98 °F (36.7 °C) 97.9 °F (36.6 °C)     TempSrc: Axillary Axillary Oral    SpO2:  96%     Weight:       Height:          I/O last 3 completed shifts:  In: 300 [P.O.:300]  Out: 3850 [Urine:3850]  I/O this shift:  In: 310 [P.O.:300; I.V.:10]  Out: -       Physical exam:    GEN: NAD  NECK- no mass  RESP: no wheezing  NEURO: Normal speech, non focal  EXT: No Edema   PSYCH: Normal Mood  Skin: Purpuric rash noted    Chart reviewed.         Pertinent Notes reviewed.       Data Review :  Lab Results   Component Value Date/Time     04/17/2025 02:13 AM    K 4.7 04/17/2025 02:13 AM     04/17/2025 02:13 AM 
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  Obstructive uropathy(dilatation of right renal collecting system)  Nephrolithiasis  leukocytoclastic vasculitis  Afib  Htn  Hyperlipidemia  Proteinuria--UPCR 1.5 g      PLAN-  S/P RENAL BX TODAY- CHECK CBC 6 AND 12 HOUR POST BX  no dialysis indicated today   Discussed with the patient that depending on the kidney biopsy result we will decide further management of his renal failure-  As per rheumatology patient will need prednisone for HSP.  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      Physical exam:    GEN: NAD  NECK- no mass  RESP: no wheezing  NEURO: Normal speech, nonfocal  EXT: No Edema   PSYCH: Normal Mood  Skin: Purpuric rash noted    Chart reviewed.         Pertinent Notes reviewed.       Data Review :  Lab Results   Component 
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  Patient at this time has declined renal biopsy.  In view of available data including hematuria, proteinuria of greater than 1.5 g worsening JOEY, we will go ahead and start the patient on pulse steroids with Solu-Medrol 250 mg IV daily x 3 days.  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      Physical exam:    GEN: NAD  NECK- no mass  RESP: No wheezing, decreased BS b/l  CVS: S1,S2  RRR  NEURO: Normal speech, Non 
Nephrology Progress Note  [facility] / [place] Office  [address]  [place] [address]  Phone - [phone]  Fax - [phone]                   Patient: Darius Burgos                     YOB: 1940        Date- 4/10/2025                                     Admit Date: 4/8/2025   CC: Follow up for JOEY stage III          IMPRESSION & PLAN:   JOEY stage III(multifactorial, obstructive uropathy, possible GN, volume depletion)  Purpuric rash  Colitis?  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  There is no need for renal biopsy at this moment as there is enough evidence with combining the skin biopsy for possible IgA nephritis.  If the proteinuria is less than 1 g/dL then there is no need to start steroids and in that case we need to manage it with supportive treatment that includes ACE/ARB and SGLT2 inhibitors.  If steroid needs to be started then he will need pulse steroids for 3 days and then 0.5 mg/kg over tapered over few months.  Spoke to medicine team     Subjective:  Interval History:   - Seen and examined today  - Creatinine stable    Objective:   Vitals:    04/10/25 0357 04/10/25 0400 04/10/25 0630 04/10/25 0746   BP:  (!) 131/52 (!) 143/72 135/67   Pulse:  73  78   Resp: 21 16  17   Temp:       TempSrc:    Oral   SpO2:  91%  93%   Weight:       Height:          I/O last 3 completed shifts:  In: -   Out: 850 [Urine:850]  I/O this shift:  In: 10 [I.V.:10]  Out: -       Physical exam:    GEN: NAD  NECK- 
Nephrology Progress Note  SERENA PETE [facility] / [place] Office  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  Obstructive uropathy(dilatation of right renal collecting system)  Nephrolithiasis  leukocytoclastic vasculitis  Afib  Htn  Hyperlipidemia  Proteinuria--UPCR 1.5 g      PLAN-  Start prednisone 40 mg daily  Ordered myeloma workup  Ordered celiac panel and calprotectin for possible secondary causes of IGA nephropathy  Cr stable, non oliguric  Epogen for anemia  Continue losartan  Continue bactrim ds for prophylaxis  Continue prednisone for GI prophylaxis  Will need close nephrology follow up at discharge  May be okay to discharge from a renal perspective w/ appropriate sugar control as long as medically cleared  Dw attending nephrologist     Subjective:  Interval History:   Bp stable,cr stable  4-18-25: good uop recorded. Endorses some LE swelling.     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  Obstructive uropathy(dilatation of right renal collecting system)  Nephrolithiasis  leukocytoclastic vasculitis  Afib  Htn  Hyperlipidemia  Proteinuria--UPCR 1.5 g      PLAN-  Discharge to SNF on PO Bumex BID  Continue prednisone 40 mg for at least 2 months  Plan to taper prednisone over 4 months after initial dose of 1 mg/kg for first 2 months  Bactrim for PJP prophylaxis  Will need outpatient nephrology follow up     Subjective:  Interval History:   -4-22-25 - pt was started on bumex yesterday for LE swelling. Pt reports he has been urinating a good bit and feels the diuretics are helping with the swelling. Ready for discharge today    -4-21-25 - pt seen at bedside. Resting comfortably in chair. Some LE swelling noted     4-18-25: good uop recorded. Endorses some LE swelling.      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  Obstructive uropathy(dilatation of right renal collecting system)  Nephrolithiasis  leukocytoclastic vasculitis  Afib  Htn  Hyperlipidemia  Proteinuria--UPCR 1.5 g      PLAN-  Kidney biopsy canceled due to heparin injection   no dialysis indicated today   Schedule renal biopsy tomorrow   Discussed with the patient that depending on the kidney biopsy result we will decide further management of his renal failure-input from Dr. Dias noted.  As per rheumatology patient will need prednisone for HSP.  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    Objective:   Vitals:    04/14/25 2246 04/15/25 0438 04/15/25 0724 04/15/25 1108   BP: (!) 138/55 (!) 148/62 (!) 148/69 129/60   Pulse:   74 71   Resp:   25 23   Temp: 98.2 °F (36.8 °C)  97.5 °F (36.4 °C) 97.7 °F (36.5 °C)   TempSrc: Oral  Oral Oral   SpO2:   93%    Weight:       Height:          I/O last 3 completed shifts:  In: 340 [P.O.:340]  Out: 2275 [Urine:2275]  I/O this shift:  In: -   Out: 350 [Urine:350]      Physical exam:    GEN: NAD  NECK- no mass  RESP: No wheezing, decreased BS b/l  CVS: S1,S2  RRR  NEURO: Normal speech, nonfocal  EXT: No Edema   PSYCH: Normal Mood  Skin: Purpuric rash noted    Chart reviewed.  
Nutrition: Consult received for ONS with comment stating that supplement was changed to diabetic by primary RN. Patient has been receiving ensure high protein (low kcal/carb, high protein ONS) which is already diabetic friendly. Now changed to Glucerna. Okay to continue Glucerna as long as patient agreeable. Great PO intake of meals noted. Nutrition team will continue to follow. Thanks.    Patient Vitals for the past 120 hrs:   PO Meals Eaten (%)   04/20/25 1627 76 - 100%   04/20/25 1200 76 - 100%   04/20/25 0800 76 - 100%   04/19/25 1700 76 - 100%   04/19/25 1200 76 - 100%   04/19/25 0730 76 - 100%   04/18/25 1700 51 - 75%   04/18/25 1302 51 - 75%   04/18/25 1100 51 - 75%   04/18/25 0730 51 - 75%   04/17/25 1925 76 - 100%   04/17/25 1416 76 - 100%   04/17/25 0917 76 - 100%   04/16/25 1654 51 - 75%     Ivonne Petit, RD  Ext [phone]       
Occupational Therapy    Patient chart reviewed up to date. Discussed with RN who reports patient is on bedrest until 1430. Will defer and re-attempt as able and appropriate.     Thank you.  Rose Marie Morse OTR/L    
Occupational Therapy  4/11/2025    Visited pt for OT. Pt reports pain level is too high to participate. Nursing gave pt morphine prior to session. Educated pt on getting up to chair with nursing over the weekend. Will defer to allow pt to rest.     Thank you,   Lisa Padilla, OTR/L    
Pharmacy Medication History Review    Medication history obtained by Zoraida Acevedo while patient was in room CD36/36 and was completed based on information available during current patient encounter. Medication history was completed after home meds were reconciled by provider.      PTA medication list was corrected to the following:   Prior to Admission Medications   Prescriptions Last Dose Informant   amLODIPine (NORVASC) 5 MG tablet 4/7/2025 Morning Self   Sig: Take 1 tablet by mouth daily   amiodarone (CORDARONE) 200 MG tablet 4/7/2025 Morning Self   Sig: take 1/2 tablet (100mg) BY MOUTH DAILY   amoxicillin-clavulanate (AUGMENTIN) 875-125 MG per tablet 4/7/2025 Self   Sig: Take 1 tablet by mouth 2 times daily   apixaban (ELIQUIS) 5 MG TABS tablet 4/7/2025 Self   Sig: Take 1 tablet by mouth 2 times daily Hold until 11/24- start taking from 11/25   Patient taking differently: Take 1 tablet by mouth 2 times daily   dicyclomine (BENTYL) 10 MG capsule 4/7/2025 Self   Sig: Take 1 capsule by mouth 4 times daily as needed (as needed fro abdominal pain)   ferrous sulfate (IRON 325) 325 (65 Fe) MG tablet 4/6/2025 Self   Sig: Take 1 tablet by mouth every other day   furosemide (LASIX) 40 MG tablet 4/7/2025 Self   Sig: Take 1 tablet by mouth daily Can take an extra tab in the afternoon if weight >3 lbs in a day or 6 Lbs in a week. Up to 3 times.   losartan (COZAAR) 100 MG tablet 4/7/2025 Self   Sig: Take 1 tablet by mouth daily . Blood work needed for further refills.   metFORMIN (GLUCOPHAGE) 1000 MG tablet 4/7/2025 Self   Sig: Take 1 tablet by mouth 2 times daily   metoprolol succinate (TOPROL XL) 25 MG extended release tablet 4/7/2025 Self   Sig: take 1/2 tablet BY MOUTH DAILY              pantoprazole (PROTONIX) 40 MG tablet 4/7/2025 Self   Sig: Take 1 tablet by mouth every morning   pravastatin (PRAVACHOL) 20 MG tablet 4/7/2025 Self   Sig: TAKE 1 TABLET BY MOUTH EVERY NIGHT AT BEDTIME FOR CHOLESTEROL    
Physical Therapy    Chart reviewed. Attempted to see patient for skilled therapy. Patient in too much pain to participate - reports pain in gut. Upon first attempt, RN trying to get pain meds for patient. Upon second attempt, patient had recently received morphine. He politely declined stating he is comfortable in current position and does not want to move, despite education. Will defer.    Pily Felton, PT, DPT  
Rheum  Above events noted- I will sign off.  
Rheumatology    Above events noted.  IV steroids delayed as pt had rebound tenderness and R abd pain to palpation.  Skin Bx performed and now Surgery feels is is ok to start steroids which Dr Nowak (renal) has done-  ANCA neg, other serology pending.    Will continue to follow  
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  The Bx did not stain for IgA!  Serum IgA was negative.  This could be c/w lots of things including sensitivity Rxn to Abx.    DESEAN, C3C4, ANCA all neg. Hep Band C studies neg.  No eosinophils seen.  So doubt SLE, Churg-Alejandro etc.    RENAL Bx should include staining for IgA- can we make sure that happens?    Since the pt has Renal insuff and proteinuria, kidney Bx will most likely show the same (HSP)  .  Typical Rx will be Prednisone 40 mg a day x 2 months with slow taper.  If not improving proteinuria, Cellcept or CTX would then be appropriate.  This yould most likely be managed by Nephrology.    I will see later today.  
SURGERY PROGRESS NOTE      Admit Date: 2025    POD 1 Day Post-Op    Procedure: Procedure(s):  CYSTOSCOPY RIGHT URETERAL STENT INSERTION, BILATERAL RETROGRADE PYELOGRAM      Subjective:     Patient states abdominal pain is better but, not gone.  He denies nausea.  He is tolerating po and passing flatus.    Objective:     BP (!) 144/61   Pulse 68   Temp 97.6 °F (36.4 °C) (Oral)   Resp 20   Ht 1.702 m (5' 7.01\")   Wt 97.5 kg (215 lb)   SpO2 94%   BMI 33.67 kg/m²      Temp (24hrs), Av.8 °F (36.6 °C), Min:97.2 °F (36.2 °C), Max:98.3 °F (36.8 °C)      No intake/output data recorded.  04/10 1901 -  0700  In: 920 [P.O.:320; I.V.:600]  Out: 1150 [Urine:1150]    Physical Exam:    General:  alert, appears stated age, cooperative, and no distress   Abdomen: soft, bowel sounds active, non-tender         CBC:   Lab Results   Component Value Date/Time    WBC 14.7 2025 08:34 AM    RBC 4.14 2025 08:34 AM    HGB 11.4 2025 08:34 AM    HCT 35.2 2025 08:34 AM    MCV 85.0 2025 08:34 AM    MCH 27.5 2025 08:34 AM    MCHC 32.4 2025 08:34 AM    RDW 14.7 2025 08:34 AM     2025 08:34 AM    MPV 9.6 2025 08:34 AM     BMP:    Lab Results   Component Value Date/Time     2025 08:34 AM    K 4.7 2025 08:34 AM     2025 08:34 AM    CO2 20 2025 08:34 AM     2025 08:34 AM    CREATININE 3.61 2025 08:34 AM    CALCIUM 7.7 2025 08:34 AM    GFRAA >60 2022 11:50 AM    LABGLOM 16 2025 08:34 AM    LABGLOM >60 2023 04:49 AM    LABGLOM 49 2023 05:12 AM    GLUCOSE 348 2025 08:34 AM       Skin biopsy -  leukocytoclastic vasculitis     Assessment:     Principal Problem:    Acute kidney injury  Active Problems:    Purpura    Severe protein-calorie malnutrition  Resolved Problems:    * No resolved hospital problems. *    84 year old admitted with abdominal pain, purpura and JOEY.  Skin biopsy 
Spiritual Health History and Assessment/Progress Note  [facility]    Initial Encounter,  , Adjustment to illness, Life Adjustments,      Name: Darius Burgos MRN: [id number]    Age: 84 y.o.     Sex: male   Language: English   Protestant: Sikh   Acute kidney injury     Date: 4/17/2025            Total Time Calculated: 14 min              Spiritual Assessment began in [facility] CARDIOPULMONARY C[facility]        Referral/Consult From: Rounding   Encounter Overview/Reason: Initial Encounter       Millicent, Belief, Meaning:   Patient is connected with a millicent tradition or spiritual practice and has beliefs or practices that help with coping during difficult times  Family/Friends No family/friends present      Importance and Influence:  Patient has spiritual/personal beliefs that influence decisions regarding their health  Family/Friends No family/friends present    Community:  Patient is connected with a spiritual community and feels well-supported. Support system includes: Spouse/Partner, Children, Millicent Community, and Friends  Family/Friends No family/friends present    Assessment and Plan of Care:     Patient Interventions include: Engaged in theological reflection and Affirmed coping skills/support systems  Family/Friends Interventions include: No family/friends present    Patient Plan of Care: Spiritual Care available upon further referral  Family/Friends Plan of Care: No family/friends present    Electronically signed by GANESH Peña on 4/17/2025 at 2:42 PM   
Surgery NP Note    Clinical decision making made in collaboration with Dr. Ya who is aware of and in agreement with treatment plan.     Stitches from right thigh biopsy sites removed at bedside. Patient tolerated with minimal pain. The later site did open and revealed a shallow circular wound. This was covered with a steri-strip and the entire sites were covered  with a Mepilex.     SHARON Verduzco - NP   
TRANSFER - OUT REPORT:    Verbal report given to Audrey Valenzuela LPN on Darius Burgos  being transferred to [facility] for routine progression of patient care & skilled therapy for strengthening prior to return to home.     Report consisted of patient's Situation, Background, Assessment and   Recommendations(SBAR).     Information from the following report(s) Nurse Handoff Report, ED SBAR, Adult Overview, MAR, Recent Results, Cardiac Rhythm Sinus Abraham - Sinus Rhythm, and Event Log was reviewed with the receiving nurse.    PIV discontinued and dressing placed prior to discharge, without complications.     Opportunity for questions and clarification was provided.      AVS was reviewed with patient as well, all questions were answered.     Patient transported with:  [facility] @ 2pm   All personal belongings left with patient including glasses, cell phone,  for cell phone and all clothing.                               
08:34 AM       Lab Results   Component Value Date    WBC 14.7 (H) 04/12/2025    HGB 11.4 (L) 04/12/2025    HCT 35.2 (L) 04/12/2025    MCV 85.0 04/12/2025     04/12/2025      Recent Labs     04/10/25  0313 04/11/25  0413 04/12/25  0834    138 132*   K 4.1 4.4 4.7   * 109* 103   CO2 25 22 20*   GLUCOSE 199* 156* 348*   BUN 58* 77* 101*   CREATININE 2.52* 3.05* 3.61*   CALCIUM 8.2* 8.0* 7.7*       Recent Labs     04/10/25  0313 04/11/25  0413 04/12/25  0834   WBC 6.1 5.6 14.7*   RBC 4.23 4.37 4.14   HGB 11.5* 12.0* 11.4*   HCT 36.6 37.9 35.2*   MCV 86.5 86.7 85.0   MCH 27.2 27.5 27.5   MCHC 31.4 31.7 32.4   RDW 14.7* 14.8* 14.7*    328 377   MPV 9.6 9.8 9.6     Lab Results   Component Value Date/Time    IRON 49 10/12/2021 01:03 AM    TIBC 216 (L) 10/12/2021 01:03 AM     No results found for: \"PTH\"  Lab Results   Component Value Date/Time    LABA1C 6.6 (H) 04/09/2025 04:33 AM     Lab Results   Component Value Date/Time    COLORU YELLOW 04/08/2025 11:24 AM    CLARITYU TURBID (A) 02/15/2023 01:33 PM    GLUCOSEU 250 (A) 04/08/2025 11:24 AM    GLUCOSEU Negative 11/16/2023 01:59 PM    BILIRUBINUR Negative 04/08/2025 11:24 AM    KETUA Negative 04/08/2025 11:24 AM    BLOODU LARGE (A) 04/08/2025 11:24 AM    PHUR 5.5 04/08/2025 11:24 AM    PHUR 7.0 02/15/2023 01:33 PM    PROTEINU 300 (A) 04/08/2025 11:24 AM    NITRU Negative 04/08/2025 11:24 AM    LEUKOCYTESUR SMALL (A) 04/08/2025 11:24 AM       US Results (most recent):    Medication list  reviewed  Current Facility-Administered Medications   Medication Dose Route Frequency    insulin lispro (HUMALOG,ADMELOG) injection vial 0-4 Units  0-4 Units SubCUTAneous 4x Daily AC & HS    morphine (PF) injection 1 mg  1 mg IntraVENous Q3H PRN    methylPREDNISolone sodium succ (SOLU-MEDROL) 250 mg in sterile water 4 mL injection  250 mg IntraVENous Daily    melatonin tablet 3 mg  3 mg Oral Nightly PRN    hydrALAZINE (APRESOLINE) injection 10 mg  10 mg IntraVENous 
36.0* 37.8 35.2*    446* 377     No results for input(s): \"KU\", \"CLU\" in the last 720 hours.    Invalid input(s): \"EMELY\", \"CREAU\", \"PROU\"  No results found for: \"SDES\"  No components found for: \"CULT\"  Recent Results (from the past 24 hours)   POCT Glucose    Collection Time: 04/13/25  4:43 PM   Result Value Ref Range    POC Glucose 240 (H) 65 - 117 mg/dL    Performed by: Barrera Reinoso PCT    POCT Glucose    Collection Time: 04/13/25  9:36 PM   Result Value Ref Range    POC Glucose 270 (H) 65 - 117 mg/dL    Performed by: Mike Montero    CBC with Auto Differential    Collection Time: 04/14/25  4:12 AM   Result Value Ref Range    WBC 8.3 4.1 - 11.1 K/uL    RBC 4.27 4.10 - 5.70 M/uL    Hemoglobin 11.6 (L) 12.1 - 17.0 g/dL    Hematocrit 36.0 (L) 36.6 - 50.3 %    MCV 84.3 80.0 - 99.0 FL    MCH 27.2 26.0 - 34.0 PG    MCHC 32.2 30.0 - 36.5 g/dL    RDW 15.1 (H) 11.5 - 14.5 %    Platelets 377 150 - 400 K/uL    MPV 9.4 8.9 - 12.9 FL    Nucleated RBCs 0.0 0  WBC    nRBC 0.00 0.00 - 0.01 K/uL    Neutrophils % 84.1 (H) 32.0 - 75.0 %    Lymphocytes % 6.5 (L) 12.0 - 49.0 %    Monocytes % 8.0 5.0 - 13.0 %    Eosinophils % 0.0 0.0 - 7.0 %    Basophils % 0.4 0.0 - 1.0 %    Immature Granulocytes % 1.0 (H) 0.0 - 0.5 %    Neutrophils Absolute 6.98 1.80 - 8.00 K/UL    Lymphocytes Absolute 0.54 (L) 0.80 - 3.50 K/UL    Monocytes Absolute 0.66 0.00 - 1.00 K/UL    Eosinophils Absolute 0.00 0.00 - 0.40 K/UL    Basophils Absolute 0.03 0.00 - 0.10 K/UL    Immature Granulocytes Absolute 0.08 (H) 0.00 - 0.04 K/UL    Differential Type AUTOMATED     Renal Function Panel    Collection Time: 04/14/25  4:40 AM   Result Value Ref Range    Sodium 131 (L) 136 - 145 mmol/L    Potassium 4.6 3.5 - 5.1 mmol/L    Chloride 101 97 - 108 mmol/L    CO2 20 (L) 21 - 32 mmol/L    Anion Gap 10 2 - 12 mmol/L    Glucose 257 (H) 65 - 100 mg/dL     (H) 6 - 20 MG/DL    Creatinine 3.59 (H) 0.70 - 1.30 MG/DL    BUN/Creatinine Ratio 32 (H) 12 - 20 
Right sided abdominal pain with some distension.  Possible from stent placement.- will order US abdomen .  Appreciated urology recommendations.            Atrial fibrillation  Continue metoprolol  Continue amiodarone  Hold eliquis for now     Hypertension  Hyperlipidemia  Continue metoprolol, and amlodipine  Start po hydralazine 25 mg po TID and titrate as needed  Hold losartan in the setting of JOEY  Start hydralazine 10 mg iv q6h prn if SBP>160mm Hg        Type II DM:  Hold metformin  Start insulin lispro sliding scale  NPH while on steroids             Medical Decision Making:   I personally reviewed labs: Yes   I personally reviewed imaging:   I personally reviewed EKG:  Toxic drug monitoring:  Steroids- blood glucose   Discussed case with: Patient, RN        Code Status: Full   DVT Prophylaxis:   GI Prophylaxis:    Subjective:     Chief Complaint / Reason for Physician Visit  \"States doing okay but just feels tired. \".  Discussed with RN events overnight.       Objective:     VITALS:   Last 24hrs VS reviewed since prior progress note. Most recent are:  Patient Vitals for the past 24 hrs:   BP Temp Temp src Pulse Resp SpO2   04/13/25 0930 (!) 113/55 -- -- 75 22 --   04/13/25 0800 (!) 158/77 97.6 °F (36.4 °C) Oral 71 26 96 %   04/13/25 0245 -- 97.8 °F (36.6 °C) Oral -- 16 95 %   04/12/25 2300 (!) 146/61 98 °F (36.7 °C) Oral 63 20 95 %   04/12/25 1915 (!) 144/61 97.6 °F (36.4 °C) Oral 66 19 95 %   04/12/25 1453 (!) 142/61 97.6 °F (36.4 °C) Oral 70 20 96 %   04/12/25 1107 -- 97.6 °F (36.4 °C) Oral -- -- --         Intake/Output Summary (Last 24 hours) at 4/13/2025 1046  Last data filed at 4/13/2025 0935  Gross per 24 hour   Intake 1545.5 ml   Output 1025 ml   Net 520.5 ml        I had a face to face encounter and independently examined this patient on 4/13/2025, as outlined below:  PHYSICAL EXAM:  General: Alert, cooperative  EENT:  EOMI. Anicteric sclerae.  Resp:  CTA bilaterally, no wheezing or rales.  No 
Oral Daily PRN    bisacodyl (DULCOLAX) suppository 10 mg  10 mg Rectal Daily PRN    insulin NPH (HumuLIN N;NovoLIN N) injection vial 10 Units  0.1 Units/kg SubCUTAneous BID AC    insulin lispro (HUMALOG,ADMELOG) injection vial 0-8 Units  0-8 Units SubCUTAneous 4x Daily AC & HS    morphine (PF) injection 1 mg  1 mg IntraVENous Q3H PRN    melatonin tablet 3 mg  3 mg Oral Nightly PRN    hydrALAZINE (APRESOLINE) injection 10 mg  10 mg IntraVENous Q6H PRN    amiodarone (CORDARONE) tablet 100 mg  100 mg Oral Daily    amLODIPine (NORVASC) tablet 5 mg  5 mg Oral Daily    metoprolol succinate (TOPROL XL) extended release tablet 12.5 mg  12.5 mg Oral Daily    sodium chloride flush 0.9 % injection 5-40 mL  5-40 mL IntraVENous 2 times per day    sodium chloride flush 0.9 % injection 5-40 mL  5-40 mL IntraVENous PRN    0.9 % sodium chloride infusion   IntraVENous PRN    ondansetron (ZOFRAN-ODT) disintegrating tablet 4 mg  4 mg Oral Q8H PRN    Or    ondansetron (ZOFRAN) injection 4 mg  4 mg IntraVENous Q6H PRN    acetaminophen (TYLENOL) tablet 650 mg  650 mg Oral Q6H PRN    Or    acetaminophen (TYLENOL) suppository 650 mg  650 mg Rectal Q6H PRN    heparin (porcine) injection 5,000 Units  5,000 Units SubCUTAneous 3 times per day    glucose chewable tablet 16 g  4 tablet Oral PRN    dextrose bolus 10% 125 mL  125 mL IntraVENous PRN    Or    dextrose bolus 10% 250 mL  250 mL IntraVENous PRN    glucagon injection 1 mg  1 mg SubCUTAneous PRN    dextrose 10 % infusion   IntraVENous Continuous PRN    pantoprazole (PROTONIX) tablet 40 mg  40 mg Oral QAM AC    pravastatin (PRAVACHOL) tablet 20 mg  20 mg Oral Nightly    hydrALAZINE (APRESOLINE) tablet 25 mg  25 mg Oral 3 times per day    calcium carbonate (TUMS) chewable tablet 500 mg  500 mg Oral TID PRN        Hugh Richardson MD  4/14/2025                                                                                                               
6:08 AM   Result Value Ref Range    POC Glucose 165 (H) 65 - 117 mg/dL    Performed by: Sriram Miranda RN    POCT Glucose    Collection Time: 04/10/25 11:25 AM   Result Value Ref Range    POC Glucose 196 (H) 65 - 117 mg/dL    Performed by: Lanre Deal PCT    Protein / creatinine ratio, urine    Collection Time: 04/10/25  2:52 PM   Result Value Ref Range    Protein, Urine, Random 276 (H) 0.0 - 11.9 mg/dL    Creatinine, Ur 189.00 mg/dL    PROTEIN/CREAT RATIO URINE RAN 1.5     POCT Glucose    Collection Time: 04/10/25  5:32 PM   Result Value Ref Range    POC Glucose 165 (H) 65 - 117 mg/dL    Performed by: Lanre Guilford PCT    POCT Glucose    Collection Time: 04/10/25 11:18 PM   Result Value Ref Range    POC Glucose 156 (H) 65 - 117 mg/dL    Performed by: Mike Montero    Basic Metabolic Panel    Collection Time: 04/11/25  4:13 AM   Result Value Ref Range    Sodium 138 136 - 145 mmol/L    Potassium 4.4 3.5 - 5.1 mmol/L    Chloride 109 (H) 97 - 108 mmol/L    CO2 22 21 - 32 mmol/L    Anion Gap 7 2 - 12 mmol/L    Glucose 156 (H) 65 - 100 mg/dL    BUN 77 (H) 6 - 20 MG/DL    Creatinine 3.05 (H) 0.70 - 1.30 MG/DL    BUN/Creatinine Ratio 25 (H) 12 - 20      Est, Glom Filt Rate 19 (L) >60 ml/min/1.73m2    Calcium 8.0 (L) 8.5 - 10.1 MG/DL   CBC with Auto Differential    Collection Time: 04/11/25  4:13 AM   Result Value Ref Range    WBC 5.6 4.1 - 11.1 K/uL    RBC 4.37 4.10 - 5.70 M/uL    Hemoglobin 12.0 (L) 12.1 - 17.0 g/dL    Hematocrit 37.9 36.6 - 50.3 %    MCV 86.7 80.0 - 99.0 FL    MCH 27.5 26.0 - 34.0 PG    MCHC 31.7 30.0 - 36.5 g/dL    RDW 14.8 (H) 11.5 - 14.5 %    Platelets 328 150 - 400 K/uL    MPV 9.8 8.9 - 12.9 FL    Nucleated RBCs 0.0 0  WBC    nRBC 0.00 0.00 - 0.01 K/uL    Neutrophils % 73.9 32.0 - 75.0 %    Lymphocytes % 15.3 12.0 - 49.0 %    Monocytes % 8.7 5.0 - 13.0 %    Eosinophils % 1.2 0.0 - 7.0 %    Basophils % 0.2 0.0 - 1.0 %    Immature Granulocytes % 0.7 (H) 0.0 - 0.5 %    Neutrophils Absolute 
Rectal Q6H PRN    glucose chewable tablet 16 g  4 tablet Oral PRN    dextrose bolus 10% 125 mL  125 mL IntraVENous PRN    Or    dextrose bolus 10% 250 mL  250 mL IntraVENous PRN    glucagon injection 1 mg  1 mg SubCUTAneous PRN    dextrose 10 % infusion   IntraVENous Continuous PRN    pantoprazole (PROTONIX) tablet 40 mg  40 mg Oral QAM AC    pravastatin (PRAVACHOL) tablet 20 mg  20 mg Oral Nightly    hydrALAZINE (APRESOLINE) tablet 25 mg  25 mg Oral 3 times per day    calcium carbonate (TUMS) chewable tablet 500 mg  500 mg Oral TID PRN        Anita Meyer PA-C  4/21/2025                                                                                                                 
chewable tablet 500 mg  500 mg Oral TID PRN        Hugh Richardson MD  4/18/2025

## 2025-04-23 LAB
ALBUMIN SERPL ELPH-MCNC: 2.8 G/DL (ref 2.9–4.4)
ALBUMIN/GLOB SERPL: 1.2 (ref 0.7–1.7)
ALPHA1 GLOB SERPL ELPH-MCNC: 0.3 G/DL (ref 0–0.4)
ALPHA2 GLOB SERPL ELPH-MCNC: 0.5 G/DL (ref 0.4–1)
B-GLOBULIN SERPL ELPH-MCNC: 0.8 G/DL (ref 0.7–1.3)
ENDOMYSIUM IGA SER QL: NEGATIVE
GAMMA GLOB SERPL ELPH-MCNC: 0.8 G/DL (ref 0.4–1.8)
GLOBULIN SER-MCNC: 2.4 G/DL (ref 2.2–3.9)
IGA SERPL-MCNC: 295 MG/DL (ref 61–437)
IGA SERPL-MCNC: 301 MG/DL (ref 61–437)
IGG SERPL-MCNC: 977 MG/DL (ref 603–1613)
IGM SERPL-MCNC: 65 MG/DL (ref 15–143)
INTERPRETATION SERPL IEP-IMP: ABNORMAL
M PROTEIN SERPL ELPH-MCNC: ABNORMAL G/DL
PROT SERPL-MCNC: 5.2 G/DL (ref 6–8.5)
TTG IGA SER-ACNC: <2 U/ML (ref 0–3)

## 2025-04-25 LAB
ALBUMIN SERPL ELPH-MCNC: 2.6 G/DL (ref 2.9–4.4)
ALBUMIN/GLOB SERPL: 1 (ref 0.7–1.7)
ALPHA1 GLOB SERPL ELPH-MCNC: 0.4 G/DL (ref 0–0.4)
ALPHA2 GLOB SERPL ELPH-MCNC: 0.6 G/DL (ref 0.4–1)
B-GLOBULIN SERPL ELPH-MCNC: 0.8 G/DL (ref 0.7–1.3)
GAMMA GLOB SERPL ELPH-MCNC: 0.7 G/DL (ref 0.4–1.8)
GLOBULIN SER CALC-MCNC: 2.5 G/DL (ref 2.2–3.9)
KAPPA LC FREE SER-MCNC: NORMAL MG/L
KAPPA LC FREE/LAMBDA FREE SER: NORMAL
LABORATORY COMMENT REPORT: ABNORMAL
LAMBDA LC FREE SERPL-MCNC: NORMAL MG/L
M PROTEIN SERPL ELPH-MCNC: ABNORMAL G/DL
PROT SERPL-MCNC: 5.1 G/DL (ref 6–8.5)
REFLEX TESTING: ABNORMAL

## 2025-05-07 ENCOUNTER — TELEPHONE (OUTPATIENT)
Age: 85
End: 2025-05-07

## 2025-05-07 ENCOUNTER — APPOINTMENT (OUTPATIENT)
Facility: HOSPITAL | Age: 85
End: 2025-05-07
Payer: MEDICARE

## 2025-05-07 ENCOUNTER — HOSPITAL ENCOUNTER (INPATIENT)
Facility: HOSPITAL | Age: 85
LOS: 2 days | Discharge: HOME HEALTH | End: 2025-05-10
Attending: STUDENT IN AN ORGANIZED HEALTH CARE EDUCATION/TRAINING PROGRAM | Admitting: FAMILY MEDICINE
Payer: MEDICARE

## 2025-05-07 DIAGNOSIS — R68.89 RIGORS: ICD-10-CM

## 2025-05-07 DIAGNOSIS — N05.9 NEPHRITIS: ICD-10-CM

## 2025-05-07 DIAGNOSIS — N39.0 URINARY TRACT INFECTION WITH HEMATURIA, SITE UNSPECIFIED: ICD-10-CM

## 2025-05-07 DIAGNOSIS — R60.0 PERIPHERAL EDEMA: Primary | ICD-10-CM

## 2025-05-07 DIAGNOSIS — R31.9 URINARY TRACT INFECTION WITH HEMATURIA, SITE UNSPECIFIED: ICD-10-CM

## 2025-05-07 DIAGNOSIS — Z96.0 URETERAL STENT PRESENT: ICD-10-CM

## 2025-05-07 LAB
ALBUMIN SERPL-MCNC: 2.8 G/DL (ref 3.5–5)
ALBUMIN/GLOB SERPL: 0.8 (ref 1.1–2.2)
ALP SERPL-CCNC: 78 U/L (ref 45–117)
ALT SERPL-CCNC: 24 U/L (ref 12–78)
ANION GAP SERPL CALC-SCNC: 4 MMOL/L (ref 2–12)
AST SERPL-CCNC: 19 U/L (ref 15–37)
BASOPHILS # BLD: 0.01 K/UL (ref 0–0.1)
BASOPHILS NFR BLD: 0.2 % (ref 0–1)
BILIRUB SERPL-MCNC: 0.6 MG/DL (ref 0.2–1)
BUN SERPL-MCNC: 34 MG/DL (ref 6–20)
BUN/CREAT SERPL: 16 (ref 12–20)
CALCIUM SERPL-MCNC: 8.5 MG/DL (ref 8.5–10.1)
CHLORIDE SERPL-SCNC: 102 MMOL/L (ref 97–108)
CO2 SERPL-SCNC: 31 MMOL/L (ref 21–32)
COMMENT:: NORMAL
COMMENT:: NORMAL
CREAT SERPL-MCNC: 2.12 MG/DL (ref 0.7–1.3)
DIFFERENTIAL METHOD BLD: ABNORMAL
EOSINOPHIL # BLD: 0.04 K/UL (ref 0–0.4)
EOSINOPHIL NFR BLD: 0.9 % (ref 0–7)
ERYTHROCYTE [DISTWIDTH] IN BLOOD BY AUTOMATED COUNT: 15.9 % (ref 11.5–14.5)
GLOBULIN SER CALC-MCNC: 3.4 G/DL (ref 2–4)
GLUCOSE SERPL-MCNC: 94 MG/DL (ref 65–100)
HCT VFR BLD AUTO: 33.8 % (ref 36.6–50.3)
HGB BLD-MCNC: 10.7 G/DL (ref 12.1–17)
IMM GRANULOCYTES # BLD AUTO: 0.03 K/UL (ref 0–0.04)
IMM GRANULOCYTES NFR BLD AUTO: 0.7 % (ref 0–0.5)
LACTATE SERPL-SCNC: 1.1 MMOL/L (ref 0.4–2)
LYMPHOCYTES # BLD: 1.22 K/UL (ref 0.8–3.5)
LYMPHOCYTES NFR BLD: 27.3 % (ref 12–49)
MCH RBC QN AUTO: 27.4 PG (ref 26–34)
MCHC RBC AUTO-ENTMCNC: 31.7 G/DL (ref 30–36.5)
MCV RBC AUTO: 86.4 FL (ref 80–99)
MONOCYTES # BLD: 0.47 K/UL (ref 0–1)
MONOCYTES NFR BLD: 10.5 % (ref 5–13)
NEUTS SEG # BLD: 2.7 K/UL (ref 1.8–8)
NEUTS SEG NFR BLD: 60.4 % (ref 32–75)
NRBC # BLD: 0 K/UL (ref 0–0.01)
NRBC BLD-RTO: 0 PER 100 WBC
NT PRO BNP: 4816 PG/ML
PLATELET # BLD AUTO: 171 K/UL (ref 150–400)
PMV BLD AUTO: 9.3 FL (ref 8.9–12.9)
POTASSIUM SERPL-SCNC: 4.2 MMOL/L (ref 3.5–5.1)
PROCALCITONIN SERPL-MCNC: 0.12 NG/ML
PROT SERPL-MCNC: 6.2 G/DL (ref 6.4–8.2)
RBC # BLD AUTO: 3.91 M/UL (ref 4.1–5.7)
SODIUM SERPL-SCNC: 137 MMOL/L (ref 136–145)
SPECIMEN HOLD: NORMAL
SPECIMEN HOLD: NORMAL
WBC # BLD AUTO: 4.3 K/UL (ref 4.1–11.1)
WBC # BLD AUTO: 4.5 K/UL (ref 4.1–11.1)

## 2025-05-07 PROCEDURE — 83880 ASSAY OF NATRIURETIC PEPTIDE: CPT

## 2025-05-07 PROCEDURE — 74176 CT ABD & PELVIS W/O CONTRAST: CPT

## 2025-05-07 PROCEDURE — 85004 AUTOMATED DIFF WBC COUNT: CPT

## 2025-05-07 PROCEDURE — 36415 COLL VENOUS BLD VENIPUNCTURE: CPT

## 2025-05-07 PROCEDURE — 71046 X-RAY EXAM CHEST 2 VIEWS: CPT

## 2025-05-07 PROCEDURE — 80053 COMPREHEN METABOLIC PANEL: CPT

## 2025-05-07 PROCEDURE — 84145 PROCALCITONIN (PCT): CPT

## 2025-05-07 PROCEDURE — 85027 COMPLETE CBC AUTOMATED: CPT

## 2025-05-07 PROCEDURE — 99285 EMERGENCY DEPT VISIT HI MDM: CPT

## 2025-05-07 PROCEDURE — 83605 ASSAY OF LACTIC ACID: CPT

## 2025-05-07 PROCEDURE — 85048 AUTOMATED LEUKOCYTE COUNT: CPT

## 2025-05-07 PROCEDURE — 87040 BLOOD CULTURE FOR BACTERIA: CPT

## 2025-05-07 ASSESSMENT — PAIN - FUNCTIONAL ASSESSMENT: PAIN_FUNCTIONAL_ASSESSMENT: NONE - DENIES PAIN

## 2025-05-07 NOTE — ED TRIAGE NOTES
Patient presents via EMS from [facility] with a chief complaint of shaking. Patient states this started around noon today. Patient is hypertensive on arrival, not in any pain at this time, AOx4.    Patient takes blood pressure medication, but does not remember the name of it.

## 2025-05-08 PROBLEM — N39.0 UTI (URINARY TRACT INFECTION): Status: ACTIVE | Noted: 2025-05-08

## 2025-05-08 LAB
APPEARANCE UR: CLEAR
BACTERIA URNS QL MICRO: NEGATIVE /HPF
BILIRUB UR QL: NEGATIVE
COLOR UR: ABNORMAL
EPITH CASTS URNS QL MICRO: ABNORMAL /LPF
GLUCOSE BLD STRIP.AUTO-MCNC: 233 MG/DL (ref 65–117)
GLUCOSE BLD STRIP.AUTO-MCNC: 387 MG/DL (ref 65–117)
GLUCOSE BLD STRIP.AUTO-MCNC: 404 MG/DL (ref 65–117)
GLUCOSE UR STRIP.AUTO-MCNC: NEGATIVE MG/DL
HGB UR QL STRIP: ABNORMAL
HYALINE CASTS URNS QL MICRO: ABNORMAL /LPF (ref 0–5)
KETONES UR QL STRIP.AUTO: NEGATIVE MG/DL
LEUKOCYTE ESTERASE UR QL STRIP.AUTO: ABNORMAL
NITRITE UR QL STRIP.AUTO: NEGATIVE
PH UR STRIP: 7 (ref 5–8)
PROT UR STRIP-MCNC: 100 MG/DL
RBC #/AREA URNS HPF: >100 /HPF (ref 0–5)
SERVICE CMNT-IMP: ABNORMAL
SP GR UR REFRACTOMETRY: 1.01 (ref 1–1.03)
UROBILINOGEN UR QL STRIP.AUTO: 0.2 EU/DL (ref 0.2–1)
WBC URNS QL MICRO: ABNORMAL /HPF (ref 0–4)

## 2025-05-08 PROCEDURE — 6360000002 HC RX W HCPCS: Performed by: NURSE PRACTITIONER

## 2025-05-08 PROCEDURE — 82962 GLUCOSE BLOOD TEST: CPT

## 2025-05-08 PROCEDURE — 2500000003 HC RX 250 WO HCPCS: Performed by: STUDENT IN AN ORGANIZED HEALTH CARE EDUCATION/TRAINING PROGRAM

## 2025-05-08 PROCEDURE — 87186 SC STD MICRODIL/AGAR DIL: CPT

## 2025-05-08 PROCEDURE — 2500000003 HC RX 250 WO HCPCS: Performed by: NURSE PRACTITIONER

## 2025-05-08 PROCEDURE — 1100000000 HC RM PRIVATE

## 2025-05-08 PROCEDURE — 6360000002 HC RX W HCPCS: Performed by: STUDENT IN AN ORGANIZED HEALTH CARE EDUCATION/TRAINING PROGRAM

## 2025-05-08 PROCEDURE — 6370000000 HC RX 637 (ALT 250 FOR IP): Performed by: NURSE PRACTITIONER

## 2025-05-08 PROCEDURE — 81001 URINALYSIS AUTO W/SCOPE: CPT

## 2025-05-08 PROCEDURE — 87088 URINE BACTERIA CULTURE: CPT

## 2025-05-08 PROCEDURE — 87086 URINE CULTURE/COLONY COUNT: CPT

## 2025-05-08 PROCEDURE — 6370000000 HC RX 637 (ALT 250 FOR IP): Performed by: FAMILY MEDICINE

## 2025-05-08 PROCEDURE — 2500000003 HC RX 250 WO HCPCS: Performed by: FAMILY MEDICINE

## 2025-05-08 RX ORDER — SODIUM CHLORIDE 9 MG/ML
INJECTION, SOLUTION INTRAVENOUS PRN
Status: DISCONTINUED | OUTPATIENT
Start: 2025-05-08 | End: 2025-05-10 | Stop reason: HOSPADM

## 2025-05-08 RX ORDER — PREDNISONE 20 MG/1
40 TABLET ORAL DAILY
Status: DISCONTINUED | OUTPATIENT
Start: 2025-05-08 | End: 2025-05-09 | Stop reason: SDUPTHER

## 2025-05-08 RX ORDER — PANTOPRAZOLE SODIUM 40 MG/1
40 TABLET, DELAYED RELEASE ORAL EVERY MORNING
Status: DISCONTINUED | OUTPATIENT
Start: 2025-05-08 | End: 2025-05-10 | Stop reason: HOSPADM

## 2025-05-08 RX ORDER — ENOXAPARIN SODIUM 100 MG/ML
30 INJECTION SUBCUTANEOUS 2 TIMES DAILY
Status: DISCONTINUED | OUTPATIENT
Start: 2025-05-08 | End: 2025-05-08

## 2025-05-08 RX ORDER — METOPROLOL SUCCINATE 25 MG/1
12.5 TABLET, EXTENDED RELEASE ORAL DAILY
Status: DISCONTINUED | OUTPATIENT
Start: 2025-05-08 | End: 2025-05-10 | Stop reason: HOSPADM

## 2025-05-08 RX ORDER — INSULIN LISPRO 100 [IU]/ML
0-4 INJECTION, SOLUTION INTRAVENOUS; SUBCUTANEOUS
Status: DISCONTINUED | OUTPATIENT
Start: 2025-05-08 | End: 2025-05-09

## 2025-05-08 RX ORDER — ACETAMINOPHEN 325 MG/1
650 TABLET ORAL EVERY 6 HOURS PRN
Status: DISCONTINUED | OUTPATIENT
Start: 2025-05-08 | End: 2025-05-10 | Stop reason: HOSPADM

## 2025-05-08 RX ORDER — ACETAMINOPHEN 650 MG/1
650 SUPPOSITORY RECTAL EVERY 6 HOURS PRN
Status: DISCONTINUED | OUTPATIENT
Start: 2025-05-08 | End: 2025-05-10 | Stop reason: HOSPADM

## 2025-05-08 RX ORDER — HYDRALAZINE HYDROCHLORIDE 50 MG/1
50 TABLET, FILM COATED ORAL EVERY 8 HOURS SCHEDULED
Status: DISCONTINUED | OUTPATIENT
Start: 2025-05-08 | End: 2025-05-10 | Stop reason: HOSPADM

## 2025-05-08 RX ORDER — INSULIN LISPRO 100 [IU]/ML
8 INJECTION, SOLUTION INTRAVENOUS; SUBCUTANEOUS ONCE
Status: COMPLETED | OUTPATIENT
Start: 2025-05-08 | End: 2025-05-08

## 2025-05-08 RX ORDER — POTASSIUM CHLORIDE 7.45 MG/ML
10 INJECTION INTRAVENOUS PRN
Status: DISCONTINUED | OUTPATIENT
Start: 2025-05-08 | End: 2025-05-10 | Stop reason: HOSPADM

## 2025-05-08 RX ORDER — PRAVASTATIN SODIUM 10 MG
20 TABLET ORAL NIGHTLY
Status: DISCONTINUED | OUTPATIENT
Start: 2025-05-08 | End: 2025-05-10 | Stop reason: HOSPADM

## 2025-05-08 RX ORDER — POLYETHYLENE GLYCOL 3350 17 G/17G
17 POWDER, FOR SOLUTION ORAL DAILY PRN
Status: DISCONTINUED | OUTPATIENT
Start: 2025-05-08 | End: 2025-05-10 | Stop reason: HOSPADM

## 2025-05-08 RX ORDER — SODIUM CHLORIDE 0.9 % (FLUSH) 0.9 %
5-40 SYRINGE (ML) INJECTION PRN
Status: DISCONTINUED | OUTPATIENT
Start: 2025-05-08 | End: 2025-05-10 | Stop reason: HOSPADM

## 2025-05-08 RX ORDER — DEXTROSE MONOHYDRATE 100 MG/ML
INJECTION, SOLUTION INTRAVENOUS CONTINUOUS PRN
Status: DISCONTINUED | OUTPATIENT
Start: 2025-05-08 | End: 2025-05-10 | Stop reason: HOSPADM

## 2025-05-08 RX ORDER — POTASSIUM CHLORIDE 750 MG/1
40 TABLET, EXTENDED RELEASE ORAL PRN
Status: DISCONTINUED | OUTPATIENT
Start: 2025-05-08 | End: 2025-05-10 | Stop reason: HOSPADM

## 2025-05-08 RX ORDER — BUMETANIDE 1 MG/1
1 TABLET ORAL 2 TIMES DAILY
Status: DISCONTINUED | OUTPATIENT
Start: 2025-05-08 | End: 2025-05-10 | Stop reason: HOSPADM

## 2025-05-08 RX ORDER — LOSARTAN POTASSIUM 25 MG/1
25 TABLET ORAL DAILY
Status: DISCONTINUED | OUTPATIENT
Start: 2025-05-08 | End: 2025-05-10 | Stop reason: HOSPADM

## 2025-05-08 RX ORDER — SULFAMETHOXAZOLE AND TRIMETHOPRIM 400; 80 MG/1; MG/1
1 TABLET ORAL
Status: DISCONTINUED | OUTPATIENT
Start: 2025-05-09 | End: 2025-05-10 | Stop reason: HOSPADM

## 2025-05-08 RX ORDER — ONDANSETRON 4 MG/1
4 TABLET, ORALLY DISINTEGRATING ORAL EVERY 8 HOURS PRN
Status: DISCONTINUED | OUTPATIENT
Start: 2025-05-08 | End: 2025-05-10 | Stop reason: HOSPADM

## 2025-05-08 RX ORDER — MAGNESIUM SULFATE IN WATER 40 MG/ML
2000 INJECTION, SOLUTION INTRAVENOUS PRN
Status: DISCONTINUED | OUTPATIENT
Start: 2025-05-08 | End: 2025-05-10 | Stop reason: HOSPADM

## 2025-05-08 RX ORDER — SODIUM CHLORIDE 0.9 % (FLUSH) 0.9 %
5-40 SYRINGE (ML) INJECTION EVERY 12 HOURS SCHEDULED
Status: DISCONTINUED | OUTPATIENT
Start: 2025-05-08 | End: 2025-05-10 | Stop reason: HOSPADM

## 2025-05-08 RX ORDER — AMIODARONE HYDROCHLORIDE 200 MG/1
100 TABLET ORAL DAILY
Status: DISCONTINUED | OUTPATIENT
Start: 2025-05-09 | End: 2025-05-10 | Stop reason: HOSPADM

## 2025-05-08 RX ORDER — ONDANSETRON 2 MG/ML
4 INJECTION INTRAMUSCULAR; INTRAVENOUS EVERY 6 HOURS PRN
Status: DISCONTINUED | OUTPATIENT
Start: 2025-05-08 | End: 2025-05-10 | Stop reason: HOSPADM

## 2025-05-08 RX ORDER — INSULIN LISPRO 100 [IU]/ML
10 INJECTION, SOLUTION INTRAVENOUS; SUBCUTANEOUS ONCE
Status: COMPLETED | OUTPATIENT
Start: 2025-05-08 | End: 2025-05-08

## 2025-05-08 RX ADMIN — WATER 1000 MG: 1 INJECTION INTRAMUSCULAR; INTRAVENOUS; SUBCUTANEOUS at 22:41

## 2025-05-08 RX ADMIN — METOPROLOL SUCCINATE 12.5 MG: 25 TABLET, EXTENDED RELEASE ORAL at 10:06

## 2025-05-08 RX ADMIN — INSULIN LISPRO 4 UNITS: 100 INJECTION, SOLUTION INTRAVENOUS; SUBCUTANEOUS at 18:08

## 2025-05-08 RX ADMIN — PRAVASTATIN SODIUM 20 MG: 10 TABLET ORAL at 20:35

## 2025-05-08 RX ADMIN — LOSARTAN POTASSIUM 25 MG: 25 TABLET, FILM COATED ORAL at 10:06

## 2025-05-08 RX ADMIN — BUMETANIDE 1 MG: 1 TABLET ORAL at 18:11

## 2025-05-08 RX ADMIN — HYDRALAZINE HYDROCHLORIDE 50 MG: 50 TABLET ORAL at 16:12

## 2025-05-08 RX ADMIN — PREDNISONE 40 MG: 20 TABLET ORAL at 10:06

## 2025-05-08 RX ADMIN — HYDRALAZINE HYDROCHLORIDE 50 MG: 50 TABLET ORAL at 10:11

## 2025-05-08 RX ADMIN — INSULIN LISPRO 8 UNITS: 100 INJECTION, SOLUTION INTRAVENOUS; SUBCUTANEOUS at 18:09

## 2025-05-08 RX ADMIN — PANTOPRAZOLE SODIUM 40 MG: 40 TABLET, DELAYED RELEASE ORAL at 10:06

## 2025-05-08 RX ADMIN — INSULIN LISPRO 1 UNITS: 100 INJECTION, SOLUTION INTRAVENOUS; SUBCUTANEOUS at 12:38

## 2025-05-08 RX ADMIN — WATER 1000 MG: 1 INJECTION INTRAMUSCULAR; INTRAVENOUS; SUBCUTANEOUS at 03:13

## 2025-05-08 RX ADMIN — HYDRALAZINE HYDROCHLORIDE 50 MG: 50 TABLET ORAL at 22:41

## 2025-05-08 RX ADMIN — SODIUM CHLORIDE, PRESERVATIVE FREE 10 ML: 5 INJECTION INTRAVENOUS at 20:36

## 2025-05-08 RX ADMIN — SODIUM CHLORIDE, PRESERVATIVE FREE 10 ML: 5 INJECTION INTRAVENOUS at 10:09

## 2025-05-08 RX ADMIN — INSULIN LISPRO 10 UNITS: 100 INJECTION, SOLUTION INTRAVENOUS; SUBCUTANEOUS at 20:46

## 2025-05-08 RX ADMIN — BUMETANIDE 1 MG: 1 TABLET ORAL at 10:11

## 2025-05-08 NOTE — CARE COORDINATION
05/08/25 1204   Readmission Assessment   Number of Days since last admission? 8-30 days  (Previous hospitalization dates; 4/8/25 - 4/22/25)   Previous Disposition SNF  ([facility])   Who is being Interviewed Patient   What was the patient's/caregiver's perception as to why they think they needed to return back to the hospital? Other (Comment)  (Rigors/shaking)   Did you visit your Primary Care Physician after you left the hospital, before you returned this time? No   Why weren't you able to visit your PCP? Other (Comment)  (Seen by MD in SNF)   Did you see a specialist, such as Cardiac, Pulmonary, Orthopedic Physician, etc. after you left the hospital? No   Who advised the patient to return to the hospital? Skilled Unit   Does the patient report anything that got in the way of taking their medications? No   In our efforts to provide the best possible care to you and others like you, can you think of anything that we could have done to help you after you left the hospital the first time, so that you might not have needed to return so soon? Teach back instructions regarding management of illness     [facility]   [phone]

## 2025-05-08 NOTE — CARE COORDINATION
Care Management Initial Assessment       RUR: 21% High   Readmission? Yes - Previous hospitalization dates; 4/8/25 - 4/22/25  1st IM letter given? Yes - 5/8/25  1st  letter given: NA     05/08/25 0712   Service Assessment   Patient Orientation Alert and Oriented;Person;Place;Situation;Self   Cognition Alert   History Provided By Patient   Primary Caregiver Self   Accompanied By/Relationship EMS - Admitted from [facility]   Support Systems Spouse/Significant Other;Children   Patient's Healthcare Decision Maker is: Legal Next of Kin   PCP Verified by CM Yes  (Dr. Maria Luisa Amezcua)   Last Visit to PCP Within last 3 months   Prior Functional Level Independent in ADLs/IADLs   Current Functional Level Independent in ADLs/IADLs   Can patient return to prior living arrangement Yes   Ability to make needs known: Good   Family able to assist with home care needs: Yes   Would you like for me to discuss the discharge plan with any other family members/significant others, and if so, who? Yes  (Upon patient request)   Financial Resources Medicare   Social/Functional History   Lives With Spouse   Type of Home House   Home Layout One level   Home Access Level entry   Bathroom Shower/Tub Shower chair with back   Bathroom Equipment Grab bars in shower;Shower chair;Commode   Home Equipment Rollator;Walker - Rolling;Grab bars   Prior Level of Assist for ADLs Independent   Prior Level of Assist for Homemaking Independent   Ambulation Assistance Independent   Prior Level of Assist for Transfers Independent   Discharge Planning   Type of Residence House   Living Arrangements Spouse/Significant Other   Current Services Prior To Admission Durable Medical Equipment;Other (Comment)  (Owns DME; however, does not use at baseline)   Potential Assistance Needed Other (Comment)  (TBD)   DME Ordered? No   Potential Assistance Purchasing Medications No   Patient expects to be discharged to: [facility]

## 2025-05-08 NOTE — H&P
Updated: 05/07/25 2230     Special Requests NO SPECIAL REQUESTS        Culture NO GROWTH <24 HRS       Blood Culture 2 [[id number]] Collected: 05/07/25 2130    Order Status: Completed Specimen: Blood Updated: 05/07/25 2229     Special Requests NO SPECIAL REQUESTS        Culture NO GROWTH <24 HRS                IMAGING:   XR CHEST (2 VW)   Final Result   Suspected small left pleural effusion. No lung consolidation.         Electronically signed by Vicente Perez      CT ABDOMEN PELVIS WO CONTRAST Additional Contrast? None   Final Result      1. The proximal part of the right ureteral stent is positioned in the right   upper pole calyx, with residual right-sided hydronephrosis.   2. Stable nonobstructing calculi in the right kidney, measuring up to 9 mm.   3. Moderate bilateral pleural effusions with bibasilar atelectasis      Electronically signed by Dusty Enciso           ECG/ECHO:    Encounter Date: 04/08/25   EKG 12 Lead   Result Value    Ventricular Rate 63    Atrial Rate 63    P-R Interval 176    QRS Duration 150    Q-T Interval 448    QTc Calculation (Bazett) 458    P Axis 64    R Axis 10    T Axis 14    Diagnosis      Sinus rhythm with occasional premature ventricular complexes  Right bundle branch block     Confirmed by Danielle Richardson M.D. ([id number]) on 4/22/2025 12:25:20 PM       04/08/25    ECHO (TTE) LIMITED (PRN CONTRAST/BUBBLE/STRAIN/3D) 04/11/2025 12:14 PM (Final)    Interpretation Summary    Left Ventricle: Normal left ventricular systolic function with a visually estimated EF of 55 - 60%. Left ventricle size is normal. Increased wall thickness. Normal wall motion.    Right Ventricle: Right ventricle is mildly dilated.    Aortic Valve: Calcified cusps. Mild regurgitation. Mild stenosis of the aortic valve. AV mean gradient is 14 mmHg. AV area by continuity VTI is 1.3 cm2.    Mitral Valve: Mild regurgitation.    Tricuspid Valve: The estimated RVSP is 35 mmHg.    Aorta: Mildly dilated aortic root. Ao root  PPI    #dyslipidemia  -continue statin    DIET: No diet orders on file   ISOLATION PRECAUTIONS: Contact  CODE STATUS: full code  DVT PROPHYLAXIS: SCD's or Sequential Compression Device  ANTICIPATED DISCHARGE: 2-3 days  ANTICIPATED DISPOSITION: Home    Signed By: Maria Luisa Villavicencio MD     May 8, 2025         Please note that this dictation may have been completed with Dragon, the computer voice recognition software.  Quite often unanticipated grammatical, syntax, homophones, and other interpretive errors are inadvertently transcribed by the computer software.  Please disregard these errors.  Please excuse any errors that have escaped final proofreading.

## 2025-05-08 NOTE — CONSULTS
New Urology Consult Note    Patient: Darius Burgos MRN: [id number]  SSN: [id number]    YOB: 1940  Age: 84 y.o.  Sex: male            Assessment:     Obstructive uropathy known dilation of right renal collecting system with several right renal calci  - renogram was last updated 7/2023 and demonstrated 31% function on the right with half-life of 29 minutes, 69% on the left and 2 minutes.   - 4/8/25 CT:  Persistent dilatation of the right renal collecting system to the level of the right UPJ. Right renal calculi,Multiple markedly thickened loops of proximal jejunum   - 4/11 s/p cysto with right stent placement with Dr. Rees     Recommendations:     1. R hydronephrosis chronic  pt currently with ureteral stent in place   Hx of chronic hydro .  - renal function improved with stent placement no urgent urological intervention indicated in the setting of active infection , no flank or back pain   - continue to follow urine and blood cultures  - Continue with broad spectrum antibiotics for now    2.  Gross hematuria-   - Rec urine overnight for urology to assess  - Continue to hold any insight coagulants.    Thank you for this consult. Please contact [facility] with any further questions/concerns.    Urology following     D/w Dr Lopez     History of Present Illness:     Reason for Consult:  R hydro with R ureteral stent in place    Darius Burgos is seen in consultation for reasons noted above at the request of Zulema Ross MD.    This is a 84 y.o. male with a history of chronic R UPJ obstruction patient underwent subsequent right ureteral stent placement on 4/11.  Patient presents back to hospital with complaints of rigors.  He states prior antibiotic use while at rehab.  Urology consulted to see patient for possible complicated UTI.    Patient is known to [facility].  Patient states no issues in the immediate postoperative.  However in the last week he has noted some  PM    K 4.2 05/07/2025 05:26 PM     05/07/2025 05:26 PM    CO2 31 05/07/2025 05:26 PM    BUN 34 05/07/2025 05:26 PM    GFRAA >60 06/07/2022 11:50 AM    GLOB 3.4 05/07/2025 05:26 PM    ALT 24 05/07/2025 05:26 PM        No results found for: \"PSA\", \"PSA2\", \"PSAR1\", \"PSA1\", \"PSA3\", \"PSAEXT\"         No results found for: \"HBA1C\", \"QHQ2LJIP\"     No results found for: \"CPK\", \"CKMB\", \"BNP\"       Urine/Blood Cultures:  Results       Procedure Component Value Units Date/Time    Culture, Urine [[id number]] Collected: 05/08/25 0145    Order Status: Sent Specimen: Urine, clean catch Updated: 05/08/25 0843    Blood Culture 1 [[id number]] Collected: 05/07/25 2130    Order Status: Completed Specimen: Blood Updated: 05/07/25 2230     Special Requests NO SPECIAL REQUESTS        Culture NO GROWTH <24 HRS       Blood Culture 2 [[id number]] Collected: 05/07/25 2130    Order Status: Completed Specimen: Blood Updated: 05/07/25 2229     Special Requests NO SPECIAL REQUESTS        Culture NO GROWTH <24 HRS                  CT: === 05/07/25 ===    CT ABDOMEN PELVIS WO CONTRAST    - Narrative -  EXAM: CT ABDOMEN PELVIS WO CONTRAST    INDICATION: rigors; renal stents recently    COMPARISON: CT abdomen pelvis April 8, 2025    IV CONTRAST: None.    ORAL CONTRAST: None    TECHNIQUE:  Thin axial images were obtained through the abdomen and pelvis. Coronal and  sagittal reformats were generated. CT dose reduction was achieved through use of  a standardized protocol tailored for this examination and automatic exposure  control for dose modulation.    The absence of intravenous contrast material reduces the sensitivity for  evaluation of the vasculature and solid organs.    FINDINGS:  LOWER THORAX: Moderate bilateral pleural effusions with basilar atelectasis.  LIVER: No mass.  BILIARY TREE: Cholecystectomy. CBD is not dilated.  SPLEEN: within normal limits.  PANCREAS: Multiple small exophytic cysts are unchanged compared to

## 2025-05-08 NOTE — ED NOTES
ED TO INPATIENT SBAR HANDOFF    Patient Name: Darius Burgos   :  1940  84 y.o.   MRN:  [id number]  ED Room #:  ER16/16     Situation  Code Status: Prior   Allergies: Patient has no known allergies.  Weight: Patient Vitals for the past 96 hrs (Last 3 readings):   Weight   25 2129 106 kg (233 lb 11 oz)       Arrived from: home    Chief Complaint:   Chief Complaint   Patient presents with    Shaking       Hospital Problem/Diagnosis:  Principal Problem:    UTI (urinary tract infection)  Resolved Problems:    * No resolved hospital problems. *      Mobility: no current mobility problem   ED Fall Risk: Presents to emergency department  because of falls (Syncope, seizure, or loss of consciousness): No, Age > 70: Yes, Altered Mental Status, Intoxication with alcohol or substance confusion (Disorientation, impaired judgment, poor safety awaremess, or inability to follow instructions): No, Impaired Mobility: Ambulates or transfers with assistive devices or assistance; Unable to ambulate or transer.: No, Nursing Judgement: Yes   Fell in ED or prior to admission: no   Restraints: no     Sitter: no   Family/Caregiver Present: no    Neet to know social/safety information:     Background  History:   Past Medical History:   Diagnosis Date    Arthritis     At risk for sleep apnea 2019    Stop Bang 5     Atrial fibrillation (HCC)     Atrial Flutter-Wittkamp    Cancer (HCC)     prostate cancer    Colon cancer (HCC)     Diabetes (HCC)     DM2    Elevated cholesterol     History of blood transfusion     over 25 years    History of kidney stones     [place] (hard of hearing)     bilateral hearing aids    Hypertension     PUD (peptic ulcer disease) 1980    Skin cancer        Assessment    Abnormal Assessment Findings: n/a  Imaging:   XR CHEST (2 VW)   Final Result   Suspected small left pleural effusion. No lung consolidation.         Electronically signed by Vicente Perez      CT ABDOMEN PELVIS WO CONTRAST Additional  16% Systolic 174     3% BUN abnormal (34 MG/DL)     3% Potassium 4.2 mmol/L     3% Sodium 137 mmol/L     1% Hematocrit abnormal (33.8 %)     1% WBC count 4.5 K/uL     0% Pulse 67     0% Pulse oximetry 97 %     0% Temperature 97.9 °F (36.6 °C)         FiO2 (%):   O2 Flow Rate: O2 Device: None (Room air)    Cardiac Rhythm:      Pain Scale: Pain Assessment  Pain Assessment: None - Denies Pain  Last documented pain score: (0-10 scale)    Last documented pain medication administered:      Mental Status: oriented, alert, coherent, logical, thought processes intact, and able to concentrate and follow conversation  Orientation Level:    NIH Score:    C-SSRS: Risk of Suicide: No Risk    PO Status: Regular  Bedside swallow:    [place] Coma Scale (GCS): James Coma Scale  Eye Opening: Spontaneous  Best Verbal Response: Oriented  Best Motor Response: Obeys commands  James Coma Scale Score: 15    Active LDA's:   Peripheral IV 05/07/25 Right Antecubital (Active)   Site Assessment Clean, dry & intact 05/07/25 1725   Line Status Blood return noted;Flushed;Normal saline locked;Specimen collected 05/07/25 1725   Line Care Connections checked and tightened 05/07/25 1725   Phlebitis Assessment No symptoms 05/07/25 1725   Infiltration Assessment 0 05/07/25 1725   Alcohol Cap Used No 05/07/25 1725   Dressing Status New dressing applied 05/07/25 1725   Dressing Type Transparent 05/07/25 1725   Dressing Intervention New 05/07/25 1725     Pertinent or High Risk Medications/Drips: no   If Yes, please provide details:   Titratable drips:    Pending Blood Product Administration:      Sepsis: Sepsis Risk Score   Predictive Model Details          16 (Low)  Factor Value    Calculated 5/8/2025 03:21 9% Urinary Tract Infection present    [facility] EARLY DETECTION OF SEPSIS VERSION 2 Model 7% O2 Delivery Method ROOM AIR     7% Age 84 years old     6% Albumin 2.8 g/dL     6% Creatinine 2.12 MG/DL     5% Count of Active LDAs 9     -4% Duration of

## 2025-05-08 NOTE — ED PROVIDER NOTES
[facility], [place] EMERGENCY DEPARTMENT  EMERGENCY DEPARTMENT ENCOUNTER      Pt Name: Darius Burgos  MRN: [id number]  Birthdate 1940  Date of evaluation: 5/7/2025  Provider: Kee Araujo DO    CHIEF COMPLAINT       Chief Complaint   Patient presents with    Shaking         HISTORY OF PRESENT ILLNESS   (Location/Symptom, Timing/Onset, Context/Setting, Quality, Duration, Modifying Factors, Severity)  Note limiting factors.   84-year-old male history of A-fib, prostate cancer, colon cancer, type 2 diabetes, hyperlipidemia, peptic ulcer disease presents today secondary to shaking chills.  Patient reports that he has had these for the past 1 day.  He thinks it is related to a kidney infection.  He recently had a right sided ureteral stent placed for kidney stones.  Per chart review was also diagnosed with IgA nephritis.  Patient has had increasing bilateral lower extremity edema.  He was told not to take his diuretics because of his renal function.  I spoke to his daughter on the phone who was worried about his leg swelling and how to manage it.  He denies any pain at this time.  Denies any shortness of breath.  Denies cough or fever.    The history is provided by the patient and a caregiver.         Review of External Medical Records:     Nursing Notes were reviewed.    REVIEW OF SYSTEMS    (2-9 systems for level 4, 10 or more for level 5)     Review of Systems   Constitutional:  Positive for chills.       Except as noted above the remainder of the review of systems was reviewed and negative.       PAST MEDICAL HISTORY     Past Medical History:   Diagnosis Date    Arthritis     At risk for sleep apnea 12/18/2019    Stop Bang 5     Atrial fibrillation (HCC)     Atrial Flutter-Wittkamp    Cancer (HCC)     prostate cancer    Colon cancer (HCC)     Diabetes (HCC)     DM2    Elevated cholesterol     History of blood transfusion     over 25 years    History of kidney stones     [place] (hard of hearing)     bilateral  CALCIUM CARBONATE (TUMS) 500 MG CHEWABLE TABLET    Take 1 tablet by mouth 3 times daily as needed for Heartburn    DICYCLOMINE (BENTYL) 10 MG CAPSULE    Take 1 capsule by mouth 4 times daily as needed (as needed fro abdominal pain)    FERROUS SULFATE (IRON 325) 325 (65 FE) MG TABLET    Take 1 tablet by mouth every other day    HYDRALAZINE (APRESOLINE) 50 MG TABLET    Take 1 tablet by mouth every 8 hours    INSULIN NPH (HUMULIN N KWIKPEN) 100 UNIT/ML INJECTION PEN    Inject 40 Units into the skin every morning Use 40 units NPH with each 40 mg dose of prednisone    INSULIN PEN NEEDLE 32G X 4 MM MISC    1 each by Does not apply route daily    LOSARTAN (COZAAR) 25 MG TABLET    Take 1 tablet by mouth daily    METFORMIN (GLUCOPHAGE) 1000 MG TABLET    Take 1 tablet by mouth 2 times daily    METOPROLOL SUCCINATE (TOPROL XL) 25 MG EXTENDED RELEASE TABLET    Take 0.5 tablets by mouth daily    ONDANSETRON (ZOFRAN-ODT) 4 MG DISINTEGRATING TABLET    Take 1 tablet by mouth every 8 hours as needed for Nausea or Vomiting    PANTOPRAZOLE (PROTONIX) 40 MG TABLET    Take 1 tablet by mouth every morning    PRAVASTATIN (PRAVACHOL) 20 MG TABLET    TAKE 1 TABLET BY MOUTH EVERY NIGHT AT BEDTIME FOR CHOLESTEROL    PREDNISONE (DELTASONE) 20 MG TABLET    Take 2 tablets by mouth daily for 57 doses    SODIUM BICARBONATE 650 MG TABLET    Take 1 tablet by mouth 2 times daily    SULFAMETHOXAZOLE-TRIMETHOPRIM (BACTRIM;SEPTRA) 400-80 MG PER TABLET    Take 1 tablet by mouth three times a week for 15 doses       ALLERGIES     Patient has no known allergies.    FAMILY HISTORY       Family History   Problem Relation Age of Onset    Cancer Mother         ovarian    Stroke Father         TIA    No Known Problems Sister     Cancer Brother         lung, brain & throat          SOCIAL HISTORY       Social History     Socioeconomic History    Marital status:    Tobacco Use    Smoking status: Former     Current packs/day: 0.00     Types: Cigarettes,

## 2025-05-09 PROBLEM — Z96.0 URETERAL STENT PRESENT: Status: ACTIVE | Noted: 2025-05-09

## 2025-05-09 PROBLEM — E11.65 TYPE 2 DIABETES MELLITUS WITH HYPERGLYCEMIA, WITHOUT LONG-TERM CURRENT USE OF INSULIN (HCC): Status: ACTIVE | Noted: 2025-05-09

## 2025-05-09 PROBLEM — R60.0 PERIPHERAL EDEMA: Status: ACTIVE | Noted: 2025-05-09

## 2025-05-09 PROBLEM — N05.9 NEPHRITIS: Status: ACTIVE | Noted: 2025-05-09

## 2025-05-09 PROBLEM — R68.89 RIGORS: Status: ACTIVE | Noted: 2025-05-09

## 2025-05-09 LAB
ALBUMIN SERPL-MCNC: 2.4 G/DL (ref 3.5–5)
ANION GAP SERPL CALC-SCNC: 5 MMOL/L (ref 2–12)
BUN SERPL-MCNC: 41 MG/DL (ref 6–20)
BUN/CREAT SERPL: 19 (ref 12–20)
CALCIUM SERPL-MCNC: 8.2 MG/DL (ref 8.5–10.1)
CHLORIDE SERPL-SCNC: 101 MMOL/L (ref 97–108)
CO2 SERPL-SCNC: 30 MMOL/L (ref 21–32)
CREAT SERPL-MCNC: 2.18 MG/DL (ref 0.7–1.3)
EST. AVERAGE GLUCOSE BLD GHB EST-MCNC: 143 MG/DL
GLUCOSE BLD STRIP.AUTO-MCNC: 178 MG/DL (ref 65–117)
GLUCOSE BLD STRIP.AUTO-MCNC: 189 MG/DL (ref 65–117)
GLUCOSE BLD STRIP.AUTO-MCNC: 209 MG/DL (ref 65–117)
GLUCOSE BLD STRIP.AUTO-MCNC: 250 MG/DL (ref 65–117)
GLUCOSE BLD STRIP.AUTO-MCNC: 349 MG/DL (ref 65–117)
GLUCOSE SERPL-MCNC: 179 MG/DL (ref 65–100)
HBA1C MFR BLD: 6.6 % (ref 4–5.6)
PHOSPHATE SERPL-MCNC: 2.8 MG/DL (ref 2.6–4.7)
POTASSIUM SERPL-SCNC: 4.4 MMOL/L (ref 3.5–5.1)
SERVICE CMNT-IMP: ABNORMAL
SODIUM SERPL-SCNC: 136 MMOL/L (ref 136–145)

## 2025-05-09 PROCEDURE — 82962 GLUCOSE BLOOD TEST: CPT

## 2025-05-09 PROCEDURE — 80069 RENAL FUNCTION PANEL: CPT

## 2025-05-09 PROCEDURE — 6370000000 HC RX 637 (ALT 250 FOR IP)

## 2025-05-09 PROCEDURE — 2500000003 HC RX 250 WO HCPCS: Performed by: FAMILY MEDICINE

## 2025-05-09 PROCEDURE — 97116 GAIT TRAINING THERAPY: CPT

## 2025-05-09 PROCEDURE — 99221 1ST HOSP IP/OBS SF/LOW 40: CPT

## 2025-05-09 PROCEDURE — 2500000003 HC RX 250 WO HCPCS: Performed by: NURSE PRACTITIONER

## 2025-05-09 PROCEDURE — 97110 THERAPEUTIC EXERCISES: CPT

## 2025-05-09 PROCEDURE — 1100000000 HC RM PRIVATE

## 2025-05-09 PROCEDURE — 6370000000 HC RX 637 (ALT 250 FOR IP): Performed by: FAMILY MEDICINE

## 2025-05-09 PROCEDURE — 83036 HEMOGLOBIN GLYCOSYLATED A1C: CPT

## 2025-05-09 PROCEDURE — 6360000002 HC RX W HCPCS: Performed by: NURSE PRACTITIONER

## 2025-05-09 PROCEDURE — 97530 THERAPEUTIC ACTIVITIES: CPT

## 2025-05-09 PROCEDURE — 97161 PT EVAL LOW COMPLEX 20 MIN: CPT

## 2025-05-09 PROCEDURE — 97165 OT EVAL LOW COMPLEX 30 MIN: CPT

## 2025-05-09 RX ORDER — INSULIN LISPRO 100 [IU]/ML
0-8 INJECTION, SOLUTION INTRAVENOUS; SUBCUTANEOUS
Status: DISCONTINUED | OUTPATIENT
Start: 2025-05-09 | End: 2025-05-10 | Stop reason: HOSPADM

## 2025-05-09 RX ORDER — PREDNISONE 20 MG/1
40 TABLET ORAL DAILY
Status: DISCONTINUED | OUTPATIENT
Start: 2025-05-10 | End: 2025-05-10 | Stop reason: HOSPADM

## 2025-05-09 RX ADMIN — INSULIN HUMAN 40 UNITS: 100 INJECTION, SUSPENSION SUBCUTANEOUS at 10:25

## 2025-05-09 RX ADMIN — INSULIN LISPRO 2 UNITS: 100 INJECTION, SOLUTION INTRAVENOUS; SUBCUTANEOUS at 12:10

## 2025-05-09 RX ADMIN — WATER 1000 MG: 1 INJECTION INTRAMUSCULAR; INTRAVENOUS; SUBCUTANEOUS at 22:38

## 2025-05-09 RX ADMIN — SODIUM CHLORIDE, PRESERVATIVE FREE 10 ML: 5 INJECTION INTRAVENOUS at 10:29

## 2025-05-09 RX ADMIN — HYDRALAZINE HYDROCHLORIDE 50 MG: 50 TABLET ORAL at 15:43

## 2025-05-09 RX ADMIN — PRAVASTATIN SODIUM 20 MG: 10 TABLET ORAL at 20:37

## 2025-05-09 RX ADMIN — PREDNISONE 40 MG: 20 TABLET ORAL at 10:25

## 2025-05-09 RX ADMIN — HYDRALAZINE HYDROCHLORIDE 50 MG: 50 TABLET ORAL at 06:10

## 2025-05-09 RX ADMIN — HYDRALAZINE HYDROCHLORIDE 50 MG: 50 TABLET ORAL at 20:37

## 2025-05-09 RX ADMIN — SULFAMETHOXAZOLE AND TRIMETHOPRIM 1 TABLET: 400; 80 TABLET ORAL at 10:34

## 2025-05-09 RX ADMIN — SODIUM CHLORIDE, PRESERVATIVE FREE 10 ML: 5 INJECTION INTRAVENOUS at 20:37

## 2025-05-09 RX ADMIN — INSULIN LISPRO 6 UNITS: 100 INJECTION, SOLUTION INTRAVENOUS; SUBCUTANEOUS at 20:37

## 2025-05-09 RX ADMIN — BUMETANIDE 1 MG: 1 TABLET ORAL at 18:29

## 2025-05-09 RX ADMIN — METOPROLOL SUCCINATE 12.5 MG: 25 TABLET, EXTENDED RELEASE ORAL at 10:24

## 2025-05-09 RX ADMIN — AMIODARONE HYDROCHLORIDE 100 MG: 200 TABLET ORAL at 10:25

## 2025-05-09 RX ADMIN — LOSARTAN POTASSIUM 25 MG: 25 TABLET, FILM COATED ORAL at 10:25

## 2025-05-09 RX ADMIN — INSULIN LISPRO 2 UNITS: 100 INJECTION, SOLUTION INTRAVENOUS; SUBCUTANEOUS at 18:29

## 2025-05-09 RX ADMIN — BUMETANIDE 1 MG: 1 TABLET ORAL at 10:34

## 2025-05-09 ASSESSMENT — PAIN SCALES - GENERAL: PAINLEVEL_OUTOF10: 0

## 2025-05-09 NOTE — PLAN OF CARE
Problem: Physical Therapy - Adult  Goal: By Discharge: Performs mobility at highest level of function for planned discharge setting.  See evaluation for individualized goals.  Description: FUNCTIONAL STATUS PRIOR TO ADMISSION: Patient was independent and active without use of DME. Assisted spouse with her ADLs due to her being sick (but spouse currently residing at rehab facility). Patient reports 1-2 falls in past year.     HOME SUPPORT PRIOR TO ADMISSION: The patient lived with his spouse but did not require assistance. Reports home to be ~600 square ft with level entry.    Physical Therapy Goals  Initiated 5/9/2025  1.  Patient will move from supine to sit and sit to supine, scoot up and down, and roll side to side in bed with independence within 7 day(s).    2.  Patient will perform sit to stand with independence within 7 day(s).  3.  Patient will transfer from bed to chair and chair to bed with independence using the least restrictive device within 7 day(s).  4.  Patient will ambulate with modified independence for 75 feet with the least restrictive device within 7 day(s).     Outcome: Progressing     PHYSICAL THERAPY EVALUATION    Patient: Darius Burgos (84 y.o. male)  Date: 5/9/2025  Primary Diagnosis: Rigors [R68.89]  UTI (urinary tract infection) [N39.0]  Peripheral edema [R60.0]  Nephritis [N05.9]  Urinary tract infection with hematuria, site unspecified [N39.0, R31.9]  Ureteral stent present [Z96.0]       Precautions:              ASSESSMENT :   DEFICITS/IMPAIRMENTS:   The patient is limited by decreased independence in ADLs, high-level IADLs, activity tolerance, safety awareness, balance s/[facility] and tremors. Recent admission to [facility] for cystitis with ureteral stone s/p stenting.     Based on the impairments listed above, patient presents below initial baseline but tolerating mobility well. Patient adamant about returning home once cleared to discharge from hospital and

## 2025-05-09 NOTE — PROGRESS NOTES
Physical Therapy    Orders received, chart reviewed and patient evaluated by physical therapy. Pending progression with skilled acute physical therapy, recommend:    Intermittent physical therapy up to 2-3x/week in previous living setting with family support  Patient has DME and level entry home    Recommend with nursing OOB to chair 3x/day and walking daily with 1 person assist and rolling walker. Thank you for completing as able in order to maintain patient strength, endurance and independence.     Full evaluation to follow.      Thank you,  Devan Quinn, PT, DPT

## 2025-05-09 NOTE — CARE COORDINATION
Transition of Care Plan:  Medicare effective May 1/25  (Changed from [employer])     Admission  Rigors, UTI  Ureteral stent     RUR: 22%  Prior Level of Functioning: independent with adl's and iadl's  has dme but does not use at at baseline  [facility] 25- 25 (scheduled for discharge home 25)  Disposition:  Home  [place]: 5/10/25     If SNF or IPR: Date FOC offered: N/A  Date FOC received:   Accepting facility:   Date authorization started with reference number:   Date authorization received and expires:   Follow up appointments:     DME needed: none  has dme   RW, rollator, grab bars, shower chair and bsc  [facility] [phone]    AVS updated  Liaison  Keely  [phone]   Transportation at discharge: Family  Medical Follow Up PCP Maria Luisa Gramajo and Specialist   IM/IMM Medicare/ letter given: 25  Caregiver Contact:   Daughter  Shima Parada  [phone]  Wife  Mela Burgso [phone]  Son In Law  Waqas Quintanilla [phone]  Discharge Caregiver contacted prior to discharge?   Will call daughter   Care Conference needed? no  Barriers to discharge:  Medical     CM met with patient in his room to discuss discharge planning.  He was admitted to [facility] from [facility] on 25 and was to be discharged home on 22) but admitted to [facility] on 25.  Patient has been evaluated by therapy and recommendation home with HH.      Patient lives in one level home with his wife but she is currently in SNF--[facility].  They were in the same room during his stay at [facility].  He said he plans to discharge  home alone but has a lot of family support. He does not know when his wife will be discharged from SNF.     He and wife live in a 700 square foot home, on the property of his step son, José Antonio.  He said José Antonio will assist with grocery shopping and errands.  He does drive and is independent with adl's and adl's. Secures medications at [facility]

## 2025-05-09 NOTE — PROGRESS NOTES
Occupational Therapy  05/09/25    Orders received, chart reviewed and patient evaluated by occupational therapy. Pending progression with skilled acute occupational therapy, recommend:    Intermittent occupational therapy up to 2-3x/week in previous living setting with additional support needed for IADLs    Recommend with nursing patient to complete as able in order to maintain strength, endurance and independence: OOB to chair 3x/day, ADLs with Spv and performing toileting with Spv assist. Thank you for your assistance.     Full evaluation to follow.   Shelia Shaw, OT

## 2025-05-09 NOTE — PLAN OF CARE
Problem: Occupational Therapy - Adult  Goal: By Discharge: Performs self-care activities at highest level of function for planned discharge setting.  See evaluation for individualized goals.  Description: FUNCTIONAL STATUS PRIOR TO ADMISSION:  Pt has been in rehab receiving OT/PT. At baseline, Pt lives with his spouse (his spouse is currently in rehab) and is independent with BADLs.   , Prior Level of Assist for ADLs: Independent,  ,  ,  ,  ,  , Prior Level of Assist for Homemaking: Independent, Ambulation Assistance: Independent, Prior Level of Assist for Transfers: Independent,       HOME SUPPORT: Patient lived with spouse. Spouse unable to assist as she is in rehab.    Occupational Therapy Goals:  Initiated 5/9/2025  1.  Patient will perform lower body dressing with Modified [place] using AE within 7 day(s).  2.  Patient will perform bathing with Modified [place] within 7 day(s).  3.  Patient will participate in upper extremity therapeutic exercise/activities with Modified [place] for 10 minutes within 7 day(s).    4.  Patient will utilize energy conservation techniques during functional activities with verbal cues within 7 day(s).    Outcome: Progressing   OCCUPATIONAL THERAPY EVALUATION    Patient: Darius Burgos (84 y.o. male)  Date: 5/9/2025  Primary Diagnosis: Rigors [R68.89]  UTI (urinary tract infection) [N39.0]  Peripheral edema [R60.0]  Nephritis [N05.9]  Urinary tract infection with hematuria, site unspecified [N39.0, R31.9]  Ureteral stent present [Z96.0]       Precautions:                    ASSESSMENT :  The patient is limited by decreased independence in ADLs, high-level IADLs, sensation, and LE edema (much improved per Pt report).     Based on the impairments listed above Pt is completing BADLs Mod I-SBA which is slightly below his baseline of Mod I. Pt reports he was scheduled to discharge home from rehab today but instead came to hospital for c/o shaking/infection. Pt demonstrates  therapist, registered nurse, and     Patient Education  Education Given To: Patient  Education Provided: Role of Therapy;Plan of Care;ADL Adaptive Strategies;Transfer Training;Energy Conservation;Fall Prevention Strategies;Equipment  Education Method: Verbal  Barriers to Learning: None  Education Outcome: Verbalized understanding;Continued education needed    Thank you for this referral.  Shelia Shaw OT  Minutes: 23    Occupational Therapy Evaluation Charge Determination   History Examination Decision-Making   LOW Complexity : Brief history review  LOW Complexity: 1-3 Performance deficits relating to physical, cognitive, or psychosocial skills that result in activity limitations and/or participation restrictions LOW Complexity: No comorbidities that affect functional and  no verbal  or physical assist needed to complete eval tasks   Based on the above components, the patient evaluation is determined to be of the following complexity level: Low

## 2025-05-09 NOTE — CONSULTS
[facility]  PROGRAM FOR DIABETES HEALTH  DIABETES MANAGEMENT CONSULT    Consulted by Provider for advanced nursing evaluation and care for inpatient blood glucose management.    Evaluation and Action Plan   Darius Burgos is a 85 yo male with a history of DMT2, HLD, peptic ulcer, colon cancer, prostate cancer, Afib who presented for shaking and chills lasting for a day, he believes s/s are related to his kidney infection. He also reports increasing bilateral lower extremity swelling. He was recently admitted at [facility] and had a Right ureteral stent placed for kidney stones with diagnosis of IgA nephritis. CT abd pelvis WO contrast showed proximal part of the right ureteral stent is positioned in the right  upper pole calyx, with residual right-sided hydronephrosis with stable nonobstructing calculi in the right kidney, measuring up to 9 mm. He was admitted under hospitalist service for further workup an eval with consult for urology. The Program for Diabetes Health has been consulted to assist in glycemic management and advanced diabetes management assessment this admission.    Mr. Burgos is a type 2 diabetic with A1c of 6.6% indicating good glycemic control, but I would encourage using a more individualized A1c goal to prevent hypoglycemia which is common with lower A1c in the older adult population. He is currently taking Metformin 1000 mg BID. He endorses he eats whatever he wants, he is a big meat and potatoes nestor and he has a big sweet tooth. He checks his BG with FSBG with avg 's.     Admission BG 94. First dose of Prednisone PO 40 mg was given 5/8 with steroid induced hyperglycemia indicated by BG pattern of 233-404 requiring 23 units of correctional insulin. Primary care team started NPH 40 units to help override effects of steroid, current  at 1148. Anticipate BG will start to stabilize with more consistent insulin administration. Will increase correctional scale to medium dose. If BG is greater

## 2025-05-09 NOTE — PROGRESS NOTES
Patient: Darius Burgos MRN: [id number]  SSN: [id number]    YOB: 1940  Age: 84 y.o.  Sex: male        ADMITTED: 2025 to Zulema Ross MD by Maria Luisa Villavicencio MD for Rigors [R68.89]  UTI (urinary tract infection) [N39.0]  Peripheral edema [R60.0]  Nephritis [N05.9]  Urinary tract infection with hematuria, site unspecified [N39.0, R31.9]  Ureteral stent present [Z96.0]    Darius Burgos is doing well. Follow up for gross hematuria. No raked urine for assessment. Staff report tea colored urine this AM. He states that this has been the color of his urine since renal bx dated 25. PVR bladder scan yesterday 0 cc. No associated pain or complaints. Shaking has resolved.     Remains AF  No recent CBC.   Ucx + Proteus   Serum cr 2.18 (2.12), BUN 41    Vitals: Temp (24hrs), Av.5 °F (36.9 °C), Min:98.2 °F (36.8 °C), Max:98.8 °F (37.1 °C)    Blood pressure (!) 156/65, pulse 62, temperature 98.2 °F (36.8 °C), temperature source Oral, resp. rate 16, height 1.702 m (5' 7\"), weight 105.2 kg (231 lb 14.8 oz), SpO2 97%.    Intake and Output:  No intake/output data recorded.   07 -  1900  In: -   Out: 175 [Urine:175]  REMI Output lats 24 hrs: No data found.   REMI Output last 8 hrs: No data found.  BM over last 24 hrs: No data found.    Physical Exam  General: NAD, pleasant  Respiratory: no distress, breathing easily, room air  Abdomen: soft, no distention; non-tender to palpation  : no CVA tenderness, voiding independently, no UA for review  Neuro: Appropriate, no focal neurological deficits  Skin: warm, dry  Extremities: moves all, full ROM    Labs:  CBC:   Lab Results   Component Value Date/Time    WBC 4.3 2025 05:26 PM    WBC 4.5 2025 05:26 PM    HCT 33.8 2025 05:26 PM     2025 05:26 PM     BMP:   Lab Results   Component Value Date/Time     2025 06:27 AM    K 4.4 2025 06:27 AM     2025 06:27 AM    CO2 30 2025 06:27 AM    BUN

## 2025-05-10 VITALS
HEIGHT: 67 IN | TEMPERATURE: 98.4 F | SYSTOLIC BLOOD PRESSURE: 142 MMHG | BODY MASS INDEX: 36.96 KG/M2 | DIASTOLIC BLOOD PRESSURE: 60 MMHG | OXYGEN SATURATION: 98 % | WEIGHT: 235.45 LBS | RESPIRATION RATE: 18 BRPM | HEART RATE: 58 BPM

## 2025-05-10 LAB
ALBUMIN SERPL-MCNC: 2.2 G/DL (ref 3.5–5)
ANION GAP SERPL CALC-SCNC: 7 MMOL/L (ref 2–12)
BACTERIA SPEC CULT: ABNORMAL
BUN SERPL-MCNC: 47 MG/DL (ref 6–20)
BUN/CREAT SERPL: 20 (ref 12–20)
CALCIUM SERPL-MCNC: 8.2 MG/DL (ref 8.5–10.1)
CC UR VC: ABNORMAL
CHLORIDE SERPL-SCNC: 101 MMOL/L (ref 97–108)
CO2 SERPL-SCNC: 28 MMOL/L (ref 21–32)
CREAT SERPL-MCNC: 2.36 MG/DL (ref 0.7–1.3)
GLUCOSE BLD STRIP.AUTO-MCNC: 122 MG/DL (ref 65–117)
GLUCOSE BLD STRIP.AUTO-MCNC: 203 MG/DL (ref 65–117)
GLUCOSE SERPL-MCNC: 227 MG/DL (ref 65–100)
PHOSPHATE SERPL-MCNC: 3.3 MG/DL (ref 2.6–4.7)
POTASSIUM SERPL-SCNC: 4.1 MMOL/L (ref 3.5–5.1)
SERVICE CMNT-IMP: ABNORMAL
SODIUM SERPL-SCNC: 136 MMOL/L (ref 136–145)

## 2025-05-10 PROCEDURE — 2500000003 HC RX 250 WO HCPCS: Performed by: FAMILY MEDICINE

## 2025-05-10 PROCEDURE — 6370000000 HC RX 637 (ALT 250 FOR IP): Performed by: FAMILY MEDICINE

## 2025-05-10 PROCEDURE — 80069 RENAL FUNCTION PANEL: CPT

## 2025-05-10 PROCEDURE — 82962 GLUCOSE BLOOD TEST: CPT

## 2025-05-10 PROCEDURE — 6370000000 HC RX 637 (ALT 250 FOR IP)

## 2025-05-10 RX ORDER — CEFPODOXIME PROXETIL 100 MG/1
100 TABLET, FILM COATED ORAL 2 TIMES DAILY
Qty: 8 TABLET | Refills: 0 | Status: SHIPPED | OUTPATIENT
Start: 2025-05-10 | End: 2025-05-14

## 2025-05-10 RX ORDER — METOPROLOL SUCCINATE 25 MG/1
12.5 TABLET, EXTENDED RELEASE ORAL DAILY
Qty: 30 TABLET | Refills: 3 | Status: SHIPPED | OUTPATIENT
Start: 2025-05-11

## 2025-05-10 RX ORDER — PREDNISONE 10 MG/1
10 TABLET ORAL DAILY
Qty: 10 TABLET | Refills: 0 | Status: SHIPPED | OUTPATIENT
Start: 2025-05-10 | End: 2025-05-20

## 2025-05-10 RX ORDER — PREDNISONE 10 MG/1
TABLET ORAL
Qty: 30 TABLET | Refills: 0 | Status: SHIPPED | OUTPATIENT
Start: 2025-05-10 | End: 2025-05-22

## 2025-05-10 RX ADMIN — LOSARTAN POTASSIUM 25 MG: 25 TABLET, FILM COATED ORAL at 10:09

## 2025-05-10 RX ADMIN — AMIODARONE HYDROCHLORIDE 100 MG: 200 TABLET ORAL at 10:08

## 2025-05-10 RX ADMIN — HYDRALAZINE HYDROCHLORIDE 50 MG: 50 TABLET ORAL at 06:15

## 2025-05-10 RX ADMIN — METOPROLOL SUCCINATE 12.5 MG: 25 TABLET, EXTENDED RELEASE ORAL at 10:08

## 2025-05-10 RX ADMIN — INSULIN HUMAN 40 UNITS: 100 INJECTION, SUSPENSION SUBCUTANEOUS at 10:09

## 2025-05-10 RX ADMIN — BUMETANIDE 1 MG: 1 TABLET ORAL at 10:25

## 2025-05-10 RX ADMIN — PREDNISONE 40 MG: 20 TABLET ORAL at 10:08

## 2025-05-10 RX ADMIN — INSULIN LISPRO 2 UNITS: 100 INJECTION, SOLUTION INTRAVENOUS; SUBCUTANEOUS at 10:09

## 2025-05-10 RX ADMIN — SODIUM CHLORIDE, PRESERVATIVE FREE 10 ML: 5 INJECTION INTRAVENOUS at 10:12

## 2025-05-10 RX ADMIN — HYDRALAZINE HYDROCHLORIDE 50 MG: 50 TABLET ORAL at 14:52

## 2025-05-10 RX ADMIN — PANTOPRAZOLE SODIUM 40 MG: 40 TABLET, DELAYED RELEASE ORAL at 10:08

## 2025-05-10 NOTE — PLAN OF CARE
Problem: Chronic Conditions and Co-morbidities  Goal: Patient's chronic conditions and co-morbidity symptoms are monitored and maintained or improved  5/10/2025 1343 by Juana Bello RN  Outcome: Adequate for Discharge  5/10/2025 1156 by Juana Bello RN  Outcome: Progressing  5/9/2025 2348 by Joselyn Echols RN  Outcome: Progressing     Problem: Safety - Adult  Goal: Free from fall injury  5/10/2025 1343 by Juana Bello RN  Outcome: Adequate for Discharge  5/10/2025 1156 by Juana Bello RN  Outcome: Progressing  5/9/2025 2348 by Joselyn Echols RN  Outcome: Progressing

## 2025-05-10 NOTE — DISCHARGE INSTRUCTIONS
You will be on a steroid taper at home:      START NPH 40 units for Prednisone 40 mg   START NPH 30 units for Prednisone 30 mg   START NPH 20 units for Prednisone 20 mg   START NPH 10 units for Prednisone 10 mg      Steroid taper:    Prednisone taper  40 Mg QD x 3 days  30 MG QD x 3 days  20 Mg QD x 3 days  10 Mg QD x 3 day     You have follow up in place     Future Appointments   Date Time Provider Department Center   5/13/2025  9:40 AM Konrad Kingston MD [facility]   6/9/2025 11:20 AM Brunilda Gary APRN - NP [facility]   7/14/2025  3:00 PM [facility]   8/22/2025 10:40 AM Konrad Kingston MD [facility]          Urinary Tract Infections (UTI) in Men: Care Instructions  Overview     A urinary tract infection, or UTI, is a term for an infection anywhere between the kidneys and the urethra. (The urethra is the tube that carries urine from the bladder to outside the body.) Most UTIs are bladder infections. They often cause pain or burning when you urinate.  UTIs are caused by bacteria. This means they can be cured with antibiotics. Be sure to complete your treatment so that the infection does not get worse.  Follow-up care is a key part of your treatment and safety. Be sure to make and go to all appointments, and call your doctor if you are having problems. It's also a good idea to know your test results and keep a list of the medicines you take.  How can you care for yourself at home?  Take your antibiotics as prescribed. Do not stop taking them just because you feel better. You need to take the full course of antibiotics.  Take your medicines exactly as prescribed. Your doctor may have prescribed a medicine, such as phenazopyridine (Pyridium), to help relieve pain when you urinate. This turns your urine orange. You may stop taking it when your symptoms get better. But be sure to take all of your antibiotics, which treat the infection.  Drink extra water for the next day or two.

## 2025-05-10 NOTE — PROGRESS NOTES
Pt given discharge instructions including antibiotic regimen, relevant follow-up appointments, and prevention/management of UTI. Pt was given adequate time to ask questions. Pt is a+ox4 and is ambulatory. Pt will be discharged to home with a referral of home health via private car being driven by his stepson to his house in [place] accompanied by David (PCT).

## 2025-05-10 NOTE — PLAN OF CARE
Problem: Chronic Conditions and Co-morbidities  Goal: Patient's chronic conditions and co-morbidity symptoms are monitored and maintained or improved  5/10/2025 1156 by Juana Bello RN  Outcome: Progressing  5/9/2025 2348 by Joselyn Echols RN  Outcome: Progressing     Problem: Safety - Adult  Goal: Free from fall injury  5/10/2025 1156 by Juana Bello RN  Outcome: Progressing  5/9/2025 2348 by Joselyn Echols RN  Outcome: Progressing

## 2025-05-10 NOTE — PROGRESS NOTES
Hospitalist Progress Note  SHARON Owusu NP  Answering service: [phone] OR [phone] from in house phone        Date of Service:  5/10/2025  NAME:  Darius Burgos  :  1940  MRN:  [id number]      Admission Summary:     Darius Burgos is a 84 y.o. male with a pmhx a-fib/flutter, past colon cancer, past prostate cancer, DM II, dyslipidemia, ESRD on HD, HTN, PVD s/p right aka,  IGA nephritis, [facility] and is being admitted for sepsis due complicated cystitis.     He was previously admitted at [facility] from - for HSP and IgA nephritis, obstructive uropathy with r hydronephrosis with ureteral stone s/p right ureteral stent insertion, and acute cystitis.    Interval history / Subjective:     Patient with no further gross hematuria. Has been cleared by urology for discharge. Proteus growing in culture at 20,000 CFU. Awaiting sensitivities. Wants to \"stay in the hospital through completion of antibiotics\" we discussed that he would hopefully be sent out on oral antibiotics and would not need to stay in the hospital.     Plan is for discharge tomorrow AM once sensitivities are back. He is in agreement with this plan. Will ultimately need stone treatment outpatient with Urology.    Blood sugars consistently elevated despite 40 units NPH. Diabetes treatment center consulted.      Assessment & Plan:      Complicated recurrent cystitis  Obstructive uropathy s/p right ureteral stent and 9mm stone  -Procal 0.12, UA with moderate LE, >100 RBC, 10-20 WBC  -CTAP with proximal part of the right ureteral stent in the right upper pole calyx and residual right hydronephrosis  - urology consulted, will f/u outpatient for definitive stone treamtent   -continue ceftriaxone  -follow urine culture , thus far showing Proteus      IGA nehpropathy  HSP  --creatinine 2.18, BUN 34, albumin

## 2025-05-10 NOTE — DISCHARGE SUMMARY
Discharge Summary       PATIENT ID: Darius Burgos  MRN: [id number]   YOB: 1940    DATE OF ADMISSION: 5/7/2025  8:56 PM    DATE OF DISCHARGE: 05/10/25    PRIMARY CARE PROVIDER: Maria Luisa Gramajo MD     ATTENDING PHYSICIAN: Dr. Cleary   DISCHARGING PROVIDER: RHIANNON Jackson    To contact this individual call [phone] and ask the  to page.  If unavailable ask to be transferred the Adult Hospitalist Department.    CONSULTATIONS: IP CONSULT TO HOSPITALIST  IP CONSULT TO UROLOGY  IP CONSULT HOME HEALTH  IP CONSULT TO DIABETES MANAGEMENT    PROCEDURES/SURGERIES: * No surgery found *     ADMITTING DIAGNOSES & HOSPITAL COURSE:      Admission Summary:      Darius Burgos is a 84 y.o. male with a pmhx a-fib/flutter, past colon cancer, past prostate cancer, DM II, dyslipidemia, ESRD on HD, HTN, PVD s/p right aka,  IGA nephritis, [facility] and is being admitted for sepsis due complicated cystitis.     He was previously admitted at [facility] from 4/8-4/22 for HSP and IgA nephritis, obstructive uropathy with r hydronephrosis with ureteral stone s/p right ureteral stent insertion, and acute cystitis.     Interval history / Subjective:      Patient with no further gross hematuria. Has been cleared by urology for discharge. Proteus growing in culture at 20,000 CFU. Reviewed  sensitivities.OK to transition to oral ABX as clinically he is doing well  No pain  No c/o  No n/v  Tolerating reg diet  No fever  Ambulating independently    Pt strongly desired DC to home today and is clinically stable for DC, his daughter is coming to get him     Will ultimately need stone treatment outpatient with Urology.     Blood sugars consistently elevated despite 40 units NPH. Diabetes treatment center consulted.       Assessment & Plan:       Complicated recurrent cystitis  Obstructive uropathy s/p right ureteral stent and 9mm stone  -Procal 0.12, UA with moderate LE, >100 RBC, 10-20

## 2025-05-10 NOTE — PLAN OF CARE
Problem: Safety - Adult  Goal: Free from fall injury  5/9/2025 2348 by Joselyn Echols, RN  Outcome: Progressing     Problem: Chronic Conditions and Co-morbidities  Goal: Patient's chronic conditions and co-morbidity symptoms are monitored and maintained or improved  5/9/2025 2348 by Joselyn Echols, RN  Outcome: Progressing

## 2025-05-13 ENCOUNTER — OFFICE VISIT (OUTPATIENT)
Age: 85
End: 2025-05-13
Payer: MEDICARE

## 2025-05-13 VITALS
BODY MASS INDEX: 35.79 KG/M2 | SYSTOLIC BLOOD PRESSURE: 138 MMHG | HEART RATE: 57 BPM | WEIGHT: 228 LBS | OXYGEN SATURATION: 99 % | HEIGHT: 67 IN | DIASTOLIC BLOOD PRESSURE: 68 MMHG

## 2025-05-13 DIAGNOSIS — I10 ESSENTIAL (PRIMARY) HYPERTENSION: ICD-10-CM

## 2025-05-13 DIAGNOSIS — R06.09 DOE (DYSPNEA ON EXERTION): ICD-10-CM

## 2025-05-13 DIAGNOSIS — R06.02 SHORTNESS OF BREATH: ICD-10-CM

## 2025-05-13 DIAGNOSIS — I48.0 PAF (PAROXYSMAL ATRIAL FIBRILLATION) (HCC): ICD-10-CM

## 2025-05-13 DIAGNOSIS — R60.0 BILATERAL EDEMA OF LOWER EXTREMITY: Primary | ICD-10-CM

## 2025-05-13 PROCEDURE — 1160F RVW MEDS BY RX/DR IN RCRD: CPT | Performed by: INTERNAL MEDICINE

## 2025-05-13 PROCEDURE — 99214 OFFICE O/P EST MOD 30 MIN: CPT | Performed by: INTERNAL MEDICINE

## 2025-05-13 PROCEDURE — 1036F TOBACCO NON-USER: CPT | Performed by: INTERNAL MEDICINE

## 2025-05-13 PROCEDURE — 3078F DIAST BP <80 MM HG: CPT | Performed by: INTERNAL MEDICINE

## 2025-05-13 PROCEDURE — 1159F MED LIST DOCD IN RCRD: CPT | Performed by: INTERNAL MEDICINE

## 2025-05-13 PROCEDURE — G2211 COMPLEX E/M VISIT ADD ON: HCPCS | Performed by: INTERNAL MEDICINE

## 2025-05-13 PROCEDURE — 1126F AMNT PAIN NOTED NONE PRSNT: CPT | Performed by: INTERNAL MEDICINE

## 2025-05-13 PROCEDURE — 3075F SYST BP GE 130 - 139MM HG: CPT | Performed by: INTERNAL MEDICINE

## 2025-05-13 PROCEDURE — G8427 DOCREV CUR MEDS BY ELIG CLIN: HCPCS | Performed by: INTERNAL MEDICINE

## 2025-05-13 PROCEDURE — 1111F DSCHRG MED/CURRENT MED MERGE: CPT | Performed by: INTERNAL MEDICINE

## 2025-05-13 PROCEDURE — 1123F ACP DISCUSS/DSCN MKR DOCD: CPT | Performed by: INTERNAL MEDICINE

## 2025-05-13 PROCEDURE — G8417 CALC BMI ABV UP PARAM F/U: HCPCS | Performed by: INTERNAL MEDICINE

## 2025-05-13 RX ORDER — BUMETANIDE 1 MG/1
1 TABLET ORAL 2 TIMES DAILY
Qty: 120 TABLET | Refills: 0 | Status: SHIPPED | OUTPATIENT
Start: 2025-05-13 | End: 2025-07-12

## 2025-05-13 ASSESSMENT — PATIENT HEALTH QUESTIONNAIRE - PHQ9
SUM OF ALL RESPONSES TO PHQ QUESTIONS 1-9: 0
SUM OF ALL RESPONSES TO PHQ QUESTIONS 1-9: 0
2. FEELING DOWN, DEPRESSED OR HOPELESS: NOT AT ALL
1. LITTLE INTEREST OR PLEASURE IN DOING THINGS: NOT AT ALL
SUM OF ALL RESPONSES TO PHQ QUESTIONS 1-9: 0
SUM OF ALL RESPONSES TO PHQ QUESTIONS 1-9: 0

## 2025-05-13 NOTE — PROGRESS NOTES
[address] [place] [address] [phone] [address], [place] [address]     Subjective:        Darius Burgos is a 84 y.o. male is here for routine f/u.  Recently discharged from [facility] 5/10/2025-see details below.  The patient denies chest pain/ shortness of breath, orthopnea, PND, LE edema, palpitations, syncope, presyncope or fatigue.    Patient Active Problem List    Diagnosis Date Noted    Peripheral edema 05/09/2025    Nephritis 05/09/2025    Rigors 05/09/2025    Ureteral stent present 05/09/2025    Type 2 diabetes mellitus with hyperglycemia, without long-term current use of insulin (HCC) 05/09/2025    UTI (urinary tract infection) 05/08/2025    Steroid-induced hyperglycemia 04/22/2025    Hypervolemia 04/21/2025    Purpura 04/10/2025    Severe protein-calorie malnutrition 04/10/2025    Acute kidney injury 04/08/2025    Intravenous infiltration 09/04/2024    Leukocytosis 09/04/2024    Sepsis (HCC) 08/30/2024    Biliary obstruction (HCC) 11/16/2023    Colon polyp 02/21/2023    Rectal bleed 10/09/2021    Atrial flutter, paroxysmal (HCC) 10/09/2021    Colonic mass 10/09/2021    Combined forms of age-related cataract of right eye 01/09/2020      Maria Luisa Gramajo MD  Past Medical History:   Diagnosis Date    Arthritis     At risk for sleep apnea 12/18/2019    Stop Bang 5     Atrial fibrillation (HCC)     Atrial Flutter-Wittkamp    Cancer (HCC)     prostate cancer    Colon cancer (HCC)     Diabetes (HCC)     DM2    Elevated cholesterol     History of blood transfusion     over 25 years    History of kidney stones     [place] (hard of hearing)     bilateral hearing aids    Hypertension     PUD (peptic ulcer disease) 1980    Skin cancer       Past Surgical History:   Procedure Laterality Date    APPENDECTOMY      1960's    CATARACT REMOVAL Bilateral     CHOLECYSTECTOMY, LAPAROSCOPIC N/A 11/19/2023    LAPAROSCOPIC CHOLCYSTECTOMY  WITH GRAMS performed by

## 2025-05-14 NOTE — PROGRESS NOTES
Physician Progress Note      PATIENT:               ELENITA DIAZ  CSN #:                  [id number]  :                       1940  ADMIT DATE:       2025 8:56 PM  DISCH DATE:        5/10/2025 4:10 PM  RESPONDING  PROVIDER #:        Zulema Ross MD          QUERY TEXT:    Sepsis is documented in the medical record H&P on . Please provide   additional clinical indicators supportive of your documentation. Or please   document if the diagnosis of sepsis has been ruled out after study.    The clinical indicators include:  Patient presented with chills/ rigors, bilateral lower extremity edema.   Recently had a right sided ureteral stent placed for kidney stones  Admitted with Cystitis  Clinical indicators include:  : Procal-0.12, LA-1.1, WBC-4.5  VSS without fever, HR 67-83   ED note \"No signs of sepsis.\"   H&P \"being admitted for sepsis due complicated cystitis.\"  Options provided:  -- Sepsis present as evidenced by, Please document evidence.  -- Sepsis was ruled out after study  -- Other - I will add my own diagnosis  -- Disagree - Not applicable / Not valid  -- Disagree - Clinically unable to determine / Unknown  -- Refer to Clinical Documentation Reviewer    PROVIDER RESPONSE TEXT:    Sepsis is present as evidenced by    Query created by: Kathy Baldwin on 2025 2:34 PM      Electronically signed by:  Zulema Ross MD 2025 11:32 AM

## 2025-05-16 DIAGNOSIS — I48.0 PAF (PAROXYSMAL ATRIAL FIBRILLATION) (HCC): ICD-10-CM

## 2025-05-16 DIAGNOSIS — R60.0 BILATERAL EDEMA OF LOWER EXTREMITY: ICD-10-CM

## 2025-05-16 DIAGNOSIS — R06.02 SHORTNESS OF BREATH: ICD-10-CM

## 2025-05-16 DIAGNOSIS — I10 ESSENTIAL (PRIMARY) HYPERTENSION: ICD-10-CM

## 2025-05-16 DIAGNOSIS — R06.09 DOE (DYSPNEA ON EXERTION): ICD-10-CM

## 2025-05-16 LAB
ALBUMIN SERPL-MCNC: 2.7 G/DL (ref 3.5–5)
ALBUMIN/GLOB SERPL: 0.9 (ref 1.1–2.2)
ALP SERPL-CCNC: 74 U/L (ref 45–117)
ALT SERPL-CCNC: 25 U/L (ref 12–78)
ANION GAP SERPL CALC-SCNC: 7 MMOL/L (ref 2–12)
AST SERPL-CCNC: 15 U/L (ref 15–37)
BILIRUB SERPL-MCNC: 0.5 MG/DL (ref 0.2–1)
BUN SERPL-MCNC: 51 MG/DL (ref 6–20)
BUN/CREAT SERPL: 26 (ref 12–20)
CALCIUM SERPL-MCNC: 8.9 MG/DL (ref 8.5–10.1)
CHLORIDE SERPL-SCNC: 102 MMOL/L (ref 97–108)
CO2 SERPL-SCNC: 29 MMOL/L (ref 21–32)
CREAT SERPL-MCNC: 1.97 MG/DL (ref 0.7–1.3)
GLOBULIN SER CALC-MCNC: 2.9 G/DL (ref 2–4)
GLUCOSE SERPL-MCNC: 130 MG/DL (ref 65–100)
MAGNESIUM SERPL-MCNC: 1.8 MG/DL (ref 1.6–2.4)
POTASSIUM SERPL-SCNC: 5.1 MMOL/L (ref 3.5–5.1)
PROT SERPL-MCNC: 5.6 G/DL (ref 6.4–8.2)
SODIUM SERPL-SCNC: 138 MMOL/L (ref 136–145)

## 2025-05-23 ENCOUNTER — RESULTS FOLLOW-UP (OUTPATIENT)
Age: 85
End: 2025-05-23

## 2025-05-24 ENCOUNTER — RESULTS FOLLOW-UP (OUTPATIENT)
Age: 85
End: 2025-05-24

## 2025-05-24 NOTE — RESULT ENCOUNTER NOTE
Please advise labs including kidney and liver functions are improved.  Continue with good hydration.  Follow-up as scheduled.    Future Appointments  6/9/2025   11:20 AM   Brunilda Gary APRN - NP [facility]              BS AMB  7/14/2025  3:00 PM    Yemi Ariza MD [facility]              BS AMB  7/18/2025  10:00 AM   Maria Luisa Child ACNP [facility]              BS AMB  8/22/2025  10:40 AM   Konrad Kingston MD    BS                BS AMB

## 2025-06-07 PROBLEM — N39.0 UTI (URINARY TRACT INFECTION): Status: RESOLVED | Noted: 2025-05-08 | Resolved: 2025-06-07

## 2025-06-27 ENCOUNTER — OFFICE VISIT (OUTPATIENT)
Age: 85
End: 2025-06-27
Payer: MEDICARE

## 2025-06-27 VITALS
HEART RATE: 82 BPM | DIASTOLIC BLOOD PRESSURE: 88 MMHG | WEIGHT: 207 LBS | HEIGHT: 67 IN | BODY MASS INDEX: 32.49 KG/M2 | SYSTOLIC BLOOD PRESSURE: 138 MMHG | OXYGEN SATURATION: 97 %

## 2025-06-27 DIAGNOSIS — R60.0 BILATERAL LEG EDEMA: ICD-10-CM

## 2025-06-27 DIAGNOSIS — I48.0 PAROXYSMAL ATRIAL FIBRILLATION (HCC): ICD-10-CM

## 2025-06-27 DIAGNOSIS — I10 PRIMARY HYPERTENSION: ICD-10-CM

## 2025-06-27 DIAGNOSIS — E78.2 MIXED HYPERLIPIDEMIA: ICD-10-CM

## 2025-06-27 DIAGNOSIS — E11.65 TYPE 2 DIABETES MELLITUS WITH HYPERGLYCEMIA, WITHOUT LONG-TERM CURRENT USE OF INSULIN (HCC): ICD-10-CM

## 2025-06-27 DIAGNOSIS — R60.0 BILATERAL LEG EDEMA: Primary | ICD-10-CM

## 2025-06-27 PROCEDURE — 99214 OFFICE O/P EST MOD 30 MIN: CPT

## 2025-06-27 PROCEDURE — 1126F AMNT PAIN NOTED NONE PRSNT: CPT

## 2025-06-27 PROCEDURE — 1159F MED LIST DOCD IN RCRD: CPT

## 2025-06-27 PROCEDURE — 3044F HG A1C LEVEL LT 7.0%: CPT

## 2025-06-27 PROCEDURE — 3075F SYST BP GE 130 - 139MM HG: CPT

## 2025-06-27 PROCEDURE — 1123F ACP DISCUSS/DSCN MKR DOCD: CPT

## 2025-06-27 PROCEDURE — 3079F DIAST BP 80-89 MM HG: CPT

## 2025-06-27 RX ORDER — PREDNISONE 20 MG/1
TABLET ORAL
COMMUNITY

## 2025-06-27 RX ORDER — AMLODIPINE BESYLATE 5 MG/1
5 TABLET ORAL DAILY
COMMUNITY

## 2025-06-27 RX ORDER — CEFPODOXIME PROXETIL 100 MG/1
100 TABLET, FILM COATED ORAL 2 TIMES DAILY
COMMUNITY

## 2025-06-27 RX ORDER — INSULIN LISPRO 100 [IU]/ML
INJECTION, SOLUTION INTRAVENOUS; SUBCUTANEOUS
COMMUNITY
Start: 2025-04-23

## 2025-06-27 RX ORDER — CEPHALEXIN 500 MG/1
500 CAPSULE ORAL 3 TIMES DAILY
COMMUNITY

## 2025-06-27 RX ORDER — FUROSEMIDE 40 MG/1
40 TABLET ORAL DAILY
COMMUNITY

## 2025-06-27 ASSESSMENT — PATIENT HEALTH QUESTIONNAIRE - PHQ9
SUM OF ALL RESPONSES TO PHQ QUESTIONS 1-9: 0
1. LITTLE INTEREST OR PLEASURE IN DOING THINGS: NOT AT ALL
SUM OF ALL RESPONSES TO PHQ QUESTIONS 1-9: 0
2. FEELING DOWN, DEPRESSED OR HOPELESS: NOT AT ALL

## 2025-06-27 NOTE — PROGRESS NOTES
tablet Take 1 tablet by mouth every morning      metFORMIN (GLUCOPHAGE) 1000 MG tablet Take 1 tablet by mouth 2 times daily 180 tablet 1    pravastatin (PRAVACHOL) 20 MG tablet TAKE 1 TABLET BY MOUTH EVERY NIGHT AT BEDTIME FOR CHOLESTEROL      ondansetron (ZOFRAN-ODT) 4 MG disintegrating tablet Take 1 tablet by mouth every 8 hours as needed for Nausea or Vomiting (Patient not taking: Reported on 2025) 21 tablet 0    ferrous sulfate (IRON 325) 325 (65 Fe) MG tablet Take 1 tablet by mouth every other day (Patient not taking: Reported on 2025)       No current facility-administered medications for this visit.       No Known Allergies    SOCIAL HISTORY:     Social History     Tobacco Use    Smoking status: Former     Current packs/day: 0.00     Types: Cigarettes, Pipe, Cigars     Quit date: 1965     Years since quittin.7     Passive exposure: Past    Smokeless tobacco: Never   Vaping Use    Vaping status: Never Used   Substance Use Topics    Alcohol use: No    Drug use: Never       FAMILY HISTORY:     Family History   Problem Relation Age of Onset    Cancer Mother         ovarian    Stroke Father         TIA    No Known Problems Sister     Cancer Brother         lung, brain & throat       REVIEW OF SYMPTOMS:     Review of Symptoms:  Negative except as above, all other systems reviewed and are negative for a Comprehensive ROS (10+)    PHYSICAL EXAM:     Physical Exam:  /88 (BP Site: Right Upper Arm, Patient Position: Sitting, BP Cuff Size: Medium Adult)   Pulse 82   Ht 1.702 m (5' 7\")   Wt 93.9 kg (207 lb)   SpO2 97%   BMI 32.42 kg/m²     General: alert, cooperative, no distress, appears stated age  Neck: supple, symmetrical, trachea midline, no adenopathy, thyroid: not enlarged, symmetric, no tenderness/mass/nodules, no carotid bruit, and no JVD  Lungs: extensive wheezing and productive cough  Heart: regular rate and rhythm, S1, S2 normal, no murmur, no click, rub or gallop  Abdomen: soft,

## 2025-06-29 LAB
ANION GAP SERPL CALC-SCNC: 5 MMOL/L (ref 2–12)
BUN SERPL-MCNC: 42 MG/DL (ref 6–20)
BUN/CREAT SERPL: 22 (ref 12–20)
CALCIUM SERPL-MCNC: 9.1 MG/DL (ref 8.5–10.1)
CHLORIDE SERPL-SCNC: 91 MMOL/L (ref 97–108)
CO2 SERPL-SCNC: 36 MMOL/L (ref 21–32)
CREAT SERPL-MCNC: 1.91 MG/DL (ref 0.7–1.3)
GLUCOSE SERPL-MCNC: 216 MG/DL (ref 65–100)
NT PRO BNP: 5401 PG/ML
POTASSIUM SERPL-SCNC: 4.4 MMOL/L (ref 3.5–5.1)
SODIUM SERPL-SCNC: 132 MMOL/L (ref 136–145)

## 2025-06-30 ENCOUNTER — RESULTS FOLLOW-UP (OUTPATIENT)
Age: 85
End: 2025-06-30

## 2025-06-30 NOTE — TELEPHONE ENCOUNTER
Telephone call made to patient. Two Identifiers used.  Spoke with patient that per NP Jason        Can you call and follow-up with him and let him know that his labs indicate that he has volume overload as we discussed at his appointment. See how he is feeling and let him know that I again really encourage him to go to ED for evaluation.    Thanks-    Jason    I asked patient how is he feeling if he has any swelling, SOB, of fatugie? Patient stated that he still has swelling but he is not SOB or fatigued. I asked patient if he is taking his medication for his swelling patient stated that he is taking Bumex and not his lasix. I did ask him if he would be willing to go to the ED for further evaluation. Patient stated no he is not willing.Patient verbalized understanding and has no further questions.    Future Appointments   Date Time Provider Department Center   7/18/2025 10:00 AM Maria Luisa Child ACNP [facility]   8/5/2025  2:00 PM Yemi Ariza MD [facility]   8/22/2025 10:40 AM Konrad Kingston MD BS [facility]

## 2025-07-18 ENCOUNTER — HOSPITAL ENCOUNTER (INPATIENT)
Facility: HOSPITAL | Age: 85
LOS: 11 days | Discharge: NURSING, REHAB OR LONG-TERM CARE | DRG: 872 | End: 2025-07-29
Attending: STUDENT IN AN ORGANIZED HEALTH CARE EDUCATION/TRAINING PROGRAM | Admitting: INTERNAL MEDICINE
Payer: MEDICARE

## 2025-07-18 ENCOUNTER — APPOINTMENT (OUTPATIENT)
Facility: HOSPITAL | Age: 85
DRG: 872 | End: 2025-07-18
Payer: MEDICARE

## 2025-07-18 ASSESSMENT — PAIN SCALES - GENERAL
PAINLEVEL_OUTOF10: 8
PAINLEVEL_OUTOF10: 5
PAINLEVEL_OUTOF10: 4
PAINLEVEL_OUTOF10: 5
PAINLEVEL_OUTOF10: 5

## 2025-07-18 ASSESSMENT — PAIN DESCRIPTION - ORIENTATION
ORIENTATION: LOWER;POSTERIOR
ORIENTATION: LOWER;POSTERIOR
ORIENTATION: LEFT
ORIENTATION: POSTERIOR

## 2025-07-18 ASSESSMENT — PAIN - FUNCTIONAL ASSESSMENT
PAIN_FUNCTIONAL_ASSESSMENT: PREVENTS OR INTERFERES SOME ACTIVE ACTIVITIES AND ADLS
PAIN_FUNCTIONAL_ASSESSMENT: PREVENTS OR INTERFERES SOME ACTIVE ACTIVITIES AND ADLS
PAIN_FUNCTIONAL_ASSESSMENT: 0-10
PAIN_FUNCTIONAL_ASSESSMENT: PREVENTS OR INTERFERES SOME ACTIVE ACTIVITIES AND ADLS

## 2025-07-18 ASSESSMENT — PAIN DESCRIPTION - ONSET
ONSET: ON-GOING
ONSET: ON-GOING

## 2025-07-18 ASSESSMENT — LIFESTYLE VARIABLES
HOW MANY STANDARD DRINKS CONTAINING ALCOHOL DO YOU HAVE ON A TYPICAL DAY: PATIENT DOES NOT DRINK
HOW OFTEN DO YOU HAVE A DRINK CONTAINING ALCOHOL: NEVER

## 2025-07-18 ASSESSMENT — PAIN DESCRIPTION - LOCATION
LOCATION: BACK
LOCATION: BACK
LOCATION: NECK
LOCATION: ARM

## 2025-07-18 ASSESSMENT — PAIN DESCRIPTION - DESCRIPTORS
DESCRIPTORS: ACHING

## 2025-07-18 ASSESSMENT — PAIN DESCRIPTION - PAIN TYPE
TYPE: ACUTE PAIN

## 2025-07-18 ASSESSMENT — PAIN DESCRIPTION - FREQUENCY
FREQUENCY: INTERMITTENT
FREQUENCY: CONTINUOUS

## 2025-07-18 NOTE — PROGRESS NOTES
Bedside shift change report given to BRIAN Norris (oncoming nurse) by BRIAN Rosa (offgoing nurse). Report included the following information Nurse Handoff Report, Intake/Output, MAR, Recent Results, and Cardiac Rhythm A fib.

## 2025-07-18 NOTE — PROGRESS NOTES
Patient is well known to [facility], with history of a chronically obstructed right bifid collecting system and kidney stones, recurrent UTI. His renogram was last updated 7/2023 and demonstrated 31% function on the right with half-life of 29 minutes, 69% on the left and 2 minutes. We have previously performed ureteroscopy to evaluate his proximal ureter and treat renal calculi. No evidence of any urothelial malignancy on biopsies. Unable to clear entirety of stone burden.  Patient has refused pyeloplasty or nephrectomy in the past.   S/p ureteral stent placement on 04/09/25    Hydro is unchanged from all previous imaging. Stent is appropriately placed.     R-max 103 on admission, now 97.5    Wbc-, 5. Cr-2.32  UA c/w UTI, ucx pending     No plans for urgent stent exchange tonight, stent is satisfactory placement. Kidney is chronically obstructed.    IR consult for consideration of R PCN  Continue culture driven ABX    Will see in am

## 2025-07-18 NOTE — H&P
Hospitalist Admission Note    NAME:   Darius Burgos   : 1940   MRN: [id number]     Date/Time: 2025 3:59 PM    Patient PCP: Maria Luisa Gramajo MD    ______________________________________________________________________  Given the patient's current clinical presentation, I have a high level of concern for decompensation if discharged from the emergency department.  Complex decision making was performed, which includes reviewing the patient's available past medical records, laboratory results, and x-ray films.       My assessment of this patient's clinical condition and my plan of care is as follows.    Assessment / Plan:    Severe sepsis  Complicated urinary tract infection  Hypotension  Nephrolithiasis status post stents in the past  - Lactic acid was 4.1 on arrival  - CT abdomen shows severe right-sided hydronephrosis with a nephroureteral stent in satisfactory position and nonobstructing right renal stones  - He received IV fluid bolus with improvement of blood pressure  - Urine and blood cultures were sent in the emergency department  - Lactic acid improved with IV hydration  - Will start empiric Zosyn  - Keep NPO.  Will consult urology.  - Check labs in a.m. tomorrow        Abnormal CT with pancreatic hypodense lesions with 1 lesion that is enlarging  - He will need MRI of pancreas once clinically better    CKD stage III  History of IgA nephritis, leukocytoclastic vasculitis  - Most recently creatinine ranged anywhere between 2-3.  Currently creatinine is 2.3.  -Check BMP in a.m. tomorrow  - Consider nephrology evaluation if creatinine is trending up  - It appears that he is no longer taking prednisone for IgA nephritis    Diabetes mellitus type 2  - Hold home metformin.  Start insulin sliding scale with blood sugar checks    Hypertension  Atrial fibrillation not on anticoagulation  History of watchman's procedure  Dyslipidemia  History of prostate cancer  Peptic ulcer disease  - Hold all home  modulation. The absence of intravenous contrast reduces the sensitivity for evaluation of the mediastinum, pedro, vasculature, and upper abdominal organs. FINDINGS: Thyroid: Dystrophic calcification in the left thyroid lobe. CHEST WALL: No mass or axillary lymphadenopathy. MEDIASTINUM: No mass or lymphadenopathy. PEDRO: No mass or lymphadenopathy. THORACIC AORTA: No aneurysm. MAIN PULMONARY ARTERY: Normal in caliber. TRACHEA/BRONCHI: Patent. ESOPHAGUS: No wall thickening or dilatation. HEART: Normal in size. Coronary artery calcium: present PLEURA: No pneumothorax. Small bilateral pleural transudates, asymmetric to the left. LUNGS: Vague groundglass opacity in the upper lobes. Stable bilateral micronodules. INCIDENTALLY IMAGED UPPER ABDOMEN: No significant abnormality in the incidentally imaged upper abdomen. BONES: No destructive bone lesion. Bilateral glenohumeral osteoarthritis is evidence.     No acute fracture Mild pulmonary edema with small bilateral pleural effusions. Incidental coronary artery disease. Electronically signed by Amber Senior    CT HEAD WO CONTRAST  Result Date: 7/18/2025  EXAM:  CT HEAD WO CONTRAST INDICATION:   fall, head injury COMPARISON: CT head 12/1/2024. TECHNIQUE: Unenhanced CT of the head was performed using 5 mm images. Brain and bone windows were generated.  CT dose reduction was achieved through use of a standardized protocol tailored for this examination and automatic exposure control for dose modulation. FINDINGS: Evaluation is limited by patient positioning (head rotated and angulated). The ventricles are normal in size and position. Scattered periventricular and deep white matter hypodensities, consistent with mild chronic microangiopathic ischemic changes. Basilar cisterns are patent. No midline shift. There is no evidence of acute infarct, hemorrhage, or extraaxial fluid collection. Small retention cyst in the left maxillary sinus. The mastoid air cells and middle ears are clear.

## 2025-07-18 NOTE — PLAN OF CARE
Problem: Skin/Tissue Integrity  Goal: Skin integrity remains intact  Description: 1.  Monitor for areas of redness and/or skin breakdown  2.  Assess vascular access sites hourly  3.  Every 4-6 hours minimum:  Change oxygen saturation probe site  4.  Every 4-6 hours:  If on nasal continuous positive airway pressure, respiratory therapy assess nares and determine need for appliance change or resting period  Outcome: Not Progressing     Problem: Chronic Conditions and Co-morbidities  Goal: Patient's chronic conditions and co-morbidity symptoms are monitored and maintained or improved  Outcome: Not Progressing     Problem: Discharge Planning  Goal: Discharge to home or other facility with appropriate resources  Outcome: Not Progressing     Problem: Pain  Goal: Verbalizes/displays adequate comfort level or baseline comfort level  Outcome: Not Progressing     Problem: ABCDS Injury Assessment  Goal: Absence of physical injury  Outcome: Not Progressing     Problem: Safety - Adult  Goal: Free from fall injury  Outcome: Not Progressing     Problem: Cardiovascular - Adult  Goal: Maintains optimal cardiac output and hemodynamic stability  Outcome: Not Progressing  Goal: Absence of cardiac dysrhythmias or at baseline  Outcome: Not Progressing     Problem: Skin/Tissue Integrity - Adult  Goal: Skin integrity remains intact  Description: 1.  Monitor for areas of redness and/or skin breakdown  2.  Assess vascular access sites hourly  3.  Every 4-6 hours minimum:  Change oxygen saturation probe site  4.  Every 4-6 hours:  If on nasal continuous positive airway pressure, respiratory therapy assess nares and determine need for appliance change or resting period  Outcome: Not Progressing     Problem: Musculoskeletal - Adult  Goal: Return mobility to safest level of function  Outcome: Not Progressing  Goal: Return ADL status to a safe level of function  Outcome: Not Progressing     Problem: Infection - Adult  Goal: Absence of infection

## 2025-07-18 NOTE — PROGRESS NOTES
Per urology no need for emergent surgery and recommend pcn tube by IR  Spoke to Dr Lewis from IR and he recommended no pcn as there is no hydro and recommended cx directed abx therapy.

## 2025-07-19 ASSESSMENT — PAIN SCALES - GENERAL
PAINLEVEL_OUTOF10: 0
PAINLEVEL_OUTOF10: 0
PAINLEVEL_OUTOF10: 4
PAINLEVEL_OUTOF10: 7
PAINLEVEL_OUTOF10: 0

## 2025-07-19 ASSESSMENT — PAIN - FUNCTIONAL ASSESSMENT: PAIN_FUNCTIONAL_ASSESSMENT: ACTIVITIES ARE NOT PREVENTED

## 2025-07-19 ASSESSMENT — PAIN DESCRIPTION - ORIENTATION
ORIENTATION: LEFT
ORIENTATION: LEFT

## 2025-07-19 ASSESSMENT — PAIN DESCRIPTION - LOCATION
LOCATION: BUTTOCKS
LOCATION: BUTTOCKS

## 2025-07-19 ASSESSMENT — PAIN DESCRIPTION - DESCRIPTORS: DESCRIPTORS: ACHING;DISCOMFORT

## 2025-07-19 NOTE — PROGRESS NOTES
Bedside and Verbal shift change report given to Tiana RN (oncoming nurse) by Myrna RN (offgoing nurse). Report included the following information Nurse Handoff Report, Adult Overview, Intake/Output, MAR, Recent Results, and Cardiac Rhythm NSR to JULIAN-fib.

## 2025-07-19 NOTE — PROGRESS NOTES
Comprehensive Nutrition Assessment    Type and Reason for Visit:  Initial, Wound    Nutrition Recommendations/Plan:   Continue diet as tolerated  RD to add Glucerna and Kyle BID  Please document % meals and supplements consumed in flowsheet I/O's under intake      Malnutrition Assessment:  Malnutrition Status:  Insufficient data (07/19/25 1439)    Context:  Acute Illness     Findings of the 6 clinical characteristics of malnutrition:  Energy Intake:  No decrease in energy intake (per MST)  Weight Loss:  Unable to assess     Body Fat Loss:  Unable to assess     Muscle Mass Loss:  Unable to assess    Fluid Accumulation:  Mild (unsure if nutritionally related) Extremities   Strength:  Not Performed    Nutrition Assessment:  Pt admitted with sepsis.  PMH: PUD, CKD stage 3, colon CA, DM, HTN, [place], poor historian.  PI referral received, chart reviewed.  Pt visiting with providers at 2 different visit attempts.  MST negative for previous nutritional risk factors.  Poor PO intake since admission.  K 3.4 and Mag 1.5, being repleted.  BG .  He is on D5 IVF's.  Will add PO supplements on all trays.  Noted he was dx with severe malnutrition in April of this year by RD.  Will be able to ascertain nutritional status upon F/U when pt can be interviewed and examined.    Patient Vitals for the past 120 hrs:   PO Meals Eaten (%)   07/18/25 1715 0%     Wt Readings from Last 10 Encounters:   07/18/25 93 kg (205 lb 0.4 oz)   06/27/25 93.9 kg (207 lb)   05/13/25 103.4 kg (228 lb)   05/10/25 106.8 kg (235 lb 7.2 oz)   04/21/25 102.7 kg (226 lb 6.6 oz)   04/07/25 97.5 kg (215 lb)   02/14/25 94.2 kg (207 lb 9.6 oz)   12/01/24 93 kg (205 lb)   08/30/24 93 kg (205 lb)   05/14/24 94.3 kg (208 lb)         Nutrition Related Findings:    Meds: iron, mag sulfate, vancomycin, protonix, zosyn, KCl, D5NS@125mL/h.    Edema: +2 nonpitting-BLE.    BM: PTA   Wound Type: Stage III, Skin Tears (coccyx)       Current Nutrition Intake & Therapies:

## 2025-07-19 NOTE — PROGRESS NOTES
End of Shift Note    Bedside shift change report given to Rney TORRES (oncoming nurse) by Tiana Og, RN (offgoing nurse).  Report included the following information SBAR, Intake/Output, MAR, Recent Results, and Cardiac Rhythm A-fib to NSR    Shift worked:  7a-7p     Shift summary and any significant changes:     Mark inserted retention. Pt tolerated well.       Concerns for physician to address:  None      Zone phone for oncoming shift:   [phone]       Activity:  Level of Assistance: Moderate assist, patient does 50-74%  Number times ambulated in hallways past shift: 0  Number of times OOB to chair past shift: 0    Cardiac:   Cardiac Monitoring: Yes      Cardiac Rhythm: Sinus rhythm, Atrial fib    Access:  Current line(s): PIV     Genitourinary:   Urinary Status: Voiding, External catheter    Respiratory:   O2 Device: None (Room air)  Chronic home O2 use?: NO  Incentive spirometer at bedside: NO    GI:     Current diet:  ADULT DIET; Dysphagia - Pureed  ADULT ORAL NUTRITION SUPPLEMENT; Breakfast, Dinner; Wound Healing Oral Supplement  ADULT ORAL NUTRITION SUPPLEMENT; Lunch, Dinner; Diabetic Oral Supplement  Passing flatus: YES    Pain Management:   Patient states pain is manageable on current regimen: N/A    Skin:  Rafael Scale Score: 14  Interventions: Wound Offloading (Prevention Methods): Bed, pressure redistribution/air, Repositioning, Pillows, Turning    Patient Safety:  Fall Risk: Nursing Judgement-Fall Risk High(Add Comments): Yes  Fall Risk Interventions  Nursing Judgement-Fall Risk High(Add Comments): Yes  Toilet Every 2 Hours-In Advance of Need: Yes  Hourly Visual Checks: Awake, In bed  Fall Visual Posted: Armband, Fall sign posted  Room Door Open: Deferred to promote rest  Alarm On: Bed  Patient Moved Closer to Nursing Station: No    Active Consults:   IP CONSULT TO UROLOGY  IP CONSULT TO INTERVENTIONAL RADIOLOGY    Length of Stay:  Expected LOS: 4  Actual LOS: 1    Tiana Og, RN

## 2025-07-19 NOTE — CONSULTS
Department of Urology  Consult Note      Reason for Consult:  right hydro  Requesting Physician:  hospitalist, Dr. Taylor    CHIEF COMPLAINT:  right hydro    History Obtained From:  patient    HISTORY OF PRESENT ILLNESS:                The patient is a 84 y.o. male with chronic right hydro, CRI, admitted for concerns for uro sepsis. Wbc normal 5, ,Cr at baseline 1.8. feeling better since admission AVSS. CT scan which I personally viewed images, shows stable chronic right hydro, stent in good position, severely dilated bladder. Stent last changed 4/11, as far as I can tell from documentation. Pt is poor historian.    Past Medical History:        Diagnosis Date    Arthritis     At risk for sleep apnea 12/18/2019    Stop Bang 5     Atrial fibrillation (HCC)     Atrial Flutter-Wittkamp    Cancer (HCC)     prostate cancer    Colon cancer (HCC)     Diabetes (HCC)     DM2    Elevated cholesterol     History of blood transfusion     over 25 years    History of kidney stones     [place] (hard of hearing)     bilateral hearing aids    Hypertension     PUD (peptic ulcer disease) 1980    Skin cancer      Past Surgical History:        Procedure Laterality Date    APPENDECTOMY      1960's    CATARACT REMOVAL Bilateral     CHOLECYSTECTOMY, LAPAROSCOPIC N/A 11/19/2023    LAPAROSCOPIC CHOLCYSTECTOMY  WITH GRAMS performed by Miguel A Johnson MD at [facility] MAIN OR    COLONOSCOPY N/A 09/27/2017    COLONOSCOPY performed by Shmuel Santiago MD at [facility] ENDOSCOPY    COLONOSCOPY N/A 12/30/2019    COLONOSCOPY WITH POLYPECTOMY performed by Shmuel Santiago MD at [facility] AMBULATORY OR    COLONOSCOPY N/A 11/30/2022    COLONOSCOPY performed by Miguel A Haynes MD at [facility] ENDOSCOPY    COLONOSCOPY N/A 10/11/2021    COLONOSCOPY performed by Shmuel Santiago MD at [facility] ENDOSCOPY    COLONOSCOPY N/A 8/18/2023    COLONOSCOPY performed by Miguel A Haynes II, MD at [facility] ENDOSCOPY    CT BIOPSY RENAL  4/16/2025    CT BIOPSY RENAL 4/16/2025 [facility] RAD CT    CYSTOSCOPY Right

## 2025-07-19 NOTE — PROGRESS NOTES
Bedside shift change report given to BIRAN Norris (oncoming nurse) by BRIAN Rosa (offgoing nurse). Report included the following information Nurse Handoff Report, Adult Overview, Recent Results, and Cardiac Rhythm AFib.     1915 Patient was cold to the touch temp 97.7 and cold to the touch and shaking. I put 4 blankets on him and rechecked temp    2128 Messaged the Provider about him still cold to the touch and shaking, oral temp 98.4.    2139 Provider ordered warming blanket.    2230 patient has stopped shaking    2333 Pt temp 99.1 discontinued warming   blanket     0430 Pt started to have the shakes again. Put the warming blanket back on and checked his sugar. BG was 48. OJ given rechecked BG 67 then gave peanut butter crackers. Pt started feeling better but still felt cold and shaky so continued with the warming blanket.  End of Shift Note    Bedside shift change report given to Tiana (oncoming nurse) by Myrna Zapien RN (offgoing nurse).  Report included the following information SBAR, Kardex, Intake/Output, MAR, Recent Results, and Cardiac Rhythm AFib    Shift worked:  7p-7a     Shift summary and any significant changes:     Q 2 turns. Blood work drawn. See above.     Concerns for physician to address:       Zone phone for oncoming shift:          Activity:  Level of Assistance: Moderate assist, patient does 50-74%  Number times ambulated in hallways past shift: 0  Number of times OOB to chair past shift: 0    Cardiac:   Cardiac Monitoring: Yes      Cardiac Rhythm: Atrial fib    Access:  Current line(s): PIV     Genitourinary:   Urinary Status: Voiding, External catheter    Respiratory:   O2 Device: None (Room air)  Chronic home O2 use?: NO  Incentive spirometer at bedside: NO    GI:     Current diet:  ADULT DIET; Dysphagia - Pureed  Passing flatus: YES    Pain Management:   Patient states pain is manageable on current regimen: YES    Skin:  Rafael Scale Score: 15  Interventions: Wound Offloading (Prevention

## 2025-07-19 NOTE — PLAN OF CARE
Problem: Skin/Tissue Integrity  Goal: Skin integrity remains intact  Description: 1.  Monitor for areas of redness and/or skin breakdown  2.  Assess vascular access sites hourly  3.  Every 4-6 hours minimum:  Change oxygen saturation probe site  4.  Every 4-6 hours:  If on nasal continuous positive airway pressure, respiratory therapy assess nares and determine need for appliance change or resting period  7/19/2025 0818 by Tiana Og RN  Outcome: Progressing  Flowsheets  Taken 7/19/2025 0800 by Tiana Og RN  Skin Integrity Remains Intact: Monitor for areas of redness and/or skin breakdown  Taken 7/18/2025 1915 by Myrna Zapien RN  Skin Integrity Remains Intact:   Monitor for areas of redness and/or skin breakdown   Assess vascular access sites hourly  7/18/2025 1824 by Shelley Sandhu RN  Outcome: Not Progressing     Problem: Chronic Conditions and Co-morbidities  Goal: Patient's chronic conditions and co-morbidity symptoms are monitored and maintained or improved  7/19/2025 0818 by Tiana Og RN  Outcome: Progressing  Flowsheets  Taken 7/19/2025 0800 by Tiana Og RN  Care Plan - Patient's Chronic Conditions and Co-Morbidity Symptoms are Monitored and Maintained or Improved: Monitor and assess patient's chronic conditions and comorbid symptoms for stability, deterioration, or improvement  Taken 7/18/2025 1915 by Myrna Zapien RN  Care Plan - Patient's Chronic Conditions and Co-Morbidity Symptoms are Monitored and Maintained or Improved: Monitor and assess patient's chronic conditions and comorbid symptoms for stability, deterioration, or improvement  7/18/2025 1824 by Shelley Sandhu RN  Outcome: Not Progressing     Problem: Discharge Planning  Goal: Discharge to home or other facility with appropriate resources  7/19/2025 0818 by Tiana Og RN  Outcome: Progressing  Flowsheets (Taken 7/19/2025 0800)  Discharge to home or other facility with appropriate resources: Identify barriers to

## 2025-07-19 NOTE — PROGRESS NOTES
Hospitalist Progress Note    NAME:   Darius Burgos   : 1940   MRN: [id number]     Date/Time: 2025 1:22 PM  Patient PCP: Maria Luisa Gramajo MD    Estimated discharge date:   Barriers: culture clearance      Assessment / Plan:    Severe sepsis  Complicated urinary tract infection  Hypotension  Nephrolithiasis status post stents in the past  - Lactic acid was 4.1 on arrival  - CT abdomen shows severe right-sided hydronephrosis with a nephroureteral stent in satisfactory position and nonobstructing right renal stones  - He received IV fluid bolus with improvement of blood pressure  - Urine and blood cultures were sent in the emergency department, followup  - Lactic acid improved with IV hydration  - Will start empiric Zosyn  - consulted urology.  Per urology no need for emergent surgery and recommend pcn tube by IR  Spoke to Dr Lewis from IR and he recommended no pcn as there is no hydro and recommended cx directed abx therapy.  - place chatman, void trial in a few days     Abnormal CT with pancreatic hypodense lesions with 1 lesion that is enlarging  - He will need MRI of pancreas once clinically better     CKD stage III  History of IgA nephritis, leukocytoclastic vasculitis  - Most recently creatinine ranged anywhere between 2-3.  Currently creatinine is 2.3.  - Consider nephrology evaluation if creatinine is trending up  - It appears that he is no longer taking prednisone for IgA nephritis     Diabetes mellitus type 2  - Hold home metformin.  Start insulin sliding scale with blood sugar checks     Hypertension  Atrial fibrillation not on anticoagulation  History of watchman's procedure  Dyslipidemia  History of prostate cancer  Peptic ulcer disease  - Hold all home antihypertensives due to hypotension and resume when appropriate  - Continue PTA amiodarone.  Not on anticoagulation  - Continue PTA statin  - Continue PTA PPI     Mild pulmonary edema with small bilateral pleural effusions on CT  -

## 2025-07-20 ENCOUNTER — APPOINTMENT (OUTPATIENT)
Facility: HOSPITAL | Age: 85
DRG: 872 | End: 2025-07-20
Payer: MEDICARE

## 2025-07-20 ASSESSMENT — PAIN DESCRIPTION - LOCATION: LOCATION: HEAD

## 2025-07-20 ASSESSMENT — PAIN DESCRIPTION - ORIENTATION: ORIENTATION: MID;ANTERIOR

## 2025-07-20 ASSESSMENT — PAIN SCALES - WONG BAKER: WONGBAKER_NUMERICALRESPONSE: NO HURT

## 2025-07-20 ASSESSMENT — PAIN SCALES - GENERAL
PAINLEVEL_OUTOF10: 0
PAINLEVEL_OUTOF10: 0
PAINLEVEL_OUTOF10: 3
PAINLEVEL_OUTOF10: 0

## 2025-07-20 ASSESSMENT — PAIN DESCRIPTION - DESCRIPTORS: DESCRIPTORS: ACHING

## 2025-07-20 NOTE — PLAN OF CARE
Problem: Pain  Goal: Verbalizes/displays adequate comfort level or baseline comfort level  Outcome: Progressing     Problem: Safety - Adult  Goal: Free from fall injury  Outcome: Progressing     Problem: Cardiovascular - Adult  Goal: Maintains optimal cardiac output and hemodynamic stability  Outcome: Progressing     Problem: Musculoskeletal - Adult  Goal: Return mobility to safest level of function  Outcome: Progressing     Problem: Neurosensory - Adult  Goal: Achieves stable or improved neurological status  Outcome: Progressing     Problem: Metabolic/Fluid and Electrolytes - Adult  Goal: Hemodynamic stability and optimal renal function maintained  Outcome: Progressing     Problem: Hematologic - Adult  Goal: Maintains hematologic stability  Outcome: Progressing

## 2025-07-20 NOTE — PROGRESS NOTES
End of Shift Note    Bedside shift change report given to May RN (oncoming nurse) by Tiana Og RN (offgoing nurse).  Report included the following information SBAR, Intake/Output, MAR, Recent Results, and Cardiac Rhythm A-fib     Shift worked:  7a-7p     Shift summary and any significant changes:     Blood cultures X 2 pending. Mark intact patient and draining.        Concerns for physician to address:  none     Zone phone for oncoming shift:   [phone]       Activity:  Level of Assistance: Moderate assist, patient does 50-74%  Number times ambulated in hallways past shift: 0  Number of times OOB to chair past shift: 0    Cardiac:   Cardiac Monitoring: Yes      Cardiac Rhythm: Atrial fib    Access:  Current line(s): PIV     Genitourinary:   Urinary Status: Mark    Respiratory:   O2 Device: None (Room air)  Chronic home O2 use?: NO  Incentive spirometer at bedside: NO    GI:     Current diet:  ADULT DIET; Dysphagia - Pureed  ADULT ORAL NUTRITION SUPPLEMENT; Breakfast, Dinner; Wound Healing Oral Supplement  ADULT ORAL NUTRITION SUPPLEMENT; Lunch, Dinner; Diabetic Oral Supplement  Passing flatus: YES    Pain Management:   Patient states pain is manageable on current regimen: YES    Skin:  Rafael Scale Score: 14  Interventions: Wound Offloading (Prevention Methods): Bed, pressure redistribution/air, Elevate heels, Repositioning, Pillows    Patient Safety:  Fall Risk: Nursing Judgement-Fall Risk High(Add Comments): Yes  Fall Risk Interventions  Nursing Judgement-Fall Risk High(Add Comments): Yes  Toilet Every 2 Hours-In Advance of Need: Yes  Hourly Visual Checks: Awake, In bed  Fall Visual Posted: [place], Fall sign posted  Room Door Open: Deferred to promote rest  Alarm On: Bed  Patient Moved Closer to Nursing Station: No    Active Consults:   IP CONSULT TO UROLOGY  IP CONSULT TO INTERVENTIONAL RADIOLOGY    Length of Stay:  Expected LOS: 3  Actual LOS: 2    Tiana Og, RN

## 2025-07-20 NOTE — PROGRESS NOTES
Physical Therapy    Chart reviewed, note patient with Hgb 6.8 and still running a fever (100.7) this morning.  Plan to defer today and will continue to follow for PT eval.      KENYATTA SappT

## 2025-07-20 NOTE — PROGRESS NOTES
Bedside and Verbal shift change report given to Janette TORRES (oncoming nurse) by Tiana TORRES (offgoing nurse). Report included the following information Nurse Handoff Report, MAR, Recent Results, and Cardiac Rhythm Afib.      End of Shift Note    Bedside shift change report given to Tiana TORRES (oncoming nurse) by Jessica Worthington RN (offgoing nurse).  Report included the following information SBAR, Intake/Output, MAR, Recent Results, and Cardiac Rhythm Afib    Shift worked:  7p-7a     Shift summary and any significant changes:    Low grade fever     Concerns for physician to address:       Zone phone for oncoming shift:          Activity:  Level of Assistance: Maximum assist, patient does 25-49%  Number times ambulated in hallways past shift: 0  Number of times OOB to chair past shift: 0    Cardiac:   Cardiac Monitoring: Yes      Cardiac Rhythm: Atrial fib    Access:  Current line(s): PIV    Genitourinary:   Urinary Status: Mark, Draining    Respiratory:   O2 Device: None (Room air)  Chronic home O2 use?: NO  Incentive spirometer at bedside: N/A    GI:     Current diet:  ADULT DIET; Dysphagia - Pureed  ADULT ORAL NUTRITION SUPPLEMENT; Breakfast, Dinner; Wound Healing Oral Supplement  ADULT ORAL NUTRITION SUPPLEMENT; Lunch, Dinner; Diabetic Oral Supplement  Passing flatus: YES    Pain Management:   Patient states pain is manageable on current regimen: N/A    Skin:  Rafael Scale Score: 13  Interventions: Wound Offloading (Prevention Methods): Bed, pressure redistribution/air, Elevate heels, Repositioning, Pillows    Patient Safety:  Fall Risk: Nursing Judgement-Fall Risk High(Add Comments): Yes  Fall Risk Interventions  Nursing Judgement-Fall Risk High(Add Comments): Yes  Toilet Every 2 Hours-In Advance of Need: Yes  Hourly Visual Checks: Awake, In bed  Fall Visual Posted: Fall sign posted, Socks  Room Door Open: Yes  Alarm On: Bed  Patient Moved Closer to Nursing Station: No    Active Consults:   IP CONSULT TO UROLOGY  IP CONSULT

## 2025-07-20 NOTE — CONSENT
Informed Consent for Blood Component Transfusion Note    I have discussed with the patient the rationale for blood component transfusion; its benefits in treating or preventing fatigue, organ damage, or death; and its risk which includes mild transfusion reactions, rare risk of blood borne infection, or more serious but rare reactions. I have discussed the alternatives to transfusion, including the risk and consequences of not receiving transfusion. The patient had an opportunity to ask questions and had agreed to proceed with transfusion of blood components.    Electronically signed by David L Mendel, MD on 7/20/25 at 4:58 PM EDT

## 2025-07-20 NOTE — PLAN OF CARE
Problem: Skin/Tissue Integrity  Goal: Skin integrity remains intact  Description: 1.  Monitor for areas of redness and/or skin breakdown  2.  Assess vascular access sites hourly  3.  Every 4-6 hours minimum:  Change oxygen saturation probe site  4.  Every 4-6 hours:  If on nasal continuous positive airway pressure, respiratory therapy assess nares and determine need for appliance change or resting period  Outcome: Progressing  Flowsheets (Taken 7/20/2025 0815)  Skin Integrity Remains Intact: Monitor for areas of redness and/or skin breakdown     Problem: Chronic Conditions and Co-morbidities  Goal: Patient's chronic conditions and co-morbidity symptoms are monitored and maintained or improved  Outcome: Progressing  Flowsheets (Taken 7/20/2025 0815)  Care Plan - Patient's Chronic Conditions and Co-Morbidity Symptoms are Monitored and Maintained or Improved: Monitor and assess patient's chronic conditions and comorbid symptoms for stability, deterioration, or improvement     Problem: Discharge Planning  Goal: Discharge to home or other facility with appropriate resources  Outcome: Progressing  Flowsheets (Taken 7/20/2025 0815)  Discharge to home or other facility with appropriate resources: Identify barriers to discharge with patient and caregiver     Problem: Pain  Goal: Verbalizes/displays adequate comfort level or baseline comfort level  7/20/2025 1027 by Tiana Og RN  Outcome: Progressing  Flowsheets (Taken 7/20/2025 0715)  Verbalizes/displays adequate comfort level or baseline comfort level: Encourage patient to monitor pain and request assistance  7/19/2025 2344 by Reny Sneed RN  Outcome: Progressing     Problem: ABCDS Injury Assessment  Goal: Absence of physical injury  Outcome: Progressing     Problem: Safety - Adult  Goal: Free from fall injury  7/20/2025 1027 by Tiana Og RN  Outcome: Progressing  7/19/2025 2344 by Reny Sneed RN  Outcome: Progressing     Problem: Cardiovascular -  Adult  Goal: Maintains hematologic stability  7/20/2025 1027 by Tiana Og, RN  Outcome: Progressing  Flowsheets (Taken 7/20/2025 0815)  Maintains hematologic stability: Assess for signs and symptoms of bleeding or hemorrhage  7/19/2025 2344 by Reny Sneed, RN  Outcome: Progressing     Problem: Nutrition Deficit:  Goal: Optimize nutritional status  Outcome: Progressing

## 2025-07-20 NOTE — ED PROVIDER NOTES
[facility] 2 CARDIAC MEDICAL STEP DOWN  EMERGENCY DEPARTMENT ENCOUNTER       Pt Name: Darius Burgos  MRN: [id number]  Birthdate 1940  Date of evaluation: 7/18/2025  Provider: Tevin aG MD   PCP: Maria Luisa Gramajo MD  Note Started: 4:55 PM 7/20/25     CHIEF COMPLAINT       Chief Complaint   Patient presents with    Fall     Pt BIB EMS coming from home. Per EMS pt was feeling fatigued coming off the toilet. Pt c/o intermittent SOB. Per EMS pt fell and hit head, pt denies LOC. Pt has hx of colon, skin, and lung CA. Pt fall was unwitnessed.        HISTORY OF PRESENT ILLNESS: 1 or more elements      History From: Patient and Patient's Son  None     Darius Burgos is a 84 y.o. male who presents to the ED from home. Patient has had increased weakness over the past 2 days. Patient's son states he was feeling very fatigued as he attempted to get off the toilet and sustained a fall. Patient did hit head but denies LOC. Patient complains of pain in right upper arm and neck.      Nursing Notes were all reviewed and agreed with or any disagreements were addressed in the HPI.     REVIEW OF SYSTEMS      Review of Systems     Positives and Pertinent negatives as per HPI.    PAST HISTORY     Past Medical History:  Past Medical History:   Diagnosis Date    Arthritis     At risk for sleep apnea 12/18/2019    Stop Bang 5     Atrial fibrillation (HCC)     Atrial Flutter-Wittkamp    Cancer (HCC)     prostate cancer    Colon cancer (HCC)     Diabetes (HCC)     DM2    Elevated cholesterol     History of blood transfusion     over 25 years    History of kidney stones     [place] (hard of hearing)     bilateral hearing aids    Hypertension     PUD (peptic ulcer disease) 1980    Skin cancer          Past Surgical History:  Past Surgical History:   Procedure Laterality Date    APPENDECTOMY      1960's    CATARACT REMOVAL Bilateral     CHOLECYSTECTOMY, LAPAROSCOPIC N/A 11/19/2023    LAPAROSCOPIC CHOLCYSTECTOMY  WITH GRAMS performed by Elizabeth

## 2025-07-20 NOTE — PROGRESS NOTES
Hospitalist Progress Note    NAME:   Darius Burgos   : 1940   MRN: [id number]     Date/Time: 2025 4:47 PM  Patient PCP: Maria Luisa Gramajo MD    Estimated discharge date:   Barriers: culture clearance, remaining afebrile, hgb improvement      Assessment / Plan:    Severe sepsis  Complicated urinary tract infection  Hypotension  Nephrolithiasis status post stents in the past  - Lactic acid was 4.1 on arrival  - CT abdomen shows severe right-sided hydronephrosis with a nephroureteral stent in satisfactory position and nonobstructing right renal stones  - He received IV fluid bolus with improvement of blood pressure  - Urine and blood cultures were sent in the emergency department, followup  - Lactic acid improved with IV hydration  - Will start empiric Zosyn  - consulted urology.  Per urology no need for emergent surgery and recommend pcn tube by IR  Spoke to Dr Lewis from IR and he recommended no pcn as there is no hydro and recommended cx directed abx therapy.  - place hcatman, void trial in a few days  - repeat fever  - , repeating blood cultures  - repeated ct abd given fevers, nonacute, showed:   Right hydronephrosis decompressed by Chatman catheter  Bilateral pleural transudate and subsegmental atelectasis  Stable L1 compression deformity, constipation  Complex ascites. Spontaneous peritonitis is not excluded but the amount of fluid  is small, probably too small for diagnostic paracentesis.     Abnormal CT with pancreatic hypodense lesions with 1 lesion that is enlarging  - He will need MRI of pancreas once clinically better     CKD stage III  History of IgA nephritis, leukocytoclastic vasculitis  - Most recently creatinine ranged anywhere between 2-3.  Currently creatinine is 2.3.  - Consider nephrology evaluation if creatinine is trending up  - It appears that he is no longer taking prednisone for IgA nephritis     Diabetes mellitus type 2  hypoglycemia  - Hold home metformin.  Start  serial FSBS for Hypoglycemic adverse drug reaction  [] Other -   [] Change in code status:    [] Decision to escalate care:    [] Major surgery/procedure with associated risk factors:    ----------------------------------------------------------------------  C. Data (any 2)  [] Discussed current management and discharge planning options with Case Management.  [] Discussed management of the case with:    [] Telemetry personally reviewed and interpreted as documented above    [x] Imaging personally reviewed and interpreted, includes:   repeat ct abd  [x] Data Review (any 3)  [x] All available Consultant notes from yesterday/today were reviewed  [x] All current labs were reviewed and interpreted for clinical significance   [x] Appropriate follow-up labs were ordered  [] Collateral history obtained from:        Subjective:     Chief Complaint / Reason for Physician Visit  \"Followup sepsis\".  Discussed with RN events overnight. No complaints today.      Objective:     VITALS:   Last 24hrs VS reviewed since prior progress note. Most recent are:  Patient Vitals for the past 24 hrs:   BP Temp Temp src Pulse Resp SpO2 Weight   07/20/25 1625 (!) 175/73 98.1 °F (36.7 °C) Oral (!) 104 19 95 % --   07/20/25 1400 -- -- -- 88 19 -- --   07/20/25 1300 -- -- -- 74 -- -- --   07/20/25 1127 106/66 98.1 °F (36.7 °C) Oral 72 17 93 % --   07/20/25 1000 -- -- -- 91 21 -- --   07/20/25 0815 -- -- -- 94 -- -- --   07/20/25 0715 119/61 (!) 100.7 °F (38.2 °C) Oral 86 17 95 % --   07/20/25 0710 -- -- -- 87 -- -- --   07/20/25 0300 (!) 150/90 98.4 °F (36.9 °C) Oral 96 21 (!) 84 % 100 kg (220 lb 7.4 oz)   07/19/25 2305 133/76 98.7 °F (37.1 °C) Oral 70 14 94 % --   07/19/25 1915 132/79 (!) 102.6 °F (39.2 °C) Oral 95 22 96 % --   07/19/25 1820 -- -- -- 98 24 -- --         Intake/Output Summary (Last 24 hours) at 7/20/2025 1647  Last data filed at 7/20/2025 1400  Gross per 24 hour   Intake 4682.13 ml   Output 1215 ml   Net 3467.13 ml        I had a

## 2025-07-20 NOTE — PROGRESS NOTES
Occupational Therapy    OT orders received and acknowledged, chart reviewed and note patient HGB dropping this morning from 7.9 to 6.8 and patient febrile. Will defer at this time and follow up as medically stable and appropriate.    Elza Eduardo, OTR/L

## 2025-07-20 NOTE — PROGRESS NOTES
Bedside and Verbal shift change report given to Tiana RN (oncoming nurse) by Reny RN  (offgoing nurse). Report included the following information Nurse Handoff Report, Adult Overview, Intake/Output, MAR, Recent Results, and Cardiac Rhythm A-fib to NSR.

## 2025-07-20 NOTE — PROGRESS NOTES
Physician Progress Note      PATIENT:               ELENITA DIAZ  CSN #:                  [id number]  :                       1940  ADMIT DATE:       2025 10:31 AM  DISCH DATE:  RESPONDING  PROVIDER #:        Delmi Gaspar NP          QUERY TEXT:    Complicated urinary tract infection is documented in the medical record    H&P and  IM Progress Note. Please provide additional clinical indicators   supportive of your documentation. Or please document if the diagnosis of UTI   has been ruled out after study.    The clinical indicators include:   H&P and  IM Progress Note: \"Severe sepsis Complicated urinary tract   infection Hypotension Nephrolithiasis status post stents in the past\"     Urology Progress Note: \"Hydro is unchanged from all previous imaging.   Stent is appropriately placed. R-max 103 on admission, now 97.5  Wbc-, 5. Cr-2.32 UA c/w UTI, ucx pending No plans for urgent stent exchange   tonight, stent is satisfactory placement. Kidney is chronically obstructed.\"     Urology consult: \"The patient is a 84 y.o. male with chronic right   hydro, CRI, admitted for concerns for uro sepsis. Wbc normal 5, ,Cr at   baseline 1.8. feeling better since admission AVSS. CT scan which I personally   viewed images, shows stable chronic right hydro, stent in good position,   severely dilated bladder. Stent last changed , as far as I can tell from   documentation. Pt is poor historian.\"    25 11:07  Color, UA: DARK YELLOW  Glucose, Ur: Negative  Bilirubin, Urine: Negative  Ketones, Urine: TRACE !  Specific Gravity, UA: 1.013  Blood, Urine: LARGE !  Protein, UA: 100 !  Urobilinogen: 1.0  Nitrite, Urine: Negative  Leukocyte Esterase, Urine: MODERATE !  Appearance: CLOUDY !  pH, Urine: 5.5  WBC, UA: 20-50  RBC, UA:   Epithelial Cells, UA: MODERATE !  Bacteria, UA: 1+ !  Budding Yeast: PRESENT !  Yeast, Urine: PRESENT !  URINALYSIS WITH REFLEX TO CULTURE: Rpt

## 2025-07-20 NOTE — CARE COORDINATION
Care Management Initial Assessment     RUR: 24% - High  Readmission? No  1st IM letter given? Yes - 7/18  1st  letter given: N/a     85 YO White Male admitted on 7/18/25 for Sepsis. Pt on contact precautions and therapy deferred d/t fever and HgB. CM called pt's daughter Kathryn) to complete assessment. Lives alone in 1-story house (level entry) in [place], [place]. Spouse currently lives at [facility]), has been there for SNF but is transitioning to LTC (they are filing all LTSS with Medicaid). Has 4 sons, 1 daughter that live locally. Son lives next door and checks on pt after work/evenings, but otherwise he's at home alone. Hx of  ([facility]), SNF ([facility]). DME includes: rollator, RW, grab bars, BSC, and shower chair. Daughter reports pt is fairly sedentary, sleeps all day, high fall risk (several falls lately), doesn't eat, and forgets to take his meds frequently. MOW delivers meals at lunch-time. Preferred Rx is [facility] (son picks up). Has BCBS Medicare.     Demographics verified, assessment completed. PT/OT evaluations pending (Deferred today d/t HgB). Report that during first SNF stay, pt checked out after 10 days but they've spoken and feel he will be more agreeable this time. Otherwise, pt likely will need some type of personal care services setup at home depending on recommendations/evaluation from rehab. May need transport at d/c pending transfer status.    **Daughter inquired about upgrading pt's diet, as he was initially evaluated before his dentures were available and he has not eaten any of the food provided. CM alerted nursing who advised SLP will have to evaluate pt with dentures before they will upgrade**    CM will continue to remain available to assist with d/c planning needs     07/20/25 0003   Service Assessment   Patient Orientation Other (see comment)  (Assessment completed with daughter (Melany Parada))   Cognition Alert   History Provided By

## 2025-07-20 NOTE — CARE COORDINATION
CM attempted to contact pt's daughter (Melany Parada, [phone]) to complete initial assessment. Confidential VM left with call back number. Pending response.     Estefany Donaldson LCSW  [facility] Care Manager  [phone]

## 2025-07-21 ENCOUNTER — APPOINTMENT (OUTPATIENT)
Facility: HOSPITAL | Age: 85
DRG: 872 | End: 2025-07-21
Payer: MEDICARE

## 2025-07-21 ASSESSMENT — PAIN SCALES - GENERAL
PAINLEVEL_OUTOF10: 0

## 2025-07-21 NOTE — PLAN OF CARE
Problem: Skin/Tissue Integrity  Goal: Skin integrity remains intact  Description: 1.  Monitor for areas of redness and/or skin breakdown  2.  Assess vascular access sites hourly  3.  Every 4-6 hours minimum:  Change oxygen saturation probe site  4.  Every 4-6 hours:  If on nasal continuous positive airway pressure, respiratory therapy assess nares and determine need for appliance change or resting period  7/21/2025 0819 by Tiana Og RN  Outcome: Progressing  Flowsheets (Taken 7/21/2025 0710)  Skin Integrity Remains Intact: Monitor for areas of redness and/or skin breakdown  7/20/2025 2222 by Jessica Worthington RN  Outcome: Progressing     Problem: Chronic Conditions and Co-morbidities  Goal: Patient's chronic conditions and co-morbidity symptoms are monitored and maintained or improved  7/21/2025 0819 by Tiana Og RN  Outcome: Progressing  Flowsheets (Taken 7/21/2025 0710)  Care Plan - Patient's Chronic Conditions and Co-Morbidity Symptoms are Monitored and Maintained or Improved: Monitor and assess patient's chronic conditions and comorbid symptoms for stability, deterioration, or improvement  7/20/2025 2222 by Jessica Worthington RN  Outcome: Progressing     Problem: Discharge Planning  Goal: Discharge to home or other facility with appropriate resources  7/21/2025 0819 by Tiana Og RN  Outcome: Progressing  Flowsheets (Taken 7/21/2025 0710)  Discharge to home or other facility with appropriate resources: Identify barriers to discharge with patient and caregiver  7/20/2025 2222 by Jessica Worthington RN  Outcome: Progressing     Problem: Pain  Goal: Verbalizes/displays adequate comfort level or baseline comfort level  7/21/2025 0819 by Tiana Og RN  Outcome: Progressing  Flowsheets (Taken 7/21/2025 0710)  Verbalizes/displays adequate comfort level or baseline comfort level: Encourage patient to monitor pain and request assistance  7/20/2025 2222 by Jessica Worthington RN  Outcome: Progressing     Problem: ABCDS

## 2025-07-21 NOTE — PROGRESS NOTES
End of Shift Note    Bedside shift change report given to May RN (oncoming nurse) by Tiana Og RN (offgoing nurse).  Report included the following information SBAR, Intake/Output, MAR, Recent Results, and Cardiac Rhythm A-fib    Shift worked:  7a-7p     Shift summary and any significant changes:     MRI pending. No other significant changes      Concerns for physician to address:  None      Zone phone for oncoming shift:   1810       Activity:  Level of Assistance: Maximum assist, patient does 25-49%  Number times ambulated in hallways past shift: 0  Number of times OOB to chair past shift: 1    Cardiac:   Cardiac Monitoring: Yes      Cardiac Rhythm: Atrial fib    Access:  Current line(s): PIV     Genitourinary:   Urinary Status: Mark, Draining    Respiratory:   O2 Device: Nasal cannula  Chronic home O2 use?: NO  Incentive spirometer at bedside: NO    GI:     Current diet:  ADULT ORAL NUTRITION SUPPLEMENT; Breakfast, Dinner; Wound Healing Oral Supplement  ADULT ORAL NUTRITION SUPPLEMENT; Lunch, Dinner; Diabetic Oral Supplement  ADULT DIET; Regular; 4 carb choices (60 gm/meal)  Passing flatus: YES    Pain Management:   Patient states pain is manageable on current regimen: N/A    Skin:  Rafael Scale Score: 14  Interventions: Wound Offloading (Prevention Methods): Bed, pressure redistribution/air, Elevate heels, Pillows, Repositioning    Patient Safety:  Fall Risk: Nursing Judgement-Fall Risk High(Add Comments): Yes  Fall Risk Interventions  Nursing Judgement-Fall Risk High(Add Comments): Yes  Toilet Every 2 Hours-In Advance of Need: Yes  Hourly Visual Checks: Awake, In bed  Fall Visual Posted: Fall sign posted  Room Door Open: Yes  Alarm On: Bed  Patient Moved Closer to Nursing Station: No    Active Consults:   IP CONSULT TO UROLOGY  IP CONSULT TO INTERVENTIONAL RADIOLOGY  IP CONSULT TO INFECTIOUS DISEASES  IP CONSULT TO NEPHROLOGY    Length of Stay:  Expected LOS: 5  Actual LOS: 3    Tiana Og  RN

## 2025-07-21 NOTE — PLAN OF CARE
Problem: Occupational Therapy - Adult  Goal: By Discharge: Performs self-care activities at highest level of function for planned discharge setting.  See evaluation for individualized goals.  Description: FUNCTIONAL STATUS PRIOR TO ADMISSION:  Patient was ambulatory using RW and was Mod Indep with ADLs and has PRN assist from family for IADLs.  Son-in-law lives next door and checks in on patient every other day when he is in town; however, leaves town for work and pt spends up to 4 days alone.  Pt reports recent frequent falls.  Wife is transitioning to LTC.   , Prior Level of Assist for ADLs: Independent,  ,  ,  ,  ,  , Prior Level of Assist for Homemaking: Needs assistance (family reports pt non-compliant with DM),  ,  ,       HOME SUPPORT: Patient lived alone with son-in-law to provide assistance.    Occupational Therapy Goals:  Initiated 7/21/2025  1.  Patient will perform RW grooming with Contact Guard Assist within 7 day(s).  2.  Patient will perform bathing with Minimal Assist within 7 day(s).  3.  Patient will perform RW lower body dressing with Minimal Assist within 7 day(s).  4.  Patient will perform RW toilet transfers with Contact Guard Assist  within 7 day(s).  5.  Patient will perform all aspects of RW toileting with Contact Guard Assist within 7 day(s).    Outcome: Progressing    OCCUPATIONAL THERAPY EVALUATION    Patient: Darius Burgos (84 y.o. male)  Date: 7/21/2025  Primary Diagnosis: Sepsis (HCC) [A41.9]         Precautions:                    ASSESSMENT :  The patient is limited by decreased strength/endurance, decreased mobility/balance, decreased activity tolerance, decreased full body reaching, and noted BUE tremors (pt reported being extremely cold during OT evaluation), all of which limit pt's safe functional independence.  Pt pleasant and agreeable to therapy, with good Min A functional transfers at RW; however, noted to benefit from Max A for toileting and LE dressing secondary to high

## 2025-07-21 NOTE — PLAN OF CARE
Problem: Skin/Tissue Integrity  Goal: Skin integrity remains intact  Description: 1.  Monitor for areas of redness and/or skin breakdown  2.  Assess vascular access sites hourly  3.  Every 4-6 hours minimum:  Change oxygen saturation probe site  4.  Every 4-6 hours:  If on nasal continuous positive airway pressure, respiratory therapy assess nares and determine need for appliance change or resting period  7/20/2025 2222 by Jessica Worthington RN  Outcome: Progressing  7/20/2025 1027 by Tiana Og RN  Outcome: Progressing  Flowsheets (Taken 7/20/2025 0815)  Skin Integrity Remains Intact: Monitor for areas of redness and/or skin breakdown     Problem: Chronic Conditions and Co-morbidities  Goal: Patient's chronic conditions and co-morbidity symptoms are monitored and maintained or improved  7/20/2025 2222 by Jessica Worthington RN  Outcome: Progressing  7/20/2025 1027 by Tiana Og RN  Outcome: Progressing  Flowsheets (Taken 7/20/2025 0815)  Care Plan - Patient's Chronic Conditions and Co-Morbidity Symptoms are Monitored and Maintained or Improved: Monitor and assess patient's chronic conditions and comorbid symptoms for stability, deterioration, or improvement     Problem: Discharge Planning  Goal: Discharge to home or other facility with appropriate resources  7/20/2025 2222 by Jessica Worthington RN  Outcome: Progressing  7/20/2025 1027 by Tiana Og RN  Outcome: Progressing  Flowsheets (Taken 7/20/2025 0815)  Discharge to home or other facility with appropriate resources: Identify barriers to discharge with patient and caregiver     Problem: Pain  Goal: Verbalizes/displays adequate comfort level or baseline comfort level  7/20/2025 2222 by Jessica Worthington RN  Outcome: Progressing  7/20/2025 1027 by Tiana Og RN  Outcome: Progressing  Flowsheets (Taken 7/20/2025 0715)  Verbalizes/displays adequate comfort level or baseline comfort level: Encourage patient to monitor pain and request assistance     Problem: ABCDS

## 2025-07-21 NOTE — PROGRESS NOTES
Speech LAnguage Pathology EVALUATION/DISCHARGE    Patient: Darius Burgos (84 y.o. male)  Date: 7/21/2025  Primary Diagnosis: Sepsis (HCC) [A41.9]       Precautions:                     ASSESSMENT :  Patient reports pureed diet required initially due to edentulous status and dentures at home.  Patient has dentures with upper denture in place.  Patient tolerated solids and thin liquids free of s/s aspiration.  He demonstrated ability to bite solids, timely and complete mastication and complete oral transit with no residue.  Given bedside presentation and dentures in place feel patient is safe to advance to regular diet.    Patient will be discharged from skilled speech-language pathology services at this time.     PLAN :  Recommendations and Planned Interventions:  Diet: Regular and thin liquids  Sit up for meals     Acute SLP Services: No, patient will be discharged from acute skilled speech-language pathology at this time.  Discharge Recommendations: No, additional SLP treatment not indicated at discharge     SUBJECTIVE:   Patient stated, “I didn't have my dentures at first.”  RN requests SLP for diet advance.    OBJECTIVE:     Past Medical History:   Diagnosis Date    Arthritis     At risk for sleep apnea 12/18/2019    Stop Bang 5     Atrial fibrillation (HCC)     Atrial Flutter-Wittkamp    Cancer (HCC)     prostate cancer    Colon cancer (HCC)     Diabetes (HCC)     DM2    Elevated cholesterol     History of blood transfusion     over 25 years    History of kidney stones     [place] (hard of hearing)     bilateral hearing aids    Hypertension     PUD (peptic ulcer disease) 1980    Skin cancer      Past Surgical History:   Procedure Laterality Date    APPENDECTOMY      1960's    CATARACT REMOVAL Bilateral     CHOLECYSTECTOMY, LAPAROSCOPIC N/A 11/19/2023    LAPAROSCOPIC CHOLCYSTECTOMY  WITH GRAMS performed by Miguel A Johnson MD at [facility] MAIN OR    COLONOSCOPY N/A 09/27/2017    COLONOSCOPY performed by Shmuel Santiago MD

## 2025-07-21 NOTE — CONSULTS
Interventional Radiology    Imaging reviewed including old Cts from 2021 which show a large right parapelvic cyst. There is no right hydronephrosis. The ureteral stent has decompressed the right collecting system.    No indication for nephrostomy tube.    Julius Lewis M.D  Interventional Radiology  [facility]  [phone]

## 2025-07-21 NOTE — PROGRESS NOTES
Hospitalist Progress Note    NAME:   Darius Burgos   : 1940   MRN: [id number]     Date/Time: 2025 6:11 PM  Patient PCP: Maria Luisa Gramajo MD    Estimated discharge date:   Barriers: culture clearance, remaining afebrile, hgb stability, MRCP, RUE duplex      Assessment / Plan:    Severe sepsis  Complicated urinary tract infection  Hypotension  Nephrolithiasis status post stents in the past  - Lactic acid was 4.1 on arrival  - CT abdomen shows severe right-sided hydronephrosis with a nephroureteral stent in satisfactory position and nonobstructing right renal stones  - He received IV fluid bolus with improvement of blood pressure  - Urine and blood cultures were sent in the emergency department, followup  - Lactic acid improved with IV hydration  - Will start empiric Zosyn  - consulted urology.  Per urology no need for emergent surgery and recommend pcn tube by IR  Spoke to Dr Lewis from IR and he recommended no pcn as there is no hydro and recommended cx directed abx therapy.  - place chatman, void trial in a few days  - repeat fever  - , repeating blood cultures  - repeated ct abd given fevers, nonacute, showed:   Right hydronephrosis decompressed by Chatman catheter  Bilateral pleural transudate and subsegmental atelectasis  Stable L1 compression deformity, constipation  Complex ascites. Spontaneous peritonitis is not excluded but the amount of fluid  is small, probably too small for diagnostic paracentesis.  - consulted ID given persistent fevers  Respiratory viral panel negatie     Abnormal CT with pancreatic hypodense lesions with 1 lesion that is enlarging  - He will need MRI of pancreas once clinically better  - MRI abdomen pancreas ordered     RUE swelling  - ordered duplex    CKD stage III  History of IgA nephritis, leukocytoclastic vasculitis  - Most recently creatinine ranged anywhere between 2-3.  Currently creatinine is 2.3.  - Consider nephrology evaluation if creatinine is

## 2025-07-21 NOTE — PROGRESS NOTES
Spiritual Health History and Assessment/Progress Note  [facility]    Initial Encounter,  ,  ,      Name: Darius Burgos MRN: [id number]    Age: 84 y.o.     Sex: male   Language: English   Mu-ism: Orthodoxy   Sepsis (HCC)     Date: 7/21/2025            Total Time Calculated: 12 min              Spiritual Assessment began in [facility] 2 CARDIAC MEDICAL STEP DOWN        Referral/Consult From: Rounding   Encounter Overview/Reason: Initial Encounter  Service Provided For: Patient and family together    Millicent, Belief, Meaning:   Patient identifies as spiritual, is connected with a millicent tradition or spiritual practice, and has beliefs or practices that help with coping during difficult times  Family/Friends Other: did  not assess      Importance and Influence:  Patient unable to assess at this time  Family/Friends Other: did not assess    Community:  Patient feels well-supported. Support system includes: Spouse/Partner, Children, and Extended family  Family/Friends Other: did not assess    Assessment and Plan of Care:     Patient Interventions include: Facilitated expression of thoughts and feelings, Explored spiritual coping/struggle/distress, Affirmed coping skills/support systems, and Other: offered assurance of prayer  Family/Friends Interventions include: Affirmed coping skills/support systems    Patient Plan of Care: Other:  plans to follow up tomorrow  Family/Friends Plan of Care: Spiritual Care available upon further referral    Electronically signed by Chaplain BIRGIT [facility] on 7/21/2025 at 3:01 PM

## 2025-07-21 NOTE — PLAN OF CARE
Problem: Physical Therapy - Adult  Goal: By Discharge: Performs mobility at highest level of function for planned discharge setting.  See evaluation for individualized goals.  Description: FUNCTIONAL STATUS PRIOR TO ADMISSION: Patient was ambulatory using RW and was Mod Indep with ADLs and has PRN assist from family for IADLs.  Son-in-law lives next door and checks in on patient every other day when he is in town; however, leaves town for work and pt spends up to 4 days alone.  Pt reports recent frequent falls.  Wife is transitioning to LTC.    HOME SUPPORT PRIOR TO ADMISSION: The patient lived alone with son in law to provide assistance. 1 story home with no steps to enter.    Physical Therapy Goals  Initiated 7/21/2025  1.  Patient will move from supine to sit and sit to supine, scoot up and down, and roll side to side in bed with modified independence within 7 day(s).    2.  Patient will perform sit to stand with modified independence within 7 day(s).  3.  Patient will transfer from bed to chair and chair to bed with standby assistance using the least restrictive device within 7 day(s).  4.  Patient will ambulate with standby assistance for 90 feet with the least restrictive device within 7 day(s).   5.  Patient will perform 8 sit to stands in <40 seconds with modified independence within 7 day(s).   7/21/2025 1646 by Chavez Johns, PT  Outcome: Progressing  PHYSICAL THERAPY EVALUATION    Patient: Darius Burgos (84 y.o. male)  Date: 7/21/2025  Primary Diagnosis: Sepsis (HCC) [A41.9]       Precautions: Restrictions/Precautions  Restrictions/Precautions: Fall Risk, Skin, Bed Alarm, Contact Precautions (ESBL)  Activity Level: Up with Assist, Up as Tolerated  Required Braces or Orthoses?: No            ASSESSMENT :   DEFICITS/IMPAIRMENTS:   The patient is limited by decreased functional mobility, independence in ADLs, high-level IADLs, strength, body mechanics, activity tolerance, endurance, safety awareness,

## 2025-07-21 NOTE — WOUND CARE
Wound Care consult for the wound on the Sacrum / buttocks present on admission.  Chart reviewed and patient assessed. Pt. Is 84 years old and he is admitted due to UroSepsis and Hypotension.  Pt. Lives at home and his son also lives there but is not a direct caregiver because he works outside of the home.   Past Medical History:   Diagnosis Date    Arthritis     At risk for sleep apnea 12/18/2019    Stop Bang 5     Atrial fibrillation (HCC)     Atrial Flutter-Wittkamp    Cancer (HCC)     prostate cancer    Colon cancer (HCC)     Diabetes (HCC)     DM2    Elevated cholesterol     History of blood transfusion     over 25 years    History of kidney stones     [place] (hard of hearing)     bilateral hearing aids    Hypertension     PUD (peptic ulcer disease) 1980    Skin cancer      Past Surgical History:   Procedure Laterality Date    APPENDECTOMY      1960's    CATARACT REMOVAL Bilateral     CHOLECYSTECTOMY, LAPAROSCOPIC N/A 11/19/2023    LAPAROSCOPIC CHOLCYSTECTOMY  WITH GRAMS performed by Miguel A Johnson MD at [facility] MAIN OR    COLONOSCOPY N/A 09/27/2017    COLONOSCOPY performed by Shmuel Santiago MD at [facility] ENDOSCOPY    COLONOSCOPY N/A 12/30/2019    COLONOSCOPY WITH POLYPECTOMY performed by Shmuel Santiago MD at [facility] AMBULATORY OR    COLONOSCOPY N/A 11/30/2022    COLONOSCOPY performed by Miguel A Haynes MD at [facility] ENDOSCOPY    COLONOSCOPY N/A 10/11/2021    COLONOSCOPY performed by Shmuel Santiago MD at [facility] ENDOSCOPY    COLONOSCOPY N/A 8/18/2023    COLONOSCOPY performed by Miguel A Haynes II, MD at [facility] ENDOSCOPY    CT BIOPSY RENAL  4/16/2025    CT BIOPSY RENAL 4/16/2025 [facility] RAD CT    CYSTOSCOPY Right 4/11/2025    CYSTOSCOPY RIGHT URETERAL STENT INSERTION, BILATERAL RETROGRADE PYELOGRAM performed by Chemo Rees MD at [facility] MAIN OR    ERCP N/A 11/17/2023    ERCP ENDOSCOPIC RETROGRADE CHOLANGIOPANCREATOGRAPHY performed by Ghanshyam Santiago MD at [facility] ENDOSCOPY    ERCP N/A 1/18/2024    ENDOSCOPIC RETROGRADE

## 2025-07-21 NOTE — CONSULTS
Consultation Note    NAME: Darius Burgos   :  1940   MRN:  [id number]     Date/Time:  2025 12:05 PM    I have been asked to see this patient by Dr. Mendel  for advice/opinion re: CKD stage 3b/IgA nephropathy.     Assessment :    Plan/Discussion:  CKD stage 3b  Proteinuria  Biopsy proven IgA nephropathy  Mild hyponatremia  Anemia   H/o nephrolithiasis and s/p stents (no hydro, he has a parapelvic cyst)    Admitted with sepsis/uti Creatinine at what appears to be baseline, ~ 1.8  Mark (non-oliguric)    He was to be on a prednisone taper, but is now all the way off Prednisone, restart at 10 mg PO daily     new patient 2025  Performed by Bryn Cabral MD, Nephrology, [phone]  Thank you for referring your patient to see me. I appreciate the opportunity given to me to participate in medical management of this patient. My impression for this visit is as follows:-  hypertensive disorder  goal blood pressure 120/80. Continue with Bumex at this time.  Type 2 diabetes mellitus with stage 3b chronic kidney disease, without long-term current use of insulin  Suspect that he has underlying diabetic kidney disease  Checked hemoglobin [id number]  hyperlipidemia  Goal LDL of less than 100 and goal triglycerides of less than 150  Henoch-Schonlein purpura nephritis  As per skin biopsy  Stage 3b chronic kidney disease  Updated labs to be done.  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

## 2025-07-21 NOTE — PROGRESS NOTES
Bedside and Verbal shift change report given to May RN (oncoming nurse) by Tiana TORRES (offgoing nurse). Report included the following information Nurse Handoff Report, Intake/Output, MAR, Recent Results, and Cardiac Rhythm Afib.    End of Shift Note    Bedside shift change report given to Julia TORRES (oncoming nurse) by Jessica Worthington RN (offgoing nurse).  Report included the following information SBAR, Intake/Output, MAR, Recent Results, and Cardiac Rhythm Afib    Shift worked:  7p-7a     Shift summary and any significant changes:     No significant change     Concerns for physician to address:       Zone phone for oncoming shift:          Activity:  Level of Assistance: Moderate assist, patient does 50-74%  Number times ambulated in hallways past shift: 0  Number of times OOB to chair past shift: 1    Cardiac:   Cardiac Monitoring: Yes      Cardiac Rhythm: Atrial fib    Access:  Current line(s): PIV    Genitourinary:   Urinary Status: Mark, Draining    Respiratory:   O2 Device: Nasal cannula  Chronic home O2 use?: NO  Incentive spirometer at bedside: NO    GI:     Current diet:  ADULT ORAL NUTRITION SUPPLEMENT; Breakfast, Dinner; Wound Healing Oral Supplement  ADULT ORAL NUTRITION SUPPLEMENT; Lunch, Dinner; Diabetic Oral Supplement  ADULT DIET; Regular; 4 carb choices (60 gm/meal)  Passing flatus: YES    Pain Management:   Patient states pain is manageable on current regimen: N/A    Skin:  Rafael Scale Score: 16  Interventions: Wound Offloading (Prevention Methods): Turning, Repositioning    Patient Safety:  Fall Risk: Nursing Judgement-Fall Risk High(Add Comments): Yes  Fall Risk Interventions  Nursing Judgement-Fall Risk High(Add Comments): Yes  Toilet Every 2 Hours-In Advance of Need: Yes  Hourly Visual Checks: Awake, In bed  Fall Visual Posted: Fall sign posted, Socks  Room Door Open: Yes  Alarm On: Bed  Patient Moved Closer to Nursing Station: No    Active Consults:   IP CONSULT TO UROLOGY  IP CONSULT TO

## 2025-07-21 NOTE — PROGRESS NOTES
-Please complete MRI History and Safety Screening Form.     - Patient cannot be scanned until this form is completed and reviewed in MRI to ensure patient is SAFE and eligible for MRI.  - CALL MRI when this has been successfully completed at [phone].

## 2025-07-21 NOTE — CONSULTS
spoke to the patient, he is  established with [facility]. I have notified Dr Thomas.

## 2025-07-21 NOTE — PROGRESS NOTES
Bedside and Verbal shift change report given to Tiana RN (oncoming nurse) by May RN (offgoing nurse). Report included the following information Nurse Handoff Report, Intake/Output, Recent Results, and Cardiac Rhythm A-fib.

## 2025-07-22 ENCOUNTER — APPOINTMENT (OUTPATIENT)
Facility: HOSPITAL | Age: 85
DRG: 872 | End: 2025-07-22
Attending: STUDENT IN AN ORGANIZED HEALTH CARE EDUCATION/TRAINING PROGRAM
Payer: MEDICARE

## 2025-07-22 NOTE — PLAN OF CARE
Problem: Occupational Therapy - Adult  Goal: By Discharge: Performs self-care activities at highest level of function for planned discharge setting.  See evaluation for individualized goals.  Description: FUNCTIONAL STATUS PRIOR TO ADMISSION:  Patient was ambulatory using RW and was Mod Indep with ADLs and has PRN assist from family for IADLs.  Son-in-law lives next door and checks in on patient every other day when he is in town; however, leaves town for work and pt spends up to 4 days alone.  Pt reports recent frequent falls.  Wife is transitioning to LTC.   , Prior Level of Assist for ADLs: Independent,  ,  ,  ,  ,  , Prior Level of Assist for Homemaking: Needs assistance (family reports pt non-compliant with DM),  ,  ,       HOME SUPPORT: Patient lived alone with son-in-law to provide assistance.    Occupational Therapy Goals:  Initiated 7/21/2025  1.  Patient will perform RW grooming with Contact Guard Assist within 7 day(s).  2.  Patient will perform bathing with Minimal Assist within 7 day(s).  3.  Patient will perform RW lower body dressing with Minimal Assist within 7 day(s).  4.  Patient will perform RW toilet transfers with Contact Guard Assist  within 7 day(s).  5.  Patient will perform all aspects of RW toileting with Contact Guard Assist within 7 day(s).    Outcome: Progressing  OCCUPATIONAL THERAPY TREATMENT  Patient: Darius Burgos (84 y.o. male)  Date: 7/22/2025  Primary Diagnosis: Sepsis (HCC) [A41.9]       Precautions: Fall Risk, Skin, Bed Alarm, Contact Precautions (ESBL)                Chart, occupational therapy assessment, plan of care, and goals were reviewed.    ASSESSMENT  Patient continues to benefit from skilled OT services and is slowly progressing towards goals. Pt was seated at bedside chair upon arrival and was cleared for session by nursing.  Pt reports he had been sitting up since the morning and initially didn't want to return back to bed.  He has kyphosis in standing with forward

## 2025-07-22 NOTE — PROGRESS NOTES
Patient is pending RUE Duplex to r/o DVT. PT will defer pending the results.    Chavez Johns, PT

## 2025-07-22 NOTE — PROGRESS NOTES
NAME: Darius Burgos        :  1940        MRN:  [id number]                    Assessment   :                                               Plan/Discussion:  CKD stage 3b  Proteinuria  Biopsy proven IgA nephropathy  Mild hyponatremia  Anemia   H/o nephrolithiasis and s/p stents (no hydro, he has a parapelvic cyst)     Admitted with sepsis/uti Creatinine is at what appears to be baseline, ~ 1.8  Mark (non-oliguric)    BP stable     He was to be on a prednisone taper, but is now all the way off Prednisone, restarted at 10 mg PO daily    Hgb < 9; iron sat 17, ferritin 493 - continue PO iron and will give retacrit 10 k sc x one       Subjective:     Chief Complaint:  oob in chair. No specific complaint. Swelling better today. Sleepy. No n/v/d.      Review of Systems:    Symptom Y/N Comments  Symptom Y/N Comments   Fever/Chills    Chest Pain     Poor Appetite    Edema     Cough    Abdominal Pain     Sputum    Joint Pain     SOB/CAMARGO    Pruritis/Rash     Nausea/vomit    Tolerating PT/OT     Diarrhea    Tolerating Diet     Constipation    Other       Could not obtain due to:      Objective:     VITALS:   Last 24hrs VS reviewed since prior progress note. Most recent are:  Vitals:    25 0230   BP: 138/81   Pulse: 68   Resp: 17   Temp: 98.1 °F (36.7 °C)   SpO2: 95%       Intake/Output Summary (Last 24 hours) at 2025 0623  Last data filed at 2025 0401  Gross per 24 hour   Intake 680 ml   Output 1200 ml   Net -520 ml      Telemetry Reviewed:     PHYSICAL EXAM:  General: NAD      Lab Data Reviewed: (see below)    Medications Reviewed: (see below)    PMH/SH reviewed - no change compared to H&P  ________________________________________________________________________  Care Plan discussed with:  Patient     Family      RN     Care Manager                    Consultant:          Comments   >50% of visit spent in counseling and coordination

## 2025-07-22 NOTE — PROGRESS NOTES
Spiritual Health History and Assessment/Progress Note  [facility]    Follow-up,  ,  ,      Name: Darius Burgos MRN: [id number]    Age: 84 y.o.     Sex: male   Language: English   Protestant: [employer]   Sepsis (HCC)     Date: 7/22/2025            Total Time Calculated: 16 min              Spiritual Assessment began in [facility] 2 CARDIAC MEDICAL STEP DOWN        Referral/Consult From: Rounding   Encounter Overview/Reason: Follow-up  Service Provided For: Patient    Millicent, Belief, Meaning:   Patient identifies as spiritual, is connected with a millicent tradition or spiritual practice, and has beliefs or practices that help with coping during difficult times  Family/Friends No family/friends present      Importance and Influence:  Patient has no beliefs influential to healthcare decision-making identified during this visit  Family/Friends No family/friends present    Community:  Patient is connected with a spiritual community and feels well-supported. Support system includes: Spouse/Partner, Millicent Community, Friends, and Extended family  Family/Friends No family/friends present    Assessment and Plan of Care:     Patient Interventions include: Facilitated expression of thoughts and feelings, Explored spiritual coping/struggle/distress, Affirmed coping skills/support systems, and Other: offered assurance of prayer  Family/Friends Interventions include: No family/friends present    Patient Plan of Care: No spiritual needs identified for follow-up and Spiritual Care available upon further referral  Family/Friends Plan of Care: No family/friends present    Electronically signed by Chaplain GANESH GENAO on 7/22/2025 at 11:28 AM

## 2025-07-22 NOTE — PROGRESS NOTES
0730: Bedside and Verbal shift change report given to Julia (oncoming nurse) by May (offgoing nurse). Report included the following information Nurse Handoff Report, Index, ED Encounter Summary, ED SBAR, Adult Overview, Intake/Output, MAR, Recent Results, and Cardiac Rhythm A. Fib.      1900: End of Shift Note    Bedside shift change report given to RN (oncoming nurse) by Julia Dimas RN (offgoing nurse).  Report included the following information SBAR, Kardex, ED Summary, Intake/Output, MAR, Recent Results, and Cardiac Rhythm A. Fib     Shift worked:  1413-1211     Shift summary and any significant changes:     Pt felt drowsy today     Concerns for physician to address:  N/A     Zone phone for oncoming shift:          Activity:  Level of Assistance: Moderate assist, patient does 50-74%  Number times ambulated in hallways past shift: 0  Number of times OOB to chair past shift: 2    Cardiac:   Cardiac Monitoring: Yes      Cardiac Rhythm: Atrial fib    Access:  Current line(s): PIV     Genitourinary:   Urinary Status: Mark    Respiratory:   O2 Device: None (Room air)  Chronic home O2 use?: NO  Incentive spirometer at bedside: YES    GI:  Last BM (including prior to admit): 07/21/25  Current diet:  ADULT ORAL NUTRITION SUPPLEMENT; Breakfast, Dinner; Wound Healing Oral Supplement  ADULT ORAL NUTRITION SUPPLEMENT; Lunch, Dinner; Diabetic Oral Supplement  ADULT DIET; Regular; 4 carb choices (60 gm/meal)  Passing flatus: YES    Pain Management:   Patient states pain is manageable on current regimen: YES    Skin:  Rafael Scale Score: 16  Interventions: Wound Offloading (Prevention Methods): Bed, pressure reduction mattress, Elevate heels, Pillows, Repositioning, Turning    Patient Safety:  Fall Risk: Nursing Judgement-Fall Risk High(Add Comments): Yes  Fall Risk Interventions  Nursing Judgement-Fall Risk High(Add Comments): Yes  Toilet Every 2 Hours-In Advance of Need: Yes  Hourly Visual Checks: Awake, In bed  Fall

## 2025-07-22 NOTE — PLAN OF CARE
Problem: Skin/Tissue Integrity  Goal: Skin integrity remains intact  Description: 1.  Monitor for areas of redness and/or skin breakdown  2.  Assess vascular access sites hourly  3.  Every 4-6 hours minimum:  Change oxygen saturation probe site  4.  Every 4-6 hours:  If on nasal continuous positive airway pressure, respiratory therapy assess nares and determine need for appliance change or resting period  Outcome: Progressing     Problem: Chronic Conditions and Co-morbidities  Goal: Patient's chronic conditions and co-morbidity symptoms are monitored and maintained or improved  Outcome: Progressing     Problem: Discharge Planning  Goal: Discharge to home or other facility with appropriate resources  Outcome: Progressing     Problem: Pain  Goal: Verbalizes/displays adequate comfort level or baseline comfort level  Outcome: Progressing     Problem: ABCDS Injury Assessment  Goal: Absence of physical injury  Outcome: Progressing     Problem: Safety - Adult  Goal: Free from fall injury  Outcome: Progressing     Problem: Cardiovascular - Adult  Goal: Maintains optimal cardiac output and hemodynamic stability  Outcome: Progressing  Goal: Absence of cardiac dysrhythmias or at baseline  Outcome: Progressing     Problem: Skin/Tissue Integrity - Adult  Goal: Skin integrity remains intact  Description: 1.  Monitor for areas of redness and/or skin breakdown  2.  Assess vascular access sites hourly  3.  Every 4-6 hours minimum:  Change oxygen saturation probe site  4.  Every 4-6 hours:  If on nasal continuous positive airway pressure, respiratory therapy assess nares and determine need for appliance change or resting period  Outcome: Progressing  Goal: Incisions, wounds, or drain sites healing without S/S of infection  Outcome: Progressing     Problem: Musculoskeletal - Adult  Goal: Return mobility to safest level of function  Outcome: Progressing  Goal: Return ADL status to a safe level of function  Outcome: Progressing

## 2025-07-22 NOTE — CARE COORDINATION
2988 - CM received consult for, \"Skilled Nursing Facility Placement.\"  It appears patient was at [facility] and may consider returning.    1415 - Patient would like to return to [facility], as he has been there recently.  SNF list provided to him and he is aware to select additional choices if they cannot accepted.    1512 - Referral sent to [facility] per patient's request.  CM asked them how many SNF days he has left.    1642 to 1656 - [facility] notified CM that patient's wife is at their facility and he previously used 15 days.  They requested to know if the patient will need IV abx on discharge, if he will discharge with a chatman in, and when he will be ready for discharge.  They requested the most recent ID note with any IV abx orders, most recent MD/consult notes, and culture/sensitivity results.  They will need to check to see if they have an isolation room available.  CM let them know that IV abx may be needed and ID has not yet seen.  CM is not sure if he will need his chatman.  They were notified that MD progress/urology/wound care/nephro/PT/OT notes and BCs/UA/duplex results so far have been uploaded.      Kylah Mendez, MSN, RN    Care Management  [phone]

## 2025-07-22 NOTE — PLAN OF CARE
Problem: Skin/Tissue Integrity  Goal: Skin integrity remains intact  Description: 1.  Monitor for areas of redness and/or skin breakdown  2.  Assess vascular access sites hourly  3.  Every 4-6 hours minimum:  Change oxygen saturation probe site  4.  Every 4-6 hours:  If on nasal continuous positive airway pressure, respiratory therapy assess nares and determine need for appliance change or resting period  7/22/2025 0818 by Julia Dimas RN  Outcome: Progressing  Flowsheets (Taken 7/22/2025 0730)  Skin Integrity Remains Intact: Monitor for areas of redness and/or skin breakdown  7/21/2025 2317 by Jessica Worthington RN  Outcome: Progressing     Problem: Chronic Conditions and Co-morbidities  Goal: Patient's chronic conditions and co-morbidity symptoms are monitored and maintained or improved  7/22/2025 0818 by Julia Dimas RN  Outcome: Progressing  Flowsheets (Taken 7/22/2025 0730)  Care Plan - Patient's Chronic Conditions and Co-Morbidity Symptoms are Monitored and Maintained or Improved: Monitor and assess patient's chronic conditions and comorbid symptoms for stability, deterioration, or improvement  7/21/2025 2317 by Jessica Worthington RN  Outcome: Progressing     Problem: Discharge Planning  Goal: Discharge to home or other facility with appropriate resources  7/22/2025 0818 by Julia Dimas RN  Outcome: Progressing  Flowsheets (Taken 7/22/2025 0730)  Discharge to home or other facility with appropriate resources: Identify barriers to discharge with patient and caregiver  7/21/2025 2317 by Jessica Worthington RN  Outcome: Progressing     Problem: Pain  Goal: Verbalizes/displays adequate comfort level or baseline comfort level  7/22/2025 0818 by Julia Dimas RN  Outcome: Progressing  Flowsheets (Taken 7/22/2025 0730)  Verbalizes/displays adequate comfort level or baseline comfort level: Encourage patient to monitor pain and request assistance  7/21/2025 2317 by Jessica Worthington RN  Outcome:  Progressing     Problem: ABCDS Injury Assessment  Goal: Absence of physical injury  7/22/2025 0818 by Julia Dimas RN  Outcome: Progressing  7/21/2025 2317 by Jessica Worthington RN  Outcome: Progressing     Problem: Safety - Adult  Goal: Free from fall injury  7/22/2025 0818 by Julia Dimas RN  Outcome: Progressing  7/21/2025 2317 by Jessica Worthington RN  Outcome: Progressing     Problem: Cardiovascular - Adult  Goal: Maintains optimal cardiac output and hemodynamic stability  7/22/2025 0818 by Julia Dimas RN  Outcome: Progressing  Flowsheets (Taken 7/22/2025 0730)  Maintains optimal cardiac output and hemodynamic stability: Monitor blood pressure and heart rate  7/21/2025 2317 by Jessica Worthington RN  Outcome: Progressing  Goal: Absence of cardiac dysrhythmias or at baseline  7/22/2025 0818 by Julia Dimas RN  Outcome: Progressing  Flowsheets (Taken 7/22/2025 0730)  Absence of cardiac dysrhythmias or at baseline: Monitor cardiac rate and rhythm  7/21/2025 2317 by Jessica Worthington RN  Outcome: Progressing     Problem: Skin/Tissue Integrity - Adult  Goal: Skin integrity remains intact  Description: 1.  Monitor for areas of redness and/or skin breakdown  2.  Assess vascular access sites hourly  3.  Every 4-6 hours minimum:  Change oxygen saturation probe site  4.  Every 4-6 hours:  If on nasal continuous positive airway pressure, respiratory therapy assess nares and determine need for appliance change or resting period  7/22/2025 0818 by Julia Dimas RN  Outcome: Progressing  Flowsheets (Taken 7/22/2025 0730)  Skin Integrity Remains Intact: Monitor for areas of redness and/or skin breakdown  7/21/2025 2317 by Jessica Worthington RN  Outcome: Progressing  Goal: Incisions, wounds, or drain sites healing without S/S of infection  7/22/2025 0818 by Julia Dimas RN  Outcome: Progressing  Flowsheets (Taken 7/22/2025 0730)  Incisions, Wounds, or Drain Sites Healing Without Sign and Symptoms of

## 2025-07-22 NOTE — PROGRESS NOTES
Hospitalist Progress Note    NAME:   Darius Burgos   : 1940   MRN: [id number]     Date/Time: 2025 5:13 PM  Patient PCP: Maria Luisa Gramajo MD    Estimated discharge date:   Barriers: placement      Assessment / Plan:    Severe sepsis  Complicated urinary tract infection  Hypotension  Nephrolithiasis status post stents in the past  - Lactic acid was 4.1 on arrival  - CT abdomen shows severe right-sided hydronephrosis with a nephroureteral stent in satisfactory position and nonobstructing right renal stones  - He received IV fluid bolus with improvement of blood pressure  - Urine and blood cultures were sent in the emergency department, followup  - Lactic acid improved with IV hydration  - Will start empiric Zosyn  - consulted urology.  Per urology no need for emergent surgery and recommend pcn tube by IR  Spoke to Dr Lewis from IR and he recommended no pcn as there is no hydro and recommended cx directed abx therapy.  - place chatman, void trial in a few days  - repeat fever  - , repeating blood cultures  - repeated ct abd given fevers, nonacute, showed:   Right hydronephrosis decompressed by Chatman catheter  Bilateral pleural transudate and subsegmental atelectasis  Stable L1 compression deformity, constipation  Complex ascites. Spontaneous peritonitis is not excluded but the amount of fluid  is small, probably too small for diagnostic paracentesis.  - consulted ID given persistent fevers, however fevers have resolved at this point  Respiratory viral panel negative     Abnormal CT with pancreatic hypodense lesions with 1 lesion that is enlarging  - He will need MRI of pancreas once clinically better  - MRCP showed Numerous cystic lesions in the pancreas likely reflecting IPMN  lesions with no MRI evidence of malignancy. There numerous simple fluid signal intensity lesions diffusely involving the pancreas without suspicious nodularity, enhancing tissue, or other suspicious features with the

## 2025-07-22 NOTE — PROGRESS NOTES
Noted that pt is pending RUE duplex to rule out DVT.  Will await results prior to continuation of OT services.

## 2025-07-23 ASSESSMENT — PAIN DESCRIPTION - LOCATION: LOCATION: GENERALIZED

## 2025-07-23 ASSESSMENT — PAIN SCALES - WONG BAKER
WONGBAKER_NUMERICALRESPONSE: NO HURT
WONGBAKER_NUMERICALRESPONSE: NO HURT

## 2025-07-23 ASSESSMENT — PAIN SCALES - GENERAL
PAINLEVEL_OUTOF10: 0
PAINLEVEL_OUTOF10: 3
PAINLEVEL_OUTOF10: 0
PAINLEVEL_OUTOF10: 0

## 2025-07-23 ASSESSMENT — PAIN DESCRIPTION - DESCRIPTORS: DESCRIPTORS: ACHING

## 2025-07-23 NOTE — PROGRESS NOTES
Comprehensive Nutrition Assessment    Type and Reason for Visit:  Reassess    Nutrition Recommendations/Plan:   Continue current diet and supplements  Please document % meals and supplements consumed in flowsheet I/O's under intake      Malnutrition Assessment:  Malnutrition Status:  Insufficient data (07/19/25 1439)    Context:  Acute Illness     Findings of the 6 clinical characteristics of malnutrition:  Energy Intake:  No decrease in energy intake (per MST)  Weight Loss:  Unable to assess     Body Fat Loss:  Unable to assess     Muscle Mass Loss:  Unable to assess    Fluid Accumulation:  Mild (unsure if nutritionally related) Extremities   Strength:  Not Performed    Nutrition Assessment:     Chart reviewed and case discussed during IDR. Pt receiving nursing care during time of assessment today. Appreciate nursing documentation on PO intake which has been variable over the last few days. There is not documentation of ONS intake. Continue to encourage intake of meals and supplements. Will monitor.     Patient Vitals for the past 120 hrs:   PO Meals Eaten (%)   07/22/25 1530 26 - 50%   07/22/25 0805 26 - 50%   07/21/25 1845 26 - 50%   07/21/25 1310 51 - 75%   07/21/25 0911 1 - 25%   07/20/25 1820 1 - 25%   07/20/25 1400 1 - 25%   07/20/25 1000 1 - 25%   07/19/25 1820 26 - 50%   07/19/25 1300 26 - 50%   07/19/25 0758 26 - 50%   07/18/25 1715 0%     Wt Readings from Last 5 Encounters:   07/22/25 103 kg (227 lb 1.2 oz)   07/21/25 100 kg (220 lb 7.4 oz)   06/27/25 93.9 kg (207 lb)   05/13/25 103.4 kg (228 lb)   05/10/25 106.8 kg (235 lb 7.2 oz)   ]    Nutrition Related Findings:    Labs: -250, no BMP today.   Medications: iron, lasix, lantus, humalog, protonix, zosyn, prednisone.   Edema: +2 BUE.   BM 7/21.   Wound Type: Stage III, Skin Tears (coccyx)       Current Nutrition Intake & Therapies:    Average Meal Intake: 26-50%  Average Supplements Intake: Unable to assess  ADULT ORAL NUTRITION SUPPLEMENT;

## 2025-07-23 NOTE — PLAN OF CARE
Problem: Skin/Tissue Integrity  Goal: Skin integrity remains intact  Description: 1.  Monitor for areas of redness and/or skin breakdown  2.  Assess vascular access sites hourly  3.  Every 4-6 hours minimum:  Change oxygen saturation probe site  4.  Every 4-6 hours:  If on nasal continuous positive airway pressure, respiratory therapy assess nares and determine need for appliance change or resting period  Outcome: Progressing     Problem: Chronic Conditions and Co-morbidities  Goal: Patient's chronic conditions and co-morbidity symptoms are monitored and maintained or improved  Outcome: Progressing     Problem: Discharge Planning  Goal: Discharge to home or other facility with appropriate resources  Outcome: Progressing     Problem: Pain  Goal: Verbalizes/displays adequate comfort level or baseline comfort level  Outcome: Progressing  Flowsheets  Taken 7/22/2025 1530 by Julia Dimas RN  Verbalizes/displays adequate comfort level or baseline comfort level: Encourage patient to monitor pain and request assistance  Taken 7/22/2025 1130 by Julia Dimas RN  Verbalizes/displays adequate comfort level or baseline comfort level: Encourage patient to monitor pain and request assistance     Problem: ABCDS Injury Assessment  Goal: Absence of physical injury  Outcome: Progressing     Problem: Safety - Adult  Goal: Free from fall injury  Outcome: Progressing     Problem: Cardiovascular - Adult  Goal: Maintains optimal cardiac output and hemodynamic stability  Outcome: Progressing  Goal: Absence of cardiac dysrhythmias or at baseline  Outcome: Progressing     Problem: Skin/Tissue Integrity - Adult  Goal: Skin integrity remains intact  Description: 1.  Monitor for areas of redness and/or skin breakdown  2.  Assess vascular access sites hourly  3.  Every 4-6 hours minimum:  Change oxygen saturation probe site  4.  Every 4-6 hours:  If on nasal continuous positive airway pressure, respiratory therapy assess nares and

## 2025-07-23 NOTE — PLAN OF CARE
Problem: Physical Therapy - Adult  Goal: By Discharge: Performs mobility at highest level of function for planned discharge setting.  See evaluation for individualized goals.  Description: FUNCTIONAL STATUS PRIOR TO ADMISSION: Patient was ambulatory using RW and was Mod Indep with ADLs and has PRN assist from family for IADLs.  Son-in-law lives next door and checks in on patient every other day when he is in town; however, leaves town for work and pt spends up to 4 days alone.  Pt reports recent frequent falls.  Wife is transitioning to LTC.    HOME SUPPORT PRIOR TO ADMISSION: The patient lived alone with son in law to provide assistance. 1 story home with no steps to enter.    Physical Therapy Goals  Initiated 7/21/2025  1.  Patient will move from supine to sit and sit to supine, scoot up and down, and roll side to side in bed with modified independence within 7 day(s).    2.  Patient will perform sit to stand with modified independence within 7 day(s).  3.  Patient will transfer from bed to chair and chair to bed with standby assistance using the least restrictive device within 7 day(s).  4.  Patient will ambulate with standby assistance for 90 feet with the least restrictive device within 7 day(s).   5.  Patient will perform 8 sit to stands in <40 seconds with modified independence within 7 day(s).   Outcome: Progressing   PHYSICAL THERAPY TREATMENT    Patient: Darius Burgos (84 y.o. male)  Date: 7/23/2025  Diagnosis: Sepsis (HCC) [A41.9] Sepsis (HCC)      Precautions: Restrictions/Precautions  Restrictions/Precautions: Fall Risk, Skin, Bed Alarm, Contact Precautions (ESBL)  Activity Level: Up with Assist, Up as Tolerated  Required Braces or Orthoses?: No            ASSESSMENT:  Patient continues to benefit from skilled PT services and is not progressing towards goals. He is received in A-fib demonstrating -123 bpm seated at rest. Patient is provided CGA for sit<>stand at RW. He demonstrates fecal

## 2025-07-23 NOTE — PLAN OF CARE
Problem: ABCDS Injury Assessment  Goal: Absence of physical injury  Outcome: Progressing     Problem: ABCDS Injury Assessment  Goal: Absence of physical injury  Outcome: Progressing     Problem: Pain  Goal: Verbalizes/displays adequate comfort level or baseline comfort level  Outcome: Progressing     Problem: Pain  Goal: Verbalizes/displays adequate comfort level or baseline comfort level  Outcome: Progressing     Problem: Discharge Planning  Goal: Discharge to home or other facility with appropriate resources  Outcome: Progressing     Problem: Discharge Planning  Goal: Discharge to home or other facility with appropriate resources  Outcome: Progressing     Problem: Chronic Conditions and Co-morbidities  Goal: Patient's chronic conditions and co-morbidity symptoms are monitored and maintained or improved  Outcome: Progressing     Problem: Chronic Conditions and Co-morbidities  Goal: Patient's chronic conditions and co-morbidity symptoms are monitored and maintained or improved  Outcome: Progressing     Problem: Skin/Tissue Integrity  Goal: Skin integrity remains intact  Description: 1.  Monitor for areas of redness and/or skin breakdown  2.  Assess vascular access sites hourly  3.  Every 4-6 hours minimum:  Change oxygen saturation probe site  4.  Every 4-6 hours:  If on nasal continuous positive airway pressure, respiratory therapy assess nares and determine need for appliance change or resting period  Outcome: Progressing     Problem: Skin/Tissue Integrity  Goal: Skin integrity remains intact  Description: 1.  Monitor for areas of redness and/or skin breakdown  2.  Assess vascular access sites hourly  3.  Every 4-6 hours minimum:  Change oxygen saturation probe site  4.  Every 4-6 hours:  If on nasal continuous positive airway pressure, respiratory therapy assess nares and determine need for appliance change or resting period  Outcome: Progressing     Problem: ABCDS Injury Assessment  Goal: Absence of physical

## 2025-07-23 NOTE — PLAN OF CARE
Problem: Occupational Therapy - Adult  Goal: By Discharge: Performs self-care activities at highest level of function for planned discharge setting.  See evaluation for individualized goals.  Description: FUNCTIONAL STATUS PRIOR TO ADMISSION:  Patient was ambulatory using RW and was Mod Indep with ADLs and has PRN assist from family for IADLs.  Son-in-law lives next door and checks in on patient every other day when he is in town; however, leaves town for work and pt spends up to 4 days alone.  Pt reports recent frequent falls.  Wife is transitioning to LTC.   , Prior Level of Assist for ADLs: Independent,  ,  ,  ,  ,  , Prior Level of Assist for Homemaking: Needs assistance (family reports pt non-compliant with DM),  ,  ,       HOME SUPPORT: Patient lived alone with son-in-law to provide assistance.    Occupational Therapy Goals:  Initiated 7/21/2025  1.  Patient will perform RW grooming with Contact Guard Assist within 7 day(s).  2.  Patient will perform bathing with Minimal Assist within 7 day(s).  3.  Patient will perform RW lower body dressing with Minimal Assist within 7 day(s).  4.  Patient will perform RW toilet transfers with Contact Guard Assist  within 7 day(s).  5.  Patient will perform all aspects of RW toileting with Contact Guard Assist within 7 day(s).    Outcome: Progressing   OCCUPATIONAL THERAPY TREATMENT  Patient: Darius Burgos (84 y.o. male)  Date: 7/23/2025  Primary Diagnosis: Sepsis (HCC) [A41.9]       Precautions: Fall Risk, Skin, Bed Alarm, Contact Precautions (ESBL)                Chart, occupational therapy assessment, plan of care, and goals were reviewed.    ASSESSMENT  Patient continues to benefit from skilled OT services and is progressing towards goals. Pt received in bed, alert and oriented x4. Pt denies pain and agreeable to therapy with encouragement. Pt performs bed mobility with supervision and demonstrates intact sitting balance. -114bpm with activity. Pt CGA for all  functional transfers from bed to BSC to chair using RW. Pt required assistance for toileting tasks, including urinal set-up in standing; noted incontinence of loose stool. Pt then transferred to chair with CGA and RW, no overt LOB. Pt does demonstrate impaired activity tolerance, standing balance, functional strength affecting his independence needed for ADLs and functional mobility. Pt will continue to benefit from acute therapy services while admitted.       PLAN :  Patient continues to benefit from skilled intervention to address the above impairments.  Continue treatment per established plan of care to address goals.    Recommend with staff: 1 assist with RW, OOB ADLs    Recommend next OT session: POC progression    Recommendation for discharge: (in order for the patient to meet his/her long term goals):   Moderate intensity short-term skilled occupational therapy up to 5x/week    Other factors to consider for discharge: lives alone, high risk for falls, not safe to be alone, and concern for safely navigating or managing the home environment    IF patient discharges home will need the following DME: continuing to assess with progress       SUBJECTIVE:   Patient stated “I've been having loose stool at home too but it's gotten worse here.”    OBJECTIVE DATA SUMMARY:   Cognitive/Behavioral Status:  Orientation  Overall Orientation Status: Within Normal Limits  Cognition  Overall Cognitive Status: Exceptions  Arousal/Alertness: Appears intact  Following Commands: Appears intact  Attention Span: Attends with cues to redirect  Memory: Appears intact  Safety Judgement: Appears intact  Problem Solving: Impaired  Insights: Decreased awareness of deficits  Initiation: Appears intact  Sequencing: Appears intact    Functional Mobility and Transfers for ADLs:  Bed Mobility:  Bed Mobility Training  Bed Mobility Training: Yes  Supine to Sit: Supervision  Scooting: Supervision     Transfers:   Transfer Training  Transfer

## 2025-07-23 NOTE — PROGRESS NOTES
Bedside and Verbal shift change report given to May RN (oncoming nurse) by Julia TORRES (offgoing nurse). Report included the following information Nurse Handoff Report, MAR, Recent Results, and Cardiac Rhythm Afib.      End of Shift Note    Bedside shift change report given to Dolores TORRES (oncoming nurse) by Jessica Worthington RN (offgoing nurse).  Report included the following information SBAR, Intake/Output, MAR, Recent Results, and Cardiac Rhythm Afib    Shift worked:  7p-7a     Shift summary and any significant changes:     No significant change     Concerns for physician to address:       Zone phone for oncoming shift:          Activity:  Level of Assistance: Moderate assist, patient does 50-74%  Number times ambulated in hallways past shift: 0  Number of times OOB to chair past shift: 0    Cardiac:   Cardiac Monitoring: Yes      Cardiac Rhythm: Atrial fib    Access:  Current line(s): PIV    Genitourinary:   Urinary Status: Mark, Draining    Respiratory:   O2 Device: None (Room air)  Chronic home O2 use?: NO  Incentive spirometer at bedside: NO    GI:  Last BM (including prior to admit): 07/21/25  Current diet:  ADULT ORAL NUTRITION SUPPLEMENT; Breakfast, Dinner; Wound Healing Oral Supplement  ADULT ORAL NUTRITION SUPPLEMENT; Lunch, Dinner; Diabetic Oral Supplement  ADULT DIET; Regular; 4 carb choices (60 gm/meal)  Passing flatus: YES    Pain Management:   Patient states pain is manageable on current regimen: YES    Skin:  Rafael Scale Score: 16  Interventions: Wound Offloading (Prevention Methods): Turning, Repositioning    Patient Safety:  Fall Risk: Nursing Judgement-Fall Risk High(Add Comments): Yes  Fall Risk Interventions  Nursing Judgement-Fall Risk High(Add Comments): Yes  Toilet Every 2 Hours-In Advance of Need: Yes  Hourly Visual Checks: Awake, In bed  Fall Visual Posted: Fall sign posted, Socks  Room Door Open: Deferred to promote rest  Alarm On: Bed  Patient Moved Closer to Nursing Station: No    Active

## 2025-07-23 NOTE — PROGRESS NOTES
NAME: Darius Burgos        :  1940        MRN:  [id number]                    Assessment   :                                               Plan/Discussion:  CKD stage 3b  Proteinuria  Biopsy proven IgA nephropathy  Mild hyponatremia  Anemia   H/o nephrolithiasis and s/p stents (no hydro, he has a parapelvic cyst)     Admitted with sepsis/uti Creatinine is at what appears to be baseline, ~ 1.9  Mark (non-oliguric)    BP stable, high at times, weight is up - start furosemide 40 mg po daily     He was to be on a prednisone taper, but is now all the way off Prednisone, restarted at 10 mg PO daily    Hgb < 9; iron sat 17, ferritin 493 - continue PO iron and s/p retacrit 10 k sc x one on        Subjective:     Chief Complaint:  oob working with PT. No specific complaint. Swelling better today, not resolved.  No n/v/d.      Review of Systems:    Symptom Y/N Comments  Symptom Y/N Comments   Fever/Chills    Chest Pain     Poor Appetite    Edema     Cough    Abdominal Pain     Sputum    Joint Pain     SOB/CAMARGO    Pruritis/Rash     Nausea/vomit    Tolerating PT/OT     Diarrhea    Tolerating Diet     Constipation    Other       Could not obtain due to:      Objective:     VITALS:   Last 24hrs VS reviewed since prior progress note. Most recent are:  Vitals:    25 0400   BP: (!) 159/85   Pulse: 100   Resp: 24   Temp: 98.9 °F (37.2 °C)   SpO2: 97%       Intake/Output Summary (Last 24 hours) at 2025 0543  Last data filed at 2025 0503  Gross per 24 hour   Intake 978 ml   Output 1700 ml   Net -722 ml      Telemetry Reviewed:     PHYSICAL EXAM:  General: NAD      Lab Data Reviewed: (see below)    Medications Reviewed: (see below)    PMH/SH reviewed - no change compared to H&P  ________________________________________________________________________  Care Plan discussed with:  Patient     Family      RN     Care Manager

## 2025-07-23 NOTE — PROGRESS NOTES
Physician Progress Note      PATIENT:               ELENITA DIAZ  CSN #:                  [id number]  :                       1940  ADMIT DATE:       2025 10:31 AM  DISCH DATE:  RESPONDING  PROVIDER #:        David L Mendel MD          QUERY TEXT:    Pressure ulcer of left buttocks/sacrum is documented in the medical record in    Wound Care Note. Please specify the present on admission status and/or   stage:    The clinical indicators include:   Wound Care Note: \" the wound on the left buttocks / sacrum is pink and   healing and non-blanchable.  This is a stage 2 pressure injury. \"        Wound care assessment, wound care  Options provided:  -- Stage 2 Pressure Ulcer to left buttocks/sacrum present on admission  -- Stage 2 Pressure Ulcer to left buttocks/sacrum not present on admission  -- Other - I will add my own diagnosis  -- Disagree - Not applicable / Not valid  -- Disagree - Clinically unable to determine / Unknown  -- Refer to Clinical Documentation Reviewer    PROVIDER RESPONSE TEXT:    This patient has a Stage 2 pressure ulcer to left buttocks/sacrum which was   present on admission.    Query created by: Jaci Peterson on 2025 4:18 PM      Electronically signed by:  David L Mendel MD 2025 10:55 AM

## 2025-07-23 NOTE — PROGRESS NOTES
End of Shift Note    Bedside shift change report given to nurse Manfred (oncoming nurse) by Dolores Aguiar RN (offgoing nurse).  Report included the following information SBAR, Intake/Output, MAR, Recent Results, and Cardiac Rhythm NSR    Shift worked:  7a-7p     Shift summary and any significant changes:     A&O X4, able to make his needs known. Post catheter removal voids/X3, about 400 ml; no burning or discomfort reported. 3 episodes of loosed stools. Safety precautions in place, we'll continue to monitor.     Concerns for physician to address:       Zone phone for oncoming shift:          Activity:  Level of Assistance: Minimal assist, patient does 75% or more  Number times ambulated in hallways past shift: 0  Number of times OOB to chair past shift: 2    Cardiac:   Cardiac Monitoring: Yes      Cardiac Rhythm: Atrial fib    Access:  Current line(s): PIV     Genitourinary:   Urinary Status: Voiding    Respiratory:   O2 Device: None (Room air)  Chronic home O2 use?: NO  Incentive spirometer at bedside: YES    GI:  Last BM (including prior to admit): 07/23/25  Current diet:  ADULT ORAL NUTRITION SUPPLEMENT; Breakfast, Dinner; Wound Healing Oral Supplement  ADULT ORAL NUTRITION SUPPLEMENT; Lunch, Dinner; Diabetic Oral Supplement  ADULT DIET; Regular; 4 carb choices (60 gm/meal)  Passing flatus: YES    Pain Management:   Patient states pain is manageable on current regimen: YES    Skin:  Rafael Scale Score: 19  Interventions: Wound Offloading (Prevention Methods): Turning, Repositioning    Patient Safety:  Fall Risk: Nursing Judgement-Fall Risk High(Add Comments): Yes  Fall Risk Interventions  Nursing Judgement-Fall Risk High(Add Comments): Yes  Toilet Every 2 Hours-In Advance of Need: Yes  Hourly Visual Checks: Awake  Fall Visual Posted: Armband, Socks  Room Door Open: Deferred to promote rest  Alarm On: Bed, Chair  Patient Moved Closer to Nursing Station: No    Active Consults:   IP CONSULT TO UROLOGY  IP CONSULT

## 2025-07-23 NOTE — PROGRESS NOTES
Hospitalist Progress Note    NAME:   Darius Burgos   : 1940   MRN: [id number]     Date/Time: 2025 3:12 PM  Patient PCP: Maria Luisa Gramajo MD    Estimated discharge date:   Barriers: placement      Assessment / Plan:    Severe sepsis  Complicated urinary tract infection  Hypotension  Nephrolithiasis status post stents in the past  - Lactic acid was 4.1 on arrival  - CT abdomen shows severe right-sided hydronephrosis with a nephroureteral stent in satisfactory position and nonobstructing right renal stones  - He received IV fluid bolus with improvement of blood pressure  - Urine and blood cultures were sent in the emergency department, followup  - Lactic acid improved with IV hydration  - Will start empiric Zosyn  - consulted urology.  Per urology no need for emergent surgery and recommend pcn tube by IR  Spoke to Dr Lewis from IR and he recommended no pcn as there is no hydro and recommended cx directed abx therapy.  - place chatman, void trial in a few days  - repeat fever  - , repeating blood cultures  - repeated ct abd given fevers, nonacute, showed:   Right hydronephrosis decompressed by Chatman catheter  Bilateral pleural transudate and subsegmental atelectasis  Stable L1 compression deformity, constipation  Complex ascites. Spontaneous peritonitis is not excluded but the amount of fluid  is small, probably too small for diagnostic paracentesis.  - consulted ID given persistent fevers, however fevers have resolved at this point  Respiratory viral panel negative  : Sepsis criteria resolving     Abnormal CT with pancreatic hypodense lesions with 1 lesion that is enlarging  - He will need MRI of pancreas once clinically better  - MRCP showed Numerous cystic lesions in the pancreas likely reflecting IPMN  lesions with no MRI evidence of malignancy. There numerous simple fluid signal intensity lesions diffusely involving the pancreas without suspicious nodularity, enhancing tissue, or

## 2025-07-24 PROBLEM — N39.0 COMPLICATED URINARY TRACT INFECTION: Status: ACTIVE | Noted: 2025-07-24

## 2025-07-24 PROBLEM — K86.9 PANCREATIC LESION: Status: ACTIVE | Noted: 2025-07-24

## 2025-07-24 PROBLEM — B37.41 YEAST CYSTITIS: Status: ACTIVE | Noted: 2025-07-24

## 2025-07-24 PROBLEM — I48.11 LONGSTANDING PERSISTENT ATRIAL FIBRILLATION (HCC): Status: ACTIVE | Noted: 2025-07-24

## 2025-07-24 PROBLEM — N18.31 STAGE 3A CHRONIC KIDNEY DISEASE (HCC): Status: ACTIVE | Noted: 2025-07-24

## 2025-07-24 PROBLEM — E66.9 OBESITY (BMI 30-39.9): Status: ACTIVE | Noted: 2025-07-24

## 2025-07-24 PROBLEM — R50.9 PERSISTENT FEVER: Status: ACTIVE | Noted: 2025-07-24

## 2025-07-24 PROBLEM — N02.B9 IGA NEPHROPATHY: Status: ACTIVE | Noted: 2025-07-24

## 2025-07-24 PROBLEM — E11.8 CONTROLLED DIABETES MELLITUS TYPE 2 WITH COMPLICATIONS (HCC): Status: ACTIVE | Noted: 2025-07-24

## 2025-07-24 PROBLEM — N20.0 NEPHROLITHIASIS: Status: ACTIVE | Noted: 2025-07-24

## 2025-07-24 ASSESSMENT — PAIN SCALES - GENERAL
PAINLEVEL_OUTOF10: 6
PAINLEVEL_OUTOF10: 0

## 2025-07-24 ASSESSMENT — PAIN DESCRIPTION - DESCRIPTORS: DESCRIPTORS: ACHING

## 2025-07-24 ASSESSMENT — PAIN SCALES - WONG BAKER: WONGBAKER_NUMERICALRESPONSE: NO HURT

## 2025-07-24 ASSESSMENT — PAIN DESCRIPTION - LOCATION: LOCATION: NECK;BACK

## 2025-07-24 NOTE — CARE COORDINATION
0915 to 0917 - [facility] declined.  CM attemtped to send [facility] an update but [facility] [place] is working intermittently at this time.  [facility] asked for an update and CM unable to send [facility] message.  CM left VM for them at: [phone] opt. 3 or ext. [phone].      1024 - Morning IDR team verified that patient will finish IV abx tonight.    1211 to 1219 - CM left VM for [facility] admissions to see if they can accept the patient.  CM contacted DTR, Melany, with patient's permissions and updated her on the authorization situation.  SNF list sent to her at: [email or web address] to select additional SNF choices if [facility] is not an option.    1412 - [facility] declined.  CM Assistant notified and will communicate with insurance so they can reconsider [facility].  Patient transferred to [facility] from [facility]; handoff provided to Unit CM.        Kylah Mendez, MSN, RN    Care Management  [phone]

## 2025-07-24 NOTE — PROGRESS NOTES
0700: Bedside and Verbal shift change report given to Lidia Isidro RN (oncoming nurse) by BRIAN Shearer (offgoing nurse). Report included the following information Nurse Handoff Report, Adult Overview, Intake/Output, MAR, and Recent Results.     0803: BG 65, RN administered orange juice tech to recheck BG in 15 minutes.

## 2025-07-24 NOTE — PLAN OF CARE
Problem: Skin/Tissue Integrity  Goal: Skin integrity remains intact  Description: 1.  Monitor for areas of redness and/or skin breakdown  2.  Assess vascular access sites hourly  3.  Every 4-6 hours minimum:  Change oxygen saturation probe site  4.  Every 4-6 hours:  If on nasal continuous positive airway pressure, respiratory therapy assess nares and determine need for appliance change or resting period  7/24/2025 1006 by Lidia Isidro RN  Outcome: Progressing  7/24/2025 0138 by Manfred Acevedo RN  Outcome: Progressing     Problem: Chronic Conditions and Co-morbidities  Goal: Patient's chronic conditions and co-morbidity symptoms are monitored and maintained or improved  7/24/2025 1006 by Lidia Isidro RN  Outcome: Progressing  7/24/2025 0138 by Manfred Acevedo RN  Outcome: Progressing     Problem: Discharge Planning  Goal: Discharge to home or other facility with appropriate resources  7/24/2025 1006 by Lidia Isidro RN  Outcome: Progressing  7/24/2025 0138 by Manfred Acevedo RN  Outcome: Progressing     Problem: Pain  Goal: Verbalizes/displays adequate comfort level or baseline comfort level  7/24/2025 1006 by Lidia Isidro RN  Outcome: Progressing  7/24/2025 0138 by Manfred Acevedo RN  Outcome: Progressing     Problem: ABCDS Injury Assessment  Goal: Absence of physical injury  7/24/2025 1006 by Lidia Isidro RN  Outcome: Progressing  7/24/2025 0138 by Manfred Acevedo RN  Outcome: Progressing     Problem: Safety - Adult  Goal: Free from fall injury  7/24/2025 1006 by Lidia Isidro RN  Outcome: Progressing  7/24/2025 0138 by Manfred Acevedo RN  Outcome: Progressing     Problem: Cardiovascular - Adult  Goal: Maintains optimal cardiac output and hemodynamic stability  7/24/2025 1006 by Lidia Isidro RN  Outcome: Progressing  7/24/2025 0138 by Manfred Acevedo RN  Outcome: Progressing  Goal: Absence of cardiac dysrhythmias or at baseline  7/24/2025 1006 by Lidia Isidro RN  Outcome:

## 2025-07-24 NOTE — PROGRESS NOTES
Bedside and Verbal shift change report given to BRIAN Shearer (oncoming nurse) by BRIAN Bernal (offgoing nurse). Report included the following information Nurse Handoff Report, ED SBAR, MAR, and Cardiac Rhythm AFIB.      End of Shift Note    Bedside shift change report given to BRIAN Murillo (oncoming nurse) by Manfred Acevedo RN (offgoing nurse).  Report included the following information SBAR, ED Summary, MAR, and Cardiac Rhythm AFIB    Shift worked:  8712-3255     Shift summary and any significant changes:     WC completed, no other significant changes.      Concerns for physician to address:  N/A     Zone phone for oncoming shift:   N/A       Activity:  Level of Assistance: Minimal assist, patient does 75% or more  Number times ambulated in hallways past shift: 0  Number of times OOB to chair past shift: 0    Cardiac:   Cardiac Monitoring: Yes      Cardiac Rhythm: Atrial fib    Access:  Current line(s): PIV     Genitourinary:   Urinary Status: Voiding    Respiratory:   O2 Device: None (Room air)  Chronic home O2 use?: NO  Incentive spirometer at bedside: YES    GI:  Last BM (including prior to admit): 07/23/25  Current diet:  ADULT ORAL NUTRITION SUPPLEMENT; Breakfast, Dinner; Wound Healing Oral Supplement  ADULT ORAL NUTRITION SUPPLEMENT; Lunch, Dinner; Diabetic Oral Supplement  ADULT DIET; Regular; 4 carb choices (60 gm/meal)  Passing flatus: YES    Pain Management:   Patient states pain is manageable on current regimen: YES    Skin:  Rafael Scale Score: 18  Interventions: Wound Offloading (Prevention Methods): Pillows, Repositioning, Turning, Bed, pressure reduction mattress    Patient Safety:  Fall Risk: Nursing Judgement-Fall Risk High(Add Comments): Yes  Fall Risk Interventions  Nursing Judgement-Fall Risk High(Add Comments): Yes  Toilet Every 2 Hours-In Advance of Need: Yes  Hourly Visual Checks: Awake  Fall Visual Posted: Armband, Socks  Room Door Open: Deferred to promote rest  Alarm On: Bed, Chair  Patient

## 2025-07-24 NOTE — CONSULTS
Infectious Disease Consult    Date of Consultation:  July 24, 2025  Reason for Consultation: Persistent fever  Referring Physician: Dr Mendel  Date of Admission:7/18/2025      Impression    Persistent fevers  Tmax 103  Now resolved    Complicated UTI  Nephrolithiasis  H/o stent placement  CT abdomen/pelvis+ for  R/ureteral double-J stent in place with formation of  Both curves, nonobstructing right renal calculi, large R/ parapelvic renal cyst  Bladder decompressed by Mark, no mass or calculus.  Negative blood and urine cultures.  UA cloudy, trace ketones, large amount of blood, LE, moderate epis cells  Bacteria 1+, yeast +, urine culture ordered.    Pancreatic lesions  Largest in tail measuring 19 mm  For outpatient follow-up with GI    Diabetes type 2  A1c 7.2    CKD 3  Cr 1.88  Improving    IgA nephropathy    Atrial fibrillation  Stable    Obesity  BMI 35.56    Plan  Continue Zosyn IV  end date 7/25  Add fluconazole p.o. x 3 days end date 7/26  Adequate fluids, daily probiotic  Nothing more to add from ID standpoint    Please call with questions, concerns    Abx    Zosyn- 7/18      Temp  7/18- 103  7/.6  7/.4    Extensive review of chart notes, labs, imaging, cultures done  Additionally review of done: Recent reports-Labs, cultures, imaging  D/w -hospitalist, RN    Darius Burgos is consulted for-persistent fever.  Darius Burgos is a 84 y.o. male who presents to the ED from home. Patient has had increased weakness over the past 2 days. Patient's son states he was feeling very fatigued as he attempted to get off the toilet and sustained a fall. Patient did hit head but denies LOC .  Patient has been admitted to hospitalist service.  Extensive workup has been done.  ID has been consulted for persistent fevers.  Patient is currently afebrile.  Blood and urine cultures are negative.  Extensive chart review done.D/w hospitalist.    Past Medical History:   Diagnosis Date    Arthritis     At risk for sleep  5/7/2025 IV CONTRAST: None. ORAL CONTRAST: None TECHNIQUE: Thin axial images were obtained through the abdomen and pelvis. Coronal and sagittal reformats were generated. CT dose reduction was achieved through use of a standardized protocol tailored for this examination and automatic exposure control for dose modulation. The absence of intravenous contrast material reduces the sensitivity for evaluation of the vasculature and solid organs. FINDINGS: LOWER THORAX: Small pleural effusions left greater than right slightly improved. Basilar atelectasis LIVER: No mass. BILIARY TREE: Absent. CBD is not dilated. SPLEEN: within normal limits. PANCREAS: Multiple small round hypodense lesions within the pancreas. These are indeterminate. Single lesion within the tail has enlarged now measuring 19 mm ADRENALS: Unremarkable. KIDNEYS/URETERS: Severe right-sided hydronephrosis. There is a nephroureteral stent in satisfactory position. Nonobstructing stones lower pole right kidney. No left-sided hydronephrosis STOMACH: Unremarkable. SMALL BOWEL: No dilatation or wall thickening. COLON: Post right colectomy PERITONEUM: No ascites or pneumoperitoneum. RETROPERITONEUM: No lymphadenopathy or aortic aneurysm. There are vascular calcifications REPRODUCTIVE ORGANS: Surgically absent URINARY BLADDER: No mass or calculus. BONES: Osteopenia. Chronic wedging of L1 ABDOMINAL WALL: No mass or hernia. ADDITIONAL COMMENTS: N/A     1. Severe right-sided hydronephrosis. This is unchanged. The nephroureteral stent is in satisfactory position 2. Nonobstructing right renal stone 3. Multiple hypodense lesions within the pancreas. Single lesion in the pancreatic tail has enlarged in the interval. If clinically indicated once the patient has resolved the acute process and MRI of the pancreas can be performed as an outpatient Electronically signed by Dinesh Oliver    XR HUMERUS LEFT (MIN 2 VIEWS)  Result Date: 7/18/2025  EXAM: XR HUMERUS LEFT (MIN 2

## 2025-07-24 NOTE — PROGRESS NOTES
Hospitalist Progress Note    NAME:   Darius Burgos   : 1940   MRN: [id number]     Date/Time: 2025 3:38 PM  Patient PCP: Maria Luisa Gramajo MD    Estimated discharge date:   Barriers: placement      Assessment / Plan:    Severe sepsis  Complicated urinary tract infection  Hypotension  Nephrolithiasis status post stents in the past  - Lactic acid was 4.1 on arrival  - CT abdomen shows severe right-sided hydronephrosis with a nephroureteral stent in satisfactory position and nonobstructing right renal stones  - He received IV fluid bolus with improvement of blood pressure  - Urine and blood cultures were sent in the emergency department, followup  - Lactic acid improved with IV hydration  - Will start empiric Zosyn  - consulted urology.  Per urology no need for emergent surgery and recommend pcn tube by IR  Spoke to Dr Lewis from IR and he recommended no pcn as there is no hydro and recommended cx directed abx therapy.  - place chatman, void trial in a few days  - repeat fever  - , repeating blood cultures  - repeated ct abd given fevers, nonacute, showed:   Right hydronephrosis decompressed by Chatman catheter  Bilateral pleural transudate and subsegmental atelectasis  Stable L1 compression deformity, constipation  Complex ascites. Spontaneous peritonitis is not excluded but the amount of fluid  is small, probably too small for diagnostic paracentesis.  - consulted ID given persistent fevers, however fevers have resolved at this point  Respiratory viral panel negative  : Sepsis criteria resolving  : Continue zosyn I.V end date , fluconazole Po x 3 days end date      Abnormal CT with pancreatic hypodense lesions with 1 lesion that is enlarging  - He will need MRI of pancreas once clinically better  - MRCP showed Numerous cystic lesions in the pancreas likely reflecting IPMN  lesions with no MRI evidence of malignancy. There numerous simple fluid signal intensity lesions  diffusely involving the pancreas without suspicious nodularity, enhancing tissue, or other suspicious features with the largest in the tail measuring 19 mm. No duct dilation or enhancing mass lesions otherwise. Right nephroureteral stent in position with right renal cysts including large parapelvic cyst redemonstrated.  - Outpatient followup with GI and PCP    RUE swelling  - ordered duplex, neg for thrombophlebitis    CKD stage III  History of IgA nephritis, leukocytoclastic vasculitis  Urinary retention  - Most recently creatinine ranged anywhere between 2-3.  Currently creatinine is 2.3.  - Consider nephrology evaluation if creatinine is trending up  - discussed with family. Was on prednisone 80 daily for a couple months, then reportedly dropped to 40 daily about 10 days ago  - discussed with Nephro, unlikely nephritis flare  - started on prednisone 10mg, discussed with Nephrology  - void trial in the morning 7/23 in case he needs Urology to help replace should he fail  - 7/23: Mark catheter removed.  On voiding trial  - 7/24: Patient making adequate urine. Renal indices stable     Diabetes mellitus type 2  Hypoglycemia - improved  - Hold home metformin.  Start insulin sliding scale with blood sugar checks  - concerned initially for adrenal insufficiency given that he had recently finished prolonged steroid taper, had multiple episodes of hypoglycemia requiring dextrose drip  - AM cortisol 19, unlikely adrenal insufficiency  7/24: , 71, 65  Continue insulin sliding scale. Hold lantus. Accu checks       Hypertension  Atrial fibrillation not on anticoagulation  History of watchman's procedure  Dyslipidemia  History of prostate cancer  Peptic ulcer disease  - Hold all home antihypertensives due to hypotension and resume when appropriate  - Continue PTA amiodarone.  Not on anticoagulation  - Continue PTA statin  - Continue PTA PPI    Acute on chronic anemia  Likely anemia of chronic disease  Hgb downtrendig to

## 2025-07-24 NOTE — PROGRESS NOTES
NAME: Darius Burgos        :  1940        MRN:  [id number]                    Assessment   :                                               Plan/Discussion:  CKD stage 3b  Proteinuria  Biopsy proven IgA nephropathy  Mild hyponatremia  Anemia   H/o nephrolithiasis and s/p stents (no hydro, he has a parapelvic cyst)     Admitted with sepsis/uti Creatinine is at what appears to be baseline, ~ 1.9  Mark out - per Urology    cont furosemide 40 mg po daily     cont Prednisone 10 mg PO daily    Hgb < 9; iron sat 17, ferritin 493 - continue PO iron and s/p retacrit 10 k sc x one on     F/u with Dr. Cabral on discharge       Subjective:     Chief Complaint:  in bed. No specific complaint. Swelling better today, not resolved.  No n/v/d.      Review of Systems:    Symptom Y/N Comments  Symptom Y/N Comments   Fever/Chills    Chest Pain     Poor Appetite    Edema     Cough    Abdominal Pain     Sputum    Joint Pain     SOB/CAMARGO    Pruritis/Rash     Nausea/vomit    Tolerating PT/OT     Diarrhea    Tolerating Diet     Constipation    Other       Could not obtain due to:      Objective:     VITALS:   Last 24hrs VS reviewed since prior progress note. Most recent are:  Vitals:    25 2330   BP: (!) 159/78   Pulse: 79   Resp: 21   Temp: 98.3 °F (36.8 °C)   SpO2: 93%       Intake/Output Summary (Last 24 hours) at 2025 0619  Last data filed at 2025 2100  Gross per 24 hour   Intake 540 ml   Output 800 ml   Net -260 ml      Telemetry Reviewed:     PHYSICAL EXAM:  General: NAD      Lab Data Reviewed: (see below)    Medications Reviewed: (see below)    PMH/SH reviewed - no change compared to H&P  ________________________________________________________________________  Care Plan discussed with:  Patient     Family      RN     Care Manager                    Consultant:          Comments   >50% of visit spent in counseling and coordination of

## 2025-07-24 NOTE — PLAN OF CARE
Problem: Occupational Therapy - Adult  Goal: By Discharge: Performs self-care activities at highest level of function for planned discharge setting.  See evaluation for individualized goals.  Description: FUNCTIONAL STATUS PRIOR TO ADMISSION:  Patient was ambulatory using RW and was Mod Indep with ADLs and has PRN assist from family for IADLs.  Son-in-law lives next door and checks in on patient every other day when he is in town; however, leaves town for work and pt spends up to 4 days alone.  Pt reports recent frequent falls.  Wife is transitioning to LTC.   , Prior Level of Assist for ADLs: Independent,  ,  ,  ,  ,  , Prior Level of Assist for Homemaking: Needs assistance (family reports pt non-compliant with DM),  ,  ,       HOME SUPPORT: Patient lived alone with son-in-law to provide assistance.    Occupational Therapy Goals:  Initiated 7/21/2025  1.  Patient will perform RW grooming with Contact Guard Assist within 7 day(s).  2.  Patient will perform bathing with Minimal Assist within 7 day(s).  3.  Patient will perform RW lower body dressing with Minimal Assist within 7 day(s).  4.  Patient will perform RW toilet transfers with Contact Guard Assist  within 7 day(s).  5.  Patient will perform all aspects of RW toileting with Contact Guard Assist within 7 day(s).    Outcome: Progressing    OCCUPATIONAL THERAPY TREATMENT  Patient: Darius Burgos (84 y.o. male)  Date: 7/24/2025  Primary Diagnosis: Sepsis (HCC) [A41.9]       Precautions: Fall Risk, Skin, Bed Alarm, Contact Precautions (ESBL)                Chart, occupational therapy assessment, plan of care, and goals were reviewed.    ASSESSMENT  Patient continues to benefit from skilled OT services and is slowly progressing towards goals. Pt received sitting upright in chair, agreeable to therapy services. Pt demonstrates improved, but still diminished, activity tolerance, strength, balance which is still impacting pt's functional status. Pt able to progress

## 2025-07-24 NOTE — PLAN OF CARE
Problem: Skin/Tissue Integrity  Goal: Skin integrity remains intact  Description: 1.  Monitor for areas of redness and/or skin breakdown  2.  Assess vascular access sites hourly  3.  Every 4-6 hours minimum:  Change oxygen saturation probe site  4.  Every 4-6 hours:  If on nasal continuous positive airway pressure, respiratory therapy assess nares and determine need for appliance change or resting period  7/24/2025 0138 by Manfred Acevedo RN  Outcome: Progressing  7/23/2025 1429 by Dolores Aguiar RN  Outcome: Progressing     Problem: Chronic Conditions and Co-morbidities  Goal: Patient's chronic conditions and co-morbidity symptoms are monitored and maintained or improved  7/24/2025 0138 by Manfred Acevedo RN  Outcome: Progressing  7/23/2025 1429 by Dolores Aguiar RN  Outcome: Progressing     Problem: Discharge Planning  Goal: Discharge to home or other facility with appropriate resources  7/24/2025 0138 by Manfred Acevedo RN  Outcome: Progressing  7/23/2025 1429 by Dolores Aguiar RN  Outcome: Progressing     Problem: Pain  Goal: Verbalizes/displays adequate comfort level or baseline comfort level  7/24/2025 0138 by Manfred Acevedo RN  Outcome: Progressing  7/23/2025 1429 by Dolores Aguiar RN  Outcome: Progressing     Problem: ABCDS Injury Assessment  Goal: Absence of physical injury  7/24/2025 0138 by Manfred Acevedo RN  Outcome: Progressing  7/23/2025 1429 by Dolores Aguiar RN  Outcome: Progressing     Problem: Cardiovascular - Adult  Goal: Maintains optimal cardiac output and hemodynamic stability  7/24/2025 0138 by Manfred Acevedo RN  Outcome: Progressing  7/23/2025 1429 by Dolores Aguiar RN  Outcome: Progressing  Goal: Absence of cardiac dysrhythmias or at baseline  7/24/2025 0138 by Manfred Acevedo RN  Outcome: Progressing  7/23/2025 1429 by Dolores Aguiar RN  Outcome: Progressing     Problem: Safety - Adult  Goal: Free from fall injury  7/24/2025 0138 by Manfred Acevedo

## 2025-07-25 ASSESSMENT — PAIN SCALES - GENERAL
PAINLEVEL_OUTOF10: 0
PAINLEVEL_OUTOF10: 0

## 2025-07-25 ASSESSMENT — PAIN SCALES - WONG BAKER: WONGBAKER_NUMERICALRESPONSE: NO HURT

## 2025-07-25 NOTE — PROGRESS NOTES
Hospitalist Progress Note    NAME:   Darius Burgos   : 1940   MRN: [id number]     Date/Time: 2025 4:33 PM  Patient PCP: Maria Luisa Gramajo MD    Estimated discharge date:   Barriers: placement      Assessment / Plan:    Severe sepsis  Complicated urinary tract infection  Hypotension  Nephrolithiasis status post stents   - Lactic acid was 4.1 on arrival  - CT abdomen shows severe right-sided hydronephrosis with a nephroureteral stent in satisfactory position and nonobstructing right renal stones  - He received IV fluid bolus with improvement of blood pressure  - Urine and blood cultures were sent in the emergency department, followup  - Lactic acid improved with IV hydration  - Will start empiric Zosyn  - consulted urology.  Per urology no need for emergent surgery and recommend pcn tube by IR  Spoke to Dr Lewis from IR and he recommended no pcn as there is no hydro and recommended cx directed abx therapy.  - place chatman, void trial in a few days  - repeat fever  - , repeating blood cultures  - repeated ct abd given fevers, nonacute, showed:   Right hydronephrosis decompressed by Chatman catheter  Bilateral pleural transudate and subsegmental atelectasis  Stable L1 compression deformity, constipation  Complex ascites. Spontaneous peritonitis is not excluded but the amount of fluid  is small, probably too small for diagnostic paracentesis.  - consulted ID given persistent fevers, however fevers have resolved at this point  Respiratory viral panel negative  : Sepsis criteria resolving  : Continue zosyn I.V end date , fluconazole Po x 3 days end date : On Zosyn  till date.  Last dose  .  Continue fluconazole.  End date   Chatman catheter has been removed.  Continue to check postvoid residual.  Outpatient follow-up with urology.  Plan next steps exchange.  Patient to call [phone] for follow-up at [facility]       Abnormal CT with pancreatic hypodense

## 2025-07-25 NOTE — PLAN OF CARE
Problem: Physical Therapy - Adult  Goal: By Discharge: Performs mobility at highest level of function for planned discharge setting.  See evaluation for individualized goals.  Description: FUNCTIONAL STATUS PRIOR TO ADMISSION: Patient was ambulatory using RW and was Mod Indep with ADLs and has PRN assist from family for IADLs.  Son-in-law lives next door and checks in on patient every other day when he is in town; however, leaves town for work and pt spends up to 4 days alone.  Pt reports recent frequent falls.  Wife is transitioning to LTC.    HOME SUPPORT PRIOR TO ADMISSION: The patient lived alone with son in law to provide assistance. 1 story home with no steps to enter.    Physical Therapy Goals  Initiated 7/21/2025  1.  Patient will move from supine to sit and sit to supine, scoot up and down, and roll side to side in bed with modified independence within 7 day(s).    2.  Patient will perform sit to stand with modified independence within 7 day(s).  3.  Patient will transfer from bed to chair and chair to bed with standby assistance using the least restrictive device within 7 day(s).  4.  Patient will ambulate with standby assistance for 90 feet with the least restrictive device within 7 day(s).   5.  Patient will perform 8 sit to stands in <40 seconds with modified independence within 7 day(s).   Outcome: Progressing  PHYSICAL THERAPY TREATMENT    Patient: Darius Burgos (84 y.o. male)  Date: 7/25/2025  Diagnosis: Sepsis ([facility]) [A41.9] Sepsis (HCC)      Precautions: Restrictions/Precautions  Restrictions/Precautions: Fall Risk, Skin, Bed Alarm, Contact Precautions (ESBL)  Activity Level: Up with Assist, Up as Tolerated  Required Braces or Orthoses?: No          ASSESSMENT:  Patient continues to benefit from skilled PT services and is progressing towards goals. Patient in chair on arrival and agreeable to therapy. He tolerated in room ambulation but limited in distance secondary to weakness and fatigue.

## 2025-07-25 NOTE — PLAN OF CARE
Problem: Neurosensory - Adult  Goal: Achieves stable or improved neurological status  Outcome: Progressing  Flowsheets  Taken 7/25/2025 0022 by Dasia Mooney RN  Achieves stable or improved neurological status: Assess for and report changes in neurological status  Taken 7/24/2025 1341 by Francine Fine RN  Achieves stable or improved neurological status: Assess for and report changes in neurological status     Problem: Respiratory - Adult  Goal: Achieves optimal ventilation and oxygenation  Outcome: Progressing  Flowsheets (Taken 7/25/2025 0022)  Achieves optimal ventilation and oxygenation:   Assess for changes in respiratory status   Encourage broncho-pulmonary hygiene including cough, deep breathe, incentive spirometry   Position to facilitate oxygenation and minimize respiratory effort     Problem: Metabolic/Fluid and Electrolytes - Adult  Goal: Electrolytes maintained within normal limits  Outcome: Progressing  Flowsheets (Taken 7/25/2025 0022)  Electrolytes maintained within normal limits:   Monitor labs and assess patient for signs and symptoms of electrolyte imbalances   Monitor response to electrolyte replacements, including repeat lab results as appropriate   Administer electrolyte replacement as ordered  Goal: Hemodynamic stability and optimal renal function maintained  Outcome: Progressing  Flowsheets (Taken 7/25/2025 0022)  Hemodynamic stability and optimal renal function maintained:   Monitor labs and assess for signs and symptoms of volume excess or deficit   Monitor intake, output and patient weight  Goal: Glucose maintained within prescribed range  Outcome: Progressing  Flowsheets (Taken 7/25/2025 0022)  Glucose maintained within prescribed range:   Monitor blood glucose as ordered   Assess for signs and symptoms of hyperglycemia and hypoglycemia   Administer ordered medications to maintain glucose within target range   Assess barriers to adequate nutritional intake and initiate nutrition  assistance (family reports pt non-compliant with DM),  ,  ,       HOME SUPPORT: Patient lived alone with son-in-law to provide assistance.    Occupational Therapy Goals:  Initiated 7/21/2025  1.  Patient will perform RW grooming with Contact Guard Assist within 7 day(s).  2.  Patient will perform bathing with Minimal Assist within 7 day(s).  3.  Patient will perform RW lower body dressing with Minimal Assist within 7 day(s).  4.  Patient will perform RW toilet transfers with Contact Guard Assist  within 7 day(s).  5.  Patient will perform all aspects of RW toileting with Contact Guard Assist within 7 day(s).    7/24/2025 1411 by Dereck Haile, OT  Outcome: Progressing

## 2025-07-25 NOTE — PLAN OF CARE
Problem: Skin/Tissue Integrity  Goal: Skin integrity remains intact  Description: 1.  Monitor for areas of redness and/or skin breakdown  2.  Assess vascular access sites hourly  3.  Every 4-6 hours minimum:  Change oxygen saturation probe site  4.  Every 4-6 hours:  If on nasal continuous positive airway pressure, respiratory therapy assess nares and determine need for appliance change or resting period  7/25/2025 1236 by Teresita Ya RN  Flowsheets (Taken 7/25/2025 0022 by Dasia Mooney RN)  Skin Integrity Remains Intact:   Monitor for areas of redness and/or skin breakdown   Turn and reposition as indicated   Check visual cues for pain  7/25/2025 0022 by Dasia Mooney RN  Outcome: Progressing  Flowsheets (Taken 7/25/2025 0022)  Skin Integrity Remains Intact:   Monitor for areas of redness and/or skin breakdown   Turn and reposition as indicated   Check visual cues for pain     Problem: Chronic Conditions and Co-morbidities  Goal: Patient's chronic conditions and co-morbidity symptoms are monitored and maintained or improved  7/25/2025 1236 by Teresita Ya RN  Flowsheets (Taken 7/22/2025 0730 by Julia Dimas RN)  Care Plan - Patient's Chronic Conditions and Co-Morbidity Symptoms are Monitored and Maintained or Improved: Monitor and assess patient's chronic conditions and comorbid symptoms for stability, deterioration, or improvement  7/25/2025 0022 by Dasia Mooney RN  Outcome: Progressing  Flowsheets (Taken 7/22/2025 0730 by Julia Dimas RN)  Care Plan - Patient's Chronic Conditions and Co-Morbidity Symptoms are Monitored and Maintained or Improved: Monitor and assess patient's chronic conditions and comorbid symptoms for stability, deterioration, or improvement

## 2025-07-25 NOTE — PROGRESS NOTES
NAME: Darius Burgos        :  1940        MRN:  [id number]                    Assessment   :                                               Plan/Discussion:  CKD stage 3b  Proteinuria  Biopsy proven IgA nephropathy  Mild hyponatremia  Anemia   H/o nephrolithiasis and s/p stents (no hydro, he has a parapelvic cyst)     Admitted with sepsis/uti Creatinine is at what appears to be baseline, ~ 1.9  Mark out - per Urology    Increase furosemide 40 mg po daily to bid     cont Prednisone 10 mg PO daily    Hgb < 9; iron sat 17, ferritin 493 - continue PO iron and s/p retacrit 10 k sc x one on     F/u with Dr. Cabral on discharge       Subjective:     Chief Complaint:  in bed. Weak. On room air. Swelling.  No n/v/d.      Review of Systems:    Symptom Y/N Comments  Symptom Y/N Comments   Fever/Chills    Chest Pain     Poor Appetite    Edema     Cough    Abdominal Pain     Sputum    Joint Pain     SOB/CAMARGO    Pruritis/Rash     Nausea/vomit    Tolerating PT/OT     Diarrhea    Tolerating Diet     Constipation    Other       Could not obtain due to:      Objective:     VITALS:   Last 24hrs VS reviewed since prior progress note. Most recent are:  Vitals:    25   BP: (!) 147/60   Pulse: 75   Resp: 16   Temp: 97.9 °F (36.6 °C)   SpO2: 96%       Intake/Output Summary (Last 24 hours) at 2025 0554  Last data filed at 2025 0958  Gross per 24 hour   Intake 250 ml   Output 450 ml   Net -200 ml      Telemetry Reviewed:     PHYSICAL EXAM:  General: NAD      Lab Data Reviewed: (see below)    Medications Reviewed: (see below)    PMH/SH reviewed - no change compared to H&P  ________________________________________________________________________  Care Plan discussed with:  Patient     Family      RN     Care Manager                    Consultant:          Comments   >50% of visit spent in counseling and coordination of care        ________________________________________________________________________  Katelynn Cox MD     Procedures: see electronic medical records for all procedures/Xrays and details which  were not copied into this note but were reviewed prior to creation of Plan.      LABS:  No results for input(s): \"WBC\", \"HGB\", \"HCT\", \"PLT\" in the last 72 hours.    Recent Labs     07/24/25  0423   *   K 3.6      CO2 27   BUN 30*     No results for input(s): \"TP\", \"GLOB\", \"GGT\" in the last 72 hours.    Invalid input(s): \"SGOT\", \"GPT\", \"AP\", \"TBIL\", \"ALB\", \"AML\", \"AMYP\", \"LPSE\", \"HLPSE\"  No results for input(s): \"INR\", \"APTT\" in the last 72 hours.    Invalid input(s): \"PTP\"   No results for input(s): \"TIBC\" in the last 72 hours.    Invalid input(s): \"FE\", \"PSAT\", \"FERR\"     No results found for: \"RBCF\"   No results for input(s): \"PH\", \"PCO2\", \"PO2\" in the last 72 hours.  No results for input(s): \"CPK\", \"CKMB\", \"TROPONINI\" in the last 72 hours.  No components found for: \"GLPOC\"  @labua@    MEDICATIONS:  Current Facility-Administered Medications   Medication Dose Route Frequency    fluconazole (DIFLUCAN) tablet 200 mg  200 mg Oral Daily    furosemide (LASIX) tablet 40 mg  40 mg Oral Daily    glucose chewable tablet 16 g  4 tablet Oral PRN    [Held by provider] insulin glargine (LANTUS) injection vial 26 Units  0.25 Units/kg SubCUTAneous Daily    insulin lispro (HUMALOG,ADMELOG) injection vial 0-4 Units  0-4 Units SubCUTAneous 4x Daily AC & HS    metoprolol succinate (TOPROL XL) extended release tablet 12.5 mg  12.5 mg Oral Daily    predniSONE (DELTASONE) tablet 10 mg  10 mg Oral Daily    amiodarone (CORDARONE) tablet 100 mg  100 mg Oral Daily    ferrous sulfate (IRON 325) tablet 325 mg  325 mg Oral Every Other Day    pantoprazole (PROTONIX) tablet 40 mg  40 mg Oral QAM    pravastatin (PRAVACHOL) tablet 20 mg  20 mg Oral Nightly    sodium chloride flush 0.9 % injection 5-40 mL  5-40 mL IntraVENous 2 times per day    sodium

## 2025-07-25 NOTE — CARE COORDINATION
Transition of Care Plan:     RUR: 23%  Prior Level of Functioning: Assistance with shopping and housework  Disposition: SNF (auth/bed pending), then return home afterwards  [place]: 7/28/2025  If SNF or IPR: Date FOC offered: 7/22  Date FOC received: 7/22 [facility]  Accepting facility: [facility]  Date authorization started with reference number: 7/23/2025, Pending Auth #: [id number]   Date authorization received and expires: TBD  Follow up appointments: defer to SNF.    DME needed: defer to SNF.  Patient has grab bars, RW, BSC and rollator at home.  Transportation at discharge: [facility] anticipated.  IM/IMM Medicare/ letter given: 2nd IM needed prior to discharge.  Is patient a  and connected with VA? No.              If yes, was [place] transfer form completed and VA notified? N/A  Caregiver Contact: Kate Aubrey - spouse - [phone]  Discharge Caregiver contacted prior to discharge? Patient to contact.  Care Conference needed? No.  Barriers to discharge: Placement/bed/auth    CM reviewed pt's chart and CM is aware that pt is medically stable for d/c but waiting on auth, bed and placement.     Pt needed three declines from SNF placements that was in network with pt's insurance before pt's insurance would consider auth at [facility].    Pt has had three declines and working on auth for [facility].     CM added an avoidable day.    CM will continue to follow and assist with d/c planning.    Glenna Valente    [phone]

## 2025-07-25 NOTE — PLAN OF CARE
Problem: Occupational Therapy - Adult  Goal: By Discharge: Performs self-care activities at highest level of function for planned discharge setting.  See evaluation for individualized goals.  Description: FUNCTIONAL STATUS PRIOR TO ADMISSION:  Patient was ambulatory using RW and was Mod Indep with ADLs and has PRN assist from family for IADLs.  Son-in-law lives next door and checks in on patient every other day when he is in town; however, leaves town for work and pt spends up to 4 days alone.  Pt reports recent frequent falls.  Wife is transitioning to LTC.   , Prior Level of Assist for ADLs: Independent,  ,  ,  ,  ,  , Prior Level of Assist for Homemaking: Needs assistance (family reports pt non-compliant with DM),  ,  ,       HOME SUPPORT: Patient lived alone with son-in-law to provide assistance.    Occupational Therapy Goals:  Initiated 7/21/2025  1.  Patient will perform RW grooming with Contact Guard Assist within 7 day(s).  2.  Patient will perform bathing with Minimal Assist within 7 day(s).  3.  Patient will perform RW lower body dressing with Minimal Assist within 7 day(s).  4.  Patient will perform RW toilet transfers with Contact Guard Assist  within 7 day(s).  5.  Patient will perform all aspects of RW toileting with Contact Guard Assist within 7 day(s).    Outcome: Progressing   OCCUPATIONAL THERAPY TREATMENT  Patient: Darius Burgos (84 y.o. male)  Date: 7/25/2025  Primary Diagnosis: Sepsis (HCC) [A41.9]       Precautions: Fall Risk, Skin, Bed Alarm, Contact Precautions (ESBL)                Chart, occupational therapy assessment, plan of care, and goals were reviewed.    ASSESSMENT  Patient continues to benefit from skilled OT services and is progressing towards goals. Limited today by excessive urination post removal of chatman this week. Bedding and patient gown fully saturated upon arrival; While patient reported feeling very cold, he was fully unaware that he was incontinent; Urine leaked during  is important, but occasionally longer periods will be relevant.  6. Middle categories imply that the patient supplies over 50 per cent of the effort.  7. Use of aids to be independent is allowed.    Score Interpretation (from Radha )    Independent   60-79 Minimally independent   40-59 Partially dependent   20-39 Very dependent   <20 Totally dependent     -Иван Cannon, Barthel, D.W. (1965). Functional evaluation: the Barthel Index. [facility] Med J (142.  -MILA Garcia, PHILIP Lisa (). The Barthel activities of daily living index: self-reporting versus actual performance in the old (> or = 75 years). Journal of American Geriatric Society 45(7), 832-836.   -NAYELI Mcgrath, PETERSON Mcfarlane., Zechariah, JLIONEL., Yadiel, JOSIE. (1999). Measuring the change in disability after inpatient rehabilitation; comparison of the responsiveness of the Barthel Index and Functional [place] Measure. Journal of Neurology, Neurosurgery, and Psychiatry, 66(4), 480-484.  -Dylon Verdin, N.J.A, YESI Mejias, & Kennedy, M.A. (2004) Assessment of post-stroke quality of life in cost-effectiveness studies: The usefulness of the Barthel Index and the EuroQoL-5D. Quality of Life Research, 13, 427-33                                                                                                                                                                                                                                 Pain Ratin/10 back pain, see above  Pain Intervention(s):   nursing notified and addressing, rest, elevation, repositioning, and pain is at a level acceptable to the patient      Activity Tolerance:   Fair , requires frequent rest breaks, and SpO2 stable on room air  Please refer to the flowsheet for vital signs taken during this treatment.    After treatment:   Patient left in no apparent distress sitting up in chair, Call bell within reach, Bed/ chair alarm activated, Heels elevated for pressure

## 2025-07-26 ASSESSMENT — PAIN DESCRIPTION - LOCATION: LOCATION: CHEST

## 2025-07-26 ASSESSMENT — PAIN SCALES - WONG BAKER: WONGBAKER_NUMERICALRESPONSE: NO HURT

## 2025-07-26 ASSESSMENT — PAIN SCALES - GENERAL
PAINLEVEL_OUTOF10: 9
PAINLEVEL_OUTOF10: 0
PAINLEVEL_OUTOF10: 7

## 2025-07-26 ASSESSMENT — PAIN DESCRIPTION - PAIN TYPE: TYPE: ACUTE PAIN

## 2025-07-26 ASSESSMENT — PAIN DESCRIPTION - ORIENTATION: ORIENTATION: RIGHT

## 2025-07-26 ASSESSMENT — PAIN DESCRIPTION - DESCRIPTORS: DESCRIPTORS: STABBING

## 2025-07-26 ASSESSMENT — PAIN DESCRIPTION - FREQUENCY: FREQUENCY: INTERMITTENT

## 2025-07-26 ASSESSMENT — PAIN - FUNCTIONAL ASSESSMENT: PAIN_FUNCTIONAL_ASSESSMENT: PREVENTS OR INTERFERES SOME ACTIVE ACTIVITIES AND ADLS

## 2025-07-26 NOTE — PROGRESS NOTES
End of Shift Note    Bedside shift change report given to LEONA Ramyundo (oncoming nurse) by Anh Swartz RN (offgoing nurse).  Report included the following information SBAR and MAR    Shift worked:  7a-7p     Shift summary and any significant changes:     Mr. Burgos complained of level 9 chest pain.  He received an EKG, a troponin lab some Tylenol, and a lidocaine patch.  He reported that he has had this pain for 5 days, but has not mentioned it to anyone.  His pain is now resolved.  He slept off and on for much of the day.     Concerns for physician to address:  Follow up on chest pain     Zone phone for oncoming shift:   [phone]           Anh Swartz RN

## 2025-07-26 NOTE — PROGRESS NOTES
Hospitalist Progress Note    NAME:   Darius Burgos   : 1940   MRN: [id number]     Date/Time: 2025 3:43 PM  Patient PCP: Maria Luisa Gramajo MD    Estimated discharge date:   Barriers: placement, repeat trop      Assessment / Plan:    Severe sepsis  Complicated urinary tract infection  Hypotension  Nephrolithiasis status post stents   - Lactic acid was 4.1 on arrival  - CT abdomen shows severe right-sided hydronephrosis with a nephroureteral stent in satisfactory position and nonobstructing right renal stones  - He received IV fluid bolus with improvement of blood pressure  - Urine and blood cultures were sent in the emergency department, followup  - Lactic acid improved with IV hydration  - Will start empiric Zosyn  - consulted urology.  Per urology no need for emergent surgery and recommend pcn tube by IR  Spoke to Dr Lewis from IR and he recommended no pcn as there is no hydro and recommended cx directed abx therapy.  - place chatman, void trial in a few days  - repeat fever  - , repeating blood cultures  - repeated ct abd given fevers, nonacute, showed:   Right hydronephrosis decompressed by Chatman catheter  Bilateral pleural transudate and subsegmental atelectasis  Stable L1 compression deformity, constipation  Complex ascites. Spontaneous peritonitis is not excluded but the amount of fluid  is small, probably too small for diagnostic paracentesis.  - consulted ID given persistent fevers, however fevers have resolved at this point  Respiratory viral panel negative  : Sepsis criteria resolving  : Continue zosyn I.V end date , fluconazole Po x 3 days end date : On Zosyn  till date.  Last dose  .  Continue fluconazole.  End date   Chatman catheter has been removed.  Continue to check postvoid residual.  Outpatient follow-up with urology.  Plan next steps exchange.  Patient to call [phone] for follow-up at [facility]    Atypical chest pain  R sided,

## 2025-07-26 NOTE — PLAN OF CARE
Problem: Skin/Tissue Integrity  Goal: Skin integrity remains intact  Description: 1.  Monitor for areas of redness and/or skin breakdown  2.  Assess vascular access sites hourly  3.  Every 4-6 hours minimum:  Change oxygen saturation probe site  4.  Every 4-6 hours:  If on nasal continuous positive airway pressure, respiratory therapy assess nares and determine need for appliance change or resting period  Outcome: Progressing  Flowsheets (Taken 7/25/2025 0022)  Skin Integrity Remains Intact:   Monitor for areas of redness and/or skin breakdown   Turn and reposition as indicated   Check visual cues for pain     Problem: Chronic Conditions and Co-morbidities  Goal: Patient's chronic conditions and co-morbidity symptoms are monitored and maintained or improved  Outcome: Progressing  Flowsheets (Taken 7/22/2025 0730 by Julia Dimas RN)  Care Plan - Patient's Chronic Conditions and Co-Morbidity Symptoms are Monitored and Maintained or Improved: Monitor and assess patient's chronic conditions and comorbid symptoms for stability, deterioration, or improvement     Problem: Discharge Planning  Goal: Discharge to home or other facility with appropriate resources  Outcome: Progressing  Flowsheets (Taken 7/25/2025 0022)  Discharge to home or other facility with appropriate resources:   Identify barriers to discharge with patient and caregiver   Identify discharge learning needs (meds, wound care, etc)     Problem: Pain  Goal: Verbalizes/displays adequate comfort level or baseline comfort level  Outcome: Progressing  Flowsheets (Taken 7/25/2025 0022)  Verbalizes/displays adequate comfort level or baseline comfort level:   Encourage patient to monitor pain and request assistance   Assess pain using appropriate pain scale   Administer analgesics based on type and severity of pain and evaluate response   Implement non-pharmacological measures as appropriate and evaluate response   Consider cultural and social influences on

## 2025-07-27 ASSESSMENT — PAIN SCALES - GENERAL
PAINLEVEL_OUTOF10: 0
PAINLEVEL_OUTOF10: 8
PAINLEVEL_OUTOF10: 0
PAINLEVEL_OUTOF10: 0

## 2025-07-27 ASSESSMENT — PAIN DESCRIPTION - LOCATION: LOCATION: CHEST

## 2025-07-27 ASSESSMENT — PAIN DESCRIPTION - ORIENTATION: ORIENTATION: RIGHT

## 2025-07-27 ASSESSMENT — PAIN - FUNCTIONAL ASSESSMENT: PAIN_FUNCTIONAL_ASSESSMENT: PREVENTS OR INTERFERES SOME ACTIVE ACTIVITIES AND ADLS

## 2025-07-27 ASSESSMENT — PAIN DESCRIPTION - FREQUENCY: FREQUENCY: INTERMITTENT

## 2025-07-27 ASSESSMENT — PAIN DESCRIPTION - PAIN TYPE: TYPE: ACUTE PAIN

## 2025-07-27 ASSESSMENT — PAIN DESCRIPTION - ONSET: ONSET: SUDDEN

## 2025-07-27 ASSESSMENT — PAIN DESCRIPTION - DESCRIPTORS: DESCRIPTORS: ACHING

## 2025-07-27 ASSESSMENT — PAIN SCALES - WONG BAKER: WONGBAKER_NUMERICALRESPONSE: NO HURT

## 2025-07-27 NOTE — PROGRESS NOTES
End of Shift Note    Bedside shift change report given to BRIAN Raymundo (oncoming nurse) by Anh Swartz RN (offgoing nurse).  Report included the following information SBAR, Kardex, and MAR    Shift worked:  7a-7p     Shift summary and any significant changes:     Mr. Smith oxygen level dropped to 81% this morning.  Positional changes and deep breathing led to an improvement to 85-87.  He was started on 2L with a nasal canula.    He got up to the chair twice for a couple of hours each time.  Wound care has been completed     Concerns for physician to address:  Although this is the first time that he has required supplemental oxygen, Mr. Smith oxygen level has dipped into the 80s multiple times over the last two days, and was brought up to 90% b positional changes and breathing.     Zone phone for oncoming shift:   [phone]           Anh Swartz RN

## 2025-07-27 NOTE — PLAN OF CARE
Problem: Skin/Tissue Integrity  Goal: Skin integrity remains intact  Description: 1.  Monitor for areas of redness and/or skin breakdown  2.  Assess vascular access sites hourly  3.  Every 4-6 hours minimum:  Change oxygen saturation probe site  4.  Every 4-6 hours:  If on nasal continuous positive airway pressure, respiratory therapy assess nares and determine need for appliance change or resting period  Outcome: Progressing  Flowsheets (Taken 7/27/2025 0242)  Skin Integrity Remains Intact: Monitor for areas of redness and/or skin breakdown     Problem: Chronic Conditions and Co-morbidities  Goal: Patient's chronic conditions and co-morbidity symptoms are monitored and maintained or improved  Outcome: Progressing     Problem: Discharge Planning  Goal: Discharge to home or other facility with appropriate resources  Outcome: Progressing     Problem: Pain  Goal: Verbalizes/displays adequate comfort level or baseline comfort level  Outcome: Progressing     Problem: ABCDS Injury Assessment  Goal: Absence of physical injury  Outcome: Progressing     Problem: Safety - Adult  Goal: Free from fall injury  Outcome: Progressing     Problem: Cardiovascular - Adult  Goal: Maintains optimal cardiac output and hemodynamic stability  Outcome: Progressing  Goal: Absence of cardiac dysrhythmias or at baseline  Outcome: Progressing     Problem: Skin/Tissue Integrity - Adult  Goal: Skin integrity remains intact  Description: 1.  Monitor for areas of redness and/or skin breakdown  2.  Assess vascular access sites hourly  3.  Every 4-6 hours minimum:  Change oxygen saturation probe site  4.  Every 4-6 hours:  If on nasal continuous positive airway pressure, respiratory therapy assess nares and determine need for appliance change or resting period  Outcome: Progressing  Flowsheets (Taken 7/27/2025 0242)  Skin Integrity Remains Intact: Monitor for areas of redness and/or skin breakdown  Goal: Incisions, wounds, or drain sites healing

## 2025-07-27 NOTE — PROGRESS NOTES
End of Shift Note    Bedside shift change report given to Anh TORRES (oncoming nurse) by Breanne Arce LPN (offgoing nurse).  Report included the following information SBAR, Kardex, and MAR    Shift worked:  1900 To 0700     Shift summary and any significant changes:     Patient was received aox4 in no distress, vital signs stable , verbalized no complaints on shift . Patient took all medications as per orders on shift . Safety precautions maintained on shift. Bedside shift report was given to the on coming shift nurse.      Concerns for physician to address:    none   Zone phone for oncoming shift:            Breanne Arce LPN

## 2025-07-27 NOTE — PROGRESS NOTES
Hospitalist Progress Note    NAME:   Darius Burgos   : 1940   MRN: [id number]     Date/Time: 2025 10:54 AM  Patient PCP: Maria Luisa Gramajo MD    Estimated discharge date:   Barriers: placement,      Assessment / Plan:    Severe sepsis  Complicated urinary tract infection  Hypotension  Nephrolithiasis status post stents   - Lactic acid was 4.1 on arrival  - CT abdomen shows severe right-sided hydronephrosis with a nephroureteral stent in satisfactory position and nonobstructing right renal stones  - He received IV fluid bolus with improvement of blood pressure  - Urine and blood cultures were sent in the emergency department, followup  - Lactic acid improved with IV hydration  - Will start empiric Zosyn  - consulted urology.  Per urology no need for emergent surgery and recommend pcn tube by IR  Spoke to Dr Lewis from IR and he recommended no pcn as there is no hydro and recommended cx directed abx therapy.  - place chatman, void trial in a few days  - repeat fever  - , repeating blood cultures  - repeated ct abd given fevers, nonacute, showed:   Right hydronephrosis decompressed by Chatman catheter  Bilateral pleural transudate and subsegmental atelectasis  Stable L1 compression deformity, constipation  Complex ascites. Spontaneous peritonitis is not excluded but the amount of fluid  is small, probably too small for diagnostic paracentesis.  - consulted ID given persistent fevers, however fevers have resolved at this point  Respiratory viral panel negative  : Sepsis criteria resolving  : Continue zosyn I.V end date , fluconazole Po x 3 days end date : On Zosyn  till date.  Last dose  .  Continue fluconazole.  End date   Chatman catheter has been removed.  Continue to check postvoid residual.  Outpatient follow-up with urology.  Plan next steps exchange.  Patient to call [phone] for follow-up at [facility]    Atypical chest pain  R sided,  16 93 %   07/26/25 1333 (!) 140/84 98.2 °F (36.8 °C) -- (!) 104 -- 93 %   07/26/25 1307 106/69 98.1 °F (36.7 °C) -- (!) 106 16 90 %       No intake or output data in the 24 hours ending 07/27/25 1054         I had a face to face encounter and independently examined this patient on 7/27/2025, as outlined below:  PHYSICAL EXAM:  General: Alert, cooperative  EENT:  EOMI. Anicteric sclerae.  Resp:  CTA bilaterally, no wheezing or rales.  No accessory muscle use  CV:  Regular  rate,  No edema  GI:  Soft, Non distended, Non tender.  :  Mark present  Neurologic:  Alert and oriented X 3, normal speech,   Ext:  RUE mild swelling noted  Skin:  No rashes.  No jaundice    Reviewed most current lab test results and cultures  YES  Reviewed most current radiology test results   YES  Review and summation of old records today    NO  Reviewed patient's current orders and MAR    YES  PMH/SH reviewed - no change compared to H&P    Procedures: see electronic medical records for all procedures/Xrays and details which were not copied into this note but were reviewed prior to creation of Plan.      LABS:  I reviewed today's most current labs and imaging studies.  Pertinent labs include:  Recent Labs     07/25/25  0627   WBC 6.0   HGB 8.4*   HCT 24.7*        Recent Labs     07/25/25  0627 07/26/25  0401   * 133*   K 3.8 3.6    99   CO2 25 29   GLUCOSE 148* 157*   BUN 26* 28*   CREATININE 1.82* 1.78*   CALCIUM 8.6 8.4*       Signed: Edward Garcia MD

## 2025-07-28 PROBLEM — E11.649 HYPOGLYCEMIA ASSOCIATED WITH DIABETES (HCC): Status: ACTIVE | Noted: 2025-07-28

## 2025-07-28 PROBLEM — E11.22 TYPE 2 DIABETES MELLITUS WITH STAGE 3 CHRONIC KIDNEY DISEASE, WITH LONG-TERM CURRENT USE OF INSULIN (HCC): Status: ACTIVE | Noted: 2025-07-28

## 2025-07-28 PROBLEM — Z79.4 TYPE 2 DIABETES MELLITUS WITH STAGE 3 CHRONIC KIDNEY DISEASE, WITH LONG-TERM CURRENT USE OF INSULIN (HCC): Status: ACTIVE | Noted: 2025-07-28

## 2025-07-28 PROBLEM — N18.30 TYPE 2 DIABETES MELLITUS WITH STAGE 3 CHRONIC KIDNEY DISEASE, WITH LONG-TERM CURRENT USE OF INSULIN (HCC): Status: ACTIVE | Noted: 2025-07-28

## 2025-07-28 ASSESSMENT — PAIN DESCRIPTION - ORIENTATION
ORIENTATION: RIGHT;LEFT
ORIENTATION: RIGHT;LEFT

## 2025-07-28 ASSESSMENT — PAIN SCALES - GENERAL
PAINLEVEL_OUTOF10: 7
PAINLEVEL_OUTOF10: 0
PAINLEVEL_OUTOF10: 0
PAINLEVEL_OUTOF10: 4
PAINLEVEL_OUTOF10: 0

## 2025-07-28 ASSESSMENT — PAIN DESCRIPTION - LOCATION
LOCATION: FOOT
LOCATION: FOOT

## 2025-07-28 ASSESSMENT — PAIN DESCRIPTION - DESCRIPTORS: DESCRIPTORS: THROBBING;ACHING

## 2025-07-28 NOTE — CARE COORDINATION
Transition of Care Plan:     RUR: 20%  Prior Level of Functioning: Assistance with shopping and housework  Disposition: SNF (auth/bed pending), then return home afterwards  [place]: 7/28/2025  If SNF or IPR: Date FOC offered: 7/22  Date FOC received: 7/22 Linda  Accepting facility: [facility]  Date authorization started with reference number: 7/23/2025, Pending Auth #: [id number]   Date authorization received and expires: TBD  Follow up appointments: defer to SNF.    DME needed: defer to SNF.  Patient has grab bars, RW, BSC and rollator at home.  Transportation at discharge: [facility] anticipated.  IM/IMM Medicare/ letter given: 2nd IM needed prior to discharge.  Is patient a  and connected with VA? No.              If yes, was [place] transfer form completed and VA notified? N/A  Caregiver Contact: Kate Burgos - spouse - [phone]  Discharge Caregiver contacted prior to discharge? Patient to contact.  Care Conference needed? No.  Barriers to discharge: Placement/bed/auth      CM reviewed pt's chart and CM is aware that pt is medically stable for d/c but pt's insurance is requesting for a P2P to be done today by 5 pm.    CM [employer] PA regarding the P2P and the information.     CM provided the PA with the following information.....This auth got sent to P2P .  This the number to call for P2P [phone]     The doctor has until 5PM to call.    CM will continue to follow and assist with d/c planning.    Glenna Valente    [phone]

## 2025-07-28 NOTE — PROGRESS NOTES
End of Shift Note    Bedside shift change report given to BRIAN Stewart (oncoming nurse) by TANVI NEGRO RN (offgoing nurse).  Report included the following information SBAR, Kardex, and MAR    Shift worked:  7am-7pm     Shift summary and any significant changes:     Pt tolerated care fairly well. Medications were give and education was provided. Hourly rounding completed. Pt made no complaints of pain during this shift. Pt up x1 with the walker; pt sat in chair for most of this shift. Urinal present at bedside.      Concerns for physician to address:  N/A     [facility] phone for oncoming shift:   [phone]       Activity:  Level of Assistance: Moderate assist, patient does 50-74%  Number times ambulated in hallways past shift: 0  Number of times OOB to chair past shift: 1    Cardiac:   Cardiac Monitoring: No      Cardiac Rhythm: Atrial fib    Access:  Current line(s): PIV     Genitourinary:   Urinary Status: Voiding    Respiratory:   O2 Device: None (Room air)  Chronic home O2 use?: NO  Incentive spirometer at bedside: N/A    GI:  Last BM (including prior to admit): 07/26/25  Current diet:  ADULT ORAL NUTRITION SUPPLEMENT; Breakfast, Dinner; Wound Healing Oral Supplement  ADULT ORAL NUTRITION SUPPLEMENT; Lunch, Dinner; Diabetic Oral Supplement  ADULT DIET; Regular; 4 carb choices (60 gm/meal)  Passing flatus: YES    Pain Management:   Patient states pain is manageable on current regimen: N/A    Skin:  Rafael Scale Score: 18  Interventions: Wound Offloading (Prevention Methods): Bed, pressure reduction mattress, Elevate heels, Pillows, Repositioning, Turning    Patient Safety:  Fall Risk: Nursing Judgement-Fall Risk High(Add Comments): Yes  Fall Risk Interventions  Nursing Judgement-Fall Risk High(Add Comments): Yes  Toilet Every 2 Hours-In Advance of Need: Yes  Hourly Visual Checks: Awake, In chair  Fall Visual Posted: Fall sign posted, Socks  Room Door Open: Yes  Alarm On: Bed  Patient Moved Closer to Nursing Station:  No    Active Consults:   IP CONSULT TO UROLOGY  IP CONSULT TO INTERVENTIONAL RADIOLOGY  IP CONSULT TO INFECTIOUS DISEASES  IP CONSULT TO NEPHROLOGY  IP CONSULT TO CASE MANAGEMENT  IP CONSULT TO DIABETES MANAGEMENT    Length of Stay:  Expected LOS: 10  Actual LOS: 10    TANVI NEGRO RN

## 2025-07-28 NOTE — DIABETES MGMT
[facility]  DIABETES MANAGEMENT CONSULT    Consulted by Provider for advanced nursing evaluation and care for inpatient blood glucose management.    Evaluation and Action Plan   Darius Burgos is a 84 y.o. male with arthritis, A-fib, Type 2 Diabetes, CKD III, IgA Nephritis, HDL, HTN, PUD, [place], colon, skin, and lung CA.  Patient with increased weakness over the past 2 days presented to the ER on 2025. Patient's son states he was feeling very fatigued as he attempted to get off the toilet and sustained a fall. Patient did hit head but denies LOC. Patient complains of pain in right upper arm and neck.. On arrival to ER patient was noted to have fever of 103, , and BP 93/51. Patient followed by PT/OT Infectious Disease, Nephrology and Wound care. Notable admission labs include Na 134, Cl 96, Cr 2.32, GFR 27, lactic acid 4.1, troponin 67, Hgb 8.5, platelet count 214. NT Pro-BNP 3650. [facility] consulted to assess home management and hospital blood glucose management.    Patient with Type 2 Diabetes current HbA1C 7.2% which is within target range for age and comorbids but slightly higher than previous A1C 6.2%  May 2025. Patient with hypoglycemia POA 48 mg/dL with additional hypoglycemic episode on  57 mg/dL on Lantus 26 units. Patient confirmed home regimen of NPH 40 units twice daily and Metformin 1000 mg BID. Patient lives with Spouse will discharge to SNF then transition to home    FBG 87 today on NPH 20 units BID recommend continue. Noted preprandial dinner , steroid induced hyperglycemia on Prednisone 10 mg daily. Will continue to monitor and follow.     Blood glucose pattern    Significant diabetes-related events over the past 24-72 hours  A1C 7.2%  Fasting B  Pre-prandial: 257  Basal: NPH 20 units BID  Correction: 4  Total daily insulin dose in the last 24 hours: 44      Management Rationale Action Plan   Medication   Basal needs Using  20  units/kg/D based on PTA regimen NPH 20 units BID   Nutritional needs Covers carb load in meals Will evaluate   Corrective insulin Using low sensitivity based  medium       Additional orders  Carb consistent diet (60g CHO/meal)       Diabetes Discharge Plan   Medication  Type 2 Diabetes, controlled as evidenced by 7.2% A1C  Resume NPH dose TBD ( patient will need dose adjustment due to reported hypoglycemia at home)  Stop Metformin 1000 mg BID   Referral  []        Outpatient diabetes education   Additional orders       Past Medical History     Past Medical History:   Diagnosis Date    Arthritis     At risk for sleep apnea 12/18/2019    Stop Bang 5     Atrial fibrillation (HCC)     Atrial Flutter-Wittkamp    Cancer (HCC)     prostate cancer    Colon cancer (HCC)     Diabetes (HCC)     DM2    Elevated cholesterol     History of blood transfusion     over 25 years    History of kidney stones     [place] (hard of hearing)     bilateral hearing aids    Hypertension     PUD (peptic ulcer disease) 1980    Skin cancer        Hospital Course   Clinical progress has been uncomplicated    Diabetes History   Type 2 Diabetes  Ambulatory BG management provided by:   Family History:    Lab Results   Component Value Date    LABA1C 7.2 (H) 07/24/2025    LABA1C 6.6 (H) 05/09/2025    LABA1C 6.6 (H) 04/09/2025     Diabetes-related Medical History  Acute complications  Recurrent hypoglycemia and Steroid Induced Hyperglycemia  Neurological complications  Hypoglycemia unawareness  Microvascular disease   Nephropathy  Other associated conditions     Obesity, HLD    Diabetes Medication History  Diabetes drug class Antihyperglycemic Agent and Dosing Additional Comments   Biguanide  Metformin 1000 mg BID    Sulfonylureas     DPP4 inhibitors     Thiazolidinediones     SGLT-2 inhibitors     GLP-1 Ana     Insulin NPH 40 units BID      Diabetes self-management practices: deferred  Eating pattern   [] Eating a carbohydrate-controlled meal

## 2025-07-28 NOTE — PROGRESS NOTES
Occupational Therapy    chart reviewed and patient treated by occupational therapy. Pending progression with skilled acute occupational therapy, recommend:    Moderate intensity short-term skilled occupational therapy up to 5x/week    Recommend with nursing patient to complete as able in order to maintain strength, endurance and independence: OOB to chair 3x/day, ADLs with min/mod A and performing toileting with mod  assist. Thank you for your assistance.     Full evaluation to follow.     07/28/25 0915 07/28/25 0930 07/28/25 0942   Vital Signs   Pulse 90 (!) 106 82   /76 95/60 (!) 128/58   MAP (Calculated) 89 72 81   BP Location Left upper arm Left upper arm Left upper arm   Patient Position Sitting;Up in chair Standing Sitting;Up in chair  (LEs reclined)     Good therapy participation, no c/o pain this session, reports chest pain better with lidocaine;  Full note to follow. Caro Alarcon OTR/L

## 2025-07-28 NOTE — DISCHARGE SUMMARY
Physician Discharge Summary     Pt Name  Darius Burgos   Admit date:  7/18/2025   Medically ready for discharge date:   7/28- medically ready - SNF placement pending   Medical Record Number  [id number] @ [facility]   Age  84 y.o.   Date of Birth 1940   PCP Maria Luisa Gramajo MD   Admission Diagnoses:                        Sepsis (HCC)   Present on Admission:   Sepsis (HCC)   Persistent fever   Nephrolithiasis   Pancreatic lesion   Controlled diabetes mellitus type 2 with complications (HCC)   Stage 3a chronic kidney disease (HCC)   IgA nephropathy   Longstanding persistent atrial fibrillation (HCC)   Yeast cystitis   Obesity (BMI 30-39.9)   Complicated urinary tract infection     No Known Allergies      Admission HPI : \" Darius Burgos is a 84 y.o.  male with PMHx significant for multiple medical problems including IgA nephritis, hypertension, diabetes mellitus, atrial fibrillation is coming to the hospital chief complaints of generalized weakness, fall and also fever.  Reports having a fever of 103 at home along with some generalized weakness, falls and also right flank pain.  He also reports some associated chills as well.  Reports nausea along with 1 episode of vomiting.  No chest pain or shortness of breath.     On arrival to ED, he was noted to have fever of 103, had tachycardia with a heart rate of 114 and also blood pressure was low at 93/51.  On labs he was noted to have a creatinine of 2.32, lactic acid of 4.1, troponin 67, proBNP 3600, LFTs are within normal limits.  CBC shows a hemoglobin of 8.5, platelet count of 214.  Urinalysis is abnormal.  He received Zosyn and also IV fluid bolus.  We were asked to admit for work up and evaluation of the above problems \"    -Dr. Montalvo 7/18/25     Hospital Course: This pt was admitted with  Sepsis (HCC) on 7/18/2025.    Present on Admission:   Sepsis (HCC)   Persistent fever   Nephrolithiasis   Pancreatic lesion   Controlled diabetes

## 2025-07-28 NOTE — PLAN OF CARE
COLONOSCOPY N/A 11/30/2022    COLONOSCOPY performed by Miguel A Haynes MD at [facility] ENDOSCOPY    COLONOSCOPY N/A 10/11/2021    COLONOSCOPY performed by Shmuel Santiago MD at [facility] ENDOSCOPY    COLONOSCOPY N/A 8/18/2023    COLONOSCOPY performed by Miguel A Haynes II, MD at [facility] ENDOSCOPY    CT BIOPSY RENAL  4/16/2025    CT BIOPSY RENAL 4/16/2025 [facility] RAD CT    CYSTOSCOPY Right 4/11/2025    CYSTOSCOPY RIGHT URETERAL STENT INSERTION, BILATERAL RETROGRADE PYELOGRAM performed by Chemo Rees MD at [facility] MAIN OR    ERCP N/A 11/17/2023    ERCP ENDOSCOPIC RETROGRADE CHOLANGIOPANCREATOGRAPHY performed by Ghanshyam Santiago MD at [facility] ENDOSCOPY    ERCP N/A 1/18/2024    ENDOSCOPIC RETROGRADE CHOLANGIOPANCREATOGRAPHY (ERCP) performed by Ghanshyam Santiago MD at [facility] ENDOSCOPY    HERNIA REPAIR      1960's    LITHOTRIPSY      PROSTATECTOMY      TOTAL HIP ARTHROPLASTY Right     TOTAL KNEE ARTHROPLASTY Right     UPPER GASTROINTESTINAL ENDOSCOPY N/A 8/4/2023    EGD ESOPHAGOGASTRODUODENOSCOPY with BX performed by Ghanshyam Santiago MD at [facility] ENDOSCOPY       Home Situation:  Social/Functional History  Lives With: Alone  Type of Home: House  Home Layout: One level  Home Access: Level entry  Bathroom Shower/Tub: Walk-in shower  Bathroom Equipment: Shower chair  Home Equipment: Grab bars, Rollator, Walker - Rolling  Has the patient had two or more falls in the past year or any fall with injury in the past year?: Yes  Prior Level of Assist for ADLs: Independent  Prior Level of Assist for Homemaking: Needs assistance  Prior Level of Assist for Ambulation: Independent household ambulator, with or without device  Prior Level of Assist for Transfers: Independent  Critical Behavior:  Orientation  Overall Orientation Status: Within Normal Limits  Orientation Level: Oriented X4  Cognition  Overall Cognitive Status: WFL  Arousal/Alertness: Appears intact  Following Commands: Appears intact  Attention Span: Difficulty dividing  [id number].  3. Cindy GONZALEZ, Shazia EVANS, Sabine S, Neelam K, Juve S. Activity Measure for Post-Acute Care \"6-Clicks\" Basic Mobility Scores Predict Discharge Destination After Acute Care Hospitalization in Select Patient Groups: A Retrospective, Observational Study. Arch Rehabil Res Clin Transl. 2022 Jul 16;4(3):950618. doi: 10.1016/j.arrct.2022.045976. PMID: 20761385; PMCID: BFP1043002.  4. Sunil JORDAN, Keiko S, Mitch W, Kiki P. AM-PAC Short Forms Manual 4.0. Revised 2/2020.                                                                                                                                                                                                                              Pain Rating:  Pt has no c/o pain     Activity Tolerance:   requires rest breaks and SpO2 stable on room air    After treatment:   Patient left in no apparent distress sitting up in chair, Call bell within reach, and Bed/ chair alarm activated    COMMUNICATION/EDUCATION:   The patient's plan of care was discussed with: registered nurse    Patient Education  Education Given To: Patient  Education Provided: Mobility Training  Education Provided Comments: importance of mobility and OOB  Education Method: Verbal  Barriers to Learning: None  Education Outcome: Verbalized understanding;Continued education needed    Thank you for this referral.  Leandra Blevins, PT  Minutes: 23

## 2025-07-28 NOTE — PROGRESS NOTES
Comprehensive Nutrition Assessment    Type and Reason for Visit:  Reassess    Nutrition Recommendations/Plan:   Continue current diet  Encourage PO intakes, honor preferences as able, provide assistance PRN  Glucerna (vanilla/chocolate) 2x/day  Monitor and record PO intakes, supplement acceptance, and Bms in I/Os     Malnutrition Assessment:  Malnutrition Status:  At risk for malnutrition (07/28/25 1303)    Context:  Acute Illness     Findings of the 6 clinical characteristics of malnutrition:  Energy Intake:  Mild decrease in energy intake  Weight Loss:  Unable to assess     Body Fat Loss:  No body fat loss     Muscle Mass Loss:  No muscle mass loss    Fluid Accumulation:  No fluid accumulation Extremities   Strength:  Not Performed    Nutrition Assessment:    Follow up. Pt reports good intakes. Requested fruit and cottage cheese plate for lunch, ordered. Agreeable to continue glucerna (only chocolate or vanilla, dislikes strawberry), updated order.    Nutrition Related Findings:    Labs: Na 133, K 3.6, BUN 28, Creat 1.78, Gluc 182. Meds: furosemide, insulin lispro, insulin NPH, pantoprazole, pravastatin, prednisone. 2+ pitting RLE, 2+ LLE edema. BM 7/26. Wound Type: Stage III, Skin Tears (coccyx)       Current Nutrition Intake & Therapies:    Average Meal Intake: 26-50%, 51-75%  Average Supplements Intake: 26-50%  ADULT ORAL NUTRITION SUPPLEMENT; Breakfast, Dinner; Wound Healing Oral Supplement  ADULT DIET; Regular; 4 carb choices (60 gm/meal)  ADULT ORAL NUTRITION SUPPLEMENT; Lunch, Dinner; Diabetic Oral Supplement  DIET ONE TIME MESSAGE;    Anthropometric Measures:  Height: 170.2 cm (5' 7.01\")  Ideal Body Weight (IBW): 148 lbs (67 kg)    Current Body Weight: 103 kg (227 lb 1.2 oz), 153.4 % IBW. Weight Source: Standing scale  Current BMI (kg/m2): 35.6  Usual Body Weight:  (205-230's)  BMI Categories: Obese Class 2 (BMI 35.0 -39.9)    Estimated Daily Nutrient Needs:  Energy Requirements Based On:

## 2025-07-28 NOTE — PROGRESS NOTES
End of Shift Note    Bedside shift change report given to  Sultana TORRES(oncoming nurse) by Breanne Arce LPN (offgoing nurse).  Report included the following information SBAR, Kardex, and MAR    Shift worked:  1900 to 0700     Shift summary and any significant changes:     Patient was received aox4 in no distress , vital signs stable. Verbalized no comliants on shift . Patient did get tylenol for foot pain with relief. Patient took all medications as per orders on shift. Safety precaution maintained on shift for the patient. Bedside shift report was given to the oncoming shift nurse.      Concerns for physician to address:    none   Zone phone for oncoming shift:            Breanne Arce LPN

## 2025-07-28 NOTE — PLAN OF CARE
Problem: Skin/Tissue Integrity  Goal: Skin integrity remains intact  Description: 1.  Monitor for areas of redness and/or skin breakdown  2.  Assess vascular access sites hourly  3.  Every 4-6 hours minimum:  Change oxygen saturation probe site  4.  Every 4-6 hours:  If on nasal continuous positive airway pressure, respiratory therapy assess nares and determine need for appliance change or resting period  7/27/2025 2248 by Breanne Arce LPN  Outcome: Progressing  7/27/2025 1207 by Anh Swartz RN  Outcome: Progressing  Flowsheets (Taken 7/27/2025 0900)  Skin Integrity Remains Intact: Monitor for areas of redness and/or skin breakdown     Problem: Chronic Conditions and Co-morbidities  Goal: Patient's chronic conditions and co-morbidity symptoms are monitored and maintained or improved  7/27/2025 2248 by Breanne Arce LPN  Outcome: Progressing  7/27/2025 1207 by Anh Swartz RN  Outcome: Progressing  Flowsheets (Taken 7/27/2025 0900)  Care Plan - Patient's Chronic Conditions and Co-Morbidity Symptoms are Monitored and Maintained or Improved:   Collaborate with multidisciplinary team to address chronic and comorbid conditions and prevent exacerbation or deterioration   Monitor and assess patient's chronic conditions and comorbid symptoms for stability, deterioration, or improvement     Problem: Discharge Planning  Goal: Discharge to home or other facility with appropriate resources  7/27/2025 2248 by Breanne Arce LPN  Outcome: Progressing  7/27/2025 1207 by Anh Swartz RN  Outcome: Progressing  Flowsheets (Taken 7/27/2025 0900)  Discharge to home or other facility with appropriate resources: Identify barriers to discharge with patient and caregiver     Problem: Pain  Goal: Verbalizes/displays adequate comfort level or baseline comfort level  7/27/2025 2248 by Breanne Arce LPN  Outcome: Progressing  7/27/2025 1207 by Anh Swartz RN  Outcome: Progressing     Problem: ABCDS Injury Assessment  Goal: Absence

## 2025-07-28 NOTE — PLAN OF CARE
Problem: Occupational Therapy - Adult  Goal: By Discharge: Performs self-care activities at highest level of function for planned discharge setting.  See evaluation for individualized goals.  Description: FUNCTIONAL STATUS PRIOR TO ADMISSION:  Patient was ambulatory using RW and was Mod Indep with ADLs and has PRN assist from family for IADLs.  Son-in-law lives next door and checks in on patient every other day when he is in town; however, leaves town for work and pt spends up to 4 days alone.  Pt reports recent frequent falls.  Wife is transitioning to LTC.   , Prior Level of Assist for ADLs: Independent,  ,  ,  ,  ,  , Prior Level of Assist for Homemaking: Needs assistance (family reports pt non-compliant with DM),  ,  ,       HOME SUPPORT: Patient lived alone with son-in-law to provide assistance.    Occupational Therapy Goals:  Initiated 7/21/2025  1.  Patient will perform RW grooming with Contact Guard Assist within 7 day(s).  2.  Patient will perform bathing with Minimal Assist within 7 day(s).  3.  Patient will perform RW lower body dressing with Minimal Assist within 7 day(s).  4.  Patient will perform RW toilet transfers with Contact Guard Assist  within 7 day(s).  5.  Patient will perform all aspects of RW toileting with Contact Guard Assist within 7 day(s).    Outcome: Progressing   OCCUPATIONAL THERAPY TREATMENT  Patient: Darius Burgos (84 y.o. male)  Date: 7/28/2025  Primary Diagnosis: Sepsis (HCC) [A41.9]       Precautions: Fall Risk, Skin, Bed Alarm, Contact Precautions (ESBL)                Chart, occupational therapy assessment, plan of care, and goals were reviewed.    ASSESSMENT  Patient continues to benefit from skilled OT services and is progressing towards goals. Good participation reporting, \"I want to go to rehab.\" Asymptomatic hypotension/soft BP; will and able to continue with therapy during assessment. Decreased static standing tolerance but responsive to postural and environmental cues

## 2025-07-29 ASSESSMENT — PAIN SCALES - GENERAL: PAINLEVEL_OUTOF10: 0

## 2025-07-29 NOTE — PLAN OF CARE
Problem: Skin/Tissue Integrity  Goal: Skin integrity remains intact  Description: 1.  Monitor for areas of redness and/or skin breakdown  2.  Assess vascular access sites hourly  3.  Every 4-6 hours minimum:  Change oxygen saturation probe site  4.  Every 4-6 hours:  If on nasal continuous positive airway pressure, respiratory therapy assess nares and determine need for appliance change or resting period  7/29/2025 1015 by Debora Ashford RN  Outcome: Progressing  7/28/2025 2224 by Terry Cross RN  Outcome: Progressing  Flowsheets (Taken 7/28/2025 1738)  Skin Integrity Remains Intact: Monitor for areas of redness and/or skin breakdown     Problem: Chronic Conditions and Co-morbidities  Goal: Patient's chronic conditions and co-morbidity symptoms are monitored and maintained or improved  7/29/2025 1015 by Debora Ashford RN  Outcome: Progressing  7/28/2025 2224 by Terry Cross RN  Outcome: Progressing     Problem: Discharge Planning  Goal: Discharge to home or other facility with appropriate resources  7/29/2025 1015 by Debora Ashford RN  Outcome: Progressing  7/28/2025 2224 by Terry Cross RN  Outcome: Progressing     Problem: Pain  Goal: Verbalizes/displays adequate comfort level or baseline comfort level  7/29/2025 1015 by Debora Ashford RN  Outcome: Progressing  7/28/2025 2224 by Terry Cross RN  Outcome: Progressing     Problem: ABCDS Injury Assessment  Goal: Absence of physical injury  7/29/2025 1015 by Debora Ashford RN  Outcome: Progressing  7/28/2025 2224 by Terry Cross RN  Outcome: Progressing     Problem: Safety - Adult  Goal: Free from fall injury  7/29/2025 1015 by Debora Ashford RN  Outcome: Progressing  7/28/2025 2224 by Terry Cross RN  Outcome: Progressing     Problem: Cardiovascular - Adult  Goal: Maintains optimal cardiac output and hemodynamic stability  7/29/2025 1015 by Debora Ashford RN  Outcome: Progressing  7/28/2025 2224 by Tay

## 2025-07-29 NOTE — CASE COMMUNICATION
0910 Dr. Goldman.    Needed more information on antibiotics and updated PT/OT notes, which we reviewed.     Will approve.

## 2025-07-29 NOTE — DISCHARGE SUMMARY
HumuLIN N;NovoLIN N  Inject 20 Units into the skin 2 times daily (before meals)  Replaces: HumuLIN N KwikPen 100 UNIT/ML injection pen     lidocaine 4 % external patch  Place 1 patch onto the skin daily Apply patch to R chest.     melatonin 3 MG Tabs tablet  Take 1 tablet by mouth nightly as needed (insomnia)     polyethylene glycol 17 g packet  Commonly known as: GLYCOLAX  Take 1 packet by mouth daily as needed for Constipation     senna 8.6 MG tablet  Commonly known as: SENOKOT  Take 1 tablet by mouth nightly            CHANGE how you take these medications      furosemide 40 MG tablet  Commonly known as: LASIX  Take 1 tablet by mouth in the morning and 1 tablet in the evening.  What changed: when to take this     losartan 25 MG tablet  Commonly known as: COZAAR  Take 1 tablet by mouth daily  What changed: how much to take     predniSONE 10 MG tablet  Commonly known as: DELTASONE  Take 1 tablet by mouth daily  What changed:   medication strength  See the new instructions.            CONTINUE taking these medications      amiodarone 100 MG tablet  Commonly known as: PACERONE  Take 1 tablet by mouth daily     dicyclomine 10 MG capsule  Commonly known as: BENTYL     ferrous sulfate 325 (65 Fe) MG tablet  Commonly known as: IRON 325     FISH OIL PO     Insulin Pen Needle 32G X 4 MM Misc  1 each by Does not apply route daily     metFORMIN 1000 MG tablet  Commonly known as: GLUCOPHAGE  Take 1 tablet by mouth 2 times daily     metoprolol succinate 25 MG extended release tablet  Commonly known as: TOPROL XL  Take 0.5 tablets by mouth daily     multivitamin Tabs tablet     ondansetron 4 MG disintegrating tablet  Commonly known as: ZOFRAN-ODT  Take 1 tablet by mouth every 8 hours as needed for Nausea or Vomiting     pantoprazole 40 MG tablet  Commonly known as: PROTONIX     pravastatin 20 MG tablet  Commonly known as: PRAVACHOL            STOP taking these medications      amLODIPine 5 MG tablet  Commonly known as:  NORVASC     bumetanide 1 MG tablet  Commonly known as: BUMEX     cefpodoxime 200 MG tablet  Commonly known as: VANTIN     cephALEXin 500 MG capsule  Commonly known as: KEFLEX     HumuLIN N KwikPen 100 UNIT/ML injection pen  Generic drug: insulin NPH  Replaced by: insulin  UNIT/ML injection vial     hydrALAZINE 50 MG tablet  Commonly known as: APRESOLINE     insulin lispro 100 UNIT/ML Soln injection vial  Commonly known as: HUMALOG,ADMELOG     Ventolin  (90 Base) MCG/ACT inhaler  Generic drug: albuterol sulfate HFA            ASK your doctor about these medications      Symbicort 160-4.5 MCG/ACT Aero  Generic drug: budesonide-formoterol               Where to Get Your Medications        Information about where to get these medications is not yet available    Ask your nurse or doctor about these medications  furosemide 40 MG tablet  insulin  UNIT/ML injection vial  lidocaine 4 % external patch  losartan 25 MG tablet  melatonin 3 MG Tabs tablet  polyethylene glycol 17 g packet  predniSONE 10 MG tablet  senna 8.6 MG tablet            Significant Diagnostic Studies:   No results for input(s): \"WBC\", \"HGB\", \"HCT\", \"PLT\" in the last 72 hours.  No results for input(s): \"NA\", \"K\", \"CL\", \"CO2\", \"GLU\", \"BUN\", \"MG\", \"PHOS\", \"ALT\", \"INR\" in the last 72 hours.    Invalid input(s): \"CREA\", \"CA\", \"ALB\", \"TBIL\", \"TBILI\", \"SGOT\"  Lab Results   Component Value Date/Time    TSH 2.23 11/14/2023 02:35 PM          Other medical conditions are listed in the active hospital problem list section; these and other pertinent data were taken into consideration when the treatment plan was developed and customized to this patient's unique overall circumstances and needs.         Today total floor/unit time was >30 minutes while caring for this patient and greater than 50% of that time was spent with patient (and/or family) coordinating patient's clinical issues.     Amparo Perez PA-C  7/29/2025

## 2025-07-29 NOTE — PLAN OF CARE
Problem: Skin/Tissue Integrity  Goal: Skin integrity remains intact  Description: 1.  Monitor for areas of redness and/or skin breakdown  2.  Assess vascular access sites hourly  3.  Every 4-6 hours minimum:  Change oxygen saturation probe site  4.  Every 4-6 hours:  If on nasal continuous positive airway pressure, respiratory therapy assess nares and determine need for appliance change or resting period  7/28/2025 2224 by Terry Cross RN  Outcome: Progressing  7/28/2025 2224 by Terry Cross RN  Outcome: Progressing  7/28/2025 1033 by Renée Goncalves RN  Outcome: Progressing  Flowsheets (Taken 7/28/2025 1032)  Skin Integrity Remains Intact:   Monitor for areas of redness and/or skin breakdown   Assess vascular access sites hourly     Problem: Chronic Conditions and Co-morbidities  Goal: Patient's chronic conditions and co-morbidity symptoms are monitored and maintained or improved  7/28/2025 2224 by Terry Cross RN  Outcome: Progressing  7/28/2025 2224 by Terry Cross RN  Outcome: Progressing  7/28/2025 1033 by Renée Goncalves RN  Outcome: Progressing     Problem: Discharge Planning  Goal: Discharge to home or other facility with appropriate resources  7/28/2025 2224 by Terry Cross RN  Outcome: Progressing  7/28/2025 2224 by Terry Cross RN  Outcome: Progressing  7/28/2025 1033 by Renée Goncalves RN  Outcome: Progressing     Problem: Pain  Goal: Verbalizes/displays adequate comfort level or baseline comfort level  7/28/2025 2224 by Terry Cross RN  Outcome: Progressing  7/28/2025 2224 by Terry Cross RN  Outcome: Progressing  7/28/2025 1033 by Renée Goncalves RN  Outcome: Progressing     Problem: ABCDS Injury Assessment  Goal: Absence of physical injury  7/28/2025 2224 by Terry Cross RN  Outcome: Progressing  7/28/2025 2224 by Terry Cross RN  Outcome: Progressing  7/28/2025 1033 by Renée Goncalves RN  Outcome: Progressing     Problem: Safety - Adult  Goal: Free from fall  in <40 seconds with modified independence within 7 day(s).   7/28/2025 1244 by Leandra Blevins, PT  Outcome: Progressing

## 2025-07-29 NOTE — PROGRESS NOTES
Patient determined to be stable for discharge by attending provider. I have reviewed the discharge instructions and follow-up appointments with the [place] nurse in [facility]. They verbalized understanding and all questions were answered to their satisfaction. No complaints or further questions were expressed.       New medications: Appropriate educational materials and medication side effect teaching were provided.    [place]   PIV were removed prior to discharge.     All personal items collected during admission were returned to the patient prior to discharge.    Debora Ashford RN

## 2025-07-29 NOTE — PLAN OF CARE
Problem: Occupational Therapy - Adult  Goal: By Discharge: Performs self-care activities at highest level of function for planned discharge setting.  See evaluation for individualized goals.  Description: FUNCTIONAL STATUS PRIOR TO ADMISSION:  Patient was ambulatory using RW and was Mod Indep with ADLs and has PRN assist from family for IADLs.  Son-in-law lives next door and checks in on patient every other day when he is in town; however, leaves town for work and pt spends up to 4 days alone.  Pt reports recent frequent falls.  Wife is transitioning to LTC.   , Prior Level of Assist for ADLs: Independent,  ,  ,  ,  ,  , Prior Level of Assist for Homemaking: Needs assistance (family reports pt non-compliant with DM),  ,  ,       HOME SUPPORT: Patient lived alone with son-in-law to provide assistance.    Occupational Therapy Goals:  Initiated 7/21/2025  1.  Patient will perform RW grooming with Contact Guard Assist within 7 day(s).  2.  Patient will perform bathing with Minimal Assist within 7 day(s).  3.  Patient will perform RW lower body dressing with Minimal Assist within 7 day(s).  4.  Patient will perform RW toilet transfers with Contact Guard Assist  within 7 day(s).  5.  Patient will perform all aspects of RW toileting with Contact Guard Assist within 7 day(s).    Outcome: Progressing   OCCUPATIONAL THERAPY TREATMENT  Patient: Darius Burgos (84 y.o. male)  Date: 7/29/2025  Primary Diagnosis: Sepsis (HCC) [A41.9]       Precautions: Fall Risk, Skin, Bed Alarm, Contact Precautions (ESBL)                Chart, occupational therapy assessment, plan of care, and goals were reviewed.    ASSESSMENT  Patient continues to benefit from skilled OT services and is slowly progressing towards goals. Good participation; limited by ongoing incontinence B&B, fully unaware BM, aware of bladder void after legs get wet but not in sense of sensory information of \"I have need to void.\" Moving bed to chair with CGA-min A, heavy  Requires cues for some  Cognition Comment: lives alone at 84; recommend cog screen to clarify safe discharge planning, recommendations for safe mod I adaptations PRN    Functional Mobility and Transfers for ADLs:  Bed Mobility:  Bed Mobility Training  Bed Mobility Training: Yes  Overall Level of Assistance: Minimal assistance  Interventions: Verbal cues;Safety awareness training;Tactile cues;Visual cues;Weight shifting training/pressure relief  Rolling: Contact guard assistance  Supine to Sit: Minimal assistance  Sit to Supine: Contact guard assistance  Scooting: Supervision (increased time)     Transfers:   Transfer Training  Transfer Training: Yes  Overall Level of Assistance: Minimal assistance  Interventions: Visual cues;Demonstration;Safety awareness training;Tactile cues;Verbal cues  Sit to Stand: Minimal assistance  Stand to Sit: Minimal assistance  Bed to Chair: Minimal assistance;Contact guard assistance  Toilet Transfer: Minimal assistance (inferred; incontinent B&B this session)           Balance:     Balance  Sitting: Impaired  Sitting - Static: Good (unsupported)  Sitting - Dynamic: Fair (occasional)  Standing: Impaired;With support  Standing - Static: Fair;Constant support  Standing - Dynamic: Fair;Constant support      ADL Intervention:         Feeding: Modified independent ;Increased time to complete   Feeding Skilled Clinical Factors: not wearing dentures; tremors improving     Grooming: Increased time to complete;Verbal cueing;Contact guard assistance   Grooming Skilled Clinical Factors: encouraged to comb hair more often    UE Bathing: Minimal assistance;Increased time to complete       LE Bathing: Increased time to complete;Maximum assistance;Moderate assistance  LE Bathing Skilled Clinical Factors: decreased ability to reach posterior clement region and B feet; standing tolerance improved this session    UE Dressing: Minimal assistance       LE Dressing: Moderate assistance  LE Dressing Skilled

## 2025-07-29 NOTE — PROGRESS NOTES
Physical therapy services attempted 11:10AM. Per cursory review of medical chart and communication with CM Team, Pt pending dc @ 3pm. No reported [facility] PT Team needs prior to discharge. PT Team will follow.     Daria Pereira, PT, DPT

## 2025-07-29 NOTE — PROGRESS NOTES
End of Shift Note    Bedside shift change report given to BRIAN Cazares (oncoming nurse) by Terry Cross RN (offgoing nurse).  Report included the following information SBAR, Kardex, and MAR    Shift worked:  [id number]     Shift summary and any significant changes:     Received on bed awake and responsive, not in distress AOX4, all due meds given and tolerated, watched and cared for, advised to verbalize any concerns, kept well rested, all needs attended to     Concerns for physician to address:  none           Activity:  Level of Assistance: Moderate assist, patient does 50-74%  Number times ambulated in hallways past shift: 0  Number of times OOB to chair past shift: 0    Cardiac:   Cardiac Monitoring: No      Cardiac Rhythm: Atrial fib    Access:  Current line(s): PIV     Genitourinary:   Urinary Status: Voiding    Respiratory:   O2 Device: None (Room air)  Chronic home O2 use?: NO  Incentive spirometer at bedside: NO    GI:  Last BM (including prior to admit): 07/26/25  Current diet:  ADULT ORAL NUTRITION SUPPLEMENT; Breakfast, Dinner; Wound Healing Oral Supplement  ADULT DIET; Regular; 4 carb choices (60 gm/meal)  ADULT ORAL NUTRITION SUPPLEMENT; Lunch, Dinner; Diabetic Oral Supplement  DIET ONE TIME MESSAGE;  Passing flatus: YES    Pain Management:   Patient states pain is manageable on current regimen: YES    Skin:  Rafael Scale Score: 18  Interventions: Wound Offloading (Prevention Methods): Bed, pressure reduction mattress    Patient Safety:  Fall Risk: Nursing Judgement-Fall Risk High(Add Comments): Yes  Fall Risk Interventions  Nursing Judgement-Fall Risk High(Add Comments): Yes  Toilet Every 2 Hours-In Advance of Need: Yes  Hourly Visual Checks: Awake, In bed  Fall Visual Posted: Fall sign posted, Socks  Room Door Open: Yes  Alarm On: Bed  Patient Moved Closer to Nursing Station: No    Active Consults:   IP CONSULT TO UROLOGY  IP CONSULT TO INTERVENTIONAL RADIOLOGY  IP CONSULT TO INFECTIOUS DISEASES  IP

## 2025-07-29 NOTE — CARE COORDINATION
Pt is clear from CM standpoint for d/c.    [facility] to transport at 3 pm.    Report number to [facility] is [phone] option 2.    Going to room 110.    Transition of Care Plan:     RUR: 17%  Prior Level of Functioning: Assistance with shopping and housework  Disposition: SNF   [place]: 7/29/2025  If SNF or IPR: Date FOC offered: 7/22  Date FOC received: 7/22 [facility]  Accepting facility: [facility]  Date authorization started with reference number: 7/23/2025, Pending Auth #: [id number]   Date authorization received and expires: Ref #  - [id number]     Service dates - 7/29-8/4  Follow up appointments: defer to SNF.    DME needed: defer to SNF.  Patient has grab bars, RW, BSC and rollator at home.  Transportation at discharge: [facility]   IM/IMM Medicare/ letter given: 7/29/25  Is patient a [place] and connected with [facility]? No.              If yes, was  transfer form completed and VA notified? N/A  Caregiver Contact: Kate Burgos - spouse - [phone]  Discharge Caregiver contacted prior to discharge? Patient was contacted   Care Conference needed? No.  Barriers to discharge: None            07/29/25 1042   Services At/After Discharge   Transition of Care Consult (CM Consult) SNF   Services At/After Discharge Skilled Nursing Facility (SNF)    Resource Information Provided? No   Mode of Transport at Discharge BLS   Confirm Follow Up Transport Family   Condition of Participation: Discharge Planning   The Plan for Transition of Care is related to the following treatment goals: Goal is to go to [facility] for [facility] services   The Patient and/or Patient Representative was provided with a Choice of Provider? Patient;Patient Representative   Name of the Patient Representative who was provided with the Choice of Provider and agrees with the Discharge Plan?  Melany Parada-Pt's dtr   The Patient and/Or Patient Representative agree with the Discharge Plan? Yes   Freedom of Choice list was provided with basic

## 2025-07-29 NOTE — PLAN OF CARE
Problem: Cardiovascular - Adult  Goal: Absence of cardiac dysrhythmias or at baseline  7/29/2025 1458 by Debora Ashford RN  Outcome: Progressing  7/29/2025 1015 by Debora Ashford RN  Outcome: Progressing     Problem: Skin/Tissue Integrity - Adult  Goal: Incisions, wounds, or drain sites healing without S/S of infection  7/29/2025 1458 by Debora Ashford RN  Outcome: Progressing  7/29/2025 1015 by Debora Ashford RN  Outcome: Progressing     Problem: Occupational Therapy - Adult  Goal: By Discharge: Performs self-care activities at highest level of function for planned discharge setting.  See evaluation for individualized goals.  Description: FUNCTIONAL STATUS PRIOR TO ADMISSION:  Patient was ambulatory using RW and was Mod Indep with ADLs and has PRN assist from family for IADLs.  Son-in-law lives next door and checks in on patient every other day when he is in town; however, leaves town for work and pt spends up to 4 days alone.  Pt reports recent frequent falls.  Wife is transitioning to LTC.   , Prior Level of Assist for ADLs: Independent,  ,  ,  ,  ,  , Prior Level of Assist for Homemaking: Needs assistance (family reports pt non-compliant with DM),  ,  ,       HOME SUPPORT: Patient lived alone with son-in-law to provide assistance.    Occupational Therapy Goals:  Initiated 7/21/2025  1.  Patient will perform RW grooming with Contact Guard Assist within 7 day(s).  2.  Patient will perform bathing with Minimal Assist within 7 day(s).  3.  Patient will perform RW lower body dressing with Minimal Assist within 7 day(s).  4.  Patient will perform RW toilet transfers with Contact Guard Assist  within 7 day(s).  5.  Patient will perform all aspects of RW toileting with Contact Guard Assist within 7 day(s).    7/29/2025 1436 by Caro Alarcon OT  Outcome: Progressing

## 2025-08-22 ENCOUNTER — HOSPITAL ENCOUNTER (INPATIENT)
Facility: HOSPITAL | Age: 85
LOS: 3 days | Discharge: NURSING, REHAB OR LONG-TERM CARE | DRG: 313 | End: 2025-08-28
Attending: EMERGENCY MEDICINE | Admitting: STUDENT IN AN ORGANIZED HEALTH CARE EDUCATION/TRAINING PROGRAM
Payer: MEDICARE

## 2025-08-22 ENCOUNTER — APPOINTMENT (OUTPATIENT)
Facility: HOSPITAL | Age: 85
DRG: 313 | End: 2025-08-22
Payer: MEDICARE

## 2025-08-22 DIAGNOSIS — R79.89 ELEVATED TROPONIN: ICD-10-CM

## 2025-08-22 DIAGNOSIS — R07.9 CHEST PAIN, UNSPECIFIED TYPE: ICD-10-CM

## 2025-08-22 DIAGNOSIS — R07.89 OTHER CHEST PAIN: Primary | ICD-10-CM

## 2025-08-22 LAB
ALBUMIN SERPL-MCNC: 2.8 G/DL (ref 3.5–5.2)
ALBUMIN/GLOB SERPL: 0.8 (ref 1.1–2.2)
ALP SERPL-CCNC: 58 U/L (ref 40–129)
ALT SERPL-CCNC: 11 U/L (ref 10–50)
ANION GAP SERPL CALC-SCNC: 10 MMOL/L (ref 2–14)
AST SERPL-CCNC: 26 U/L (ref 10–50)
BASOPHILS # BLD: 0.02 K/UL (ref 0–0.1)
BASOPHILS NFR BLD: 0.4 % (ref 0–1)
BILIRUB SERPL-MCNC: 0.3 MG/DL (ref 0–1.2)
BUN SERPL-MCNC: 30 MG/DL (ref 8–23)
CALCIUM SERPL-MCNC: 9.2 MG/DL (ref 8.8–10.2)
CHLORIDE SERPL-SCNC: 98 MMOL/L (ref 98–107)
CO2 SERPL-SCNC: 30 MMOL/L (ref 20–29)
COMMENT:: NORMAL
CREAT SERPL-MCNC: 1.59 MG/DL (ref 0.7–1.2)
D DIMER PPP FEU-MCNC: 1.75 MG/L FEU (ref 0–0.65)
DIFFERENTIAL METHOD BLD: ABNORMAL
ECHO AO ROOT DIAM: 4.2 CM
ECHO AO ROOT INDEX: 1.97 CM/M2
ECHO AV AREA PEAK VELOCITY: 1.1 CM2
ECHO AV AREA VTI: 1.2 CM2
ECHO AV AREA/BSA PEAK VELOCITY: 0.5 CM2/M2
ECHO AV AREA/BSA VTI: 0.6 CM2/M2
ECHO AV MEAN GRADIENT: 12 MMHG
ECHO AV MEAN VELOCITY: 1.6 M/S
ECHO AV PEAK GRADIENT: 22 MMHG
ECHO AV PEAK VELOCITY: 2.4 M/S
ECHO AV VELOCITY RATIO: 0.33
ECHO AV VTI: 51.7 CM
ECHO BSA: 2.2 M2
ECHO EST RA PRESSURE: 3 MMHG
ECHO LA DIAMETER INDEX: 1.69 CM/M2
ECHO LA DIAMETER: 3.6 CM
ECHO LA TO AORTIC ROOT RATIO: 0.86
ECHO LV E' LATERAL VELOCITY: 10.09 CM/S
ECHO LV E' SEPTAL VELOCITY: 9.39 CM/S
ECHO LV EF PHYSICIAN: 60 %
ECHO LV FRACTIONAL SHORTENING: 29 % (ref 28–44)
ECHO LV INTERNAL DIMENSION DIASTOLE INDEX: 2.39 CM/M2
ECHO LV INTERNAL DIMENSION DIASTOLIC: 5.1 CM (ref 4.2–5.9)
ECHO LV INTERNAL DIMENSION SYSTOLIC INDEX: 1.69 CM/M2
ECHO LV INTERNAL DIMENSION SYSTOLIC: 3.6 CM
ECHO LV IVSD: 1 CM (ref 0.6–1)
ECHO LV MASS 2D: 175.6 G (ref 88–224)
ECHO LV MASS INDEX 2D: 82.4 G/M2 (ref 49–115)
ECHO LV POSTERIOR WALL DIASTOLIC: 0.9 CM (ref 0.6–1)
ECHO LV RELATIVE WALL THICKNESS RATIO: 0.35
ECHO LVOT AREA: 3.1 CM2
ECHO LVOT AV VTI INDEX: 0.38
ECHO LVOT DIAM: 2 CM
ECHO LVOT MEAN GRADIENT: 2 MMHG
ECHO LVOT PEAK GRADIENT: 3 MMHG
ECHO LVOT PEAK VELOCITY: 0.8 M/S
ECHO LVOT STROKE VOLUME INDEX: 29.3 ML/M2
ECHO LVOT SV: 62.5 ML
ECHO LVOT VTI: 19.9 CM
ECHO MV AREA VTI: 2.3 CM2
ECHO MV LVOT VTI INDEX: 1.34
ECHO MV MAX VELOCITY: 1.2 M/S
ECHO MV MEAN GRADIENT: 1 MMHG
ECHO MV MEAN VELOCITY: 0.4 M/S
ECHO MV PEAK GRADIENT: 5 MMHG
ECHO MV VTI: 26.7 CM
ECHO RA VOLUME: 129 ML
ECHO RA VOLUME: 135 ML
ECHO RIGHT VENTRICULAR SYSTOLIC PRESSURE (RVSP): 33 MMHG
ECHO RV FREE WALL PEAK S': 10.9 CM/S
ECHO RV TAPSE: 1.5 CM (ref 1.7–?)
ECHO TV REGURGITANT MAX VELOCITY: 2.75 M/S
ECHO TV REGURGITANT PEAK GRADIENT: 30 MMHG
EKG DIAGNOSIS: NORMAL
EKG Q-T INTERVAL: 440 MS
EKG QRS DURATION: 146 MS
EKG QTC CALCULATION (BAZETT): 471 MS
EKG R AXIS: 0 DEGREES
EKG T AXIS: -30 DEGREES
EKG VENTRICULAR RATE: 69 BPM
EOSINOPHIL # BLD: 0.13 K/UL (ref 0–0.4)
EOSINOPHIL NFR BLD: 2.3 % (ref 0–7)
ERYTHROCYTE [DISTWIDTH] IN BLOOD BY AUTOMATED COUNT: 15.1 % (ref 11.5–14.5)
GLOBULIN SER CALC-MCNC: 3.5 G/DL (ref 2–4)
GLUCOSE BLD STRIP.AUTO-MCNC: 159 MG/DL (ref 65–117)
GLUCOSE SERPL-MCNC: 147 MG/DL (ref 65–100)
HCT VFR BLD AUTO: 28.1 % (ref 36.6–50.3)
HGB BLD-MCNC: 8.9 G/DL (ref 12.1–17)
IMM GRANULOCYTES # BLD AUTO: 0.05 K/UL (ref 0–0.04)
IMM GRANULOCYTES NFR BLD AUTO: 0.9 % (ref 0–0.5)
INR PPP: 1 (ref 0.9–1.1)
LIPASE SERPL-CCNC: 40 U/L (ref 13–60)
LYMPHOCYTES # BLD: 1.44 K/UL (ref 0.8–3.5)
LYMPHOCYTES NFR BLD: 25.4 % (ref 12–49)
MAGNESIUM SERPL-MCNC: 1.8 MG/DL (ref 1.6–2.4)
MCH RBC QN AUTO: 30.8 PG (ref 26–34)
MCHC RBC AUTO-ENTMCNC: 31.7 G/DL (ref 30–36.5)
MCV RBC AUTO: 97.2 FL (ref 80–99)
MONOCYTES # BLD: 0.53 K/UL (ref 0–1)
MONOCYTES NFR BLD: 9.4 % (ref 5–13)
NEUTS SEG # BLD: 3.49 K/UL (ref 1.8–8)
NEUTS SEG NFR BLD: 61.6 % (ref 32–75)
NRBC # BLD: 0 K/UL (ref 0–0.01)
NRBC BLD-RTO: 0 PER 100 WBC
NT PRO BNP: 4555 PG/ML (ref 0–450)
PLATELET # BLD AUTO: 220 K/UL (ref 150–400)
PMV BLD AUTO: 10 FL (ref 8.9–12.9)
POTASSIUM SERPL-SCNC: 4 MMOL/L (ref 3.5–5.1)
PROCALCITONIN SERPL-MCNC: 0.04 NG/ML
PROT SERPL-MCNC: 6.3 G/DL (ref 6.4–8.3)
PROTHROMBIN TIME: 10.9 SEC (ref 9.2–11.2)
RBC # BLD AUTO: 2.89 M/UL (ref 4.1–5.7)
SERVICE CMNT-IMP: ABNORMAL
SODIUM SERPL-SCNC: 138 MMOL/L (ref 136–145)
SPECIMEN HOLD: NORMAL
TROPONIN T SERPL HS-MCNC: 66.5 NG/L (ref 0–22)
TROPONIN T SERPL HS-MCNC: 71.2 NG/L (ref 0–22)
WBC # BLD AUTO: 5.7 K/UL (ref 4.1–11.1)

## 2025-08-22 PROCEDURE — 6370000000 HC RX 637 (ALT 250 FOR IP): Performed by: EMERGENCY MEDICINE

## 2025-08-22 PROCEDURE — 85610 PROTHROMBIN TIME: CPT

## 2025-08-22 PROCEDURE — 96376 TX/PRO/DX INJ SAME DRUG ADON: CPT

## 2025-08-22 PROCEDURE — 83690 ASSAY OF LIPASE: CPT

## 2025-08-22 PROCEDURE — 6370000000 HC RX 637 (ALT 250 FOR IP): Performed by: NURSE PRACTITIONER

## 2025-08-22 PROCEDURE — 6360000002 HC RX W HCPCS: Performed by: NURSE PRACTITIONER

## 2025-08-22 PROCEDURE — 96366 THER/PROPH/DIAG IV INF ADDON: CPT

## 2025-08-22 PROCEDURE — 83735 ASSAY OF MAGNESIUM: CPT

## 2025-08-22 PROCEDURE — 2500000003 HC RX 250 WO HCPCS: Performed by: STUDENT IN AN ORGANIZED HEALTH CARE EDUCATION/TRAINING PROGRAM

## 2025-08-22 PROCEDURE — 96372 THER/PROPH/DIAG INJ SC/IM: CPT

## 2025-08-22 PROCEDURE — G0378 HOSPITAL OBSERVATION PER HR: HCPCS

## 2025-08-22 PROCEDURE — 2500000003 HC RX 250 WO HCPCS: Performed by: NURSE PRACTITIONER

## 2025-08-22 PROCEDURE — A9540 TC99M MAA: HCPCS | Performed by: NURSE PRACTITIONER

## 2025-08-22 PROCEDURE — 93306 TTE W/DOPPLER COMPLETE: CPT

## 2025-08-22 PROCEDURE — 93010 ELECTROCARDIOGRAM REPORT: CPT | Performed by: INTERNAL MEDICINE

## 2025-08-22 PROCEDURE — 3430000000 HC RX DIAGNOSTIC RADIOPHARMACEUTICAL: Performed by: NURSE PRACTITIONER

## 2025-08-22 PROCEDURE — 84145 PROCALCITONIN (PCT): CPT

## 2025-08-22 PROCEDURE — 83880 ASSAY OF NATRIURETIC PEPTIDE: CPT

## 2025-08-22 PROCEDURE — 71045 X-RAY EXAM CHEST 1 VIEW: CPT

## 2025-08-22 PROCEDURE — 93306 TTE W/DOPPLER COMPLETE: CPT | Performed by: INTERNAL MEDICINE

## 2025-08-22 PROCEDURE — 85379 FIBRIN DEGRADATION QUANT: CPT

## 2025-08-22 PROCEDURE — 96375 TX/PRO/DX INJ NEW DRUG ADDON: CPT

## 2025-08-22 PROCEDURE — 82962 GLUCOSE BLOOD TEST: CPT

## 2025-08-22 PROCEDURE — 78580 LUNG PERFUSION IMAGING: CPT

## 2025-08-22 PROCEDURE — 96365 THER/PROPH/DIAG IV INF INIT: CPT

## 2025-08-22 PROCEDURE — 85025 COMPLETE CBC W/AUTO DIFF WBC: CPT

## 2025-08-22 PROCEDURE — 84484 ASSAY OF TROPONIN QUANT: CPT

## 2025-08-22 PROCEDURE — 2580000003 HC RX 258: Performed by: STUDENT IN AN ORGANIZED HEALTH CARE EDUCATION/TRAINING PROGRAM

## 2025-08-22 PROCEDURE — 99223 1ST HOSP IP/OBS HIGH 75: CPT | Performed by: INTERNAL MEDICINE

## 2025-08-22 PROCEDURE — 80053 COMPREHEN METABOLIC PANEL: CPT

## 2025-08-22 PROCEDURE — 93005 ELECTROCARDIOGRAM TRACING: CPT | Performed by: EMERGENCY MEDICINE

## 2025-08-22 PROCEDURE — 6360000002 HC RX W HCPCS: Performed by: STUDENT IN AN ORGANIZED HEALTH CARE EDUCATION/TRAINING PROGRAM

## 2025-08-22 PROCEDURE — 99285 EMERGENCY DEPT VISIT HI MDM: CPT

## 2025-08-22 RX ORDER — PREDNISONE 5 MG/1
10 TABLET ORAL DAILY
Status: DISCONTINUED | OUTPATIENT
Start: 2025-08-22 | End: 2025-08-28 | Stop reason: HOSPADM

## 2025-08-22 RX ORDER — PANTOPRAZOLE SODIUM 40 MG/1
40 TABLET, DELAYED RELEASE ORAL
Status: DISCONTINUED | OUTPATIENT
Start: 2025-08-23 | End: 2025-08-28 | Stop reason: HOSPADM

## 2025-08-22 RX ORDER — ACETAMINOPHEN 650 MG/1
650 SUPPOSITORY RECTAL EVERY 6 HOURS PRN
Status: DISCONTINUED | OUTPATIENT
Start: 2025-08-22 | End: 2025-08-28 | Stop reason: HOSPADM

## 2025-08-22 RX ORDER — LOSARTAN POTASSIUM 25 MG/1
25 TABLET ORAL DAILY
Status: DISCONTINUED | OUTPATIENT
Start: 2025-08-22 | End: 2025-08-28 | Stop reason: HOSPADM

## 2025-08-22 RX ORDER — ATORVASTATIN CALCIUM 40 MG/1
40 TABLET, FILM COATED ORAL NIGHTLY
Status: DISCONTINUED | OUTPATIENT
Start: 2025-08-22 | End: 2025-08-22

## 2025-08-22 RX ORDER — ACETAMINOPHEN 325 MG/1
650 TABLET ORAL EVERY 8 HOURS PRN
COMMUNITY

## 2025-08-22 RX ORDER — FUROSEMIDE 10 MG/ML
40 INJECTION INTRAMUSCULAR; INTRAVENOUS 2 TIMES DAILY
Status: DISCONTINUED | OUTPATIENT
Start: 2025-08-22 | End: 2025-08-26

## 2025-08-22 RX ORDER — PRAVASTATIN SODIUM 10 MG
20 TABLET ORAL NIGHTLY
Status: DISCONTINUED | OUTPATIENT
Start: 2025-08-22 | End: 2025-08-28 | Stop reason: HOSPADM

## 2025-08-22 RX ORDER — ACETAMINOPHEN 325 MG/1
650 TABLET ORAL
Status: COMPLETED | OUTPATIENT
Start: 2025-08-22 | End: 2025-08-22

## 2025-08-22 RX ORDER — ACETAMINOPHEN 325 MG/1
650 TABLET ORAL EVERY 6 HOURS PRN
Status: DISCONTINUED | OUTPATIENT
Start: 2025-08-22 | End: 2025-08-28 | Stop reason: HOSPADM

## 2025-08-22 RX ORDER — METOPROLOL SUCCINATE 25 MG/1
12.5 TABLET, EXTENDED RELEASE ORAL DAILY
Status: DISCONTINUED | OUTPATIENT
Start: 2025-08-22 | End: 2025-08-28 | Stop reason: HOSPADM

## 2025-08-22 RX ORDER — AMIODARONE HYDROCHLORIDE 200 MG/1
100 TABLET ORAL DAILY
Status: DISCONTINUED | OUTPATIENT
Start: 2025-08-22 | End: 2025-08-28 | Stop reason: HOSPADM

## 2025-08-22 RX ORDER — ASPIRIN 81 MG/1
81 TABLET, CHEWABLE ORAL DAILY
Status: DISCONTINUED | OUTPATIENT
Start: 2025-08-23 | End: 2025-08-28 | Stop reason: HOSPADM

## 2025-08-22 RX ORDER — ONDANSETRON 2 MG/ML
4 INJECTION INTRAMUSCULAR; INTRAVENOUS EVERY 6 HOURS PRN
Status: DISCONTINUED | OUTPATIENT
Start: 2025-08-22 | End: 2025-08-28 | Stop reason: HOSPADM

## 2025-08-22 RX ORDER — SODIUM CHLORIDE 0.9 % (FLUSH) 0.9 %
5-40 SYRINGE (ML) INJECTION EVERY 12 HOURS SCHEDULED
Status: DISCONTINUED | OUTPATIENT
Start: 2025-08-22 | End: 2025-08-28 | Stop reason: HOSPADM

## 2025-08-22 RX ORDER — SODIUM CHLORIDE 0.9 % (FLUSH) 0.9 %
5-40 SYRINGE (ML) INJECTION PRN
Status: DISCONTINUED | OUTPATIENT
Start: 2025-08-22 | End: 2025-08-28 | Stop reason: HOSPADM

## 2025-08-22 RX ORDER — NITROGLYCERIN 0.4 MG/1
0.4 TABLET SUBLINGUAL PRN
Status: DISCONTINUED | OUTPATIENT
Start: 2025-08-22 | End: 2025-08-28 | Stop reason: HOSPADM

## 2025-08-22 RX ORDER — POLYETHYLENE GLYCOL 3350 17 G/17G
17 POWDER, FOR SOLUTION ORAL DAILY PRN
Status: DISCONTINUED | OUTPATIENT
Start: 2025-08-22 | End: 2025-08-27

## 2025-08-22 RX ORDER — SODIUM CHLORIDE 9 MG/ML
INJECTION, SOLUTION INTRAVENOUS PRN
Status: DISCONTINUED | OUTPATIENT
Start: 2025-08-22 | End: 2025-08-28 | Stop reason: HOSPADM

## 2025-08-22 RX ORDER — ERYTHROMYCIN 5 MG/G
OINTMENT OPHTHALMIC EVERY 6 HOURS SCHEDULED
Status: DISCONTINUED | OUTPATIENT
Start: 2025-08-22 | End: 2025-08-28 | Stop reason: HOSPADM

## 2025-08-22 RX ORDER — FERROUS SULFATE 325(65) MG
325 TABLET ORAL EVERY OTHER DAY
Status: DISCONTINUED | OUTPATIENT
Start: 2025-08-22 | End: 2025-08-28 | Stop reason: HOSPADM

## 2025-08-22 RX ORDER — ENOXAPARIN SODIUM 100 MG/ML
40 INJECTION SUBCUTANEOUS DAILY
Status: DISCONTINUED | OUTPATIENT
Start: 2025-08-22 | End: 2025-08-22 | Stop reason: SDUPTHER

## 2025-08-22 RX ORDER — ENOXAPARIN SODIUM 100 MG/ML
30 INJECTION SUBCUTANEOUS 2 TIMES DAILY
Status: DISCONTINUED | OUTPATIENT
Start: 2025-08-22 | End: 2025-08-23

## 2025-08-22 RX ORDER — LIDOCAINE 4 G/G
1 PATCH TOPICAL DAILY
Status: DISCONTINUED | OUTPATIENT
Start: 2025-08-22 | End: 2025-08-28 | Stop reason: HOSPADM

## 2025-08-22 RX ORDER — ONDANSETRON 4 MG/1
4 TABLET, ORALLY DISINTEGRATING ORAL EVERY 8 HOURS PRN
Status: DISCONTINUED | OUTPATIENT
Start: 2025-08-22 | End: 2025-08-28 | Stop reason: HOSPADM

## 2025-08-22 RX ORDER — METOLAZONE 5 MG/1
5 TABLET ORAL DAILY
COMMUNITY

## 2025-08-22 RX ADMIN — PRAVASTATIN SODIUM 20 MG: 10 TABLET ORAL at 20:48

## 2025-08-22 RX ADMIN — ACETAMINOPHEN 650 MG: 325 TABLET ORAL at 20:47

## 2025-08-22 RX ADMIN — KIT FOR THE PREPARATION OF TECHNETIUM TC 99M ALBUMIN AGGREGATED 4.4 MILLICURIE: 2.5 INJECTION, POWDER, FOR SOLUTION INTRAVENOUS at 13:25

## 2025-08-22 RX ADMIN — Medication 3 MG: at 20:48

## 2025-08-22 RX ADMIN — ACETAMINOPHEN 650 MG: 325 TABLET ORAL at 06:44

## 2025-08-22 RX ADMIN — AMIODARONE HYDROCHLORIDE 100 MG: 200 TABLET ORAL at 16:50

## 2025-08-22 RX ADMIN — INSULIN HUMAN 20 UNITS: 100 INJECTION, SUSPENSION SUBCUTANEOUS at 19:08

## 2025-08-22 RX ADMIN — SODIUM CHLORIDE, PRESERVATIVE FREE 10 ML: 5 INJECTION INTRAVENOUS at 20:50

## 2025-08-22 RX ADMIN — WATER 1000 MG: 1 INJECTION INTRAMUSCULAR; INTRAVENOUS; SUBCUTANEOUS at 11:10

## 2025-08-22 RX ADMIN — LOSARTAN POTASSIUM 25 MG: 50 TABLET, FILM COATED ORAL at 16:51

## 2025-08-22 RX ADMIN — PREDNISONE 10 MG: 5 TABLET ORAL at 16:51

## 2025-08-22 RX ADMIN — AZITHROMYCIN MONOHYDRATE 500 MG: 500 INJECTION, POWDER, LYOPHILIZED, FOR SOLUTION INTRAVENOUS at 11:02

## 2025-08-22 RX ADMIN — FUROSEMIDE 40 MG: 10 INJECTION, SOLUTION INTRAMUSCULAR; INTRAVENOUS at 11:07

## 2025-08-22 RX ADMIN — ENOXAPARIN SODIUM 30 MG: 100 INJECTION SUBCUTANEOUS at 11:02

## 2025-08-22 RX ADMIN — ENOXAPARIN SODIUM 30 MG: 100 INJECTION SUBCUTANEOUS at 20:49

## 2025-08-22 RX ADMIN — METOPROLOL SUCCINATE 12.5 MG: 25 TABLET, EXTENDED RELEASE ORAL at 11:03

## 2025-08-22 RX ADMIN — FUROSEMIDE 40 MG: 10 INJECTION, SOLUTION INTRAMUSCULAR; INTRAVENOUS at 19:08

## 2025-08-22 ASSESSMENT — PAIN DESCRIPTION - LOCATION
LOCATION: CHEST
LOCATION: CHEST

## 2025-08-22 ASSESSMENT — PAIN SCALES - GENERAL
PAINLEVEL_OUTOF10: 7
PAINLEVEL_OUTOF10: 4
PAINLEVEL_OUTOF10: 5
PAINLEVEL_OUTOF10: 0

## 2025-08-22 ASSESSMENT — PAIN - FUNCTIONAL ASSESSMENT
PAIN_FUNCTIONAL_ASSESSMENT: 0-10
PAIN_FUNCTIONAL_ASSESSMENT: PREVENTS OR INTERFERES SOME ACTIVE ACTIVITIES AND ADLS

## 2025-08-22 ASSESSMENT — PAIN DESCRIPTION - ONSET: ONSET: AWAKENED FROM SLEEP

## 2025-08-22 ASSESSMENT — PAIN SCALES - WONG BAKER: WONGBAKER_NUMERICALRESPONSE: NO HURT

## 2025-08-22 ASSESSMENT — PAIN DESCRIPTION - ORIENTATION: ORIENTATION: RIGHT

## 2025-08-22 ASSESSMENT — PAIN DESCRIPTION - PAIN TYPE: TYPE: ACUTE PAIN

## 2025-08-22 ASSESSMENT — PAIN DESCRIPTION - FREQUENCY: FREQUENCY: CONTINUOUS

## 2025-08-22 ASSESSMENT — PAIN DESCRIPTION - DESCRIPTORS
DESCRIPTORS: SHARP;SHOOTING
DESCRIPTORS: STABBING

## 2025-08-23 LAB
ANION GAP SERPL CALC-SCNC: 10 MMOL/L (ref 2–14)
BUN SERPL-MCNC: 30 MG/DL (ref 8–23)
BUN/CREAT SERPL: 19 (ref 12–20)
CALCIUM SERPL-MCNC: 8.9 MG/DL (ref 8.8–10.2)
CHLORIDE SERPL-SCNC: 100 MMOL/L (ref 98–107)
CO2 SERPL-SCNC: 32 MMOL/L (ref 20–29)
CREAT SERPL-MCNC: 1.54 MG/DL (ref 0.7–1.2)
ERYTHROCYTE [DISTWIDTH] IN BLOOD BY AUTOMATED COUNT: 14.6 % (ref 11.5–14.5)
GLUCOSE BLD STRIP.AUTO-MCNC: 191 MG/DL (ref 65–117)
GLUCOSE BLD STRIP.AUTO-MCNC: 203 MG/DL (ref 65–117)
GLUCOSE BLD STRIP.AUTO-MCNC: 89 MG/DL (ref 65–117)
GLUCOSE SERPL-MCNC: 82 MG/DL (ref 65–100)
HCT VFR BLD AUTO: 29.3 % (ref 36.6–50.3)
HGB BLD-MCNC: 9.3 G/DL (ref 12.1–17)
MCH RBC QN AUTO: 30.9 PG (ref 26–34)
MCHC RBC AUTO-ENTMCNC: 31.7 G/DL (ref 30–36.5)
MCV RBC AUTO: 97.3 FL (ref 80–99)
NRBC # BLD: 0 K/UL (ref 0–0.01)
NRBC BLD-RTO: 0 PER 100 WBC
PLATELET # BLD AUTO: 226 K/UL (ref 150–400)
PMV BLD AUTO: 9.9 FL (ref 8.9–12.9)
POTASSIUM SERPL-SCNC: 3.7 MMOL/L (ref 3.5–5.1)
RBC # BLD AUTO: 3.01 M/UL (ref 4.1–5.7)
SERVICE CMNT-IMP: ABNORMAL
SERVICE CMNT-IMP: ABNORMAL
SERVICE CMNT-IMP: NORMAL
SODIUM SERPL-SCNC: 142 MMOL/L (ref 136–145)
WBC # BLD AUTO: 5.1 K/UL (ref 4.1–11.1)

## 2025-08-23 PROCEDURE — G0378 HOSPITAL OBSERVATION PER HR: HCPCS

## 2025-08-23 PROCEDURE — 94760 N-INVAS EAR/PLS OXIMETRY 1: CPT

## 2025-08-23 PROCEDURE — 97165 OT EVAL LOW COMPLEX 30 MIN: CPT

## 2025-08-23 PROCEDURE — 6370000000 HC RX 637 (ALT 250 FOR IP): Performed by: NURSE PRACTITIONER

## 2025-08-23 PROCEDURE — 85027 COMPLETE CBC AUTOMATED: CPT

## 2025-08-23 PROCEDURE — 97161 PT EVAL LOW COMPLEX 20 MIN: CPT | Performed by: PHYSICAL THERAPIST

## 2025-08-23 PROCEDURE — 2500000003 HC RX 250 WO HCPCS: Performed by: NURSE PRACTITIONER

## 2025-08-23 PROCEDURE — 96376 TX/PRO/DX INJ SAME DRUG ADON: CPT

## 2025-08-23 PROCEDURE — 6360000002 HC RX W HCPCS: Performed by: STUDENT IN AN ORGANIZED HEALTH CARE EDUCATION/TRAINING PROGRAM

## 2025-08-23 PROCEDURE — 2580000003 HC RX 258: Performed by: NURSE PRACTITIONER

## 2025-08-23 PROCEDURE — 82962 GLUCOSE BLOOD TEST: CPT

## 2025-08-23 PROCEDURE — 6360000002 HC RX W HCPCS: Performed by: NURSE PRACTITIONER

## 2025-08-23 PROCEDURE — 80048 BASIC METABOLIC PNL TOTAL CA: CPT

## 2025-08-23 PROCEDURE — 96366 THER/PROPH/DIAG IV INF ADDON: CPT

## 2025-08-23 PROCEDURE — 97116 GAIT TRAINING THERAPY: CPT | Performed by: PHYSICAL THERAPIST

## 2025-08-23 PROCEDURE — 96372 THER/PROPH/DIAG INJ SC/IM: CPT

## 2025-08-23 PROCEDURE — 97535 SELF CARE MNGMENT TRAINING: CPT

## 2025-08-23 PROCEDURE — 6370000000 HC RX 637 (ALT 250 FOR IP): Performed by: STUDENT IN AN ORGANIZED HEALTH CARE EDUCATION/TRAINING PROGRAM

## 2025-08-23 RX ORDER — ENOXAPARIN SODIUM 100 MG/ML
40 INJECTION SUBCUTANEOUS DAILY
Status: DISCONTINUED | OUTPATIENT
Start: 2025-08-23 | End: 2025-08-28 | Stop reason: HOSPADM

## 2025-08-23 RX ADMIN — FUROSEMIDE 40 MG: 10 INJECTION, SOLUTION INTRAMUSCULAR; INTRAVENOUS at 10:39

## 2025-08-23 RX ADMIN — SODIUM CHLORIDE, PRESERVATIVE FREE 10 ML: 5 INJECTION INTRAVENOUS at 10:40

## 2025-08-23 RX ADMIN — LOSARTAN POTASSIUM 25 MG: 50 TABLET, FILM COATED ORAL at 10:39

## 2025-08-23 RX ADMIN — FUROSEMIDE 40 MG: 10 INJECTION, SOLUTION INTRAMUSCULAR; INTRAVENOUS at 17:56

## 2025-08-23 RX ADMIN — ENOXAPARIN SODIUM 40 MG: 100 INJECTION SUBCUTANEOUS at 10:38

## 2025-08-23 RX ADMIN — METOPROLOL SUCCINATE 12.5 MG: 25 TABLET, EXTENDED RELEASE ORAL at 10:41

## 2025-08-23 RX ADMIN — ERYTHROMYCIN: 5 OINTMENT OPHTHALMIC at 00:30

## 2025-08-23 RX ADMIN — AMIODARONE HYDROCHLORIDE 100 MG: 200 TABLET ORAL at 10:40

## 2025-08-23 RX ADMIN — PREDNISONE 10 MG: 5 TABLET ORAL at 10:39

## 2025-08-23 RX ADMIN — INSULIN HUMAN 20 UNITS: 100 INJECTION, SUSPENSION SUBCUTANEOUS at 10:38

## 2025-08-23 RX ADMIN — Medication 3 MG: at 20:52

## 2025-08-23 RX ADMIN — INSULIN HUMAN 20 UNITS: 100 INJECTION, SUSPENSION SUBCUTANEOUS at 17:56

## 2025-08-23 RX ADMIN — ERYTHROMYCIN: 5 OINTMENT OPHTHALMIC at 05:30

## 2025-08-23 RX ADMIN — NITROGLYCERIN 0.4 MG: 0.4 TABLET, ORALLY DISINTEGRATING SUBLINGUAL at 01:20

## 2025-08-23 RX ADMIN — ERYTHROMYCIN: 5 OINTMENT OPHTHALMIC at 17:56

## 2025-08-23 RX ADMIN — PANTOPRAZOLE SODIUM 40 MG: 40 TABLET, DELAYED RELEASE ORAL at 08:27

## 2025-08-23 RX ADMIN — PRAVASTATIN SODIUM 20 MG: 10 TABLET ORAL at 20:52

## 2025-08-23 RX ADMIN — ERYTHROMYCIN: 5 OINTMENT OPHTHALMIC at 12:49

## 2025-08-23 RX ADMIN — SODIUM CHLORIDE, PRESERVATIVE FREE 10 ML: 5 INJECTION INTRAVENOUS at 20:52

## 2025-08-23 RX ADMIN — ASPIRIN 81 MG CHEWABLE TABLET 81 MG: 81 TABLET CHEWABLE at 10:39

## 2025-08-23 RX ADMIN — WATER 1000 MG: 1 INJECTION INTRAMUSCULAR; INTRAVENOUS; SUBCUTANEOUS at 08:28

## 2025-08-23 RX ADMIN — AZITHROMYCIN MONOHYDRATE 500 MG: 500 INJECTION, POWDER, LYOPHILIZED, FOR SOLUTION INTRAVENOUS at 08:34

## 2025-08-23 ASSESSMENT — PAIN DESCRIPTION - DESCRIPTORS: DESCRIPTORS: SHARP;SHOOTING

## 2025-08-23 ASSESSMENT — PAIN SCALES - GENERAL
PAINLEVEL_OUTOF10: 0

## 2025-08-23 ASSESSMENT — PAIN DESCRIPTION - LOCATION: LOCATION: CHEST

## 2025-08-23 ASSESSMENT — PAIN - FUNCTIONAL ASSESSMENT: PAIN_FUNCTIONAL_ASSESSMENT: 0-10

## 2025-08-23 ASSESSMENT — PAIN DESCRIPTION - ORIENTATION: ORIENTATION: RIGHT

## 2025-08-24 LAB
ANION GAP SERPL CALC-SCNC: 9 MMOL/L (ref 2–14)
BASOPHILS # BLD: 0.01 K/UL (ref 0–0.1)
BASOPHILS NFR BLD: 0.2 % (ref 0–1)
BUN SERPL-MCNC: 28 MG/DL (ref 8–23)
BUN/CREAT SERPL: 20 (ref 12–20)
CALCIUM SERPL-MCNC: 8.6 MG/DL (ref 8.8–10.2)
CHLORIDE SERPL-SCNC: 101 MMOL/L (ref 98–107)
CO2 SERPL-SCNC: 30 MMOL/L (ref 20–29)
CREAT SERPL-MCNC: 1.45 MG/DL (ref 0.7–1.2)
DIFFERENTIAL METHOD BLD: ABNORMAL
EOSINOPHIL # BLD: 0.11 K/UL (ref 0–0.4)
EOSINOPHIL NFR BLD: 2.3 % (ref 0–7)
ERYTHROCYTE [DISTWIDTH] IN BLOOD BY AUTOMATED COUNT: 14.6 % (ref 11.5–14.5)
GLUCOSE BLD STRIP.AUTO-MCNC: 130 MG/DL (ref 65–117)
GLUCOSE BLD STRIP.AUTO-MCNC: 209 MG/DL (ref 65–117)
GLUCOSE BLD STRIP.AUTO-MCNC: 211 MG/DL (ref 65–117)
GLUCOSE SERPL-MCNC: 126 MG/DL (ref 65–100)
HCT VFR BLD AUTO: 27.3 % (ref 36.6–50.3)
HGB BLD-MCNC: 8.8 G/DL (ref 12.1–17)
IMM GRANULOCYTES # BLD AUTO: 0.03 K/UL (ref 0–0.04)
IMM GRANULOCYTES NFR BLD AUTO: 0.6 % (ref 0–0.5)
LYMPHOCYTES # BLD: 1.09 K/UL (ref 0.8–3.5)
LYMPHOCYTES NFR BLD: 23.1 % (ref 12–49)
MCH RBC QN AUTO: 30.9 PG (ref 26–34)
MCHC RBC AUTO-ENTMCNC: 32.2 G/DL (ref 30–36.5)
MCV RBC AUTO: 95.8 FL (ref 80–99)
MONOCYTES # BLD: 0.48 K/UL (ref 0–1)
MONOCYTES NFR BLD: 10.2 % (ref 5–13)
NEUTS SEG # BLD: 2.99 K/UL (ref 1.8–8)
NEUTS SEG NFR BLD: 63.6 % (ref 32–75)
NRBC # BLD: 0 K/UL (ref 0–0.01)
NRBC BLD-RTO: 0 PER 100 WBC
PLATELET # BLD AUTO: 220 K/UL (ref 150–400)
PMV BLD AUTO: 9.4 FL (ref 8.9–12.9)
POTASSIUM SERPL-SCNC: 3.4 MMOL/L (ref 3.5–5.1)
RBC # BLD AUTO: 2.85 M/UL (ref 4.1–5.7)
SERVICE CMNT-IMP: ABNORMAL
SODIUM SERPL-SCNC: 140 MMOL/L (ref 136–145)
WBC # BLD AUTO: 4.7 K/UL (ref 4.1–11.1)

## 2025-08-24 PROCEDURE — 80048 BASIC METABOLIC PNL TOTAL CA: CPT

## 2025-08-24 PROCEDURE — 96366 THER/PROPH/DIAG IV INF ADDON: CPT

## 2025-08-24 PROCEDURE — 6370000000 HC RX 637 (ALT 250 FOR IP): Performed by: NURSE PRACTITIONER

## 2025-08-24 PROCEDURE — G0378 HOSPITAL OBSERVATION PER HR: HCPCS

## 2025-08-24 PROCEDURE — 2500000003 HC RX 250 WO HCPCS: Performed by: NURSE PRACTITIONER

## 2025-08-24 PROCEDURE — 94760 N-INVAS EAR/PLS OXIMETRY 1: CPT

## 2025-08-24 PROCEDURE — 6370000000 HC RX 637 (ALT 250 FOR IP): Performed by: STUDENT IN AN ORGANIZED HEALTH CARE EDUCATION/TRAINING PROGRAM

## 2025-08-24 PROCEDURE — 96376 TX/PRO/DX INJ SAME DRUG ADON: CPT

## 2025-08-24 PROCEDURE — 85025 COMPLETE CBC W/AUTO DIFF WBC: CPT

## 2025-08-24 PROCEDURE — 82962 GLUCOSE BLOOD TEST: CPT

## 2025-08-24 PROCEDURE — 96372 THER/PROPH/DIAG INJ SC/IM: CPT

## 2025-08-24 PROCEDURE — 6360000002 HC RX W HCPCS: Performed by: STUDENT IN AN ORGANIZED HEALTH CARE EDUCATION/TRAINING PROGRAM

## 2025-08-24 PROCEDURE — 6360000002 HC RX W HCPCS: Performed by: NURSE PRACTITIONER

## 2025-08-24 PROCEDURE — 2580000003 HC RX 258: Performed by: NURSE PRACTITIONER

## 2025-08-24 RX ORDER — NIFEDIPINE 30 MG/1
30 TABLET, EXTENDED RELEASE ORAL DAILY
Status: DISCONTINUED | OUTPATIENT
Start: 2025-08-24 | End: 2025-08-28 | Stop reason: HOSPADM

## 2025-08-24 RX ADMIN — Medication 3 MG: at 21:30

## 2025-08-24 RX ADMIN — NIFEDIPINE 30 MG: 30 TABLET, FILM COATED, EXTENDED RELEASE ORAL at 11:30

## 2025-08-24 RX ADMIN — ERYTHROMYCIN: 5 OINTMENT OPHTHALMIC at 00:08

## 2025-08-24 RX ADMIN — SODIUM CHLORIDE, PRESERVATIVE FREE 10 ML: 5 INJECTION INTRAVENOUS at 21:31

## 2025-08-24 RX ADMIN — AMIODARONE HYDROCHLORIDE 100 MG: 200 TABLET ORAL at 08:54

## 2025-08-24 RX ADMIN — LOSARTAN POTASSIUM 25 MG: 50 TABLET, FILM COATED ORAL at 08:54

## 2025-08-24 RX ADMIN — ERYTHROMYCIN: 5 OINTMENT OPHTHALMIC at 07:12

## 2025-08-24 RX ADMIN — SODIUM CHLORIDE, PRESERVATIVE FREE 10 ML: 5 INJECTION INTRAVENOUS at 08:55

## 2025-08-24 RX ADMIN — ACETAMINOPHEN 650 MG: 325 TABLET ORAL at 07:10

## 2025-08-24 RX ADMIN — AZITHROMYCIN MONOHYDRATE 500 MG: 500 INJECTION, POWDER, LYOPHILIZED, FOR SOLUTION INTRAVENOUS at 09:03

## 2025-08-24 RX ADMIN — PRAVASTATIN SODIUM 20 MG: 10 TABLET ORAL at 21:30

## 2025-08-24 RX ADMIN — FERROUS SULFATE TAB 325 MG (65 MG ELEMENTAL FE) 325 MG: 325 (65 FE) TAB at 08:55

## 2025-08-24 RX ADMIN — ASPIRIN 81 MG CHEWABLE TABLET 81 MG: 81 TABLET CHEWABLE at 08:54

## 2025-08-24 RX ADMIN — METOPROLOL SUCCINATE 12.5 MG: 25 TABLET, EXTENDED RELEASE ORAL at 08:54

## 2025-08-24 RX ADMIN — ERYTHROMYCIN: 5 OINTMENT OPHTHALMIC at 17:43

## 2025-08-24 RX ADMIN — PANTOPRAZOLE SODIUM 40 MG: 40 TABLET, DELAYED RELEASE ORAL at 07:10

## 2025-08-24 RX ADMIN — PREDNISONE 10 MG: 5 TABLET ORAL at 08:54

## 2025-08-24 RX ADMIN — FUROSEMIDE 40 MG: 10 INJECTION, SOLUTION INTRAMUSCULAR; INTRAVENOUS at 17:38

## 2025-08-24 RX ADMIN — INSULIN HUMAN 20 UNITS: 100 INJECTION, SUSPENSION SUBCUTANEOUS at 07:11

## 2025-08-24 RX ADMIN — WATER 1000 MG: 1 INJECTION INTRAMUSCULAR; INTRAVENOUS; SUBCUTANEOUS at 08:54

## 2025-08-24 RX ADMIN — ACETAMINOPHEN 650 MG: 325 TABLET ORAL at 21:30

## 2025-08-24 RX ADMIN — FUROSEMIDE 40 MG: 10 INJECTION, SOLUTION INTRAMUSCULAR; INTRAVENOUS at 08:54

## 2025-08-24 RX ADMIN — INSULIN HUMAN 20 UNITS: 100 INJECTION, SUSPENSION SUBCUTANEOUS at 17:39

## 2025-08-24 RX ADMIN — ENOXAPARIN SODIUM 40 MG: 100 INJECTION SUBCUTANEOUS at 08:53

## 2025-08-24 ASSESSMENT — PAIN DESCRIPTION - LOCATION
LOCATION: NECK
LOCATION: NECK

## 2025-08-24 ASSESSMENT — PAIN SCALES - GENERAL
PAINLEVEL_OUTOF10: 3
PAINLEVEL_OUTOF10: 6
PAINLEVEL_OUTOF10: 5

## 2025-08-24 ASSESSMENT — PAIN - FUNCTIONAL ASSESSMENT
PAIN_FUNCTIONAL_ASSESSMENT: 0-10

## 2025-08-25 LAB
ANION GAP SERPL CALC-SCNC: 11 MMOL/L (ref 2–14)
BASOPHILS # BLD: 0.02 K/UL (ref 0–0.1)
BASOPHILS NFR BLD: 0.3 % (ref 0–1)
BUN SERPL-MCNC: 31 MG/DL (ref 8–23)
BUN/CREAT SERPL: 22 (ref 12–20)
CALCIUM SERPL-MCNC: 8.7 MG/DL (ref 8.8–10.2)
CHLORIDE SERPL-SCNC: 102 MMOL/L (ref 98–107)
CO2 SERPL-SCNC: 30 MMOL/L (ref 20–29)
CREAT SERPL-MCNC: 1.4 MG/DL (ref 0.7–1.2)
DIFFERENTIAL METHOD BLD: ABNORMAL
EOSINOPHIL # BLD: 0.15 K/UL (ref 0–0.4)
EOSINOPHIL NFR BLD: 2.5 % (ref 0–7)
ERYTHROCYTE [DISTWIDTH] IN BLOOD BY AUTOMATED COUNT: 14.7 % (ref 11.5–14.5)
GLUCOSE BLD STRIP.AUTO-MCNC: 132 MG/DL (ref 65–117)
GLUCOSE BLD STRIP.AUTO-MCNC: 140 MG/DL (ref 65–117)
GLUCOSE BLD STRIP.AUTO-MCNC: 174 MG/DL (ref 65–117)
GLUCOSE BLD STRIP.AUTO-MCNC: 209 MG/DL (ref 65–117)
GLUCOSE SERPL-MCNC: 108 MG/DL (ref 65–100)
HCT VFR BLD AUTO: 27.7 % (ref 36.6–50.3)
HGB BLD-MCNC: 8.9 G/DL (ref 12.1–17)
IMM GRANULOCYTES # BLD AUTO: 0.03 K/UL (ref 0–0.04)
IMM GRANULOCYTES NFR BLD AUTO: 0.5 % (ref 0–0.5)
LYMPHOCYTES # BLD: 1.7 K/UL (ref 0.8–3.5)
LYMPHOCYTES NFR BLD: 28.1 % (ref 12–49)
MCH RBC QN AUTO: 30.4 PG (ref 26–34)
MCHC RBC AUTO-ENTMCNC: 32.1 G/DL (ref 30–36.5)
MCV RBC AUTO: 94.5 FL (ref 80–99)
MONOCYTES # BLD: 0.57 K/UL (ref 0–1)
MONOCYTES NFR BLD: 9.4 % (ref 5–13)
NEUTS SEG # BLD: 3.59 K/UL (ref 1.8–8)
NEUTS SEG NFR BLD: 59.2 % (ref 32–75)
NRBC # BLD: 0 K/UL (ref 0–0.01)
NRBC BLD-RTO: 0 PER 100 WBC
PLATELET # BLD AUTO: 242 K/UL (ref 150–400)
PMV BLD AUTO: 9.6 FL (ref 8.9–12.9)
POTASSIUM SERPL-SCNC: 3.4 MMOL/L (ref 3.5–5.1)
RBC # BLD AUTO: 2.93 M/UL (ref 4.1–5.7)
SERVICE CMNT-IMP: ABNORMAL
SODIUM SERPL-SCNC: 142 MMOL/L (ref 136–145)
WBC # BLD AUTO: 6.1 K/UL (ref 4.1–11.1)

## 2025-08-25 PROCEDURE — 6360000002 HC RX W HCPCS: Performed by: NURSE PRACTITIONER

## 2025-08-25 PROCEDURE — 6370000000 HC RX 637 (ALT 250 FOR IP): Performed by: STUDENT IN AN ORGANIZED HEALTH CARE EDUCATION/TRAINING PROGRAM

## 2025-08-25 PROCEDURE — 97116 GAIT TRAINING THERAPY: CPT

## 2025-08-25 PROCEDURE — 97530 THERAPEUTIC ACTIVITIES: CPT

## 2025-08-25 PROCEDURE — 80048 BASIC METABOLIC PNL TOTAL CA: CPT

## 2025-08-25 PROCEDURE — 6370000000 HC RX 637 (ALT 250 FOR IP): Performed by: NURSE PRACTITIONER

## 2025-08-25 PROCEDURE — 97535 SELF CARE MNGMENT TRAINING: CPT

## 2025-08-25 PROCEDURE — 2500000003 HC RX 250 WO HCPCS: Performed by: NURSE PRACTITIONER

## 2025-08-25 PROCEDURE — 1100000000 HC RM PRIVATE

## 2025-08-25 PROCEDURE — 85025 COMPLETE CBC W/AUTO DIFF WBC: CPT

## 2025-08-25 PROCEDURE — 82962 GLUCOSE BLOOD TEST: CPT

## 2025-08-25 PROCEDURE — 6360000002 HC RX W HCPCS: Performed by: STUDENT IN AN ORGANIZED HEALTH CARE EDUCATION/TRAINING PROGRAM

## 2025-08-25 RX ORDER — DEXTROSE MONOHYDRATE 100 MG/ML
INJECTION, SOLUTION INTRAVENOUS CONTINUOUS PRN
Status: DISCONTINUED | OUTPATIENT
Start: 2025-08-25 | End: 2025-08-28 | Stop reason: HOSPADM

## 2025-08-25 RX ORDER — ERYTHROMYCIN 5 MG/G
OINTMENT OPHTHALMIC
Qty: 1 G | Refills: 0 | Status: SHIPPED
Start: 2025-08-25

## 2025-08-25 RX ORDER — CEFUROXIME AXETIL 250 MG/1
250 TABLET ORAL 2 TIMES DAILY
Qty: 4 TABLET | Refills: 0 | Status: SHIPPED
Start: 2025-08-25 | End: 2025-08-27

## 2025-08-25 RX ORDER — POTASSIUM CHLORIDE 750 MG/1
40 TABLET, EXTENDED RELEASE ORAL ONCE
Status: COMPLETED | OUTPATIENT
Start: 2025-08-25 | End: 2025-08-25

## 2025-08-25 RX ORDER — ASPIRIN 81 MG/1
81 TABLET, CHEWABLE ORAL DAILY
Qty: 30 TABLET | Refills: 0 | Status: SHIPPED
Start: 2025-08-25 | End: 2025-09-24

## 2025-08-25 RX ORDER — NIFEDIPINE 30 MG/1
30 TABLET, EXTENDED RELEASE ORAL DAILY
Qty: 30 TABLET | Refills: 0 | Status: SHIPPED
Start: 2025-08-25 | End: 2025-09-24

## 2025-08-25 RX ADMIN — INSULIN HUMAN 20 UNITS: 100 INJECTION, SUSPENSION SUBCUTANEOUS at 06:25

## 2025-08-25 RX ADMIN — FUROSEMIDE 40 MG: 10 INJECTION, SOLUTION INTRAMUSCULAR; INTRAVENOUS at 17:44

## 2025-08-25 RX ADMIN — ERYTHROMYCIN: 5 OINTMENT OPHTHALMIC at 06:30

## 2025-08-25 RX ADMIN — INSULIN HUMAN 20 UNITS: 100 INJECTION, SUSPENSION SUBCUTANEOUS at 17:44

## 2025-08-25 RX ADMIN — NIFEDIPINE 30 MG: 30 TABLET, FILM COATED, EXTENDED RELEASE ORAL at 10:00

## 2025-08-25 RX ADMIN — LOSARTAN POTASSIUM 25 MG: 50 TABLET, FILM COATED ORAL at 09:59

## 2025-08-25 RX ADMIN — PRAVASTATIN SODIUM 20 MG: 10 TABLET ORAL at 21:56

## 2025-08-25 RX ADMIN — ERYTHROMYCIN: 5 OINTMENT OPHTHALMIC at 00:00

## 2025-08-25 RX ADMIN — ASPIRIN 81 MG CHEWABLE TABLET 81 MG: 81 TABLET CHEWABLE at 09:59

## 2025-08-25 RX ADMIN — METOPROLOL SUCCINATE 12.5 MG: 25 TABLET, EXTENDED RELEASE ORAL at 10:30

## 2025-08-25 RX ADMIN — SODIUM CHLORIDE, PRESERVATIVE FREE 10 ML: 5 INJECTION INTRAVENOUS at 10:33

## 2025-08-25 RX ADMIN — Medication 3 MG: at 21:56

## 2025-08-25 RX ADMIN — PANTOPRAZOLE SODIUM 40 MG: 40 TABLET, DELAYED RELEASE ORAL at 06:25

## 2025-08-25 RX ADMIN — POTASSIUM CHLORIDE 40 MEQ: 750 TABLET, FILM COATED, EXTENDED RELEASE ORAL at 12:15

## 2025-08-25 RX ADMIN — PREDNISONE 10 MG: 5 TABLET ORAL at 09:58

## 2025-08-25 RX ADMIN — FUROSEMIDE 40 MG: 10 INJECTION, SOLUTION INTRAMUSCULAR; INTRAVENOUS at 09:57

## 2025-08-25 RX ADMIN — ENOXAPARIN SODIUM 40 MG: 100 INJECTION SUBCUTANEOUS at 09:56

## 2025-08-25 RX ADMIN — SODIUM CHLORIDE, PRESERVATIVE FREE 10 ML: 5 INJECTION INTRAVENOUS at 21:57

## 2025-08-25 RX ADMIN — WATER 1000 MG: 1 INJECTION INTRAMUSCULAR; INTRAVENOUS; SUBCUTANEOUS at 09:58

## 2025-08-25 RX ADMIN — AMIODARONE HYDROCHLORIDE 100 MG: 200 TABLET ORAL at 10:00

## 2025-08-25 ASSESSMENT — PAIN SCALES - GENERAL: PAINLEVEL_OUTOF10: 0

## 2025-08-26 LAB
ANION GAP SERPL CALC-SCNC: 10 MMOL/L (ref 2–14)
BASOPHILS # BLD: 0.03 K/UL (ref 0–0.1)
BASOPHILS NFR BLD: 0.5 % (ref 0–1)
BUN SERPL-MCNC: 28 MG/DL (ref 8–23)
BUN/CREAT SERPL: 16 (ref 12–20)
CALCIUM SERPL-MCNC: 8.5 MG/DL (ref 8.8–10.2)
CHLORIDE SERPL-SCNC: 102 MMOL/L (ref 98–107)
CO2 SERPL-SCNC: 29 MMOL/L (ref 20–29)
CREAT SERPL-MCNC: 1.67 MG/DL (ref 0.7–1.2)
DIFFERENTIAL METHOD BLD: ABNORMAL
EOSINOPHIL # BLD: 0.16 K/UL (ref 0–0.4)
EOSINOPHIL NFR BLD: 2.7 % (ref 0–7)
ERYTHROCYTE [DISTWIDTH] IN BLOOD BY AUTOMATED COUNT: 14.6 % (ref 11.5–14.5)
GLUCOSE BLD STRIP.AUTO-MCNC: 102 MG/DL (ref 65–117)
GLUCOSE BLD STRIP.AUTO-MCNC: 251 MG/DL (ref 65–117)
GLUCOSE BLD STRIP.AUTO-MCNC: 273 MG/DL (ref 65–117)
GLUCOSE BLD STRIP.AUTO-MCNC: 283 MG/DL (ref 65–117)
GLUCOSE SERPL-MCNC: 76 MG/DL (ref 65–100)
HCT VFR BLD AUTO: 28.4 % (ref 36.6–50.3)
HGB BLD-MCNC: 9.2 G/DL (ref 12.1–17)
IMM GRANULOCYTES # BLD AUTO: 0.04 K/UL (ref 0–0.04)
IMM GRANULOCYTES NFR BLD AUTO: 0.7 % (ref 0–0.5)
LYMPHOCYTES # BLD: 1.42 K/UL (ref 0.8–3.5)
LYMPHOCYTES NFR BLD: 24.1 % (ref 12–49)
MCH RBC QN AUTO: 30.8 PG (ref 26–34)
MCHC RBC AUTO-ENTMCNC: 32.4 G/DL (ref 30–36.5)
MCV RBC AUTO: 95 FL (ref 80–99)
MONOCYTES # BLD: 0.61 K/UL (ref 0–1)
MONOCYTES NFR BLD: 10.4 % (ref 5–13)
NEUTS SEG # BLD: 3.63 K/UL (ref 1.8–8)
NEUTS SEG NFR BLD: 61.6 % (ref 32–75)
NRBC # BLD: 0 K/UL (ref 0–0.01)
NRBC BLD-RTO: 0 PER 100 WBC
PLATELET # BLD AUTO: 240 K/UL (ref 150–400)
PMV BLD AUTO: 9.6 FL (ref 8.9–12.9)
POTASSIUM SERPL-SCNC: 3.5 MMOL/L (ref 3.5–5.1)
RBC # BLD AUTO: 2.99 M/UL (ref 4.1–5.7)
SERVICE CMNT-IMP: ABNORMAL
SERVICE CMNT-IMP: NORMAL
SODIUM SERPL-SCNC: 140 MMOL/L (ref 136–145)
WBC # BLD AUTO: 5.9 K/UL (ref 4.1–11.1)

## 2025-08-26 PROCEDURE — 6370000000 HC RX 637 (ALT 250 FOR IP): Performed by: NURSE PRACTITIONER

## 2025-08-26 PROCEDURE — 6370000000 HC RX 637 (ALT 250 FOR IP): Performed by: STUDENT IN AN ORGANIZED HEALTH CARE EDUCATION/TRAINING PROGRAM

## 2025-08-26 PROCEDURE — 82962 GLUCOSE BLOOD TEST: CPT

## 2025-08-26 PROCEDURE — 99233 SBSQ HOSP IP/OBS HIGH 50: CPT | Performed by: INTERNAL MEDICINE

## 2025-08-26 PROCEDURE — 97535 SELF CARE MNGMENT TRAINING: CPT

## 2025-08-26 PROCEDURE — 6360000002 HC RX W HCPCS: Performed by: NURSE PRACTITIONER

## 2025-08-26 PROCEDURE — 6360000002 HC RX W HCPCS: Performed by: STUDENT IN AN ORGANIZED HEALTH CARE EDUCATION/TRAINING PROGRAM

## 2025-08-26 PROCEDURE — 85025 COMPLETE CBC W/AUTO DIFF WBC: CPT

## 2025-08-26 PROCEDURE — 80048 BASIC METABOLIC PNL TOTAL CA: CPT

## 2025-08-26 PROCEDURE — 1100000000 HC RM PRIVATE

## 2025-08-26 PROCEDURE — 2500000003 HC RX 250 WO HCPCS: Performed by: NURSE PRACTITIONER

## 2025-08-26 RX ORDER — FUROSEMIDE 40 MG/1
40 TABLET ORAL 2 TIMES DAILY
Status: DISCONTINUED | OUTPATIENT
Start: 2025-08-26 | End: 2025-08-28 | Stop reason: HOSPADM

## 2025-08-26 RX ADMIN — WATER 1000 MG: 1 INJECTION INTRAMUSCULAR; INTRAVENOUS; SUBCUTANEOUS at 11:18

## 2025-08-26 RX ADMIN — PRAVASTATIN SODIUM 20 MG: 10 TABLET ORAL at 20:31

## 2025-08-26 RX ADMIN — INSULIN HUMAN 20 UNITS: 100 INJECTION, SUSPENSION SUBCUTANEOUS at 17:23

## 2025-08-26 RX ADMIN — FERROUS SULFATE TAB 325 MG (65 MG ELEMENTAL FE) 325 MG: 325 (65 FE) TAB at 09:51

## 2025-08-26 RX ADMIN — LOSARTAN POTASSIUM 25 MG: 50 TABLET, FILM COATED ORAL at 09:52

## 2025-08-26 RX ADMIN — ENOXAPARIN SODIUM 40 MG: 100 INJECTION SUBCUTANEOUS at 09:49

## 2025-08-26 RX ADMIN — PANTOPRAZOLE SODIUM 40 MG: 40 TABLET, DELAYED RELEASE ORAL at 06:34

## 2025-08-26 RX ADMIN — ASPIRIN 81 MG CHEWABLE TABLET 81 MG: 81 TABLET CHEWABLE at 09:50

## 2025-08-26 RX ADMIN — NIFEDIPINE 30 MG: 30 TABLET, FILM COATED, EXTENDED RELEASE ORAL at 09:50

## 2025-08-26 RX ADMIN — METOPROLOL SUCCINATE 12.5 MG: 25 TABLET, EXTENDED RELEASE ORAL at 09:51

## 2025-08-26 RX ADMIN — FUROSEMIDE 40 MG: 40 TABLET ORAL at 19:08

## 2025-08-26 RX ADMIN — SODIUM CHLORIDE, PRESERVATIVE FREE 10 ML: 5 INJECTION INTRAVENOUS at 20:32

## 2025-08-26 RX ADMIN — INSULIN HUMAN 20 UNITS: 100 INJECTION, SUSPENSION SUBCUTANEOUS at 08:03

## 2025-08-26 RX ADMIN — SODIUM CHLORIDE, PRESERVATIVE FREE 10 ML: 5 INJECTION INTRAVENOUS at 09:58

## 2025-08-26 RX ADMIN — FUROSEMIDE 40 MG: 40 TABLET ORAL at 09:51

## 2025-08-26 RX ADMIN — AMIODARONE HYDROCHLORIDE 100 MG: 200 TABLET ORAL at 09:50

## 2025-08-26 RX ADMIN — Medication 3 MG: at 20:31

## 2025-08-26 RX ADMIN — PREDNISONE 10 MG: 5 TABLET ORAL at 09:52

## 2025-08-27 LAB
ANION GAP SERPL CALC-SCNC: 10 MMOL/L (ref 2–14)
BASOPHILS # BLD: 0.02 K/UL (ref 0–0.1)
BASOPHILS NFR BLD: 0.4 % (ref 0–1)
BUN SERPL-MCNC: 29 MG/DL (ref 8–23)
BUN/CREAT SERPL: 19 (ref 12–20)
CALCIUM SERPL-MCNC: 8.4 MG/DL (ref 8.8–10.2)
CHLORIDE SERPL-SCNC: 104 MMOL/L (ref 98–107)
CO2 SERPL-SCNC: 28 MMOL/L (ref 20–29)
CREAT SERPL-MCNC: 1.5 MG/DL (ref 0.7–1.2)
DIFFERENTIAL METHOD BLD: ABNORMAL
EOSINOPHIL # BLD: 0.16 K/UL (ref 0–0.4)
EOSINOPHIL NFR BLD: 2.9 % (ref 0–7)
ERYTHROCYTE [DISTWIDTH] IN BLOOD BY AUTOMATED COUNT: 14.4 % (ref 11.5–14.5)
GLUCOSE BLD STRIP.AUTO-MCNC: 111 MG/DL (ref 65–117)
GLUCOSE BLD STRIP.AUTO-MCNC: 189 MG/DL (ref 65–117)
GLUCOSE BLD STRIP.AUTO-MCNC: 251 MG/DL (ref 65–117)
GLUCOSE BLD STRIP.AUTO-MCNC: 326 MG/DL (ref 65–117)
GLUCOSE SERPL-MCNC: 87 MG/DL (ref 65–100)
HCT VFR BLD AUTO: 27.2 % (ref 36.6–50.3)
HGB BLD-MCNC: 8.9 G/DL (ref 12.1–17)
IMM GRANULOCYTES # BLD AUTO: 0.04 K/UL (ref 0–0.04)
IMM GRANULOCYTES NFR BLD AUTO: 0.7 % (ref 0–0.5)
LYMPHOCYTES # BLD: 1.45 K/UL (ref 0.8–3.5)
LYMPHOCYTES NFR BLD: 26.3 % (ref 12–49)
MCH RBC QN AUTO: 30.8 PG (ref 26–34)
MCHC RBC AUTO-ENTMCNC: 32.7 G/DL (ref 30–36.5)
MCV RBC AUTO: 94.1 FL (ref 80–99)
MONOCYTES # BLD: 0.54 K/UL (ref 0–1)
MONOCYTES NFR BLD: 9.8 % (ref 5–13)
NEUTS SEG # BLD: 3.31 K/UL (ref 1.8–8)
NEUTS SEG NFR BLD: 59.9 % (ref 32–75)
NRBC # BLD: 0 K/UL (ref 0–0.01)
NRBC BLD-RTO: 0 PER 100 WBC
PLATELET # BLD AUTO: 219 K/UL (ref 150–400)
PMV BLD AUTO: 9.3 FL (ref 8.9–12.9)
POTASSIUM SERPL-SCNC: 3.5 MMOL/L (ref 3.5–5.1)
RBC # BLD AUTO: 2.89 M/UL (ref 4.1–5.7)
SERVICE CMNT-IMP: ABNORMAL
SERVICE CMNT-IMP: NORMAL
SODIUM SERPL-SCNC: 142 MMOL/L (ref 136–145)
WBC # BLD AUTO: 5.5 K/UL (ref 4.1–11.1)

## 2025-08-27 PROCEDURE — 6370000000 HC RX 637 (ALT 250 FOR IP): Performed by: STUDENT IN AN ORGANIZED HEALTH CARE EDUCATION/TRAINING PROGRAM

## 2025-08-27 PROCEDURE — 82962 GLUCOSE BLOOD TEST: CPT

## 2025-08-27 PROCEDURE — 6370000000 HC RX 637 (ALT 250 FOR IP): Performed by: NURSE PRACTITIONER

## 2025-08-27 PROCEDURE — 6360000002 HC RX W HCPCS: Performed by: STUDENT IN AN ORGANIZED HEALTH CARE EDUCATION/TRAINING PROGRAM

## 2025-08-27 PROCEDURE — 1100000000 HC RM PRIVATE

## 2025-08-27 PROCEDURE — 97535 SELF CARE MNGMENT TRAINING: CPT

## 2025-08-27 PROCEDURE — 2500000003 HC RX 250 WO HCPCS: Performed by: NURSE PRACTITIONER

## 2025-08-27 PROCEDURE — 85025 COMPLETE CBC W/AUTO DIFF WBC: CPT

## 2025-08-27 PROCEDURE — 80048 BASIC METABOLIC PNL TOTAL CA: CPT

## 2025-08-27 RX ORDER — INSULIN LISPRO 100 [IU]/ML
0-8 INJECTION, SOLUTION INTRAVENOUS; SUBCUTANEOUS
Status: DISCONTINUED | OUTPATIENT
Start: 2025-08-27 | End: 2025-08-28 | Stop reason: HOSPADM

## 2025-08-27 RX ORDER — POLYETHYLENE GLYCOL 3350 17 G/17G
17 POWDER, FOR SOLUTION ORAL DAILY
Status: DISCONTINUED | OUTPATIENT
Start: 2025-08-27 | End: 2025-08-28 | Stop reason: HOSPADM

## 2025-08-27 RX ORDER — SENNA AND DOCUSATE SODIUM 50; 8.6 MG/1; MG/1
2 TABLET, FILM COATED ORAL DAILY
Status: DISCONTINUED | OUTPATIENT
Start: 2025-08-27 | End: 2025-08-28 | Stop reason: HOSPADM

## 2025-08-27 RX ADMIN — INSULIN LISPRO 6 UNITS: 100 INJECTION, SOLUTION INTRAVENOUS; SUBCUTANEOUS at 21:22

## 2025-08-27 RX ADMIN — NIFEDIPINE 30 MG: 30 TABLET, FILM COATED, EXTENDED RELEASE ORAL at 09:15

## 2025-08-27 RX ADMIN — Medication 3 MG: at 21:18

## 2025-08-27 RX ADMIN — METOPROLOL SUCCINATE 12.5 MG: 25 TABLET, EXTENDED RELEASE ORAL at 09:15

## 2025-08-27 RX ADMIN — DOCUSATE SODIUM 50MG AND SENNOSIDES 8.6MG 2 TABLET: 8.6; 5 TABLET, FILM COATED ORAL at 16:00

## 2025-08-27 RX ADMIN — ERYTHROMYCIN: 5 OINTMENT OPHTHALMIC at 23:46

## 2025-08-27 RX ADMIN — INSULIN HUMAN 20 UNITS: 100 INJECTION, SUSPENSION SUBCUTANEOUS at 09:13

## 2025-08-27 RX ADMIN — INSULIN HUMAN 20 UNITS: 100 INJECTION, SUSPENSION SUBCUTANEOUS at 16:01

## 2025-08-27 RX ADMIN — PANTOPRAZOLE SODIUM 40 MG: 40 TABLET, DELAYED RELEASE ORAL at 05:55

## 2025-08-27 RX ADMIN — ACETAMINOPHEN 650 MG: 325 TABLET ORAL at 05:55

## 2025-08-27 RX ADMIN — AMIODARONE HYDROCHLORIDE 100 MG: 200 TABLET ORAL at 09:14

## 2025-08-27 RX ADMIN — PRAVASTATIN SODIUM 20 MG: 10 TABLET ORAL at 21:18

## 2025-08-27 RX ADMIN — FUROSEMIDE 40 MG: 40 TABLET ORAL at 17:11

## 2025-08-27 RX ADMIN — LOSARTAN POTASSIUM 25 MG: 50 TABLET, FILM COATED ORAL at 09:14

## 2025-08-27 RX ADMIN — POLYETHYLENE GLYCOL 3350 17 G: 17 POWDER, FOR SOLUTION ORAL at 16:01

## 2025-08-27 RX ADMIN — ASPIRIN 81 MG CHEWABLE TABLET 81 MG: 81 TABLET CHEWABLE at 09:16

## 2025-08-27 RX ADMIN — PREDNISONE 10 MG: 5 TABLET ORAL at 09:14

## 2025-08-27 RX ADMIN — FUROSEMIDE 40 MG: 40 TABLET ORAL at 09:14

## 2025-08-27 RX ADMIN — SODIUM CHLORIDE, PRESERVATIVE FREE 10 ML: 5 INJECTION INTRAVENOUS at 09:19

## 2025-08-27 RX ADMIN — ENOXAPARIN SODIUM 40 MG: 100 INJECTION SUBCUTANEOUS at 09:18

## 2025-08-27 RX ADMIN — SODIUM CHLORIDE, PRESERVATIVE FREE 10 ML: 5 INJECTION INTRAVENOUS at 21:18

## 2025-08-27 ASSESSMENT — PAIN DESCRIPTION - DESCRIPTORS: DESCRIPTORS: DULL

## 2025-08-27 ASSESSMENT — PAIN SCALES - GENERAL
PAINLEVEL_OUTOF10: 5
PAINLEVEL_OUTOF10: 0
PAINLEVEL_OUTOF10: 0

## 2025-08-27 ASSESSMENT — PAIN DESCRIPTION - LOCATION: LOCATION: ABDOMEN

## 2025-08-27 ASSESSMENT — PAIN DESCRIPTION - ORIENTATION: ORIENTATION: UPPER

## 2025-08-28 VITALS
HEART RATE: 62 BPM | SYSTOLIC BLOOD PRESSURE: 143 MMHG | DIASTOLIC BLOOD PRESSURE: 63 MMHG | OXYGEN SATURATION: 97 % | HEIGHT: 67 IN | BODY MASS INDEX: 33.79 KG/M2 | RESPIRATION RATE: 16 BRPM | TEMPERATURE: 97.5 F | WEIGHT: 215.3 LBS

## 2025-08-28 LAB
ANION GAP SERPL CALC-SCNC: 9 MMOL/L (ref 2–14)
BASOPHILS # BLD: 0.02 K/UL (ref 0–0.1)
BASOPHILS NFR BLD: 0.3 % (ref 0–1)
BUN SERPL-MCNC: 30 MG/DL (ref 8–23)
BUN/CREAT SERPL: 21 (ref 12–20)
CALCIUM SERPL-MCNC: 8.6 MG/DL (ref 8.8–10.2)
CHLORIDE SERPL-SCNC: 102 MMOL/L (ref 98–107)
CO2 SERPL-SCNC: 29 MMOL/L (ref 20–29)
CREAT SERPL-MCNC: 1.41 MG/DL (ref 0.7–1.2)
DIFFERENTIAL METHOD BLD: ABNORMAL
EOSINOPHIL # BLD: 0.18 K/UL (ref 0–0.4)
EOSINOPHIL NFR BLD: 2.8 % (ref 0–7)
ERYTHROCYTE [DISTWIDTH] IN BLOOD BY AUTOMATED COUNT: 14.4 % (ref 11.5–14.5)
GLUCOSE BLD STRIP.AUTO-MCNC: 129 MG/DL (ref 65–117)
GLUCOSE BLD STRIP.AUTO-MCNC: 260 MG/DL (ref 65–117)
GLUCOSE BLD STRIP.AUTO-MCNC: 284 MG/DL (ref 65–117)
GLUCOSE SERPL-MCNC: 116 MG/DL (ref 65–100)
HCT VFR BLD AUTO: 29 % (ref 36.6–50.3)
HGB BLD-MCNC: 9.4 G/DL (ref 12.1–17)
IMM GRANULOCYTES # BLD AUTO: 0.04 K/UL (ref 0–0.04)
IMM GRANULOCYTES NFR BLD AUTO: 0.6 % (ref 0–0.5)
LYMPHOCYTES # BLD: 1.78 K/UL (ref 0.8–3.5)
LYMPHOCYTES NFR BLD: 27.6 % (ref 12–49)
MCH RBC QN AUTO: 30.8 PG (ref 26–34)
MCHC RBC AUTO-ENTMCNC: 32.4 G/DL (ref 30–36.5)
MCV RBC AUTO: 95.1 FL (ref 80–99)
MONOCYTES # BLD: 0.59 K/UL (ref 0–1)
MONOCYTES NFR BLD: 9.2 % (ref 5–13)
NEUTS SEG # BLD: 3.83 K/UL (ref 1.8–8)
NEUTS SEG NFR BLD: 59.5 % (ref 32–75)
NRBC # BLD: 0 K/UL (ref 0–0.01)
NRBC BLD-RTO: 0 PER 100 WBC
PLATELET # BLD AUTO: 256 K/UL (ref 150–400)
PMV BLD AUTO: 9.8 FL (ref 8.9–12.9)
POTASSIUM SERPL-SCNC: 4.2 MMOL/L (ref 3.5–5.1)
RBC # BLD AUTO: 3.05 M/UL (ref 4.1–5.7)
SERVICE CMNT-IMP: ABNORMAL
SODIUM SERPL-SCNC: 140 MMOL/L (ref 136–145)
WBC # BLD AUTO: 6.4 K/UL (ref 4.1–11.1)

## 2025-08-28 PROCEDURE — 6370000000 HC RX 637 (ALT 250 FOR IP): Performed by: NURSE PRACTITIONER

## 2025-08-28 PROCEDURE — 2500000003 HC RX 250 WO HCPCS: Performed by: NURSE PRACTITIONER

## 2025-08-28 PROCEDURE — 85025 COMPLETE CBC W/AUTO DIFF WBC: CPT

## 2025-08-28 PROCEDURE — 82962 GLUCOSE BLOOD TEST: CPT

## 2025-08-28 PROCEDURE — 80048 BASIC METABOLIC PNL TOTAL CA: CPT

## 2025-08-28 PROCEDURE — 6370000000 HC RX 637 (ALT 250 FOR IP): Performed by: STUDENT IN AN ORGANIZED HEALTH CARE EDUCATION/TRAINING PROGRAM

## 2025-08-28 PROCEDURE — 6360000002 HC RX W HCPCS: Performed by: STUDENT IN AN ORGANIZED HEALTH CARE EDUCATION/TRAINING PROGRAM

## 2025-08-28 PROCEDURE — 94760 N-INVAS EAR/PLS OXIMETRY 1: CPT

## 2025-08-28 RX ADMIN — ERYTHROMYCIN: 5 OINTMENT OPHTHALMIC at 05:55

## 2025-08-28 RX ADMIN — LOSARTAN POTASSIUM 25 MG: 50 TABLET, FILM COATED ORAL at 10:36

## 2025-08-28 RX ADMIN — METOPROLOL SUCCINATE 12.5 MG: 25 TABLET, EXTENDED RELEASE ORAL at 10:52

## 2025-08-28 RX ADMIN — INSULIN LISPRO 4 UNITS: 100 INJECTION, SOLUTION INTRAVENOUS; SUBCUTANEOUS at 17:33

## 2025-08-28 RX ADMIN — PREDNISONE 10 MG: 5 TABLET ORAL at 10:33

## 2025-08-28 RX ADMIN — ENOXAPARIN SODIUM 40 MG: 100 INJECTION SUBCUTANEOUS at 10:29

## 2025-08-28 RX ADMIN — INSULIN HUMAN 20 UNITS: 100 INJECTION, SUSPENSION SUBCUTANEOUS at 10:51

## 2025-08-28 RX ADMIN — INSULIN LISPRO 4 UNITS: 100 INJECTION, SOLUTION INTRAVENOUS; SUBCUTANEOUS at 12:39

## 2025-08-28 RX ADMIN — NIFEDIPINE 30 MG: 30 TABLET, FILM COATED, EXTENDED RELEASE ORAL at 10:36

## 2025-08-28 RX ADMIN — SODIUM CHLORIDE, PRESERVATIVE FREE 10 ML: 5 INJECTION INTRAVENOUS at 10:36

## 2025-08-28 RX ADMIN — AMIODARONE HYDROCHLORIDE 100 MG: 200 TABLET ORAL at 10:33

## 2025-08-28 RX ADMIN — ERYTHROMYCIN: 5 OINTMENT OPHTHALMIC at 12:33

## 2025-08-28 RX ADMIN — POLYETHYLENE GLYCOL 3350 17 G: 17 POWDER, FOR SOLUTION ORAL at 10:31

## 2025-08-28 RX ADMIN — INSULIN HUMAN 20 UNITS: 100 INJECTION, SUSPENSION SUBCUTANEOUS at 17:34

## 2025-08-28 RX ADMIN — FUROSEMIDE 40 MG: 40 TABLET ORAL at 10:37

## 2025-08-28 RX ADMIN — DOCUSATE SODIUM 50MG AND SENNOSIDES 8.6MG 2 TABLET: 8.6; 5 TABLET, FILM COATED ORAL at 10:31

## 2025-08-28 RX ADMIN — PANTOPRAZOLE SODIUM 40 MG: 40 TABLET, DELAYED RELEASE ORAL at 06:05

## 2025-08-28 ASSESSMENT — PAIN SCALES - GENERAL
PAINLEVEL_OUTOF10: 0

## 2025-08-29 NOTE — PROGRESS NOTES
Physician Progress Note      PATIENT:               ELENITA DIAZ  CSN #:                  [id number]  :                       1940  ADMIT DATE:       2025 10:31 AM  DISCH DATE:        2025 3:24 PM  RESPONDING  PROVIDER #:        JORGE LANGLEY          QUERY TEXT:    UTI is documented in the medical record in  H&P, IM Progress Notes dated   - and DC Summaries dated -.  Please clarify the   relationship, if any, with the bilateral ureteral stents:    The clinical indicators include:   CT abs/pelvis--Severe right-sided hydronephrosis. This is unchanged. The   nephroureteral stent is in satisfactory position     MRI Abdomen--KIDNEYS Right renal cysts redemonstrated which require no   follow-up.  Nephroureteral stent remains in expected position on the right. No  hydronephrosis, enhancing renal lesions, or ureteral dilation.     Urine culture--No growth (<1,000 CFU/ML)     Urinalysis--large blood, moderate leukocyte esterase, moderate   epithelial cells, 1+ bacteria, yeast present, budding yeast present     H&P, IM Progress Notes dated - and DC Summaries dated   ---\"Complicated Urinary Tract Infection\"     Urology consult--\"Stent in good position with stable hydro. Patient   appears to be clinically improving from admission\"          Treatment---CT abd/pelvis, MRI Abdomen, Urinalysis, Urine culture, Diflucan   po, IV Zosyn, IV Vancomycin, NS 1,800ml IV Bolus, NS 1,000ml IV bolus  Options provided:  -- UTI related to ureteral stent  -- UTI not related to ureteral stent  -- Other - I will add my own diagnosis  -- Disagree - Not applicable / Not valid  -- Disagree - Clinically unable to determine / Unknown  -- Refer to Clinical Documentation Reviewer    PROVIDER RESPONSE TEXT:    UTI is related to ureteral stent.    Query created by: Jaci Peterson on 2025 6:28 PM      Electronically signed by:  JORGE LANGLEY 2025

## (undated) DEVICE — RX DELIVERY SYSTEM: Brand: NAVIFLEX™ RX DELIVERY SYSTEM

## (undated) DEVICE — BASIN EMSIS 16OZ GRAPHITE PLAS KID SHP MOLD GRAD FOR ORAL

## (undated) DEVICE — SYRINGE 20ML LL S/C 50

## (undated) DEVICE — SENSOR PLSE OXMTR NEO AD 40KG ADH DISP NELLCOR

## (undated) DEVICE — 3M™ TEGADERM™ TRANSPARENT FILM DRESSING FRAME STYLE, 1624W, 2-3/8 IN X 2-3/4 IN (6 CM X 7 CM), 100/CT 4CT/CASE: Brand: 3M™ TEGADERM™

## (undated) DEVICE — SUTURE NONABSORBABLE MONOFILAMENT 2-0 FS 18 IN ETHILON 664H

## (undated) DEVICE — SYR 5ML 1/5 GRAD LL NSAF LF --

## (undated) DEVICE — 1200 GUARD II KIT W/5MM TUBE W/O VAC TUBE: Brand: GUARDIAN

## (undated) DEVICE — REDUCER CANN ENDOWRIST 12-8MM -- DA VINCI XI - SNGL USE

## (undated) DEVICE — TOWEL 4 PLY TISS 19X30 SUE WHT

## (undated) DEVICE — NON-REM POLYHESIVE PATIENT RETURN ELECTRODE: Brand: VALLEYLAB

## (undated) DEVICE — TUBING, SUCTION, 1/4" X 10', STRAIGHT: Brand: MEDLINE

## (undated) DEVICE — GLOVE ORANGE PI 7   MSG9070

## (undated) DEVICE — SUTURE VCRL SZ 3-0 L27IN ABSRB UD L26MM SH 1/2 CIR J416H

## (undated) DEVICE — SOLUTION LACTATED RINGERS INJECTION USP

## (undated) DEVICE — SET ADMIN 16ML TBNG L100IN 2 Y INJ SITE IV PIGGY BK DISP (ORDER IN MULIPLES OF 48)

## (undated) DEVICE — SYR 3ML LL TIP 1/10ML GRAD --

## (undated) DEVICE — CUFF ADULT 1 PC 1 VINYL DISP --

## (undated) DEVICE — GENERAL LAPAROSCOPY-MRMC: Brand: MEDLINE INDUSTRIES, INC.

## (undated) DEVICE — TOTAL TRAY, 16FR 10ML SIL FOLEY, URN: Brand: MEDLINE

## (undated) DEVICE — LIQUIBAND RAPID ADHESIVE 36/CS 0.8ML: Brand: MEDLINE

## (undated) DEVICE — SYRINGE MEDICAL 3ML CLEAR PLASTIC STANDARD NON CONTROL LUERLOCK TIP DISPOSABLE

## (undated) DEVICE — Z DISCONTINUED PER MEDLINE LINE GAS SAMPLING O2/CO2 LNG AD 13 FT NSL W/ TBNG FILTERLINE

## (undated) DEVICE — APPLICATOR ENDOSCP CANN S STL STYL N DEHP FOR DELIVERING

## (undated) DEVICE — SNARE ENDOSCP L240CM LOOP W27MM SHTH DIA2.4MM WRK CHN 2.8MM

## (undated) DEVICE — SYR 10ML LUER LOK 1/5ML GRAD --

## (undated) DEVICE — ENDOSCOPIC KIT COMPLIANCE ENDOKIT

## (undated) DEVICE — Device: Brand: MEDEX

## (undated) DEVICE — SOLIDIFIER FLD 2OZ 1500CC N DISINF IN BTL DISP SAFESORB

## (undated) DEVICE — KENDALL DL ECG CABLE AND LEAD WIRE SYSTEM, 3-LEAD, SINGLE PATIENT USE: Brand: KENDALL

## (undated) DEVICE — FLOSEAL WITH RECOTHROM - 10ML.: Brand: FLOSEAL HEMOSTATIC MATRIX

## (undated) DEVICE — SURGICAL PROCEDURE PACK CATRCT CUST

## (undated) DEVICE — SEAL UNIV 5-8MM DISP BX/10 -- DA VINCI XI - SNGL USE

## (undated) DEVICE — SYRINGE MED 10ML LUERLOCK TIP W/O SFTY DISP

## (undated) DEVICE — STAPLER INT L28CM DIA29MM CLS STPL H10-2.5MM OPN LEG L5.5MM

## (undated) DEVICE — OLYMPUS SINGLE USE DISTAL COVER

## (undated) DEVICE — LINE FILTER NASAL CANNULA SHORT TERM ADULT 6.5

## (undated) DEVICE — TOWEL OR BL STR 4/PK -- MEDICHOICE - MEDLINE

## (undated) DEVICE — SET ADMIN 16ML TBNG L100IN 2 Y INJ SITE IV PIGGY BK DISP

## (undated) DEVICE — SYSTEM BX CAP BILI RAP EXCHG CAP LOK DEV COMPATIBLE W/ OLY

## (undated) DEVICE — NEEDLE SCLERO 25GA L240CM OD0.51MM ID0.24MM EXTN L4MM SHTH

## (undated) DEVICE — STAIN INDIA INK IN NACL 10ML --

## (undated) DEVICE — PURSE STRING DEVICE: Brand: PURSTRING

## (undated) DEVICE — ANTI-EMBOLISM STOCKINGS,KNEE LENGTH,LARGE,REGULAR,SIZE E-: Brand: T.E.D.

## (undated) DEVICE — DRAPE,ROBOTICS,STERILE: Brand: MEDLINE

## (undated) DEVICE — NEEDLE HYPO 18GA L1.5IN PNK S STL HUB POLYPR SHLD REG BVL

## (undated) DEVICE — Device

## (undated) DEVICE — CYSTO/BLADDER IRRIGATION SET, REGULATING CLAMP

## (undated) DEVICE — CLIP HEMO ENDOSCP 235CM BX/10 -- RESOLUTION 360

## (undated) DEVICE — FIAPC® PROBE W/ FILTER 2200 C OD 2.3MM/6.9FR; L 2.2M/7.2FT: Brand: ERBE

## (undated) DEVICE — COVER,MAYO STAND,XL,STERILE: Brand: MEDLINE

## (undated) DEVICE — 3M™ IOBAN™ 2 ANTIMICROBIAL INCISE DRAPE 6650EZ: Brand: IOBAN™ 2

## (undated) DEVICE — INSUFFLATION NEEDLE: Brand: SURGINEEDLE

## (undated) DEVICE — TISSUE RETRIEVAL SYSTEM: Brand: INZII RETRIEVAL SYSTEM

## (undated) DEVICE — GOWN,SIRUS,FABRNF,XL,20/CS: Brand: MEDLINE

## (undated) DEVICE — SOLUTION IRRIG 1000ML 0.9% SOD CHL USP POUR PLAS BTL

## (undated) DEVICE — TRAP SUC MUCOUS 70ML -- MEDICHOICE MEDLINE

## (undated) DEVICE — CUFF BLD PRSS AD CLTH SGL TB W/ BAYNT CONN ROUNDED CORNER

## (undated) DEVICE — SNARE ENDOSCP M L240CM W27MM SHTH DIA2.4MM CHN 2.8MM OVL

## (undated) DEVICE — HYPODERMIC SAFETY NEEDLE: Brand: MAGELLAN

## (undated) DEVICE — GLOVE ORTHO 7 1/2   MSG9475

## (undated) DEVICE — BLADELESS OBTURATOR: Brand: WECK VISTA

## (undated) DEVICE — SYSTEM FASCIAL CLSR UNIQUE SHLDED WNG SAFE UNIF CONSISTENT

## (undated) DEVICE — BAG SPEC BIOHZRD 10 X 10 IN --

## (undated) DEVICE — STAPLER 60 RELOAD GREEN: Brand: SUREFORM

## (undated) DEVICE — SYRINGE 50ML E/T

## (undated) DEVICE — GUIDEWIRE ENDOSCP STD ANG HYDRPHLC L260CM OD035IN NAVIPRO

## (undated) DEVICE — STRAINER URIN CALC RNL MSH -- CONVERT TO ITEM 357634

## (undated) DEVICE — SYRINGE IRRIG 60ML SFT PLIABLE BLB EZ TO GRP 1 HND USE W/

## (undated) DEVICE — SOLUTION IV 250ML 0.9% SOD CHL CLR INJ FLX BG CONT PRT CLSR

## (undated) DEVICE — SUTURE PDS II SZ 0 L60IN ABSRB VLT L48MM CTX 1/2 CIR Z990G

## (undated) DEVICE — SINGLE USE DISTAL COVER MAJ-2315: Brand: SINGLE USE DISTAL COVER

## (undated) DEVICE — CONTAINER SPEC 20 ML LID NEUT BUFF FORMALIN 10 % POLYPR STS

## (undated) DEVICE — SUTURE VCRL SZ 10-0 L12IN ABSRB VLT L5.5MM CS160-6 1/2 CIR V448G

## (undated) DEVICE — (D)AGENT INJECTN ELEVIEW 10ML -- DISC BY MFG

## (undated) DEVICE — SOLIDIFIER MEDC 1200ML -- CONVERT TO 356117

## (undated) DEVICE — SPONGE LAPAROTOMY W18XL18IN WHITE STRUNG RADIOPAQUE STERILE

## (undated) DEVICE — AEGIS 1" DISK 4MM HOLE, PEEL OPEN: Brand: MEDLINE

## (undated) DEVICE — ACCESS PLATFORM FOR MINIMALLY INVASIVE SURGERY.: Brand: GELPORT® LAPAROSCOPIC  SYSTEM

## (undated) DEVICE — GUIDEWIRE ENDOSCP WRK L450CM DIA0.035IN STD SHFT STR RND

## (undated) DEVICE — ABSORBABLE WOUND CLOSURE DEVICE: Brand: V-LOC 90

## (undated) DEVICE — CONTINU-FLO SOLUTION SET, 2 INJECTION SITES, MALE LUER LOCK ADAPTER WITH RETRACTABLE COLLAR, LARGE BORE STOPCOCK WITH ROTATING MALE LUER LOCK EXTENSION SET, 2 INJECTION SITES, MALE LUER LOCK ADAPTER WITH RETRACTABLE COLLAR: Brand: INTERLINK/CONTINU-FLO

## (undated) DEVICE — CATH IV AUTOGRD BC PNK 20GA 25 -- INSYTE

## (undated) DEVICE — STAPLER 60: Brand: SUREFORM

## (undated) DEVICE — SPONGE LAP 18X18IN STRL -- 5/PK

## (undated) DEVICE — SOLUTION IV STRL H2O 500 ML AQUALITE POUR BTL

## (undated) DEVICE — CLIP INT L235CM WRK CHAN DIA2.8MM OPN 11MM LCK MECHANISM MR

## (undated) DEVICE — ELECTRODE PT RET AD L9FT HI MOIST COND ADH HYDRGEL CORDED

## (undated) DEVICE — SOLUTION IRRIG 500ML 0.9% SOD CHLO USP POUR PLAS BTL

## (undated) DEVICE — STERILE POLYISOPRENE POWDER-FREE SURGICAL GLOVES: Brand: PROTEXIS

## (undated) DEVICE — ENDO CARRY-ON PROCEDURE KIT INCLUDES ENZYMATIC SPONGE, GAUZE, BIOHAZARD LABEL, TRAY, LUBRICANT, DIRTY SCOPE LABEL, WATER LABEL, TRAY, DRAWSTRING PAD, AND DEFENDO 4-PIECE KIT.: Brand: ENDO CARRY-ON PROCEDURE KIT

## (undated) DEVICE — YANKAUER,POOLE TIP,STERILE,50/CS: Brand: MEDLINE

## (undated) DEVICE — (D)SYR 10ML 1/5ML GRAD NSAF -- PKGING CHANGE USE ITEM 338027

## (undated) DEVICE — SEAL

## (undated) DEVICE — YANKAUER,TAPERED BULBOUS TIP,W/O VENT: Brand: MEDLINE

## (undated) DEVICE — FORCEPS BX L240CM JAW DIA2.4MM ORNG L CAP W/ NDL DISP RAD

## (undated) DEVICE — Z INACTIVE NO ACTIVE SUPPLIER BLOCK BITE ENDOSCP AD 21 MM W/ DIL BLU LF DISP

## (undated) DEVICE — SET GRAV CK VLV NEEDLESS ST 3 GANGED 4WAY STPCOCK HI FLO 10

## (undated) DEVICE — Z DISCONTINUED NO SUB IDED CATHETER IV 14GA L2IN POLYUR STR ORNG HUB SFTY RADPQ DISP

## (undated) DEVICE — VISUALIZATION SYSTEM: Brand: CLEARIFY

## (undated) DEVICE — DRAPE ARM BX/20 -- DA VINCI XI

## (undated) DEVICE — RETRIEVAL BALLOON CATHETER: Brand: EXTRACTOR™ PRO RX

## (undated) DEVICE — GARMENT,MEDLINE,DVT,INT,CALF,MED, GEN2: Brand: MEDLINE

## (undated) DEVICE — SOLUTION ANTIFOG VIS SYS CLEARIFY LAPSCP

## (undated) DEVICE — THE MONARCH® "D" CARTRIDGE IS A SINGLE-USE POLYPROPYLENE CARTRIDGE FOR POSTERIOR CHAMBER IOL DELIVERY: Brand: MONARCH® III

## (undated) DEVICE — VESSEL SEALER: Brand: ENDOWRIST

## (undated) DEVICE — GRASPER ENDOSCP TIP L10MM ANVIL ROT RATCH HNDL DISP

## (undated) DEVICE — SYRINGE MED 3ML CLR PLAS STD N CTRL LUERLOCK TIP DISP

## (undated) DEVICE — OPEN-END URETERAL CATHETER: Brand: COOK

## (undated) DEVICE — NEEDLE HYPO 22GA L1.5IN BLK S STL HUB POLYPR SHLD REG BVL

## (undated) DEVICE — CANNULATING SPHINCTEROTOME: Brand: JAGTOME™ REVOLUTION RX

## (undated) DEVICE — SUTURE MCRYL SZ 4-0 L27IN ABSRB UD L19MM PS-2 1/2 CIR PRIM Y426H

## (undated) DEVICE — TROCAR: Brand: KII FIOS FIRST ENTRY

## (undated) DEVICE — DISPOSABLE SUCTION/IRRIGATOR TUBE SET: Brand: AHTO

## (undated) DEVICE — NEONATAL-ADULT SPO2 SENSOR: Brand: NELLCOR

## (undated) DEVICE — LAPAROSCOPIC TROCAR SLEEVE/SINGLE USE: Brand: KII® OPTICAL ACCESS SYSTEM

## (undated) DEVICE — ELECTRODE,RADIOTRANSLUCENT,FOAM,5PK: Brand: MEDLINE

## (undated) DEVICE — C-ARM: Brand: UNBRANDED

## (undated) DEVICE — NDL FLTR TIP 5 MIC 18GX1.5IN --

## (undated) DEVICE — PAD PT POS 36 IN SURGYPAD DISP

## (undated) DEVICE — SOLUTION IRRIG 1000ML H2O PIC PLAS SHATTERPROOF CONTAINER

## (undated) DEVICE — TROCAR: Brand: KII® SLEEVE

## (undated) DEVICE — SPHINCTEROTOME: Brand: DREAMTOME™ RX 44

## (undated) DEVICE — APPLIER CLP M/L SHFT DIA5MM 15 LIG LIGAMAX 5

## (undated) DEVICE — VESSEL SEALER XI EXTENDED DISP -- VESSEL

## (undated) DEVICE — SYRINGE INSULIN 1ML LUERSLIP TIP W/O SFTY U100 BLISTER PK

## (undated) DEVICE — GLOVE ORANGE PI 7 1/2   MSG9075

## (undated) DEVICE — ARM DRAPE

## (undated) DEVICE — SPONGE GZ W4XL4IN COT 12 PLY TYP VII WVN C FLD DSGN STERILE

## (undated) DEVICE — ESOPHAGEAL/PYLORIC/COLONIC/BILIARY WIREGUIDED BALLOON DILATATION CATHETER: Brand: CRE™ PRO

## (undated) DEVICE — DECANTER BTL 6IN: Brand: MEDLINE INDUSTRIES, INC.

## (undated) DEVICE — OBTRTR BLDELSS OPT 8MM DISP -- DA VINICI XI - SNGL USE

## (undated) DEVICE — BASIN EMESIS 500CC ROSE 250/CS 60/PLT: Brand: MEDEGEN MEDICAL PRODUCTS, LLC

## (undated) DEVICE — SINGLE USE GRASPING FORCEPS: Brand: SINGLE USE GRASPING FORCEPS

## (undated) DEVICE — STRAP,POSITIONING,KNEE/BODY,FOAM,4X60": Brand: MEDLINE

## (undated) DEVICE — SET CHOLANGIOGRAPHY 4FR L60CM W/ ARW KARLAN BLLN CATH CRV

## (undated) DEVICE — GLOVE SURG SZ 75 L12IN FNGR THK79MIL GRN LTX FREE

## (undated) DEVICE — ROCKER SWITCH PENCIL BLADE ELECTRODE, HOLSTER: Brand: EDGE

## (undated) DEVICE — IV START KIT: Brand: MEDLINE

## (undated) DEVICE — 450 ML BOTTLE OF 0.05% CHLORHEXIDINE GLUCONATE IN 99.95% STERILE WATER FOR IRRIGATION, USP AND APPLICATOR.: Brand: IRRISEPT ANTIMICROBIAL WOUND LAVAGE

## (undated) DEVICE — MARYLAND JAW LAPAROSCOPIC SEALER/DIVIDER COATED: Brand: LIGASURE

## (undated) DEVICE — ADHESIVE SKIN CLSR 0.7ML TOP DERMBND ADV

## (undated) DEVICE — INTENDED FOR TISSUE SEPARATION, AND OTHER PROCEDURES THAT REQUIRE A SHARP SURGICAL BLADE TO PUNCTURE OR CUT.: Brand: BARD-PARKER ® CARBON RIB-BACK BLADES

## (undated) DEVICE — DRAIN SURG 15FR L3/16IN DIA4.7MM SIL CHN RND FULL FLUT TRCR

## (undated) DEVICE — WOUND RETRACTOR AND PROTECTOR: Brand: ALEXIS WOUND PROTECTOR-RETRACTOR

## (undated) DEVICE — STAPLER RELOAD SUREFORM 60 BLU -- DA VINCI XI

## (undated) DEVICE — SUTURE PROL SZ 2-0 L36IN NONABSORBABLE BLU SH L26MM 1/2 CIR 8523H

## (undated) DEVICE — FORCEPS BX L240CM JAW DIA2.8MM L CAP W/ NDL MIC MESH TOOTH

## (undated) DEVICE — CONTAINER SPEC 20ML NEUT BUFF FRMLN PREFIL STATLAB

## (undated) DEVICE — SOLUTION IRRIG 1000ML STRL H2O USP PLAS POUR BTL

## (undated) DEVICE — SUTURE SZ 0 27IN 5/8 CIR UR-6  TAPER PT VIOLET ABSRB VICRYL J603H

## (undated) DEVICE — TRAP,MUCUS SPECIMEN, 80CC: Brand: MEDLINE

## (undated) DEVICE — REM POLYHESIVE ADULT PATIENT RETURN ELECTRODE: Brand: VALLEYLAB

## (undated) DEVICE — CYSTO MRMC: Brand: MEDLINE INDUSTRIES, INC.

## (undated) DEVICE — CATHETER IV 20 GAX1 IN N WNG KINK RESIST INSYT AUTOGRD

## (undated) DEVICE — SOLUTION IV 1000ML 0.9% SOD CHL PH 5 INJ USP VIAFLX PLAS

## (undated) DEVICE — CATH IV AUTOGRD BC BLU 22GA 25 -- INSYTE

## (undated) DEVICE — IV STRT KT 3282] LSL INDUSTRIES INC]

## (undated) DEVICE — KENDALL RADIOLUCENT FOAM MONITORING ELECTRODE RECTANGULAR SHAPE: Brand: KENDALL

## (undated) DEVICE — E-Z CLEAN, PTFE COATED, ELECTROSURGICAL LAPAROSCOPIC ELECTRODE, L-HOOK, 33 CM., SINGLE-USE, FOR USE WITH HAND CONTROL PENCIL: Brand: MEGADYNE

## (undated) DEVICE — FIAPC® PROBE W/ FILTER 2200 A OD 2.3MM/6.9FR; L 2.2M/7.2FT: Brand: ERBE

## (undated) DEVICE — 3M™ TEGADERM™ TRANSPARENT FILM DRESSING FRAME STYLE, 1626W, 4 IN X 4-3/4 IN (10 CM X 12 CM), 50/CT 4CT/CASE: Brand: 3M™ TEGADERM™

## (undated) DEVICE — SUTURE VCRL SZ 2-0 L27IN ABSRB UD L26MM SH 1/2 CIR J417H

## (undated) DEVICE — SOLUTION IRRIGATION STRL H2O 1000 ML UROMATIC CONTAINER

## (undated) DEVICE — LOOP LIG SUT SZ 0 L18IN ABSRB POLYDIOXANONE MFIL PDS II